# Patient Record
Sex: MALE | Race: WHITE | NOT HISPANIC OR LATINO | Employment: OTHER | URBAN - NONMETROPOLITAN AREA
[De-identification: names, ages, dates, MRNs, and addresses within clinical notes are randomized per-mention and may not be internally consistent; named-entity substitution may affect disease eponyms.]

---

## 2023-03-14 ENCOUNTER — TELEPHONE (OUTPATIENT)
Dept: PRIMARY CARE | Facility: CLINIC | Age: 73
End: 2023-03-14

## 2023-03-14 ENCOUNTER — OFFICE VISIT (OUTPATIENT)
Dept: PRIMARY CARE | Facility: CLINIC | Age: 73
End: 2023-03-14
Payer: MEDICARE

## 2023-03-14 VITALS
DIASTOLIC BLOOD PRESSURE: 72 MMHG | WEIGHT: 219 LBS | HEIGHT: 70 IN | RESPIRATION RATE: 16 BRPM | BODY MASS INDEX: 31.35 KG/M2 | HEART RATE: 78 BPM | OXYGEN SATURATION: 98 % | SYSTOLIC BLOOD PRESSURE: 152 MMHG | TEMPERATURE: 97.7 F

## 2023-03-14 DIAGNOSIS — R68.84 JAW PAIN: Primary | ICD-10-CM

## 2023-03-14 DIAGNOSIS — R03.0 ELEVATED BLOOD PRESSURE READING: ICD-10-CM

## 2023-03-14 DIAGNOSIS — C90.00 MULTIPLE MYELOMA, REMISSION STATUS UNSPECIFIED (MULTI): ICD-10-CM

## 2023-03-14 PROBLEM — M89.9 BONE LESION: Status: ACTIVE | Noted: 2023-03-14

## 2023-03-14 PROBLEM — M84.359A STRESS FRACTURE OF HIP: Status: ACTIVE | Noted: 2023-03-14

## 2023-03-14 PROBLEM — M16.10 HIP ARTHRITIS: Status: ACTIVE | Noted: 2023-03-14

## 2023-03-14 PROBLEM — M17.9 ARTHRITIS OF KNEE, DEGENERATIVE: Status: ACTIVE | Noted: 2023-03-14

## 2023-03-14 PROBLEM — R79.89 ELEVATED SERUM CREATININE: Status: ACTIVE | Noted: 2023-03-14

## 2023-03-14 PROBLEM — N52.9 ERECTILE DYSFUNCTION: Status: ACTIVE | Noted: 2023-03-14

## 2023-03-14 PROBLEM — M79.672 LEFT FOOT PAIN: Status: ACTIVE | Noted: 2023-03-14

## 2023-03-14 PROBLEM — R94.4 DECREASED GFR: Status: ACTIVE | Noted: 2023-03-14

## 2023-03-14 PROBLEM — S69.91XA FINGER INJURY, RIGHT, INITIAL ENCOUNTER: Status: ACTIVE | Noted: 2023-03-14

## 2023-03-14 PROBLEM — I51.9 LEFT VENTRICULAR DIASTOLIC DYSFUNCTION: Status: ACTIVE | Noted: 2023-03-14

## 2023-03-14 PROBLEM — M79.89 SWELLING OF EXTREMITY, LEFT: Status: ACTIVE | Noted: 2023-03-14

## 2023-03-14 PROBLEM — I95.1 AUTONOMIC ORTHOSTATIC HYPOTENSION: Status: ACTIVE | Noted: 2023-03-14

## 2023-03-14 PROBLEM — S42.002A FRACTURE OF CLAVICLE, LEFT, CLOSED: Status: ACTIVE | Noted: 2023-03-14

## 2023-03-14 PROBLEM — I82.402 ACUTE THROMBOEMBOLISM OF DEEP VEINS OF LEFT LOWER EXTREMITY (MULTI): Status: ACTIVE | Noted: 2023-03-14

## 2023-03-14 PROBLEM — I10 BENIGN ESSENTIAL HYPERTENSION: Status: ACTIVE | Noted: 2023-03-14

## 2023-03-14 PROBLEM — M48.02 CERVICAL STENOSIS OF SPINE: Status: ACTIVE | Noted: 2023-03-14

## 2023-03-14 PROBLEM — G47.00 INSOMNIA: Status: ACTIVE | Noted: 2023-03-14

## 2023-03-14 PROBLEM — M25.551 ACUTE PAIN OF RIGHT HIP: Status: ACTIVE | Noted: 2023-03-14

## 2023-03-14 PROBLEM — Z94.84 STEM CELLS TRANSPLANT STATUS (MULTI): Status: ACTIVE | Noted: 2023-03-14

## 2023-03-14 PROCEDURE — 3077F SYST BP >= 140 MM HG: CPT | Performed by: FAMILY MEDICINE

## 2023-03-14 PROCEDURE — 99213 OFFICE O/P EST LOW 20 MIN: CPT | Performed by: FAMILY MEDICINE

## 2023-03-14 PROCEDURE — 1159F MED LIST DOCD IN RCRD: CPT | Performed by: FAMILY MEDICINE

## 2023-03-14 PROCEDURE — 3078F DIAST BP <80 MM HG: CPT | Performed by: FAMILY MEDICINE

## 2023-03-14 PROCEDURE — 1160F RVW MEDS BY RX/DR IN RCRD: CPT | Performed by: FAMILY MEDICINE

## 2023-03-14 RX ORDER — CYCLOBENZAPRINE HCL 5 MG
5 TABLET ORAL 3 TIMES DAILY PRN
Qty: 30 TABLET | Refills: 0 | Status: SHIPPED | OUTPATIENT
Start: 2023-03-14 | End: 2023-05-13

## 2023-03-14 RX ORDER — APIXABAN 5 MG/1
5 TABLET, FILM COATED ORAL 2 TIMES DAILY
COMMUNITY
Start: 2023-02-07 | End: 2024-01-25 | Stop reason: ALTCHOICE

## 2023-03-14 RX ORDER — ACYCLOVIR 400 MG/1
400 TABLET ORAL 2 TIMES DAILY
COMMUNITY
End: 2024-01-02 | Stop reason: SDUPTHER

## 2023-03-14 RX ORDER — TRAZODONE HYDROCHLORIDE 100 MG/1
100 TABLET ORAL 3 TIMES DAILY
COMMUNITY
Start: 2015-04-17 | End: 2023-04-05 | Stop reason: SDUPTHER

## 2023-03-14 ASSESSMENT — ENCOUNTER SYMPTOMS
SHORTNESS OF BREATH: 0
PALPITATIONS: 0
SORE THROAT: 0
COUGH: 0
FEVER: 0
FATIGUE: 0
CHILLS: 0

## 2023-03-14 NOTE — TELEPHONE ENCOUNTER
Please call and let him know I spoke to Dr. Almodovar as well following his visit today- he suggest seeing Dentist first and getting xrays updated then.  May need CT scan of the jaw, either Dentist or we can order.

## 2023-03-14 NOTE — PROGRESS NOTES
"Subjective   Patient ID: Maldonado Mccloud is a 72 y.o. male who presents for Jaw Pain (Pain when he opens is mouth, pain will go up to his right eye /Swallowing will hurt right side only ).    HPI     Here today for right sided jaw pain, worse when opening mouth, pain radiates up towards right eye  He isn't sure what he did that caused it   Pain in throat when swallowing, right side only  Bothering him for 10 days or so   No recent illness  No injuries   No redness  No rashes  Pain described as dull   Headache right side and ear pain as well, feels like coming from jaw    No vision changes   Compliant with dental checks every 6 months   Scheduled to see Dentist on Monday   No teeth pain     Elevated BP- not on meds     Multiple myeloma- diagnosed about 8 years ago- was taken off his meds about 3 months for a hip replacement, will be going back on chemo soon     Can't take NSAIDs because he is on eliquis       Review of Systems   Constitutional:  Negative for chills, fatigue and fever.   HENT:  Negative for dental problem and sore throat.    Respiratory:  Negative for cough and shortness of breath.    Cardiovascular:  Negative for chest pain and palpitations.       Objective   /72 (BP Location: Left arm, Patient Position: Sitting, BP Cuff Size: Adult)   Pulse 78   Temp 36.5 °C (97.7 °F) (Temporal)   Resp 16   Ht 1.778 m (5' 10\")   Wt 99.3 kg (219 lb)   SpO2 98%   BMI 31.42 kg/m²     Physical Exam    Constitutional: Well developed, well nourished, alert and in no acute distress.  Head and Face: NC/AT  Eyes: Normal external exam.   ENT: External inspection of ears normal, tympanic membranes visualized and normal. Oral mucosa moist, oropharynx clear without tonsillar exudate or erythema.  Right side of jaw is tender, no dislocation or subluxation present.    Neck: Supple. No cervical lymphadenopathy.   Skin: Warm, well perfused, normal skin turgor and color.   Neurologic: Cranial nerves II-XII grossly " intact.      Assessment/Plan   Problem List Items Addressed This Visit          Other    Multiple myeloma (CMS/HCC)     Other Visit Diagnoses       Jaw pain    -  Primary    Right sided  Seeing Dentist Monday, will defer imaging to them, may need CT scan   Referral placed to ENT as well with history of cancer    Relevant Medications    cyclobenzaprine (Flexeril) 5 mg tablet    Other Relevant Orders    Referral to ENT    Elevated blood pressure reading        Follow up back in office next week for nurse visit check          Danica Dawson,   3/14/2023

## 2023-04-05 DIAGNOSIS — G47.09 OTHER INSOMNIA: ICD-10-CM

## 2023-04-05 RX ORDER — TRAZODONE HYDROCHLORIDE 100 MG/1
TABLET ORAL
Qty: 270 TABLET | Refills: 1 | Status: SHIPPED | OUTPATIENT
Start: 2023-04-05 | End: 2023-08-23 | Stop reason: SDUPTHER

## 2023-04-05 NOTE — TELEPHONE ENCOUNTER
Pt called for a med refill. Pt needs a refill on his Trazodone. He would like it sent to his mail away co. [facility]. Pt has an ov 8/23/23 with [facility].

## 2023-05-26 ENCOUNTER — HOSPITAL ENCOUNTER (OUTPATIENT)
Dept: DATA CONVERSION | Facility: HOSPITAL | Age: 73
End: 2023-05-26
Attending: INTERNAL MEDICINE | Admitting: INTERNAL MEDICINE
Payer: MEDICARE

## 2023-05-26 DIAGNOSIS — F17.290 NICOTINE DEPENDENCE, OTHER TOBACCO PRODUCT, UNCOMPLICATED: ICD-10-CM

## 2023-05-26 DIAGNOSIS — Z88.0 ALLERGY STATUS TO PENICILLIN: ICD-10-CM

## 2023-05-26 DIAGNOSIS — Z76.82 AWAITING ORGAN TRANSPLANT STATUS: ICD-10-CM

## 2023-05-26 DIAGNOSIS — C90.00 MULTIPLE MYELOMA NOT HAVING ACHIEVED REMISSION (MULTI): ICD-10-CM

## 2023-06-26 LAB
BASOPHILS (10*3/UL) IN BLOOD BY AUTOMATED COUNT: 0.01 X10E9/L (ref 0–0.1)
BASOPHILS/100 LEUKOCYTES IN BLOOD BY AUTOMATED COUNT: 0.4 % (ref 0–2)
EOSINOPHILS (10*3/UL) IN BLOOD BY AUTOMATED COUNT: 0.03 X10E9/L (ref 0–0.4)
EOSINOPHILS/100 LEUKOCYTES IN BLOOD BY AUTOMATED COUNT: 1.3 % (ref 0–6)
ERYTHROCYTE DISTRIBUTION WIDTH (RATIO) BY AUTOMATED COUNT: 13 % (ref 11.5–14.5)
ERYTHROCYTE MEAN CORPUSCULAR HEMOGLOBIN CONCENTRATION (G/DL) BY AUTOMATED: 33.8 G/DL (ref 32–36)
ERYTHROCYTE MEAN CORPUSCULAR VOLUME (FL) BY AUTOMATED COUNT: 106 FL (ref 80–100)
ERYTHROCYTES (10*6/UL) IN BLOOD BY AUTOMATED COUNT: 2.99 X10E12/L (ref 4.5–5.9)
HEMATOCRIT (%) IN BLOOD BY AUTOMATED COUNT: 31.7 % (ref 41–52)
HEMOGLOBIN (G/DL) IN BLOOD: 10.7 G/DL (ref 13.5–17.5)
IMMATURE GRANULOCYTES/100 LEUKOCYTES IN BLOOD BY AUTOMATED COUNT: 0.4 % (ref 0–0.9)
LEUKOCYTES (10*3/UL) IN BLOOD BY AUTOMATED COUNT: 2.3 X10E9/L (ref 4.4–11.3)
LYMPHOCYTES (10*3/UL) IN BLOOD BY AUTOMATED COUNT: 0.59 X10E9/L (ref 0.8–3)
LYMPHOCYTES/100 LEUKOCYTES IN BLOOD BY AUTOMATED COUNT: 25.5 % (ref 13–44)
MONOCYTES (10*3/UL) IN BLOOD BY AUTOMATED COUNT: 0.35 X10E9/L (ref 0.05–0.8)
MONOCYTES/100 LEUKOCYTES IN BLOOD BY AUTOMATED COUNT: 15.2 % (ref 2–10)
NEUTROPHILS (10*3/UL) IN BLOOD BY AUTOMATED COUNT: 1.32 X10E9/L (ref 1.6–5.5)
NEUTROPHILS/100 LEUKOCYTES IN BLOOD BY AUTOMATED COUNT: 57.2 % (ref 40–80)
PLATELETS (10*3/UL) IN BLOOD AUTOMATED COUNT: 128 X10E9/L (ref 150–450)

## 2023-06-27 LAB
ALANINE AMINOTRANSFERASE (SGPT) (U/L) IN SER/PLAS: 26 U/L (ref 10–52)
ALBUMIN (G/DL) IN SER/PLAS: 3.7 G/DL (ref 3.4–5)
ALKALINE PHOSPHATASE (U/L) IN SER/PLAS: 70 U/L (ref 33–136)
ANION GAP IN SER/PLAS: 12 MMOL/L (ref 10–20)
ASPARTATE AMINOTRANSFERASE (SGOT) (U/L) IN SER/PLAS: 28 U/L (ref 9–39)
BILIRUBIN TOTAL (MG/DL) IN SER/PLAS: 0.6 MG/DL (ref 0–1.2)
CALCIUM (MG/DL) IN SER/PLAS: 9 MG/DL (ref 8.6–10.6)
CARBON DIOXIDE, TOTAL (MMOL/L) IN SER/PLAS: 25 MMOL/L (ref 21–32)
CHLORIDE (MMOL/L) IN SER/PLAS: 112 MMOL/L (ref 98–107)
CREATININE (MG/DL) IN SER/PLAS: 1.22 MG/DL (ref 0.5–1.3)
GFR MALE: 62 ML/MIN/1.73M2
GLUCOSE (MG/DL) IN SER/PLAS: 89 MG/DL (ref 74–99)
IGA (MG/DL) IN SER/PLAS: <7 MG/DL (ref 70–400)
IGG (MG/DL) IN SER/PLAS: 1060 MG/DL (ref 700–1600)
IGM (MG/DL) IN SER/PLAS: 11 MG/DL (ref 40–230)
IMMUNOGLOBULIN LIGHT CHAINS KAPPA/LAMBDA (MASS RATIO) IN SERUM: 16.14 (ref 0.26–1.65)
IMMUNOGLOBULIN LIGHT CHAINS.KAPPA (MG/DL) IN SERUM: 4.68 MG/DL (ref 0.33–1.94)
IMMUNOGLOBULIN LIGHT CHAINS.LAMBDA (MG/DL) IN SERUM: 0.29 MG/DL (ref 0.57–2.63)
LACTATE DEHYDROGENASE (U/L) IN SER/PLAS BY LAC->PYR RXN: 141 U/L (ref 84–246)
POTASSIUM (MMOL/L) IN SER/PLAS: 4.8 MMOL/L (ref 3.5–5.3)
PROTEIN TOTAL: 6.2 G/DL (ref 6.4–8.2)
SODIUM (MMOL/L) IN SER/PLAS: 144 MMOL/L (ref 136–145)
UREA NITROGEN (MG/DL) IN SER/PLAS: 27 MG/DL (ref 6–23)

## 2023-07-01 LAB
ALBUMIN ELP: 3.6 G/DL (ref 3.4–5)
ALPHA 1: 0.4 G/DL (ref 0.2–0.6)
ALPHA 2: 0.8 G/DL (ref 0.4–1.1)
BETA: 0.6 G/DL (ref 0.5–1.2)
GAMMA GLOBULIN: 0.9 G/DL (ref 0.5–1.4)
PATH REVIEW - SERUM IMMUNOFIXATION: NORMAL
PATH REVIEW-SERUM PROTEIN ELECTROPHORESIS: NORMAL
PROTEIN ELECTROPHORESIS INTERPRETATION: NORMAL
PROTEIN TOTAL: 6.2 G/DL (ref 6.4–8.2)
SERUM IMMUNOFIXATION INTERPRETATION: NORMAL

## 2023-08-22 PROBLEM — R05.1 ACUTE COUGH: Status: ACTIVE | Noted: 2023-08-22

## 2023-08-22 PROBLEM — I48.91 ATRIAL FIBRILLATION (MULTI): Status: ACTIVE | Noted: 2023-08-22

## 2023-08-22 PROBLEM — M75.52 BURSITIS OF LEFT SHOULDER: Status: ACTIVE | Noted: 2023-08-22

## 2023-08-23 ENCOUNTER — OFFICE VISIT (OUTPATIENT)
Dept: PRIMARY CARE | Facility: CLINIC | Age: 73
End: 2023-08-23
Payer: MEDICARE

## 2023-08-23 VITALS
DIASTOLIC BLOOD PRESSURE: 73 MMHG | HEART RATE: 76 BPM | SYSTOLIC BLOOD PRESSURE: 123 MMHG | WEIGHT: 213.1 LBS | TEMPERATURE: 97.9 F | BODY MASS INDEX: 30.58 KG/M2 | OXYGEN SATURATION: 96 % | RESPIRATION RATE: 16 BRPM

## 2023-08-23 DIAGNOSIS — Z00.00 MEDICARE ANNUAL WELLNESS VISIT, SUBSEQUENT: ICD-10-CM

## 2023-08-23 DIAGNOSIS — C90.00 MULTIPLE MYELOMA NOT HAVING ACHIEVED REMISSION (MULTI): ICD-10-CM

## 2023-08-23 DIAGNOSIS — G47.09 OTHER INSOMNIA: Primary | ICD-10-CM

## 2023-08-23 PROBLEM — I10 HTN (HYPERTENSION): Status: ACTIVE | Noted: 2023-08-23

## 2023-08-23 PROCEDURE — 1170F FXNL STATUS ASSESSED: CPT | Performed by: FAMILY MEDICINE

## 2023-08-23 PROCEDURE — 1160F RVW MEDS BY RX/DR IN RCRD: CPT | Performed by: FAMILY MEDICINE

## 2023-08-23 PROCEDURE — 1125F AMNT PAIN NOTED PAIN PRSNT: CPT | Performed by: FAMILY MEDICINE

## 2023-08-23 PROCEDURE — 3074F SYST BP LT 130 MM HG: CPT | Performed by: FAMILY MEDICINE

## 2023-08-23 PROCEDURE — 3078F DIAST BP <80 MM HG: CPT | Performed by: FAMILY MEDICINE

## 2023-08-23 PROCEDURE — 1159F MED LIST DOCD IN RCRD: CPT | Performed by: FAMILY MEDICINE

## 2023-08-23 PROCEDURE — G0439 PPPS, SUBSEQ VISIT: HCPCS | Performed by: FAMILY MEDICINE

## 2023-08-23 RX ORDER — OXYCODONE HYDROCHLORIDE 5 MG/1
5 CAPSULE ORAL EVERY 6 HOURS PRN
COMMUNITY
End: 2023-09-12 | Stop reason: WASHOUT

## 2023-08-23 RX ORDER — TRAZODONE HYDROCHLORIDE 100 MG/1
TABLET ORAL
Qty: 270 TABLET | Refills: 1 | Status: SHIPPED | OUTPATIENT
Start: 2023-08-23 | End: 2023-11-30 | Stop reason: SDUPTHER

## 2023-08-23 ASSESSMENT — ACTIVITIES OF DAILY LIVING (ADL)
DRESSING: INDEPENDENT
DOING_HOUSEWORK: INDEPENDENT
TAKING_MEDICATION: INDEPENDENT
GROCERY_SHOPPING: INDEPENDENT
MANAGING_FINANCES: INDEPENDENT
BATHING: INDEPENDENT

## 2023-08-23 ASSESSMENT — PATIENT HEALTH QUESTIONNAIRE - PHQ9
SUM OF ALL RESPONSES TO PHQ9 QUESTIONS 1 AND 2: 0
2. FEELING DOWN, DEPRESSED OR HOPELESS: NOT AT ALL
1. LITTLE INTEREST OR PLEASURE IN DOING THINGS: NOT AT ALL

## 2023-08-23 ASSESSMENT — PAIN SCALES - GENERAL: PAINLEVEL: 8

## 2023-08-23 NOTE — PROGRESS NOTES
Subjective   Patient ID: Maldonado Mccloud is a 72 y.o. male who presents for Medicare Annual Wellness Visit Subsequent.    HPI   Les was seen today for a routine follow-up of his insomnia, for which he takes trazodone nightly.  He has been on this regimen for many years, tolerates it well, has no a.m. side effects.  His multiple myeloma treatment is ongoing, has had multiple sites radiated in recent months.  He will soon see an orthopedic oncologist for an opinion.  Labs reviewed, white blood cell count is typically a little low, hemoglobin in the 11 range.  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  He is not quite to the point where he would consider resuming it, or trying pregabalin.  Review of Systems  The full, 10+ multi-organ review of systems, is within normal limits with the exception of what is noted above in HPI.  Objective   /73 (BP Location: Right arm, Patient Position: Sitting, BP Cuff Size: Adult)   Pulse 76   Temp 36.6 °C (97.9 °F) (Temporal)   Resp 16   Wt 96.7 kg (213 lb 1.6 oz)   SpO2 96%   BMI 30.58 kg/m²     Physical Exam  Constitutional/General appearance: alert, oriented, well-appearing, in no distress  Head and face exam is normal  No scleral icterus or conjunctival erythema present  Hearing is grossly normal  Respiratory effort is normal, no dyspnea noted  Cortical function is normal  Mood, affect, are pleasant, appropriate, and interactive.  Insight is normal  Cardiac exam reveals a regular rate and rhythm  Lungs are clear bilaterally.    No lower extremity edema present.    Assessment/Plan     Insomnia, continue trazodone as prescribed, refill sent.    Multiple myeloma, care ongoing.  He does have oxycodone on hand for severe pain, as needed.  This is prescribed by oncology.    He continues Eliquis long-term for history of DVT.    Medicare gaps reviewed, PSA, colon  cancer screening not needed at present.  Pneumovax series up-to-date, flu shot soon.  Discussed Shingrix vaccine.    Follow-up in 6 months    **Portions of this medical record have been created using voice recognition software and may have minor errors which are inherent in voice recognition systems. It has not been fully edited for typographical or grammatical errors**

## 2023-08-23 NOTE — PROGRESS NOTES
No concerns to addres    sSubjective   Reason for Visit: Maldonado Mccloud is an 72 y.o. male here for a Medicare Wellness visit.          Reviewed all medications by prescribing practitioner or clinical pharmacist (such as prescriptions, OTCs, herbal therapies and supplements) and documented in the medical record.    HPI    Patient Care Team:  Timothy Almodovar MD as PCP - General  Timothy Almodovar MD as PCP - [employer] PCP     Review of Systems    Objective   Vitals:  There were no vitals taken for this visit.      Physical Exam    Assessment/Plan   Problem List Items Addressed This Visit    None

## 2023-09-06 DIAGNOSIS — G47.09 OTHER INSOMNIA: ICD-10-CM

## 2023-09-06 RX ORDER — TRAZODONE HYDROCHLORIDE 100 MG/1
TABLET ORAL
Qty: 270 TABLET | Refills: 1 | OUTPATIENT
Start: 2023-09-06

## 2023-09-11 NOTE — PROGRESS NOTES
Subjective      HPI:    Maldonado Gant Is 72 y.o.. male. Here for acute visit-     PCP is - Dr Almodovar pt         Chief Complaint   Patient presents with    Cough     X 5 days with wheezing.  Productive in the day with unknown colored sputum.    Immunizations     No flu vaccine due to illness         What concern/ problem/pain/symptom  brings you here today?  Cough and wheezing x 5 days      how long has pt had sxs?  5 days.  He states he gets this every year and it turns into bronchitis.      describe symptoms-  cough, wheezing, sob during coughing     fever- no  nausea- no  vomiting- no  SOB-  during coughing episodes  CP- no      how often do symptoms occur?  On and off all day      has pt tried anything for current symptoms, including medications (OTC or prescription)  ?  Dayquil and Nyquil with some rellief      what makes symptoms worse?  Nothing. Smokes 2 cigars daily and denies that it worsens his symptoms.      has pt been seen recently for this problem ( within past 2-3 weeks) ?  No      Social History     Tobacco Use   Smoking Status Every Day    Types: Cigars   Smokeless Tobacco Never        reports that he does not currently use alcohol.       Objective            Vitals:    09/12/23 0933   BP: 148/83   BP Location: Left arm   Patient Position: Sitting   BP Cuff Size: Adult   Pulse: 79   Temp: 36.9 °C (98.5 °F)   TempSrc: Temporal   SpO2: 95%   Weight: 95.8 kg (211 lb 4.8 oz)           PHYSICAL:      CONSTITUTIONAL- NAD, Pt is A and O x3, Vital signs are within normal limits, well- hydrated, non-toxic   General Appearance- normal , good hygiene, talks easily  EYES- conjunctiva and lids- normal  EARS/NOSE-TM's normal, head normocephalic and atraumatic, nasopharynx-no nasal discharge, no trismus, no hot potato voice, oropharynx- normal  NECK- supple, FROM  LYMPH- no cervical lymph nodes palpated   CV- RRR without murmur  PULM- mild rhonchi, scattered  exp wheezes bilaterally       Respiratory effort-  normal respiratory effort , no retractions or nasal flaring   ABDOMEN- normoactive BS's   EXTREMITIES- no edema or varicosities           SKIN- no abnormal skin lesions on inspection or palpation   NEURO- no focal deficits  PSYCH- pleasant, normal judgement and insight            BP Readings from Last 3 Encounters:   09/12/23 148/83   08/23/23 123/73   05/02/23 145/85         Wt Readings from Last 3 Encounters:   09/12/23 95.8 kg (211 lb 4.8 oz)   08/23/23 96.7 kg (213 lb 1.6 oz)   05/02/23 93.4 kg (206 lb)       BMI Readings from Last 3 Encounters:   09/12/23 30.91 kg/m²   08/23/23 31.17 kg/m²   05/02/23 30.13 kg/m²           The number and complexity of problems addressed is considered moderate.  The amount and/or complexity of data reviewed and analyzed is considered moderate. The risk of complications and/or morbidity/mortality of patient is considered moderate. Overall, this patient encounter is considered a moderate risk visit.      What are antibiotics?  Antibiotics are medicines that help people fight infections caused by bacteria. They work by killing bacteria that are in the body. These medicines come in many different forms, including pills, ointments, and liquids that are given by injection.    Antibiotics can do a lot of good. For people with serious bacterial infections, antibiotics can save lives. But people use them far too often, even when they're not needed. This is causing a very serious problem called antibiotic resistance. Antibiotic resistance happens when bacteria that have been exposed to an antibiotic change so that the antibiotic can no longer kill them. In those bacteria, the antibiotic has no effect. Because of this problem, doctors are having a harder and harder time treating infections. Experts worry that there will soon be infections that don't respond to any antibiotics.    When are antibiotics helpful?  Antibiotics can help people fight off infections caused by bacteria. They do not  work on infections caused by viruses.    Some common bacterial infections that are treated with antibiotics include:    Strep throat    Pneumonia (an infection of the lungs)    Bladder infections    Infections you catch through sex, such as gonorrhea and chlamydia    When are antibiotics NOT helpful?    Antibiotics do not work on infections caused by viruses.    Antibiotics are not helpful for the common cold, because the common cold is caused by a virus.    Antibiotics are not helpful for the flu, because the flu is caused by a virus. (People with the flu can be treated with medicines called antiviral medicines, which are different from antibiotics.)      Even though antibiotics don't work on infections caused by viruses, people sometimes believe that they do. That's because they took antibiotics for a viral infection before and then got better. The problem is that those people would have gotten better with or without an antibiotic. When they get better with the antibiotic, they think that's what cured them, when in reality the antibiotic had nothing to do with it.    What's the harm in taking antibiotics even if they might not help?  There are many reasons you should not take antibiotics unless you absolutely need them:    Antibiotics cause side effects, such as nausea, vomiting, and diarrhea. They can even make it more likely that you will get a different kind of infection, such as yeast infection (if you are a woman).    Allergies to antibiotics are common. You can develop an allergy to an antibiotic, even if you have not had a problem with it before. Some allergies are just unpleasant, causing rashes and itching. But some can be very serious and even life-threatening. It is better to avoid any risk of an allergy, if the antibiotic wouldn't help you anyway.    Overuse of antibiotics leads to antibiotic resistance. Using antibiotics when they are not needed gives bacteria a chance to change, so that the  "antibiotics cannot hurt them later on. People who have infections caused by antibiotic-resistant bacteria often have to be treated in the hospital with many different antibiotics. People can even die from these infections, because there is no antibiotic that will cure them.    When should I take antibiotics?  You should take antibiotics only when a doctor or nurse prescribes them to you. You should never take antibiotics prescribed to someone else, and you should not take antibiotics that were prescribed to you for a previous illness. When prescribing an antibiotic, doctors and nurses have to carefully pick the right antibiotic for a particular infection. Not all antibiotics work on all bacteria.    If you do have an infection caused by a bacteria, your doctor or nurse might want to find out what that bacteria is, and which antibiotics can kill it. They do this by taking a \"culture\" of the bacteria and growing it in the lab. But it's not possible to do a culture on someone who has already started an antibiotic. So if you start an antibiotic without input from a doctor or nurse it will be impossible to know if you have taken the right one.    What can I do to reduce antibiotic resistance?  Here are some things that can help:    Do not pressure your doctor or nurse for antibiotics when they do not think you need them.    If you are prescribed antibiotics, finish all of the medicine and take it exactly as directed. Never skip doses or stop taking the medicine without talking to your doctor or nurse.    Do not give antibiotics that were prescribed to you to anyone else.    What if my doctor prescribes an antibiotic that did not work for me before?  If an antibiotic did not work for you before, that does not mean it will never work for you. If you have used an antibiotic before and it did not work, tell your doctor. But keep in mind that the infection you had before might not have been caused by the same bacteria that " "you have now. The \"best\" antibiotic is the right one for the bacteria causing the infection, not for the person with the infection.    What if I am allergic to an antibiotic?  If you had a bad reaction to an antibiotic, tell your doctor or nurse about it. But do not assume you are allergic unless your doctor or nurse tells you that you are.    Many people who think they are allergic to an antibiotic are wrong. If you get nauseous after taking an antibiotic, that does not mean you are allergic to it. Nausea is a common side effect of many antibiotics. If you are a woman and you get a yeast infection after taking an antibiotic, that does not mean you are allergic to it. Yeast infections are a common side effect of antibiotics.    Symptoms of antibiotic allergy can be mild and include a flat rash and itching. They can also be more serious and include:    Hives - Hives are raised, red patches of skin that are usually very itchy (picture 1).    Lip or tongue swelling    Trouble swallowing or breathing    Serious allergy symptoms can start right after you start taking an antibiotic if you are very sensitive. Less serious symptoms, on the other hand, often start a day or more later.    Almost all antibiotics may cause possible side effects of allergic reaction, nausea, vomiting, diarrhea- most worrisome caused by C. diff, and secondary yeast infection.        Assessment/Plan        1. Cough  methylPREDNISolone (Medrol Dospak) 4 mg tablets    azithromycin (Zithromax Z-Donald) 250 mg tablet      2. Wheezing                Start albuterol inhaler with spacer- patient declines at this time       Start medrol dose pack      Start zpak                 Go to ED if symptoms worsen     "

## 2023-09-12 ENCOUNTER — OFFICE VISIT (OUTPATIENT)
Dept: PRIMARY CARE | Facility: CLINIC | Age: 73
End: 2023-09-12
Payer: MEDICARE

## 2023-09-12 VITALS
WEIGHT: 211.3 LBS | OXYGEN SATURATION: 95 % | HEART RATE: 79 BPM | DIASTOLIC BLOOD PRESSURE: 83 MMHG | SYSTOLIC BLOOD PRESSURE: 148 MMHG | TEMPERATURE: 98.5 F | BODY MASS INDEX: 30.91 KG/M2

## 2023-09-12 DIAGNOSIS — R05.9 COUGH, UNSPECIFIED TYPE: Primary | ICD-10-CM

## 2023-09-12 DIAGNOSIS — R06.2 WHEEZING: ICD-10-CM

## 2023-09-12 PROCEDURE — 3079F DIAST BP 80-89 MM HG: CPT | Performed by: FAMILY MEDICINE

## 2023-09-12 PROCEDURE — 1159F MED LIST DOCD IN RCRD: CPT | Performed by: FAMILY MEDICINE

## 2023-09-12 PROCEDURE — 1160F RVW MEDS BY RX/DR IN RCRD: CPT | Performed by: FAMILY MEDICINE

## 2023-09-12 PROCEDURE — 1125F AMNT PAIN NOTED PAIN PRSNT: CPT | Performed by: FAMILY MEDICINE

## 2023-09-12 PROCEDURE — 3077F SYST BP >= 140 MM HG: CPT | Performed by: FAMILY MEDICINE

## 2023-09-12 PROCEDURE — 99214 OFFICE O/P EST MOD 30 MIN: CPT | Performed by: FAMILY MEDICINE

## 2023-09-12 RX ORDER — AZITHROMYCIN 250 MG/1
TABLET, FILM COATED ORAL
Qty: 6 TABLET | Refills: 0 | Status: SHIPPED | OUTPATIENT
Start: 2023-09-12 | End: 2023-10-02 | Stop reason: ALTCHOICE

## 2023-09-12 RX ORDER — METHYLPREDNISOLONE 4 MG/1
TABLET ORAL
Qty: 21 TABLET | Refills: 0 | Status: SHIPPED | OUTPATIENT
Start: 2023-09-12 | End: 2023-10-02 | Stop reason: ALTCHOICE

## 2023-09-20 VITALS — HEIGHT: 69 IN | WEIGHT: 209.66 LBS | BODY MASS INDEX: 31.05 KG/M2

## 2023-09-20 DIAGNOSIS — C90.00 IGG MULTIPLE MYELOMA (MULTI): Primary | ICD-10-CM

## 2023-09-20 PROBLEM — D80.1 HYPOGAMMAGLOBULINEMIA DUE TO MULTIPLE MYELOMA (MULTI): Status: ACTIVE | Noted: 2023-09-20

## 2023-09-20 RX ORDER — ALBUTEROL SULFATE 0.83 MG/ML
3 SOLUTION RESPIRATORY (INHALATION) AS NEEDED
Status: CANCELLED | OUTPATIENT
Start: 2023-10-02

## 2023-09-20 RX ORDER — DIPHENHYDRAMINE HCL 25 MG
25 CAPSULE ORAL ONCE
Status: CANCELLED | OUTPATIENT
Start: 2023-10-02

## 2023-09-20 RX ORDER — ACETAMINOPHEN 325 MG/1
650 TABLET ORAL ONCE
Status: CANCELLED | OUTPATIENT
Start: 2023-10-02

## 2023-09-20 RX ORDER — EPINEPHRINE 0.3 MG/.3ML
0.3 INJECTION SUBCUTANEOUS EVERY 5 MIN PRN
Status: CANCELLED | OUTPATIENT
Start: 2023-10-02

## 2023-09-20 RX ORDER — FAMOTIDINE 10 MG/ML
20 INJECTION INTRAVENOUS ONCE AS NEEDED
Status: CANCELLED | OUTPATIENT
Start: 2023-10-02

## 2023-09-20 RX ORDER — DIPHENHYDRAMINE HYDROCHLORIDE 50 MG/ML
50 INJECTION INTRAMUSCULAR; INTRAVENOUS AS NEEDED
Status: CANCELLED | OUTPATIENT
Start: 2023-10-02

## 2023-09-22 ENCOUNTER — TELEPHONE (OUTPATIENT)
Dept: CARDIOLOGY | Facility: CLINIC | Age: 73
End: 2023-09-22

## 2023-09-27 DIAGNOSIS — C90.00 IGG MULTIPLE MYELOMA (MULTI): Primary | ICD-10-CM

## 2023-10-02 ENCOUNTER — APPOINTMENT (OUTPATIENT)
Dept: LAB | Facility: LAB | Age: 73
End: 2023-10-02
Payer: COMMERCIAL

## 2023-10-02 ENCOUNTER — APPOINTMENT (OUTPATIENT)
Dept: HEMATOLOGY/ONCOLOGY | Facility: CLINIC | Age: 73
End: 2023-10-02
Payer: MEDICARE

## 2023-10-02 ENCOUNTER — INFUSION (OUTPATIENT)
Dept: HEMATOLOGY/ONCOLOGY | Facility: CLINIC | Age: 73
End: 2023-10-02
Payer: MEDICARE

## 2023-10-02 ENCOUNTER — PROCEDURE VISIT (OUTPATIENT)
Dept: HEMATOLOGY/ONCOLOGY | Facility: CLINIC | Age: 73
End: 2023-10-02
Payer: MEDICARE

## 2023-10-02 VITALS
HEART RATE: 68 BPM | WEIGHT: 208.67 LBS | OXYGEN SATURATION: 99 % | TEMPERATURE: 98.2 F | DIASTOLIC BLOOD PRESSURE: 78 MMHG | SYSTOLIC BLOOD PRESSURE: 130 MMHG | HEIGHT: 69 IN | BODY MASS INDEX: 30.91 KG/M2

## 2023-10-02 DIAGNOSIS — Z76.82 STEM CELL TRANSPLANT CANDIDATE: ICD-10-CM

## 2023-10-02 DIAGNOSIS — C90.00 IGG MULTIPLE MYELOMA (MULTI): Primary | ICD-10-CM

## 2023-10-02 DIAGNOSIS — C90.00 MULTIPLE MYELOMA, REMISSION STATUS UNSPECIFIED (MULTI): ICD-10-CM

## 2023-10-02 DIAGNOSIS — C90.00 MULTIPLE MYELOMA, REMISSION STATUS UNSPECIFIED (MULTI): Primary | ICD-10-CM

## 2023-10-02 PROBLEM — G62.2 POLYNEUROPATHY DUE TO OTHER TOXIC AGENTS (MULTI): Status: ACTIVE | Noted: 2023-10-02

## 2023-10-02 PROBLEM — E55.9 VITAMIN D DEFICIENCY: Status: ACTIVE | Noted: 2023-10-02

## 2023-10-02 PROBLEM — C90.30 PLASMACYTOMA (MULTI): Status: ACTIVE | Noted: 2023-10-02

## 2023-10-02 PROBLEM — R50.9 FEVER: Status: ACTIVE | Noted: 2023-10-02

## 2023-10-02 PROBLEM — M16.9 OSTEOARTHRITIS OF HIP: Status: ACTIVE | Noted: 2023-10-02

## 2023-10-02 LAB
BASOPHILS # BLD AUTO: 0.01 X10*3/UL (ref 0–0.1)
BASOPHILS NFR BLD AUTO: 0.3 %
CMV IGG AVIDITY SERPL IA-RTO: REACTIVE %
EOSINOPHIL # BLD AUTO: 0.09 X10*3/UL (ref 0–0.4)
EOSINOPHIL NFR BLD AUTO: 2.9 %
ERYTHROCYTE [DISTWIDTH] IN BLOOD BY AUTOMATED COUNT: 13.1 % (ref 11.5–14.5)
HBV CORE AB SER QL: NONREACTIVE
HBV CORE IGM SER QL: NONREACTIVE
HBV SURFACE AB SER-ACNC: 213.1 MIU/ML
HBV SURFACE AG SERPL QL IA: NONREACTIVE
HCT VFR BLD AUTO: 35 % (ref 41–52)
HCV AB SER QL: NONREACTIVE
HGB BLD-MCNC: 12.2 G/DL (ref 13.5–17.5)
HIV 1+2 AB+HIV1 P24 AG SERPL QL IA: NONREACTIVE
IGA SERPL-MCNC: <7 MG/DL (ref 70–400)
IGG SERPL-MCNC: 670 MG/DL (ref 700–1600)
IGM SERPL-MCNC: 7 MG/DL (ref 40–230)
IMM GRANULOCYTES # BLD AUTO: 0.01 X10*3/UL (ref 0–0.5)
IMM GRANULOCYTES NFR BLD AUTO: 0.3 % (ref 0–0.9)
LYMPHOCYTES # BLD AUTO: 0.7 X10*3/UL (ref 0.8–3)
LYMPHOCYTES NFR BLD AUTO: 22.4 %
MCH RBC QN AUTO: 36.4 PG (ref 26–34)
MCHC RBC AUTO-ENTMCNC: 34.9 G/DL (ref 32–36)
MCV RBC AUTO: 105 FL (ref 80–100)
MONOCYTES # BLD AUTO: 0.4 X10*3/UL (ref 0.05–0.8)
MONOCYTES NFR BLD AUTO: 12.8 %
NEUTROPHILS # BLD AUTO: 1.91 X10*3/UL (ref 1.6–5.5)
NEUTROPHILS NFR BLD AUTO: 61.3 %
NRBC BLD-RTO: ABNORMAL /100{WBCS}
PLATELET # BLD AUTO: 97 X10*3/UL (ref 150–450)
PMV BLD AUTO: 8.7 FL (ref 7.5–11.5)
RBC # BLD AUTO: 3.35 X10*6/UL (ref 4.5–5.9)
T GONDII IGG SER-ACNC: NONREACTIVE
T PALLIDUM AB SER QL: NONREACTIVE
WBC # BLD AUTO: 3.1 X10*3/UL (ref 4.4–11.3)

## 2023-10-02 PROCEDURE — 86705 HEP B CORE ANTIBODY IGM: CPT

## 2023-10-02 PROCEDURE — 36415 COLL VENOUS BLD VENIPUNCTURE: CPT

## 2023-10-02 PROCEDURE — 86665 EPSTEIN-BARR CAPSID VCA: CPT

## 2023-10-02 PROCEDURE — 38222 DX BONE MARROW BX & ASPIR: CPT

## 2023-10-02 PROCEDURE — 86644 CMV ANTIBODY: CPT

## 2023-10-02 PROCEDURE — 88305 TISSUE EXAM BY PATHOLOGIST: CPT | Performed by: PATHOLOGY

## 2023-10-02 PROCEDURE — 88342 IMHCHEM/IMCYTCHM 1ST ANTB: CPT | Performed by: PATHOLOGY

## 2023-10-02 PROCEDURE — 88313 SPECIAL STAINS GROUP 2: CPT | Performed by: PATHOLOGY

## 2023-10-02 PROCEDURE — 88189 FLOWCYTOMETRY/READ 16 & >: CPT

## 2023-10-02 PROCEDURE — 88184 FLOWCYTOMETRY/ TC 1 MARKER: CPT | Mod: TC

## 2023-10-02 PROCEDURE — 87340 HEPATITIS B SURFACE AG IA: CPT

## 2023-10-02 PROCEDURE — 82784 ASSAY IGA/IGD/IGG/IGM EACH: CPT

## 2023-10-02 PROCEDURE — 99215 OFFICE O/P EST HI 40 MIN: CPT

## 2023-10-02 PROCEDURE — 87389 HIV-1 AG W/HIV-1&-2 AB AG IA: CPT

## 2023-10-02 PROCEDURE — 87799 DETECT AGENT NOS DNA QUANT: CPT

## 2023-10-02 PROCEDURE — 86780 TREPONEMA PALLIDUM: CPT

## 2023-10-02 PROCEDURE — 88341 IMHCHEM/IMCYTCHM EA ADD ANTB: CPT | Performed by: PATHOLOGY

## 2023-10-02 PROCEDURE — 84155 ASSAY OF PROTEIN SERUM: CPT | Mod: 59

## 2023-10-02 PROCEDURE — 86320 SERUM IMMUNOELECTROPHORESIS: CPT | Performed by: PATHOLOGY

## 2023-10-02 PROCEDURE — 88185 FLOWCYTOMETRY/TC ADD-ON: CPT | Mod: TC,MUE

## 2023-10-02 PROCEDURE — 86706 HEP B SURFACE ANTIBODY: CPT

## 2023-10-02 PROCEDURE — 85097 BONE MARROW INTERPRETATION: CPT | Mod: TC

## 2023-10-02 PROCEDURE — 86704 HEP B CORE ANTIBODY TOTAL: CPT

## 2023-10-02 PROCEDURE — 84165 PROTEIN E-PHORESIS SERUM: CPT | Performed by: PATHOLOGY

## 2023-10-02 PROCEDURE — 86334 IMMUNOFIX E-PHORESIS SERUM: CPT

## 2023-10-02 PROCEDURE — 80053 COMPREHEN METABOLIC PANEL: CPT

## 2023-10-02 PROCEDURE — 86695 HERPES SIMPLEX TYPE 1 TEST: CPT

## 2023-10-02 PROCEDURE — 86803 HEPATITIS C AB TEST: CPT

## 2023-10-02 PROCEDURE — 85025 COMPLETE CBC W/AUTO DIFF WBC: CPT

## 2023-10-02 PROCEDURE — 84550 ASSAY OF BLOOD/URIC ACID: CPT

## 2023-10-02 PROCEDURE — 83521 IG LIGHT CHAINS FREE EACH: CPT

## 2023-10-02 PROCEDURE — 86696 HERPES SIMPLEX TYPE 2 TEST: CPT

## 2023-10-02 PROCEDURE — 86645 CMV ANTIBODY IGM: CPT

## 2023-10-02 PROCEDURE — 88311 DECALCIFY TISSUE: CPT | Performed by: PATHOLOGY

## 2023-10-02 PROCEDURE — 86790 VIRUS ANTIBODY NOS: CPT

## 2023-10-02 PROCEDURE — 86787 VARICELLA-ZOSTER ANTIBODY: CPT

## 2023-10-02 PROCEDURE — 86777 TOXOPLASMA ANTIBODY: CPT

## 2023-10-02 PROCEDURE — 88305 TISSUE EXAM BY PATHOLOGIST: CPT | Mod: TC,59,SUR

## 2023-10-02 PROCEDURE — 86663 EPSTEIN-BARR ANTIBODY: CPT

## 2023-10-02 PROCEDURE — 99417 PROLNG OP E/M EACH 15 MIN: CPT

## 2023-10-02 PROCEDURE — 89240 UNLISTED MISC PATH TEST: CPT

## 2023-10-02 PROCEDURE — 88365 INSITU HYBRIDIZATION (FISH): CPT | Performed by: PATHOLOGY

## 2023-10-02 PROCEDURE — 84165 PROTEIN E-PHORESIS SERUM: CPT

## 2023-10-02 RX ORDER — HEPARIN SODIUM,PORCINE/PF 10 UNIT/ML
50 SYRINGE (ML) INTRAVENOUS AS NEEDED
Status: CANCELLED | OUTPATIENT
Start: 2023-10-02

## 2023-10-02 RX ORDER — HEPARIN 100 UNIT/ML
500 SYRINGE INTRAVENOUS AS NEEDED
Status: CANCELLED | OUTPATIENT
Start: 2023-10-02

## 2023-10-02 ASSESSMENT — PROMIS GLOBAL HEALTH SCALE
IN GENERAL, WOULD YOU SAY YOUR QUALITY OF LIFE IS...[ON A SCALE OF 1 (POOR) TO 5 (EXCELLENT)]: EXCELLENT
IN THE PAST 7 DAYS, HOW OFTEN HAVE YOU BEEN BOTHERED BY EMOTIONAL PROBLEMS, SUCH AS FEELING ANXIOUS, DEPRESSED, OR IRRITABLE [ON A SCALE FROM 1 (NEVER) TO 5 (ALWAYS)]?: NEVER
TO WHAT EXTENT ARE YOU ABLE TO CARRY OUT YOUR EVERYDAY PHYSICAL ACTIVITIES SUCH AS WALKING, CLIMBING STAIRS, CARRYING GROCERIES, OR MOVING A CHAIR [ON A SCALE OF 1 (NOT AT ALL) TO 5 (COMPLETELY)]?: COMPLETELY
IN THE PAST 7 DAYS, HOW WOULD YOU RATE YOUR FATIGUE ON AVERAGE [ON A SCALE FROM 1 (NONE) TO 5 (VERY SEVERE)]?: MILD
IN THE PAST 7 DAYS, HOW WOULD YOU RATE YOUR PAIN ON AVERAGE [ON A SCALE FROM 0 (NO PAIN) TO 10 (WORST IMAGINABLE PAIN)]?: 5
IN GENERAL, HOW WOULD YOU RATE YOUR PHYSICAL HEALTH [ON A SCALE OF 1 (POOR) TO 5 (EXCELLENT)]?: GOOD
IN GENERAL, HOW WOULD YOU RATE YOUR SATISFACTION WITH YOUR SOCIAL ACTIVITIES AND RELATIONSHIPS [ON A SCALE OF 1 (POOR) TO 5 (EXCELLENT)]?: EXCELLENT
IN GENERAL, HOW WOULD YOU RATE YOUR MENTAL HEALTH, INCLUDING YOUR MOOD AND YOUR ABILITY TO THINK [ON A SCALE OF 1 (POOR) TO 5 (EXCELLENT)]?: EXCELLENT

## 2023-10-02 ASSESSMENT — PAIN SCALES - GENERAL: PAINLEVEL: 4

## 2023-10-02 NOTE — PROGRESS NOTES
Bone Marrow Biopsy and Aspiration Procedure Note     Informed consent was obtained and potential risks including bleeding, infection and pain were reviewed with the patient.     The right posterior iliac crest was prepped with chlorhexidine.     10 ml of lidocaine 2% local anesthesia infiltrated into the subcutaneous tissue.    Right bone marrow biopsy and right bone marrow aspirate was obtained.     The procedure was tolerated well and there were no complications.    Specimens sent for: routine histopathologic stains and sectioning, flow cytometry, cytogenetics, and Clonoseq MRD    Procedure completed by: GEOFFREY John-CNP

## 2023-10-02 NOTE — PROGRESS NOTES
"Patient ID: Maldonado Mccloud is a 72 y.o. male.    Subjective    He presents to clinic 10/2/23 for a follow up visit. Overall he is doing well. Reporting a new lesion that popped up in his LFA, tender to touch. Has ongoing pain also in his R arm.     Denies fever/chills, headaches, chest pain, dyspnea, cough, sputum production, palpitations, syncope/near-syncope, peripheral edema, nausea/vomiting/diarrhea, taste changes, mouth sores, night sweats, constipation, abdominal pain, melena, dysuria, hematuria, noticeable lymphadenopathy, rash, dizziness, and balance/gait disturbance.         Objective    BSA: 2.14 meters squared  /78   Pulse 68   Temp 36.8 °C (98.2 °F) (Temporal)   Ht 1.744 m (5' 8.66\")   Wt 94.7 kg (208 lb 10.7 oz)   SpO2 99%   BMI 31.12 kg/m²      Physical Exam  Vitals reviewed.   Constitutional:       Appearance: Normal appearance. He is normal weight.   HENT:      Head: Normocephalic.      Nose: Nose normal.   Eyes:      Pupils: Pupils are equal, round, and reactive to light.   Cardiovascular:      Rate and Rhythm: Normal rate and regular rhythm.   Pulmonary:      Effort: Pulmonary effort is normal.   Abdominal:      General: Bowel sounds are normal.   Musculoskeletal:         General: Normal range of motion.      Cervical back: Normal range of motion and neck supple.   Skin:     General: Skin is warm.   Neurological:      General: No focal deficit present.      Mental Status: He is alert.   Psychiatric:         Mood and Affect: Mood normal.         Behavior: Behavior normal.         Thought Content: Thought content normal.         Judgment: Judgment normal.       Performance Status:  Symptomatic; fully ambulatory      Assessment/Plan   Had BMBx performed today. 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 Planning to admit for CAR T cell therapy on 10/19/23 w/ ABECMA w/ flu, cy prep.     Multiple Myeloma:   - ISS Stage 2 IgG Kappa Multiple Myeloma  - S/p VRD, " Autologous SCT, Vacto/Pom, Kyprolis/Sarclisa, Radiation, most recently Kyprolis/Cytoxan  - PET/CT showed mostly decreased metabolic activity, however some new areas  - Completed radiation to R arm with Dr. Patel  - BMBx performed 10/2  - Tracking to CAR T cell therapy, collected 5/26, plan to admit 10/19    ID:  - Acyclovir 400mg BID  - IVIG every 28 days, LD 8/7/23     DVT:  - Eliquis 5mg BID     Cardiac:  - Echo (5/2/23) showed EF of 50-55%     RTC  10/17 Dr. Resendez     Cancer Staging   No matching staging information was found for the patient.    Oncology History   IgG multiple myeloma (CMS/HCC)   11/4/2022 - 11/4/2022 Chemotherapy    Carfilzomib (Weekly) / Cyclophosphamide / Thalidomide / Dexamethasone, 28 Day Cycles     3/14/2023 Initial Diagnosis    IgG multiple myeloma (CMS/HCC)          Diagnoses and all orders for this visit:  Multiple myeloma, remission status unspecified (CMS/HCC)  -     CBC and Auto Differential; Future  -     Comprehensive Metabolic Panel; Future  -     Immunoglobulins (IgG, IgA, IgM); Future  -     Saxon/Lambda Free Light Chain, Serum; Future  -     Serum Protein Electrophoresis + Immunofixation; Future  -     Bone Marrow Evaluation  -     Extra Tubes; Future  Stem cell transplant candidate  -     Extra Tubes; Future  Other orders  -     heparin flush 10 unit/mL syringe 50 Units  -     heparin flush 100 unit/mL injection 500 Units  -     alteplase (Cathflo Activase) injection 2 mg           Wiley Arechiga, APRN-CNP

## 2023-10-03 LAB
ADENOVIRUS QPCR,PLASMA, VIRC: NOT DETECTED COPIES/ML
ALBUMIN SERPL BCP-MCNC: 4.2 G/DL (ref 3.4–5)
ALP SERPL-CCNC: 51 U/L (ref 33–136)
ALT SERPL W P-5'-P-CCNC: 21 U/L (ref 10–52)
ANION GAP SERPL CALC-SCNC: 14 MMOL/L (ref 10–20)
AST SERPL W P-5'-P-CCNC: 27 U/L (ref 9–39)
BILIRUB SERPL-MCNC: 1 MG/DL (ref 0–1.2)
BUN SERPL-MCNC: 23 MG/DL (ref 6–23)
CALCIUM SERPL-MCNC: 9.4 MG/DL (ref 8.6–10.6)
CHLORIDE SERPL-SCNC: 108 MMOL/L (ref 98–107)
CMV DNA SERPL NAA+PROBE-LOG IU: NORMAL {LOG_IU}/ML
CO2 SERPL-SCNC: 24 MMOL/L (ref 21–32)
CREAT SERPL-MCNC: 1.14 MG/DL (ref 0.5–1.3)
GFR SERPL CREATININE-BSD FRML MDRD: 68 ML/MIN/1.73M*2
GLUCOSE SERPL-MCNC: 111 MG/DL (ref 74–99)
HERPES SIMPLEX VIRUS 1 IGG: 1.7 INDEX
HERPES SIMPLEX VIRUS 2 IGG: 1.2 INDEX
KAPPA LC SERPL-MCNC: 6.79 MG/DL (ref 0.33–1.94)
KAPPA LC/LAMBDA SER: 21.9 {RATIO} (ref 0.26–1.65)
LABORATORY COMMENT REPORT: NOT DETECTED
LAMBDA LC SERPL-MCNC: 0.31 MG/DL (ref 0.57–2.63)
POTASSIUM SERPL-SCNC: 4.1 MMOL/L (ref 3.5–5.3)
PROT SERPL-MCNC: 6 G/DL (ref 6.4–8.2)
PROT SERPL-MCNC: 6 G/DL (ref 6.4–8.2)
SODIUM SERPL-SCNC: 142 MMOL/L (ref 136–145)
URATE SERPL-MCNC: 5.6 MG/DL (ref 4–7.5)
VARICELLA ZOSTER IGG INDEX: 2.1 IA
VZV IGG SER QL IA: POSITIVE

## 2023-10-04 LAB
ALBUMIN: 3.9 G/DL (ref 3.4–5)
ALPHA 1 GLOBULIN: 0.3 G/DL (ref 0.2–0.6)
ALPHA 2 GLOBULIN: 0.7 G/DL (ref 0.4–1.1)
BETA GLOBULIN: 0.6 G/DL (ref 0.5–1.2)
EBV EA IGG SER QL: NEGATIVE
EBV NA AB SER QL: POSITIVE
EBV VCA IGG SER IA-ACNC: POSITIVE
EBV VCA IGM SER IA-ACNC: ABNORMAL
GAMMA GLOBULIN: 0.6 G/DL (ref 0.5–1.4)
IMMUNOFIXATION COMMENT: NORMAL
PATH REVIEW - SERUM IMMUNOFIXATION: NORMAL
PATH REVIEW-SERUM PROTEIN ELECTROPHORESIS: NORMAL
PROTEIN ELECTROPHORESIS COMMENT: NORMAL

## 2023-10-05 LAB
CMV IGM SERPL-ACNC: <8 AU/ML
HTLV I+II AB SER QL IA: NEGATIVE
PATH REPORT.COMMENTS IMP SPEC: NORMAL
PATH REPORT.FINAL DX SPEC: NORMAL
PATH REPORT.GROSS SPEC: NORMAL
PATH REPORT.MICROSCOPIC SPEC OTHER STN: NORMAL
PATH REPORT.RELEVANT HX SPEC: NORMAL
PATH REPORT.TOTAL CANCER: NORMAL

## 2023-10-05 PROCEDURE — 88311 DECALCIFY TISSUE: CPT | Mod: TC,SUR

## 2023-10-09 LAB
CELL COUNT (BLOOD): 2.81 X10*3/UL
CELL POPULATIONS: NORMAL
DIAGNOSIS: NORMAL
FLOW DIFFERENTIAL: NORMAL
FLOW TEST ORDERED: NORMAL
LAB TEST METHOD: NORMAL
NUMBER OF CELLS COLLECTED: NORMAL PER TUBE
PATH REPORT.TOTAL CANCER: NORMAL
SIGNATURE COMMENT: NORMAL
SPECIMEN VIABILITY: NORMAL

## 2023-10-11 DIAGNOSIS — C90.00 IGG MULTIPLE MYELOMA (MULTI): Primary | ICD-10-CM

## 2023-10-17 ENCOUNTER — DOCUMENTATION (OUTPATIENT)
Dept: OTHER | Facility: HOSPITAL | Age: 73
End: 2023-10-17
Payer: MEDICARE

## 2023-10-17 ENCOUNTER — OFFICE VISIT (OUTPATIENT)
Dept: HEMATOLOGY/ONCOLOGY | Facility: HOSPITAL | Age: 73
DRG: 018 | End: 2023-10-17
Payer: COMMERCIAL

## 2023-10-17 VITALS
HEART RATE: 88 BPM | SYSTOLIC BLOOD PRESSURE: 147 MMHG | OXYGEN SATURATION: 100 % | RESPIRATION RATE: 16 BRPM | DIASTOLIC BLOOD PRESSURE: 88 MMHG | BODY MASS INDEX: 30.56 KG/M2 | HEIGHT: 70 IN | WEIGHT: 213.5 LBS | TEMPERATURE: 96.8 F

## 2023-10-17 DIAGNOSIS — Z76.82 STEM CELL TRANSPLANT CANDIDATE: Primary | ICD-10-CM

## 2023-10-17 DIAGNOSIS — C90.00 IGG MULTIPLE MYELOMA (MULTI): ICD-10-CM

## 2023-10-17 PROCEDURE — 87635 SARS-COV-2 COVID-19 AMP PRB: CPT | Performed by: INTERNAL MEDICINE

## 2023-10-17 PROCEDURE — 99215 OFFICE O/P EST HI 40 MIN: CPT | Performed by: INTERNAL MEDICINE

## 2023-10-17 PROCEDURE — 1160F RVW MEDS BY RX/DR IN RCRD: CPT | Performed by: INTERNAL MEDICINE

## 2023-10-17 PROCEDURE — 1159F MED LIST DOCD IN RCRD: CPT | Performed by: INTERNAL MEDICINE

## 2023-10-17 PROCEDURE — 1125F AMNT PAIN NOTED PAIN PRSNT: CPT | Performed by: INTERNAL MEDICINE

## 2023-10-17 PROCEDURE — 3079F DIAST BP 80-89 MM HG: CPT | Performed by: INTERNAL MEDICINE

## 2023-10-17 PROCEDURE — 3077F SYST BP >= 140 MM HG: CPT | Performed by: INTERNAL MEDICINE

## 2023-10-17 ASSESSMENT — ENCOUNTER SYMPTOMS
OCCASIONAL FEELINGS OF UNSTEADINESS: 1
LOSS OF SENSATION IN FEET: 1
DEPRESSION: 0

## 2023-10-17 ASSESSMENT — PAIN SCALES - GENERAL: PAINLEVEL: 3

## 2023-10-17 NOTE — PROGRESS NOTES
Met pt and wife at pre admit appointment with Dr. Resendez. Reviewed and provided calendar for CART admit. Questions answered. Pt signed CART consent and was provided a copy. My contact information was provided.

## 2023-10-17 NOTE — PROGRESS NOTES
"Patient ID: Maldonado Mccloud is a 73 y.o. male.    Subjective    Patient coming for last pre CART visit.  Continues to have pain in arm and in extremities.      Denies fever/chills, headaches, chest pain, dyspnea, cough, sputum production, palpitations, syncope/near-syncope, peripheral edema, nausea/vomiting/diarrhea, taste changes, mouth sores, night sweats, constipation, abdominal pain, melena, dysuria, hematuria, noticeable lymphadenopathy, rash, dizziness, and balance/gait disturbance.         Objective    BSA: 2.19 meters squared  /88 (BP Location: Left arm, Patient Position: Sitting, BP Cuff Size: Large adult)   Pulse 88   Temp 36 °C (96.8 °F)   Resp 16   Ht (S) 1.778 m (5' 10\")   Wt 96.8 kg (213 lb 8 oz)   SpO2 100%   BMI 30.63 kg/m²      Physical Exam  Vitals reviewed.   Constitutional:       Appearance: Normal appearance. He is normal weight.   HENT:      Head: Normocephalic.      Nose: Nose normal.   Eyes:      Pupils: Pupils are equal, round, and reactive to light.   Cardiovascular:      Rate and Rhythm: Normal rate and regular rhythm.   Pulmonary:      Effort: Pulmonary effort is normal.   Abdominal:      General: Bowel sounds are normal.   Musculoskeletal:         General: Normal range of motion.      Cervical back: Normal range of motion and neck supple.   Skin:     General: Skin is warm.   Neurological:      General: No focal deficit present.      Mental Status: He is alert.   Psychiatric:         Mood and Affect: Mood normal.         Behavior: Behavior normal.         Thought Content: Thought content normal.         Judgment: Judgment normal.         Performance Status:  Symptomatic; fully ambulatory      Assessment/Plan   Had BMBx performed today. 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 Planning to admit for CAR T cell therapy on 10/19/23 w/ ABECMA w/ flu, cy prep.     Multiple Myeloma:   - ISS Stage 2 IgG Kappa Multiple Myeloma  - S/p VRD, Autologous SCT, " Vacto/Pom, Kyprolis/Sarclisa, Radiation, most recently Kyprolis/Cytoxan  - PET/CT showed mostly decreased metabolic activity, however some new areas  - Completed radiation to R arm with Dr. Patel  - BMBx performed 10/2  shows BULMARO with  clonoseq detected but below LOD  Discrepancy between clonoseq and PET scan +symptoms.     - Tto be admitted for CART Abecma    Risk and benetifs explained.  Consent signed.   Patient to be admitted     ID:  - Acyclovir 400mg BID  - IVIG every 28 days, LD 8/7/23     DVT:  - Eliquis 5mg BID     Cardiac:  - Echo (5/2/23) showed EF of 50-55%     RTC  10/17 Dr. Sun      Cancer Staging   IgG multiple myeloma (CMS/HCC)  Staging form: Plasma Cell Myeloma and Plasma Cell Disorders, AJCC 8th Edition  - Clinical: No stage assigned - Unsigned    Oncology History   IgG multiple myeloma (CMS/HCC)   11/4/2022 - 11/4/2022 Chemotherapy    Carfilzomib (Weekly) / Cyclophosphamide / Thalidomide / Dexamethasone, 28 Day Cycles     3/14/2023 Initial Diagnosis    IgG multiple myeloma (CMS/HCC)     10/20/2023 -  Bone Marrow Transplant            Diagnoses and all orders for this visit:  Stem cell transplant candidate  IgG multiple myeloma (CMS/HCC)  -     Sars-CoV-2 PCR, Screen Asymptomatic           Wil Sun MD PhD

## 2023-10-18 LAB — SARS-COV-2 RNA RESP QL NAA+PROBE: NOT DETECTED

## 2023-10-19 ENCOUNTER — HOSPITAL ENCOUNTER (OUTPATIENT)
Dept: RADIOLOGY | Facility: HOSPITAL | Age: 73
Discharge: HOME | DRG: 018 | End: 2023-10-19
Payer: COMMERCIAL

## 2023-10-19 ENCOUNTER — HOSPITAL ENCOUNTER (INPATIENT)
Facility: HOSPITAL | Age: 73
LOS: 20 days | Discharge: HOME | DRG: 018 | End: 2023-11-08
Attending: INTERNAL MEDICINE
Payer: COMMERCIAL

## 2023-10-19 DIAGNOSIS — I10 HYPERTENSION, UNSPECIFIED TYPE: ICD-10-CM

## 2023-10-19 DIAGNOSIS — Z76.82 AWAITING ORGAN TRANSPLANT STATUS: ICD-10-CM

## 2023-10-19 DIAGNOSIS — C90.00 HYPOGAMMAGLOBULINEMIA DUE TO MULTIPLE MYELOMA (MULTI): ICD-10-CM

## 2023-10-19 DIAGNOSIS — C90.00 MULTIPLE MYELOMA WITHOUT REMISSION (MULTI): Primary | ICD-10-CM

## 2023-10-19 DIAGNOSIS — C90.00 IGG MULTIPLE MYELOMA (MULTI): ICD-10-CM

## 2023-10-19 DIAGNOSIS — C90.00 MULTIPLE MYELOMA NOT HAVING ACHIEVED REMISSION (MULTI): ICD-10-CM

## 2023-10-19 DIAGNOSIS — D80.1 HYPOGAMMAGLOBULINEMIA DUE TO MULTIPLE MYELOMA (MULTI): ICD-10-CM

## 2023-10-19 LAB
ALBUMIN SERPL BCP-MCNC: 3.9 G/DL (ref 3.4–5)
ALP SERPL-CCNC: 55 U/L (ref 33–136)
ALT SERPL W P-5'-P-CCNC: 22 U/L (ref 10–52)
ANION GAP SERPL CALC-SCNC: 14 MMOL/L (ref 10–20)
APTT PPP: 30 SECONDS (ref 27–38)
AST SERPL W P-5'-P-CCNC: 22 U/L (ref 9–39)
BILIRUB DIRECT SERPL-MCNC: 0.1 MG/DL (ref 0–0.3)
BILIRUB SERPL-MCNC: 0.6 MG/DL (ref 0–1.2)
BUN SERPL-MCNC: 25 MG/DL (ref 6–23)
CALCIUM SERPL-MCNC: 9 MG/DL (ref 8.6–10.6)
CHLORIDE SERPL-SCNC: 108 MMOL/L (ref 98–107)
CO2 SERPL-SCNC: 26 MMOL/L (ref 21–32)
CREAT SERPL-MCNC: 1.14 MG/DL (ref 0.5–1.3)
FIBRINOGEN PPP-MCNC: 269 MG/DL (ref 200–400)
GFR SERPL CREATININE-BSD FRML MDRD: 67 ML/MIN/1.73M*2
GLUCOSE SERPL-MCNC: 110 MG/DL (ref 74–99)
INR PPP: 1 (ref 0.9–1.1)
MAGNESIUM SERPL-MCNC: 1.87 MG/DL (ref 1.6–2.4)
PHOSPHATE SERPL-MCNC: 3 MG/DL (ref 2.5–4.9)
POTASSIUM SERPL-SCNC: 4.2 MMOL/L (ref 3.5–5.3)
PROT SERPL-MCNC: 5.6 G/DL (ref 6.4–8.2)
PROTHROMBIN TIME: 11.3 SECONDS (ref 9.8–12.8)
SODIUM SERPL-SCNC: 144 MMOL/L (ref 136–145)

## 2023-10-19 PROCEDURE — 85610 PROTHROMBIN TIME: CPT

## 2023-10-19 PROCEDURE — 99223 1ST HOSP IP/OBS HIGH 75: CPT

## 2023-10-19 PROCEDURE — 84100 ASSAY OF PHOSPHORUS: CPT

## 2023-10-19 PROCEDURE — 85384 FIBRINOGEN ACTIVITY: CPT

## 2023-10-19 PROCEDURE — 36569 INSJ PICC 5 YR+ W/O IMAGING: CPT

## 2023-10-19 PROCEDURE — 82784 ASSAY IGA/IGD/IGG/IGM EACH: CPT

## 2023-10-19 PROCEDURE — 80053 COMPREHEN METABOLIC PANEL: CPT

## 2023-10-19 PROCEDURE — 1170000001 HC PRIVATE ONCOLOGY ROOM DAILY

## 2023-10-19 PROCEDURE — 83735 ASSAY OF MAGNESIUM: CPT

## 2023-10-19 PROCEDURE — 82248 BILIRUBIN DIRECT: CPT

## 2023-10-19 PROCEDURE — 2500000001 HC RX 250 WO HCPCS SELF ADMINISTERED DRUGS (ALT 637 FOR MEDICARE OP)

## 2023-10-19 RX ORDER — TRAZODONE HYDROCHLORIDE 100 MG/1
300 TABLET ORAL NIGHTLY
Status: DISCONTINUED | OUTPATIENT
Start: 2023-10-19 | End: 2023-11-08 | Stop reason: HOSPADM

## 2023-10-19 RX ORDER — ACYCLOVIR 400 MG/1
400 TABLET ORAL 2 TIMES DAILY
Status: DISCONTINUED | OUTPATIENT
Start: 2023-10-19 | End: 2023-10-20

## 2023-10-19 RX ORDER — OXYCODONE HYDROCHLORIDE 5 MG/1
5 TABLET ORAL EVERY 6 HOURS PRN
Status: DISCONTINUED | OUTPATIENT
Start: 2023-10-19 | End: 2023-11-08 | Stop reason: HOSPADM

## 2023-10-19 RX ADMIN — APIXABAN 5 MG: 5 TABLET, FILM COATED ORAL at 21:59

## 2023-10-19 RX ADMIN — ACYCLOVIR 400 MG: 400 TABLET ORAL at 21:59

## 2023-10-19 RX ADMIN — TRAZODONE HYDROCHLORIDE 300 MG: 100 TABLET ORAL at 22:32

## 2023-10-19 SDOH — SOCIAL STABILITY: SOCIAL INSECURITY: ARE THERE ANY APPARENT SIGNS OF INJURIES/BEHAVIORS THAT COULD BE RELATED TO ABUSE/NEGLECT?: NO

## 2023-10-19 SDOH — SOCIAL STABILITY: SOCIAL INSECURITY: ABUSE: ADULT

## 2023-10-19 SDOH — SOCIAL STABILITY: SOCIAL INSECURITY: WERE YOU ABLE TO COMPLETE ALL THE BEHAVIORAL HEALTH SCREENINGS?: YES

## 2023-10-19 SDOH — SOCIAL STABILITY: SOCIAL INSECURITY: ARE YOU OR HAVE YOU BEEN THREATENED OR ABUSED PHYSICALLY, EMOTIONALLY, OR SEXUALLY BY ANYONE?: NO

## 2023-10-19 SDOH — SOCIAL STABILITY: SOCIAL INSECURITY: HAVE YOU HAD THOUGHTS OF HARMING ANYONE ELSE?: NO

## 2023-10-19 SDOH — SOCIAL STABILITY: SOCIAL INSECURITY: DO YOU FEEL UNSAFE GOING BACK TO THE PLACE WHERE YOU ARE LIVING?: NO

## 2023-10-19 SDOH — SOCIAL STABILITY: SOCIAL INSECURITY: DO YOU FEEL ANYONE HAS EXPLOITED OR TAKEN ADVANTAGE OF YOU FINANCIALLY OR OF YOUR PERSONAL PROPERTY?: NO

## 2023-10-19 SDOH — SOCIAL STABILITY: SOCIAL INSECURITY: DOES ANYONE TRY TO KEEP YOU FROM HAVING/CONTACTING OTHER FRIENDS OR DOING THINGS OUTSIDE YOUR HOME?: NO

## 2023-10-19 SDOH — SOCIAL STABILITY: SOCIAL INSECURITY: HAS ANYONE EVER THREATENED TO HURT YOUR FAMILY OR YOUR PETS?: NO

## 2023-10-19 ASSESSMENT — PAIN SCALES - GENERAL
PAINLEVEL_OUTOF10: 0 - NO PAIN
PAINLEVEL_OUTOF10: 0 - NO PAIN

## 2023-10-19 ASSESSMENT — LIFESTYLE VARIABLES
AUDIT-C TOTAL SCORE: 0
SUBSTANCE_ABUSE_PAST_12_MONTHS: NO
AUDIT-C TOTAL SCORE: 0
HOW OFTEN DO YOU HAVE 6 OR MORE DRINKS ON ONE OCCASION: NEVER
HOW MANY STANDARD DRINKS CONTAINING ALCOHOL DO YOU HAVE ON A TYPICAL DAY: PATIENT DOES NOT DRINK
PRESCIPTION_ABUSE_PAST_12_MONTHS: NO
HOW OFTEN DO YOU HAVE A DRINK CONTAINING ALCOHOL: NEVER
SKIP TO QUESTIONS 9-10: 1

## 2023-10-19 ASSESSMENT — ENCOUNTER SYMPTOMS
CARDIOVASCULAR NEGATIVE: 1
ENDOCRINE NEGATIVE: 1
CONSTITUTIONAL NEGATIVE: 1
NEUROLOGICAL NEGATIVE: 1
PSYCHIATRIC NEGATIVE: 1
GASTROINTESTINAL NEGATIVE: 1
HEMATOLOGIC/LYMPHATIC NEGATIVE: 1
SHORTNESS OF BREATH: 1

## 2023-10-19 ASSESSMENT — COGNITIVE AND FUNCTIONAL STATUS - GENERAL
DAILY ACTIVITIY SCORE: 24
PATIENT BASELINE BEDBOUND: NO
MOBILITY SCORE: 24

## 2023-10-19 ASSESSMENT — ACTIVITIES OF DAILY LIVING (ADL)
BATHING: INDEPENDENT
HEARING - RIGHT EAR: FUNCTIONAL
PATIENT'S MEMORY ADEQUATE TO SAFELY COMPLETE DAILY ACTIVITIES?: YES
WALKS IN HOME: INDEPENDENT
JUDGMENT_ADEQUATE_SAFELY_COMPLETE_DAILY_ACTIVITIES: YES
DRESSING YOURSELF: INDEPENDENT
ADEQUATE_TO_COMPLETE_ADL: YES
GROOMING: INDEPENDENT
TOILETING: INDEPENDENT
LACK_OF_TRANSPORTATION: NO
HEARING - LEFT EAR: FUNCTIONAL
FEEDING YOURSELF: INDEPENDENT

## 2023-10-19 ASSESSMENT — PATIENT HEALTH QUESTIONNAIRE - PHQ9
2. FEELING DOWN, DEPRESSED OR HOPELESS: NOT AT ALL
1. LITTLE INTEREST OR PLEASURE IN DOING THINGS: NOT AT ALL
SUM OF ALL RESPONSES TO PHQ9 QUESTIONS 1 & 2: 0

## 2023-10-19 ASSESSMENT — PAIN - FUNCTIONAL ASSESSMENT: PAIN_FUNCTIONAL_ASSESSMENT: 0-10

## 2023-10-19 NOTE — NURSING NOTE
Pt arrived to floor, ambulating independently with steady gait. Pt accompanied by wife. Pt and wife oriented to room 3025. Admission forms completed. Pt denies needs at this time.

## 2023-10-19 NOTE — CONSULTS
Reason For Consult  PICC line placement     Pre-Procedure Checklist:  Emergent Line Insertion: No  Type of Line to be Placed: PICC  Consent Obtained: Yes  Emergency Medication Necessary: No  Patient Identified with 2 Independent Identifiers: Yes  Review of Allergies, Anticoagulation, Relevant Labs, ECG/Telemetry: Yes  Risks/Benefits/Alternatives Discussed with Patient/POA/Legal Representative: Yes  Stop Sign on Door: Yes  Time Out Performed: Yes  Catheter Exchange: No    Positioning Checklist:  All People, Including Patient, in the Room with Cap and Mask: Yes  Fluoroscopy Used to Identify Vessel and Guide Insertion: No   Sterile Cover Used: Yes  Full Barrier Precautions Followed (Mask, Cap, Gown, Gloves): Yes  Hands Washed: Yes  Monitors Attached with Sound Alarms On: No  Full Body Sterile Drape (Head-to-Toe) Used to Cover Patient: Yes  Trendelenburg Position (For IJ and Subclavian): No  CHG Skin Prep Used and Allowed to Air Dry to Skin Procedure: Yes    Procedure Checklist:  Blood Aspirated From All Lumens, All Ports Subsequently Flushed: Yes  Catheter Caps Placed on All Lumens; Lumens Clamped: Yes  Maintain Guidewire Control Throughout, Ensuring Guidewire Removal: Yes  Maintain Sterile Field Throughout Insertion: Yes  Catheter Secured: Yes  Confirmatory Test of Venous Placement: Non-Pulsatile Blood    Post Procedure Checklist:  Date and Time Written on Dressing: Yes  Sharp and Wire Count and Safe Disposal of all Sharps/Wires: Yes  Sterile Dressing Applied Per Protocol: Yes  X-ray Ordered or ECG Image: Yes    PICC Insertion Details:  Size (Fr): 4  Lumen Type: DL PICC   Catheter to Vein Ratio Less Than 50%: Yes  Total Length (cm): 46 cm   External Length (cm): 0cm   Orientation: Right basilic vein     Site Prep: Chlorohexidine; Usual sterile procedure followed  Local Anesthetic: Injectable/Subcutaneous  Indication: medication administration   Insertion Team Members in the Room: Sandra Soliz Nurse, LPN  Initial  Extremity Circumference (cm):  31 cm   Insertion Attempts: 1  Patient Tolerance: Tolerated Well, Age Appropriate  Comfort Measures: Subcutaneous anesthetic; Verbal  Procedure Location: Bedside  Safety Measures: Patient specific safety measures addressed with RN  Estimated Blood Loss (mL): 1  Vessel Fully Compressible Proximally and Distally to Insertion Site: Yes  Brisk Blood Return Obtained and Line Draws Easily: Yes  Tip Location: PICC tip confirmed using 3cg at SVC  Line Confirmation: ECG  Lot #: [id number]  : Bard  PICC Line Exp Date: 07/31/2024  Securement: Stat Lock  Post Procedure Checklist: Handoff with RN; Obtain all new IV tubing prior to use; Bed at lowest level and wheels locked; Line discharge information at bedside.  Additional Details: Line was inserted using Modified Seldinger's Technique.   Placed by:   Kaitlyn Zuñiga RN

## 2023-10-20 LAB
ABO GROUP (TYPE) IN BLOOD: NORMAL
ALBUMIN SERPL BCP-MCNC: 3.7 G/DL (ref 3.4–5)
ANION GAP SERPL CALC-SCNC: 12 MMOL/L (ref 10–20)
ANTIBODY SCREEN: NORMAL
BASOPHILS # BLD AUTO: 0.01 X10*3/UL (ref 0–0.1)
BASOPHILS NFR BLD AUTO: 0.3 %
BUN SERPL-MCNC: 24 MG/DL (ref 6–23)
CALCIUM SERPL-MCNC: 8.9 MG/DL (ref 8.6–10.6)
CHLORIDE SERPL-SCNC: 109 MMOL/L (ref 98–107)
CO2 SERPL-SCNC: 26 MMOL/L (ref 21–32)
CREAT SERPL-MCNC: 1.22 MG/DL (ref 0.5–1.3)
EOSINOPHIL # BLD AUTO: 0.08 X10*3/UL (ref 0–0.4)
EOSINOPHIL NFR BLD AUTO: 2.4 %
ERYTHROCYTE [DISTWIDTH] IN BLOOD BY AUTOMATED COUNT: 12.5 % (ref 11.5–14.5)
GFR SERPL CREATININE-BSD FRML MDRD: 62 ML/MIN/1.73M*2
GLUCOSE SERPL-MCNC: 125 MG/DL (ref 74–99)
HCT VFR BLD AUTO: 30.3 % (ref 41–52)
HGB BLD-MCNC: 10.4 G/DL (ref 13.5–17.5)
IGG SERPL-MCNC: 690 MG/DL (ref 700–1600)
IMM GRANULOCYTES # BLD AUTO: 0.01 X10*3/UL (ref 0–0.5)
IMM GRANULOCYTES NFR BLD AUTO: 0.3 % (ref 0–0.9)
LYMPHOCYTES # BLD AUTO: 0.81 X10*3/UL (ref 0.8–3)
LYMPHOCYTES NFR BLD AUTO: 24.6 %
MAGNESIUM SERPL-MCNC: 1.9 MG/DL (ref 1.6–2.4)
MCH RBC QN AUTO: 35.6 PG (ref 26–34)
MCHC RBC AUTO-ENTMCNC: 34.3 G/DL (ref 32–36)
MCV RBC AUTO: 104 FL (ref 80–100)
MONOCYTES # BLD AUTO: 0.47 X10*3/UL (ref 0.05–0.8)
MONOCYTES NFR BLD AUTO: 14.3 %
NEUTROPHILS # BLD AUTO: 1.91 X10*3/UL (ref 1.6–5.5)
NEUTROPHILS NFR BLD AUTO: 58.1 %
NRBC BLD-RTO: 0 /100 WBCS (ref 0–0)
PHOSPHATE SERPL-MCNC: 3.1 MG/DL (ref 2.5–4.9)
PLATELET # BLD AUTO: 121 X10*3/UL (ref 150–450)
PMV BLD AUTO: 8.8 FL (ref 7.5–11.5)
POTASSIUM SERPL-SCNC: 4.1 MMOL/L (ref 3.5–5.3)
RBC # BLD AUTO: 2.92 X10*6/UL (ref 4.5–5.9)
RH FACTOR (ANTIGEN D): NORMAL
SCAN RESULT: NORMAL
SODIUM SERPL-SCNC: 143 MMOL/L (ref 136–145)
WBC # BLD AUTO: 3.3 X10*3/UL (ref 4.4–11.3)

## 2023-10-20 PROCEDURE — 1170000001 HC PRIVATE ONCOLOGY ROOM DAILY

## 2023-10-20 PROCEDURE — 2500000001 HC RX 250 WO HCPCS SELF ADMINISTERED DRUGS (ALT 637 FOR MEDICARE OP)

## 2023-10-20 PROCEDURE — 2500000004 HC RX 250 GENERAL PHARMACY W/ HCPCS (ALT 636 FOR OP/ED): Performed by: INTERNAL MEDICINE

## 2023-10-20 PROCEDURE — 85025 COMPLETE CBC W/AUTO DIFF WBC: CPT

## 2023-10-20 PROCEDURE — 86901 BLOOD TYPING SEROLOGIC RH(D): CPT

## 2023-10-20 PROCEDURE — 2500000001 HC RX 250 WO HCPCS SELF ADMINISTERED DRUGS (ALT 637 FOR MEDICARE OP): Performed by: INTERNAL MEDICINE

## 2023-10-20 PROCEDURE — 80069 RENAL FUNCTION PANEL: CPT

## 2023-10-20 PROCEDURE — 99233 SBSQ HOSP IP/OBS HIGH 50: CPT | Performed by: INTERNAL MEDICINE

## 2023-10-20 PROCEDURE — 83735 ASSAY OF MAGNESIUM: CPT

## 2023-10-20 RX ORDER — FLUCONAZOLE 200 MG/1
400 TABLET ORAL DAILY
Status: DISCONTINUED | OUTPATIENT
Start: 2023-10-26 | End: 2023-11-08 | Stop reason: HOSPADM

## 2023-10-20 RX ORDER — SODIUM CHLORIDE 9 MG/ML
60 INJECTION, SOLUTION INTRAVENOUS CONTINUOUS
Status: DISCONTINUED | OUTPATIENT
Start: 2023-10-20 | End: 2023-10-28

## 2023-10-20 RX ORDER — FAMOTIDINE 10 MG/ML
20 INJECTION INTRAVENOUS AS NEEDED
Status: DISCONTINUED | OUTPATIENT
Start: 2023-10-20 | End: 2023-11-01 | Stop reason: ALTCHOICE

## 2023-10-20 RX ORDER — URSODIOL 300 MG/1
300 CAPSULE ORAL 3 TIMES DAILY
Status: DISCONTINUED | OUTPATIENT
Start: 2023-10-20 | End: 2023-11-08 | Stop reason: HOSPADM

## 2023-10-20 RX ORDER — EPINEPHRINE 0.1 MG/ML
0.3 INJECTION INTRACARDIAC; INTRAVENOUS EVERY 5 MIN PRN
Status: DISCONTINUED | OUTPATIENT
Start: 2023-10-20 | End: 2023-11-01 | Stop reason: ALTCHOICE

## 2023-10-20 RX ORDER — ACETAMINOPHEN 325 MG/1
325 TABLET ORAL EVERY 6 HOURS PRN
COMMUNITY
End: 2023-11-08 | Stop reason: HOSPADM

## 2023-10-20 RX ORDER — ALLOPURINOL 300 MG/1
300 TABLET ORAL DAILY
Status: DISCONTINUED | OUTPATIENT
Start: 2023-10-20 | End: 2023-10-24

## 2023-10-20 RX ORDER — DIPHENHYDRAMINE HYDROCHLORIDE 50 MG/ML
50 INJECTION INTRAMUSCULAR; INTRAVENOUS AS NEEDED
Status: DISCONTINUED | OUTPATIENT
Start: 2023-10-20 | End: 2023-11-01 | Stop reason: ALTCHOICE

## 2023-10-20 RX ORDER — ALBUTEROL SULFATE 0.83 MG/ML
3 SOLUTION RESPIRATORY (INHALATION) AS NEEDED
Status: DISCONTINUED | OUTPATIENT
Start: 2023-10-20 | End: 2023-11-01 | Stop reason: ALTCHOICE

## 2023-10-20 RX ORDER — LEVETIRACETAM 500 MG/1
500 TABLET ORAL 2 TIMES DAILY
Status: DISCONTINUED | OUTPATIENT
Start: 2023-10-24 | End: 2023-11-08 | Stop reason: HOSPADM

## 2023-10-20 RX ORDER — ACYCLOVIR 400 MG/1
400 TABLET ORAL EVERY 12 HOURS
Status: DISCONTINUED | OUTPATIENT
Start: 2023-10-20 | End: 2023-11-08 | Stop reason: HOSPADM

## 2023-10-20 RX ADMIN — ONDANSETRON 16 MG: 2 INJECTION INTRAMUSCULAR; INTRAVENOUS at 11:41

## 2023-10-20 RX ADMIN — SODIUM CHLORIDE 100 ML/HR: 9 INJECTION, SOLUTION INTRAVENOUS at 22:23

## 2023-10-20 RX ADMIN — URSODIOL 300 MG: 300 CAPSULE ORAL at 14:35

## 2023-10-20 RX ADMIN — ACYCLOVIR 400 MG: 400 TABLET ORAL at 08:34

## 2023-10-20 RX ADMIN — APIXABAN 5 MG: 5 TABLET, FILM COATED ORAL at 21:04

## 2023-10-20 RX ADMIN — CYCLOPHOSPHAMIDE 657 MG: 1 INJECTION, POWDER, FOR SOLUTION INTRAVENOUS; ORAL at 13:51

## 2023-10-20 RX ADMIN — SODIUM CHLORIDE 100 ML/HR: 9 INJECTION, SOLUTION INTRAVENOUS at 11:41

## 2023-10-20 RX ADMIN — URSODIOL 300 MG: 300 CAPSULE ORAL at 21:04

## 2023-10-20 RX ADMIN — ACYCLOVIR 400 MG: 400 TABLET ORAL at 21:04

## 2023-10-20 RX ADMIN — FLUDARABINE PHOSPHATE 65 MG: 25 INJECTION, SOLUTION INTRAVENOUS at 13:14

## 2023-10-20 RX ADMIN — TRAZODONE HYDROCHLORIDE 300 MG: 100 TABLET ORAL at 21:04

## 2023-10-20 RX ADMIN — APIXABAN 5 MG: 5 TABLET, FILM COATED ORAL at 08:34

## 2023-10-20 RX ADMIN — ALLOPURINOL 300 MG: 300 TABLET ORAL at 11:41

## 2023-10-20 ASSESSMENT — PAIN SCALES - GENERAL
PAINLEVEL_OUTOF10: 0 - NO PAIN

## 2023-10-20 ASSESSMENT — COGNITIVE AND FUNCTIONAL STATUS - GENERAL
MOBILITY SCORE: 24
DAILY ACTIVITIY SCORE: 24
DAILY ACTIVITIY SCORE: 24
MOBILITY SCORE: 24

## 2023-10-20 ASSESSMENT — PAIN - FUNCTIONAL ASSESSMENT
PAIN_FUNCTIONAL_ASSESSMENT: 0-10

## 2023-10-20 ASSESSMENT — ACTIVITIES OF DAILY LIVING (ADL): LACK_OF_TRANSPORTATION: NO

## 2023-10-20 NOTE — ED NOTES
Pharmacy Medication History Review    Maldonado Mccloud is a 73 y.o. male admitted for Multiple myeloma without remission (CMS/Formerly [facility]). Pharmacy reviewed the patient's [id number] medications and allergies for accuracy. Interviewed patient and wife at bedside. Patient was able to list off the medications with help of his wife including strength and direction.     The list below reflects the updated PTA list. Please review each medication in order reconciliation for additional clarification and justification.  Medications Prior to Admission   Medication Sig Dispense Refill Last Dose    acetaminophen (Tylenol) 325 mg tablet Take 1 tablet (325 mg) by mouth every 6 hours if needed for mild pain (1 - 3).       acyclovir (Zovirax) 400 mg tablet Take 1 tablet (400 mg) by mouth 2 times a day.   10/19/2023    Eliquis 5 mg tablet Take 1 tablet (5 mg) by mouth 2 times a day.   10/19/2023    traZODone (Desyrel) 100 mg tablet 3 tablets nightly at bedtime 270 tablet 1 10/18/2023        The list below reflects the updated allergy list. Please review each documented allergy for additional clarification and justification.  Allergies  Reviewed by Mariely Peoples, FranklinD on 10/20/2023        Severity Reactions Comments    Penicillins Low Rash     Piperacillin-tazobactam Low Rash             Below are additional concerns with the patient's PTA list.  -- Patient's wife also states giving an OTC vitamin B6 daily with an occasional vitamin B12    Sources: 10/17 heme onc note, 10/2 procedure visit, 9/12 PCP visit, surescricorby, patient/wife interview.    Mariely Peoples, PharmD  Transitions of Care Pharmacist  Medication reconciliation complete  Please reach out via [employer] chat for questions, or if no response call [employer] or [employer]  [facility]

## 2023-10-20 NOTE — H&P
History Of Present Illness  Mr. Maldonado Mccloud is a 73 year old male with a PMH of multiple myeloma s/p Auto SCT in 2015 and multiple lines of treatment most recently on Cytoxan and s/p radiation treatments, and a DVT in the LLE earlier this year (on Eliquis) who is a direct admit for CAR T cell therapy for his ISS stage 2 Kappa multiple myeloma with ABECMA.    Upon admission, patient is in good spirits. He states that he does get short of breath with exertion at times and has pain from his myeloma that he only takes Tylenol for. Patient denies recent sick contacts, fever, chills, HA, dizziness, visual disturbances, nasal congestion, sore throat, dysphagia, CP, palpitations, wheezing, hemoptysis, N/V/C/D, abdominal pain, melena, hematochezia, urinary s/s, hematuria, weakness, bleeding, bruising, rash, pruritus. All other ROS otherwise negative.    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 Bone marrow biopsy suggested multiple myeloma IgG kappa, ISS Stage II, bone survey revealed lytic lesions; Urine light chains present kappa restricted with 2gm proteinuria.  No adverse prognostic factors identified.     Treatment:     VRD  Initiated Aug 2015; currently s/p 3 cycles.     BMBx 10/2015 complete response.     Stem Cell Transplant  Underwent stem cell mobilization with Neupogen/Plerixafor and 2 day collection December 16-17, 2015, for 8.62 CD34 x 10^6/kg. During procedure for right IJ Trialysis placement  developed A. Fib with RVR which spontaneously resolved on December 14, 2015. He was admitted and placed on monitor for stem cell collection.      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  Hospitalization complicated by orthostatic hypotension, electrolyte replacement, drug induced rash, culture negative fever.       He completed  approximately 5 weeks of maintenance Revlimid 10 mg daily which was held for worsening neuropathy August 2016.     3/ 2018: IgG M Braxton rising to 0.2gm/DL and light chain rising; restarted on Revlimid maintenance at 5mg EOD.      Revlimid stopped in 3/2021 due to stable disease.     Vacto/Pom Trial  7/2021 his M spike was on the rise. He was consented for Vacto/Pom trial which he began on 8/5/2021. He continued Pom  post finishing the trial.      Sarclisa/Kyprolis  Myeloma markers increasing in 6/2022, consented for Sarclisa /Kyprolis, which began 6/30/22. He continued this through September of 2022.         Cytoxan/Kyprolis  His therapy was subsequently changed in 9/2022 due to continued disease progression in the face of Sarclisa. His therapy was changed to CYYCLON regimen (Cytoxan/Kyprolis).     PET CT scan 9/14/2022 shows FDG avid lytic lesion within the left midshaft clavicle with pathologic fracture, FDG avid lytic lesions involving bilateral scapulas and FDG avidity within the T9 vertebral body, suggestive of active multiple myeloma.  There  are non FDG avid lytic lesions in the right iliac bone and right clavicle, likely representing nonactive multiple myeloma.  There is no PET evidence of extramedullary involvement.  There is an FDG avid right thyroid nodule.      MRI in 10/2022 negative for myelomatous involvement in thoracic vertebrae.      Therapy placed on hold in 11/2022 due to need for hip replacement surgery. His myeloma markers were on the rise in 3/2023 with a new jaw lesion. Plan is to begin radiation therapy 4/11 and resume 2x/week Kyprolis 4/10.      Tracking to CAR T cell therapy. Collected CAR T cells on 5/26/23.      X-ray of L elbow (6/20/23):  IMPRESSION:  1. Osteolytic lesions in the proximal radius and distal humerus  consistent with patient's reported history of multiple myeloma.  2. No acute fracture or malalignment.     Began radiation x8 fractions on 7/3/23.     Resumed Cytoxan as of  8/7/23.      PMH: as above  PSH: Right hip replacement, left knee replacement  Family Hx: Father-pancreatic cancer  Social Hx: Denies ETOH and drug use. Smokes 1-2 cigars a day         Oncology History   IgG multiple myeloma (CMS/HCC)   11/4/2022 - 11/4/2022 Chemotherapy    Carfilzomib (Weekly) / Cyclophosphamide / Thalidomide / Dexamethasone, 28 Day Cycles     3/14/2023 Initial Diagnosis    IgG multiple myeloma (CMS/HCC)     10/20/2023 -  Bone Marrow Transplant            Allergies  Penicillins and Piperacillin-tazobactam    Review of Systems   Constitutional: Negative.    HENT: Negative.     Respiratory:  Positive for shortness of breath.         With exertion intermittently   Cardiovascular: Negative.    Gastrointestinal: Negative.    Endocrine: Negative.    Genitourinary: Negative.    Musculoskeletal:         Pain from myeloma lesions   Neurological: Negative.    Hematological: Negative.    Psychiatric/Behavioral: Negative.          Physical Exam  Constitutional:       Appearance: Normal appearance.   HENT:      Head: Normocephalic and atraumatic.      Nose: Nose normal.      Mouth/Throat:      Mouth: Mucous membranes are dry.   Eyes:      Extraocular Movements: Extraocular movements intact.      Pupils: Pupils are equal, round, and reactive to light.   Cardiovascular:      Rate and Rhythm: Normal rate and regular rhythm.   Pulmonary:      Effort: Pulmonary effort is normal.      Breath sounds: Normal breath sounds.   Abdominal:      General: Bowel sounds are normal.      Palpations: Abdomen is soft.   Musculoskeletal:         General: Normal range of motion.   Skin:     General: Skin is warm and dry.   Neurological:      General: No focal deficit present.      Mental Status: He is alert and oriented to person, place, and time.   Psychiatric:         Mood and Affect: Mood normal.         Behavior: Behavior normal.          Last Recorded Vitals  Blood pressure 134/77, pulse 91, temperature 36.5 °C (97.7 °F),  "temperature source Temporal, resp. rate 18, height 1.775 m (5' 9.88\"), weight 97.3 kg (214 lb 8.1 oz), SpO2 99 %.    Relevant Results          Scheduled medications  acyclovir, 400 mg, oral, BID  apixaban, 5 mg, oral, BID  traZODone, 300 mg, oral, Nightly      Continuous medications     PRN medications  PRN medications: alteplase, oxyCODONE       Assessment/Plan   Principal Problem:    Multiple myeloma without remission (CMS/HCC)      ONC:  ISS stage 2 IgG Kappa Multiple Myeloma  -Presented with right chest wall mass in July 2015 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   -Collected T cells 5/26/23  -Tracking to CAR-T cell therapy-ABECMA prep with Flu/Cy  -Myeloma marks 10/2/23-KFLC 6.79, LFLC 0.31, IgG 670, IgM 7, IgA <7  -Plan to start Allopurinol, Ursodiol 10/20  -Plan to start Keppra 500mg BID on T-1 (10/24)  -Plan to start Fluconazole T+1 (10/26)      HEME:  Admit H&H-10.4/30.3  Anemia, thrombocytopenia  -No evidence of active bleeding  -Monitor and transfuse to maintain Hgb >7, plt >10    ID:  -Afebrile on admit  -No evidence of infection on admit  -Continue Acyclovir 400mg BID    CARDIAC:  -Echo (9/22/23) EF 50%     FEN/GI:  Admit wt: 97.3kg  Admit sCr-1.14  -Monitor electrolytes and replete as needed    PSYCH:  Hx of Insomnia  -Continue home Trazadone 300mg HS    DISPO  -Full code-confirmed on admit  -Access: double PICC  -primary oncologist Dr. Gaitan, APRKRYSTYNA-CNP    "

## 2023-10-20 NOTE — SIGNIFICANT EVENT
10/20/23 0942   Prechemo Checklist   Has the patient been in the hospital, ED, or urgent care since last date of service No   Chemo/Immuno Consent Signed Yes  (CAR T Transplant Consent)   Protocol/Indications Verified Yes  (Multiple Myeloma)   Confirmed to previous date/time of medication N/A  (First Dose/Cycle)   Compared to previous dose N/A  (First Dose/Cycle)   All medications are dated accurately Yes   Pregnancy Test Negative Not applicable   Parameters Met Yes   BSA/Weight-Height Verified Yes  (Current BSA = 2.19, Ordered BSA = 2.19. Within 10%.)   Dose Calculations Verified Yes  (Ordered BSA is the same as Pt's current BSA - doses are correct.)

## 2023-10-20 NOTE — PROGRESS NOTES
"Mr. Maldonado Mccloud is a 73 year old male with a PMH of multiple myeloma s/p Auto SCT on 12/13/15 and then multiple lines of treatment (Revlimid, Vacto/Pom trial, Sarclista/Kyprolis), most recently (8/2023) on Cytoxan/Kyprolis and s/p radiation treatments x 8 fractions on 7/3/23.,  LLE DVT 2022 (on Eliquis).  Admitted for CAR T cell therapy for his ISS stage 2 Kappa multiple myeloma with ABECMA.    T-5 today    Patient feels well today.  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    ROS:  Patient denies recent sick contacts, fever, chills, HA, dizziness, visual disturbances, nasal congestion, sore throat, dysphagia, CP, palpitations, wheezing, hemoptysis, N/V/C/D, abdominal pain, melena, hematochezia, urinary s/s, hematuria, weakness, bleeding, bruising, rash, pruritus. All other ROS otherwise negative.        PMH: as above  PSH: Right hip replacement, left knee replacement  Family Hx: Father-pancreatic cancer  Social Hx: Denies ETOH and drug use. Smokes 1-2 cigars a day      Allergies  Penicillins and Piperacillin-tazobactam    Meds:     acyclovir, 400 mg, oral, q12h  allopurinol, 300 mg, oral, Daily  apixaban, 5 mg, oral, BID  cyclophosphamide, 300 mg/m2 (Treatment Plan Recorded), intravenous, Once  [START ON 10/26/2023] fluconazole, 400 mg, oral, Daily  [START ON 10/24/2023] levETIRAcetam, 500 mg, oral, BID  traZODone, 300 mg, oral, Nightly  ursodiol, 300 mg, oral, TID       Visit Vitals  /87   Pulse 66   Temp 36.4 °C (97.5 °F) (Temporal)   Resp 18   Ht 1.775 m (5' 9.88\")   Wt 97.3 kg (214 lb 8.1 oz)   SpO2 96%   BMI 30.88 kg/m²   Smoking Status Every Day   BSA 2.19 m²          Physical Exam  Constitutional:       Appearance: Normal appearance.   HENT:      Head: Normocephalic and atraumatic.      Nose: Nose normal.      Mouth/Throat:      Mouth: Mucous membranes are moist.   Eyes:      Extraocular Movements: Extraocular movements intact.      Pupils: Pupils are equal, round, and reactive to " light.   Cardiovascular:      Rate and Rhythm: Normal rate and regular rhythm.   Pulmonary:      Effort: Pulmonary effort is normal.      Breath sounds: Normal breath sounds.   Abdominal:      General: Bowel sounds are normal.      Palpations: Abdomen is soft.   Musculoskeletal:         General: Normal range of motion.   Skin:     General: Skin is warm and dry.   Neurological:      General: No focal deficit present.      Mental Status: He is alert and oriented to person, place, and time.   Psychiatric:         Mood and Affect: Mood normal.         Behavior: Behavior normal.         Results for orders placed or performed during the hospital encounter of 10/19/23 (from the past 24 hour(s))   Magnesium   Result Value Ref Range    Magnesium 1.87 1.60 - 2.40 mg/dL   Hepatic function panel   Result Value Ref Range    Albumin 3.9 3.4 - 5.0 g/dL    Bilirubin, Total 0.6 0.0 - 1.2 mg/dL    Bilirubin, Direct 0.1 0.0 - 0.3 mg/dL    Alkaline Phosphatase 55 33 - 136 U/L    ALT 22 10 - 52 U/L    AST 22 9 - 39 U/L    Total Protein 5.6 (L) 6.4 - 8.2 g/dL   Coagulation Screen   Result Value Ref Range    Protime 11.3 9.8 - 12.8 seconds    INR 1.0 0.9 - 1.1    aPTT 30 27 - 38 seconds   Fibrinogen   Result Value Ref Range    Fibrinogen 269 200 - 400 mg/dL   Phosphorus   Result Value Ref Range    Phosphorus 3.0 2.5 - 4.9 mg/dL   Basic Metabolic Panel   Result Value Ref Range    Glucose 110 (H) 74 - 99 mg/dL    Sodium 144 136 - 145 mmol/L    Potassium 4.2 3.5 - 5.3 mmol/L    Chloride 108 (H) 98 - 107 mmol/L    Bicarbonate 26 21 - 32 mmol/L    Anion Gap 14 10 - 20 mmol/L    Urea Nitrogen 25 (H) 6 - 23 mg/dL    Creatinine 1.14 0.50 - 1.30 mg/dL    eGFR 67 >60 mL/min/1.73m*2    Calcium 9.0 8.6 - 10.6 mg/dL   CBC and Auto Differential   Result Value Ref Range    WBC 3.3 (L) 4.4 - 11.3 x10*3/uL    nRBC 0.0 0.0 - 0.0 /100 WBCs    RBC 2.92 (L) 4.50 - 5.90 x10*6/uL    Hemoglobin 10.4 (L) 13.5 - 17.5 g/dL    Hematocrit 30.3 (L) 41.0 - 52.0 %      (H) 80 - 100 fL    MCH 35.6 (H) 26.0 - 34.0 pg    MCHC 34.3 32.0 - 36.0 g/dL    RDW 12.5 11.5 - 14.5 %    Platelets 121 (L) 150 - 450 x10*3/uL    MPV 8.8 7.5 - 11.5 fL    Neutrophils % 58.1 40.0 - 80.0 %    Immature Granulocytes %, Automated 0.3 0.0 - 0.9 %    Lymphocytes % 24.6 13.0 - 44.0 %    Monocytes % 14.3 2.0 - 10.0 %    Eosinophils % 2.4 0.0 - 6.0 %    Basophils % 0.3 0.0 - 2.0 %    Neutrophils Absolute 1.91 1.60 - 5.50 x10*3/uL    Immature Granulocytes Absolute, Automated 0.01 0.00 - 0.50 x10*3/uL    Lymphocytes Absolute 0.81 0.80 - 3.00 x10*3/uL    Monocytes Absolute 0.47 0.05 - 0.80 x10*3/uL    Eosinophils Absolute 0.08 0.00 - 0.40 x10*3/uL    Basophils Absolute 0.01 0.00 - 0.10 x10*3/uL   Renal function panel   Result Value Ref Range    Glucose 125 (H) 74 - 99 mg/dL    Sodium 143 136 - 145 mmol/L    Potassium 4.1 3.5 - 5.3 mmol/L    Chloride 109 (H) 98 - 107 mmol/L    Bicarbonate 26 21 - 32 mmol/L    Anion Gap 12 10 - 20 mmol/L    Urea Nitrogen 24 (H) 6 - 23 mg/dL    Creatinine 1.22 0.50 - 1.30 mg/dL    eGFR 62 >60 mL/min/1.73m*2    Calcium 8.9 8.6 - 10.6 mg/dL    Phosphorus 3.1 2.5 - 4.9 mg/dL    Albumin 3.7 3.4 - 5.0 g/dL   Magnesium   Result Value Ref Range    Magnesium 1.90 1.60 - 2.40 mg/dL   Type and screen   Result Value Ref Range    ABO TYPE B     Rh TYPE POS     ANTIBODY SCREEN NEG          ONC:  ISS stage 2 IgG Kappa Multiple Myeloma  -Presented with right chest wall mass in July 2015 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   -Myeloma marks 10/2/23-KFLC 6.79, LFLC 0.31, IgG 670, IgM 7, IgA  <7  -Collected T cells 5/26/23    Admitted for CAR-T cell therapy-ABECMA (Idecabtagene vicleucel) prep with Flu/Cy  -Fludarabine 65 mg IV on days T-5 to T-3 (10/20 - 10/22/23)  -Cyclophosphamide 657 mg IV on days T-5 to T-3 (10/20 - 10/22/23)  -CAR T-cell ABECMA (Idecabtagene vicleucel) T0 = 10/25/23  -Plan to start Allopurinol, Ursodiol (10/20)  -Plan to start Keppra 500mg BID on T-1 (10/24)    Daily Evaluation:    Ferritin:   CRP:   Fibrinogen:   Coags:   ICE Score :     #CRS Assessment:   In last 24 hours: Fever: NO; Any O2 supplement? NO    #Neurotoxicity Assessment:   ICANS Score: 10/10  Handwriting/Signature impaired:  No  Motor/Sensory deficit: No  Other abnormal Neuro symptom: No  Imaging/Fundoscopy finding: N/A     #CRS Grade: N/A  Organ/System affected by CRS:   Date of CRS onset:   Date of highest CRS Grade:   Date of CRS resolution to Grade 1 or lower:      #ICANS Grade: N/A  Date of Neurotoxicity onset:   Date of highest Neurotoxicity Grade:   Date of Neurotoxicity resolution to Grade 1 or lower:   Neurotox management: not indicated     #Management:   1. Tocilizumab 8mg/kg: N/A  2. Anakinra 100mcg/day: N/A  3. Siltuximab 5mg/kg: date & time administered (not started)   4. Steroids: N/A  5. Other agents/management: agent or other free text (N/A)      HEME:  Pancytopenia  Tx for hgb < 7, plt < 10  Hx/o LLE DVT 2022, cont Eliquis, hold for plt count < 50k     ID:  -Allergies to PCN & Pip-Tazo  -Afebrile on admit  -No evidence of infection on admit  -Continue Acyclovir 400mg BID  -Plan to start Fluconazole T+1 (10/26)  -Check CMV PCR 2x/week     CARDIAC:  -Echo (9/22/23) EF 50%  -Hx/o A. 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     PSYCH:  Hx of Insomnia  -Continue home Trazadone 300mg HS     DISPO  -Full code-confirmed on admit  -Access: double  non solo PICC  -primary oncologist Dr. Resendez    Patient seen & examined with Dr. Alexis James PA-C

## 2023-10-20 NOTE — PROGRESS NOTES
10/20/23 1625   Discharge Planning   Living Arrangements Spouse/significant other   Support Systems Spouse/significant other   Assistance Needed none   Type of Residence Private residence   Home or Post Acute Services None   Patient expects to be discharged to: local hotel   Does the patient need discharge transport arranged? No   Financial Resource Strain   How hard is it for you to pay for the very basics like food, housing, medical care, and heating? Not hard   Housing Stability   In the last 12 months, was there a time when you were not able to pay the mortgage or rent on time? N   In the last 12 months, how many places have you lived? 1   In the last 12 months, was there a time when you did not have a steady place to sleep or slept in a shelter (including now)? N   Transportation Needs   In the past 12 months, has lack of transportation kept you from medical appointments or from getting medications? no   In the past 12 months, has lack of transportation kept you from meetings, work, or from getting things needed for daily living? No     Pt with Multiple Myeloma was admitted for CAR-T Cells (Abecma), T-5 today.  Pt is independent and has no home care or DME needs.  His caregiver will be his wife and they will be staying at a local hotel after discharge.  He has a DL SOLO PICC which will be cared for in the [facility] infusion center.  His MD is Dr. Wil Resendez.  EDOD is 11/3/23.

## 2023-10-20 NOTE — CARE PLAN
The clinical goals for the shift include Patient will remain hemodynamically stable throughout shift    VSS, afebrile, no complaints N/V/D this shift. Pt remained safe and free of falls/injury. No pain reported this shift.

## 2023-10-21 LAB
ALBUMIN SERPL BCP-MCNC: 3.4 G/DL (ref 3.4–5)
ANION GAP SERPL CALC-SCNC: 10 MMOL/L (ref 10–20)
BASOPHILS # BLD AUTO: 0.01 X10*3/UL (ref 0–0.1)
BASOPHILS NFR BLD AUTO: 0.4 %
BUN SERPL-MCNC: 20 MG/DL (ref 6–23)
CALCIUM SERPL-MCNC: 8.4 MG/DL (ref 8.6–10.6)
CHLORIDE SERPL-SCNC: 110 MMOL/L (ref 98–107)
CO2 SERPL-SCNC: 27 MMOL/L (ref 21–32)
CREAT SERPL-MCNC: 1.32 MG/DL (ref 0.5–1.3)
CRP SERPL-MCNC: 0.39 MG/DL
EOSINOPHIL # BLD AUTO: 0.09 X10*3/UL (ref 0–0.4)
EOSINOPHIL NFR BLD AUTO: 3.3 %
ERYTHROCYTE [DISTWIDTH] IN BLOOD BY AUTOMATED COUNT: 12.4 % (ref 11.5–14.5)
FERRITIN SERPL-MCNC: 115 NG/ML (ref 20–300)
FIBRINOGEN PPP-MCNC: 260 MG/DL (ref 200–400)
GFR SERPL CREATININE-BSD FRML MDRD: 57 ML/MIN/1.73M*2
GLUCOSE SERPL-MCNC: 122 MG/DL (ref 74–99)
HCT VFR BLD AUTO: 29.8 % (ref 41–52)
HGB BLD-MCNC: 10.1 G/DL (ref 13.5–17.5)
IMM GRANULOCYTES # BLD AUTO: 0.01 X10*3/UL (ref 0–0.5)
IMM GRANULOCYTES NFR BLD AUTO: 0.4 % (ref 0–0.9)
LYMPHOCYTES # BLD AUTO: 0.43 X10*3/UL (ref 0.8–3)
LYMPHOCYTES NFR BLD AUTO: 15.6 %
MAGNESIUM SERPL-MCNC: 1.7 MG/DL (ref 1.6–2.4)
MCH RBC QN AUTO: 35.4 PG (ref 26–34)
MCHC RBC AUTO-ENTMCNC: 33.9 G/DL (ref 32–36)
MCV RBC AUTO: 105 FL (ref 80–100)
MONOCYTES # BLD AUTO: 0.33 X10*3/UL (ref 0.05–0.8)
MONOCYTES NFR BLD AUTO: 12 %
NEUTROPHILS # BLD AUTO: 1.89 X10*3/UL (ref 1.6–5.5)
NEUTROPHILS NFR BLD AUTO: 68.3 %
NRBC BLD-RTO: 0 /100 WBCS (ref 0–0)
PHOSPHATE SERPL-MCNC: 3.2 MG/DL (ref 2.5–4.9)
PLATELET # BLD AUTO: 120 X10*3/UL (ref 150–450)
PMV BLD AUTO: 9.1 FL (ref 7.5–11.5)
POTASSIUM SERPL-SCNC: 4 MMOL/L (ref 3.5–5.3)
RBC # BLD AUTO: 2.85 X10*6/UL (ref 4.5–5.9)
SODIUM SERPL-SCNC: 143 MMOL/L (ref 136–145)
WBC # BLD AUTO: 2.8 X10*3/UL (ref 4.4–11.3)

## 2023-10-21 PROCEDURE — 2500000004 HC RX 250 GENERAL PHARMACY W/ HCPCS (ALT 636 FOR OP/ED): Performed by: INTERNAL MEDICINE

## 2023-10-21 PROCEDURE — 83735 ASSAY OF MAGNESIUM: CPT | Performed by: PHYSICIAN ASSISTANT

## 2023-10-21 PROCEDURE — 2500000001 HC RX 250 WO HCPCS SELF ADMINISTERED DRUGS (ALT 637 FOR MEDICARE OP): Performed by: INTERNAL MEDICINE

## 2023-10-21 PROCEDURE — 1170000001 HC PRIVATE ONCOLOGY ROOM DAILY

## 2023-10-21 PROCEDURE — 85025 COMPLETE CBC W/AUTO DIFF WBC: CPT | Performed by: PHYSICIAN ASSISTANT

## 2023-10-21 PROCEDURE — 82728 ASSAY OF FERRITIN: CPT | Performed by: PHYSICIAN ASSISTANT

## 2023-10-21 PROCEDURE — 85384 FIBRINOGEN ACTIVITY: CPT | Performed by: PHYSICIAN ASSISTANT

## 2023-10-21 PROCEDURE — 86140 C-REACTIVE PROTEIN: CPT | Performed by: PHYSICIAN ASSISTANT

## 2023-10-21 PROCEDURE — 99233 SBSQ HOSP IP/OBS HIGH 50: CPT | Performed by: INTERNAL MEDICINE

## 2023-10-21 PROCEDURE — 2500000001 HC RX 250 WO HCPCS SELF ADMINISTERED DRUGS (ALT 637 FOR MEDICARE OP)

## 2023-10-21 PROCEDURE — 80069 RENAL FUNCTION PANEL: CPT | Performed by: PHYSICIAN ASSISTANT

## 2023-10-21 RX ADMIN — FLUDARABINE PHOSPHATE 65 MG: 25 INJECTION, SOLUTION INTRAVENOUS at 11:39

## 2023-10-21 RX ADMIN — URSODIOL 300 MG: 300 CAPSULE ORAL at 08:38

## 2023-10-21 RX ADMIN — ACYCLOVIR 400 MG: 400 TABLET ORAL at 20:42

## 2023-10-21 RX ADMIN — APIXABAN 5 MG: 5 TABLET, FILM COATED ORAL at 08:38

## 2023-10-21 RX ADMIN — ACYCLOVIR 400 MG: 400 TABLET ORAL at 08:38

## 2023-10-21 RX ADMIN — ONDANSETRON 16 MG: 2 INJECTION INTRAMUSCULAR; INTRAVENOUS at 11:05

## 2023-10-21 RX ADMIN — APIXABAN 5 MG: 5 TABLET, FILM COATED ORAL at 20:42

## 2023-10-21 RX ADMIN — TRAZODONE HYDROCHLORIDE 300 MG: 100 TABLET ORAL at 20:42

## 2023-10-21 RX ADMIN — ALLOPURINOL 300 MG: 300 TABLET ORAL at 08:38

## 2023-10-21 RX ADMIN — CYCLOPHOSPHAMIDE 657 MG: 1 INJECTION, POWDER, FOR SOLUTION INTRAVENOUS; ORAL at 12:44

## 2023-10-21 RX ADMIN — URSODIOL 300 MG: 300 CAPSULE ORAL at 14:10

## 2023-10-21 RX ADMIN — SODIUM CHLORIDE 100 ML/HR: 9 INJECTION, SOLUTION INTRAVENOUS at 11:04

## 2023-10-21 RX ADMIN — URSODIOL 300 MG: 300 CAPSULE ORAL at 20:42

## 2023-10-21 ASSESSMENT — COGNITIVE AND FUNCTIONAL STATUS - GENERAL
DAILY ACTIVITIY SCORE: 24
MOBILITY SCORE: 24

## 2023-10-21 ASSESSMENT — PAIN SCALES - GENERAL
PAINLEVEL_OUTOF10: 0 - NO PAIN
PAINLEVEL_OUTOF10: 0 - NO PAIN

## 2023-10-21 ASSESSMENT — PAIN - FUNCTIONAL ASSESSMENT: PAIN_FUNCTIONAL_ASSESSMENT: 0-10

## 2023-10-21 NOTE — CARE PLAN
Problem: Fall/Injury  Goal: Not fall by end of shift  Outcome: Progressing  Goal: Be free from injury by end of the shift  Outcome: Progressing  Goal: Verbalize understanding of personal risk factors for fall in the hospital  Outcome: Progressing  Goal: Verbalize understanding of risk factor reduction measures to prevent injury from fall in the home  Outcome: Progressing  Goal: Use assistive devices by end of the shift  Outcome: Progressing  Goal: Pace activities to prevent fatigue by end of the shift  Outcome: Progressing     VSS, patient remained afebrile throughout shift. Patient remained free from injuries and falls throughout shift. No complaints of pain or n/v/d.  Patient safe.

## 2023-10-21 NOTE — CARE PLAN
The patient's goals for the shift include      The clinical goals for the shift include Patient will tolerate chemotherapy by end of shift.    VSS, afebrile. No c/o pain, nausea, or diarrhea. Fludarabine/Cytarabine infused at 1130. Tolerated well. Chemo precautions reviewed with patient. Salt and Soda rinse provided. No other complaints at this time. Wife at bedside. In good spirits.

## 2023-10-21 NOTE — PROGRESS NOTES
Mr. Maldonado Mccloud is a 73 year old male with a PMH of multiple myeloma s/p Auto SCT on 12/13/15 and then multiple lines of treatment (Revlimid, Vacto/Pom trial, Sarclista/Kyprolis), most recently (8/2023) on Cytoxan/Kyprolis and s/p radiation treatments x 8 fractions on 7/3/23.,  LLE DVT 2022 (on Eliquis).  Admitted for CAR T cell therapy for his ISS stage 2 Kappa multiple myeloma with ABECMA.    T-4 today    Patient is clinically doing well today. Denies any chest pain, cough, SOB, abdominal pain. Admits regular BM. Other ROS are unremarkable.         Allergies  Penicillins and Piperacillin-tazobactam    Meds:     acyclovir, 400 mg, oral, q12h  allopurinol, 300 mg, oral, Daily  apixaban, 5 mg, oral, BID  cyclophosphamide, 300 mg/m2 (Treatment Plan Recorded), intravenous, Once  [START ON 10/26/2023] fluconazole, 400 mg, oral, Daily  [START ON 10/24/2023] levETIRAcetam, 500 mg, oral, BID  traZODone, 300 mg, oral, Nightly  ursodiol, 300 mg, oral, TID       Visit Vitals  Vitals:    10/21/23 1429   BP: 139/75   Pulse: 78   Resp: 18   Temp: 37 °C (98.6 °F)   SpO2: 99%           Physical Exam  Constitutional:       Appearance: Normal appearance.   HENT:      Head: Normocephalic and atraumatic.      Nose: Nose normal.      Mouth/Throat:      Mouth: Mucous membranes are moist.   Eyes:      Extraocular Movements: Extraocular movements intact.      Pupils: Pupils are equal, round, and reactive to light.   Cardiovascular:      Rate and Rhythm: Normal rate and regular rhythm.   Pulmonary:      Effort: Pulmonary effort is normal.      Breath sounds: Normal breath sounds.   Abdominal:      General: Bowel sounds are normal.      Palpations: Abdomen is soft.   Musculoskeletal:         General: Normal range of motion.   Skin:     General: Skin is warm and dry.   Neurological:      General: No focal deficit present.      Mental Status: He is alert and oriented to person, place, and time.   Psychiatric:         Mood and Affect: Mood  normal.         Behavior: Behavior normal.      Results for orders placed or performed during the hospital encounter of 10/19/23 (from the past 24 hour(s))   CBC and Auto Differential   Result Value Ref Range    WBC 2.8 (L) 4.4 - 11.3 x10*3/uL    nRBC 0.0 0.0 - 0.0 /100 WBCs    RBC 2.85 (L) 4.50 - 5.90 x10*6/uL    Hemoglobin 10.1 (L) 13.5 - 17.5 g/dL    Hematocrit 29.8 (L) 41.0 - 52.0 %     (H) 80 - 100 fL    MCH 35.4 (H) 26.0 - 34.0 pg    MCHC 33.9 32.0 - 36.0 g/dL    RDW 12.4 11.5 - 14.5 %    Platelets 120 (L) 150 - 450 x10*3/uL    MPV 9.1 7.5 - 11.5 fL    Neutrophils % 68.3 40.0 - 80.0 %    Immature Granulocytes %, Automated 0.4 0.0 - 0.9 %    Lymphocytes % 15.6 13.0 - 44.0 %    Monocytes % 12.0 2.0 - 10.0 %    Eosinophils % 3.3 0.0 - 6.0 %    Basophils % 0.4 0.0 - 2.0 %    Neutrophils Absolute 1.89 1.60 - 5.50 x10*3/uL    Immature Granulocytes Absolute, Automated 0.01 0.00 - 0.50 x10*3/uL    Lymphocytes Absolute 0.43 (L) 0.80 - 3.00 x10*3/uL    Monocytes Absolute 0.33 0.05 - 0.80 x10*3/uL    Eosinophils Absolute 0.09 0.00 - 0.40 x10*3/uL    Basophils Absolute 0.01 0.00 - 0.10 x10*3/uL   Renal Function Panel   Result Value Ref Range    Glucose 122 (H) 74 - 99 mg/dL    Sodium 143 136 - 145 mmol/L    Potassium 4.0 3.5 - 5.3 mmol/L    Chloride 110 (H) 98 - 107 mmol/L    Bicarbonate 27 21 - 32 mmol/L    Anion Gap 10 10 - 20 mmol/L    Urea Nitrogen 20 6 - 23 mg/dL    Creatinine 1.32 (H) 0.50 - 1.30 mg/dL    eGFR 57 (L) >60 mL/min/1.73m*2    Calcium 8.4 (L) 8.6 - 10.6 mg/dL    Phosphorus 3.2 2.5 - 4.9 mg/dL    Albumin 3.4 3.4 - 5.0 g/dL   Magnesium   Result Value Ref Range    Magnesium 1.70 1.60 - 2.40 mg/dL   Fibrinogen   Result Value Ref Range    Fibrinogen 260 200 - 400 mg/dL   C-reactive protein   Result Value Ref Range    C-Reactive Protein 0.39 <1.00 mg/dL   Ferritin   Result Value Ref Range    Ferritin 115 20 - 300 ng/mL          ONC:  ISS stage 2 IgG Kappa Multiple Myeloma  -Presented with right chest  wall mass in July 2015 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   -Myeloma marks 10/2/23-KFLC 6.79, LFLC 0.31, IgG 670, IgM 7, IgA <7  -Collected T cells 5/26/23    Admitted for CAR-T cell therapy-ABECMA (Idecabtagene vicleucel) prep with Flu/Cy  -Fludarabine 65 mg IV on days T-5 to T-3 (10/20 - 10/22/23)  -Cyclophosphamide 657 mg IV on days T-5 to T-3 (10/20 - 10/22/23)  -CAR T-cell ABECMA (Idecabtagene vicleucel) T0 = 10/25/23  -Plan to start Allopurinol, Ursodiol (10/20)  -Plan to start Keppra 500mg BID on T-1 (10/24)    Daily Evaluation:    Ferritin: 115  CRP: 0.39  Fibrinogen: 260  Coags:   ICE Score :     #CRS Assessment:   In last 24 hours: Fever: NO; Any O2 supplement? NO    #Neurotoxicity Assessment:   ICANS Score: 10/10  Handwriting/Signature impaired:  No  Motor/Sensory deficit: No  Other abnormal Neuro symptom: No  Imaging/Fundoscopy finding: N/A     #CRS Grade: N/A  Organ/System affected by CRS:   Date of CRS onset:   Date of highest CRS Grade:   Date of CRS resolution to Grade 1 or lower:      #ICANS Grade: N/A  Date of Neurotoxicity onset:   Date of highest Neurotoxicity Grade:   Date of Neurotoxicity resolution to Grade 1 or lower:   Neurotox management: not indicated     #Management:   1. Tocilizumab 8mg/kg: N/A  2. Anakinra 100mcg/day: N/A  3. Siltuximab 5mg/kg: date & time administered (not started)   4. Steroids: N/A  5. Other agents/management: agent or other free text (N/A)      HEME:  Pancytopenia  Tx for hgb < 7, plt < 10  Hx/o LLE DVT 2022, cont Eliquis, hold for plt count < 50k     ID:  -Allergies to PCN  & Pip-Tazo  -Afebrile on admit  -No evidence of infection on admit  -Continue Acyclovir 400mg BID  -Plan to start Fluconazole T+1 (10/26)  -Check CMV PCR 2x/week     CARDIAC:  -Echo (9/22/23) EF 50%  -Hx/o A. 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     PSYCH:  Hx of Insomnia  -Continue home Trazadone 300mg HS     DISPO  -Full code-confirmed on admit  -Access: double non solo PICC  -primary oncologist Dr. Resendez    Patient seen & examined with Dr. Alexis Carvajal ([email or web address])

## 2023-10-21 NOTE — SIGNIFICANT EVENT
10/21/23 0353   Prechemo Checklist   Has the patient been in the hospital, ED, or urgent care since last date of service No   Chemo/Immuno Consent Signed Yes   Protocol/Indications Verified Yes   Confirmed to previous date/time of medication Yes   Compared to previous dose Yes   All medications are dated accurately Yes   Pregnancy Test Negative Not applicable   Parameters Met Yes   BSA/Weight-Height Verified Yes   Dose Calculations Verified Yes

## 2023-10-22 LAB
ALBUMIN SERPL BCP-MCNC: 3.5 G/DL (ref 3.4–5)
ANION GAP SERPL CALC-SCNC: 12 MMOL/L (ref 10–20)
BASOPHILS # BLD AUTO: 0 X10*3/UL (ref 0–0.1)
BASOPHILS NFR BLD AUTO: 0 %
BUN SERPL-MCNC: 19 MG/DL (ref 6–23)
CALCIUM SERPL-MCNC: 8.5 MG/DL (ref 8.6–10.6)
CHLORIDE SERPL-SCNC: 111 MMOL/L (ref 98–107)
CMV DNA SERPL NAA+PROBE-LOG IU: NORMAL {LOG_IU}/ML
CO2 SERPL-SCNC: 22 MMOL/L (ref 21–32)
CREAT SERPL-MCNC: 1.14 MG/DL (ref 0.5–1.3)
CRP SERPL-MCNC: 0.47 MG/DL
EOSINOPHIL # BLD AUTO: 0.07 X10*3/UL (ref 0–0.4)
EOSINOPHIL NFR BLD AUTO: 2.7 %
ERYTHROCYTE [DISTWIDTH] IN BLOOD BY AUTOMATED COUNT: 12.3 % (ref 11.5–14.5)
FERRITIN SERPL-MCNC: 118 NG/ML (ref 20–300)
FIBRINOGEN PPP-MCNC: 230 MG/DL (ref 200–400)
GFR SERPL CREATININE-BSD FRML MDRD: 67 ML/MIN/1.73M*2
GLUCOSE SERPL-MCNC: 107 MG/DL (ref 74–99)
HCT VFR BLD AUTO: 29.3 % (ref 41–52)
HGB BLD-MCNC: 9.9 G/DL (ref 13.5–17.5)
IMM GRANULOCYTES # BLD AUTO: 0.01 X10*3/UL (ref 0–0.5)
IMM GRANULOCYTES NFR BLD AUTO: 0.4 % (ref 0–0.9)
LABORATORY COMMENT REPORT: NOT DETECTED
LYMPHOCYTES # BLD AUTO: 0.07 X10*3/UL (ref 0.8–3)
LYMPHOCYTES NFR BLD AUTO: 2.7 %
MAGNESIUM SERPL-MCNC: 1.89 MG/DL (ref 1.6–2.4)
MCH RBC QN AUTO: 36.1 PG (ref 26–34)
MCHC RBC AUTO-ENTMCNC: 33.8 G/DL (ref 32–36)
MCV RBC AUTO: 107 FL (ref 80–100)
MONOCYTES # BLD AUTO: 0.16 X10*3/UL (ref 0.05–0.8)
MONOCYTES NFR BLD AUTO: 6.1 %
NEUTROPHILS # BLD AUTO: 2.32 X10*3/UL (ref 1.6–5.5)
NEUTROPHILS NFR BLD AUTO: 88.1 %
NRBC BLD-RTO: 0 /100 WBCS (ref 0–0)
PHOSPHATE SERPL-MCNC: 3.4 MG/DL (ref 2.5–4.9)
PLATELET # BLD AUTO: 98 X10*3/UL (ref 150–450)
PMV BLD AUTO: 8.6 FL (ref 7.5–11.5)
POTASSIUM SERPL-SCNC: 4.3 MMOL/L (ref 3.5–5.3)
RBC # BLD AUTO: 2.74 X10*6/UL (ref 4.5–5.9)
SODIUM SERPL-SCNC: 141 MMOL/L (ref 136–145)
WBC # BLD AUTO: 2.6 X10*3/UL (ref 4.4–11.3)

## 2023-10-22 PROCEDURE — 2500000004 HC RX 250 GENERAL PHARMACY W/ HCPCS (ALT 636 FOR OP/ED): Mod: JZ | Performed by: INTERNAL MEDICINE

## 2023-10-22 PROCEDURE — 85384 FIBRINOGEN ACTIVITY: CPT | Performed by: PHYSICIAN ASSISTANT

## 2023-10-22 PROCEDURE — 83735 ASSAY OF MAGNESIUM: CPT | Performed by: PHYSICIAN ASSISTANT

## 2023-10-22 PROCEDURE — 2500000001 HC RX 250 WO HCPCS SELF ADMINISTERED DRUGS (ALT 637 FOR MEDICARE OP): Performed by: INTERNAL MEDICINE

## 2023-10-22 PROCEDURE — 2500000001 HC RX 250 WO HCPCS SELF ADMINISTERED DRUGS (ALT 637 FOR MEDICARE OP)

## 2023-10-22 PROCEDURE — 80069 RENAL FUNCTION PANEL: CPT | Performed by: PHYSICIAN ASSISTANT

## 2023-10-22 PROCEDURE — 86140 C-REACTIVE PROTEIN: CPT | Performed by: PHYSICIAN ASSISTANT

## 2023-10-22 PROCEDURE — 85025 COMPLETE CBC W/AUTO DIFF WBC: CPT | Performed by: PHYSICIAN ASSISTANT

## 2023-10-22 PROCEDURE — 1170000001 HC PRIVATE ONCOLOGY ROOM DAILY

## 2023-10-22 PROCEDURE — 99233 SBSQ HOSP IP/OBS HIGH 50: CPT | Performed by: INTERNAL MEDICINE

## 2023-10-22 PROCEDURE — 82728 ASSAY OF FERRITIN: CPT | Performed by: PHYSICIAN ASSISTANT

## 2023-10-22 RX ADMIN — URSODIOL 300 MG: 300 CAPSULE ORAL at 20:38

## 2023-10-22 RX ADMIN — FLUDARABINE PHOSPHATE 65 MG: 25 INJECTION, SOLUTION INTRAVENOUS at 11:25

## 2023-10-22 RX ADMIN — TRAZODONE HYDROCHLORIDE 300 MG: 100 TABLET ORAL at 20:38

## 2023-10-22 RX ADMIN — APIXABAN 5 MG: 5 TABLET, FILM COATED ORAL at 10:13

## 2023-10-22 RX ADMIN — ONDANSETRON 16 MG: 2 INJECTION INTRAMUSCULAR; INTRAVENOUS at 10:13

## 2023-10-22 RX ADMIN — CYCLOPHOSPHAMIDE 657 MG: 1 INJECTION, POWDER, FOR SOLUTION INTRAVENOUS; ORAL at 12:33

## 2023-10-22 RX ADMIN — APIXABAN 5 MG: 5 TABLET, FILM COATED ORAL at 20:38

## 2023-10-22 RX ADMIN — ALLOPURINOL 300 MG: 300 TABLET ORAL at 10:13

## 2023-10-22 RX ADMIN — ACYCLOVIR 400 MG: 400 TABLET ORAL at 10:13

## 2023-10-22 RX ADMIN — URSODIOL 300 MG: 300 CAPSULE ORAL at 14:13

## 2023-10-22 RX ADMIN — SODIUM CHLORIDE 100 ML/HR: 9 INJECTION, SOLUTION INTRAVENOUS at 14:13

## 2023-10-22 RX ADMIN — ACYCLOVIR 400 MG: 400 TABLET ORAL at 20:38

## 2023-10-22 RX ADMIN — URSODIOL 300 MG: 300 CAPSULE ORAL at 10:13

## 2023-10-22 ASSESSMENT — COGNITIVE AND FUNCTIONAL STATUS - GENERAL
DAILY ACTIVITIY SCORE: 24
MOBILITY SCORE: 24
MOBILITY SCORE: 24
DAILY ACTIVITIY SCORE: 24

## 2023-10-22 ASSESSMENT — PAIN - FUNCTIONAL ASSESSMENT: PAIN_FUNCTIONAL_ASSESSMENT: 0-10

## 2023-10-22 ASSESSMENT — PAIN SCALES - GENERAL: PAINLEVEL_OUTOF10: 0 - NO PAIN

## 2023-10-22 NOTE — PROGRESS NOTES
Mr. Maldonado Mccloud is a 73 year old male with a PMH of multiple myeloma s/p Auto SCT on 12/13/15 and then multiple lines of treatment (Revlimid, Vacto/Pom trial, Sarclista/Kyprolis), most recently (8/2023) on Cytoxan/Kyprolis and s/p radiation treatments x 8 fractions on 7/3/23.,  LLE DVT 2022 (on Eliquis).  Admitted for CAR T cell therapy for his ISS stage 2 Kappa multiple myeloma with ABECMA.    T-3 today    Patient siting up at bed side with wife.  He states he continues to tolerate  chemo well.    He offered no other complaints.          Allergies  Penicillins and Piperacillin-tazobactam    Meds:     acyclovir, 400 mg, oral, q12h  allopurinol, 300 mg, oral, Daily  apixaban, 5 mg, oral, BID  cyclophosphamide, 300 mg/m2 (Treatment Plan Recorded), intravenous, Once  [START ON 10/26/2023] fluconazole, 400 mg, oral, Daily  [START ON 10/24/2023] levETIRAcetam, 500 mg, oral, BID  traZODone, 300 mg, oral, Nightly  ursodiol, 300 mg, oral, TID       Visit Vitals  Vitals:    10/22/23 1354   BP: 112/58   Pulse: 71   Resp: 16   Temp: 36.7 °C (98.1 °F)   SpO2: 98%           Physical Exam  Constitutional:       Appearance: Normal appearance.   HENT:      Head: Normocephalic and atraumatic.      Nose: Nose normal.      Mouth/Throat:      Mouth: Mucous membranes are moist.   Eyes:      Extraocular Movements: Extraocular movements intact.      Pupils: Pupils are equal, round, and reactive to light.   Cardiovascular:      Rate and Rhythm: Normal rate and regular rhythm.   Pulmonary:      Effort: Pulmonary effort is normal.      Breath sounds: Normal breath sounds.   Abdominal:      General: Bowel sounds are normal.      Palpations: Abdomen is soft.   Musculoskeletal:         General: Normal range of motion.   Skin:     General: Skin is warm and dry.   Neurological:      General: No focal deficit present.      Mental Status: He is alert and oriented to person, place, and time.   Psychiatric:         Mood and Affect: Mood normal.          Behavior: Behavior normal.      Results for orders placed or performed during the hospital encounter of 10/19/23 (from the past 24 hour(s))   CBC and Auto Differential   Result Value Ref Range    WBC 2.6 (L) 4.4 - 11.3 x10*3/uL    nRBC 0.0 0.0 - 0.0 /100 WBCs    RBC 2.74 (L) 4.50 - 5.90 x10*6/uL    Hemoglobin 9.9 (L) 13.5 - 17.5 g/dL    Hematocrit 29.3 (L) 41.0 - 52.0 %     (H) 80 - 100 fL    MCH 36.1 (H) 26.0 - 34.0 pg    MCHC 33.8 32.0 - 36.0 g/dL    RDW 12.3 11.5 - 14.5 %    Platelets 98 (L) 150 - 450 x10*3/uL    MPV 8.6 7.5 - 11.5 fL    Neutrophils % 88.1 40.0 - 80.0 %    Immature Granulocytes %, Automated 0.4 0.0 - 0.9 %    Lymphocytes % 2.7 13.0 - 44.0 %    Monocytes % 6.1 2.0 - 10.0 %    Eosinophils % 2.7 0.0 - 6.0 %    Basophils % 0.0 0.0 - 2.0 %    Neutrophils Absolute 2.32 1.60 - 5.50 x10*3/uL    Immature Granulocytes Absolute, Automated 0.01 0.00 - 0.50 x10*3/uL    Lymphocytes Absolute 0.07 (L) 0.80 - 3.00 x10*3/uL    Monocytes Absolute 0.16 0.05 - 0.80 x10*3/uL    Eosinophils Absolute 0.07 0.00 - 0.40 x10*3/uL    Basophils Absolute 0.00 0.00 - 0.10 x10*3/uL   Renal Function Panel   Result Value Ref Range    Glucose 107 (H) 74 - 99 mg/dL    Sodium 141 136 - 145 mmol/L    Potassium 4.3 3.5 - 5.3 mmol/L    Chloride 111 (H) 98 - 107 mmol/L    Bicarbonate 22 21 - 32 mmol/L    Anion Gap 12 10 - 20 mmol/L    Urea Nitrogen 19 6 - 23 mg/dL    Creatinine 1.14 0.50 - 1.30 mg/dL    eGFR 67 >60 mL/min/1.73m*2    Calcium 8.5 (L) 8.6 - 10.6 mg/dL    Phosphorus 3.4 2.5 - 4.9 mg/dL    Albumin 3.5 3.4 - 5.0 g/dL   Magnesium   Result Value Ref Range    Magnesium 1.89 1.60 - 2.40 mg/dL   Fibrinogen   Result Value Ref Range    Fibrinogen 230 200 - 400 mg/dL   C-reactive protein   Result Value Ref Range    C-Reactive Protein 0.47 <1.00 mg/dL   Ferritin   Result Value Ref Range    Ferritin 118 20 - 300 ng/mL          ONC:  ISS stage 2 IgG Kappa Multiple Myeloma  -Presented with right chest wall mass in July 2015  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   -Myeloma marks 10/2/23-KFLC 6.79, LFLC 0.31, IgG 670, IgM 7, IgA <7  -Collected T cells 5/26/23    Admitted for CAR-T cell therapy-ABECMA (Idecabtagene vicleucel) prep with Flu/Cy  -Fludarabine 65 mg IV on days T-5 to T-3 (10/20 - 10/22/23)  -Cyclophosphamide 657 mg IV on days T-5 to T-3 (10/20 - 10/22/23)  -CAR T-cell ABECMA (Idecabtagene vicleucel) T0 = 10/25/23  -Plan to start Allopurinol, Ursodiol (10/20)  -Plan to start Keppra 500mg BID on T-1 (10/24)    Daily Evaluation:    Ferritin: 115  CRP: 0.39  Fibrinogen: 260  Coags:   ICE Score :     #CRS Assessment:   In last 24 hours: Fever: NO; Any O2 supplement? NO    #Neurotoxicity Assessment:   ICANS Score: 10/10  Handwriting/Signature impaired:  No  Motor/Sensory deficit: No  Other abnormal Neuro symptom: No  Imaging/Fundoscopy finding: N/A     #CRS Grade: N/A  Organ/System affected by CRS:   Date of CRS onset:   Date of highest CRS Grade:   Date of CRS resolution to Grade 1 or lower:      #ICANS Grade: N/A  Date of Neurotoxicity onset:   Date of highest Neurotoxicity Grade:   Date of Neurotoxicity resolution to Grade 1 or lower:   Neurotox management: not indicated     #Management:   1. Tocilizumab 8mg/kg: N/A  2. Anakinra 100mcg/day: N/A  3. Siltuximab 5mg/kg: date & time administered (not started)   4. Steroids: N/A  5. Other agents/management: agent or other free text (N/A)      HEME:  Pancytopenia  Tx for hgb < 7, plt < 10  Hx/o LLE DVT 2022, cont Eliquis, hold for plt count < 50k     ID:  -Allergies to PCN & Pip-Tazo  -Afebrile  on admit  -No evidence of infection on admit  -Continue Acyclovir 400mg BID  -Plan to start Fluconazole T+1 (10/26)  -Check CMV PCR 2x/week     CARDIAC:  -Echo (9/22/23) EF 50%  -Hx/o A. 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     PSYCH:  Hx of Insomnia  -Continue home Trazadone 300mg HS     DISPO  -Full code-confirmed on admit  -Access: double non solo PICC  -primary oncologist Dr. Resendez    Patient seen & examined with Dr. Espinoza

## 2023-10-22 NOTE — CARE PLAN
The patient's goals for the shift include      The clinical goals for the shift include patient will tolerate chemotherapy    D-3 CAR-T, Abecma. VSS, afebrile. No c/o pain or nausea. Patient received last doses of Fludarabine & Cytarabine today. Tolerated well. No other complaints at this time. Patient fatigued from getting up frequently to urinate last night.

## 2023-10-23 LAB
ALBUMIN SERPL BCP-MCNC: 3.3 G/DL (ref 3.4–5)
ALP SERPL-CCNC: 46 U/L (ref 33–136)
ALT SERPL W P-5'-P-CCNC: 17 U/L (ref 10–52)
ANION GAP SERPL CALC-SCNC: 10 MMOL/L (ref 10–20)
APTT PPP: 31 SECONDS (ref 27–38)
AST SERPL W P-5'-P-CCNC: 18 U/L (ref 9–39)
BASOPHILS # BLD AUTO: 0 X10*3/UL (ref 0–0.1)
BASOPHILS NFR BLD AUTO: 0 %
BILIRUB DIRECT SERPL-MCNC: 0.2 MG/DL (ref 0–0.3)
BILIRUB SERPL-MCNC: 0.9 MG/DL (ref 0–1.2)
BUN SERPL-MCNC: 17 MG/DL (ref 6–23)
CALCIUM SERPL-MCNC: 8.2 MG/DL (ref 8.6–10.6)
CHLORIDE SERPL-SCNC: 113 MMOL/L (ref 98–107)
CO2 SERPL-SCNC: 24 MMOL/L (ref 21–32)
CREAT SERPL-MCNC: 1.03 MG/DL (ref 0.5–1.3)
CRP SERPL-MCNC: 1.3 MG/DL
EOSINOPHIL # BLD AUTO: 0.08 X10*3/UL (ref 0–0.4)
EOSINOPHIL NFR BLD AUTO: 4 %
ERYTHROCYTE [DISTWIDTH] IN BLOOD BY AUTOMATED COUNT: 12.3 % (ref 11.5–14.5)
FERRITIN SERPL-MCNC: 158 NG/ML (ref 20–300)
FIBRINOGEN PPP-MCNC: 224 MG/DL (ref 200–400)
GFR SERPL CREATININE-BSD FRML MDRD: 76 ML/MIN/1.73M*2
GLUCOSE SERPL-MCNC: 102 MG/DL (ref 74–99)
HCT VFR BLD AUTO: 28.2 % (ref 41–52)
HGB BLD-MCNC: 9.3 G/DL (ref 13.5–17.5)
IMM GRANULOCYTES # BLD AUTO: 0.01 X10*3/UL (ref 0–0.5)
IMM GRANULOCYTES NFR BLD AUTO: 0.5 % (ref 0–0.9)
INR PPP: 1.3 (ref 0.9–1.1)
LYMPHOCYTES # BLD AUTO: 0.02 X10*3/UL (ref 0.8–3)
LYMPHOCYTES NFR BLD AUTO: 1 %
MAGNESIUM SERPL-MCNC: 1.78 MG/DL (ref 1.6–2.4)
MCH RBC QN AUTO: 34.6 PG (ref 26–34)
MCHC RBC AUTO-ENTMCNC: 33 G/DL (ref 32–36)
MCV RBC AUTO: 105 FL (ref 80–100)
MONOCYTES # BLD AUTO: 0.03 X10*3/UL (ref 0.05–0.8)
MONOCYTES NFR BLD AUTO: 1.5 %
NEUTROPHILS # BLD AUTO: 1.87 X10*3/UL (ref 1.6–5.5)
NEUTROPHILS NFR BLD AUTO: 93 %
NRBC BLD-RTO: 0 /100 WBCS (ref 0–0)
PHOSPHATE SERPL-MCNC: 3.1 MG/DL (ref 2.5–4.9)
PLATELET # BLD AUTO: 90 X10*3/UL (ref 150–450)
PMV BLD AUTO: 8.6 FL (ref 7.5–11.5)
POTASSIUM SERPL-SCNC: 4 MMOL/L (ref 3.5–5.3)
PROT SERPL-MCNC: 4.7 G/DL (ref 6.4–8.2)
PROTHROMBIN TIME: 14.2 SECONDS (ref 9.8–12.8)
RBC # BLD AUTO: 2.69 X10*6/UL (ref 4.5–5.9)
RBC MORPH BLD: NORMAL
SODIUM SERPL-SCNC: 143 MMOL/L (ref 136–145)
WBC # BLD AUTO: 2 X10*3/UL (ref 4.4–11.3)

## 2023-10-23 PROCEDURE — 2500000001 HC RX 250 WO HCPCS SELF ADMINISTERED DRUGS (ALT 637 FOR MEDICARE OP): Performed by: INTERNAL MEDICINE

## 2023-10-23 PROCEDURE — 82728 ASSAY OF FERRITIN: CPT | Performed by: PHYSICIAN ASSISTANT

## 2023-10-23 PROCEDURE — 2500000004 HC RX 250 GENERAL PHARMACY W/ HCPCS (ALT 636 FOR OP/ED): Performed by: INTERNAL MEDICINE

## 2023-10-23 PROCEDURE — 83735 ASSAY OF MAGNESIUM: CPT | Performed by: PHYSICIAN ASSISTANT

## 2023-10-23 PROCEDURE — 81003 URINALYSIS AUTO W/O SCOPE: CPT | Performed by: PHYSICIAN ASSISTANT

## 2023-10-23 PROCEDURE — 85384 FIBRINOGEN ACTIVITY: CPT | Performed by: PHYSICIAN ASSISTANT

## 2023-10-23 PROCEDURE — 82248 BILIRUBIN DIRECT: CPT | Performed by: PHYSICIAN ASSISTANT

## 2023-10-23 PROCEDURE — 85025 COMPLETE CBC W/AUTO DIFF WBC: CPT | Performed by: PHYSICIAN ASSISTANT

## 2023-10-23 PROCEDURE — 1170000001 HC PRIVATE ONCOLOGY ROOM DAILY

## 2023-10-23 PROCEDURE — 2500000001 HC RX 250 WO HCPCS SELF ADMINISTERED DRUGS (ALT 637 FOR MEDICARE OP)

## 2023-10-23 PROCEDURE — 85610 PROTHROMBIN TIME: CPT | Performed by: PHYSICIAN ASSISTANT

## 2023-10-23 PROCEDURE — 86140 C-REACTIVE PROTEIN: CPT | Performed by: PHYSICIAN ASSISTANT

## 2023-10-23 PROCEDURE — 84100 ASSAY OF PHOSPHORUS: CPT | Performed by: PHYSICIAN ASSISTANT

## 2023-10-23 RX ADMIN — URSODIOL 300 MG: 300 CAPSULE ORAL at 20:19

## 2023-10-23 RX ADMIN — URSODIOL 300 MG: 300 CAPSULE ORAL at 15:00

## 2023-10-23 RX ADMIN — TRAZODONE HYDROCHLORIDE 300 MG: 100 TABLET ORAL at 20:19

## 2023-10-23 RX ADMIN — ACYCLOVIR 400 MG: 400 TABLET ORAL at 08:13

## 2023-10-23 RX ADMIN — ACYCLOVIR 400 MG: 400 TABLET ORAL at 20:19

## 2023-10-23 RX ADMIN — ALLOPURINOL 300 MG: 300 TABLET ORAL at 08:13

## 2023-10-23 RX ADMIN — APIXABAN 5 MG: 5 TABLET, FILM COATED ORAL at 20:19

## 2023-10-23 RX ADMIN — APIXABAN 5 MG: 5 TABLET, FILM COATED ORAL at 08:13

## 2023-10-23 RX ADMIN — URSODIOL 300 MG: 300 CAPSULE ORAL at 08:13

## 2023-10-23 ASSESSMENT — PAIN - FUNCTIONAL ASSESSMENT
PAIN_FUNCTIONAL_ASSESSMENT: 0-10
PAIN_FUNCTIONAL_ASSESSMENT: 0-10

## 2023-10-23 ASSESSMENT — COGNITIVE AND FUNCTIONAL STATUS - GENERAL
MOBILITY SCORE: 24
DAILY ACTIVITIY SCORE: 24

## 2023-10-23 ASSESSMENT — PAIN SCALES - GENERAL
PAINLEVEL_OUTOF10: 0 - NO PAIN

## 2023-10-23 NOTE — PROGRESS NOTES
"Mr. Maldonado Mccloud is a 73 year old male with a PMH of multiple myeloma s/p Auto SCT on 12/13/15 and then multiple lines of treatment (Revlimid, Vacto/Pom trial, Sarclista/Kyprolis), most recently (8/2023) on Cytoxan/Kyprolis and s/p radiation treatments x 8 fractions on 7/3/23.,  LLE DVT 2022 (on Eliquis).  Admitted for CAR T cell therapy for his ISS stage 2 Kappa multiple myeloma with ABECMA.    T-2 today    Patient siting up at bed side with wife.  He states he continues to tolerate chemo well.    Mentions couple of episodes overnight of urinary urgency with sm amt of leakage before getting to the bathroom.  Denies dysuria.  He offered no other complaints.          Allergies  Penicillins and Piperacillin-tazobactam    Meds:     acyclovir, 400 mg, oral, q12h  allopurinol, 300 mg, oral, Daily  apixaban, 5 mg, oral, BID  cyclophosphamide, 300 mg/m2 (Treatment Plan Recorded), intravenous, Once  [START ON 10/26/2023] fluconazole, 400 mg, oral, Daily  [START ON 10/24/2023] levETIRAcetam, 500 mg, oral, BID  traZODone, 300 mg, oral, Nightly  ursodiol, 300 mg, oral, TID      Visit Vitals  /82   Pulse 73   Temp 36.8 °C (98.2 °F)   Resp 18   Ht 1.775 m (5' 9.88\")   Wt 97.4 kg (214 lb 11.7 oz)   SpO2 97%   BMI 30.91 kg/m²   Smoking Status Every Day   BSA 2.19 m²              Physical Exam  Constitutional:       Appearance: Normal appearance.   HENT:      Head: Normocephalic and atraumatic.      Nose: Nose normal.      Mouth/Throat:      Mouth: Mucous membranes are moist.   Eyes:      Extraocular Movements: Extraocular movements intact.      Pupils: Pupils are equal, round, and reactive to light.   Cardiovascular:      Rate and Rhythm: Normal rate and regular rhythm.   Pulmonary:      Effort: Pulmonary effort is normal.      Breath sounds: Normal breath sounds.   Abdominal:      General: Bowel sounds are normal.      Palpations: Abdomen is soft.   Musculoskeletal:         General: Normal range of motion.   Skin:     " General: Skin is warm and dry.   Neurological:      General: No focal deficit present.      Mental Status: He is alert and oriented to person, place, and time.   Psychiatric:         Mood and Affect: Mood normal.         Behavior: Behavior normal.      Results for orders placed or performed during the hospital encounter of 10/19/23 (from the past 24 hour(s))   CBC and Auto Differential   Result Value Ref Range    WBC 2.0 (L) 4.4 - 11.3 x10*3/uL    nRBC 0.0 0.0 - 0.0 /100 WBCs    RBC 2.69 (L) 4.50 - 5.90 x10*6/uL    Hemoglobin 9.3 (L) 13.5 - 17.5 g/dL    Hematocrit 28.2 (L) 41.0 - 52.0 %     (H) 80 - 100 fL    MCH 34.6 (H) 26.0 - 34.0 pg    MCHC 33.0 32.0 - 36.0 g/dL    RDW 12.3 11.5 - 14.5 %    Platelets 90 (L) 150 - 450 x10*3/uL    MPV 8.6 7.5 - 11.5 fL    Neutrophils % 93.0 40.0 - 80.0 %    Immature Granulocytes %, Automated 0.5 0.0 - 0.9 %    Lymphocytes % 1.0 13.0 - 44.0 %    Monocytes % 1.5 2.0 - 10.0 %    Eosinophils % 4.0 0.0 - 6.0 %    Basophils % 0.0 0.0 - 2.0 %    Neutrophils Absolute 1.87 1.60 - 5.50 x10*3/uL    Immature Granulocytes Absolute, Automated 0.01 0.00 - 0.50 x10*3/uL    Lymphocytes Absolute 0.02 (L) 0.80 - 3.00 x10*3/uL    Monocytes Absolute 0.03 (L) 0.05 - 0.80 x10*3/uL    Eosinophils Absolute 0.08 0.00 - 0.40 x10*3/uL    Basophils Absolute 0.00 0.00 - 0.10 x10*3/uL   Hepatic function panel   Result Value Ref Range    Albumin 3.3 (L) 3.4 - 5.0 g/dL    Bilirubin, Total 0.9 0.0 - 1.2 mg/dL    Bilirubin, Direct 0.2 0.0 - 0.3 mg/dL    Alkaline Phosphatase 46 33 - 136 U/L    ALT 17 10 - 52 U/L    AST 18 9 - 39 U/L    Total Protein 4.7 (L) 6.4 - 8.2 g/dL   Coagulation Screen   Result Value Ref Range    Protime 14.2 (H) 9.8 - 12.8 seconds    INR 1.3 (H) 0.9 - 1.1    aPTT 31 27 - 38 seconds   Magnesium   Result Value Ref Range    Magnesium 1.78 1.60 - 2.40 mg/dL   Fibrinogen   Result Value Ref Range    Fibrinogen 224 200 - 400 mg/dL   C-reactive protein   Result Value Ref Range    C-Reactive  Protein 1.30 (H) <1.00 mg/dL   Ferritin   Result Value Ref Range    Ferritin 158 20 - 300 ng/mL   Phosphorus   Result Value Ref Range    Phosphorus 3.1 2.5 - 4.9 mg/dL   Basic Metabolic Panel   Result Value Ref Range    Glucose 102 (H) 74 - 99 mg/dL    Sodium 143 136 - 145 mmol/L    Potassium 4.0 3.5 - 5.3 mmol/L    Chloride 113 (H) 98 - 107 mmol/L    Bicarbonate 24 21 - 32 mmol/L    Anion Gap 10 10 - 20 mmol/L    Urea Nitrogen 17 6 - 23 mg/dL    Creatinine 1.03 0.50 - 1.30 mg/dL    eGFR 76 >60 mL/min/1.73m*2    Calcium 8.2 (L) 8.6 - 10.6 mg/dL   Morphology   Result Value Ref Range    RBC Morphology No significant RBC morphology present        ONC:  ISS stage 2 IgG Kappa Multiple Myeloma  -Presented with right chest wall mass in July 2015 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   -Myeloma marks 10/2/23-KFLC 6.79, LFLC 0.31, IgG 670, IgM 7, IgA <7  -Collected T cells 5/26/23    Admitted for CAR-T cell therapy-ABECMA (Idecabtagene vicleucel) prep with Flu/Cy  -Fludarabine 65 mg IV on days T-5 to T-3 (10/20 - 10/22/23)  -Cyclophosphamide 657 mg IV on days T-5 to T-3 (10/20 - 10/22/23)  -CAR T-cell ABECMA (Idecabtagene vicleucel) T0 = 10/25/23    -Cont Allopurinol, Ursodiol (10/20)  -Plan to start Keppra 500mg BID on T-1 (10/24)    Daily Evaluation:    Ferritin: 158  CRP: 1.3  Fibrinogen: 224  Coags: 1.3  ICE Score :     #CRS Assessment:   In last 24 hours: Fever: NO; Any O2 supplement? NO    #Neurotoxicity Assessment:   ICANS Score: 10/10  Handwriting/Signature impaired:  No  Motor/Sensory deficit: No  Other abnormal Neuro symptom:  No  Imaging/Fundoscopy finding: N/A     #CRS Grade: N/A  Organ/System affected by CRS:   Date of CRS onset:   Date of highest CRS Grade:   Date of CRS resolution to Grade 1 or lower:      #ICANS Grade: N/A  Date of Neurotoxicity onset:   Date of highest Neurotoxicity Grade:   Date of Neurotoxicity resolution to Grade 1 or lower:   Neurotox management: not indicated     #Management:   1. Tocilizumab 8mg/kg: N/A  2. Anakinra 100mcg/day: N/A  3. Siltuximab 5mg/kg: date & time administered (not started)   4. Steroids: N/A  5. Other agents/management: agent or other free text (N/A)      HEME:  Pancytopenia  Tx for hgb < 7, plt < 10  Hx/o LLE DVT 2022, cont Eliquis, hold for plt count < 50k     ID:  -Allergies to PCN & Pip-Tazo  -Afebrile on admit  -No evidence of infection on admit  -Continue Acyclovir 400mg BID  -Plan to start Fluconazole T+1 (10/26)  -Check CMV PCR 2x/week     CARDIAC:  -Echo (9/22/23) EF 50%  -Hx/o A. 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     PSYCH:  Hx of Insomnia  -Continue home Trazadone 300mg HS     DISPO  -Full code-confirmed on admit  -Access: double non solo PICC  -primary oncologist Dr. Resendez    Patient seen & examined with Dr. Alexis James PA-C

## 2023-10-24 LAB
ALBUMIN SERPL BCP-MCNC: 3.3 G/DL (ref 3.4–5)
ANION GAP SERPL CALC-SCNC: 10 MMOL/L (ref 10–20)
APPEARANCE UR: CLEAR
BASOPHILS # BLD AUTO: 0.01 X10*3/UL (ref 0–0.1)
BASOPHILS NFR BLD AUTO: 0.5 %
BILIRUB UR STRIP.AUTO-MCNC: NEGATIVE MG/DL
BUN SERPL-MCNC: 18 MG/DL (ref 6–23)
CALCIUM SERPL-MCNC: 8.5 MG/DL (ref 8.6–10.6)
CHLORIDE SERPL-SCNC: 112 MMOL/L (ref 98–107)
CMV DNA SERPL NAA+PROBE-LOG IU: NORMAL {LOG_IU}/ML
CO2 SERPL-SCNC: 25 MMOL/L (ref 21–32)
COLOR UR: NORMAL
CREAT SERPL-MCNC: 0.95 MG/DL (ref 0.5–1.3)
CRP SERPL-MCNC: 1.64 MG/DL
EOSINOPHIL # BLD AUTO: 0.1 X10*3/UL (ref 0–0.4)
EOSINOPHIL NFR BLD AUTO: 4.5 %
ERYTHROCYTE [DISTWIDTH] IN BLOOD BY AUTOMATED COUNT: 12.4 % (ref 11.5–14.5)
FERRITIN SERPL-MCNC: 210 NG/ML (ref 20–300)
FIBRINOGEN PPP-MCNC: 244 MG/DL (ref 200–400)
GFR SERPL CREATININE-BSD FRML MDRD: 84 ML/MIN/1.73M*2
GLUCOSE SERPL-MCNC: 104 MG/DL (ref 74–99)
GLUCOSE UR STRIP.AUTO-MCNC: NEGATIVE MG/DL
HCT VFR BLD AUTO: 28.1 % (ref 41–52)
HGB BLD-MCNC: 9.5 G/DL (ref 13.5–17.5)
IMM GRANULOCYTES # BLD AUTO: 0.02 X10*3/UL (ref 0–0.5)
IMM GRANULOCYTES NFR BLD AUTO: 0.9 % (ref 0–0.9)
KETONES UR STRIP.AUTO-MCNC: NEGATIVE MG/DL
LABORATORY COMMENT REPORT: NOT DETECTED
LEUKOCYTE ESTERASE UR QL STRIP.AUTO: NEGATIVE
LYMPHOCYTES # BLD AUTO: 0.03 X10*3/UL (ref 0.8–3)
LYMPHOCYTES NFR BLD AUTO: 1.4 %
MCH RBC QN AUTO: 35.4 PG (ref 26–34)
MCHC RBC AUTO-ENTMCNC: 33.8 G/DL (ref 32–36)
MCV RBC AUTO: 105 FL (ref 80–100)
MONOCYTES # BLD AUTO: 0.06 X10*3/UL (ref 0.05–0.8)
MONOCYTES NFR BLD AUTO: 2.7 %
NEUTROPHILS # BLD AUTO: 1.98 X10*3/UL (ref 1.6–5.5)
NEUTROPHILS NFR BLD AUTO: 90 %
NITRITE UR QL STRIP.AUTO: NEGATIVE
NRBC BLD-RTO: 0 /100 WBCS (ref 0–0)
PH UR STRIP.AUTO: 6 [PH]
PHOSPHATE SERPL-MCNC: 3.1 MG/DL (ref 2.5–4.9)
PLATELET # BLD AUTO: 85 X10*3/UL (ref 150–450)
PMV BLD AUTO: 9.3 FL (ref 7.5–11.5)
POTASSIUM SERPL-SCNC: 4.1 MMOL/L (ref 3.5–5.3)
PROT UR STRIP.AUTO-MCNC: NEGATIVE MG/DL
RBC # BLD AUTO: 2.68 X10*6/UL (ref 4.5–5.9)
RBC # UR STRIP.AUTO: NEGATIVE /UL
RBC MORPH BLD: NORMAL
SODIUM SERPL-SCNC: 143 MMOL/L (ref 136–145)
SP GR UR STRIP.AUTO: 1.01
UROBILINOGEN UR STRIP.AUTO-MCNC: <2 MG/DL
WBC # BLD AUTO: 2.2 X10*3/UL (ref 4.4–11.3)

## 2023-10-24 PROCEDURE — 2500000001 HC RX 250 WO HCPCS SELF ADMINISTERED DRUGS (ALT 637 FOR MEDICARE OP)

## 2023-10-24 PROCEDURE — 1170000001 HC PRIVATE ONCOLOGY ROOM DAILY

## 2023-10-24 PROCEDURE — 99233 SBSQ HOSP IP/OBS HIGH 50: CPT | Performed by: INTERNAL MEDICINE

## 2023-10-24 PROCEDURE — 82728 ASSAY OF FERRITIN: CPT | Performed by: PHYSICIAN ASSISTANT

## 2023-10-24 PROCEDURE — 85025 COMPLETE CBC W/AUTO DIFF WBC: CPT | Performed by: PHYSICIAN ASSISTANT

## 2023-10-24 PROCEDURE — 86140 C-REACTIVE PROTEIN: CPT | Performed by: PHYSICIAN ASSISTANT

## 2023-10-24 PROCEDURE — 2500000001 HC RX 250 WO HCPCS SELF ADMINISTERED DRUGS (ALT 637 FOR MEDICARE OP): Performed by: INTERNAL MEDICINE

## 2023-10-24 PROCEDURE — 80069 RENAL FUNCTION PANEL: CPT | Performed by: PHYSICIAN ASSISTANT

## 2023-10-24 PROCEDURE — 85384 FIBRINOGEN ACTIVITY: CPT | Performed by: PHYSICIAN ASSISTANT

## 2023-10-24 PROCEDURE — 2500000004 HC RX 250 GENERAL PHARMACY W/ HCPCS (ALT 636 FOR OP/ED): Performed by: INTERNAL MEDICINE

## 2023-10-24 RX ORDER — PANTOPRAZOLE SODIUM 40 MG/1
40 TABLET, DELAYED RELEASE ORAL
Status: DISCONTINUED | OUTPATIENT
Start: 2023-10-25 | End: 2023-11-08 | Stop reason: HOSPADM

## 2023-10-24 RX ADMIN — APIXABAN 5 MG: 5 TABLET, FILM COATED ORAL at 08:58

## 2023-10-24 RX ADMIN — TRAZODONE HYDROCHLORIDE 300 MG: 100 TABLET ORAL at 20:25

## 2023-10-24 RX ADMIN — LEVETIRACETAM 500 MG: 500 TABLET, FILM COATED ORAL at 20:25

## 2023-10-24 RX ADMIN — SODIUM CHLORIDE 100 ML/HR: 9 INJECTION, SOLUTION INTRAVENOUS at 23:28

## 2023-10-24 RX ADMIN — URSODIOL 300 MG: 300 CAPSULE ORAL at 08:58

## 2023-10-24 RX ADMIN — ACYCLOVIR 400 MG: 400 TABLET ORAL at 08:58

## 2023-10-24 RX ADMIN — URSODIOL 300 MG: 300 CAPSULE ORAL at 20:25

## 2023-10-24 RX ADMIN — URSODIOL 300 MG: 300 CAPSULE ORAL at 15:50

## 2023-10-24 RX ADMIN — ACYCLOVIR 400 MG: 400 TABLET ORAL at 20:25

## 2023-10-24 RX ADMIN — ALLOPURINOL 300 MG: 300 TABLET ORAL at 08:58

## 2023-10-24 RX ADMIN — APIXABAN 5 MG: 5 TABLET, FILM COATED ORAL at 20:25

## 2023-10-24 ASSESSMENT — COGNITIVE AND FUNCTIONAL STATUS - GENERAL
MOBILITY SCORE: 24
MOBILITY SCORE: 24
DAILY ACTIVITIY SCORE: 24
MOBILITY SCORE: 24
DAILY ACTIVITIY SCORE: 24
DAILY ACTIVITIY SCORE: 24

## 2023-10-24 ASSESSMENT — PAIN SCALES - GENERAL
PAINLEVEL_OUTOF10: 0 - NO PAIN
PAINLEVEL_OUTOF10: 0 - NO PAIN

## 2023-10-24 ASSESSMENT — PAIN - FUNCTIONAL ASSESSMENT: PAIN_FUNCTIONAL_ASSESSMENT: 0-10

## 2023-10-24 NOTE — PROGRESS NOTES
"Mr. Maldonado Mccloud is a 73 year old male with a PMH of multiple myeloma s/p Auto SCT on 12/13/15 and then multiple lines of treatment (Revlimid, Vacto/Pom trial, Sarclista/Kyprolis), most recently (8/2023) on Cytoxan/Kyprolis and s/p radiation treatments x 8 fractions on 7/3/23.,  LLE DVT 2022 (on Eliquis).  Admitted for CAR T cell therapy for his ISS stage 2 Kappa multiple myeloma with ABECMA.    T-1 today    Patient siting up at bed side.  He states he continues to tolerate chemo well.    Mentions couple of episodes overnight of urinary urgency & using bedside urnal .  Denies dysuria. Mentions mild pruritic rash on mid/lower back.  He offered no other complaints.      Denies recent sx of Ha, fever, chills, sinus pain, anorexia, stomatitis, odynophagia, CP, cough. Sob, dyspnea, abd pain, GERD, diarrhea, constipation, dysuria, bleeding, unexplained bruising,  swelling, edema, paresthesia, & diff w balance, coordination, & gait.         Allergies  Penicillins (rash) and Piperacillin-tazobactam (rash)    Meds:   acyclovir, 400 mg, oral, q12h  apixaban, 5 mg, oral, BID  [START ON 10/26/2023] fluconazole, 400 mg, oral, Daily  levETIRAcetam, 500 mg, oral, BID  [START ON 10/25/2023] pantoprazole, 40 mg, oral, Daily before breakfast  traZODone, 300 mg, oral, Nightly  ursodiol, 300 mg, oral, TID     PRN medications: albuterol, alteplase, dextrose, diphenhydrAMINE, EPINEPHrine, famotidine, methylPREDNISolone sodium succinate (PF), oxyCODONE, sodium chloride       Visit Vitals  /73 (Patient Position: Sitting)   Pulse 72   Temp 36.5 °C (97.7 °F) (Temporal)   Resp 14   Ht 1.775 m (5' 9.88\")   Wt 97.4 kg (214 lb 11.7 oz)   SpO2 97%   BMI 30.91 kg/m²   Smoking Status Every Day   BSA 2.19 m²               Physical Exam  Constitutional:       Appearance: Normal appearance.   HENT:      Head: Normocephalic and atraumatic.      Nose: Nose normal.      Mouth/Throat:      Mouth: Mucous membranes are moist.   Eyes:      Extraocular " Movements: Extraocular movements intact.      Pupils: Pupils are equal, round, and reactive to light.   Cardiovascular:      Rate and Rhythm: Normal rate and regular rhythm.   Pulmonary:      Effort: Pulmonary effort is normal.      Breath sounds: Normal breath sounds.   Abdominal:      General: Bowel sounds are normal.      Palpations: Abdomen is soft.   Musculoskeletal:         General: Normal range of motion.   Skin:     General: Skin is warm and dry.  Faint papular erythema on mid to lower back  Neurological:      General: No focal deficit present.      Mental Status: He is alert and oriented to person, place, and time.   Psychiatric:         Mood and Affect: Mood normal.         Behavior: Behavior normal.       Results for orders placed or performed during the hospital encounter of 10/19/23 (from the past 24 hour(s))   Urinalysis with Reflex Microscopic   Result Value Ref Range    Color, Urine Straw Straw, Yellow    Appearance, Urine Clear Clear    Specific Gravity, Urine 1.008 1.005 - 1.035    pH, Urine 6.0 5.0, 5.5, 6.0, 6.5, 7.0, 7.5, 8.0    Protein, Urine NEGATIVE NEGATIVE mg/dL    Glucose, Urine NEGATIVE NEGATIVE mg/dL    Blood, Urine NEGATIVE NEGATIVE    Ketones, Urine NEGATIVE NEGATIVE mg/dL    Bilirubin, Urine NEGATIVE NEGATIVE    Urobilinogen, Urine <2.0 <2.0 mg/dL    Nitrite, Urine NEGATIVE NEGATIVE    Leukocyte Esterase, Urine NEGATIVE NEGATIVE   CBC and Auto Differential   Result Value Ref Range    WBC 2.2 (L) 4.4 - 11.3 x10*3/uL    nRBC 0.0 0.0 - 0.0 /100 WBCs    RBC 2.68 (L) 4.50 - 5.90 x10*6/uL    Hemoglobin 9.5 (L) 13.5 - 17.5 g/dL    Hematocrit 28.1 (L) 41.0 - 52.0 %     (H) 80 - 100 fL    MCH 35.4 (H) 26.0 - 34.0 pg    MCHC 33.8 32.0 - 36.0 g/dL    RDW 12.4 11.5 - 14.5 %    Platelets 85 (L) 150 - 450 x10*3/uL    MPV 9.3 7.5 - 11.5 fL    Neutrophils % 90.0 40.0 - 80.0 %    Immature Granulocytes %, Automated 0.9 0.0 - 0.9 %    Lymphocytes % 1.4 13.0 - 44.0 %    Monocytes % 2.7 2.0 - 10.0  %    Eosinophils % 4.5 0.0 - 6.0 %    Basophils % 0.5 0.0 - 2.0 %    Neutrophils Absolute 1.98 1.60 - 5.50 x10*3/uL    Immature Granulocytes Absolute, Automated 0.02 0.00 - 0.50 x10*3/uL    Lymphocytes Absolute 0.03 (L) 0.80 - 3.00 x10*3/uL    Monocytes Absolute 0.06 0.05 - 0.80 x10*3/uL    Eosinophils Absolute 0.10 0.00 - 0.40 x10*3/uL    Basophils Absolute 0.01 0.00 - 0.10 x10*3/uL   Renal Function Panel   Result Value Ref Range    Glucose 104 (H) 74 - 99 mg/dL    Sodium 143 136 - 145 mmol/L    Potassium 4.1 3.5 - 5.3 mmol/L    Chloride 112 (H) 98 - 107 mmol/L    Bicarbonate 25 21 - 32 mmol/L    Anion Gap 10 10 - 20 mmol/L    Urea Nitrogen 18 6 - 23 mg/dL    Creatinine 0.95 0.50 - 1.30 mg/dL    eGFR 84 >60 mL/min/1.73m*2    Calcium 8.5 (L) 8.6 - 10.6 mg/dL    Phosphorus 3.1 2.5 - 4.9 mg/dL    Albumin 3.3 (L) 3.4 - 5.0 g/dL   Fibrinogen   Result Value Ref Range    Fibrinogen 244 200 - 400 mg/dL   C-reactive protein   Result Value Ref Range    C-Reactive Protein 1.64 (H) <1.00 mg/dL   Ferritin   Result Value Ref Range    Ferritin 210 20 - 300 ng/mL   Morphology   Result Value Ref Range    RBC Morphology No significant RBC morphology present              ONC:  ISS stage 2 IgG Kappa Multiple Myeloma  -Presented with right chest wall mass in July 2015 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   -Myeloma marks 10/2/23-KFLC 6.79, LFLC 0.31, IgG 670, IgM 7, IgA <7  -Collected T cells 5/26/23    Admitted for CAR-T cell therapy-ABECMA (Idecabtagene vicleucel) prep with Flu/Cy  -Fludarabine 65 mg IV on days T-5 to T-3 (10/20 -  10/22/23)  -Cyclophosphamide 657 mg IV on days T-5 to T-3 (10/20 - 10/22/23)  -CAR T-cell ABECMA (Idecabtagene vicleucel) T0 = 10/25/23    Currently T-1 today    -Cont Allopurinol, Ursodiol (10/20), discontinued (10/24) for new mild pruritic rash on     mid/lower back.  -Plan to start Keppra 500mg BID on T-1 (10/24)    Daily Evaluation:    Ferritin: 210  CRP: 1.64  Fibrinogen: 244  Coags: 1.3  ICE Score :     #CRS Assessment:   In last 24 hours: Fever: NO; Any O2 supplement? NO    #Neurotoxicity Assessment:   ICANS Score: 10/10  Handwriting/Signature impaired:  No  Motor/Sensory deficit: No  Other abnormal Neuro symptom: No  Imaging/Fundoscopy finding: N/A     #CRS Grade: N/A  Organ/System affected by CRS:   Date of CRS onset:   Date of highest CRS Grade:   Date of CRS resolution to Grade 1 or lower:      #ICANS Grade: N/A  Date of Neurotoxicity onset:   Date of highest Neurotoxicity Grade:   Date of Neurotoxicity resolution to Grade 1 or lower:   Neurotox management: not indicated     #Management:   1. Tocilizumab 8mg/kg: N/A  2. Anakinra 100mcg/day: N/A  3. Siltuximab 5mg/kg: date & time administered (not started)   4. Steroids: N/A  5. Other agents/management: agent or other free text (N/A)      HEME:  Pancytopenia  Tx for hgb < 7, plt < 10  Hx/o LLE DVT 2022, cont Eliquis, hold for plt count < 50k     ID:  -Allergies to PCN (rash) & Pip-Tazo (rash)  -Afebrile  -Continue Acyclovir 400mg BID  -Plan to start Fluconazole T+1 (10/26)  -Check CMV PCR 2x/week  -Plan Cefepime or Meropenem w neutropenic fever     CARDIAC:  -Echo (9/22/23) EF 50%  -Hx/o A. fib 2015  -HTN - no home meds.  Cont to follow.  If persists may need to begin antihypertensives.     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     PSYCH:  Hx of  Insomnia  -Continue home Trazadone 300mg HS     DISPO  -Full code-confirmed on admit  -Access: double non solo PICC  -primary oncologist Dr. Resendez    Patient seen & examined with Dr. Patrick James PA-C

## 2023-10-25 LAB
ALBUMIN SERPL BCP-MCNC: 3.3 G/DL (ref 3.4–5)
ALP SERPL-CCNC: 42 U/L (ref 33–136)
ALT SERPL W P-5'-P-CCNC: 13 U/L (ref 10–52)
ANION GAP SERPL CALC-SCNC: 9 MMOL/L (ref 10–20)
APTT PPP: 30 SECONDS (ref 27–38)
AST SERPL W P-5'-P-CCNC: 20 U/L (ref 9–39)
BASOPHILS # BLD AUTO: 0.01 X10*3/UL (ref 0–0.1)
BASOPHILS NFR BLD AUTO: 0.7 %
BILIRUB DIRECT SERPL-MCNC: 0.2 MG/DL (ref 0–0.3)
BILIRUB SERPL-MCNC: 0.9 MG/DL (ref 0–1.2)
BUN SERPL-MCNC: 19 MG/DL (ref 6–23)
CALCIUM SERPL-MCNC: 8.6 MG/DL (ref 8.6–10.6)
CHLORIDE SERPL-SCNC: 115 MMOL/L (ref 98–107)
CO2 SERPL-SCNC: 25 MMOL/L (ref 21–32)
CREAT SERPL-MCNC: 0.87 MG/DL (ref 0.5–1.3)
CRP SERPL-MCNC: 1 MG/DL
EOSINOPHIL # BLD AUTO: 0.07 X10*3/UL (ref 0–0.4)
EOSINOPHIL NFR BLD AUTO: 5 %
ERYTHROCYTE [DISTWIDTH] IN BLOOD BY AUTOMATED COUNT: 12.1 % (ref 11.5–14.5)
FERRITIN SERPL-MCNC: 206 NG/ML (ref 20–300)
FIBRINOGEN PPP-MCNC: 260 MG/DL (ref 200–400)
GFR SERPL CREATININE-BSD FRML MDRD: >90 ML/MIN/1.73M*2
GLUCOSE SERPL-MCNC: 97 MG/DL (ref 74–99)
HCT VFR BLD AUTO: 26.6 % (ref 41–52)
HGB BLD-MCNC: 9.1 G/DL (ref 13.5–17.5)
IMM GRANULOCYTES # BLD AUTO: 0.01 X10*3/UL (ref 0–0.5)
IMM GRANULOCYTES NFR BLD AUTO: 0.7 % (ref 0–0.9)
INR PPP: 1.2 (ref 0.9–1.1)
LYMPHOCYTES # BLD AUTO: 0.03 X10*3/UL (ref 0.8–3)
LYMPHOCYTES NFR BLD AUTO: 2.2 %
MCH RBC QN AUTO: 35.4 PG (ref 26–34)
MCHC RBC AUTO-ENTMCNC: 34.2 G/DL (ref 32–36)
MCV RBC AUTO: 104 FL (ref 80–100)
MONOCYTES # BLD AUTO: 0.04 X10*3/UL (ref 0.05–0.8)
MONOCYTES NFR BLD AUTO: 2.9 %
NEUTROPHILS # BLD AUTO: 1.23 X10*3/UL (ref 1.6–5.5)
NEUTROPHILS NFR BLD AUTO: 88.5 %
NRBC BLD-RTO: 0 /100 WBCS (ref 0–0)
PHOSPHATE SERPL-MCNC: 3.3 MG/DL (ref 2.5–4.9)
PLATELET # BLD AUTO: 83 X10*3/UL (ref 150–450)
PMV BLD AUTO: 8.9 FL (ref 7.5–11.5)
POTASSIUM SERPL-SCNC: 4 MMOL/L (ref 3.5–5.3)
PROT SERPL-MCNC: 5.1 G/DL (ref 6.4–8.2)
PROTHROMBIN TIME: 13.2 SECONDS (ref 9.8–12.8)
RBC # BLD AUTO: 2.57 X10*6/UL (ref 4.5–5.9)
RBC MORPH BLD: NORMAL
SODIUM SERPL-SCNC: 145 MMOL/L (ref 136–145)
WBC # BLD AUTO: 1.4 X10*3/UL (ref 4.4–11.3)

## 2023-10-25 PROCEDURE — 2500000001 HC RX 250 WO HCPCS SELF ADMINISTERED DRUGS (ALT 637 FOR MEDICARE OP): Performed by: HOME HEALTH AIDE

## 2023-10-25 PROCEDURE — 0540T HC CAR-T CELL THERAPY ADMINISTRATION: CPT

## 2023-10-25 PROCEDURE — 85610 PROTHROMBIN TIME: CPT | Performed by: PHYSICIAN ASSISTANT

## 2023-10-25 PROCEDURE — 82728 ASSAY OF FERRITIN: CPT | Performed by: PHYSICIAN ASSISTANT

## 2023-10-25 PROCEDURE — 0539T HC CAR-T THERAPY RECEIPT & PREP CAR-T CELLS F/ADMN: CPT

## 2023-10-25 PROCEDURE — 85025 COMPLETE CBC W/AUTO DIFF WBC: CPT | Performed by: PHYSICIAN ASSISTANT

## 2023-10-25 PROCEDURE — 99233 SBSQ HOSP IP/OBS HIGH 50: CPT | Performed by: INTERNAL MEDICINE

## 2023-10-25 PROCEDURE — 2500000001 HC RX 250 WO HCPCS SELF ADMINISTERED DRUGS (ALT 637 FOR MEDICARE OP): Performed by: INTERNAL MEDICINE

## 2023-10-25 PROCEDURE — 82248 BILIRUBIN DIRECT: CPT | Performed by: PHYSICIAN ASSISTANT

## 2023-10-25 PROCEDURE — 84100 ASSAY OF PHOSPHORUS: CPT | Performed by: PHYSICIAN ASSISTANT

## 2023-10-25 PROCEDURE — 85384 FIBRINOGEN ACTIVITY: CPT | Performed by: PHYSICIAN ASSISTANT

## 2023-10-25 PROCEDURE — 2500000004 HC RX 250 GENERAL PHARMACY W/ HCPCS (ALT 636 FOR OP/ED): Performed by: PHYSICIAN ASSISTANT

## 2023-10-25 PROCEDURE — XW033K7 INTRODUCTION OF IDECABTAGENE VICLEUCEL IMMUNOTHERAPY INTO PERIPHERAL VEIN, PERCUTANEOUS APPROACH, NEW TECHNOLOGY GROUP 7: ICD-10-PCS | Performed by: INTERNAL MEDICINE

## 2023-10-25 PROCEDURE — 86140 C-REACTIVE PROTEIN: CPT | Performed by: PHYSICIAN ASSISTANT

## 2023-10-25 PROCEDURE — 1170000001 HC PRIVATE ONCOLOGY ROOM DAILY

## 2023-10-25 PROCEDURE — 80053 COMPREHEN METABOLIC PANEL: CPT | Performed by: PHYSICIAN ASSISTANT

## 2023-10-25 PROCEDURE — 2500000004 HC RX 250 GENERAL PHARMACY W/ HCPCS (ALT 636 FOR OP/ED): Performed by: INTERNAL MEDICINE

## 2023-10-25 PROCEDURE — 2500000001 HC RX 250 WO HCPCS SELF ADMINISTERED DRUGS (ALT 637 FOR MEDICARE OP)

## 2023-10-25 RX ORDER — ACETAMINOPHEN 325 MG/1
650 TABLET ORAL ONCE
Status: COMPLETED | OUTPATIENT
Start: 2023-10-25 | End: 2023-10-25

## 2023-10-25 RX ORDER — DIPHENHYDRAMINE HCL 25 MG
25 CAPSULE ORAL ONCE
Status: COMPLETED | OUTPATIENT
Start: 2023-10-25 | End: 2023-10-25

## 2023-10-25 RX ADMIN — Medication 1 APPLICATION: at 10:56

## 2023-10-25 RX ADMIN — APIXABAN 5 MG: 5 TABLET, FILM COATED ORAL at 20:42

## 2023-10-25 RX ADMIN — PANTOPRAZOLE SODIUM 40 MG: 40 TABLET, DELAYED RELEASE ORAL at 08:26

## 2023-10-25 RX ADMIN — URSODIOL 300 MG: 300 CAPSULE ORAL at 20:42

## 2023-10-25 RX ADMIN — DIPHENHYDRAMINE HYDROCHLORIDE 25 MG: 25 CAPSULE ORAL at 10:29

## 2023-10-25 RX ADMIN — ACYCLOVIR 400 MG: 400 TABLET ORAL at 08:26

## 2023-10-25 RX ADMIN — ACETAMINOPHEN 650 MG: 325 TABLET ORAL at 10:29

## 2023-10-25 RX ADMIN — URSODIOL 300 MG: 300 CAPSULE ORAL at 14:35

## 2023-10-25 RX ADMIN — LEVETIRACETAM 500 MG: 500 TABLET, FILM COATED ORAL at 08:26

## 2023-10-25 RX ADMIN — URSODIOL 300 MG: 300 CAPSULE ORAL at 08:26

## 2023-10-25 RX ADMIN — TRAZODONE HYDROCHLORIDE 300 MG: 100 TABLET ORAL at 20:43

## 2023-10-25 RX ADMIN — Medication 1 APPLICATION: at 20:42

## 2023-10-25 RX ADMIN — IDECABTAGENE VICLEUCEL 1 EACH: 300000000 SUSPENSION INTRAVENOUS at 11:15

## 2023-10-25 RX ADMIN — LEVETIRACETAM 500 MG: 500 TABLET, FILM COATED ORAL at 20:42

## 2023-10-25 RX ADMIN — SODIUM CHLORIDE 100 ML/HR: 9 INJECTION, SOLUTION INTRAVENOUS at 21:16

## 2023-10-25 RX ADMIN — ACYCLOVIR 400 MG: 400 TABLET ORAL at 20:42

## 2023-10-25 RX ADMIN — APIXABAN 5 MG: 5 TABLET, FILM COATED ORAL at 08:26

## 2023-10-25 ASSESSMENT — COGNITIVE AND FUNCTIONAL STATUS - GENERAL
DAILY ACTIVITIY SCORE: 24
MOBILITY SCORE: 24
MOBILITY SCORE: 24
DAILY ACTIVITIY SCORE: 24

## 2023-10-25 ASSESSMENT — PAIN SCALES - GENERAL
PAINLEVEL_OUTOF10: 0 - NO PAIN
PAINLEVEL_OUTOF10: 0 - NO PAIN

## 2023-10-25 ASSESSMENT — PAIN - FUNCTIONAL ASSESSMENT: PAIN_FUNCTIONAL_ASSESSMENT: 0-10

## 2023-10-25 NOTE — CARE PLAN
The patient's goals for the shift include remain afebrile    Problem: Fall/Injury  Goal: Not fall by end of shift  Outcome: Progressing  Goal: Be free from injury by end of the shift  Outcome: Progressing  Goal: Verbalize understanding of personal risk factors for fall in the hospital  Outcome: Progressing  Goal: Verbalize understanding of risk factor reduction measures to prevent injury from fall in the home  Outcome: Progressing       The clinical goals for the shift include pt will remain safe throughout shift

## 2023-10-25 NOTE — NURSING NOTE
Abecma product [id number]  delivered by [facility] personnel and verified at the bedside with Juan Valdovinos RN. Patient pre-medicated per orders. Patient identification verified against product with a second RN, Christa Dumont, using name, MRN, and . Verified with pharmacy that 2 doses of tocilizumab are present on site prior to initiating infusion. Dr. Nguyen present at start of infusion. Product infused via gravity within 30 minutes of product thaw through Distal catheter, +BBR obtained prior to and following infusion. Infusion started at 1115 and completed at 1147. Patient received a total of 61.65 ml and a total dose of 439.56 x 10^6 CAR T. Vitals remained stable throughout infusion. No complications noted. Report given to bedside RN. Bed alarm initiated due to fall risk from premedication. Patient instructed not to ambulate without supervision until cleared by medical team. Patient verbalized understanding of this safety measure. Patient to be monitored for at least 7 days following infusion for signs and symptoms of CRS and neurologic toxicities.      Radha Mosqueda RN

## 2023-10-25 NOTE — NURSING NOTE
Patient received CAR-T today, tolerated infusion well. Continues on fluids, started on Benadryl cream for itching back with relief. Currently resting in bed with no needs noted at this time, wife at the bedside.    Radha Mosqueda RN

## 2023-10-25 NOTE — PROGRESS NOTES
Mr. Maldonado Mccloud is a 73 year old male with a PMH of multiple myeloma s/p Auto SCT on 12/13/15 and then multiple lines of treatment (Revlimid, Vacto/Pom trial, Sarclista/Kyprolis), most recently (8/2023) on Cytoxan/Kyprolis and s/p radiation treatments x 8 fractions on 7/3/23.,  LLE DVT 2022 (on Eliquis).  Admitted for CAR T cell therapy for his ISS stage 2 Kappa multiple myeloma with ABECMA.    T0 today    Patient states he overall feels well today, only noting diffuse erythematous rash on chest and back that it is pruritic (itching palliated with topical benadryl). LBM yesterday, soft/solid. Denies HA, dizziness, CP, SOB, N/V/D/C. All other ROS otherwise negative.         Allergies  Penicillins (rash) and Piperacillin-tazobactam (rash)     Physical Exam  Constitutional:       Appearance: Normal appearance, NAD, A&Ox3, laying comfortably in bed   HENT:      Head: Normocephalic and atraumatic.      Nose: Nose normal.      Mouth/Throat:      Mouth: Mucous membranes are moist.   Eyes:      Extraocular Movements: Extraocular movements intact.      Pupils: Pupils are equal, round, and reactive to light.   Cardiovascular:      Rate and Rhythm: Normal rate and regular rhythm. No MRG  Pulmonary:      Effort: Pulmonary effort is normal.      Breath sounds: Normal breath sounds. No wheezing, rhonchi, rales  Abdominal:      General: Bowel sounds are normal.      Palpations: Abdomen is soft. No tenderness, masses, organomegaly  Musculoskeletal:         General: Normal range of motion. No edema, erythema, deformities.   Skin:     General: Skin is warm and dry.  Diffuse macular erythematous rash on back and chest- improving   Neurological:      General: No focal deficit present. CN2-12 grossly intact     Mental Status: He is alert and oriented to person, place, and time.   Psychiatric:         Mood and Affect: Mood normal.         Behavior: Behavior normal. Fluent speech, cooperative     Scheduled medications  acyclovir, 400  mg, oral, q12h  apixaban, 5 mg, oral, BID  [START ON 10/26/2023] fluconazole, 400 mg, oral, Daily  levETIRAcetam, 500 mg, oral, BID  pantoprazole, 40 mg, oral, Daily before breakfast  traZODone, 300 mg, oral, Nightly  ursodiol, 300 mg, oral, TID      Continuous medications  sodium chloride 0.9%, 100 mL/hr, Last Rate: 100 mL/hr (10/24/23 8324)      PRN medications  PRN medications: albuterol, alteplase, dextrose, diphenhydrAMINE, diphenhydramine-zinc acetate, EPINEPHrine, famotidine, methylPREDNISolone sodium succinate (PF), oxyCODONE, sodium chloride    Results for orders placed or performed during the hospital encounter of 10/19/23 (from the past 24 hour(s))   CBC and Auto Differential   Result Value Ref Range    WBC 1.4 (L) 4.4 - 11.3 x10*3/uL    nRBC 0.0 0.0 - 0.0 /100 WBCs    RBC 2.57 (L) 4.50 - 5.90 x10*6/uL    Hemoglobin 9.1 (L) 13.5 - 17.5 g/dL    Hematocrit 26.6 (L) 41.0 - 52.0 %     (H) 80 - 100 fL    MCH 35.4 (H) 26.0 - 34.0 pg    MCHC 34.2 32.0 - 36.0 g/dL    RDW 12.1 11.5 - 14.5 %    Platelets 83 (L) 150 - 450 x10*3/uL    MPV 8.9 7.5 - 11.5 fL    Neutrophils % 88.5 40.0 - 80.0 %    Immature Granulocytes %, Automated 0.7 0.0 - 0.9 %    Lymphocytes % 2.2 13.0 - 44.0 %    Monocytes % 2.9 2.0 - 10.0 %    Eosinophils % 5.0 0.0 - 6.0 %    Basophils % 0.7 0.0 - 2.0 %    Neutrophils Absolute 1.23 (L) 1.60 - 5.50 x10*3/uL    Immature Granulocytes Absolute, Automated 0.01 0.00 - 0.50 x10*3/uL    Lymphocytes Absolute 0.03 (L) 0.80 - 3.00 x10*3/uL    Monocytes Absolute 0.04 (L) 0.05 - 0.80 x10*3/uL    Eosinophils Absolute 0.07 0.00 - 0.40 x10*3/uL    Basophils Absolute 0.01 0.00 - 0.10 x10*3/uL   Hepatic function panel   Result Value Ref Range    Albumin 3.3 (L) 3.4 - 5.0 g/dL    Bilirubin, Total 0.9 0.0 - 1.2 mg/dL    Bilirubin, Direct 0.2 0.0 - 0.3 mg/dL    Alkaline Phosphatase 42 33 - 136 U/L    ALT 13 10 - 52 U/L    AST 20 9 - 39 U/L    Total Protein 5.1 (L) 6.4 - 8.2 g/dL   Coagulation Screen   Result  Value Ref Range    Protime 13.2 (H) 9.8 - 12.8 seconds    INR 1.2 (H) 0.9 - 1.1    aPTT 30 27 - 38 seconds   Fibrinogen   Result Value Ref Range    Fibrinogen 260 200 - 400 mg/dL   C-reactive protein   Result Value Ref Range    C-Reactive Protein 1.00 (H) <1.00 mg/dL   Ferritin   Result Value Ref Range    Ferritin 206 20 - 300 ng/mL   Phosphorus   Result Value Ref Range    Phosphorus 3.3 2.5 - 4.9 mg/dL   Basic Metabolic Panel   Result Value Ref Range    Glucose 97 74 - 99 mg/dL    Sodium 145 136 - 145 mmol/L    Potassium 4.0 3.5 - 5.3 mmol/L    Chloride 115 (H) 98 - 107 mmol/L    Bicarbonate 25 21 - 32 mmol/L    Anion Gap 9 (L) 10 - 20 mmol/L    Urea Nitrogen 19 6 - 23 mg/dL    Creatinine 0.87 0.50 - 1.30 mg/dL    eGFR >90 >60 mL/min/1.73m*2    Calcium 8.6 8.6 - 10.6 mg/dL   Morphology   Result Value Ref Range    RBC Morphology No significant RBC morphology present      No results found.           Mr. Maldonado Mccloud is a 73 year old male with a PMH of multiple myeloma s/p Auto SCT on 12/13/15 and then multiple lines of treatment (Revlimid, Vacto/Pom trial, Sarclista/Kyprolis), most recently (8/2023) on Cytoxan/Kyprolis and s/p radiation treatments x 8 fractions on 7/3/23.,  LLE DVT 2022 (on Eliquis).  Admitted for CAR T cell therapy for his ISS stage 2 Kappa multiple myeloma with ABECMA.    T0 today    ONC:  ISS stage 2 IgG Kappa Multiple Myeloma  -Presented with right chest wall mass in July 2015 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   -Myeloma marks 10/2/23-KFLC 6.79, LFLC 0.31, IgG 670, IgM 7, IgA <7  -Collected T cells  5/26/23    Admitted for CAR-T cell therapy-ABECMA (Idecabtagene vicleucel) prep with Flu/Cy  -Fludarabine 65 mg IV on days T-5 to T-3 (10/20 - 10/22/23)  -Cyclophosphamide 657 mg IV on days T-5 to T-3 (10/20 - 10/22/23)  -CAR T-cell ABECMA (Idecabtagene vicleucel) T0 = 10/25/23    Currently T0 today    -Cont Allopurinol, Ursodiol (10/20), allopurinol discontinued (10/24) for new mild pruritic rash on     mid/lower back.  -Plan to start Keppra 500mg BID on T-1 (10/24)    Daily Evaluation:  10/25   Ferritin: 210 (10/24), 206 (10/25)   CRP: 1.64 (10/24), 1.00 (10/25)   Fibrinogen: 244 (10/24), 260 (10/25)   Coags: 1.3 (10/24), 1.2 (10/25)   ICE Score : 10/10      #CRS Assessment:   In last 24 hours: Fever: NO; Any O2 supplement? NO    #Neurotoxicity Assessment:   ICANS Score: 10/10  Handwriting/Signature impaired:  No  Motor/Sensory deficit: No  Other abnormal Neuro symptom: No  Imaging/Fundoscopy finding: N/A     #CRS Grade: N/A  Organ/System affected by CRS:   Date of CRS onset:   Date of highest CRS Grade:   Date of CRS resolution to Grade 1 or lower:      #ICANS Grade: N/A  Date of Neurotoxicity onset:   Date of highest Neurotoxicity Grade:   Date of Neurotoxicity resolution to Grade 1 or lower:   Neurotox management: not indicated     #Management:   1. Tocilizumab 8mg/kg: N/A  2. Anakinra 100mcg/day: N/A  3. Siltuximab 5mg/kg: date & time administered (not started)   4. Steroids: N/A  5. Other agents/management: agent or other free text (N/A)      HEME:  Pancytopenia  Tx for hgb < 7, plt < 10  Hx/o LLE DVT 2022, cont Eliquis, hold for plt count < 50k     ID:  -Allergies to PCN (rash) & Pip-Tazo (rash)  -Afebrile  -Continue Acyclovir 400mg BID  -Plan to start Fluconazole T+1 (10/26)  -Check CMV PCR 2x/week  -Plan Cefepime or Meropenem w neutropenic fever     CARDIAC:  -Echo (9/22/23) EF 50%  -Hx/o A. fib 2015  -HTN - no home meds.  Cont to follow.  If persists may need to begin antihypertensives.      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     PSYCH:  Hx of Insomnia  -Continue home Trazadone 300mg HS    DERM  #Diffuse macular rash on trunk, c/f drug reaction   -Allopurinol discontinued with improvement   -If no improvement, will plan dermatology consult   -PRN benadryl cream available      DISPO  -Full code-confirmed on admit  -Access: double non solo PICC  -primary oncologist Dr. Resendez    Patient seen & examined with Dr. Patrick Gutierrez, PA-C

## 2023-10-26 ENCOUNTER — APPOINTMENT (OUTPATIENT)
Dept: RADIOLOGY | Facility: HOSPITAL | Age: 73
DRG: 018 | End: 2023-10-26
Payer: COMMERCIAL

## 2023-10-26 LAB
ALBUMIN SERPL BCP-MCNC: 3.3 G/DL (ref 3.4–5)
ANION GAP SERPL CALC-SCNC: 11 MMOL/L (ref 10–20)
APPEARANCE UR: CLEAR
BASOPHILS # BLD AUTO: 0 X10*3/UL (ref 0–0.1)
BASOPHILS NFR BLD AUTO: 0 %
BILIRUB UR STRIP.AUTO-MCNC: NEGATIVE MG/DL
BUN SERPL-MCNC: 20 MG/DL (ref 6–23)
CALCIUM SERPL-MCNC: 8.4 MG/DL (ref 8.6–10.6)
CHLORIDE SERPL-SCNC: 116 MMOL/L (ref 98–107)
CMV DNA SERPL NAA+PROBE-LOG IU: NORMAL {LOG_IU}/ML
CO2 SERPL-SCNC: 25 MMOL/L (ref 21–32)
COLOR UR: YELLOW
CREAT SERPL-MCNC: 0.9 MG/DL (ref 0.5–1.3)
CRP SERPL-MCNC: 0.65 MG/DL
EOSINOPHIL # BLD AUTO: 0.06 X10*3/UL (ref 0–0.4)
EOSINOPHIL NFR BLD AUTO: 5.4 %
ERYTHROCYTE [DISTWIDTH] IN BLOOD BY AUTOMATED COUNT: 12 % (ref 11.5–14.5)
FERRITIN SERPL-MCNC: 203 NG/ML (ref 20–300)
FIBRINOGEN PPP-MCNC: 245 MG/DL (ref 200–400)
GFR SERPL CREATININE-BSD FRML MDRD: 90 ML/MIN/1.73M*2
GLUCOSE SERPL-MCNC: 100 MG/DL (ref 74–99)
GLUCOSE UR STRIP.AUTO-MCNC: NEGATIVE MG/DL
HCT VFR BLD AUTO: 27.4 % (ref 41–52)
HGB BLD-MCNC: 9.4 G/DL (ref 13.5–17.5)
IMM GRANULOCYTES # BLD AUTO: 0 X10*3/UL (ref 0–0.5)
IMM GRANULOCYTES NFR BLD AUTO: 0 % (ref 0–0.9)
KETONES UR STRIP.AUTO-MCNC: NEGATIVE MG/DL
LABORATORY COMMENT REPORT: NOT DETECTED
LEUKOCYTE ESTERASE UR QL STRIP.AUTO: NEGATIVE
LYMPHOCYTES # BLD AUTO: 0.02 X10*3/UL (ref 0.8–3)
LYMPHOCYTES NFR BLD AUTO: 1.8 %
MAGNESIUM SERPL-MCNC: 1.64 MG/DL (ref 1.6–2.4)
MCH RBC QN AUTO: 35.5 PG (ref 26–34)
MCHC RBC AUTO-ENTMCNC: 34.3 G/DL (ref 32–36)
MCV RBC AUTO: 103 FL (ref 80–100)
MONOCYTES # BLD AUTO: 0.03 X10*3/UL (ref 0.05–0.8)
MONOCYTES NFR BLD AUTO: 2.7 %
NEUTROPHILS # BLD AUTO: 1 X10*3/UL (ref 1.6–5.5)
NEUTROPHILS NFR BLD AUTO: 90.1 %
NITRITE UR QL STRIP.AUTO: NEGATIVE
NRBC BLD-RTO: 0 /100 WBCS (ref 0–0)
OVALOCYTES BLD QL SMEAR: NORMAL
PH UR STRIP.AUTO: 6 [PH]
PHOSPHATE SERPL-MCNC: 3 MG/DL (ref 2.5–4.9)
PLATELET # BLD AUTO: 80 X10*3/UL (ref 150–450)
PMV BLD AUTO: 8.9 FL (ref 7.5–11.5)
POTASSIUM SERPL-SCNC: 4 MMOL/L (ref 3.5–5.3)
PROT UR STRIP.AUTO-MCNC: ABNORMAL MG/DL
RBC # BLD AUTO: 2.65 X10*6/UL (ref 4.5–5.9)
RBC # UR STRIP.AUTO: NEGATIVE /UL
RBC #/AREA URNS AUTO: NORMAL /HPF
RBC MORPH BLD: NORMAL
SODIUM SERPL-SCNC: 148 MMOL/L (ref 136–145)
SP GR UR STRIP.AUTO: 1.02
UROBILINOGEN UR STRIP.AUTO-MCNC: <2 MG/DL
WBC # BLD AUTO: 1.1 X10*3/UL (ref 4.4–11.3)
WBC #/AREA URNS AUTO: NORMAL /HPF

## 2023-10-26 PROCEDURE — 99233 SBSQ HOSP IP/OBS HIGH 50: CPT | Performed by: INTERNAL MEDICINE

## 2023-10-26 PROCEDURE — 2500000004 HC RX 250 GENERAL PHARMACY W/ HCPCS (ALT 636 FOR OP/ED): Performed by: NURSE PRACTITIONER

## 2023-10-26 PROCEDURE — 2500000001 HC RX 250 WO HCPCS SELF ADMINISTERED DRUGS (ALT 637 FOR MEDICARE OP)

## 2023-10-26 PROCEDURE — 2500000004 HC RX 250 GENERAL PHARMACY W/ HCPCS (ALT 636 FOR OP/ED): Performed by: PHYSICIAN ASSISTANT

## 2023-10-26 PROCEDURE — 82728 ASSAY OF FERRITIN: CPT | Performed by: PHYSICIAN ASSISTANT

## 2023-10-26 PROCEDURE — 85384 FIBRINOGEN ACTIVITY: CPT | Performed by: PHYSICIAN ASSISTANT

## 2023-10-26 PROCEDURE — 36415 COLL VENOUS BLD VENIPUNCTURE: CPT | Performed by: NURSE PRACTITIONER

## 2023-10-26 PROCEDURE — 71046 X-RAY EXAM CHEST 2 VIEWS: CPT | Performed by: RADIOLOGY

## 2023-10-26 PROCEDURE — 71046 X-RAY EXAM CHEST 2 VIEWS: CPT | Mod: FY

## 2023-10-26 PROCEDURE — 83735 ASSAY OF MAGNESIUM: CPT | Performed by: NURSE PRACTITIONER

## 2023-10-26 PROCEDURE — 87040 BLOOD CULTURE FOR BACTERIA: CPT | Performed by: NURSE PRACTITIONER

## 2023-10-26 PROCEDURE — 81001 URINALYSIS AUTO W/SCOPE: CPT | Performed by: NURSE PRACTITIONER

## 2023-10-26 PROCEDURE — 80069 RENAL FUNCTION PANEL: CPT | Performed by: PHYSICIAN ASSISTANT

## 2023-10-26 PROCEDURE — 86140 C-REACTIVE PROTEIN: CPT | Performed by: PHYSICIAN ASSISTANT

## 2023-10-26 PROCEDURE — 2500000001 HC RX 250 WO HCPCS SELF ADMINISTERED DRUGS (ALT 637 FOR MEDICARE OP): Performed by: NURSE PRACTITIONER

## 2023-10-26 PROCEDURE — 85025 COMPLETE CBC W/AUTO DIFF WBC: CPT | Performed by: PHYSICIAN ASSISTANT

## 2023-10-26 PROCEDURE — 2500000001 HC RX 250 WO HCPCS SELF ADMINISTERED DRUGS (ALT 637 FOR MEDICARE OP): Performed by: INTERNAL MEDICINE

## 2023-10-26 PROCEDURE — 1170000001 HC PRIVATE ONCOLOGY ROOM DAILY

## 2023-10-26 PROCEDURE — 2500000004 HC RX 250 GENERAL PHARMACY W/ HCPCS (ALT 636 FOR OP/ED): Performed by: INTERNAL MEDICINE

## 2023-10-26 RX ORDER — DEXAMETHASONE SODIUM PHOSPHATE 100 MG/10ML
10 INJECTION INTRAMUSCULAR; INTRAVENOUS ONCE
Status: COMPLETED | OUTPATIENT
Start: 2023-10-26 | End: 2023-10-26

## 2023-10-26 RX ORDER — MEROPENEM 1 G/1
1 INJECTION, POWDER, FOR SOLUTION INTRAVENOUS EVERY 8 HOURS
Status: DISCONTINUED | OUTPATIENT
Start: 2023-10-26 | End: 2023-11-05

## 2023-10-26 RX ORDER — LEVOFLOXACIN 500 MG/1
500 TABLET, FILM COATED ORAL
Status: DISCONTINUED | OUTPATIENT
Start: 2023-10-26 | End: 2023-10-27

## 2023-10-26 RX ORDER — ACETAMINOPHEN 325 MG/1
650 TABLET ORAL EVERY 4 HOURS PRN
Status: DISCONTINUED | OUTPATIENT
Start: 2023-10-26 | End: 2023-11-08 | Stop reason: HOSPADM

## 2023-10-26 RX ADMIN — URSODIOL 300 MG: 300 CAPSULE ORAL at 14:10

## 2023-10-26 RX ADMIN — LEVETIRACETAM 500 MG: 500 TABLET, FILM COATED ORAL at 08:35

## 2023-10-26 RX ADMIN — APIXABAN 5 MG: 5 TABLET, FILM COATED ORAL at 20:36

## 2023-10-26 RX ADMIN — APIXABAN 5 MG: 5 TABLET, FILM COATED ORAL at 08:35

## 2023-10-26 RX ADMIN — DEXAMETHASONE SODIUM PHOSPHATE 10 MG: 10 INJECTION INTRAMUSCULAR; INTRAVENOUS at 18:18

## 2023-10-26 RX ADMIN — ACETAMINOPHEN 650 MG: 325 TABLET ORAL at 17:58

## 2023-10-26 RX ADMIN — ACYCLOVIR 400 MG: 400 TABLET ORAL at 20:36

## 2023-10-26 RX ADMIN — URSODIOL 300 MG: 300 CAPSULE ORAL at 20:36

## 2023-10-26 RX ADMIN — FLUCONAZOLE 400 MG: 200 TABLET ORAL at 08:35

## 2023-10-26 RX ADMIN — LEVOFLOXACIN 500 MG: 500 TABLET, FILM COATED ORAL at 14:10

## 2023-10-26 RX ADMIN — URSODIOL 300 MG: 300 CAPSULE ORAL at 08:35

## 2023-10-26 RX ADMIN — PANTOPRAZOLE SODIUM 40 MG: 40 TABLET, DELAYED RELEASE ORAL at 08:35

## 2023-10-26 RX ADMIN — Medication 1 G: at 18:30

## 2023-10-26 RX ADMIN — SODIUM CHLORIDE 100 ML/HR: 9 INJECTION, SOLUTION INTRAVENOUS at 18:18

## 2023-10-26 RX ADMIN — LEVETIRACETAM 500 MG: 500 TABLET, FILM COATED ORAL at 20:36

## 2023-10-26 RX ADMIN — ACYCLOVIR 400 MG: 400 TABLET ORAL at 08:35

## 2023-10-26 ASSESSMENT — COGNITIVE AND FUNCTIONAL STATUS - GENERAL
DAILY ACTIVITIY SCORE: 24
DAILY ACTIVITIY SCORE: 24
MOBILITY SCORE: 24
MOBILITY SCORE: 24

## 2023-10-26 ASSESSMENT — PAIN SCALES - GENERAL
PAINLEVEL_OUTOF10: 0 - NO PAIN
PAINLEVEL_OUTOF10: 0 - NO PAIN

## 2023-10-26 ASSESSMENT — PAIN - FUNCTIONAL ASSESSMENT: PAIN_FUNCTIONAL_ASSESSMENT: 0-10

## 2023-10-26 NOTE — PROGRESS NOTES
Mr. Maldonado Mccloud is a 73 year old male with a PMH of multiple myeloma s/p Auto SCT on 12/13/15 and then multiple lines of treatment (Revlimid, Vacto/Pom trial, Sarclista/Kyprolis), most recently (8/2023) on Cytoxan/Kyprolis and s/p radiation treatments x 8 fractions on 7/3/23.,  LLE DVT 2022 (on Eliquis).  Admitted for CAR T cell therapy for his ISS stage 2 Kappa multiple myeloma with ABECMA.    T1 today    Patient states he overall feels well today. Wife, Farida is here with him. The diffuse erythematous rash on back that is pruritic is much improved with the topical benadryl. LBM this am soft/solid. Denies HA, dizziness, CP, SOB, N/V/D/C. All other ROS otherwise negative.         Allergies  Penicillins (rash) and Piperacillin-tazobactam (rash)     Physical Exam  Constitutional:       Appearance: Normal appearance, NAD, A&Ox3, laying comfortably in bed   HENT:      Head: Normocephalic and atraumatic.      Nose: Nose normal.      Mouth/Throat:      Mouth: Mucous membranes are moist.   Eyes:      Extraocular Movements: Extraocular movements intact.      Pupils: Pupils are equal, round, and reactive to light.   Cardiovascular:      Rate and Rhythm: Normal rate and regular rhythm. No MRG  Pulmonary:      Effort: Pulmonary effort is normal.      Breath sounds: Normal breath sounds. No wheezing, rhonchi, rales  Abdominal:      General: Bowel sounds are normal.      Palpations: Abdomen is soft. No tenderness, masses, organomegaly  Musculoskeletal:         General: Normal range of motion. No edema, erythema, deformities.   Skin:     General: Skin is warm and dry.  Diffuse macular erythematous rash on back and chest- improving   Neurological:      General: No focal deficit present. CN2-12 grossly intact     Mental Status: He is alert and oriented to person, place, and time.   Psychiatric:         Mood and Affect: Mood normal.         Behavior: Behavior normal. Fluent speech, cooperative     Scheduled medications  acyclovir,  400 mg, oral, q12h  apixaban, 5 mg, oral, BID  fluconazole, 400 mg, oral, Daily  levETIRAcetam, 500 mg, oral, BID  levoFLOXacin, 500 mg, oral, q24h VARINDER  pantoprazole, 40 mg, oral, Daily before breakfast  traZODone, 300 mg, oral, Nightly  ursodiol, 300 mg, oral, TID      Continuous medications  sodium chloride 0.9%, 100 mL/hr, Last Rate: 100 mL/hr (10/25/23 2116)      PRN medications  PRN medications: albuterol, alteplase, dextrose, diphenhydrAMINE, diphenhydramine-zinc acetate, EPINEPHrine, famotidine, methylPREDNISolone sodium succinate (PF), oxyCODONE, sodium chloride    Results for orders placed or performed during the hospital encounter of 10/19/23 (from the past 24 hour(s))   CBC and Auto Differential   Result Value Ref Range    WBC 1.1 (L) 4.4 - 11.3 x10*3/uL    nRBC 0.0 0.0 - 0.0 /100 WBCs    RBC 2.65 (L) 4.50 - 5.90 x10*6/uL    Hemoglobin 9.4 (L) 13.5 - 17.5 g/dL    Hematocrit 27.4 (L) 41.0 - 52.0 %     (H) 80 - 100 fL    MCH 35.5 (H) 26.0 - 34.0 pg    MCHC 34.3 32.0 - 36.0 g/dL    RDW 12.0 11.5 - 14.5 %    Platelets 80 (L) 150 - 450 x10*3/uL    MPV 8.9 7.5 - 11.5 fL    Neutrophils % 90.1 40.0 - 80.0 %    Immature Granulocytes %, Automated 0.0 0.0 - 0.9 %    Lymphocytes % 1.8 13.0 - 44.0 %    Monocytes % 2.7 2.0 - 10.0 %    Eosinophils % 5.4 0.0 - 6.0 %    Basophils % 0.0 0.0 - 2.0 %    Neutrophils Absolute 1.00 (L) 1.60 - 5.50 x10*3/uL    Immature Granulocytes Absolute, Automated 0.00 0.00 - 0.50 x10*3/uL    Lymphocytes Absolute 0.02 (L) 0.80 - 3.00 x10*3/uL    Monocytes Absolute 0.03 (L) 0.05 - 0.80 x10*3/uL    Eosinophils Absolute 0.06 0.00 - 0.40 x10*3/uL    Basophils Absolute 0.00 0.00 - 0.10 x10*3/uL   Renal Function Panel   Result Value Ref Range    Glucose 100 (H) 74 - 99 mg/dL    Sodium 148 (H) 136 - 145 mmol/L    Potassium 4.0 3.5 - 5.3 mmol/L    Chloride 116 (H) 98 - 107 mmol/L    Bicarbonate 25 21 - 32 mmol/L    Anion Gap 11 10 - 20 mmol/L    Urea Nitrogen 20 6 - 23 mg/dL    Creatinine  0.90 0.50 - 1.30 mg/dL    eGFR 90 >60 mL/min/1.73m*2    Calcium 8.4 (L) 8.6 - 10.6 mg/dL    Phosphorus 3.0 2.5 - 4.9 mg/dL    Albumin 3.3 (L) 3.4 - 5.0 g/dL   Fibrinogen   Result Value Ref Range    Fibrinogen 245 200 - 400 mg/dL   C-reactive protein   Result Value Ref Range    C-Reactive Protein 0.65 <1.00 mg/dL   Ferritin   Result Value Ref Range    Ferritin 203 20 - 300 ng/mL   Morphology   Result Value Ref Range    RBC Morphology See Below     Ovalocytes Few      No results found.           Mr. Maldonado Mccloud is a 73 year old male with a PMH of multiple myeloma s/p Auto SCT on 12/13/15 and then multiple lines of treatment (Revlimid, Vacto/Pom trial, Sarclista/Kyprolis), most recently (8/2023) on Cytoxan/Kyprolis and s/p radiation treatments x 8 fractions on 7/3/23.,  LLE DVT 2022 (on Eliquis).  Admitted for CAR T cell therapy for his ISS stage 2 Kappa multiple myeloma with ABECMA.    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   -Myeloma marks 10/2/23-KFLC 6.79, LFLC 0.31, IgG 670, IgM 7, IgA <7  -Collected T cells 5/26/23    Admitted for CAR-T cell therapy-ABECMA (Idecabtagene vicleucel) prep with Flu/Cy  -Fludarabine 65 mg IV on days T-5 to T-3 (10/20 - 10/22/23)  -Cyclophosphamide 657 mg IV on days T-5 to T-3 (10/20 - 10/22/23)  -CAR T-cell ABECMA (Idecabtagene vicleucel) T0 = 10/25/23    Currently T1 today    -Cont Allopurinol, Ursodiol (10/20), allopurinol discontinued (10/24) for new mild pruritic rash on      mid/lower back.  -Plan to start Keppra 500mg BID on T-1 (10/24)    Daily Evaluation:  10/25   Ferritin: 210 (10/24), 206 (10/25)   CRP: 1.64 (10/24), 1.00 (10/25)   Fibrinogen: 244 (10/24), 260 (10/25)   Coags: 1.3 (10/24), 1.2 (10/25)   ICE Score : 10/10      #CRS Assessment:   In last 24 hours: Fever: YES; Any O2 supplement? NO    #Neurotoxicity Assessment:   ICANS Score: 9/10  Handwriting/Signature impaired:  yes  Motor/Sensory deficit: No  Other abnormal Neuro symptom: No  Imaging/Fundoscopy finding: N/A     #CRS ndGndrndanddndend:nd nd2nd Organ/System affected by CRS: neuro  Date of CRS onset: 10/26  Date of highest CRS Grade: 10/26  Date of CRS resolution to Grade 1 or lower:      #ICANS ndGndrndanddndend:nd nd2nd Date of Neurotoxicity onset: 10/26  Date of highest Neurotoxicity ndGndrndanddndend:nd nd2nd Date of Neurotoxicity resolution to Grade 1 or lower:   Neurotox management: dexamethasone (10/26)      #Management:   1. Tocilizumab 8mg/kg: N/A  2. Anakinra 100mcg/day: N/A  3. Siltuximab 5mg/kg: date & time administered (not started)   4. Steroids: dexamethasone 10mg 10/26  5. Other agents/management:  Tylenol, Meropenem (10/26)  If febrile again s/p dexamethasone dose @1818 10/26 or progressive CRS/ICANS symptoms then give tocilizumab and then continue the dexamethasone for 24 hrs.       HEME:  Pancytopenia  Tx for hgb < 7, plt < 10  Hx/o LLE DVT 2022, cont Eliquis, hold for plt count < 50k     ID:  -Allergies to PCN (rash) & Pip-Tazo (rash)  -Afebrile  -Continue Acyclovir 400mg BID  -Plan to start Fluconazole T+1 (10/26)  -Check CMV PCR 2x/week Negative 10/26  #PPX: Levaquin initiated 10/26 at 1410  #FEVER (10/26)  -T38.1 @ 1745 10/26  -Blood cultures, UA & Culture, CXR, & Tylenol ordered 10/26 pm  -Meropenem 1gm initiated 10/26 pm Q8 hrs    PPX:  -Levaquin initiated 10/26 at 1410     CARDIAC:  -Echo (9/22/23) EF 50%  -Hx/o A. fib 2015  -HTN - no home meds.  Cont to follow.  If persists may need to begin antihypertensives.     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     PSYCH:  Hx of Insomnia  -Continue home Trazadone 300mg HS    DERM  #Diffuse macular rash on trunk, c/f drug reaction   -Allopurinol discontinued with improvement   -If no improvement, will plan dermatology consult   -PRN benadryl cream available      DISPO  -Full code-confirmed on admit  -Access: double non solo PICC  -primary oncologist Dr. Resendez    Patient seen & examined with Dr. Nguyen         Staging Info: By selecting yes to the question above you will include information on AJCC 8 tumor staging in your Mohs note. Information on tumor staging will be automatically added for SCCs on the head and neck. AJCC 8 includes tumor size, tumor depth, perineural involvement and bone invasion.

## 2023-10-26 NOTE — NURSING NOTE
Patient had uneventful shift, dressing changed per protocol. Currently resting in bed with no needs noted at this time.    Radha Mosqueda RN

## 2023-10-26 NOTE — CARE PLAN
The patient's goals for the shift include      The clinical goals for the shift include remaining free from falls.    Problem: Fall/Injury  Goal: Not fall by end of shift  Outcome: Progressing  Goal: Be free from injury by end of the shift  Outcome: Progressing  Goal: Verbalize understanding of personal risk factors for fall in the hospital  Outcome: Progressing

## 2023-10-26 NOTE — NURSING NOTE
10/26/23 1800: Pt. Febrile 38.1 with all other VSS. CARTOX score 9/10 with point missed for shaky handwriting. Team notified, peripheral blood cultures x2, urine culture and CXR ordered, Meropenem and dexamethasone ordered. Will recheck temp in one hour and continue to monitor.

## 2023-10-26 NOTE — CARE PLAN
Problem: Fall/Injury  Goal: Not fall by end of shift  Outcome: Progressing  Goal: Be free from injury by end of the shift  Outcome: Progressing  Goal: Verbalize understanding of personal risk factors for fall in the hospital  Outcome: Progressing  Goal: Verbalize understanding of risk factor reduction measures to prevent injury from fall in the home  Outcome: Progressing  Goal: Use assistive devices by end of the shift  Outcome: Progressing  Goal: Pace activities to prevent fatigue by end of the shift  Outcome: Progressing       VSS, pt remained afebrile during shift. Pt remained safe from injuries and falls during shift. No complaints of pain or n/v/d. CAR TOX 10/10. Pt safe.

## 2023-10-27 LAB
ALBUMIN SERPL BCP-MCNC: 3.5 G/DL (ref 3.4–5)
ALP SERPL-CCNC: 46 U/L (ref 33–136)
ALT SERPL W P-5'-P-CCNC: 12 U/L (ref 10–52)
ANION GAP SERPL CALC-SCNC: 12 MMOL/L (ref 10–20)
APTT PPP: 30 SECONDS (ref 27–38)
AST SERPL W P-5'-P-CCNC: 19 U/L (ref 9–39)
BASOPHILS # BLD MANUAL: 0 X10*3/UL (ref 0–0.1)
BASOPHILS NFR BLD MANUAL: 0 %
BILIRUB DIRECT SERPL-MCNC: 0.3 MG/DL (ref 0–0.3)
BILIRUB SERPL-MCNC: 1.1 MG/DL (ref 0–1.2)
BUN SERPL-MCNC: 18 MG/DL (ref 6–23)
CA-I BLD-SCNC: 1.17 MMOL/L (ref 1.1–1.33)
CALCIUM SERPL-MCNC: 8.4 MG/DL (ref 8.6–10.6)
CHLORIDE SERPL-SCNC: 110 MMOL/L (ref 98–107)
CO2 SERPL-SCNC: 23 MMOL/L (ref 21–32)
CREAT SERPL-MCNC: 0.91 MG/DL (ref 0.5–1.3)
CRP SERPL-MCNC: 2.5 MG/DL
EOSINOPHIL # BLD MANUAL: 0.06 X10*3/UL (ref 0–0.4)
EOSINOPHIL NFR BLD MANUAL: 4 %
ERYTHROCYTE [DISTWIDTH] IN BLOOD BY AUTOMATED COUNT: 11.5 % (ref 11.5–14.5)
FERRITIN SERPL-MCNC: 339 NG/ML (ref 20–300)
FIBRINOGEN PPP-MCNC: 337 MG/DL (ref 200–400)
GFR SERPL CREATININE-BSD FRML MDRD: 88 ML/MIN/1.73M*2
GLUCOSE SERPL-MCNC: 146 MG/DL (ref 74–99)
HCT VFR BLD AUTO: 27.7 % (ref 41–52)
HGB BLD-MCNC: 9.5 G/DL (ref 13.5–17.5)
IMM GRANULOCYTES # BLD AUTO: 0.13 X10*3/UL (ref 0–0.5)
IMM GRANULOCYTES NFR BLD AUTO: 8.3 % (ref 0–0.9)
INR PPP: 1.4 (ref 0.9–1.1)
LYMPHOCYTES # BLD MANUAL: 0.03 X10*3/UL (ref 0.8–3)
LYMPHOCYTES NFR BLD MANUAL: 2 %
MAGNESIUM SERPL-MCNC: 1.55 MG/DL (ref 1.6–2.4)
MCH RBC QN AUTO: 35.3 PG (ref 26–34)
MCHC RBC AUTO-ENTMCNC: 34.3 G/DL (ref 32–36)
MCV RBC AUTO: 103 FL (ref 80–100)
MONOCYTES # BLD MANUAL: 0 X10*3/UL (ref 0.05–0.8)
MONOCYTES NFR BLD MANUAL: 0 %
MYELOCYTES # BLD MANUAL: 0.02 X10*3/UL
MYELOCYTES NFR BLD MANUAL: 1 %
NEUTROPHILS # BLD MANUAL: 1.49 X10*3/UL (ref 1.6–5.5)
NEUTS BAND # BLD MANUAL: 0.05 X10*3/UL (ref 0–0.5)
NEUTS BAND NFR BLD MANUAL: 3 %
NEUTS SEG # BLD MANUAL: 1.44 X10*3/UL (ref 1.6–5)
NEUTS SEG NFR BLD MANUAL: 90 %
NRBC BLD-RTO: 0 /100 WBCS (ref 0–0)
PHOSPHATE SERPL-MCNC: 2.7 MG/DL (ref 2.5–4.9)
PLATELET # BLD AUTO: 63 X10*3/UL (ref 150–450)
PMV BLD AUTO: 9 FL (ref 7.5–11.5)
POTASSIUM SERPL-SCNC: 4 MMOL/L (ref 3.5–5.3)
PROT SERPL-MCNC: 5.2 G/DL (ref 6.4–8.2)
PROTHROMBIN TIME: 15.5 SECONDS (ref 9.8–12.8)
RBC # BLD AUTO: 2.69 X10*6/UL (ref 4.5–5.9)
RBC MORPH BLD: ABNORMAL
SODIUM SERPL-SCNC: 141 MMOL/L (ref 136–145)
TOTAL CELLS COUNTED BLD: 100
WBC # BLD AUTO: 1.6 X10*3/UL (ref 4.4–11.3)

## 2023-10-27 PROCEDURE — 2500000001 HC RX 250 WO HCPCS SELF ADMINISTERED DRUGS (ALT 637 FOR MEDICARE OP)

## 2023-10-27 PROCEDURE — 86140 C-REACTIVE PROTEIN: CPT | Performed by: PHYSICIAN ASSISTANT

## 2023-10-27 PROCEDURE — 2500000004 HC RX 250 GENERAL PHARMACY W/ HCPCS (ALT 636 FOR OP/ED): Mod: JZ

## 2023-10-27 PROCEDURE — 82248 BILIRUBIN DIRECT: CPT | Performed by: PHYSICIAN ASSISTANT

## 2023-10-27 PROCEDURE — 2500000004 HC RX 250 GENERAL PHARMACY W/ HCPCS (ALT 636 FOR OP/ED)

## 2023-10-27 PROCEDURE — 80053 COMPREHEN METABOLIC PANEL: CPT | Performed by: PHYSICIAN ASSISTANT

## 2023-10-27 PROCEDURE — 99233 SBSQ HOSP IP/OBS HIGH 50: CPT | Performed by: INTERNAL MEDICINE

## 2023-10-27 PROCEDURE — 85027 COMPLETE CBC AUTOMATED: CPT | Performed by: PHYSICIAN ASSISTANT

## 2023-10-27 PROCEDURE — 83735 ASSAY OF MAGNESIUM: CPT | Performed by: NURSE PRACTITIONER

## 2023-10-27 PROCEDURE — 85007 BL SMEAR W/DIFF WBC COUNT: CPT | Performed by: PHYSICIAN ASSISTANT

## 2023-10-27 PROCEDURE — 2500000004 HC RX 250 GENERAL PHARMACY W/ HCPCS (ALT 636 FOR OP/ED): Performed by: INTERNAL MEDICINE

## 2023-10-27 PROCEDURE — 2500000004 HC RX 250 GENERAL PHARMACY W/ HCPCS (ALT 636 FOR OP/ED): Performed by: NURSE PRACTITIONER

## 2023-10-27 PROCEDURE — 85384 FIBRINOGEN ACTIVITY: CPT | Performed by: PHYSICIAN ASSISTANT

## 2023-10-27 PROCEDURE — 82330 ASSAY OF CALCIUM: CPT | Performed by: NURSE PRACTITIONER

## 2023-10-27 PROCEDURE — 82728 ASSAY OF FERRITIN: CPT | Performed by: PHYSICIAN ASSISTANT

## 2023-10-27 PROCEDURE — 1170000001 HC PRIVATE ONCOLOGY ROOM DAILY

## 2023-10-27 PROCEDURE — 85610 PROTHROMBIN TIME: CPT | Performed by: PHYSICIAN ASSISTANT

## 2023-10-27 PROCEDURE — XW033H5 INTRODUCTION OF TOCILIZUMAB INTO PERIPHERAL VEIN, PERCUTANEOUS APPROACH, NEW TECHNOLOGY GROUP 5: ICD-10-PCS | Performed by: INTERNAL MEDICINE

## 2023-10-27 PROCEDURE — 84100 ASSAY OF PHOSPHORUS: CPT | Performed by: PHYSICIAN ASSISTANT

## 2023-10-27 PROCEDURE — 2500000004 HC RX 250 GENERAL PHARMACY W/ HCPCS (ALT 636 FOR OP/ED): Performed by: PHYSICIAN ASSISTANT

## 2023-10-27 PROCEDURE — 2500000001 HC RX 250 WO HCPCS SELF ADMINISTERED DRUGS (ALT 637 FOR MEDICARE OP): Performed by: INTERNAL MEDICINE

## 2023-10-27 RX ORDER — MAGNESIUM SULFATE HEPTAHYDRATE 40 MG/ML
2 INJECTION, SOLUTION INTRAVENOUS ONCE
Status: COMPLETED | OUTPATIENT
Start: 2023-10-27 | End: 2023-10-27

## 2023-10-27 RX ORDER — DEXAMETHASONE SODIUM PHOSPHATE 100 MG/10ML
10 INJECTION INTRAMUSCULAR; INTRAVENOUS ONCE
Status: COMPLETED | OUTPATIENT
Start: 2023-10-27 | End: 2023-10-27

## 2023-10-27 RX ADMIN — SODIUM CHLORIDE 100 ML/HR: 9 INJECTION, SOLUTION INTRAVENOUS at 05:39

## 2023-10-27 RX ADMIN — FLUCONAZOLE 400 MG: 200 TABLET ORAL at 10:13

## 2023-10-27 RX ADMIN — PANTOPRAZOLE SODIUM 40 MG: 40 TABLET, DELAYED RELEASE ORAL at 10:13

## 2023-10-27 RX ADMIN — Medication 1 G: at 10:15

## 2023-10-27 RX ADMIN — DEXAMETHASONE SODIUM PHOSPHATE 10 MG: 10 INJECTION INTRAMUSCULAR; INTRAVENOUS at 10:49

## 2023-10-27 RX ADMIN — URSODIOL 300 MG: 300 CAPSULE ORAL at 10:13

## 2023-10-27 RX ADMIN — LEVETIRACETAM 500 MG: 500 TABLET, FILM COATED ORAL at 20:18

## 2023-10-27 RX ADMIN — Medication 1 G: at 02:23

## 2023-10-27 RX ADMIN — ACYCLOVIR 400 MG: 400 TABLET ORAL at 20:18

## 2023-10-27 RX ADMIN — ACYCLOVIR 400 MG: 400 TABLET ORAL at 10:12

## 2023-10-27 RX ADMIN — APIXABAN 5 MG: 5 TABLET, FILM COATED ORAL at 10:12

## 2023-10-27 RX ADMIN — APIXABAN 2.5 MG: 2.5 TABLET, FILM COATED ORAL at 20:18

## 2023-10-27 RX ADMIN — SODIUM CHLORIDE 100 ML/HR: 9 INJECTION, SOLUTION INTRAVENOUS at 18:22

## 2023-10-27 RX ADMIN — ACETAMINOPHEN 650 MG: 325 TABLET ORAL at 10:16

## 2023-10-27 RX ADMIN — URSODIOL 300 MG: 300 CAPSULE ORAL at 20:18

## 2023-10-27 RX ADMIN — Medication 1 G: at 18:15

## 2023-10-27 RX ADMIN — LEVETIRACETAM 500 MG: 500 TABLET, FILM COATED ORAL at 10:12

## 2023-10-27 RX ADMIN — URSODIOL 300 MG: 300 CAPSULE ORAL at 15:36

## 2023-10-27 RX ADMIN — TRAZODONE HYDROCHLORIDE 300 MG: 100 TABLET ORAL at 20:18

## 2023-10-27 RX ADMIN — TOCILIZUMAB 784 MG: 20 INJECTION, SOLUTION, CONCENTRATE INTRAVENOUS at 11:41

## 2023-10-27 RX ADMIN — MAGNESIUM SULFATE HEPTAHYDRATE 2 G: 40 INJECTION, SOLUTION INTRAVENOUS at 05:38

## 2023-10-27 ASSESSMENT — COGNITIVE AND FUNCTIONAL STATUS - GENERAL
MOBILITY SCORE: 24
DAILY ACTIVITIY SCORE: 24

## 2023-10-27 ASSESSMENT — PAIN SCALES - GENERAL
PAINLEVEL_OUTOF10: 0 - NO PAIN
PAINLEVEL_OUTOF10: 2
PAINLEVEL_OUTOF10: 0 - NO PAIN
PAINLEVEL_OUTOF10: 0 - NO PAIN

## 2023-10-27 ASSESSMENT — PAIN - FUNCTIONAL ASSESSMENT
PAIN_FUNCTIONAL_ASSESSMENT: 0-10

## 2023-10-27 ASSESSMENT — PAIN DESCRIPTION - DESCRIPTORS: DESCRIPTORS: ACHING

## 2023-10-27 NOTE — PROGRESS NOTES
"Maldonado Mccloud is a 73 y.o. male on day 8 of admission presenting with Multiple myeloma without remission (CMS/HCC).    Subjective   Wasn't feeling too well this AM when he spiked his fever and had ICE 9/10 (counting impaired). Feeling better s/p toci and dex. No SOB. ICE 10/10. No CP, abd pain, N/V/D/C.     Objective   Physical Exam  Constitutional:       General: He is not in acute distress.  HENT:      Head: Normocephalic and atraumatic.      Nose: Nose normal.      Mouth/Throat:      Mouth: Mucous membranes are moist.   Eyes:      Extraocular Movements: Extraocular movements intact.      Pupils: Pupils are equal, round, and reactive to light.   Cardiovascular:      Rate and Rhythm: Normal rate and regular rhythm.   Pulmonary:      Effort: Pulmonary effort is normal. No respiratory distress.      Breath sounds: Normal breath sounds.   Abdominal:      General: Bowel sounds are normal. There is no distension.      Palpations: Abdomen is soft.      Tenderness: There is no abdominal tenderness.   Musculoskeletal:         General: Normal range of motion.      Cervical back: Normal range of motion and neck supple.   Skin:     General: Skin is warm and dry.      Comments: Diffuse macular erythematous rash on back and chest- improving   Neurological:      General: No focal deficit present.      Mental Status: He is alert and oriented to person, place, and time.   Psychiatric:         Mood and Affect: Mood normal.         Behavior: Behavior normal.     Last Recorded Vitals  Blood pressure 106/67, pulse 61, temperature 36.6 °C (97.9 °F), temperature source Temporal, resp. rate 16, height 1.775 m (5' 9.88\"), weight 98 kg (216 lb 0.8 oz), SpO2 98 %.  Intake/Output last 3 Shifts:  I/O last 3 completed shifts:  In: 2113 (21.6 mL/kg) [P.O.:240; I.V.:1823 (18.6 mL/kg); IV Piggyback:50]  Out: 300 (3.1 mL/kg) [Urine:300 (0.1 mL/kg/hr)]  Weight: 98 kg     Assessment/Plan   Principal Problem:    Multiple myeloma without remission " (CMS/[facility])    Mr. Maldonado Mccloud is a 73 year old male with a PMH of multiple myeloma s/p Auto SCT on 12/13/15 and then multiple lines of treatment (Revlimid, Vacto/Pom trial, Sarclista/Kyprolis), most recently (8/2023) on Cytoxan/Kyprolis and s/p radiation treatments x 8 fractions on 7/3/23.,  LLE DVT 2022 (on Eliquis).  Admitted for CAR T cell therapy for his ISS stage 2 Kappa multiple myeloma with ABECMA.     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   -Myeloma marks 10/2/23-KFLC 6.79, LFLC 0.31, IgG 670, IgM 7, IgA <7  -Collected T cells 5/26/23  - Admitted for CAR-T cell therapy-ABECMA (Idecabtagene vicleucel) prep with Flu/Cy    CHEMO:  -Fludarabine 65 mg IV on days T-5 to T-3 (10/20 - 10/22/23)  -Cyclophosphamide 657 mg IV on days T-5 to T-3 (10/20 - 10/22/23)  -CAR T-cell ABECMA (Idecabtagene vicleucel) T0 = 10/25/23  -Plan to start Keppra 500mg BID on T-1 (10/24)     Daily Evaluation:  10/25   Ferritin: 210 (10/24), 206 (10/25) 339 (10/27)  CRP: 1.64 (10/24), 1.00 (10/25) 2.5 (10/27)  Fibrinogen: 244 (10/24), 260 (10/25)   Coags: 1.3 (10/24), 1.2 (10/25)   ICE Score : 10/10      #CRS Assessment:   In last 24 hours: Fever: YES; Any O2 supplement? NO     #Neurotoxicity Assessment:   ICANS Score: 9/10  Handwriting/Signature impaired:  no  Motor/Sensory deficit: No  Other abnormal Neuro symptom: counting impaired  Imaging/Fundoscopy finding: N/A     #CRS ndGndrndanddndend:nd nd2nd Organ/System affected by CRS: neuro  Date of CRS onset:  10/26, 10/27  Date of highest CRS Grade: 10/26, 10/27  Date of CRS resolution to Grade 1 or lower: 10/27     #ICANS ndGndrndanddndend:nd nd2nd Date of Neurotoxicity onset: 10/26  Date of highest Neurotoxicity ndGndrndanddndend:nd nd2nd Date of Neurotoxicity resolution to Grade 1 or lower: Neurotox management: dexamethasone (10/26)      #Management:   1. Tocilizumab 8mg/kg: 10/27  2. Anakinra 100mcg/day: N/A  3. Siltuximab 5mg/kg: date & time administered (not started)   4. Steroids: dexamethasone 10mg 10/26, 10/27  5. Other agents/management:  Tylenol, Meropenem (10/26)  If febrile again s/p dexamethasone dose @1818 10/26 or progressive CRS/ICANS symptoms then give tocilizumab and then continue the dexamethasone for 24 hrs.       HEME:  Pancytopenia  Tx for hgb < 7, plt < 10  Hx/o LLE DVT 2022, cont Eliquis, hold for plt count < 50k  - apix decreased to 2.5mg BID, stop 10/28 if plt < 50     ID:  Allergies to PCN (rash) & Pip-Tazo (rash)  #PPX: acyclovir  -Afebrile  -Continue Acyclovir 400mg BID  -Plan to start Fluconazole T+1 (10/26)  -Check CMV PCR 2x/week Negative 10/26  #FEVER (10/26)  -T38.1 @ 1745 10/26  -Blood cultures, UA & Culture, CXR, & Tylenol ordered 10/26 pm  - UA 10/26 neg  - CXR 10/26 neg  -Meropenem 1gm initiated 10/26 pm Q8 hrs      CARDIAC:  -Echo (9/22/23) EF 50%  -Hx/o A. fib 2015  -HTN - no home meds.  Cont to follow.  If persists may need to begin antihypertensives.     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     PSYCH:  Hx of Insomnia  -Continue home Trazadone 300mg HS     DERM  #Diffuse  macular rash on trunk, c/f drug reaction, improving  -Allopurinol discontinued with improvement   -If no improvement, will plan dermatology consult   -PRN benadryl cream available      DISPO  -Full code-confirmed on admit  -Access: double non solo PICC  -primary oncologist Dr. Resendez     Patient seen & examined with Dr. Patrick Swartz, PAFahadC

## 2023-10-27 NOTE — NURSING NOTE
Maldonado had a good night and appeared to have slept well. His T max was 37.7  earlier this am and on recheck he went down to 37.6. He denied any pain overnight and his cartox score was a 10.

## 2023-10-28 LAB
ALBUMIN SERPL BCP-MCNC: 3.5 G/DL (ref 3.4–5)
ANION GAP SERPL CALC-SCNC: 15 MMOL/L (ref 10–20)
BASOPHILS # BLD MANUAL: 0 X10*3/UL (ref 0–0.1)
BASOPHILS NFR BLD MANUAL: 0 %
BUN SERPL-MCNC: 25 MG/DL (ref 6–23)
BURR CELLS BLD QL SMEAR: ABNORMAL
CALCIUM SERPL-MCNC: 7.8 MG/DL (ref 8.6–10.6)
CHLORIDE SERPL-SCNC: 111 MMOL/L (ref 98–107)
CO2 SERPL-SCNC: 21 MMOL/L (ref 21–32)
CREAT SERPL-MCNC: 0.98 MG/DL (ref 0.5–1.3)
CRP SERPL-MCNC: 6.24 MG/DL
EOSINOPHIL # BLD MANUAL: 0.05 X10*3/UL (ref 0–0.4)
EOSINOPHIL NFR BLD MANUAL: 5 %
ERYTHROCYTE [DISTWIDTH] IN BLOOD BY AUTOMATED COUNT: 11.9 % (ref 11.5–14.5)
FERRITIN SERPL-MCNC: 381 NG/ML (ref 20–300)
FIBRINOGEN PPP-MCNC: 348 MG/DL (ref 200–400)
GFR SERPL CREATININE-BSD FRML MDRD: 81 ML/MIN/1.73M*2
GLUCOSE SERPL-MCNC: 146 MG/DL (ref 74–99)
HCT VFR BLD AUTO: 26.2 % (ref 41–52)
HGB BLD-MCNC: 8.8 G/DL (ref 13.5–17.5)
IMM GRANULOCYTES # BLD AUTO: 0.24 X10*3/UL (ref 0–0.5)
IMM GRANULOCYTES NFR BLD AUTO: 23.8 % (ref 0–0.9)
LYMPHOCYTES # BLD MANUAL: 0.04 X10*3/UL (ref 0.8–3)
LYMPHOCYTES NFR BLD MANUAL: 4.1 %
MAGNESIUM SERPL-MCNC: 1.96 MG/DL (ref 1.6–2.4)
MCH RBC QN AUTO: 35.1 PG (ref 26–34)
MCHC RBC AUTO-ENTMCNC: 33.6 G/DL (ref 32–36)
MCV RBC AUTO: 104 FL (ref 80–100)
MONOCYTES # BLD MANUAL: 0.03 X10*3/UL (ref 0.05–0.8)
MONOCYTES NFR BLD MANUAL: 2.5 %
NEUTS SEG # BLD MANUAL: 0.88 X10*3/UL (ref 1.6–5)
NEUTS SEG NFR BLD MANUAL: 88.4 %
NRBC BLD-RTO: 0 /100 WBCS (ref 0–0)
OVALOCYTES BLD QL SMEAR: ABNORMAL
PHOSPHATE SERPL-MCNC: 2.5 MG/DL (ref 2.5–4.9)
PLATELET # BLD AUTO: 54 X10*3/UL (ref 150–450)
PMV BLD AUTO: 9.3 FL (ref 7.5–11.5)
POTASSIUM SERPL-SCNC: 4 MMOL/L (ref 3.5–5.3)
RBC # BLD AUTO: 2.51 X10*6/UL (ref 4.5–5.9)
RBC MORPH BLD: ABNORMAL
SODIUM SERPL-SCNC: 143 MMOL/L (ref 136–145)
TOTAL CELLS COUNTED BLD: 121
WBC # BLD AUTO: 1 X10*3/UL (ref 4.4–11.3)

## 2023-10-28 PROCEDURE — 2500000001 HC RX 250 WO HCPCS SELF ADMINISTERED DRUGS (ALT 637 FOR MEDICARE OP)

## 2023-10-28 PROCEDURE — 82728 ASSAY OF FERRITIN: CPT | Performed by: PHYSICIAN ASSISTANT

## 2023-10-28 PROCEDURE — 83735 ASSAY OF MAGNESIUM: CPT | Performed by: NURSE PRACTITIONER

## 2023-10-28 PROCEDURE — 2500000004 HC RX 250 GENERAL PHARMACY W/ HCPCS (ALT 636 FOR OP/ED): Performed by: NURSE PRACTITIONER

## 2023-10-28 PROCEDURE — 2500000001 HC RX 250 WO HCPCS SELF ADMINISTERED DRUGS (ALT 637 FOR MEDICARE OP): Performed by: INTERNAL MEDICINE

## 2023-10-28 PROCEDURE — 85007 BL SMEAR W/DIFF WBC COUNT: CPT | Performed by: PHYSICIAN ASSISTANT

## 2023-10-28 PROCEDURE — 2500000004 HC RX 250 GENERAL PHARMACY W/ HCPCS (ALT 636 FOR OP/ED): Performed by: INTERNAL MEDICINE

## 2023-10-28 PROCEDURE — 2500000004 HC RX 250 GENERAL PHARMACY W/ HCPCS (ALT 636 FOR OP/ED): Performed by: PHYSICIAN ASSISTANT

## 2023-10-28 PROCEDURE — 85384 FIBRINOGEN ACTIVITY: CPT | Performed by: PHYSICIAN ASSISTANT

## 2023-10-28 PROCEDURE — 2500000004 HC RX 250 GENERAL PHARMACY W/ HCPCS (ALT 636 FOR OP/ED)

## 2023-10-28 PROCEDURE — 80069 RENAL FUNCTION PANEL: CPT | Performed by: PHYSICIAN ASSISTANT

## 2023-10-28 PROCEDURE — 85027 COMPLETE CBC AUTOMATED: CPT | Performed by: PHYSICIAN ASSISTANT

## 2023-10-28 PROCEDURE — 99233 SBSQ HOSP IP/OBS HIGH 50: CPT | Performed by: INTERNAL MEDICINE

## 2023-10-28 PROCEDURE — 1170000001 HC PRIVATE ONCOLOGY ROOM DAILY

## 2023-10-28 PROCEDURE — 86140 C-REACTIVE PROTEIN: CPT | Performed by: PHYSICIAN ASSISTANT

## 2023-10-28 RX ORDER — DEXAMETHASONE 4 MG/1
10 TABLET ORAL ONCE
Status: COMPLETED | OUTPATIENT
Start: 2023-10-28 | End: 2023-10-28

## 2023-10-28 RX ADMIN — URSODIOL 300 MG: 300 CAPSULE ORAL at 14:56

## 2023-10-28 RX ADMIN — PANTOPRAZOLE SODIUM 40 MG: 40 TABLET, DELAYED RELEASE ORAL at 08:58

## 2023-10-28 RX ADMIN — ACYCLOVIR 400 MG: 400 TABLET ORAL at 21:02

## 2023-10-28 RX ADMIN — Medication 1 G: at 10:21

## 2023-10-28 RX ADMIN — Medication 1 G: at 18:23

## 2023-10-28 RX ADMIN — DEXAMETHASONE 10 MG: 4 TABLET ORAL at 14:56

## 2023-10-28 RX ADMIN — URSODIOL 300 MG: 300 CAPSULE ORAL at 21:02

## 2023-10-28 RX ADMIN — FLUCONAZOLE 400 MG: 200 TABLET ORAL at 08:58

## 2023-10-28 RX ADMIN — Medication 1 G: at 02:30

## 2023-10-28 RX ADMIN — TRAZODONE HYDROCHLORIDE 300 MG: 100 TABLET ORAL at 21:02

## 2023-10-28 RX ADMIN — APIXABAN 2.5 MG: 2.5 TABLET, FILM COATED ORAL at 10:20

## 2023-10-28 RX ADMIN — ACYCLOVIR 400 MG: 400 TABLET ORAL at 08:58

## 2023-10-28 RX ADMIN — APIXABAN 2.5 MG: 2.5 TABLET, FILM COATED ORAL at 21:02

## 2023-10-28 RX ADMIN — LEVETIRACETAM 500 MG: 500 TABLET, FILM COATED ORAL at 21:02

## 2023-10-28 RX ADMIN — LEVETIRACETAM 500 MG: 500 TABLET, FILM COATED ORAL at 08:58

## 2023-10-28 RX ADMIN — URSODIOL 300 MG: 300 CAPSULE ORAL at 08:58

## 2023-10-28 ASSESSMENT — COGNITIVE AND FUNCTIONAL STATUS - GENERAL
MOBILITY SCORE: 24
MOBILITY SCORE: 24
DAILY ACTIVITIY SCORE: 24
DAILY ACTIVITIY SCORE: 24

## 2023-10-28 ASSESSMENT — PAIN SCALES - GENERAL
PAINLEVEL_OUTOF10: 0 - NO PAIN
PAINLEVEL_OUTOF10: 0 - NO PAIN

## 2023-10-28 NOTE — HOSPITAL COURSE
Mr. Maldonado Mccloud is a 73 year old male with a PMH of multiple myeloma s/p Auto SCT on 12/13/15 and then multiple lines of treatment (Revlimid, Vacto/Pom trial, Sarclista/Kyprolis), most recently (8/2023) on Cytoxan/Kyprolis and s/p radiation treatments x 8 fractions on 7/3/23.,  LLE DVT 2022 (on Eliquis).  Admitted for CAR T cell therapy for his ISS stage 2 Kappa multiple myeloma with ABECMA.    Hospital course complicated by Grade 1 CRS and Grade 1 ICANS, managed with dexamethasone 10mg x 3 days and one dose of tocilizumab.     Prophy: acyclovir, fluconazole (T+90), keppra (T+30), ursidiol (T+30)  Access: NON solo PICC  Follow up:   -Post-transplant clinic w/ infusion chair 11/10, 11/14, 11/6  -primary physician follow up w/ Dr. Resendez 11/21 (okay per Dr. Resendez).

## 2023-10-28 NOTE — PROGRESS NOTES
"Maldonado Mccloud is a 73 y.o. male on day 9 of admission presenting with Multiple myeloma without remission (CMS/HCC).    Subjective   Feeling good today. No complaints. Appetite good. LBM this AM, normal No SOB. ICE 10/10. No CP, abd pain, N/V/D/C.     Objective   Physical Exam  Constitutional:       General: He is not in acute distress.  HENT:      Head: Normocephalic and atraumatic.      Nose: Nose normal.      Mouth/Throat:      Mouth: Mucous membranes are moist.   Eyes:      Extraocular Movements: Extraocular movements intact.      Pupils: Pupils are equal, round, and reactive to light.   Cardiovascular:      Rate and Rhythm: Normal rate and regular rhythm.   Pulmonary:      Effort: Pulmonary effort is normal. No respiratory distress.      Breath sounds: Normal breath sounds.   Abdominal:      General: Bowel sounds are normal. There is no distension.      Palpations: Abdomen is soft.      Tenderness: There is no abdominal tenderness.   Musculoskeletal:         General: Normal range of motion.      Cervical back: Normal range of motion and neck supple.   Skin:     General: Skin is warm and dry.      Comments: Diffuse macular erythematous rash on back and chest- improving   Neurological:      General: No focal deficit present.      Mental Status: He is alert and oriented to person, place, and time.   Psychiatric:         Mood and Affect: Mood normal.         Behavior: Behavior normal.     Last Recorded Vitals  Blood pressure 137/77, pulse 67, temperature 36.7 °C (98.1 °F), temperature source Temporal, resp. rate 18, height 1.775 m (5' 9.88\"), weight 98 kg (216 lb 0.8 oz), SpO2 98 %.  Intake/Output last 3 Shifts:  I/O last 3 completed shifts:  In: 200 (2 mL/kg) [IV Piggyback:200]  Out: - (0 mL/kg)   Weight: 98 kg     Assessment/Plan   Principal Problem:    Multiple myeloma without remission (CMS/HCC)    Mr. Maldonado Mccloud is a 73 year old male with a PMH of multiple myeloma s/p Auto SCT on 12/13/15 and then multiple " lines of treatment (Revlimid, Vacto/Pom trial, Sarclista/Kyprolis), most recently (8/2023) on Cytoxan/Kyprolis and s/p radiation treatments x 8 fractions on 7/3/23.,  LLE DVT 2022 (on Eliquis).  Admitted for CAR T cell therapy for his ISS stage 2 Kappa multiple myeloma with ABECMA.     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   -Myeloma marks 10/2/23-KFLC 6.79, LFLC 0.31, IgG 670, IgM 7, IgA <7  -Collected T cells 5/26/23  - Admitted for CAR-T cell therapy-ABECMA (Idecabtagene vicleucel) prep with Flu/Cy    CHEMO:  -Fludarabine 65 mg IV on days T-5 to T-3 (10/20 - 10/22/23)  -Cyclophosphamide 657 mg IV on days T-5 to T-3 (10/20 - 10/22/23)  -CAR T-cell ABECMA (Idecabtagene vicleucel) T0 = 10/25/23  -Plan to start Keppra 500mg BID on T-1 (10/24)     Daily Evaluation:  10/25   Ferritin: 210 (10/24), 206 (10/25) 339 (10/27)  CRP: 1.64 (10/24), 1.00 (10/25) 2.5 (10/27)  Fibrinogen: 244 (10/24), 260 (10/25)   Coags: 1.3 (10/24), 1.2 (10/25)   ICE Score : 10/10      #CRS Assessment:   In last 24 hours: Fever: NO; Any O2 supplement? NO     #Neurotoxicity Assessment:   ICANS Score: 10/10  Handwriting/Signature impaired:  no  Motor/Sensory deficit: No  Other abnormal Neuro symptom: N/A  Imaging/Fundoscopy finding: N/A     #CRS ndGndrndanddndend:nd nd2nd Organ/System affected by CRS: neuro  Date of CRS onset: 10/26, 10/27  Date of highest CRS Grade: 10/26, 10/27  Date of CRS resolution to Grade 1 or lower: 10/27     #ICANS ndGndrndanddndend:nd nd2nd Date of  Neurotoxicity onset: 10/26  Date of highest Neurotoxicity ndGndrndanddndend:nd nd2nd Date of Neurotoxicity resolution to Grade 1 or lower: Neurotox management: dexamethasone (10/26)      #Management:   1. Tocilizumab 8mg/kg: 10/27  2. Anakinra 100mcg/day: N/A  3. Siltuximab 5mg/kg: date & time administered (not started)   4. Steroids: dexamethasone 10mg 10/26, 10/27, 10/28  5. Other agents/management:  Tylenol, Meropenem (10/26)  - If febrile again s/p dexamethasone dose @1818 10/26 or progressive CRS/ICANS symptoms then give tocilizumab and then continue the dexamethasone for 24 hrs  - gave toci and dex 10/27  - gave dex 10mg PO 10/28 to complete 3 doses per Dr. Nguyen     HEME:  Pancytopenia  Tx for hgb < 7, plt < 10  Hx/o LLE DVT 2022, cont Eliquis, hold for plt count < 50k  - apix decreased to 2.5mg BID, last dose 10/28 PM in anticipation of thrombocytopenia     ID:  Allergies to PCN (rash) & Pip-Tazo (rash)  #PPX: acyclovir  -Afebrile  -Continue Acyclovir 400mg BID  -Plan to start Fluconazole T+1 (10/26)  -Check CMV PCR 2x/week Negative 10/26  #FEVER (10/26)  -T38.1 @ 1745 10/26  -Blood cultures, UA & Culture, CXR, & Tylenol ordered 10/26 pm  - UA 10/26 neg  - bl cx 10/26 NGTD  - CXR 10/26 neg  - CXR 10/27 neg  -Meropenem 1gm initiated 10/26 pm Q8 hrs      CARDIAC:  -Echo (9/22/23) EF 50%  -Hx/o A. fib 2015  -HTN - no home meds.  Cont to follow.  If persists may need to begin antihypertensives.     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      PSYCH:  Hx of Insomnia  -Continue home Trazadone 300mg HS     DERM  #Diffuse macular rash on trunk, c/f drug reaction, improving  -Allopurinol discontinued with improvement   -If no improvement, will plan dermatology consult   -PRN benadryl cream available      DISPO  -Full code-confirmed on admit  -Access: double non solo PICC  -primary oncologist Dr. Resendez     Patient seen & examined with Dr. Patrick Swartz PA-C

## 2023-10-29 LAB
ALBUMIN SERPL BCP-MCNC: 3.4 G/DL (ref 3.4–5)
ANION GAP SERPL CALC-SCNC: 11 MMOL/L (ref 10–20)
BASOPHILS # BLD MANUAL: 0 X10*3/UL (ref 0–0.1)
BASOPHILS NFR BLD MANUAL: 0 %
BUN SERPL-MCNC: 26 MG/DL (ref 6–23)
CALCIUM SERPL-MCNC: 7.7 MG/DL (ref 8.6–10.6)
CHLORIDE SERPL-SCNC: 113 MMOL/L (ref 98–107)
CO2 SERPL-SCNC: 23 MMOL/L (ref 21–32)
CREAT SERPL-MCNC: 0.87 MG/DL (ref 0.5–1.3)
CRP SERPL-MCNC: 2.41 MG/DL
EOSINOPHIL # BLD MANUAL: 0.02 X10*3/UL (ref 0–0.4)
EOSINOPHIL NFR BLD MANUAL: 2.5 %
ERYTHROCYTE [DISTWIDTH] IN BLOOD BY AUTOMATED COUNT: 11.9 % (ref 11.5–14.5)
FERRITIN SERPL-MCNC: 390 NG/ML (ref 20–300)
FIBRINOGEN PPP-MCNC: 260 MG/DL (ref 200–400)
GFR SERPL CREATININE-BSD FRML MDRD: >90 ML/MIN/1.73M*2
GLUCOSE SERPL-MCNC: 138 MG/DL (ref 74–99)
HCT VFR BLD AUTO: 26.1 % (ref 41–52)
HGB BLD-MCNC: 9 G/DL (ref 13.5–17.5)
IMM GRANULOCYTES # BLD AUTO: 0.07 X10*3/UL (ref 0–0.5)
IMM GRANULOCYTES NFR BLD AUTO: 8 % (ref 0–0.9)
LYMPHOCYTES # BLD MANUAL: 0.06 X10*3/UL (ref 0.8–3)
LYMPHOCYTES NFR BLD MANUAL: 6.7 %
MAGNESIUM SERPL-MCNC: 2.02 MG/DL (ref 1.6–2.4)
MCH RBC QN AUTO: 35.2 PG (ref 26–34)
MCHC RBC AUTO-ENTMCNC: 34.5 G/DL (ref 32–36)
MCV RBC AUTO: 102 FL (ref 80–100)
MONOCYTES # BLD MANUAL: 0.05 X10*3/UL (ref 0.05–0.8)
MONOCYTES NFR BLD MANUAL: 5.8 %
NEUTS SEG # BLD MANUAL: 0.77 X10*3/UL (ref 1.6–5)
NEUTS SEG NFR BLD MANUAL: 85 %
NRBC BLD-RTO: 0 /100 WBCS (ref 0–0)
PHOSPHATE SERPL-MCNC: 2.8 MG/DL (ref 2.5–4.9)
PLATELET # BLD AUTO: 64 X10*3/UL (ref 150–450)
PMV BLD AUTO: 10 FL (ref 7.5–11.5)
POTASSIUM SERPL-SCNC: 4.4 MMOL/L (ref 3.5–5.3)
RBC # BLD AUTO: 2.56 X10*6/UL (ref 4.5–5.9)
RBC MORPH BLD: ABNORMAL
SODIUM SERPL-SCNC: 143 MMOL/L (ref 136–145)
TOTAL CELLS COUNTED BLD: 120
WBC # BLD AUTO: 0.9 X10*3/UL (ref 4.4–11.3)

## 2023-10-29 PROCEDURE — 99233 SBSQ HOSP IP/OBS HIGH 50: CPT | Performed by: INTERNAL MEDICINE

## 2023-10-29 PROCEDURE — 2500000004 HC RX 250 GENERAL PHARMACY W/ HCPCS (ALT 636 FOR OP/ED): Performed by: PHYSICIAN ASSISTANT

## 2023-10-29 PROCEDURE — 2500000001 HC RX 250 WO HCPCS SELF ADMINISTERED DRUGS (ALT 637 FOR MEDICARE OP)

## 2023-10-29 PROCEDURE — 86140 C-REACTIVE PROTEIN: CPT | Performed by: PHYSICIAN ASSISTANT

## 2023-10-29 PROCEDURE — 82728 ASSAY OF FERRITIN: CPT | Performed by: PHYSICIAN ASSISTANT

## 2023-10-29 PROCEDURE — 1170000001 HC PRIVATE ONCOLOGY ROOM DAILY

## 2023-10-29 PROCEDURE — 85027 COMPLETE CBC AUTOMATED: CPT | Performed by: PHYSICIAN ASSISTANT

## 2023-10-29 PROCEDURE — 2500000004 HC RX 250 GENERAL PHARMACY W/ HCPCS (ALT 636 FOR OP/ED): Performed by: INTERNAL MEDICINE

## 2023-10-29 PROCEDURE — 85007 BL SMEAR W/DIFF WBC COUNT: CPT | Performed by: PHYSICIAN ASSISTANT

## 2023-10-29 PROCEDURE — 2500000001 HC RX 250 WO HCPCS SELF ADMINISTERED DRUGS (ALT 637 FOR MEDICARE OP): Performed by: INTERNAL MEDICINE

## 2023-10-29 PROCEDURE — 80069 RENAL FUNCTION PANEL: CPT | Performed by: PHYSICIAN ASSISTANT

## 2023-10-29 PROCEDURE — 83735 ASSAY OF MAGNESIUM: CPT | Performed by: NURSE PRACTITIONER

## 2023-10-29 PROCEDURE — 2500000004 HC RX 250 GENERAL PHARMACY W/ HCPCS (ALT 636 FOR OP/ED): Performed by: NURSE PRACTITIONER

## 2023-10-29 PROCEDURE — 85384 FIBRINOGEN ACTIVITY: CPT | Performed by: PHYSICIAN ASSISTANT

## 2023-10-29 RX ORDER — SENNOSIDES 8.6 MG/1
1 TABLET ORAL 2 TIMES DAILY
Status: DISCONTINUED | OUTPATIENT
Start: 2023-10-29 | End: 2023-11-08 | Stop reason: HOSPADM

## 2023-10-29 RX ADMIN — TRAZODONE HYDROCHLORIDE 300 MG: 100 TABLET ORAL at 20:23

## 2023-10-29 RX ADMIN — SENNOSIDES 8.6 MG: 8.6 TABLET, FILM COATED ORAL at 20:23

## 2023-10-29 RX ADMIN — LEVETIRACETAM 500 MG: 500 TABLET, FILM COATED ORAL at 20:23

## 2023-10-29 RX ADMIN — Medication 1 G: at 09:40

## 2023-10-29 RX ADMIN — APIXABAN 2.5 MG: 2.5 TABLET, FILM COATED ORAL at 20:23

## 2023-10-29 RX ADMIN — FLUCONAZOLE 400 MG: 200 TABLET ORAL at 09:40

## 2023-10-29 RX ADMIN — SENNOSIDES 8.6 MG: 8.6 TABLET, FILM COATED ORAL at 14:25

## 2023-10-29 RX ADMIN — ACYCLOVIR 400 MG: 400 TABLET ORAL at 20:23

## 2023-10-29 RX ADMIN — PANTOPRAZOLE SODIUM 40 MG: 40 TABLET, DELAYED RELEASE ORAL at 08:00

## 2023-10-29 RX ADMIN — URSODIOL 300 MG: 300 CAPSULE ORAL at 14:25

## 2023-10-29 RX ADMIN — URSODIOL 300 MG: 300 CAPSULE ORAL at 20:23

## 2023-10-29 RX ADMIN — URSODIOL 300 MG: 300 CAPSULE ORAL at 09:39

## 2023-10-29 RX ADMIN — Medication 1 G: at 03:05

## 2023-10-29 RX ADMIN — LEVETIRACETAM 500 MG: 500 TABLET, FILM COATED ORAL at 09:39

## 2023-10-29 RX ADMIN — Medication 1 G: at 18:40

## 2023-10-29 RX ADMIN — ACYCLOVIR 400 MG: 400 TABLET ORAL at 09:40

## 2023-10-29 ASSESSMENT — COGNITIVE AND FUNCTIONAL STATUS - GENERAL
MOBILITY SCORE: 24
DAILY ACTIVITIY SCORE: 24
DAILY ACTIVITIY SCORE: 24
MOBILITY SCORE: 24

## 2023-10-29 ASSESSMENT — PAIN - FUNCTIONAL ASSESSMENT
PAIN_FUNCTIONAL_ASSESSMENT: 0-10

## 2023-10-29 ASSESSMENT — PAIN SCALES - GENERAL
PAINLEVEL_OUTOF10: 0 - NO PAIN

## 2023-10-29 NOTE — PROGRESS NOTES
"Maldonado Mccloud is a 73 y.o. male on day 10 of admission presenting with Multiple myeloma without remission (CMS/HCC).    Subjective   Feeling good today. No complaints. Appetite good. Good PO intake, plenty of water.  LBM yesterday, firm. Felt like he needed to go today but could not, requested laxative. No SOB. ICE 10/10. No CP, abd pain, N/V/D/C.     Objective   Physical Exam  Constitutional:       General: He is not in acute distress.  HENT:      Head: Normocephalic and atraumatic.      Nose: Nose normal.      Mouth/Throat:      Mouth: Mucous membranes are moist.   Eyes:      Extraocular Movements: Extraocular movements intact.      Pupils: Pupils are equal, round, and reactive to light.   Cardiovascular:      Rate and Rhythm: Normal rate and regular rhythm.   Pulmonary:      Effort: Pulmonary effort is normal. No respiratory distress.      Breath sounds: Normal breath sounds.   Abdominal:      General: Bowel sounds are normal. There is no distension.      Palpations: Abdomen is soft.      Tenderness: There is no abdominal tenderness.   Musculoskeletal:         General: Normal range of motion.      Cervical back: Normal range of motion and neck supple.   Skin:     General: Skin is warm and dry.   Neurological:      General: No focal deficit present.      Mental Status: He is alert and oriented to person, place, and time.   Psychiatric:         Mood and Affect: Mood normal.         Behavior: Behavior normal.     Last Recorded Vitals  Blood pressure 150/85, pulse 50, temperature 36.5 °C (97.7 °F), resp. rate 18, height 1.775 m (5' 9.88\"), weight 98 kg (216 lb 0.8 oz), SpO2 97 %.  Intake/Output last 3 Shifts:  I/O last 3 completed shifts:  In: 200 (2 mL/kg) [IV Piggyback:200]  Out: - (0 mL/kg)   Weight: 98 kg     Assessment/Plan   Principal Problem:    Multiple myeloma without remission (CMS/HCC)    Mr. Maldonado Mccloud is a 73 year old male with a PMH of multiple myeloma s/p Auto SCT on 12/13/15 and then multiple " lines of treatment (Revlimid, Vacto/Pom trial, Sarclista/Kyprolis), most recently (8/2023) on Cytoxan/Kyprolis and s/p radiation treatments x 8 fractions on 7/3/23.,  LLE DVT 2022 (on Eliquis).  Admitted for CAR T cell therapy for his ISS stage 2 Kappa multiple myeloma with ABECMA (T0 = 10/25/23).     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   -Myeloma marks 10/2/23-KFLC 6.79, LFLC 0.31, IgG 670, IgM 7, IgA <7  -Collected T cells 5/26/23  - Admitted for CAR-T cell therapy-ABECMA (Idecabtagene vicleucel) prep with Flu/Cy (T0 = 10/25/23)    CHEMO:  -Fludarabine 65 mg IV on days T-5 to T-3 (10/20 - 10/22/23)  -Cyclophosphamide 657 mg IV on days T-5 to T-3 (10/20 - 10/22/23)  -CAR T-cell ABECMA (Idecabtagene vicleucel) T0 = 10/25/23  -Plan to start Keppra 500mg BID on T-1 (10/24)     Daily Evaluation:  Ferritin: 210 (10/24), 206 (10/25) 339 (10/27) 381 (10/28) 390 (10/29)  CRP: 1.64 (10/24), 1.00 (10/25) 2.5 (10/27) 6.24 (10/28) 2.4 (10/29)  Fibrinogen: 244 (10/24), 260 (10/25) 337 (10/27) 348 (10/28) 260 (10/29)  Coags: 1.3 (10/24), 1.2 (10/25)   ICE Score : 10/10      #CRS Assessment:   In last 24 hours: Fever: NO; Any O2 supplement? NO     #Neurotoxicity Assessment:   ICANS Score: 10/10  Handwriting/Signature impaired:  no  Motor/Sensory deficit: No  Other abnormal Neuro symptom: N/A  Imaging/Fundoscopy finding: N/A     #CRS ndGndrndanddndend:nd nd2nd Organ/System affected by CRS: neuro  Date of CRS onset: 10/26, 10/27  Date of highest  CRS Grade: 10/26, 10/27  Date of CRS resolution to Grade 1 or lower: 10/27     #ICANS ndGndrndanddndend:nd nd2nd Date of Neurotoxicity onset: 10/26  Date of highest Neurotoxicity ndGndrndanddndend:nd nd2nd Date of Neurotoxicity resolution to Grade 1 or lower: 10/27  Neurotox management: dexamethasone (10/26), dexamethasone and tocilizumab (10/27)     #Management:   1. Tocilizumab 8mg/kg: 10/27  2. Anakinra 100mcg/day: N/A  3. Siltuximab 5mg/kg: date & time administered (not started)   4. Steroids: dexamethasone 10mg 10/26, 10/27, 10/28  5. Other agents/management:  Tylenol, Meropenem (10/26), (10/27)  - If febrile again s/p dexamethasone dose @1818 10/26 or progressive CRS/ICANS symptoms then give tocilizumab and then continue the dexamethasone for 24 hrs  - gave toci and dex 10/27  - gave dex 10mg PO 10/28 to complete 3 doses per Dr. Nguyen     HEME:  # Pancytopenia  - Tx for hgb < 7, plt < 10  # Hx/o LLE DVT 2022, cont Eliquis, hold for plt count < 50k  - apixaban decreased to 2.5mg BID, last dose 10/28 PM in anticipation of thrombocytopenia  - plt 64 (10/29), apixaban 2.5mg BID restarted, increase back to 5mg BID (10/30) if plt continue to improve      ID:  Allergies to PCN (rash) & Pip-Tazo (rash)  #PPX: acyclovir  -Afebrile  -Continue Acyclovir 400mg BID  -Plan to start Fluconazole T+1 (10/26)  -Check CMV PCR 2x/week Negative 10/26  #FEVER (10/26), (10/27)  -T38.1 @ 1745 10/26  -Blood cultures, UA & Culture, CXR, & Tylenol ordered 10/26 pm  - UA 10/26 neg  - bl cx 10/26 NGTD  - CXR 10/26 neg  - CXR 10/27 neg  -Meropenem 1gm initiated 10/26 pm Q8 hrs --> plan cass for 5 days, de-escalate to levaquin on 10/31 if remains afebrile      CARDIAC:  -Echo (9/22/23) EF 50%  # Hx/o A. fib 2015  # HTN - no home meds.  Cont to follow.  If persists may need to begin antihypertensives.     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     PSYCH:  #Hx of Insomnia  -Continue home Trazadone 300mg HS     DERM  #Diffuse macular rash on trunk, c/f drug reaction, resolved  -Allopurinol discontinued with improvement   -If no improvement, will plan dermatology consult   -PRN benadryl cream available      DISPO  -Full code-confirmed on admit  -Access: double non solo PICC  -primary oncologist Dr. Resendez     Patient seen & examined with Dr. Patrick Swartz, PAFahadC

## 2023-10-29 NOTE — CARE PLAN
VSS, afebrile, pt tolerating medications well. CARTOX 10/10. No complaints of pain, n/v/d, or any injuries or falls. Pt resting comfortably in bed.

## 2023-10-29 NOTE — CARE PLAN
Assumed care of pt at 2300. Vitals stable, pt afebrile throughout shift. Denies pain, N/V/D when questioned. Resting comfortably in bed at this time, denies further needs.     Pt here for Keytruda infusion and OV. PIV placed with blood return noted. Labs sent. Pt has symptoms of itching to back and under breast area. I assessed areas and no redness or rash noted. Dr Lemuel Ferrer made aware and instructed pt to start hydrocortisone cream at home. Patient's status assessed and documented appropriately. All labs and required results were also reviewed today. Treatment parameters have been reviewed. Today's treatment has been approved by the provider. Treatment orders and medication sequencing (when applicable) was verified by 2 registered nurses. The treatment plan was confirmed with the patient prior to administration, and the patient understands the need to report any treatment-related symptoms. Prior to administration, when applicable, the following 8 elements of medication administration were reviewed with 2nd Registered Nurse prior to dosing: drug name, drug dose, infusion volume when prepared in a syringe, rate of administration, expiration dates and/or times, appearance and integrity of drug(s), and rate of pump for infusion. The 5 rights of medication administration have been verified. Pt tolerated tx without incident left ambulatory instructed to stop at check out desk for next OV and discharge paperwork.

## 2023-10-30 LAB
ALBUMIN SERPL BCP-MCNC: 3.3 G/DL (ref 3.4–5)
ALP SERPL-CCNC: 41 U/L (ref 33–136)
ALT SERPL W P-5'-P-CCNC: 22 U/L (ref 10–52)
ANION GAP SERPL CALC-SCNC: 10 MMOL/L (ref 10–20)
APTT PPP: 26 SECONDS (ref 27–38)
AST SERPL W P-5'-P-CCNC: 14 U/L (ref 9–39)
BACTERIA BLD CULT: NORMAL
BACTERIA BLD CULT: NORMAL
BASOPHILS # BLD MANUAL: 0.01 X10*3/UL (ref 0–0.1)
BASOPHILS NFR BLD MANUAL: 1.1 %
BILIRUB DIRECT SERPL-MCNC: 0.2 MG/DL (ref 0–0.3)
BILIRUB SERPL-MCNC: 0.8 MG/DL (ref 0–1.2)
BUN SERPL-MCNC: 26 MG/DL (ref 6–23)
BURR CELLS BLD QL SMEAR: ABNORMAL
CALCIUM SERPL-MCNC: 7.9 MG/DL (ref 8.6–10.6)
CHLORIDE SERPL-SCNC: 113 MMOL/L (ref 98–107)
CO2 SERPL-SCNC: 24 MMOL/L (ref 21–32)
CREAT SERPL-MCNC: 0.87 MG/DL (ref 0.5–1.3)
CRP SERPL-MCNC: 1.33 MG/DL
EOSINOPHIL # BLD MANUAL: 0.08 X10*3/UL (ref 0–0.4)
EOSINOPHIL NFR BLD MANUAL: 8.6 %
ERYTHROCYTE [DISTWIDTH] IN BLOOD BY AUTOMATED COUNT: 11.9 % (ref 11.5–14.5)
FERRITIN SERPL-MCNC: 368 NG/ML (ref 20–300)
FIBRINOGEN PPP-MCNC: 206 MG/DL (ref 200–400)
GFR SERPL CREATININE-BSD FRML MDRD: >90 ML/MIN/1.73M*2
GLUCOSE SERPL-MCNC: 129 MG/DL (ref 74–99)
HCT VFR BLD AUTO: 24.3 % (ref 41–52)
HGB BLD-MCNC: 8.3 G/DL (ref 13.5–17.5)
IMM GRANULOCYTES # BLD AUTO: 0 X10*3/UL (ref 0–0.5)
IMM GRANULOCYTES NFR BLD AUTO: 0 % (ref 0–0.9)
INR PPP: 1.1 (ref 0.9–1.1)
LYMPHOCYTES # BLD MANUAL: 0.1 X10*3/UL (ref 0.8–3)
LYMPHOCYTES NFR BLD MANUAL: 10.8 %
MAGNESIUM SERPL-MCNC: 2.01 MG/DL (ref 1.6–2.4)
MCH RBC QN AUTO: 35.5 PG (ref 26–34)
MCHC RBC AUTO-ENTMCNC: 34.2 G/DL (ref 32–36)
MCV RBC AUTO: 104 FL (ref 80–100)
MONOCYTES # BLD MANUAL: 0.07 X10*3/UL (ref 0.05–0.8)
MONOCYTES NFR BLD MANUAL: 7.5 %
NEUTS SEG # BLD MANUAL: 0.65 X10*3/UL (ref 1.6–5)
NEUTS SEG NFR BLD MANUAL: 72 %
NRBC BLD-RTO: 0 /100 WBCS (ref 0–0)
OVALOCYTES BLD QL SMEAR: ABNORMAL
PHOSPHATE SERPL-MCNC: 2.2 MG/DL (ref 2.5–4.9)
PLATELET # BLD AUTO: 60 X10*3/UL (ref 150–450)
PMV BLD AUTO: 10 FL (ref 7.5–11.5)
POTASSIUM SERPL-SCNC: 3.9 MMOL/L (ref 3.5–5.3)
PROT SERPL-MCNC: 4.6 G/DL (ref 6.4–8.2)
PROTHROMBIN TIME: 12.2 SECONDS (ref 9.8–12.8)
RBC # BLD AUTO: 2.34 X10*6/UL (ref 4.5–5.9)
RBC MORPH BLD: ABNORMAL
SODIUM SERPL-SCNC: 143 MMOL/L (ref 136–145)
TOTAL CELLS COUNTED BLD: 93
WBC # BLD AUTO: 0.9 X10*3/UL (ref 4.4–11.3)

## 2023-10-30 PROCEDURE — 82728 ASSAY OF FERRITIN: CPT | Performed by: PHYSICIAN ASSISTANT

## 2023-10-30 PROCEDURE — 85027 COMPLETE CBC AUTOMATED: CPT | Performed by: PHYSICIAN ASSISTANT

## 2023-10-30 PROCEDURE — 2500000004 HC RX 250 GENERAL PHARMACY W/ HCPCS (ALT 636 FOR OP/ED): Performed by: HOME HEALTH AIDE

## 2023-10-30 PROCEDURE — 1170000001 HC PRIVATE ONCOLOGY ROOM DAILY

## 2023-10-30 PROCEDURE — 84100 ASSAY OF PHOSPHORUS: CPT | Performed by: PHYSICIAN ASSISTANT

## 2023-10-30 PROCEDURE — 2500000004 HC RX 250 GENERAL PHARMACY W/ HCPCS (ALT 636 FOR OP/ED): Performed by: INTERNAL MEDICINE

## 2023-10-30 PROCEDURE — 2500000001 HC RX 250 WO HCPCS SELF ADMINISTERED DRUGS (ALT 637 FOR MEDICARE OP)

## 2023-10-30 PROCEDURE — 85610 PROTHROMBIN TIME: CPT | Performed by: PHYSICIAN ASSISTANT

## 2023-10-30 PROCEDURE — 86140 C-REACTIVE PROTEIN: CPT | Performed by: PHYSICIAN ASSISTANT

## 2023-10-30 PROCEDURE — 85384 FIBRINOGEN ACTIVITY: CPT | Performed by: PHYSICIAN ASSISTANT

## 2023-10-30 PROCEDURE — 85007 BL SMEAR W/DIFF WBC COUNT: CPT | Performed by: PHYSICIAN ASSISTANT

## 2023-10-30 PROCEDURE — 2500000004 HC RX 250 GENERAL PHARMACY W/ HCPCS (ALT 636 FOR OP/ED): Performed by: NURSE PRACTITIONER

## 2023-10-30 PROCEDURE — 2500000005 HC RX 250 GENERAL PHARMACY W/O HCPCS: Performed by: HOME HEALTH AIDE

## 2023-10-30 PROCEDURE — 82248 BILIRUBIN DIRECT: CPT | Performed by: PHYSICIAN ASSISTANT

## 2023-10-30 PROCEDURE — 2500000001 HC RX 250 WO HCPCS SELF ADMINISTERED DRUGS (ALT 637 FOR MEDICARE OP): Performed by: INTERNAL MEDICINE

## 2023-10-30 PROCEDURE — 80053 COMPREHEN METABOLIC PANEL: CPT | Performed by: PHYSICIAN ASSISTANT

## 2023-10-30 PROCEDURE — 85730 THROMBOPLASTIN TIME PARTIAL: CPT | Performed by: PHYSICIAN ASSISTANT

## 2023-10-30 PROCEDURE — 99233 SBSQ HOSP IP/OBS HIGH 50: CPT | Performed by: INTERNAL MEDICINE

## 2023-10-30 PROCEDURE — 2500000004 HC RX 250 GENERAL PHARMACY W/ HCPCS (ALT 636 FOR OP/ED): Performed by: PHYSICIAN ASSISTANT

## 2023-10-30 PROCEDURE — 83735 ASSAY OF MAGNESIUM: CPT | Performed by: NURSE PRACTITIONER

## 2023-10-30 RX ORDER — POLYETHYLENE GLYCOL 3350 17 G/17G
17 POWDER, FOR SOLUTION ORAL 2 TIMES DAILY PRN
Status: DISCONTINUED | OUTPATIENT
Start: 2023-10-30 | End: 2023-11-08 | Stop reason: HOSPADM

## 2023-10-30 RX ADMIN — POTASSIUM PHOSPHATE, MONOBASIC POTASSIUM PHOSPHATE, DIBASIC 15 MMOL: 224; 236 INJECTION, SOLUTION, CONCENTRATE INTRAVENOUS at 10:02

## 2023-10-30 RX ADMIN — SENNOSIDES 8.6 MG: 8.6 TABLET, FILM COATED ORAL at 20:39

## 2023-10-30 RX ADMIN — ACYCLOVIR 400 MG: 400 TABLET ORAL at 08:16

## 2023-10-30 RX ADMIN — URSODIOL 300 MG: 300 CAPSULE ORAL at 08:16

## 2023-10-30 RX ADMIN — TRAZODONE HYDROCHLORIDE 300 MG: 100 TABLET ORAL at 20:40

## 2023-10-30 RX ADMIN — URSODIOL 300 MG: 300 CAPSULE ORAL at 20:39

## 2023-10-30 RX ADMIN — SENNOSIDES 8.6 MG: 8.6 TABLET, FILM COATED ORAL at 08:16

## 2023-10-30 RX ADMIN — FLUCONAZOLE 400 MG: 200 TABLET ORAL at 08:16

## 2023-10-30 RX ADMIN — LEVETIRACETAM 500 MG: 500 TABLET, FILM COATED ORAL at 08:16

## 2023-10-30 RX ADMIN — ACYCLOVIR 400 MG: 400 TABLET ORAL at 20:40

## 2023-10-30 RX ADMIN — URSODIOL 300 MG: 300 CAPSULE ORAL at 14:55

## 2023-10-30 RX ADMIN — Medication 1 G: at 10:04

## 2023-10-30 RX ADMIN — Medication 1 G: at 01:54

## 2023-10-30 RX ADMIN — PANTOPRAZOLE SODIUM 40 MG: 40 TABLET, DELAYED RELEASE ORAL at 08:16

## 2023-10-30 RX ADMIN — Medication 1 G: at 18:15

## 2023-10-30 RX ADMIN — APIXABAN 2.5 MG: 2.5 TABLET, FILM COATED ORAL at 08:16

## 2023-10-30 RX ADMIN — POLYETHYLENE GLYCOL 3350 17 G: 17 POWDER, FOR SOLUTION ORAL at 20:41

## 2023-10-30 RX ADMIN — LEVETIRACETAM 500 MG: 500 TABLET, FILM COATED ORAL at 20:39

## 2023-10-30 ASSESSMENT — COGNITIVE AND FUNCTIONAL STATUS - GENERAL
MOBILITY SCORE: 24
MOBILITY SCORE: 24
DAILY ACTIVITIY SCORE: 24
DAILY ACTIVITIY SCORE: 24

## 2023-10-30 ASSESSMENT — PAIN - FUNCTIONAL ASSESSMENT
PAIN_FUNCTIONAL_ASSESSMENT: 0-10

## 2023-10-30 ASSESSMENT — PAIN SCALES - GENERAL
PAINLEVEL_OUTOF10: 0 - NO PAIN

## 2023-10-30 NOTE — CARE PLAN
The clinical goals for the shift include Patient will remain afebrile and hemodynamically stable throughout shift.    VSS, afebrile, no complaints N/V/D this shift. Pt remained safe and free of falls/injury. No pain reported this shift. Patient remained on IV meropenem overnight. No issues.

## 2023-10-30 NOTE — PROGRESS NOTES
Maldonado Mccloud is a 73 y.o. male on day 11 of admission presenting with Multiple myeloma without remission (CMS/HCC).    Subjective   Patient feels well today, only noting constipation. Denies HA, dizziness, CP, SOB, N/V/D. All other ROS otherwise negative.      Objective   Physical Exam  Constitutional:       General: He is not in acute distress.     Appearance: Normal appearance. He is not ill-appearing or toxic-appearing.   HENT:      Head: Normocephalic and atraumatic.      Nose: Nose normal.      Mouth/Throat:      Mouth: Mucous membranes are moist.   Eyes:      General: No scleral icterus.     Extraocular Movements: Extraocular movements intact.      Pupils: Pupils are equal, round, and reactive to light.   Cardiovascular:      Rate and Rhythm: Normal rate and regular rhythm.      Pulses: Normal pulses.      Heart sounds: Normal heart sounds. No murmur heard.  Pulmonary:      Effort: Pulmonary effort is normal. No respiratory distress.      Breath sounds: Normal breath sounds. No wheezing, rhonchi or rales.   Abdominal:      General: Bowel sounds are normal. There is distension (mild distension).      Palpations: Abdomen is soft. There is no mass.      Tenderness: There is no abdominal tenderness. There is no guarding.   Musculoskeletal:         General: No swelling. Normal range of motion.      Cervical back: Normal range of motion and neck supple.   Skin:     General: Skin is warm and dry.      Coloration: Skin is not jaundiced.      Findings: No bruising or rash.   Neurological:      General: No focal deficit present.      Mental Status: He is alert and oriented to person, place, and time.      Cranial Nerves: No cranial nerve deficit.      Sensory: No sensory deficit.      Motor: No weakness.      Gait: Gait normal.   Psychiatric:         Mood and Affect: Mood normal.         Behavior: Behavior normal.      Comments: Fluent speech, cooperative      Last Recorded Vitals  Blood pressure 149/88, pulse 62,  "temperature 36.1 °C (97 °F), resp. rate 20, height 1.775 m (5' 9.88\"), weight 100 kg (220 lb 14.4 oz), SpO2 98 %.  Intake/Output last 3 Shifts:  I/O last 3 completed shifts:  In: 429.2 (4.3 mL/kg) [I.V.:29.2 (0.3 mL/kg); IV Piggyback:400]  Out: - (0 mL/kg)   Weight: 100.2 kg     Assessment/Plan   Principal Problem:    Multiple myeloma without remission (CMS/HCC)    Mr. Maldonado Mccloud is a 73 year old male with a PMH of multiple myeloma s/p Auto SCT on 12/13/15 and then multiple lines of treatment (Revlimid, Vacto/Pom trial, Sarclista/Kyprolis), most recently (8/2023) on Cytoxan/Kyprolis and s/p radiation treatments x 8 fractions on 7/3/23.,  LLE DVT 2022 (on Eliquis).  Admitted for CAR T cell therapy for his ISS stage 2 Kappa multiple myeloma with ABECMA (T0 = 10/25/23).     T+5 today     ONC:  ISS stage 2 IgG Kappa Multiple Myeloma  -Presented with right chest wall mass in July 2015 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   -Myeloma marks 10/2/23-KFLC 6.79, LFLC 0.31, IgG 670, IgM 7, IgA <7  -Collected T cells 5/26/23  - Admitted for CAR-T cell therapy-ABECMA (Idecabtagene vicleucel) prep with Flu/Cy (T0 = 10/25/23)    CHEMO:  -Fludarabine 65 mg IV on days T-5 to T-3 (10/20 - 10/22/23)  -Cyclophosphamide 657 mg IV on days T-5 to T-3 (10/20 - 10/22/23)  -CAR T-cell ABECMA (Idecabtagene vicleucel) T0 = 10/25/23  -Plan to start Keppra 500mg BID on T-1 (10/24)     Daily Evaluation: 10/30  Ferritin: 210 (10/24), 206 (10/25) 339 (10/27) 381 (10/28) 390 (10/29), 368 (10/30)   CRP: 1.64 (10/24), 1.00 (10/25) 2.5 (10/27) 6.24 (10/28) 2.4 " (10/29), 1.33 (10/30)   Fibrinogen: 244 (10/24), 260 (10/25) 337 (10/27) 348 (10/28) 260 (10/29), 206 (10/30)   Coags: 1.3 (10/24), 1.2 (10/25)   ICE Score : 10/10      #CRS Assessment:   In last 24 hours: Fever: NO; Any O2 supplement? NO     #Neurotoxicity Assessment:   ICANS Score: 10/10  Handwriting/Signature impaired:  no  Motor/Sensory deficit: No  Other abnormal Neuro symptom: N/A  Imaging/Fundoscopy finding: N/A     #CRS ndGndrndanddndend:nd nd2nd Organ/System affected by CRS: fever  Date of CRS onset: 10/26, 10/27  Date of highest CRS Grade: 10/26, 10/27  Date of CRS resolution to Grade 1 or lower: 10/27     #ICANS ndGndrndanddndend:nd nd2nd Date of Neurotoxicity onset: 10/26, 10/27  Date of highest Neurotoxicity ndGndrndanddndend:nd nd2nd Date of Neurotoxicity resolution to Grade 1 or lower: 10/27  Neurotox management:   -Dexamethasone 10mg IVP (10/26, 10/27, 10/28)  -tocilizumab (10/27)     #Management:   1. Tocilizumab 8mg/kg: 10/27  2. Anakinra 100mcg/day: N/A  3. Siltuximab 5mg/kg: date & time administered (not started)   4. Steroids: dexamethasone 10mg 10/26, 10/27, 10/28  5. Other agents/management:  Tylenol, Meropenem (10/26), (10/27)     HEME:  # Pancytopenia  - Tx for hgb < 7, plt < 10  # Hx/o LLE DVT 2022, cont Eliquis, hold for plt count < 50k  - apixaban decreased to 2.5mg BID for thrombocytopenia; plan to increase to 5mg BID when plts increase      ID:  Allergies to PCN (rash) & Pip-Tazo (rash)  #PPX: acyclovir, fluconazole   -Check CMV PCR 2x/week Negative 10/26  #Non-neutropenic FEVER (10/26), (10/27)  Ddx: CRS vs infection (less likely given negative infectious workup)   - UA 10/26 neg  - bl cx 10/26 NGTD  - CXR 10/26, 10/27 neg for infx  -C/w meropenem (10/26-present)    CARDIAC:  -Echo (9/22/23) EF 50%  # Hx/o A. fib 2015  # HTN - no home meds.  Cont to follow.  If persists may need to begin antihypertensives.     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     PSYCH:  #Hx of Insomnia  -Continue home Trazadone 300mg HS     DERM  #Diffuse macular rash on trunk, c/f drug reaction, resolved  -Allopurinol discontinued with improvement   -If no improvement, will plan dermatology consult   -PRN benadryl cream available      DISPO  -Full code-confirmed on admit  -Access: double non solo PICC  -primary oncologist Dr. Resendez     Patient seen & examined with Dr. Patrick Gutierrez, PA-C

## 2023-10-31 ENCOUNTER — APPOINTMENT (OUTPATIENT)
Dept: HEMATOLOGY/ONCOLOGY | Facility: HOSPITAL | Age: 73
End: 2023-10-31
Payer: MEDICARE

## 2023-10-31 LAB
ALBUMIN SERPL BCP-MCNC: 3.2 G/DL (ref 3.4–5)
ANION GAP SERPL CALC-SCNC: 12 MMOL/L (ref 10–20)
BASOPHILS # BLD MANUAL: 0 X10*3/UL (ref 0–0.1)
BASOPHILS NFR BLD MANUAL: 0 %
BUN SERPL-MCNC: 17 MG/DL (ref 6–23)
CALCIUM SERPL-MCNC: 7.7 MG/DL (ref 8.6–10.6)
CHLORIDE SERPL-SCNC: 113 MMOL/L (ref 98–107)
CMV DNA SERPL NAA+PROBE-LOG IU: NORMAL {LOG_IU}/ML
CO2 SERPL-SCNC: 25 MMOL/L (ref 21–32)
CREAT SERPL-MCNC: 0.99 MG/DL (ref 0.5–1.3)
CRP SERPL-MCNC: 0.89 MG/DL
EOSINOPHIL # BLD MANUAL: 0.12 X10*3/UL (ref 0–0.4)
EOSINOPHIL NFR BLD MANUAL: 12.9 %
ERYTHROCYTE [DISTWIDTH] IN BLOOD BY AUTOMATED COUNT: 12.1 % (ref 11.5–14.5)
FERRITIN SERPL-MCNC: 323 NG/ML (ref 20–300)
FIBRINOGEN PPP-MCNC: 206 MG/DL (ref 200–400)
GFR SERPL CREATININE-BSD FRML MDRD: 80 ML/MIN/1.73M*2
GLUCOSE SERPL-MCNC: 126 MG/DL (ref 74–99)
HCT VFR BLD AUTO: 26.9 % (ref 41–52)
HGB BLD-MCNC: 9.2 G/DL (ref 13.5–17.5)
IMM GRANULOCYTES # BLD AUTO: 0 X10*3/UL (ref 0–0.5)
IMM GRANULOCYTES NFR BLD AUTO: 0 % (ref 0–0.9)
LABORATORY COMMENT REPORT: NOT DETECTED
LYMPHOCYTES # BLD MANUAL: 0.14 X10*3/UL (ref 0.8–3)
LYMPHOCYTES NFR BLD MANUAL: 15.5 %
MAGNESIUM SERPL-MCNC: 1.91 MG/DL (ref 1.6–2.4)
MCH RBC QN AUTO: 35.5 PG (ref 26–34)
MCHC RBC AUTO-ENTMCNC: 34.2 G/DL (ref 32–36)
MCV RBC AUTO: 104 FL (ref 80–100)
MONOCYTES # BLD MANUAL: 0.19 X10*3/UL (ref 0.05–0.8)
MONOCYTES NFR BLD MANUAL: 20.7 %
NEUTS SEG # BLD MANUAL: 0.4 X10*3/UL (ref 1.6–5)
NEUTS SEG NFR BLD MANUAL: 44.8 %
NRBC BLD-RTO: 0 /100 WBCS (ref 0–0)
PHOSPHATE SERPL-MCNC: 3.5 MG/DL (ref 2.5–4.9)
PLATELET # BLD AUTO: 78 X10*3/UL (ref 150–450)
PMV BLD AUTO: 10.1 FL (ref 7.5–11.5)
POTASSIUM SERPL-SCNC: 4.3 MMOL/L (ref 3.5–5.3)
RBC # BLD AUTO: 2.59 X10*6/UL (ref 4.5–5.9)
RBC MORPH BLD: ABNORMAL
SODIUM SERPL-SCNC: 146 MMOL/L (ref 136–145)
TOTAL CELLS COUNTED BLD: 116
VARIANT LYMPHS # BLD MANUAL: 0.05 X10*3/UL (ref 0–0.3)
VARIANT LYMPHS NFR BLD: 6.1 %
WBC # BLD AUTO: 0.9 X10*3/UL (ref 4.4–11.3)

## 2023-10-31 PROCEDURE — 85007 BL SMEAR W/DIFF WBC COUNT: CPT | Performed by: PHYSICIAN ASSISTANT

## 2023-10-31 PROCEDURE — 82728 ASSAY OF FERRITIN: CPT | Performed by: PHYSICIAN ASSISTANT

## 2023-10-31 PROCEDURE — 2500000004 HC RX 250 GENERAL PHARMACY W/ HCPCS (ALT 636 FOR OP/ED): Performed by: HOME HEALTH AIDE

## 2023-10-31 PROCEDURE — 85027 COMPLETE CBC AUTOMATED: CPT | Performed by: PHYSICIAN ASSISTANT

## 2023-10-31 PROCEDURE — 1170000001 HC PRIVATE ONCOLOGY ROOM DAILY

## 2023-10-31 PROCEDURE — 2500000004 HC RX 250 GENERAL PHARMACY W/ HCPCS (ALT 636 FOR OP/ED): Performed by: PHYSICIAN ASSISTANT

## 2023-10-31 PROCEDURE — 2500000001 HC RX 250 WO HCPCS SELF ADMINISTERED DRUGS (ALT 637 FOR MEDICARE OP): Performed by: INTERNAL MEDICINE

## 2023-10-31 PROCEDURE — 2500000004 HC RX 250 GENERAL PHARMACY W/ HCPCS (ALT 636 FOR OP/ED): Performed by: INTERNAL MEDICINE

## 2023-10-31 PROCEDURE — 85384 FIBRINOGEN ACTIVITY: CPT | Performed by: PHYSICIAN ASSISTANT

## 2023-10-31 PROCEDURE — 2500000004 HC RX 250 GENERAL PHARMACY W/ HCPCS (ALT 636 FOR OP/ED): Performed by: NURSE PRACTITIONER

## 2023-10-31 PROCEDURE — 86140 C-REACTIVE PROTEIN: CPT | Performed by: PHYSICIAN ASSISTANT

## 2023-10-31 PROCEDURE — 2500000001 HC RX 250 WO HCPCS SELF ADMINISTERED DRUGS (ALT 637 FOR MEDICARE OP)

## 2023-10-31 PROCEDURE — 80069 RENAL FUNCTION PANEL: CPT | Performed by: PHYSICIAN ASSISTANT

## 2023-10-31 PROCEDURE — 99233 SBSQ HOSP IP/OBS HIGH 50: CPT | Performed by: INTERNAL MEDICINE

## 2023-10-31 PROCEDURE — 83735 ASSAY OF MAGNESIUM: CPT | Performed by: NURSE PRACTITIONER

## 2023-10-31 RX ADMIN — SENNOSIDES 8.6 MG: 8.6 TABLET, FILM COATED ORAL at 20:31

## 2023-10-31 RX ADMIN — Medication 1 G: at 02:23

## 2023-10-31 RX ADMIN — PANTOPRAZOLE SODIUM 40 MG: 40 TABLET, DELAYED RELEASE ORAL at 08:54

## 2023-10-31 RX ADMIN — URSODIOL 300 MG: 300 CAPSULE ORAL at 15:37

## 2023-10-31 RX ADMIN — ACYCLOVIR 400 MG: 400 TABLET ORAL at 20:31

## 2023-10-31 RX ADMIN — TRAZODONE HYDROCHLORIDE 300 MG: 100 TABLET ORAL at 20:31

## 2023-10-31 RX ADMIN — Medication 1 G: at 11:27

## 2023-10-31 RX ADMIN — URSODIOL 300 MG: 300 CAPSULE ORAL at 20:31

## 2023-10-31 RX ADMIN — URSODIOL 300 MG: 300 CAPSULE ORAL at 08:54

## 2023-10-31 RX ADMIN — Medication 1 G: at 19:36

## 2023-10-31 RX ADMIN — POLYETHYLENE GLYCOL 3350 17 G: 17 POWDER, FOR SOLUTION ORAL at 20:34

## 2023-10-31 RX ADMIN — SENNOSIDES 8.6 MG: 8.6 TABLET, FILM COATED ORAL at 08:53

## 2023-10-31 RX ADMIN — ACYCLOVIR 400 MG: 400 TABLET ORAL at 08:54

## 2023-10-31 RX ADMIN — FLUCONAZOLE 400 MG: 200 TABLET ORAL at 08:54

## 2023-10-31 RX ADMIN — LEVETIRACETAM 500 MG: 500 TABLET, FILM COATED ORAL at 08:54

## 2023-10-31 RX ADMIN — LEVETIRACETAM 500 MG: 500 TABLET, FILM COATED ORAL at 20:31

## 2023-10-31 ASSESSMENT — COGNITIVE AND FUNCTIONAL STATUS - GENERAL
DAILY ACTIVITIY SCORE: 24
MOBILITY SCORE: 24

## 2023-10-31 ASSESSMENT — PAIN - FUNCTIONAL ASSESSMENT
PAIN_FUNCTIONAL_ASSESSMENT: 0-10

## 2023-10-31 ASSESSMENT — PAIN SCALES - GENERAL
PAINLEVEL_OUTOF10: 0 - NO PAIN

## 2023-10-31 NOTE — PROGRESS NOTES
Maldonado Mccloud is a 73 y.o. male on day 12 of admission presenting with Multiple myeloma without remission (CMS/HCC).    Subjective   Patient feels well today, reports improvement in bloating. LBM yesterday, soft. Requests to continue on stool softener. Denies HA, dizziness, CP, SOB, N/V/D/C. All other ROS otherwise negative.      Objective   Physical Exam  Constitutional:       General: He is not in acute distress.     Appearance: Normal appearance. He is not ill-appearing or toxic-appearing.   HENT:      Head: Normocephalic and atraumatic.      Nose: Nose normal.      Mouth/Throat:      Mouth: Mucous membranes are moist.   Eyes:      General: No scleral icterus.     Extraocular Movements: Extraocular movements intact.      Pupils: Pupils are equal, round, and reactive to light.   Cardiovascular:      Rate and Rhythm: Normal rate and regular rhythm.      Pulses: Normal pulses.      Heart sounds: Normal heart sounds. No murmur heard.  Pulmonary:      Effort: Pulmonary effort is normal. No respiratory distress.      Breath sounds: Normal breath sounds. No wheezing, rhonchi or rales.   Abdominal:      General: Bowel sounds are normal. There is distension (mild distension, improving).      Palpations: Abdomen is soft. There is no mass.      Tenderness: There is no abdominal tenderness. There is no guarding.   Musculoskeletal:         General: No swelling. Normal range of motion.      Cervical back: Normal range of motion and neck supple.   Skin:     General: Skin is warm and dry.      Coloration: Skin is not jaundiced.      Findings: No bruising or rash.   Neurological:      General: No focal deficit present.      Mental Status: He is alert and oriented to person, place, and time.      Cranial Nerves: No cranial nerve deficit.      Sensory: No sensory deficit.      Motor: No weakness.      Gait: Gait normal.   Psychiatric:         Mood and Affect: Mood normal.         Behavior: Behavior normal.      Comments: Fluent  "speech, cooperative      Last Recorded Vitals  Blood pressure 120/75, pulse 63, temperature 36.5 °C (97.7 °F), temperature source Temporal, resp. rate 18, height 1.775 m (5' 9.88\"), weight 100 kg (220 lb 7.4 oz), SpO2 98 %.  Intake/Output last 3 Shifts:  I/O last 3 completed shifts:  In: 1599.2 (16 mL/kg) [P.O.:720; I.V.:29.2 (0.3 mL/kg); IV Piggyback:850]  Out: 3 (0 mL/kg) [Urine:3 (0 mL/kg/hr)]  Weight: 100 kg     Scheduled medications  acyclovir, 400 mg, oral, q12h  fluconazole, 400 mg, oral, Daily  levETIRAcetam, 500 mg, oral, BID  meropenem, 1 g, intravenous, q8h  pantoprazole, 40 mg, oral, Daily before breakfast  sennosides, 1 tablet, oral, BID  traZODone, 300 mg, oral, Nightly  ursodiol, 300 mg, oral, TID      Continuous medications     PRN medications  PRN medications: acetaminophen, albuterol, alteplase, dextrose, diphenhydrAMINE, diphenhydramine-zinc acetate, EPINEPHrine, famotidine, methylPREDNISolone sodium succinate (PF), oxyCODONE, polyethylene glycol, sodium chloride    Results for orders placed or performed during the hospital encounter of 10/19/23 (from the past 24 hour(s))   CBC and Auto Differential   Result Value Ref Range    WBC 0.9 (LL) 4.4 - 11.3 x10*3/uL    nRBC 0.0 0.0 - 0.0 /100 WBCs    RBC 2.59 (L) 4.50 - 5.90 x10*6/uL    Hemoglobin 9.2 (L) 13.5 - 17.5 g/dL    Hematocrit 26.9 (L) 41.0 - 52.0 %     (H) 80 - 100 fL    MCH 35.5 (H) 26.0 - 34.0 pg    MCHC 34.2 32.0 - 36.0 g/dL    RDW 12.1 11.5 - 14.5 %    Platelets 78 (L) 150 - 450 x10*3/uL    MPV 10.1 7.5 - 11.5 fL    Immature Granulocytes %, Automated 0.0 0.0 - 0.9 %    Immature Granulocytes Absolute, Automated 0.00 0.00 - 0.50 x10*3/uL   Renal Function Panel   Result Value Ref Range    Glucose 126 (H) 74 - 99 mg/dL    Sodium 146 (H) 136 - 145 mmol/L    Potassium 4.3 3.5 - 5.3 mmol/L    Chloride 113 (H) 98 - 107 mmol/L    Bicarbonate 25 21 - 32 mmol/L    Anion Gap 12 10 - 20 mmol/L    Urea Nitrogen 17 6 - 23 mg/dL    Creatinine 0.99 " 0.50 - 1.30 mg/dL    eGFR 80 >60 mL/min/1.73m*2    Calcium 7.7 (L) 8.6 - 10.6 mg/dL    Phosphorus 3.5 2.5 - 4.9 mg/dL    Albumin 3.2 (L) 3.4 - 5.0 g/dL   Fibrinogen   Result Value Ref Range    Fibrinogen 206 200 - 400 mg/dL   C-reactive protein   Result Value Ref Range    C-Reactive Protein 0.89 <1.00 mg/dL   Ferritin   Result Value Ref Range    Ferritin 323 (H) 20 - 300 ng/mL   Magnesium   Result Value Ref Range    Magnesium 1.91 1.60 - 2.40 mg/dL   Manual Differential   Result Value Ref Range    Neutrophils %, Manual 44.8 40.0 - 80.0 %    Lymphocytes %, Manual 15.5 13.0 - 44.0 %    Monocytes %, Manual 20.7 2.0 - 10.0 %    Eosinophils %, Manual 12.9 0.0 - 6.0 %    Basophils %, Manual 0.0 0.0 - 2.0 %    Atypical Lymphocytes %, Manual 6.1 0.0 - 2.0 %    Seg Neutrophils Absolute, Manual 0.40 (L) 1.60 - 5.00 x10*3/uL    Lymphocytes Absolute, Manual 0.14 (L) 0.80 - 3.00 x10*3/uL    Monocytes Absolute, Manual 0.19 0.05 - 0.80 x10*3/uL    Eosinophils Absolute, Manual 0.12 0.00 - 0.40 x10*3/uL    Basophils Absolute, Manual 0.00 0.00 - 0.10 x10*3/uL    Atypical Lymphs Absolute, Manual 0.05 0.00 - 0.30 x10*3/uL    Total Cells Counted 116     RBC Morphology See Below          Assessment/Plan   Principal Problem:    Multiple myeloma without remission (CMS/HCC)    Mr. Maldonado Mccloud is a 73 year old male with a PMH of multiple myeloma s/p Auto SCT on 12/13/15 and then multiple lines of treatment (Revlimid, Vacto/Pom trial, Sarclista/Kyprolis), most recently (8/2023) on Cytoxan/Kyprolis and s/p radiation treatments x 8 fractions on 7/3/23.,  LLE DVT 2022 (on Eliquis).  Admitted for CAR T cell therapy for his ISS stage 2 Kappa multiple myeloma with ABECMA (T0 = 10/25/23).     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   -Myeloma marks 10/2/23-KFLC 6.79, LFLC 0.31, IgG 670, IgM 7, IgA <7  -Collected T cells 5/26/23  - Admitted for CAR-T cell therapy-ABECMA (Idecabtagene vicleucel) prep with Flu/Cy (T0 = 10/25/23)    CHEMO:  -Fludarabine 65 mg IV on days T-5 to T-3 (10/20 - 10/22/23)  -Cyclophosphamide 657 mg IV on days T-5 to T-3 (10/20 - 10/22/23)  -CAR T-cell ABECMA (Idecabtagene vicleucel) T0 = 10/25/23  -Plan to start Keppra 500mg BID on T-1 (10/24)     Daily Evaluation: 10/30  Ferritin: 210 (10/24), 206 (10/25) 339 (10/27) 381 (10/28) 390 (10/29), 368 (10/30), 323 (10/31)   CRP: 1.64 (10/24), 1.00 (10/25) 2.5 (10/27) 6.24 (10/28) 2.4 (10/29), 1.33 (10/30), 0.89 (10/31)    Fibrinogen: 244 (10/24), 260 (10/25) 337 (10/27) 348 (10/28) 260 (10/29), 206 (10/30), 206 (10/31)    Coags: 1.3 (10/24), 1.2 (10/25)   ICE Score : 10/10      #CRS Assessment:   In last 24 hours: Fever: NO; Any O2 supplement? NO     #Neurotoxicity Assessment:   ICANS Score: 10/10  Handwriting/Signature impaired:  no  Motor/Sensory deficit: No  Other abnormal Neuro symptom: N/A  Imaging/Fundoscopy finding: N/A     #CRS ndGndrndanddndend:nd nd2nd Organ/System affected by CRS: fever  Date of CRS onset: 10/26, 10/27  Date of highest CRS Grade: 10/26, 10/27  Date of CRS resolution to Grade 1 or lower: 10/27     #ICANS ndGndrndanddndend:nd nd2nd Date of Neurotoxicity onset: 10/26, 10/27  Date of highest Neurotoxicity ndGndrndanddndend:nd nd2nd Date of Neurotoxicity resolution to Grade 1 or lower: 10/27  Neurotox management:   -Dexamethasone 10mg IVP (10/26, 10/27, 10/28)  -tocilizumab (10/27)     #Management:   1. Tocilizumab 8mg/kg: 10/27  2. Anakinra 100mcg/day: N/A  3. Siltuximab 5mg/kg: date & time administered (not started)    4. Steroids: dexamethasone 10mg 10/26, 10/27, 10/28  5. Other agents/management:  Tylenol, Meropenem (10/26), (10/27)     HEME:  # Pancytopenia  - Tx for hgb < 7, plt < 10  # Hx/o LLE DVT 2022, cont Eliquis, hold for plt count < 50k  - apixaban decreased to 2.5mg BID for thrombocytopenia; plan to increase to 5mg BID when plts increase      ID:  Allergies to PCN (rash) & Pip-Tazo (rash)  #PPX: acyclovir, fluconazole   -Check CMV PCR 2x/week Negative 10/26  #Non-neutropenic FEVER (10/26), (10/27)  Ddx: CRS vs infection (less likely given negative infectious workup)   - UA 10/26 neg  - bl cx 10/26 no growth  - CXR 10/26, 10/27 neg for infx  -C/w meropenem (10/26-present); continued for neutropenia     CARDIAC:  -Echo (9/22/23) EF 50%  # Hx/o A. fib 2015  # HTN - no home meds.  Cont to follow.  If persists may need to begin antihypertensives.     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     PSYCH:  #Hx of Insomnia  -Continue home Trazadone 300mg HS     DERM  #Diffuse macular rash on trunk, c/f drug reaction, resolved  -Allopurinol discontinued with improvement   -If no improvement, will plan dermatology consult   -PRN benadryl cream available      DISPO  -Full code-confirmed on admit  -Access: double non solo PICC  -primary oncologist Dr. Resendez     Patient seen, discussed & examined with Dr. Patrick Gutierrez PA-C

## 2023-10-31 NOTE — CARE PLAN
Problem: Fall/Injury  Goal: Not fall by end of shift  Outcome: Progressing  Goal: Be free from injury by end of the shift  Outcome: Progressing  Goal: Verbalize understanding of personal risk factors for fall in the hospital  Outcome: Progressing  Goal: Verbalize understanding of risk factor reduction measures to prevent injury from fall in the home  Outcome: Progressing  Goal: Use assistive devices by end of the shift  Outcome: Progressing  Goal: Pace activities to prevent fatigue by end of the shift  Outcome: Progressing     Problem: Skin  Goal: Decreased wound size/increased tissue granulation at next dressing change  Outcome: Progressing  Goal: Participates in plan/prevention/treatment measures  Outcome: Progressing  Goal: Prevent/manage excess moisture  Outcome: Progressing  Goal: Prevent/minimize sheer/friction injuries  Outcome: Progressing  Goal: Promote/optimize nutrition  Outcome: Progressing  Goal: Promote skin healing  Outcome: Progressing     Problem: Safety  Goal: Patient will be injury free during hospitalization  Outcome: Progressing  Goal: I will remain free of falls  Outcome: Progressing     Problem: PICC line  Goal: I will remain free from symptoms of infection  Outcome: Progressing     Problem: Pain - Adult  Goal: Verbalizes/displays adequate comfort level or baseline comfort level  Outcome: Progressing   The patient's goals for the shift include      The clinical goals for the shift include Pt will remain afebrile and hemodynamically stable throughout shift.    Over the shift, the patient did not make progress toward the following goals.

## 2023-10-31 NOTE — CARE PLAN
The clinical goals for the shift include Pt will remain afebrile and hemodynamically stable throughout shift.    VSS, afebrile, no complaints N/V/D this shift. Pt remained safe and free of falls/injury. No pain reported this shift. Patient continued to receive scheduled IV meropenem overnight, and Pt reports bowel movements overnight after receiving miralax.

## 2023-11-01 LAB
ALBUMIN SERPL BCP-MCNC: 3.1 G/DL (ref 3.4–5)
ALP SERPL-CCNC: 45 U/L (ref 33–136)
ALT SERPL W P-5'-P-CCNC: 16 U/L (ref 10–52)
ANION GAP SERPL CALC-SCNC: 10 MMOL/L (ref 10–20)
APTT PPP: 22 SECONDS (ref 27–38)
AST SERPL W P-5'-P-CCNC: 13 U/L (ref 9–39)
BASOPHILS # BLD AUTO: 0 X10*3/UL (ref 0–0.1)
BASOPHILS NFR BLD AUTO: 0 %
BILIRUB DIRECT SERPL-MCNC: 0.1 MG/DL (ref 0–0.3)
BILIRUB SERPL-MCNC: 0.7 MG/DL (ref 0–1.2)
BUN SERPL-MCNC: 20 MG/DL (ref 6–23)
BURR CELLS BLD QL SMEAR: NORMAL
CALCIUM SERPL-MCNC: 8.2 MG/DL (ref 8.6–10.6)
CHLORIDE SERPL-SCNC: 111 MMOL/L (ref 98–107)
CO2 SERPL-SCNC: 27 MMOL/L (ref 21–32)
CREAT SERPL-MCNC: 0.94 MG/DL (ref 0.5–1.3)
CRP SERPL-MCNC: 0.64 MG/DL
EOSINOPHIL # BLD AUTO: 0.06 X10*3/UL (ref 0–0.4)
EOSINOPHIL NFR BLD AUTO: 8.6 %
ERYTHROCYTE [DISTWIDTH] IN BLOOD BY AUTOMATED COUNT: 11.9 % (ref 11.5–14.5)
FERRITIN SERPL-MCNC: 287 NG/ML (ref 20–300)
FIBRINOGEN PPP-MCNC: 169 MG/DL (ref 200–400)
GFR SERPL CREATININE-BSD FRML MDRD: 85 ML/MIN/1.73M*2
GLUCOSE SERPL-MCNC: 120 MG/DL (ref 74–99)
HCT VFR BLD AUTO: 26.7 % (ref 41–52)
HGB BLD-MCNC: 9.2 G/DL (ref 13.5–17.5)
IMM GRANULOCYTES # BLD AUTO: 0.01 X10*3/UL (ref 0–0.5)
IMM GRANULOCYTES NFR BLD AUTO: 1.4 % (ref 0–0.9)
INR PPP: 1 (ref 0.9–1.1)
LYMPHOCYTES # BLD AUTO: 0.3 X10*3/UL (ref 0.8–3)
LYMPHOCYTES NFR BLD AUTO: 42.9 %
MAGNESIUM SERPL-MCNC: 1.83 MG/DL (ref 1.6–2.4)
MCH RBC QN AUTO: 35.2 PG (ref 26–34)
MCHC RBC AUTO-ENTMCNC: 34.5 G/DL (ref 32–36)
MCV RBC AUTO: 102 FL (ref 80–100)
MONOCYTES # BLD AUTO: 0.22 X10*3/UL (ref 0.05–0.8)
MONOCYTES NFR BLD AUTO: 31.4 %
NEUTROPHILS # BLD AUTO: 0.11 X10*3/UL (ref 1.6–5.5)
NEUTROPHILS NFR BLD AUTO: 15.7 %
NRBC BLD-RTO: 0 /100 WBCS (ref 0–0)
PHOSPHATE SERPL-MCNC: 3.3 MG/DL (ref 2.5–4.9)
PLATELET # BLD AUTO: 75 X10*3/UL (ref 150–450)
PMV BLD AUTO: 9.7 FL (ref 7.5–11.5)
POTASSIUM SERPL-SCNC: 3.9 MMOL/L (ref 3.5–5.3)
PROT SERPL-MCNC: 4.4 G/DL (ref 6.4–8.2)
PROTHROMBIN TIME: 11.2 SECONDS (ref 9.8–12.8)
RBC # BLD AUTO: 2.61 X10*6/UL (ref 4.5–5.9)
RBC MORPH BLD: NORMAL
SODIUM SERPL-SCNC: 144 MMOL/L (ref 136–145)
WBC # BLD AUTO: 0.7 X10*3/UL (ref 4.4–11.3)

## 2023-11-01 PROCEDURE — 82728 ASSAY OF FERRITIN: CPT | Performed by: PHYSICIAN ASSISTANT

## 2023-11-01 PROCEDURE — 2500000004 HC RX 250 GENERAL PHARMACY W/ HCPCS (ALT 636 FOR OP/ED): Performed by: HOME HEALTH AIDE

## 2023-11-01 PROCEDURE — 2500000001 HC RX 250 WO HCPCS SELF ADMINISTERED DRUGS (ALT 637 FOR MEDICARE OP)

## 2023-11-01 PROCEDURE — 84100 ASSAY OF PHOSPHORUS: CPT | Performed by: PHYSICIAN ASSISTANT

## 2023-11-01 PROCEDURE — 2500000004 HC RX 250 GENERAL PHARMACY W/ HCPCS (ALT 636 FOR OP/ED): Performed by: NURSE PRACTITIONER

## 2023-11-01 PROCEDURE — 86140 C-REACTIVE PROTEIN: CPT | Performed by: PHYSICIAN ASSISTANT

## 2023-11-01 PROCEDURE — 2500000001 HC RX 250 WO HCPCS SELF ADMINISTERED DRUGS (ALT 637 FOR MEDICARE OP): Performed by: INTERNAL MEDICINE

## 2023-11-01 PROCEDURE — 83735 ASSAY OF MAGNESIUM: CPT | Performed by: NURSE PRACTITIONER

## 2023-11-01 PROCEDURE — 99233 SBSQ HOSP IP/OBS HIGH 50: CPT | Performed by: INTERNAL MEDICINE

## 2023-11-01 PROCEDURE — 2500000004 HC RX 250 GENERAL PHARMACY W/ HCPCS (ALT 636 FOR OP/ED): Performed by: PHYSICIAN ASSISTANT

## 2023-11-01 PROCEDURE — 85025 COMPLETE CBC W/AUTO DIFF WBC: CPT | Performed by: PHYSICIAN ASSISTANT

## 2023-11-01 PROCEDURE — 2500000004 HC RX 250 GENERAL PHARMACY W/ HCPCS (ALT 636 FOR OP/ED): Performed by: INTERNAL MEDICINE

## 2023-11-01 PROCEDURE — 80053 COMPREHEN METABOLIC PANEL: CPT | Performed by: PHYSICIAN ASSISTANT

## 2023-11-01 PROCEDURE — 85610 PROTHROMBIN TIME: CPT | Performed by: PHYSICIAN ASSISTANT

## 2023-11-01 PROCEDURE — 85384 FIBRINOGEN ACTIVITY: CPT | Performed by: PHYSICIAN ASSISTANT

## 2023-11-01 PROCEDURE — 82248 BILIRUBIN DIRECT: CPT | Performed by: PHYSICIAN ASSISTANT

## 2023-11-01 PROCEDURE — 1170000001 HC PRIVATE ONCOLOGY ROOM DAILY

## 2023-11-01 RX ADMIN — POLYETHYLENE GLYCOL 3350 17 G: 17 POWDER, FOR SOLUTION ORAL at 10:04

## 2023-11-01 RX ADMIN — ACYCLOVIR 400 MG: 400 TABLET ORAL at 09:59

## 2023-11-01 RX ADMIN — URSODIOL 300 MG: 300 CAPSULE ORAL at 09:59

## 2023-11-01 RX ADMIN — LEVETIRACETAM 500 MG: 500 TABLET, FILM COATED ORAL at 09:59

## 2023-11-01 RX ADMIN — URSODIOL 300 MG: 300 CAPSULE ORAL at 20:30

## 2023-11-01 RX ADMIN — SENNOSIDES 8.6 MG: 8.6 TABLET, FILM COATED ORAL at 09:59

## 2023-11-01 RX ADMIN — Medication 1 G: at 18:40

## 2023-11-01 RX ADMIN — PANTOPRAZOLE SODIUM 40 MG: 40 TABLET, DELAYED RELEASE ORAL at 09:00

## 2023-11-01 RX ADMIN — Medication 1 G: at 12:35

## 2023-11-01 RX ADMIN — LEVETIRACETAM 500 MG: 500 TABLET, FILM COATED ORAL at 20:30

## 2023-11-01 RX ADMIN — FLUCONAZOLE 400 MG: 200 TABLET ORAL at 09:59

## 2023-11-01 RX ADMIN — TRAZODONE HYDROCHLORIDE 300 MG: 100 TABLET ORAL at 20:30

## 2023-11-01 RX ADMIN — ACYCLOVIR 400 MG: 400 TABLET ORAL at 20:30

## 2023-11-01 RX ADMIN — Medication 1 G: at 02:09

## 2023-11-01 RX ADMIN — URSODIOL 300 MG: 300 CAPSULE ORAL at 14:20

## 2023-11-01 ASSESSMENT — COGNITIVE AND FUNCTIONAL STATUS - GENERAL
DAILY ACTIVITIY SCORE: 24
MOBILITY SCORE: 24
DAILY ACTIVITIY SCORE: 24
MOBILITY SCORE: 24

## 2023-11-01 ASSESSMENT — PAIN SCALES - GENERAL
PAINLEVEL_OUTOF10: 0 - NO PAIN

## 2023-11-01 ASSESSMENT — PAIN - FUNCTIONAL ASSESSMENT
PAIN_FUNCTIONAL_ASSESSMENT: 0-10

## 2023-11-01 NOTE — CARE PLAN
The patient's goals for the shift include  completing ADLs and visiting with wife.    The clinical goals for the shift include Patient will remain without CRS symptoms and hemodynamically stable throughout the shift.        Problem: Fall/Injury  Goal: Not fall by end of shift  Outcome: Progressing  Goal: Be free from injury by end of the shift  Outcome: Progressing  Goal: Verbalize understanding of personal risk factors for fall in the hospital  Outcome: Progressing  Goal: Verbalize understanding of risk factor reduction measures to prevent injury from fall in the home  Outcome: Progressing  Goal: Use assistive devices by end of the shift  Outcome: Progressing  Goal: Pace activities to prevent fatigue by end of the shift  Outcome: Progressing     Problem: Skin  Goal: Decreased wound size/increased tissue granulation at next dressing change  Outcome: Progressing  Goal: Participates in plan/prevention/treatment measures  Outcome: Progressing  Goal: Prevent/manage excess moisture  Outcome: Progressing  Goal: Prevent/minimize sheer/friction injuries  Outcome: Progressing  Goal: Promote/optimize nutrition  Outcome: Progressing  Goal: Promote skin healing  Outcome: Progressing     Problem: Safety  Goal: Patient will be injury free during hospitalization  Outcome: Progressing  Goal: I will remain free of falls  Outcome: Progressing     Problem: PICC line  Goal: I will remain free from symptoms of infection  Outcome: Progressing     Problem: Pain - Adult  Goal: Verbalizes/displays adequate comfort level or baseline comfort level  Outcome: Progressing

## 2023-11-01 NOTE — CARE PLAN
The clinical goals for the shift include Patient will remain without CRS symptoms and hemodynamically stable throughout the shift.    VSS, afebrile, no complaints N/V/D this shift. Pt remained safe and free of falls/injury. No pain reported this shift.     Patient had an uneventful night. CARTOX score remain a 10/10, not CRS signs or symptoms throughout the shift. Patient continue to receive scheduled meropenem throughout the shift.

## 2023-11-01 NOTE — PROGRESS NOTES
Maldonado Mccloud is a 73 y.o. male on day 13 of admission presenting with Multiple myeloma without remission (CMS/HCC).    Subjective   Patient feels well. Denies HA, dizziness, CP, SOB, N/V/D/C. All other ROS otherwise negative.      Objective   Physical Exam  Constitutional:       General: He is not in acute distress.     Appearance: Normal appearance. He is not ill-appearing or toxic-appearing.   HENT:      Head: Normocephalic and atraumatic.      Nose: Nose normal.      Mouth/Throat:      Mouth: Mucous membranes are moist.   Eyes:      General: No scleral icterus.     Extraocular Movements: Extraocular movements intact.      Pupils: Pupils are equal, round, and reactive to light.   Cardiovascular:      Rate and Rhythm: Normal rate and regular rhythm.      Pulses: Normal pulses.      Heart sounds: Normal heart sounds. No murmur heard.  Pulmonary:      Effort: Pulmonary effort is normal. No respiratory distress.      Breath sounds: Normal breath sounds. No wheezing, rhonchi or rales.   Abdominal:      General: Bowel sounds are normal. There is no distension.      Palpations: Abdomen is soft. There is no mass.      Tenderness: There is no abdominal tenderness. There is no guarding.   Musculoskeletal:         General: No swelling. Normal range of motion.      Cervical back: Normal range of motion and neck supple.   Skin:     General: Skin is warm and dry.      Coloration: Skin is not jaundiced.      Findings: No bruising or rash.   Neurological:      General: No focal deficit present.      Mental Status: He is alert and oriented to person, place, and time.      Cranial Nerves: No cranial nerve deficit.      Sensory: No sensory deficit.      Motor: No weakness.      Gait: Gait normal.   Psychiatric:         Mood and Affect: Mood normal.         Behavior: Behavior normal.      Comments: Fluent speech, cooperative      Last Recorded Vitals  Blood pressure (!) 162/93, pulse 61, temperature 36.5 °C (97.7 °F), temperature  "source Temporal, resp. rate 16, height 1.775 m (5' 9.88\"), weight 100 kg (220 lb 7.4 oz), SpO2 96 %.  Intake/Output last 3 Shifts:  I/O last 3 completed shifts:  In: 500 (5 mL/kg) [IV Piggyback:500]  Out: - (0 mL/kg)   Weight: 100 kg     Scheduled medications  acyclovir, 400 mg, oral, q12h  fluconazole, 400 mg, oral, Daily  levETIRAcetam, 500 mg, oral, BID  meropenem, 1 g, intravenous, q8h  pantoprazole, 40 mg, oral, Daily before breakfast  sennosides, 1 tablet, oral, BID  traZODone, 300 mg, oral, Nightly  ursodiol, 300 mg, oral, TID      Continuous medications     PRN medications  PRN medications: acetaminophen, alteplase, diphenhydramine-zinc acetate, oxyCODONE, polyethylene glycol    Results for orders placed or performed during the hospital encounter of 10/19/23 (from the past 24 hour(s))   CBC and Auto Differential   Result Value Ref Range    WBC 0.7 (LL) 4.4 - 11.3 x10*3/uL    nRBC 0.0 0.0 - 0.0 /100 WBCs    RBC 2.61 (L) 4.50 - 5.90 x10*6/uL    Hemoglobin 9.2 (L) 13.5 - 17.5 g/dL    Hematocrit 26.7 (L) 41.0 - 52.0 %     (H) 80 - 100 fL    MCH 35.2 (H) 26.0 - 34.0 pg    MCHC 34.5 32.0 - 36.0 g/dL    RDW 11.9 11.5 - 14.5 %    Platelets 75 (L) 150 - 450 x10*3/uL    MPV 9.7 7.5 - 11.5 fL    Neutrophils % 15.7 40.0 - 80.0 %    Immature Granulocytes %, Automated 1.4 (H) 0.0 - 0.9 %    Lymphocytes % 42.9 13.0 - 44.0 %    Monocytes % 31.4 2.0 - 10.0 %    Eosinophils % 8.6 0.0 - 6.0 %    Basophils % 0.0 0.0 - 2.0 %    Neutrophils Absolute 0.11 (L) 1.60 - 5.50 x10*3/uL    Immature Granulocytes Absolute, Automated 0.01 0.00 - 0.50 x10*3/uL    Lymphocytes Absolute 0.30 (L) 0.80 - 3.00 x10*3/uL    Monocytes Absolute 0.22 0.05 - 0.80 x10*3/uL    Eosinophils Absolute 0.06 0.00 - 0.40 x10*3/uL    Basophils Absolute 0.00 0.00 - 0.10 x10*3/uL   Hepatic function panel   Result Value Ref Range    Albumin 3.1 (L) 3.4 - 5.0 g/dL    Bilirubin, Total 0.7 0.0 - 1.2 mg/dL    Bilirubin, Direct 0.1 0.0 - 0.3 mg/dL    Alkaline " Phosphatase 45 33 - 136 U/L    ALT 16 10 - 52 U/L    AST 13 9 - 39 U/L    Total Protein 4.4 (L) 6.4 - 8.2 g/dL   Coagulation Screen   Result Value Ref Range    Protime 11.2 9.8 - 12.8 seconds    INR 1.0 0.9 - 1.1    aPTT 22 (L) 27 - 38 seconds   Fibrinogen   Result Value Ref Range    Fibrinogen 169 (L) 200 - 400 mg/dL   C-reactive protein   Result Value Ref Range    C-Reactive Protein 0.64 <1.00 mg/dL   Ferritin   Result Value Ref Range    Ferritin 287 20 - 300 ng/mL   Magnesium   Result Value Ref Range    Magnesium 1.83 1.60 - 2.40 mg/dL   Phosphorus   Result Value Ref Range    Phosphorus 3.3 2.5 - 4.9 mg/dL   Basic Metabolic Panel   Result Value Ref Range    Glucose 120 (H) 74 - 99 mg/dL    Sodium 144 136 - 145 mmol/L    Potassium 3.9 3.5 - 5.3 mmol/L    Chloride 111 (H) 98 - 107 mmol/L    Bicarbonate 27 21 - 32 mmol/L    Anion Gap 10 10 - 20 mmol/L    Urea Nitrogen 20 6 - 23 mg/dL    Creatinine 0.94 0.50 - 1.30 mg/dL    eGFR 85 >60 mL/min/1.73m*2    Calcium 8.2 (L) 8.6 - 10.6 mg/dL   Morphology   Result Value Ref Range    RBC Morphology See Below     Fancy Gap Cells Few          Assessment/Plan   Principal Problem:    Multiple myeloma without remission (CMS/HCC)    Mr. Maldonado Mccloud is a 73 year old male with a PMH of multiple myeloma s/p Auto SCT on 12/13/15 and then multiple lines of treatment (Revlimid, Vacto/Pom trial, Sarclista/Kyprolis), most recently (8/2023) on Cytoxan/Kyprolis and s/p radiation treatments x 8 fractions on 7/3/23.,  LLE DVT 2022 (on Eliquis).  Admitted for CAR T cell therapy for his ISS stage 2 Kappa multiple myeloma with ABECMA (T0 = 10/25/23).     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   -Myeloma marks 10/2/23-KFLC 6.79, LFLC 0.31, IgG 670, IgM 7, IgA <7  -Collected T cells 5/26/23  - Admitted for CAR-T cell therapy-ABECMA (Idecabtagene vicleucel) prep with Flu/Cy (T0 = 10/25/23)    CHEMO:  -Fludarabine 65 mg IV on days T-5 to T-3 (10/20 - 10/22/23)  -Cyclophosphamide 657 mg IV on days T-5 to T-3 (10/20 - 10/22/23)  -CAR T-cell ABECMA (Idecabtagene vicleucel) T0 = 10/25/23  -Plan to start Keppra 500mg BID on T-1 (10/24)     Daily Evaluation: 10/30  Ferritin: 210 (10/24), 206 (10/25) 339 (10/27) 381 (10/28) 390 (10/29), 368 (10/30), 323 (10/31), 323 (11/1)    CRP: 1.64 (10/24), 1.00 (10/25) 2.5 (10/27) 6.24 (10/28) 2.4 (10/29), 1.33 (10/30), 0.89 (10/31), 0.64 (11/1)     Fibrinogen: 244 (10/24), 260 (10/25) 337 (10/27) 348 (10/28) 260 (10/29), 206 (10/30), 206 (10/31), 169 (11/1)     Coags: 1.3 (10/24), 1.2 (10/25)   ICE Score : 10/10      #CRS Assessment:   In last 24 hours: Fever: NO; Any O2 supplement? NO     #Neurotoxicity Assessment:   ICANS Score: 10/10  Handwriting/Signature impaired:  no  Motor/Sensory deficit: No  Other abnormal Neuro symptom: N/A  Imaging/Fundoscopy finding: N/A     #CRS ndGndrndanddndend:nd nd2nd Organ/System affected by CRS: fever  Date of CRS onset: 10/26, 10/27  Date of highest CRS Grade: 10/26, 10/27  Date of CRS resolution to Grade 1 or lower: 10/27     #ICANS ndGndrndanddndend:nd nd2nd Date of Neurotoxicity onset: 10/26, 10/27  Date of highest Neurotoxicity ndGndrndanddndend:nd nd2nd Date of Neurotoxicity resolution to Grade 1 or lower: 10/27  Neurotox management:   -Dexamethasone 10mg IVP (10/26, 10/27, 10/28)  -tocilizumab (10/27)     #Management:   1. Tocilizumab 8mg/kg: 10/27  2. Anakinra 100mcg/day: N/A  3. Siltuximab 5mg/kg: date & time administered (not started)   4. Steroids: dexamethasone 10mg 10/26, 10/27,  10/28  5. Other agents/management:  Tylenol, Meropenem (10/26), (10/27)     HEME:  # Pancytopenia  - Tx for hgb < 7, plt < 10  # Hx/o LLE DVT 2022, cont Eliquis, hold for plt count < 50k  - apixaban decreased to 2.5mg BID for thrombocytopenia; plan to increase to 5mg BID when plts increase      ID:  Allergies to PCN (rash) & Pip-Tazo (rash)  #PPX: acyclovir, fluconazole   -Check CMV PCR 2x/week Negative 10/26, 10/30  #Non-neutropenic FEVER (10/26), (10/27)  Ddx: CRS vs infection (less likely given negative infectious workup)   - UA 10/26 neg  - bl cx 10/26 no growth  - CXR 10/26, 10/27 neg for infx  -C/w meropenem (10/26-present); continued for neutropenia     CARDIAC:  -Echo (9/22/23) EF 50%  # Hx/o A. fib 2015  # HTN - no home meds.  Cont to follow.  If persists may need to begin antihypertensives.     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     PSYCH:  #Hx of Insomnia  -Continue home Trazadone 300mg HS     DERM  #Diffuse macular rash on trunk, c/f drug reaction, resolved  -Allopurinol discontinued with improvement   -If no improvement, will plan dermatology consult   -PRN benadryl cream available      DISPO  -Full code-confirmed on admit  -Access: double non solo PICC  -primary oncologist Dr. Resendez     Patient seen, discussed & examined with Dr. Patrick Gutierrez PA-C

## 2023-11-02 LAB
ABO GROUP (TYPE) IN BLOOD: NORMAL
ALBUMIN SERPL BCP-MCNC: 3.1 G/DL (ref 3.4–5)
ANION GAP SERPL CALC-SCNC: 9 MMOL/L (ref 10–20)
ANTIBODY SCREEN: NORMAL
BASOPHILS # BLD AUTO: 0 X10*3/UL (ref 0–0.1)
BASOPHILS NFR BLD AUTO: 0 %
BLOOD EXPIRATION DATE: NORMAL
BUN SERPL-MCNC: 16 MG/DL (ref 6–23)
CALCIUM SERPL-MCNC: 8.2 MG/DL (ref 8.6–10.6)
CHLORIDE SERPL-SCNC: 111 MMOL/L (ref 98–107)
CMV DNA SERPL NAA+PROBE-LOG IU: ABNORMAL {LOG_IU}/ML
CO2 SERPL-SCNC: 29 MMOL/L (ref 21–32)
CREAT SERPL-MCNC: 1.11 MG/DL (ref 0.5–1.3)
CRP SERPL-MCNC: 0.48 MG/DL
DISPENSE STATUS: NORMAL
EOSINOPHIL # BLD AUTO: 0.03 X10*3/UL (ref 0–0.4)
EOSINOPHIL NFR BLD AUTO: 3.9 %
ERYTHROCYTE [DISTWIDTH] IN BLOOD BY AUTOMATED COUNT: 12.4 % (ref 11.5–14.5)
FERRITIN SERPL-MCNC: 270 NG/ML (ref 20–300)
FIBRINOGEN PPP-MCNC: 148 MG/DL (ref 200–400)
GFR SERPL CREATININE-BSD FRML MDRD: 70 ML/MIN/1.73M*2
GLUCOSE SERPL-MCNC: 106 MG/DL (ref 74–99)
HCT VFR BLD AUTO: 27 % (ref 41–52)
HGB BLD-MCNC: 8.9 G/DL (ref 13.5–17.5)
IMM GRANULOCYTES # BLD AUTO: 0 X10*3/UL (ref 0–0.5)
IMM GRANULOCYTES NFR BLD AUTO: 0 % (ref 0–0.9)
LABORATORY COMMENT REPORT: ABNORMAL
LYMPHOCYTES # BLD AUTO: 0.4 X10*3/UL (ref 0.8–3)
LYMPHOCYTES NFR BLD AUTO: 52.6 %
MAGNESIUM SERPL-MCNC: 1.8 MG/DL (ref 1.6–2.4)
MCH RBC QN AUTO: 35 PG (ref 26–34)
MCHC RBC AUTO-ENTMCNC: 33 G/DL (ref 32–36)
MCV RBC AUTO: 106 FL (ref 80–100)
MONOCYTES # BLD AUTO: 0.28 X10*3/UL (ref 0.05–0.8)
MONOCYTES NFR BLD AUTO: 36.8 %
NEUTROPHILS # BLD AUTO: 0.05 X10*3/UL (ref 1.6–5.5)
NEUTROPHILS NFR BLD AUTO: 6.7 %
NRBC BLD-RTO: 0 /100 WBCS (ref 0–0)
PHOSPHATE SERPL-MCNC: 3.6 MG/DL (ref 2.5–4.9)
PLATELET # BLD AUTO: 76 X10*3/UL (ref 150–450)
POTASSIUM SERPL-SCNC: 4.1 MMOL/L (ref 3.5–5.3)
PRODUCT BLOOD TYPE: 7300
PRODUCT CODE: NORMAL
RBC # BLD AUTO: 2.54 X10*6/UL (ref 4.5–5.9)
RBC MORPH BLD: NORMAL
RH FACTOR (ANTIGEN D): NORMAL
SODIUM SERPL-SCNC: 145 MMOL/L (ref 136–145)
UNIT ABO: NORMAL
UNIT NUMBER: NORMAL
UNIT RH: NORMAL
UNIT VOLUME: 63
WBC # BLD AUTO: 0.8 X10*3/UL (ref 4.4–11.3)

## 2023-11-02 PROCEDURE — 2500000001 HC RX 250 WO HCPCS SELF ADMINISTERED DRUGS (ALT 637 FOR MEDICARE OP): Performed by: INTERNAL MEDICINE

## 2023-11-02 PROCEDURE — 2500000004 HC RX 250 GENERAL PHARMACY W/ HCPCS (ALT 636 FOR OP/ED): Performed by: PHYSICIAN ASSISTANT

## 2023-11-02 PROCEDURE — 80069 RENAL FUNCTION PANEL: CPT | Performed by: PHYSICIAN ASSISTANT

## 2023-11-02 PROCEDURE — 86901 BLOOD TYPING SEROLOGIC RH(D): CPT | Performed by: HOME HEALTH AIDE

## 2023-11-02 PROCEDURE — 2500000001 HC RX 250 WO HCPCS SELF ADMINISTERED DRUGS (ALT 637 FOR MEDICARE OP)

## 2023-11-02 PROCEDURE — 85384 FIBRINOGEN ACTIVITY: CPT | Performed by: PHYSICIAN ASSISTANT

## 2023-11-02 PROCEDURE — 86140 C-REACTIVE PROTEIN: CPT | Performed by: PHYSICIAN ASSISTANT

## 2023-11-02 PROCEDURE — 1170000001 HC PRIVATE ONCOLOGY ROOM DAILY

## 2023-11-02 PROCEDURE — 36430 TRANSFUSION BLD/BLD COMPNT: CPT

## 2023-11-02 PROCEDURE — 83735 ASSAY OF MAGNESIUM: CPT | Performed by: NURSE PRACTITIONER

## 2023-11-02 PROCEDURE — 85025 COMPLETE CBC W/AUTO DIFF WBC: CPT | Performed by: PHYSICIAN ASSISTANT

## 2023-11-02 PROCEDURE — P9012 CRYOPRECIPITATE EACH UNIT: HCPCS

## 2023-11-02 PROCEDURE — 82728 ASSAY OF FERRITIN: CPT | Performed by: PHYSICIAN ASSISTANT

## 2023-11-02 PROCEDURE — 2500000004 HC RX 250 GENERAL PHARMACY W/ HCPCS (ALT 636 FOR OP/ED): Performed by: INTERNAL MEDICINE

## 2023-11-02 PROCEDURE — 2500000004 HC RX 250 GENERAL PHARMACY W/ HCPCS (ALT 636 FOR OP/ED): Performed by: NURSE PRACTITIONER

## 2023-11-02 RX ADMIN — Medication 1 G: at 02:50

## 2023-11-02 RX ADMIN — SENNOSIDES 8.6 MG: 8.6 TABLET, FILM COATED ORAL at 09:40

## 2023-11-02 RX ADMIN — LEVETIRACETAM 500 MG: 500 TABLET, FILM COATED ORAL at 09:40

## 2023-11-02 RX ADMIN — Medication 1 G: at 09:40

## 2023-11-02 RX ADMIN — ACYCLOVIR 400 MG: 400 TABLET ORAL at 20:25

## 2023-11-02 RX ADMIN — TRAZODONE HYDROCHLORIDE 300 MG: 100 TABLET ORAL at 20:25

## 2023-11-02 RX ADMIN — PANTOPRAZOLE SODIUM 40 MG: 40 TABLET, DELAYED RELEASE ORAL at 09:00

## 2023-11-02 RX ADMIN — URSODIOL 300 MG: 300 CAPSULE ORAL at 20:25

## 2023-11-02 RX ADMIN — LEVETIRACETAM 500 MG: 500 TABLET, FILM COATED ORAL at 20:25

## 2023-11-02 RX ADMIN — Medication 1 G: at 20:15

## 2023-11-02 RX ADMIN — URSODIOL 300 MG: 300 CAPSULE ORAL at 09:40

## 2023-11-02 RX ADMIN — ACYCLOVIR 400 MG: 400 TABLET ORAL at 09:40

## 2023-11-02 RX ADMIN — URSODIOL 300 MG: 300 CAPSULE ORAL at 15:48

## 2023-11-02 RX ADMIN — FLUCONAZOLE 400 MG: 200 TABLET ORAL at 09:40

## 2023-11-02 ASSESSMENT — COGNITIVE AND FUNCTIONAL STATUS - GENERAL
DAILY ACTIVITIY SCORE: 24
MOBILITY SCORE: 24
DAILY ACTIVITIY SCORE: 24
MOBILITY SCORE: 24

## 2023-11-02 ASSESSMENT — PAIN SCALES - GENERAL
PAINLEVEL_OUTOF10: 0 - NO PAIN

## 2023-11-02 ASSESSMENT — PAIN - FUNCTIONAL ASSESSMENT
PAIN_FUNCTIONAL_ASSESSMENT: 0-10
PAIN_FUNCTIONAL_ASSESSMENT: 0-10

## 2023-11-02 NOTE — CARE PLAN
The patient's goals for the shift include  ambulating in hallway with wife.    The clinical goals for the shift include Remain HDS.      Problem: Fall/Injury  Goal: Not fall by end of shift  Outcome: Progressing  Goal: Be free from injury by end of the shift  Outcome: Progressing  Goal: Verbalize understanding of personal risk factors for fall in the hospital  Outcome: Progressing  Goal: Verbalize understanding of risk factor reduction measures to prevent injury from fall in the home  Outcome: Progressing  Goal: Use assistive devices by end of the shift  Outcome: Progressing  Goal: Pace activities to prevent fatigue by end of the shift  Outcome: Progressing     Problem: Skin  Goal: Decreased wound size/increased tissue granulation at next dressing change  Outcome: Progressing  Goal: Participates in plan/prevention/treatment measures  Outcome: Progressing  Goal: Prevent/manage excess moisture  Outcome: Progressing  Goal: Prevent/minimize sheer/friction injuries  Outcome: Progressing  Goal: Promote/optimize nutrition  Outcome: Progressing  Goal: Promote skin healing  Outcome: Progressing     Problem: Safety  Goal: Patient will be injury free during hospitalization  Outcome: Progressing  Goal: I will remain free of falls  Outcome: Progressing     Problem: PICC line  Goal: I will remain free from symptoms of infection  Outcome: Progressing     Problem: Pain - Adult  Goal: Verbalizes/displays adequate comfort level or baseline comfort level  Outcome: Progressing

## 2023-11-02 NOTE — PROGRESS NOTES
Maldonado Mccloud is a 73 y.o. male on day 14 of admission presenting with Multiple myeloma without remission (CMS/HCC).    Subjective   Patient states he overall feels well today. States the bowel regimen is palliating his constipation and bloating. Denies HA, dizziness, CP, SOB, N/V/D. All other ROS otherwise negative.       Objective   Physical Exam  Constitutional:       General: He is not in acute distress.     Appearance: Normal appearance. He is not ill-appearing or toxic-appearing.   HENT:      Head: Normocephalic and atraumatic.      Nose: Nose normal.      Mouth/Throat:      Mouth: Mucous membranes are moist.   Eyes:      General: No scleral icterus.     Extraocular Movements: Extraocular movements intact.      Pupils: Pupils are equal, round, and reactive to light.   Cardiovascular:      Rate and Rhythm: Normal rate and regular rhythm.      Pulses: Normal pulses.      Heart sounds: Normal heart sounds. No murmur heard.  Pulmonary:      Effort: Pulmonary effort is normal. No respiratory distress.      Breath sounds: Normal breath sounds. No wheezing, rhonchi or rales.   Abdominal:      General: Bowel sounds are normal. There is no distension.      Palpations: Abdomen is soft. There is no mass.      Tenderness: There is no abdominal tenderness. There is no guarding.   Musculoskeletal:         General: No swelling. Normal range of motion.      Cervical back: Normal range of motion and neck supple.   Skin:     General: Skin is warm and dry.      Coloration: Skin is not jaundiced.      Findings: No bruising or rash.   Neurological:      General: No focal deficit present.      Mental Status: He is alert and oriented to person, place, and time.      Cranial Nerves: No cranial nerve deficit.      Sensory: No sensory deficit.      Motor: No weakness.      Gait: Gait normal.   Psychiatric:         Mood and Affect: Mood normal.         Behavior: Behavior normal.      Comments: Fluent speech, cooperative      Last  "Recorded Vitals  Blood pressure 160/87, pulse 58, temperature 36.8 °C (98.2 °F), temperature source Temporal, resp. rate 18, height 1.775 m (5' 9.88\"), weight 97.7 kg (215 lb 6.2 oz), SpO2 96 %.  Intake/Output last 3 Shifts:  I/O last 3 completed shifts:  In: 300 (3 mL/kg) [IV Piggyback:300]  Out: - (0 mL/kg)   Weight: 100 kg     Scheduled medications  acyclovir, 400 mg, oral, q12h  fluconazole, 400 mg, oral, Daily  levETIRAcetam, 500 mg, oral, BID  meropenem, 1 g, intravenous, q8h  pantoprazole, 40 mg, oral, Daily before breakfast  sennosides, 1 tablet, oral, BID  traZODone, 300 mg, oral, Nightly  ursodiol, 300 mg, oral, TID      Continuous medications     PRN medications  PRN medications: acetaminophen, alteplase, diphenhydramine-zinc acetate, oxyCODONE, polyethylene glycol    Results for orders placed or performed during the hospital encounter of 10/19/23 (from the past 24 hour(s))   CBC and Auto Differential   Result Value Ref Range    WBC 0.8 (LL) 4.4 - 11.3 x10*3/uL    nRBC 0.0 0.0 - 0.0 /100 WBCs    RBC 2.54 (L) 4.50 - 5.90 x10*6/uL    Hemoglobin 8.9 (L) 13.5 - 17.5 g/dL    Hematocrit 27.0 (L) 41.0 - 52.0 %     (H) 80 - 100 fL    MCH 35.0 (H) 26.0 - 34.0 pg    MCHC 33.0 32.0 - 36.0 g/dL    RDW 12.4 11.5 - 14.5 %    Platelets 76 (L) 150 - 450 x10*3/uL    Neutrophils % 6.7 40.0 - 80.0 %    Immature Granulocytes %, Automated 0.0 0.0 - 0.9 %    Lymphocytes % 52.6 13.0 - 44.0 %    Monocytes % 36.8 2.0 - 10.0 %    Eosinophils % 3.9 0.0 - 6.0 %    Basophils % 0.0 0.0 - 2.0 %    Neutrophils Absolute 0.05 (L) 1.60 - 5.50 x10*3/uL    Immature Granulocytes Absolute, Automated 0.00 0.00 - 0.50 x10*3/uL    Lymphocytes Absolute 0.40 (L) 0.80 - 3.00 x10*3/uL    Monocytes Absolute 0.28 0.05 - 0.80 x10*3/uL    Eosinophils Absolute 0.03 0.00 - 0.40 x10*3/uL    Basophils Absolute 0.00 0.00 - 0.10 x10*3/uL   Renal Function Panel   Result Value Ref Range    Glucose 106 (H) 74 - 99 mg/dL    Sodium 145 136 - 145 mmol/L    " Potassium 4.1 3.5 - 5.3 mmol/L    Chloride 111 (H) 98 - 107 mmol/L    Bicarbonate 29 21 - 32 mmol/L    Anion Gap 9 (L) 10 - 20 mmol/L    Urea Nitrogen 16 6 - 23 mg/dL    Creatinine 1.11 0.50 - 1.30 mg/dL    eGFR 70 >60 mL/min/1.73m*2    Calcium 8.2 (L) 8.6 - 10.6 mg/dL    Phosphorus 3.6 2.5 - 4.9 mg/dL    Albumin 3.1 (L) 3.4 - 5.0 g/dL   Fibrinogen   Result Value Ref Range    Fibrinogen 148 (L) 200 - 400 mg/dL   C-reactive protein   Result Value Ref Range    C-Reactive Protein 0.48 <1.00 mg/dL   Ferritin   Result Value Ref Range    Ferritin 270 20 - 300 ng/mL   Magnesium   Result Value Ref Range    Magnesium 1.80 1.60 - 2.40 mg/dL   Morphology   Result Value Ref Range    RBC Morphology No significant RBC morphology present    Prepare Cryoprecipitated AHF (Pooled Units): 1 Pools   Result Value Ref Range    PRODUCT CODE B9917C97     Unit Number [id number]     Unit ABO B     Unit RH POS     Dispense Status IS     Blood Expiration Date November 02, 2023 14:50 EDT     PRODUCT BLOOD TYPE 7300     UNIT VOLUME 63          Assessment/Plan   Principal Problem:    Multiple myeloma without remission (CMS/HCC)    Mr. Maldonado Mccloud is a 73 year old male with a PMH of multiple myeloma s/p Auto SCT on 12/13/15 and then multiple lines of treatment (Revlimid, Vacto/Pom trial, Sarclista/Kyprolis), most recently (8/2023) on Cytoxan/Kyprolis and s/p radiation treatments x 8 fractions on 7/3/23.,  LLE DVT 2022 (on Eliquis).  Admitted for CAR T cell therapy for his ISS stage 2 Kappa multiple myeloma with ABECMA (T0 = 10/25/23).     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   -Myeloma marks 10/2/23-KFLC 6.79, LFLC 0.31, IgG 670, IgM 7, IgA <7  -Collected T cells 5/26/23  - Admitted for CAR-T cell therapy-ABECMA (Idecabtagene vicleucel) prep with Flu/Cy (T0 = 10/25/23)    CHEMO:  -Fludarabine 65 mg IV on days T-5 to T-3 (10/20 - 10/22/23)  -Cyclophosphamide 657 mg IV on days T-5 to T-3 (10/20 - 10/22/23)  -CAR T-cell ABECMA (Idecabtagene vicleucel) T0 = 10/25/23  -Plan to start Keppra 500mg BID on T-1 (10/24)     Daily Evaluation: 11/2  Ferritin: 210 (10/24), 206 (10/25) 339 (10/27) 381 (10/28) 390 (10/29), 368 (10/30), 323 (10/31), 323 (11/1), 270 (11/2)     CRP: 1.64 (10/24), 1.00 (10/25) 2.5 (10/27) 6.24 (10/28) 2.4 (10/29), 1.33 (10/30), 0.89 (10/31), 0.64 (11/1), 0.48 (11/2)     Fibrinogen: 244 (10/24), 260 (10/25) 337 (10/27) 348 (10/28) 260 (10/29), 206 (10/30), 206 (10/31), 169 (11/1), 148 (11/2)      Coags: 1.3 (10/24), 1.2 (10/25)   ICE Score : 10/10      #CRS Assessment:   In last 24 hours: Fever: NO; Any O2 supplement? NO     #Neurotoxicity Assessment:   ICANS Score: 10/10  Handwriting/Signature impaired:  no  Motor/Sensory deficit: No  Other abnormal Neuro symptom: N/A  Imaging/Fundoscopy finding: N/A     #CRS ndGndrndanddndend:nd nd2nd Organ/System affected by CRS: fever  Date of CRS onset: 10/26, 10/27  Date of highest CRS Grade: 10/26, 10/27  Date of CRS resolution to Grade 1 or lower: 10/27     #ICANS ndGndrndanddndend:nd nd2nd Date of Neurotoxicity onset: 10/26, 10/27  Date of highest Neurotoxicity ndGndrndanddndend:nd nd2nd Date of Neurotoxicity resolution to Grade 1 or lower: 10/27  Neurotox management:   -Dexamethasone 10mg IVP (10/26, 10/27, 10/28)  -tocilizumab (10/27)     #Management:   1. Tocilizumab 8mg/kg: 10/27  2. Anakinra 100mcg/day: N/A  3. Siltuximab 5mg/kg: date & time administered (not started)   4. Steroids: dexamethasone 10mg 10/26, 10/27, 10/28  5. Other  agents/management:  Tylenol, Meropenem (10/26), (10/27)     HEME:  # Pancytopenia  - Tx for hgb < 7, plt < 10  # Hx/o LLE DVT 2022, cont Eliquis, hold for plt count < 50k  - apixaban decreased to 2.5mg BID for thrombocytopenia; plan to increase to 5mg BID when plts increase      ID:  Allergies to PCN (rash) & Pip-Tazo (rash)  #PPX: acyclovir, fluconazole   -Check CMV PCR 2x/week Negative 10/26, 10/30; detected (<35) on 11/2  -Will continue to monitor   #Non-neutropenic FEVER (10/26), (10/27)  Ddx: CRS vs infection (less likely given negative infectious workup)   - UA 10/26 neg  - bl cx 10/26 no growth  - CXR 10/26, 10/27 neg for infx  -C/w meropenem (10/26-present); continued for neutropenia     CARDIAC:  -Echo (9/22/23) EF 50%  # Hx/o A. fib 2015  # HTN - no home meds.  Cont to follow.  If persists may need to begin antihypertensives.     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     PSYCH:  #Hx of Insomnia  -Continue home Trazadone 300mg HS     DERM  #Diffuse macular rash on trunk, c/f drug reaction, resolved  -Allopurinol discontinued with improvement   -If no improvement, will plan dermatology consult   -PRN benadryl cream available      DISPO  -Full code-confirmed on admit  -Access: double non solo PICC  -primary oncologist Dr. Resendez     Patient seen, discussed & examined with Dr. Patrick Gutierrez PAANN

## 2023-11-02 NOTE — CARE PLAN
Problem: Fall/Injury  Goal: Not fall by end of shift  Outcome: Progressing  Goal: Be free from injury by end of the shift  Outcome: Progressing  Goal: Verbalize understanding of personal risk factors for fall in the hospital  Outcome: Progressing  Goal: Verbalize understanding of risk factor reduction measures to prevent injury from fall in the home  Outcome: Progressing  Goal: Use assistive devices by end of the shift  Outcome: Progressing  Goal: Pace activities to prevent fatigue by end of the shift  Outcome: Progressing     Problem: Skin  Goal: Decreased wound size/increased tissue granulation at next dressing change  Outcome: Progressing  Goal: Participates in plan/prevention/treatment measures  Outcome: Progressing  Goal: Prevent/manage excess moisture  Outcome: Progressing  Goal: Prevent/minimize sheer/friction injuries  Outcome: Progressing  Goal: Promote/optimize nutrition  Outcome: Progressing  Goal: Promote skin healing  Outcome: Progressing     Problem: Safety  Goal: Patient will be injury free during hospitalization  Outcome: Progressing  Goal: I will remain free of falls  Outcome: Progressing     Problem: PICC line  Goal: I will remain free from symptoms of infection  Outcome: Progressing     Problem: Pain - Adult  Goal: Verbalizes/displays adequate comfort level or baseline comfort level  Outcome: Progressing        The clinical goals for the shift include remain HDS

## 2023-11-03 ENCOUNTER — APPOINTMENT (OUTPATIENT)
Dept: ORTHOPEDIC SURGERY | Facility: HOSPITAL | Age: 73
End: 2023-11-03
Payer: MEDICARE

## 2023-11-03 LAB
ALBUMIN SERPL BCP-MCNC: 3.4 G/DL (ref 3.4–5)
ALP SERPL-CCNC: 52 U/L (ref 33–136)
ALT SERPL W P-5'-P-CCNC: 14 U/L (ref 10–52)
ANION GAP SERPL CALC-SCNC: 12 MMOL/L (ref 10–20)
APTT PPP: 27 SECONDS (ref 27–38)
AST SERPL W P-5'-P-CCNC: 15 U/L (ref 9–39)
BASOPHILS # BLD AUTO: 0 X10*3/UL (ref 0–0.1)
BASOPHILS NFR BLD AUTO: 0 %
BILIRUB DIRECT SERPL-MCNC: 0.2 MG/DL (ref 0–0.3)
BILIRUB SERPL-MCNC: 0.7 MG/DL (ref 0–1.2)
BUN SERPL-MCNC: 18 MG/DL (ref 6–23)
CALCIUM SERPL-MCNC: 8.5 MG/DL (ref 8.6–10.6)
CHLORIDE SERPL-SCNC: 109 MMOL/L (ref 98–107)
CO2 SERPL-SCNC: 27 MMOL/L (ref 21–32)
CREAT SERPL-MCNC: 1.04 MG/DL (ref 0.5–1.3)
CRP SERPL-MCNC: 0.41 MG/DL
EOSINOPHIL # BLD AUTO: 0.03 X10*3/UL (ref 0–0.4)
EOSINOPHIL NFR BLD AUTO: 3.3 %
ERYTHROCYTE [DISTWIDTH] IN BLOOD BY AUTOMATED COUNT: 12.8 % (ref 11.5–14.5)
FERRITIN SERPL-MCNC: 278 NG/ML (ref 20–300)
FIBRINOGEN PPP-MCNC: 157 MG/DL (ref 200–400)
GFR SERPL CREATININE-BSD FRML MDRD: 75 ML/MIN/1.73M*2
GLUCOSE SERPL-MCNC: 105 MG/DL (ref 74–99)
HCT VFR BLD AUTO: 27.1 % (ref 41–52)
HGB BLD-MCNC: 9.5 G/DL (ref 13.5–17.5)
IMM GRANULOCYTES # BLD AUTO: 0.01 X10*3/UL (ref 0–0.5)
IMM GRANULOCYTES NFR BLD AUTO: 1.1 % (ref 0–0.9)
INR PPP: 1 (ref 0.9–1.1)
LYMPHOCYTES # BLD AUTO: 0.53 X10*3/UL (ref 0.8–3)
LYMPHOCYTES NFR BLD AUTO: 58.2 %
MAGNESIUM SERPL-MCNC: 1.89 MG/DL (ref 1.6–2.4)
MCH RBC QN AUTO: 36.5 PG (ref 26–34)
MCHC RBC AUTO-ENTMCNC: 35.1 G/DL (ref 32–36)
MCV RBC AUTO: 104 FL (ref 80–100)
MONOCYTES # BLD AUTO: 0.29 X10*3/UL (ref 0.05–0.8)
MONOCYTES NFR BLD AUTO: 31.9 %
NEUTROPHILS # BLD AUTO: 0.05 X10*3/UL (ref 1.6–5.5)
NEUTROPHILS NFR BLD AUTO: 5.5 %
NRBC BLD-RTO: 0 /100 WBCS (ref 0–0)
PHOSPHATE SERPL-MCNC: 3.5 MG/DL (ref 2.5–4.9)
PLATELET # BLD AUTO: 82 X10*3/UL (ref 150–450)
POTASSIUM SERPL-SCNC: 4.2 MMOL/L (ref 3.5–5.3)
PROT SERPL-MCNC: 4.9 G/DL (ref 6.4–8.2)
PROTHROMBIN TIME: 10.9 SECONDS (ref 9.8–12.8)
RBC # BLD AUTO: 2.6 X10*6/UL (ref 4.5–5.9)
RBC MORPH BLD: NORMAL
SODIUM SERPL-SCNC: 144 MMOL/L (ref 136–145)
WBC # BLD AUTO: 0.9 X10*3/UL (ref 4.4–11.3)

## 2023-11-03 PROCEDURE — 83735 ASSAY OF MAGNESIUM: CPT | Performed by: NURSE PRACTITIONER

## 2023-11-03 PROCEDURE — 85610 PROTHROMBIN TIME: CPT | Performed by: PHYSICIAN ASSISTANT

## 2023-11-03 PROCEDURE — 2500000001 HC RX 250 WO HCPCS SELF ADMINISTERED DRUGS (ALT 637 FOR MEDICARE OP)

## 2023-11-03 PROCEDURE — 2500000001 HC RX 250 WO HCPCS SELF ADMINISTERED DRUGS (ALT 637 FOR MEDICARE OP): Performed by: INTERNAL MEDICINE

## 2023-11-03 PROCEDURE — 2500000004 HC RX 250 GENERAL PHARMACY W/ HCPCS (ALT 636 FOR OP/ED): Performed by: PHYSICIAN ASSISTANT

## 2023-11-03 PROCEDURE — 80048 BASIC METABOLIC PNL TOTAL CA: CPT | Performed by: PHYSICIAN ASSISTANT

## 2023-11-03 PROCEDURE — 84100 ASSAY OF PHOSPHORUS: CPT | Performed by: PHYSICIAN ASSISTANT

## 2023-11-03 PROCEDURE — 86140 C-REACTIVE PROTEIN: CPT | Performed by: PHYSICIAN ASSISTANT

## 2023-11-03 PROCEDURE — 82728 ASSAY OF FERRITIN: CPT | Performed by: PHYSICIAN ASSISTANT

## 2023-11-03 PROCEDURE — 85384 FIBRINOGEN ACTIVITY: CPT | Performed by: PHYSICIAN ASSISTANT

## 2023-11-03 PROCEDURE — 2500000001 HC RX 250 WO HCPCS SELF ADMINISTERED DRUGS (ALT 637 FOR MEDICARE OP): Performed by: NURSE PRACTITIONER

## 2023-11-03 PROCEDURE — 1170000001 HC PRIVATE ONCOLOGY ROOM DAILY

## 2023-11-03 PROCEDURE — 2500000004 HC RX 250 GENERAL PHARMACY W/ HCPCS (ALT 636 FOR OP/ED): Performed by: NURSE PRACTITIONER

## 2023-11-03 PROCEDURE — 80053 COMPREHEN METABOLIC PANEL: CPT | Performed by: PHYSICIAN ASSISTANT

## 2023-11-03 PROCEDURE — 85025 COMPLETE CBC W/AUTO DIFF WBC: CPT | Performed by: PHYSICIAN ASSISTANT

## 2023-11-03 PROCEDURE — 2500000004 HC RX 250 GENERAL PHARMACY W/ HCPCS (ALT 636 FOR OP/ED): Performed by: INTERNAL MEDICINE

## 2023-11-03 PROCEDURE — 82248 BILIRUBIN DIRECT: CPT | Performed by: PHYSICIAN ASSISTANT

## 2023-11-03 RX ADMIN — Medication 1 G: at 02:39

## 2023-11-03 RX ADMIN — PANTOPRAZOLE SODIUM 40 MG: 40 TABLET, DELAYED RELEASE ORAL at 08:40

## 2023-11-03 RX ADMIN — FLUCONAZOLE 400 MG: 200 TABLET ORAL at 08:40

## 2023-11-03 RX ADMIN — URSODIOL 300 MG: 300 CAPSULE ORAL at 15:42

## 2023-11-03 RX ADMIN — LEVETIRACETAM 500 MG: 500 TABLET, FILM COATED ORAL at 20:30

## 2023-11-03 RX ADMIN — URSODIOL 300 MG: 300 CAPSULE ORAL at 21:00

## 2023-11-03 RX ADMIN — Medication 1 G: at 19:05

## 2023-11-03 RX ADMIN — ACYCLOVIR 400 MG: 400 TABLET ORAL at 08:40

## 2023-11-03 RX ADMIN — LEVETIRACETAM 500 MG: 500 TABLET, FILM COATED ORAL at 08:40

## 2023-11-03 RX ADMIN — APIXABAN 5 MG: 5 TABLET, FILM COATED ORAL at 20:30

## 2023-11-03 RX ADMIN — TRAZODONE HYDROCHLORIDE 300 MG: 100 TABLET ORAL at 20:29

## 2023-11-03 RX ADMIN — Medication 1 G: at 10:27

## 2023-11-03 RX ADMIN — URSODIOL 300 MG: 300 CAPSULE ORAL at 08:40

## 2023-11-03 RX ADMIN — ACYCLOVIR 400 MG: 400 TABLET ORAL at 20:30

## 2023-11-03 ASSESSMENT — COGNITIVE AND FUNCTIONAL STATUS - GENERAL
MOBILITY SCORE: 24
DAILY ACTIVITIY SCORE: 24

## 2023-11-03 ASSESSMENT — PAIN - FUNCTIONAL ASSESSMENT: PAIN_FUNCTIONAL_ASSESSMENT: 0-10

## 2023-11-03 ASSESSMENT — PAIN SCALES - GENERAL: PAINLEVEL_OUTOF10: 0 - NO PAIN

## 2023-11-03 NOTE — PROGRESS NOTES
Maldonado Mccloud is a 73 y.o. male on day 15 of admission presenting with Multiple myeloma without remission (CMS/HCC).    Subjective   Patient is bed resting well.    He continues to feel well .  He has no new complaints.      Objective   Physical Exam  Constitutional:       General: He is not in acute distress.     Appearance: Normal appearance. He is not ill-appearing or toxic-appearing.   HENT:      Head: Normocephalic and atraumatic.      Nose: Nose normal.      Mouth/Throat:      Mouth: Mucous membranes are moist.   Eyes:      General: No scleral icterus.     Extraocular Movements: Extraocular movements intact.      Pupils: Pupils are equal, round, and reactive to light.   Cardiovascular:      Rate and Rhythm: Normal rate and regular rhythm.      Pulses: Normal pulses.      Heart sounds: Normal heart sounds. No murmur heard.  Pulmonary:      Effort: Pulmonary effort is normal. No respiratory distress.      Breath sounds: Normal breath sounds. No wheezing, rhonchi or rales.   Abdominal:      General: Bowel sounds are normal. There is no distension.      Palpations: Abdomen is soft. There is no mass.      Tenderness: There is no abdominal tenderness. There is no guarding.   Musculoskeletal:         General: No swelling. Normal range of motion.      Cervical back: Normal range of motion and neck supple.   Skin:     General: Skin is warm and dry.      Coloration: Skin is not jaundiced.      Findings: No bruising or rash.   Neurological:      General: No focal deficit present.      Mental Status: He is alert and oriented to person, place, and time.      Cranial Nerves: No cranial nerve deficit.      Sensory: No sensory deficit.      Motor: No weakness.      Gait: Gait normal.   Psychiatric:         Mood and Affect: Mood normal.         Behavior: Behavior normal.      Comments: Fluent speech, cooperative      Last Recorded Vitals  Blood pressure 156/85, pulse 63, temperature 36.7 °C (98.1 °F), temperature source  "Temporal, resp. rate 18, height 1.775 m (5' 9.88\"), weight 97.6 kg (215 lb 2.7 oz), SpO2 96 %.  Intake/Output last 3 Shifts:  I/O last 3 completed shifts:  In: 116.6 (1.2 mL/kg) [Blood:116.6]  Out: - (0 mL/kg)   Weight: 97.7 kg     Scheduled medications  acyclovir, 400 mg, oral, q12h  apixaban, 5 mg, oral, BID  fluconazole, 400 mg, oral, Daily  levETIRAcetam, 500 mg, oral, BID  meropenem, 1 g, intravenous, q8h  pantoprazole, 40 mg, oral, Daily before breakfast  sennosides, 1 tablet, oral, BID  traZODone, 300 mg, oral, Nightly  ursodiol, 300 mg, oral, TID      Continuous medications     PRN medications  PRN medications: acetaminophen, alteplase, diphenhydramine-zinc acetate, oxyCODONE, polyethylene glycol    Results for orders placed or performed during the hospital encounter of 10/19/23 (from the past 24 hour(s))   CBC and Auto Differential   Result Value Ref Range    WBC 0.9 (LL) 4.4 - 11.3 x10*3/uL    nRBC 0.0 0.0 - 0.0 /100 WBCs    RBC 2.60 (L) 4.50 - 5.90 x10*6/uL    Hemoglobin 9.5 (L) 13.5 - 17.5 g/dL    Hematocrit 27.1 (L) 41.0 - 52.0 %     (H) 80 - 100 fL    MCH 36.5 (H) 26.0 - 34.0 pg    MCHC 35.1 32.0 - 36.0 g/dL    RDW 12.8 11.5 - 14.5 %    Platelets 82 (L) 150 - 450 x10*3/uL    Neutrophils % 5.5 40.0 - 80.0 %    Immature Granulocytes %, Automated 1.1 (H) 0.0 - 0.9 %    Lymphocytes % 58.2 13.0 - 44.0 %    Monocytes % 31.9 2.0 - 10.0 %    Eosinophils % 3.3 0.0 - 6.0 %    Basophils % 0.0 0.0 - 2.0 %    Neutrophils Absolute 0.05 (L) 1.60 - 5.50 x10*3/uL    Immature Granulocytes Absolute, Automated 0.01 0.00 - 0.50 x10*3/uL    Lymphocytes Absolute 0.53 (L) 0.80 - 3.00 x10*3/uL    Monocytes Absolute 0.29 0.05 - 0.80 x10*3/uL    Eosinophils Absolute 0.03 0.00 - 0.40 x10*3/uL    Basophils Absolute 0.00 0.00 - 0.10 x10*3/uL   Hepatic function panel   Result Value Ref Range    Albumin 3.4 3.4 - 5.0 g/dL    Bilirubin, Total 0.7 0.0 - 1.2 mg/dL    Bilirubin, Direct 0.2 0.0 - 0.3 mg/dL    Alkaline Phosphatase " 52 33 - 136 U/L    ALT 14 10 - 52 U/L    AST 15 9 - 39 U/L    Total Protein 4.9 (L) 6.4 - 8.2 g/dL   Coagulation Screen   Result Value Ref Range    Protime 10.9 9.8 - 12.8 seconds    INR 1.0 0.9 - 1.1    aPTT 27 27 - 38 seconds   Fibrinogen   Result Value Ref Range    Fibrinogen 157 (L) 200 - 400 mg/dL   C-reactive protein   Result Value Ref Range    C-Reactive Protein 0.41 <1.00 mg/dL   Ferritin   Result Value Ref Range    Ferritin 278 20 - 300 ng/mL   Magnesium   Result Value Ref Range    Magnesium 1.89 1.60 - 2.40 mg/dL   Phosphorus   Result Value Ref Range    Phosphorus 3.5 2.5 - 4.9 mg/dL   Basic Metabolic Panel   Result Value Ref Range    Glucose 105 (H) 74 - 99 mg/dL    Sodium 144 136 - 145 mmol/L    Potassium 4.2 3.5 - 5.3 mmol/L    Chloride 109 (H) 98 - 107 mmol/L    Bicarbonate 27 21 - 32 mmol/L    Anion Gap 12 10 - 20 mmol/L    Urea Nitrogen 18 6 - 23 mg/dL    Creatinine 1.04 0.50 - 1.30 mg/dL    eGFR 75 >60 mL/min/1.73m*2    Calcium 8.5 (L) 8.6 - 10.6 mg/dL   Morphology   Result Value Ref Range    RBC Morphology No significant RBC morphology present          Assessment/Plan   Principal Problem:    Multiple myeloma without remission (CMS/HCC)    Mr. Maldonado Mccloud is a 73 year old male with a PMH of multiple myeloma s/p Auto SCT on 12/13/15 and then multiple lines of treatment (Revlimid, Vacto/Pom trial, Sarclista/Kyprolis), most recently (8/2023) on Cytoxan/Kyprolis and s/p radiation treatments x 8 fractions on 7/3/23.,  LLE DVT 2022 (on Eliquis).  Admitted for CAR T cell therapy for his ISS stage 2 Kappa multiple myeloma with ABECMA (T0 = 10/25/23).     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   -Myeloma marks 10/2/23-KFLC 6.79, LFLC 0.31, IgG 670, IgM 7, IgA <7  -Collected T cells 5/26/23  - Admitted for CAR-T cell therapy-ABECMA (Idecabtagene vicleucel) prep with Flu/Cy (T0 = 10/25/23)    CHEMO:  -Fludarabine 65 mg IV on days T-5 to T-3 (10/20 - 10/22/23)  -Cyclophosphamide 657 mg IV on days T-5 to T-3 (10/20 - 10/22/23)  -CAR T-cell ABECMA (Idecabtagene vicleucel) T0 = 10/25/23  -Plan to start Keppra 500mg BID on T-1 (10/24)     Daily Evaluation: 11/2  Ferritin: 210 (10/24), 206 (10/25) 339 (10/27) 381 (10/28) 390 (10/29), 368 (10/30), 323 (10/31), 323 (11/1), 270 (11/2)     CRP: 1.64 (10/24), 1.00 (10/25) 2.5 (10/27) 6.24 (10/28) 2.4 (10/29), 1.33 (10/30), 0.89 (10/31), 0.64 (11/1), 0.48 (11/2)     Fibrinogen: 244 (10/24), 260 (10/25) 337 (10/27) 348 (10/28) 260 (10/29), 206 (10/30), 206 (10/31), 169 (11/1), 148 (11/2)      Coags: 1.3 (10/24), 1.2 (10/25)   ICE Score : 10/10      #CRS Assessment:   In last 24 hours: Fever: NO; Any O2 supplement? NO     #Neurotoxicity Assessment:   ICANS Score: 10/10  Handwriting/Signature impaired:  no  Motor/Sensory deficit: No  Other abnormal Neuro symptom: N/A  Imaging/Fundoscopy finding: N/A     #CRS ndGndrndanddndend:nd nd2nd Organ/System affected by CRS: fever  Date of CRS onset: 10/26, 10/27  Date of highest CRS Grade: 10/26, 10/27  Date of CRS resolution to Grade 1 or lower: 10/27     #ICANS ndGndrndanddndend:nd nd2nd Date of Neurotoxicity onset: 10/26, 10/27  Date of highest Neurotoxicity ndGndrndanddndend:nd nd2nd Date of Neurotoxicity resolution to Grade 1 or lower: 10/27  Neurotox management:   -Dexamethasone 10mg IVP (10/26, 10/27, 10/28)  -tocilizumab (10/27)     #Management:   1. Tocilizumab 8mg/kg: 10/27  2. Anakinra 100mcg/day: N/A  3. Siltuximab 5mg/kg: date & time administered (not started)   4. Steroids:  dexamethasone 10mg 10/26, 10/27, 10/28  5. Other agents/management:  Tylenol, Meropenem (10/26), (10/27)     HEME:  # Pancytopenia  - Tx for hgb < 7, plt < 10  # Hx/o LLE DVT 2022, cont Eliquis, hold for plt count < 50k  - apixaban decreased to 2.5mg BID for thrombocytopenia; plan to increase to 5mg BID when plts increase      ID:  Allergies to PCN (rash) & Pip-Tazo (rash)  #PPX: acyclovir, fluconazole   -Check CMV PCR 2x/week Negative 10/26, 10/30; detected (<35) on 11/2  -Will continue to monitor   #Non-neutropenic FEVER (10/26), (10/27)  Ddx: CRS vs infection (less likely given negative infectious workup)   - UA 10/26 neg  - bl cx 10/26 no growth  - CXR 10/26, 10/27 neg for infx  -C/w meropenem (10/26-present); continued for neutropenia     CARDIAC:  -Echo (9/22/23) EF 50%  # Hx/o A. fib 2015  # HTN - no home meds.  Cont to follow.  If persists may need to begin antihypertensives.     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     PSYCH:  #Hx of Insomnia  -Continue home Trazadone 300mg HS     DERM  #Diffuse macular rash on trunk, c/f drug reaction, resolved  -Allopurinol discontinued with improvement   -If no improvement, will plan dermatology consult   -PRN benadryl cream available      DISPO  -Full code-confirmed on admit  -Access: double non solo PICC  -primary oncologist Dr. Resendez     Patient seen, discussed & examined with Dr. Patrick Stewart, APRN-CNP

## 2023-11-04 LAB
ALBUMIN SERPL BCP-MCNC: 3.4 G/DL (ref 3.4–5)
ANION GAP SERPL CALC-SCNC: 12 MMOL/L (ref 10–20)
BASOPHILS # BLD MANUAL: 0.03 X10*3/UL (ref 0–0.1)
BASOPHILS NFR BLD MANUAL: 3.4 %
BUN SERPL-MCNC: 16 MG/DL (ref 6–23)
CALCIUM SERPL-MCNC: 8.3 MG/DL (ref 8.6–10.6)
CHLORIDE SERPL-SCNC: 109 MMOL/L (ref 98–107)
CO2 SERPL-SCNC: 29 MMOL/L (ref 21–32)
CREAT SERPL-MCNC: 1.05 MG/DL (ref 0.5–1.3)
CRP SERPL-MCNC: 0.3 MG/DL
EOSINOPHIL # BLD MANUAL: 0.04 X10*3/UL (ref 0–0.4)
EOSINOPHIL NFR BLD MANUAL: 4.3 %
ERYTHROCYTE [DISTWIDTH] IN BLOOD BY AUTOMATED COUNT: 12.9 % (ref 11.5–14.5)
FERRITIN SERPL-MCNC: 250 NG/ML (ref 20–300)
FIBRINOGEN PPP-MCNC: 159 MG/DL (ref 200–400)
GFR SERPL CREATININE-BSD FRML MDRD: 74 ML/MIN/1.73M*2
GLUCOSE SERPL-MCNC: 109 MG/DL (ref 74–99)
HCT VFR BLD AUTO: 27.7 % (ref 41–52)
HGB BLD-MCNC: 9.4 G/DL (ref 13.5–17.5)
IMM GRANULOCYTES # BLD AUTO: 0.01 X10*3/UL (ref 0–0.5)
IMM GRANULOCYTES NFR BLD AUTO: 1 % (ref 0–0.9)
LYMPHOCYTES # BLD MANUAL: 0.48 X10*3/UL (ref 0.8–3)
LYMPHOCYTES NFR BLD MANUAL: 48.3 %
MAGNESIUM SERPL-MCNC: 1.86 MG/DL (ref 1.6–2.4)
MCH RBC QN AUTO: 35.7 PG (ref 26–34)
MCHC RBC AUTO-ENTMCNC: 33.9 G/DL (ref 32–36)
MCV RBC AUTO: 105 FL (ref 80–100)
MONOCYTES # BLD MANUAL: 0.35 X10*3/UL (ref 0.05–0.8)
MONOCYTES NFR BLD MANUAL: 34.5 %
MYELOCYTES # BLD MANUAL: 0.01 X10*3/UL
MYELOCYTES NFR BLD MANUAL: 0.9 %
NEUTS SEG # BLD MANUAL: 0.09 X10*3/UL (ref 1.6–5)
NEUTS SEG NFR BLD MANUAL: 8.6 %
NRBC BLD-RTO: 0 /100 WBCS (ref 0–0)
PHOSPHATE SERPL-MCNC: 3.1 MG/DL (ref 2.5–4.9)
PLATELET # BLD AUTO: 97 X10*3/UL (ref 150–450)
POTASSIUM SERPL-SCNC: 4 MMOL/L (ref 3.5–5.3)
RBC # BLD AUTO: 2.63 X10*6/UL (ref 4.5–5.9)
RBC MORPH BLD: ABNORMAL
SODIUM SERPL-SCNC: 146 MMOL/L (ref 136–145)
TOTAL CELLS COUNTED BLD: 116
WBC # BLD AUTO: 1 X10*3/UL (ref 4.4–11.3)

## 2023-11-04 PROCEDURE — 82565 ASSAY OF CREATININE: CPT | Performed by: PHYSICIAN ASSISTANT

## 2023-11-04 PROCEDURE — 85007 BL SMEAR W/DIFF WBC COUNT: CPT | Performed by: PHYSICIAN ASSISTANT

## 2023-11-04 PROCEDURE — 82728 ASSAY OF FERRITIN: CPT | Performed by: PHYSICIAN ASSISTANT

## 2023-11-04 PROCEDURE — 1170000001 HC PRIVATE ONCOLOGY ROOM DAILY

## 2023-11-04 PROCEDURE — 2500000001 HC RX 250 WO HCPCS SELF ADMINISTERED DRUGS (ALT 637 FOR MEDICARE OP): Performed by: INTERNAL MEDICINE

## 2023-11-04 PROCEDURE — 85384 FIBRINOGEN ACTIVITY: CPT | Performed by: PHYSICIAN ASSISTANT

## 2023-11-04 PROCEDURE — 2500000004 HC RX 250 GENERAL PHARMACY W/ HCPCS (ALT 636 FOR OP/ED): Performed by: INTERNAL MEDICINE

## 2023-11-04 PROCEDURE — 2500000001 HC RX 250 WO HCPCS SELF ADMINISTERED DRUGS (ALT 637 FOR MEDICARE OP): Performed by: NURSE PRACTITIONER

## 2023-11-04 PROCEDURE — 2500000001 HC RX 250 WO HCPCS SELF ADMINISTERED DRUGS (ALT 637 FOR MEDICARE OP)

## 2023-11-04 PROCEDURE — 2500000004 HC RX 250 GENERAL PHARMACY W/ HCPCS (ALT 636 FOR OP/ED): Performed by: NURSE PRACTITIONER

## 2023-11-04 PROCEDURE — 83735 ASSAY OF MAGNESIUM: CPT | Performed by: NURSE PRACTITIONER

## 2023-11-04 PROCEDURE — 86140 C-REACTIVE PROTEIN: CPT | Performed by: PHYSICIAN ASSISTANT

## 2023-11-04 PROCEDURE — 85027 COMPLETE CBC AUTOMATED: CPT | Performed by: PHYSICIAN ASSISTANT

## 2023-11-04 PROCEDURE — 2500000004 HC RX 250 GENERAL PHARMACY W/ HCPCS (ALT 636 FOR OP/ED): Performed by: PHYSICIAN ASSISTANT

## 2023-11-04 RX ADMIN — Medication 1 G: at 02:18

## 2023-11-04 RX ADMIN — FLUCONAZOLE 400 MG: 200 TABLET ORAL at 08:48

## 2023-11-04 RX ADMIN — ACYCLOVIR 400 MG: 400 TABLET ORAL at 08:48

## 2023-11-04 RX ADMIN — Medication 1 G: at 18:42

## 2023-11-04 RX ADMIN — LEVETIRACETAM 500 MG: 500 TABLET, FILM COATED ORAL at 08:48

## 2023-11-04 RX ADMIN — APIXABAN 5 MG: 5 TABLET, FILM COATED ORAL at 20:19

## 2023-11-04 RX ADMIN — URSODIOL 300 MG: 300 CAPSULE ORAL at 14:40

## 2023-11-04 RX ADMIN — PANTOPRAZOLE SODIUM 40 MG: 40 TABLET, DELAYED RELEASE ORAL at 08:48

## 2023-11-04 RX ADMIN — LEVETIRACETAM 500 MG: 500 TABLET, FILM COATED ORAL at 20:19

## 2023-11-04 RX ADMIN — TRAZODONE HYDROCHLORIDE 300 MG: 100 TABLET ORAL at 20:19

## 2023-11-04 RX ADMIN — URSODIOL 300 MG: 300 CAPSULE ORAL at 08:48

## 2023-11-04 RX ADMIN — ACYCLOVIR 400 MG: 400 TABLET ORAL at 20:19

## 2023-11-04 RX ADMIN — URSODIOL 300 MG: 300 CAPSULE ORAL at 20:19

## 2023-11-04 RX ADMIN — APIXABAN 5 MG: 5 TABLET, FILM COATED ORAL at 08:48

## 2023-11-04 ASSESSMENT — COGNITIVE AND FUNCTIONAL STATUS - GENERAL
MOBILITY SCORE: 24
MOBILITY SCORE: 23
DAILY ACTIVITIY SCORE: 24
CLIMB 3 TO 5 STEPS WITH RAILING: A LITTLE
MOBILITY SCORE: 24

## 2023-11-04 ASSESSMENT — PAIN SCALES - GENERAL
PAINLEVEL_OUTOF10: 0 - NO PAIN

## 2023-11-04 ASSESSMENT — PAIN - FUNCTIONAL ASSESSMENT: PAIN_FUNCTIONAL_ASSESSMENT: 0-10

## 2023-11-04 NOTE — CARE PLAN
The patient's goals for the shift include  get sleep    The clinical goals for the shift include patient will remain HDS for the shift    Patient's vitals stable, no complaints overnight

## 2023-11-05 LAB
ALBUMIN SERPL BCP-MCNC: 3.4 G/DL (ref 3.4–5)
ANION GAP SERPL CALC-SCNC: 12 MMOL/L (ref 10–20)
BASOPHILS # BLD MANUAL: 0 X10*3/UL (ref 0–0.1)
BASOPHILS NFR BLD MANUAL: 0 %
BUN SERPL-MCNC: 19 MG/DL (ref 6–23)
CALCIUM SERPL-MCNC: 8.4 MG/DL (ref 8.6–10.6)
CHLORIDE SERPL-SCNC: 112 MMOL/L (ref 98–107)
CO2 SERPL-SCNC: 27 MMOL/L (ref 21–32)
CREAT SERPL-MCNC: 1.04 MG/DL (ref 0.5–1.3)
CRP SERPL-MCNC: 0.24 MG/DL
EOSINOPHIL # BLD MANUAL: 0.05 X10*3/UL (ref 0–0.4)
EOSINOPHIL NFR BLD MANUAL: 5.9 %
ERYTHROCYTE [DISTWIDTH] IN BLOOD BY AUTOMATED COUNT: 13.1 % (ref 11.5–14.5)
FERRITIN SERPL-MCNC: 216 NG/ML (ref 20–300)
FIBRINOGEN PPP-MCNC: 160 MG/DL (ref 200–400)
GFR SERPL CREATININE-BSD FRML MDRD: 75 ML/MIN/1.73M*2
GLUCOSE SERPL-MCNC: 119 MG/DL (ref 74–99)
HCT VFR BLD AUTO: 27.8 % (ref 41–52)
HGB BLD-MCNC: 9.3 G/DL (ref 13.5–17.5)
IMM GRANULOCYTES # BLD AUTO: 0.01 X10*3/UL (ref 0–0.5)
IMM GRANULOCYTES NFR BLD AUTO: 1.2 % (ref 0–0.9)
LYMPHOCYTES # BLD MANUAL: 0.38 X10*3/UL (ref 0.8–3)
LYMPHOCYTES NFR BLD MANUAL: 47.1 %
MAGNESIUM SERPL-MCNC: 1.83 MG/DL (ref 1.6–2.4)
MCH RBC QN AUTO: 36.2 PG (ref 26–34)
MCHC RBC AUTO-ENTMCNC: 33.5 G/DL (ref 32–36)
MCV RBC AUTO: 108 FL (ref 80–100)
MONOCYTES # BLD MANUAL: 0.14 X10*3/UL (ref 0.05–0.8)
MONOCYTES NFR BLD MANUAL: 17.6 %
NEUTS SEG # BLD MANUAL: 0.24 X10*3/UL (ref 1.6–5)
NEUTS SEG NFR BLD MANUAL: 29.4 %
NRBC BLD-RTO: 0 /100 WBCS (ref 0–0)
PHOSPHATE SERPL-MCNC: 3.7 MG/DL (ref 2.5–4.9)
PLATELET # BLD AUTO: 88 X10*3/UL (ref 150–450)
POTASSIUM SERPL-SCNC: 4.2 MMOL/L (ref 3.5–5.3)
RBC # BLD AUTO: 2.57 X10*6/UL (ref 4.5–5.9)
RBC MORPH BLD: ABNORMAL
SODIUM SERPL-SCNC: 147 MMOL/L (ref 136–145)
TOTAL CELLS COUNTED BLD: 34
WBC # BLD AUTO: 0.8 X10*3/UL (ref 4.4–11.3)

## 2023-11-05 PROCEDURE — 80069 RENAL FUNCTION PANEL: CPT | Performed by: PHYSICIAN ASSISTANT

## 2023-11-05 PROCEDURE — 2500000001 HC RX 250 WO HCPCS SELF ADMINISTERED DRUGS (ALT 637 FOR MEDICARE OP): Performed by: NURSE PRACTITIONER

## 2023-11-05 PROCEDURE — 2500000004 HC RX 250 GENERAL PHARMACY W/ HCPCS (ALT 636 FOR OP/ED): Performed by: PHYSICIAN ASSISTANT

## 2023-11-05 PROCEDURE — 83735 ASSAY OF MAGNESIUM: CPT | Performed by: NURSE PRACTITIONER

## 2023-11-05 PROCEDURE — 86140 C-REACTIVE PROTEIN: CPT | Performed by: PHYSICIAN ASSISTANT

## 2023-11-05 PROCEDURE — 82728 ASSAY OF FERRITIN: CPT | Performed by: PHYSICIAN ASSISTANT

## 2023-11-05 PROCEDURE — 2500000004 HC RX 250 GENERAL PHARMACY W/ HCPCS (ALT 636 FOR OP/ED): Performed by: INTERNAL MEDICINE

## 2023-11-05 PROCEDURE — 2500000001 HC RX 250 WO HCPCS SELF ADMINISTERED DRUGS (ALT 637 FOR MEDICARE OP)

## 2023-11-05 PROCEDURE — 85384 FIBRINOGEN ACTIVITY: CPT | Performed by: PHYSICIAN ASSISTANT

## 2023-11-05 PROCEDURE — 85027 COMPLETE CBC AUTOMATED: CPT | Performed by: PHYSICIAN ASSISTANT

## 2023-11-05 PROCEDURE — 1170000001 HC PRIVATE ONCOLOGY ROOM DAILY

## 2023-11-05 PROCEDURE — 2500000004 HC RX 250 GENERAL PHARMACY W/ HCPCS (ALT 636 FOR OP/ED): Performed by: NURSE PRACTITIONER

## 2023-11-05 PROCEDURE — 94760 N-INVAS EAR/PLS OXIMETRY 1: CPT

## 2023-11-05 PROCEDURE — 2500000001 HC RX 250 WO HCPCS SELF ADMINISTERED DRUGS (ALT 637 FOR MEDICARE OP): Performed by: INTERNAL MEDICINE

## 2023-11-05 PROCEDURE — 85007 BL SMEAR W/DIFF WBC COUNT: CPT | Performed by: PHYSICIAN ASSISTANT

## 2023-11-05 RX ORDER — LEVOFLOXACIN 500 MG/1
500 TABLET, FILM COATED ORAL
Status: DISCONTINUED | OUTPATIENT
Start: 2023-11-05 | End: 2023-11-08 | Stop reason: HOSPADM

## 2023-11-05 RX ORDER — LISINOPRIL 5 MG/1
5 TABLET ORAL DAILY
Status: DISCONTINUED | OUTPATIENT
Start: 2023-11-05 | End: 2023-11-08 | Stop reason: HOSPADM

## 2023-11-05 RX ADMIN — URSODIOL 300 MG: 300 CAPSULE ORAL at 20:51

## 2023-11-05 RX ADMIN — Medication 1 G: at 02:06

## 2023-11-05 RX ADMIN — LEVETIRACETAM 500 MG: 500 TABLET, FILM COATED ORAL at 20:51

## 2023-11-05 RX ADMIN — URSODIOL 300 MG: 300 CAPSULE ORAL at 08:56

## 2023-11-05 RX ADMIN — FLUCONAZOLE 400 MG: 200 TABLET ORAL at 08:56

## 2023-11-05 RX ADMIN — URSODIOL 300 MG: 300 CAPSULE ORAL at 14:56

## 2023-11-05 RX ADMIN — ACYCLOVIR 400 MG: 400 TABLET ORAL at 08:56

## 2023-11-05 RX ADMIN — SENNOSIDES 8.6 MG: 8.6 TABLET, FILM COATED ORAL at 08:56

## 2023-11-05 RX ADMIN — LISINOPRIL 5 MG: 5 TABLET ORAL at 12:57

## 2023-11-05 RX ADMIN — Medication 1 G: at 09:08

## 2023-11-05 RX ADMIN — TRAZODONE HYDROCHLORIDE 300 MG: 100 TABLET ORAL at 20:51

## 2023-11-05 RX ADMIN — LEVETIRACETAM 500 MG: 500 TABLET, FILM COATED ORAL at 08:56

## 2023-11-05 RX ADMIN — APIXABAN 5 MG: 5 TABLET, FILM COATED ORAL at 08:56

## 2023-11-05 RX ADMIN — LEVOFLOXACIN 500 MG: 500 TABLET, FILM COATED ORAL at 18:27

## 2023-11-05 RX ADMIN — APIXABAN 5 MG: 5 TABLET, FILM COATED ORAL at 20:51

## 2023-11-05 RX ADMIN — ACYCLOVIR 400 MG: 400 TABLET ORAL at 20:51

## 2023-11-05 RX ADMIN — PANTOPRAZOLE SODIUM 40 MG: 40 TABLET, DELAYED RELEASE ORAL at 08:56

## 2023-11-05 ASSESSMENT — COGNITIVE AND FUNCTIONAL STATUS - GENERAL
MOBILITY SCORE: 24
DAILY ACTIVITIY SCORE: 24
DAILY ACTIVITIY SCORE: 24
MOBILITY SCORE: 24

## 2023-11-05 ASSESSMENT — PAIN SCALES - GENERAL
PAINLEVEL_OUTOF10: 0 - NO PAIN

## 2023-11-05 NOTE — PROGRESS NOTES
Maldonado Mccloud is a 73 y.o. male on day 16 of admission presenting with Multiple myeloma without remission (CMS/HCC).    Subjective   Patient up at bed side.  He states he is feeling well today.  He offers no new complaints today,  wife is in at bed side.      Objective   Physical Exam  Constitutional:       General: He is not in acute distress.     Appearance: Normal appearance. He is not ill-appearing or toxic-appearing.   HENT:      Head: Normocephalic and atraumatic.      Nose: Nose normal.      Mouth/Throat:      Mouth: Mucous membranes are moist.   Eyes:      General: No scleral icterus.     Extraocular Movements: Extraocular movements intact.      Pupils: Pupils are equal, round, and reactive to light.   Cardiovascular:      Rate and Rhythm: Normal rate and regular rhythm.      Pulses: Normal pulses.      Heart sounds: Normal heart sounds. No murmur heard.  Pulmonary:      Effort: Pulmonary effort is normal. No respiratory distress.      Breath sounds: Normal breath sounds. No wheezing, rhonchi or rales.   Abdominal:      General: Bowel sounds are normal. There is no distension.      Palpations: Abdomen is soft. There is no mass.      Tenderness: There is no abdominal tenderness. There is no guarding.   Musculoskeletal:         General: No swelling. Normal range of motion.      Cervical back: Normal range of motion and neck supple.   Skin:     General: Skin is warm and dry.      Coloration: Skin is not jaundiced.      Findings: No bruising or rash.   Neurological:      General: No focal deficit present.      Mental Status: He is alert and oriented to person, place, and time.      Cranial Nerves: No cranial nerve deficit.      Sensory: No sensory deficit.      Motor: No weakness.      Gait: Gait normal.   Psychiatric:         Mood and Affect: Mood normal.         Behavior: Behavior normal.      Comments: Fluent speech, cooperative      Last Recorded Vitals  Blood pressure 150/82, pulse 91, temperature 36.9 °C  "(98.4 °F), temperature source Temporal, resp. rate 18, height 1.775 m (5' 9.88\"), weight 95.4 kg (210 lb 5.1 oz), SpO2 98 %.  Intake/Output last 3 Shifts:  No intake/output data recorded.    Scheduled medications  acyclovir, 400 mg, oral, q12h  apixaban, 5 mg, oral, BID  fluconazole, 400 mg, oral, Daily  levETIRAcetam, 500 mg, oral, BID  meropenem, 1 g, intravenous, q8h  pantoprazole, 40 mg, oral, Daily before breakfast  sennosides, 1 tablet, oral, BID  traZODone, 300 mg, oral, Nightly  ursodiol, 300 mg, oral, TID      Continuous medications     PRN medications  PRN medications: acetaminophen, alteplase, diphenhydramine-zinc acetate, oxyCODONE, polyethylene glycol    Results for orders placed or performed during the hospital encounter of 10/19/23 (from the past 24 hour(s))   CBC and Auto Differential   Result Value Ref Range    WBC 1.0 (LL) 4.4 - 11.3 x10*3/uL    nRBC 0.0 0.0 - 0.0 /100 WBCs    RBC 2.63 (L) 4.50 - 5.90 x10*6/uL    Hemoglobin 9.4 (L) 13.5 - 17.5 g/dL    Hematocrit 27.7 (L) 41.0 - 52.0 %     (H) 80 - 100 fL    MCH 35.7 (H) 26.0 - 34.0 pg    MCHC 33.9 32.0 - 36.0 g/dL    RDW 12.9 11.5 - 14.5 %    Platelets 97 (L) 150 - 450 x10*3/uL    Immature Granulocytes %, Automated 1.0 (H) 0.0 - 0.9 %    Immature Granulocytes Absolute, Automated 0.01 0.00 - 0.50 x10*3/uL   Renal Function Panel   Result Value Ref Range    Glucose 109 (H) 74 - 99 mg/dL    Sodium 146 (H) 136 - 145 mmol/L    Potassium 4.0 3.5 - 5.3 mmol/L    Chloride 109 (H) 98 - 107 mmol/L    Bicarbonate 29 21 - 32 mmol/L    Anion Gap 12 10 - 20 mmol/L    Urea Nitrogen 16 6 - 23 mg/dL    Creatinine 1.05 0.50 - 1.30 mg/dL    eGFR 74 >60 mL/min/1.73m*2    Calcium 8.3 (L) 8.6 - 10.6 mg/dL    Phosphorus 3.1 2.5 - 4.9 mg/dL    Albumin 3.4 3.4 - 5.0 g/dL   Fibrinogen   Result Value Ref Range    Fibrinogen 159 (L) 200 - 400 mg/dL   C-reactive protein   Result Value Ref Range    C-Reactive Protein 0.30 <1.00 mg/dL   Ferritin   Result Value Ref Range    " Ferritin 250 20 - 300 ng/mL   Magnesium   Result Value Ref Range    Magnesium 1.86 1.60 - 2.40 mg/dL   Manual Differential   Result Value Ref Range    Neutrophils %, Manual 8.6 40.0 - 80.0 %    Lymphocytes %, Manual 48.3 13.0 - 44.0 %    Monocytes %, Manual 34.5 2.0 - 10.0 %    Eosinophils %, Manual 4.3 0.0 - 6.0 %    Basophils %, Manual 3.4 0.0 - 2.0 %    Myelocytes %, Manual 0.9 0.0 - 0.0 %    Seg Neutrophils Absolute, Manual 0.09 (L) 1.60 - 5.00 x10*3/uL    Lymphocytes Absolute, Manual 0.48 (L) 0.80 - 3.00 x10*3/uL    Monocytes Absolute, Manual 0.35 0.05 - 0.80 x10*3/uL    Eosinophils Absolute, Manual 0.04 0.00 - 0.40 x10*3/uL    Basophils Absolute, Manual 0.03 0.00 - 0.10 x10*3/uL    Myelocytes Absolute, Manual 0.01 0.00 - 0.00 x10*3/uL    Total Cells Counted 116     RBC Morphology No significant RBC morphology present          Assessment/Plan   Principal Problem:    Multiple myeloma without remission (CMS/HCC)    Mr. Maldonado Mccloud is a 73 year old male with a PMH of multiple myeloma s/p Auto SCT on 12/13/15 and then multiple lines of treatment (Revlimid, Vacto/Pom trial, Sarclista/Kyprolis), most recently (8/2023) on Cytoxan/Kyprolis and s/p radiation treatments x 8 fractions on 7/3/23.,  LLE DVT 2022 (on Eliquis).  Admitted for CAR T cell therapy for his ISS stage 2 Kappa multiple myeloma with ABECMA (T0 = 10/25/23).     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   -Myeloma marks 10/2/23-KFLC 6.79, LFLC 0.31, IgG  670, IgM 7, IgA <7  -Collected T cells 5/26/23  - Admitted for CAR-T cell therapy-ABECMA (Idecabtagene vicleucel) prep with Flu/Cy (T0 = 10/25/23)    CHEMO:  -Fludarabine 65 mg IV on days T-5 to T-3 (10/20 - 10/22/23)  -Cyclophosphamide 657 mg IV on days T-5 to T-3 (10/20 - 10/22/23)  -CAR T-cell ABECMA (Idecabtagene vicleucel) T0 = 10/25/23  -Plan to start Keppra 500mg BID on T-1 (10/24)     Daily Evaluation: 11/2  Ferritin: 210 (10/24), 206 (10/25) 339 (10/27) 381 (10/28) 390 (10/29), 368 (10/30), 323 (10/31), 323 (11/1), 270 (11/2)     CRP: 1.64 (10/24), 1.00 (10/25) 2.5 (10/27) 6.24 (10/28) 2.4 (10/29), 1.33 (10/30), 0.89 (10/31), 0.64 (11/1), 0.48 (11/2)     Fibrinogen: 244 (10/24), 260 (10/25) 337 (10/27) 348 (10/28) 260 (10/29), 206 (10/30), 206 (10/31), 169 (11/1), 148 (11/2)      Coags: 1.3 (10/24), 1.2 (10/25)   ICE Score : 10/10      #CRS Assessment:   In last 24 hours: Fever: NO; Any O2 supplement? NO     #Neurotoxicity Assessment:   ICANS Score: 10/10  Handwriting/Signature impaired:  no  Motor/Sensory deficit: No  Other abnormal Neuro symptom: N/A  Imaging/Fundoscopy finding: N/A     #CRS ndGndrndanddndend:nd nd2nd Organ/System affected by CRS: fever  Date of CRS onset: 10/26, 10/27  Date of highest CRS Grade: 10/26, 10/27  Date of CRS resolution to Grade 1 or lower: 10/27     #ICANS ndGndrndanddndend:nd nd2nd Date of Neurotoxicity onset: 10/26, 10/27  Date of highest Neurotoxicity ndGndrndanddndend:nd nd2nd Date of Neurotoxicity resolution to Grade 1 or lower: 10/27  Neurotox management:   -Dexamethasone 10mg IVP (10/26, 10/27, 10/28)  -tocilizumab (10/27)     #Management:   1. Tocilizumab 8mg/kg: 10/27  2. Anakinra 100mcg/day: N/A  3. Siltuximab 5mg/kg: date & time administered (not started)   4. Steroids: dexamethasone 10mg 10/26, 10/27, 10/28  5. Other agents/management:  Tylenol, Meropenem (10/26), (10/27)     HEME:  # Pancytopenia  - Tx for hgb < 7, plt < 10  # Hx/o LLE DVT 2022, cont Eliquis, hold for plt count < 50k  - apixaban decreased to  2.5mg BID for thrombocytopenia; plan to increase to 5mg BID when plts increase      ID:  Allergies to PCN (rash) & Pip-Tazo (rash)  #PPX: acyclovir, fluconazole   -Check CMV PCR 2x/week Negative 10/26, 10/30; detected (<35) on 11/2  -Will continue to monitor   #Non-neutropenic FEVER (10/26), (10/27)  Ddx: CRS vs infection (less likely given negative infectious workup)   - UA 10/26 neg  - bl cx 10/26 no growth  - CXR 10/26, 10/27 neg for infx  -C/w meropenem (10/26-present); continued for neutropenia     CARDIAC:  -Echo (9/22/23) EF 50%  # Hx/o A. fib 2015  # HTN - no home meds.  Cont to follow.  If persists may need to begin antihypertensives.     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     PSYCH:  #Hx of Insomnia  -Continue home Trazadone 300mg HS     DERM  #Diffuse macular rash on trunk, c/f drug reaction, resolved  -Allopurinol discontinued with improvement   -If no improvement, will plan dermatology consult   -PRN benadryl cream available      DISPO  -Full code-confirmed on admit  -Access: double non solo PICC  -primary oncologist Dr. Resendez     Patient seen, discussed & examined with Dr. Patrick Stewart, APRN-CNP

## 2023-11-05 NOTE — CARE PLAN
The patient's goals for the shift include  remain free of injury throughout shift.     The clinical goals for the shift include remain afebrile and HDS throughout shift.     Patient hypertensive this AM with BP of 170/94. Patient asymptomatic, denying h/a. Provider notified and will recheck BP in an hour after initial value was taken. Other VSS.  Patient afebrile this shift. Patient remains on IV meropenum per order. Up in room independently; remained safe and free of injury.

## 2023-11-05 NOTE — PROGRESS NOTES
Maldonado Mccloud is a 73 y.o. male on day 17 of admission presenting with Multiple myeloma without remission (CMS/HCC).    Subjective    Patient is bed resting well.   He continues to feel well.    BP has been elevated and he states it has been several years since he has had to take BP medications.  Otherwise he remains stable .       Objective   Physical Exam  Constitutional:       General: He is not in acute distress.     Appearance: Normal appearance. He is not ill-appearing or toxic-appearing.   HENT:      Head: Normocephalic and atraumatic.      Nose: Nose normal.      Mouth/Throat:      Mouth: Mucous membranes are moist.   Eyes:      General: No scleral icterus.     Extraocular Movements: Extraocular movements intact.      Pupils: Pupils are equal, round, and reactive to light.   Cardiovascular:      Rate and Rhythm: Normal rate and regular rhythm.      Pulses: Normal pulses.      Heart sounds: Normal heart sounds. No murmur heard.  Pulmonary:      Effort: Pulmonary effort is normal. No respiratory distress.      Breath sounds: Normal breath sounds. No wheezing, rhonchi or rales.   Abdominal:      General: Bowel sounds are normal. There is no distension.      Palpations: Abdomen is soft. There is no mass.      Tenderness: There is no abdominal tenderness. There is no guarding.   Musculoskeletal:         General: No swelling. Normal range of motion.      Cervical back: Normal range of motion and neck supple.   Skin:     General: Skin is warm and dry.      Coloration: Skin is not jaundiced.      Findings: No bruising or rash.   Neurological:      General: No focal deficit present.      Mental Status: He is alert and oriented to person, place, and time.      Cranial Nerves: No cranial nerve deficit.      Sensory: No sensory deficit.      Motor: No weakness.      Gait: Gait normal.   Psychiatric:         Mood and Affect: Mood normal.         Behavior: Behavior normal.      Comments: Fluent speech, cooperative   "    Last Recorded Vitals  Blood pressure 143/83, pulse 65, temperature 36.2 °C (97.2 °F), temperature source Temporal, resp. rate 18, height 1.775 m (5' 9.88\"), weight 96.2 kg (212 lb 1.3 oz), SpO2 97 %.  Intake/Output last 3 Shifts:  No intake/output data recorded.    Scheduled medications  acyclovir, 400 mg, oral, q12h  apixaban, 5 mg, oral, BID  fluconazole, 400 mg, oral, Daily  levETIRAcetam, 500 mg, oral, BID  lisinopril, 5 mg, oral, Daily  meropenem, 1 g, intravenous, q8h  pantoprazole, 40 mg, oral, Daily before breakfast  sennosides, 1 tablet, oral, BID  traZODone, 300 mg, oral, Nightly  ursodiol, 300 mg, oral, TID      Continuous medications     PRN medications  PRN medications: acetaminophen, alteplase, diphenhydramine-zinc acetate, oxyCODONE, polyethylene glycol    Results for orders placed or performed during the hospital encounter of 10/19/23 (from the past 24 hour(s))   CBC and Auto Differential   Result Value Ref Range    WBC 0.8 (LL) 4.4 - 11.3 x10*3/uL    nRBC 0.0 0.0 - 0.0 /100 WBCs    RBC 2.57 (L) 4.50 - 5.90 x10*6/uL    Hemoglobin 9.3 (L) 13.5 - 17.5 g/dL    Hematocrit 27.8 (L) 41.0 - 52.0 %     (H) 80 - 100 fL    MCH 36.2 (H) 26.0 - 34.0 pg    MCHC 33.5 32.0 - 36.0 g/dL    RDW 13.1 11.5 - 14.5 %    Platelets 88 (L) 150 - 450 x10*3/uL    Immature Granulocytes %, Automated 1.2 (H) 0.0 - 0.9 %    Immature Granulocytes Absolute, Automated 0.01 0.00 - 0.50 x10*3/uL   Manual Differential   Result Value Ref Range    Neutrophils %, Manual 29.4 40.0 - 80.0 %    Lymphocytes %, Manual 47.1 13.0 - 44.0 %    Monocytes %, Manual 17.6 2.0 - 10.0 %    Eosinophils %, Manual 5.9 0.0 - 6.0 %    Basophils %, Manual 0.0 0.0 - 2.0 %    Seg Neutrophils Absolute, Manual 0.24 (L) 1.60 - 5.00 x10*3/uL    Lymphocytes Absolute, Manual 0.38 (L) 0.80 - 3.00 x10*3/uL    Monocytes Absolute, Manual 0.14 0.05 - 0.80 x10*3/uL    Eosinophils Absolute, Manual 0.05 0.00 - 0.40 x10*3/uL    Basophils Absolute, Manual 0.00 0.00 - " 0.10 x10*3/uL    Total Cells Counted 34     RBC Morphology No significant RBC morphology present    Renal Function Panel   Result Value Ref Range    Glucose 119 (H) 74 - 99 mg/dL    Sodium 147 (H) 136 - 145 mmol/L    Potassium 4.2 3.5 - 5.3 mmol/L    Chloride 112 (H) 98 - 107 mmol/L    Bicarbonate 27 21 - 32 mmol/L    Anion Gap 12 10 - 20 mmol/L    Urea Nitrogen 19 6 - 23 mg/dL    Creatinine 1.04 0.50 - 1.30 mg/dL    eGFR 75 >60 mL/min/1.73m*2    Calcium 8.4 (L) 8.6 - 10.6 mg/dL    Phosphorus 3.7 2.5 - 4.9 mg/dL    Albumin 3.4 3.4 - 5.0 g/dL   Fibrinogen   Result Value Ref Range    Fibrinogen 160 (L) 200 - 400 mg/dL   C-reactive protein   Result Value Ref Range    C-Reactive Protein 0.24 <1.00 mg/dL   Ferritin   Result Value Ref Range    Ferritin 216 20 - 300 ng/mL   Magnesium   Result Value Ref Range    Magnesium 1.83 1.60 - 2.40 mg/dL         Assessment/Plan   Principal Problem:    Multiple myeloma without remission (CMS/HCC)    Mr. Maldonado Mccloud is a 73 year old male with a PMH of multiple myeloma s/p Auto SCT on 12/13/15 and then multiple lines of treatment (Revlimid, Vacto/Pom trial, Sarclista/Kyprolis), most recently (8/2023) on Cytoxan/Kyprolis and s/p radiation treatments x 8 fractions on 7/3/23.,  LLE DVT 2022 (on Eliquis).  Admitted for CAR T cell therapy for his ISS stage 2 Kappa multiple myeloma with ABECMA (T0 = 10/25/23).     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   -Myeloma marks 10/2/23-KFLC 6.79, LFLC  0.31, IgG 670, IgM 7, IgA <7  -Collected T cells 5/26/23  - Admitted for CAR-T cell therapy-ABECMA (Idecabtagene vicleucel) prep with Flu/Cy (T0 = 10/25/23)    CHEMO:  -Fludarabine 65 mg IV on days T-5 to T-3 (10/20 - 10/22/23)  -Cyclophosphamide 657 mg IV on days T-5 to T-3 (10/20 - 10/22/23)  -CAR T-cell ABECMA (Idecabtagene vicleucel) T0 = 10/25/23  -Plan to start Keppra 500mg BID on T-1 (10/24)     Daily Evaluation: 11/2  Ferritin: 210 (10/24), 206 (10/25) 339 (10/27) 381 (10/28) 390 (10/29), 368 (10/30), 323 (10/31), 323 (11/1), 270 (11/2)     CRP: 1.64 (10/24), 1.00 (10/25) 2.5 (10/27) 6.24 (10/28) 2.4 (10/29), 1.33 (10/30), 0.89 (10/31), 0.64 (11/1), 0.48 (11/2)     Fibrinogen: 244 (10/24), 260 (10/25) 337 (10/27) 348 (10/28) 260 (10/29), 206 (10/30), 206 (10/31), 169 (11/1), 148 (11/2)      Coags: 1.3 (10/24), 1.2 (10/25)   ICE Score : 10/10      #CRS Assessment:   In last 24 hours: Fever: NO; Any O2 supplement? NO     #Neurotoxicity Assessment:   ICANS Score: 10/10  Handwriting/Signature impaired:  no  Motor/Sensory deficit: No  Other abnormal Neuro symptom: N/A  Imaging/Fundoscopy finding: N/A     #CRS ndGndrndanddndend:nd nd2nd Organ/System affected by CRS: fever  Date of CRS onset: 10/26, 10/27  Date of highest CRS Grade: 10/26, 10/27  Date of CRS resolution to Grade 1 or lower: 10/27     #ICANS ndGndrndanddndend:nd nd2nd Date of Neurotoxicity onset: 10/26, 10/27  Date of highest Neurotoxicity ndGndrndanddndend:nd nd2nd Date of Neurotoxicity resolution to Grade 1 or lower: 10/27  Neurotox management:   -Dexamethasone 10mg IVP (10/26, 10/27, 10/28)  -tocilizumab (10/27)     #Management:   1. Tocilizumab 8mg/kg: 10/27  2. Anakinra 100mcg/day: N/A  3. Siltuximab 5mg/kg: date & time administered (not started)   4. Steroids: dexamethasone 10mg 10/26, 10/27, 10/28  5. Other agents/management:  Tylenol, Meropenem (10/26), (10/27)     HEME:  # Pancytopenia  - Tx for hgb < 7, plt < 10  # Hx/o LLE DVT 2022, cont Eliquis, hold for plt count < 50k  - apixaban  decreased to 2.5mg BID for thrombocytopenia; plan to increase to 5mg BID when plts increase      ID:  Allergies to PCN (rash) & Pip-Tazo (rash)  #PPX: acyclovir, fluconazole   -Check CMV PCR 2x/week Negative 10/26, 10/30; detected (<35) on 11/2  -Will continue to monitor   #Non-neutropenic FEVER (10/26), (10/27)  Ddx: CRS vs infection (less likely given negative infectious workup)   - UA 10/26 neg  - bl cx 10/26 no growth  - CXR 10/26, 10/27 neg for infx  -C/w meropenem (10/26-11/5 ); continued for neutropenia   - Transitioned back to Levaquin on 11/5 since still neutropenic     CARDIAC:  -Echo (9/22/23) EF 50%  # Hx/o A. fib 2015  # HTN - no home meds.  Cont to follow.  If persists may need to begin antihypertensives.   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     PSYCH:  #Hx of Insomnia  -Continue home Trazadone 300mg HS     DERM  #Diffuse macular rash on trunk, c/f drug reaction, resolved  -Allopurinol discontinued with improvement   -If no improvement, will plan dermatology consult   -PRN benadryl cream available      DISPO  -Full code-confirmed on admit  -Access: double non solo PICC  -primary oncologist Dr. Resendez     Patient seen, discussed & examined with Dr. Patrick Stewart, APRN-CNP

## 2023-11-05 NOTE — CARE PLAN
The patient's goals for the shift include remain free of injury this shift.      The clinical goals for the shift include pt will remain afebrile by end of shift    Patient hypertensive this AM with BP of 170/94. Patient asymptomatic, denying h/a. Provider notified and will recheck BP in an hour after initial value was taken. Other VSS.  Patient afebrile this shift. Patient remains on IV meropenum per order. Up in room independently; remained safe and free of injury.

## 2023-11-06 LAB
ALBUMIN SERPL BCP-MCNC: 3.5 G/DL (ref 3.4–5)
ALP SERPL-CCNC: 58 U/L (ref 33–136)
ALT SERPL W P-5'-P-CCNC: 13 U/L (ref 10–52)
ANION GAP SERPL CALC-SCNC: 11 MMOL/L (ref 10–20)
APTT PPP: 30 SECONDS (ref 27–38)
AST SERPL W P-5'-P-CCNC: 15 U/L (ref 9–39)
BASOPHILS # BLD AUTO: 0.01 X10*3/UL (ref 0–0.1)
BASOPHILS NFR BLD AUTO: 0.9 %
BILIRUB DIRECT SERPL-MCNC: 0.1 MG/DL (ref 0–0.3)
BILIRUB SERPL-MCNC: 0.6 MG/DL (ref 0–1.2)
BUN SERPL-MCNC: 22 MG/DL (ref 6–23)
CALCIUM SERPL-MCNC: 8.9 MG/DL (ref 8.6–10.6)
CHLORIDE SERPL-SCNC: 110 MMOL/L (ref 98–107)
CO2 SERPL-SCNC: 28 MMOL/L (ref 21–32)
CREAT SERPL-MCNC: 0.98 MG/DL (ref 0.5–1.3)
CRP SERPL-MCNC: 0.21 MG/DL
EOSINOPHIL # BLD AUTO: 0.05 X10*3/UL (ref 0–0.4)
EOSINOPHIL NFR BLD AUTO: 4.6 %
ERYTHROCYTE [DISTWIDTH] IN BLOOD BY AUTOMATED COUNT: 13.3 % (ref 11.5–14.5)
FERRITIN SERPL-MCNC: 219 NG/ML (ref 20–300)
FIBRINOGEN PPP-MCNC: 151 MG/DL (ref 200–400)
GFR SERPL CREATININE-BSD FRML MDRD: 81 ML/MIN/1.73M*2
GLUCOSE SERPL-MCNC: 95 MG/DL (ref 74–99)
HCT VFR BLD AUTO: 28.2 % (ref 41–52)
HGB BLD-MCNC: 9.4 G/DL (ref 13.5–17.5)
IMM GRANULOCYTES # BLD AUTO: 0.01 X10*3/UL (ref 0–0.5)
IMM GRANULOCYTES NFR BLD AUTO: 0.9 % (ref 0–0.9)
INR PPP: 1 (ref 0.9–1.1)
LYMPHOCYTES # BLD AUTO: 0.41 X10*3/UL (ref 0.8–3)
LYMPHOCYTES NFR BLD AUTO: 37.6 %
MAGNESIUM SERPL-MCNC: 1.76 MG/DL (ref 1.6–2.4)
MCH RBC QN AUTO: 35.2 PG (ref 26–34)
MCHC RBC AUTO-ENTMCNC: 33.3 G/DL (ref 32–36)
MCV RBC AUTO: 106 FL (ref 80–100)
MONOCYTES # BLD AUTO: 0.34 X10*3/UL (ref 0.05–0.8)
MONOCYTES NFR BLD AUTO: 31.2 %
NEUTROPHILS # BLD AUTO: 0.27 X10*3/UL (ref 1.6–5.5)
NEUTROPHILS NFR BLD AUTO: 24.8 %
NRBC BLD-RTO: 0 /100 WBCS (ref 0–0)
PHOSPHATE SERPL-MCNC: 3.5 MG/DL (ref 2.5–4.9)
PLATELET # BLD AUTO: 92 X10*3/UL (ref 150–450)
POTASSIUM SERPL-SCNC: 4.2 MMOL/L (ref 3.5–5.3)
PROT SERPL-MCNC: 4.9 G/DL (ref 6.4–8.2)
PROTHROMBIN TIME: 11.5 SECONDS (ref 9.8–12.8)
RBC # BLD AUTO: 2.67 X10*6/UL (ref 4.5–5.9)
SODIUM SERPL-SCNC: 145 MMOL/L (ref 136–145)
WBC # BLD AUTO: 1.1 X10*3/UL (ref 4.4–11.3)

## 2023-11-06 PROCEDURE — 94760 N-INVAS EAR/PLS OXIMETRY 1: CPT

## 2023-11-06 PROCEDURE — 80053 COMPREHEN METABOLIC PANEL: CPT | Performed by: PHYSICIAN ASSISTANT

## 2023-11-06 PROCEDURE — 85610 PROTHROMBIN TIME: CPT | Performed by: PHYSICIAN ASSISTANT

## 2023-11-06 PROCEDURE — 83735 ASSAY OF MAGNESIUM: CPT | Performed by: NURSE PRACTITIONER

## 2023-11-06 PROCEDURE — 86140 C-REACTIVE PROTEIN: CPT | Performed by: PHYSICIAN ASSISTANT

## 2023-11-06 PROCEDURE — 2500000004 HC RX 250 GENERAL PHARMACY W/ HCPCS (ALT 636 FOR OP/ED): Performed by: PHYSICIAN ASSISTANT

## 2023-11-06 PROCEDURE — 84100 ASSAY OF PHOSPHORUS: CPT | Performed by: PHYSICIAN ASSISTANT

## 2023-11-06 PROCEDURE — 2500000001 HC RX 250 WO HCPCS SELF ADMINISTERED DRUGS (ALT 637 FOR MEDICARE OP)

## 2023-11-06 PROCEDURE — 85025 COMPLETE CBC W/AUTO DIFF WBC: CPT | Performed by: PHYSICIAN ASSISTANT

## 2023-11-06 PROCEDURE — 82248 BILIRUBIN DIRECT: CPT | Performed by: PHYSICIAN ASSISTANT

## 2023-11-06 PROCEDURE — 1170000001 HC PRIVATE ONCOLOGY ROOM DAILY

## 2023-11-06 PROCEDURE — 2500000001 HC RX 250 WO HCPCS SELF ADMINISTERED DRUGS (ALT 637 FOR MEDICARE OP): Performed by: NURSE PRACTITIONER

## 2023-11-06 PROCEDURE — 85384 FIBRINOGEN ACTIVITY: CPT | Performed by: PHYSICIAN ASSISTANT

## 2023-11-06 PROCEDURE — 82728 ASSAY OF FERRITIN: CPT | Performed by: PHYSICIAN ASSISTANT

## 2023-11-06 PROCEDURE — 2500000004 HC RX 250 GENERAL PHARMACY W/ HCPCS (ALT 636 FOR OP/ED): Performed by: INTERNAL MEDICINE

## 2023-11-06 PROCEDURE — 99233 SBSQ HOSP IP/OBS HIGH 50: CPT | Performed by: INTERNAL MEDICINE

## 2023-11-06 PROCEDURE — 2500000001 HC RX 250 WO HCPCS SELF ADMINISTERED DRUGS (ALT 637 FOR MEDICARE OP): Performed by: INTERNAL MEDICINE

## 2023-11-06 RX ADMIN — LEVETIRACETAM 500 MG: 500 TABLET, FILM COATED ORAL at 09:10

## 2023-11-06 RX ADMIN — APIXABAN 5 MG: 5 TABLET, FILM COATED ORAL at 21:19

## 2023-11-06 RX ADMIN — LISINOPRIL 5 MG: 5 TABLET ORAL at 09:10

## 2023-11-06 RX ADMIN — FLUCONAZOLE 400 MG: 200 TABLET ORAL at 09:10

## 2023-11-06 RX ADMIN — LEVETIRACETAM 500 MG: 500 TABLET, FILM COATED ORAL at 21:19

## 2023-11-06 RX ADMIN — TRAZODONE HYDROCHLORIDE 300 MG: 100 TABLET ORAL at 21:19

## 2023-11-06 RX ADMIN — URSODIOL 300 MG: 300 CAPSULE ORAL at 21:19

## 2023-11-06 RX ADMIN — LEVOFLOXACIN 500 MG: 500 TABLET, FILM COATED ORAL at 09:10

## 2023-11-06 RX ADMIN — ACYCLOVIR 400 MG: 400 TABLET ORAL at 21:19

## 2023-11-06 RX ADMIN — URSODIOL 300 MG: 300 CAPSULE ORAL at 09:10

## 2023-11-06 RX ADMIN — URSODIOL 300 MG: 300 CAPSULE ORAL at 14:50

## 2023-11-06 RX ADMIN — PANTOPRAZOLE SODIUM 40 MG: 40 TABLET, DELAYED RELEASE ORAL at 09:10

## 2023-11-06 RX ADMIN — APIXABAN 5 MG: 5 TABLET, FILM COATED ORAL at 09:10

## 2023-11-06 RX ADMIN — ACYCLOVIR 400 MG: 400 TABLET ORAL at 09:10

## 2023-11-06 ASSESSMENT — COGNITIVE AND FUNCTIONAL STATUS - GENERAL
DAILY ACTIVITIY SCORE: 24
MOBILITY SCORE: 24
DAILY ACTIVITIY SCORE: 24
MOBILITY SCORE: 24

## 2023-11-06 ASSESSMENT — PAIN - FUNCTIONAL ASSESSMENT
PAIN_FUNCTIONAL_ASSESSMENT: 0-10
PAIN_FUNCTIONAL_ASSESSMENT: 0-10

## 2023-11-06 ASSESSMENT — PAIN SCALES - GENERAL
PAINLEVEL_OUTOF10: 0 - NO PAIN
PAINLEVEL_OUTOF10: 0 - NO PAIN

## 2023-11-06 NOTE — CARE PLAN
The clinical goals for the shift include remain HDS      Problem: Fall/Injury  Goal: Not fall by end of shift  Outcome: Progressing  Goal: Be free from injury by end of the shift  Outcome: Progressing  Goal: Verbalize understanding of personal risk factors for fall in the hospital  Outcome: Progressing  Goal: Verbalize understanding of risk factor reduction measures to prevent injury from fall in the home  Outcome: Progressing  Goal: Use assistive devices by end of the shift  Outcome: Progressing  Goal: Pace activities to prevent fatigue by end of the shift  Outcome: Progressing     Problem: Skin  Goal: Decreased wound size/increased tissue granulation at next dressing change  Outcome: Progressing  Goal: Participates in plan/prevention/treatment measures  Outcome: Progressing  Goal: Prevent/manage excess moisture  Outcome: Progressing  Goal: Prevent/minimize sheer/friction injuries  Outcome: Progressing  Goal: Promote/optimize nutrition  Outcome: Progressing  Goal: Promote skin healing  Outcome: Progressing     Problem: Safety  Goal: Patient will be injury free during hospitalization  Outcome: Progressing  Goal: I will remain free of falls  Outcome: Progressing     Problem: PICC line  Goal: I will remain free from symptoms of infection  Outcome: Progressing     Problem: Pain - Adult  Goal: Verbalizes/displays adequate comfort level or baseline comfort level  Outcome: Progressing

## 2023-11-06 NOTE — CARE PLAN
The patient's goals for the shift include  remain free of falls.     The clinical goals for the shift include pt. will remain safe throughout shift    VSS; afebrile this shift.  Patient had no c/o pain this shift; no c/o n/v/d.  Up in room independently; remained safe and free of injury.

## 2023-11-06 NOTE — PROGRESS NOTES
"Maldonado Mccloud is a 73 y.o. male on day 18 of admission presenting with Multiple myeloma without remission (CMS/HCC).    Subjective   Has no complaints. Sitting on couch with wife. Feeling good, says he has \"been ready to go home,\" just waiting to be able to leave. Eating well. Denies CP, SOB, N/V/D/C, abd pain. Wife states he has been \"wobbly\" while walking, patient says this is due to his neuropathy. Patient ambulating well in room.      Objective   Physical Exam  Constitutional:       General: He is not in acute distress.     Appearance: Normal appearance. He is not ill-appearing.   HENT:      Head: Normocephalic and atraumatic.      Nose: Nose normal.      Mouth/Throat:      Mouth: Mucous membranes are moist.      Pharynx: Oropharynx is clear.   Eyes:      Extraocular Movements: Extraocular movements intact.      Conjunctiva/sclera: Conjunctivae normal.      Pupils: Pupils are equal, round, and reactive to light.   Cardiovascular:      Rate and Rhythm: Normal rate and regular rhythm.   Pulmonary:      Effort: Pulmonary effort is normal.      Breath sounds: Normal breath sounds.   Abdominal:      General: Abdomen is flat. Bowel sounds are normal.      Palpations: Abdomen is soft.   Musculoskeletal:         General: Normal range of motion.      Cervical back: Normal range of motion and neck supple.   Skin:     General: Skin is warm and dry.   Neurological:      General: No focal deficit present.      Mental Status: He is alert and oriented to person, place, and time.   Psychiatric:         Mood and Affect: Mood normal.         Behavior: Behavior normal.     Last Recorded Vitals  Blood pressure 153/87, pulse 69, temperature 37.6 °C (99.7 °F), resp. rate 18, height 1.775 m (5' 9.88\"), weight 96.2 kg (212 lb 1.3 oz), SpO2 96 %.  Intake/Output last 3 Shifts:  I/O last 3 completed shifts:  In: 530 (5.5 mL/kg) [P.O.:480; IV Piggyback:50]  Out: - (0 mL/kg)   Weight: 96.2 kg     Assessment/Plan   Principal Problem:    " Multiple myeloma without remission (CMS/HCC)    Mr. Maldonado Mccloud is a 73 year old male with a PMH of multiple myeloma s/p Auto SCT on 12/13/15 and then multiple lines of treatment (Revlimid, Vacto/Pom trial, Sarclista/Kyprolis), most recently (8/2023) on Cytoxan/Kyprolis and s/p radiation treatments x 8 fractions on 7/3/23, LLE DVT 2022 (on Eliquis).  Admitted for CAR T cell therapy for his ISS stage 2 Kappa multiple myeloma with ABECMA (T0 = 10/25/23).     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   -Myeloma markers (10/2/23) - KFLC 6.79, LFLC 0.31, IgG 670, IgM 7, IgA <7  -Collected T cells 5/26/23  - Admitted for CAR-T cell therapy-ABECMA (Idecabtagene vicleucel) prep with Flu/Cy (T0 = 10/25/23)     CHEMO:  -Fludarabine 65 mg IV on days T-5 to T-3 (10/20 - 10/22/23)  -Cyclophosphamide 657 mg IV on days T-5 to T-3 (10/20 - 10/22/23)  -CAR T-cell ABECMA (Idecabtagene vicleucel) T0 = 10/25/23  -Plan to start Keppra 500mg BID on T-1 (10/24)     Daily Evaluation:   Ferritin: 210 (10/24), 206 (10/25) 339 (10/27) 381 (10/28) 390 (10/29), 368 (10/30), 323 (10/31), 323 (11/1), 270 (11/2)     CRP: 1.64 (10/24), 1.00 (10/25) 2.5 (10/27) 6.24 (10/28) 2.4 (10/29), 1.33 (10/30), 0.89 (10/31), 0.64 (11/1), 0.48 (11/2)     Fibrinogen: 244 (10/24), 260 (10/25) 337 (10/27) 348 (10/28) 260 (10/29), 206 (10/30), 206 (10/31), 169 (11/1), 148 (11/2)      Coags: 1.3 (10/24), 1.2 (10/25)   ICE Score : 10/10      #CRS Assessment:   In last  24 hours: Fever: NO; Any O2 supplement? NO     #Neurotoxicity Assessment:   ICANS Score: 10/10  Handwriting/Signature impaired:  no  Motor/Sensory deficit: No  Other abnormal Neuro symptom: N/A  Imaging/Fundoscopy finding: N/A     #CRS ndGndrndanddndend:nd nd2nd Organ/System affected by CRS: fever  Date of CRS onset: 10/26, 10/27  Date of highest CRS Grade: 10/26, 10/27  Date of CRS resolution to Grade 1 or lower: 10/27     #ICANS ndGndrndanddndend:nd nd2nd Date of Neurotoxicity onset: 10/26, 10/27  Date of highest Neurotoxicity ndGndrndanddndend:nd nd2nd Date of Neurotoxicity resolution to Grade 1 or lower: 10/27  Neurotox management:   -Dexamethasone 10mg IVP (10/26, 10/27, 10/28)  -tocilizumab (10/27)     #Management:   1. Tocilizumab 8mg/kg: 10/27  2. Anakinra 100mcg/day: N/A  3. Siltuximab 5mg/kg: date & time administered (not started)   4. Steroids: dexamethasone 10mg 10/26, 10/27, 10/28  5. Other agents/management:  Tylenol, Meropenem (10/26), (10/27)     HEME:  # Pancytopenia  - Tx for hgb < 7, plt < 10  # Hx/o LLE DVT 2022, cont Eliquis, hold for plt count < 50k  - apixaban decreased to 2.5mg BID for thrombocytopenia --> increased back to 5mg BID (11/3)     ID:  Allergies to PCN (rash) & Pip-Tazo (rash)  # PPX: acyclovir, fluconazole   # Check CMV PCR 2x/week: 10/26 Negative, 10/30 Negative, 11/2 detected (<35), 11/6 pending  # Non-neutropenic FEVER (10/26), (10/27)  Ddx: CRS vs infection (less likely given negative infectious workup)   - UA 10/26 neg  - bl cx 10/26 no growth  - CXR 10/26, 10/27 neg for infx  - C/w meropenem (10/26-11/5); continued for neutropenia --> Transitioned back to Levaquin on 11/5 since still neutropenic      CARDIAC:  -Echo (9/22/23) EF 50%  # Hx/o A. fib 2015  # HTN - no home meds.  Cont to follow.  If persists may need to begin antihypertensives.   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     PSYCH:  #Hx of Insomnia  -Continue home Trazadone 300mg HS     DERM  #Diffuse macular rash on trunk, c/f drug reaction, resolved  -Allopurinol discontinued with improvement   -PRN benadryl cream available      DISPO  -Full code-confirmed on admit  -Access: double non solo PICC  -primary oncologist: Dr. Resendez     Patient seen, discussed & examined with KATHY EspinosaC

## 2023-11-07 ENCOUNTER — PHARMACY VISIT (OUTPATIENT)
Dept: PHARMACY | Facility: CLINIC | Age: 73
End: 2023-11-07
Payer: MEDICARE

## 2023-11-07 LAB
ALBUMIN SERPL BCP-MCNC: 3.6 G/DL (ref 3.4–5)
ANION GAP SERPL CALC-SCNC: 13 MMOL/L (ref 10–20)
BASOPHILS # BLD AUTO: 0.01 X10*3/UL (ref 0–0.1)
BASOPHILS NFR BLD AUTO: 0.8 %
BUN SERPL-MCNC: 18 MG/DL (ref 6–23)
CALCIUM SERPL-MCNC: 8.9 MG/DL (ref 8.6–10.6)
CHLORIDE SERPL-SCNC: 111 MMOL/L (ref 98–107)
CMV DNA SERPL NAA+PROBE-LOG IU: NORMAL {LOG_IU}/ML
CO2 SERPL-SCNC: 25 MMOL/L (ref 21–32)
CREAT SERPL-MCNC: 1.07 MG/DL (ref 0.5–1.3)
CRP SERPL-MCNC: 0.15 MG/DL
EOSINOPHIL # BLD AUTO: 0.05 X10*3/UL (ref 0–0.4)
EOSINOPHIL NFR BLD AUTO: 4.2 %
ERYTHROCYTE [DISTWIDTH] IN BLOOD BY AUTOMATED COUNT: 13.6 % (ref 11.5–14.5)
FERRITIN SERPL-MCNC: 205 NG/ML (ref 20–300)
FIBRINOGEN PPP-MCNC: 155 MG/DL (ref 200–400)
GFR SERPL CREATININE-BSD FRML MDRD: 73 ML/MIN/1.73M*2
GLUCOSE SERPL-MCNC: 93 MG/DL (ref 74–99)
HCT VFR BLD AUTO: 29.7 % (ref 41–52)
HGB BLD-MCNC: 10 G/DL (ref 13.5–17.5)
IMM GRANULOCYTES # BLD AUTO: 0.01 X10*3/UL (ref 0–0.5)
IMM GRANULOCYTES NFR BLD AUTO: 0.8 % (ref 0–0.9)
LABORATORY COMMENT REPORT: NOT DETECTED
LYMPHOCYTES # BLD AUTO: 0.34 X10*3/UL (ref 0.8–3)
LYMPHOCYTES NFR BLD AUTO: 28.8 %
MAGNESIUM SERPL-MCNC: 1.69 MG/DL (ref 1.6–2.4)
MCH RBC QN AUTO: 35.5 PG (ref 26–34)
MCHC RBC AUTO-ENTMCNC: 33.7 G/DL (ref 32–36)
MCV RBC AUTO: 105 FL (ref 80–100)
MONOCYTES # BLD AUTO: 0.34 X10*3/UL (ref 0.05–0.8)
MONOCYTES NFR BLD AUTO: 28.8 %
NEUTROPHILS # BLD AUTO: 0.43 X10*3/UL (ref 1.6–5.5)
NEUTROPHILS NFR BLD AUTO: 36.6 %
NRBC BLD-RTO: 0 /100 WBCS (ref 0–0)
PHOSPHATE SERPL-MCNC: 3.7 MG/DL (ref 2.5–4.9)
PLATELET # BLD AUTO: 96 X10*3/UL (ref 150–450)
POTASSIUM SERPL-SCNC: 4.1 MMOL/L (ref 3.5–5.3)
RBC # BLD AUTO: 2.82 X10*6/UL (ref 4.5–5.9)
RBC MORPH BLD: NORMAL
SODIUM SERPL-SCNC: 145 MMOL/L (ref 136–145)
WBC # BLD AUTO: 1.2 X10*3/UL (ref 4.4–11.3)

## 2023-11-07 PROCEDURE — 94760 N-INVAS EAR/PLS OXIMETRY 1: CPT

## 2023-11-07 PROCEDURE — 2500000004 HC RX 250 GENERAL PHARMACY W/ HCPCS (ALT 636 FOR OP/ED): Performed by: INTERNAL MEDICINE

## 2023-11-07 PROCEDURE — 1170000001 HC PRIVATE ONCOLOGY ROOM DAILY

## 2023-11-07 PROCEDURE — 2500000001 HC RX 250 WO HCPCS SELF ADMINISTERED DRUGS (ALT 637 FOR MEDICARE OP): Performed by: INTERNAL MEDICINE

## 2023-11-07 PROCEDURE — 85384 FIBRINOGEN ACTIVITY: CPT | Performed by: PHYSICIAN ASSISTANT

## 2023-11-07 PROCEDURE — 80069 RENAL FUNCTION PANEL: CPT | Performed by: PHYSICIAN ASSISTANT

## 2023-11-07 PROCEDURE — 99232 SBSQ HOSP IP/OBS MODERATE 35: CPT | Performed by: INTERNAL MEDICINE

## 2023-11-07 PROCEDURE — 2500000001 HC RX 250 WO HCPCS SELF ADMINISTERED DRUGS (ALT 637 FOR MEDICARE OP): Performed by: NURSE PRACTITIONER

## 2023-11-07 PROCEDURE — 82728 ASSAY OF FERRITIN: CPT | Performed by: PHYSICIAN ASSISTANT

## 2023-11-07 PROCEDURE — 85025 COMPLETE CBC W/AUTO DIFF WBC: CPT | Performed by: PHYSICIAN ASSISTANT

## 2023-11-07 PROCEDURE — 2500000001 HC RX 250 WO HCPCS SELF ADMINISTERED DRUGS (ALT 637 FOR MEDICARE OP)

## 2023-11-07 PROCEDURE — RXMED WILLOW AMBULATORY MEDICATION CHARGE

## 2023-11-07 PROCEDURE — 83735 ASSAY OF MAGNESIUM: CPT | Performed by: NURSE PRACTITIONER

## 2023-11-07 PROCEDURE — 86140 C-REACTIVE PROTEIN: CPT | Performed by: PHYSICIAN ASSISTANT

## 2023-11-07 PROCEDURE — 2500000004 HC RX 250 GENERAL PHARMACY W/ HCPCS (ALT 636 FOR OP/ED): Performed by: PHYSICIAN ASSISTANT

## 2023-11-07 RX ORDER — LEVETIRACETAM 500 MG/1
500 TABLET ORAL 2 TIMES DAILY
Qty: 60 TABLET | Refills: 11 | Status: SHIPPED | OUTPATIENT
Start: 2023-11-07 | End: 2023-11-07 | Stop reason: SDUPTHER

## 2023-11-07 RX ORDER — SULFAMETHOXAZOLE AND TRIMETHOPRIM 800; 160 MG/1; MG/1
1 TABLET ORAL
Qty: 12 TABLET | Refills: 1 | Status: SHIPPED | OUTPATIENT
Start: 2023-11-27 | End: 2023-11-07 | Stop reason: SDUPTHER

## 2023-11-07 RX ORDER — LISINOPRIL 5 MG/1
5 TABLET ORAL DAILY
Qty: 30 TABLET | Refills: 11 | Status: SHIPPED | OUTPATIENT
Start: 2023-11-08 | End: 2023-11-07 | Stop reason: SDUPTHER

## 2023-11-07 RX ORDER — URSODIOL 300 MG/1
300 CAPSULE ORAL 3 TIMES DAILY
Qty: 90 CAPSULE | Refills: 0 | Status: SHIPPED | OUTPATIENT
Start: 2023-11-07 | End: 2023-11-07 | Stop reason: SDUPTHER

## 2023-11-07 RX ORDER — URSODIOL 300 MG/1
300 CAPSULE ORAL 3 TIMES DAILY
Qty: 90 CAPSULE | Refills: 0 | Status: SHIPPED | OUTPATIENT
Start: 2023-11-07 | End: 2023-11-28 | Stop reason: ALTCHOICE

## 2023-11-07 RX ORDER — LEVETIRACETAM 500 MG/1
500 TABLET ORAL 2 TIMES DAILY
Qty: 32 TABLET | Refills: 0 | Status: CANCELLED | OUTPATIENT
Start: 2023-11-07 | End: 2023-11-23

## 2023-11-07 RX ORDER — LEVETIRACETAM 500 MG/1
500 TABLET ORAL 2 TIMES DAILY
Qty: 60 TABLET | Refills: 11 | Status: SHIPPED | OUTPATIENT
Start: 2023-11-07 | End: 2023-11-28 | Stop reason: ALTCHOICE

## 2023-11-07 RX ORDER — FLUCONAZOLE 200 MG/1
400 TABLET ORAL DAILY
Qty: 60 TABLET | Refills: 2 | Status: SHIPPED | OUTPATIENT
Start: 2023-11-08 | End: 2023-11-07 | Stop reason: SDUPTHER

## 2023-11-07 RX ORDER — ONDANSETRON HYDROCHLORIDE 8 MG/1
8 TABLET, FILM COATED ORAL EVERY 8 HOURS PRN
Qty: 30 TABLET | Refills: 1 | Status: SHIPPED | OUTPATIENT
Start: 2023-11-07 | End: 2023-11-29

## 2023-11-07 RX ORDER — ONDANSETRON HYDROCHLORIDE 8 MG/1
8 TABLET, FILM COATED ORAL EVERY 8 HOURS PRN
Qty: 30 TABLET | Refills: 1 | Status: SHIPPED | OUTPATIENT
Start: 2023-11-07 | End: 2023-11-07 | Stop reason: SDUPTHER

## 2023-11-07 RX ORDER — LEVOFLOXACIN 500 MG/1
500 TABLET, FILM COATED ORAL
Qty: 30 TABLET | Refills: 0 | Status: SHIPPED | OUTPATIENT
Start: 2023-11-08 | End: 2023-11-07 | Stop reason: SDUPTHER

## 2023-11-07 RX ORDER — PANTOPRAZOLE SODIUM 40 MG/1
40 TABLET, DELAYED RELEASE ORAL
Qty: 30 TABLET | Refills: 11 | Status: CANCELLED | OUTPATIENT
Start: 2023-11-07 | End: 2024-11-06

## 2023-11-07 RX ORDER — LEVOFLOXACIN 500 MG/1
500 TABLET, FILM COATED ORAL
Qty: 30 TABLET | Refills: 0 | Status: SHIPPED | OUTPATIENT
Start: 2023-11-08 | End: 2023-11-28 | Stop reason: ALTCHOICE

## 2023-11-07 RX ORDER — LISINOPRIL 5 MG/1
5 TABLET ORAL DAILY
Qty: 30 TABLET | Refills: 11 | Status: SHIPPED | OUTPATIENT
Start: 2023-11-08 | End: 2023-12-18 | Stop reason: WASHOUT

## 2023-11-07 RX ORDER — FLUCONAZOLE 200 MG/1
400 TABLET ORAL DAILY
Qty: 60 TABLET | Refills: 2 | Status: SHIPPED | OUTPATIENT
Start: 2023-11-08 | End: 2023-12-18 | Stop reason: SDUPTHER

## 2023-11-07 RX ORDER — SULFAMETHOXAZOLE AND TRIMETHOPRIM 800; 160 MG/1; MG/1
1 TABLET ORAL
Qty: 12 TABLET | Refills: 1 | Status: SHIPPED | OUTPATIENT
Start: 2023-11-27 | End: 2023-12-18 | Stop reason: SDUPTHER

## 2023-11-07 RX ADMIN — URSODIOL 300 MG: 300 CAPSULE ORAL at 09:12

## 2023-11-07 RX ADMIN — URSODIOL 300 MG: 300 CAPSULE ORAL at 15:21

## 2023-11-07 RX ADMIN — ACYCLOVIR 400 MG: 400 TABLET ORAL at 09:20

## 2023-11-07 RX ADMIN — LISINOPRIL 5 MG: 5 TABLET ORAL at 09:12

## 2023-11-07 RX ADMIN — ACYCLOVIR 400 MG: 400 TABLET ORAL at 20:33

## 2023-11-07 RX ADMIN — TRAZODONE HYDROCHLORIDE 300 MG: 100 TABLET ORAL at 20:33

## 2023-11-07 RX ADMIN — LEVETIRACETAM 500 MG: 500 TABLET, FILM COATED ORAL at 09:12

## 2023-11-07 RX ADMIN — APIXABAN 5 MG: 5 TABLET, FILM COATED ORAL at 20:33

## 2023-11-07 RX ADMIN — FLUCONAZOLE 400 MG: 200 TABLET ORAL at 09:12

## 2023-11-07 RX ADMIN — APIXABAN 5 MG: 5 TABLET, FILM COATED ORAL at 09:12

## 2023-11-07 RX ADMIN — LEVETIRACETAM 500 MG: 500 TABLET, FILM COATED ORAL at 20:32

## 2023-11-07 RX ADMIN — URSODIOL 300 MG: 300 CAPSULE ORAL at 20:33

## 2023-11-07 RX ADMIN — LEVOFLOXACIN 500 MG: 500 TABLET, FILM COATED ORAL at 09:12

## 2023-11-07 RX ADMIN — PANTOPRAZOLE SODIUM 40 MG: 40 TABLET, DELAYED RELEASE ORAL at 08:00

## 2023-11-07 ASSESSMENT — COGNITIVE AND FUNCTIONAL STATUS - GENERAL
DAILY ACTIVITIY SCORE: 24
MOBILITY SCORE: 24

## 2023-11-07 ASSESSMENT — PAIN SCALES - GENERAL
PAINLEVEL_OUTOF10: 0 - NO PAIN
PAINLEVEL_OUTOF10: 0 - NO PAIN

## 2023-11-07 ASSESSMENT — PAIN - FUNCTIONAL ASSESSMENT
PAIN_FUNCTIONAL_ASSESSMENT: 0-10
PAIN_FUNCTIONAL_ASSESSMENT: 0-10

## 2023-11-07 ASSESSMENT — ACTIVITIES OF DAILY LIVING (ADL): LACK_OF_TRANSPORTATION: NO

## 2023-11-07 NOTE — PROGRESS NOTES
11/07/23 1500   Discharge Planning   Living Arrangements Spouse/significant other   Support Systems Spouse/significant other   Assistance Needed none   Type of Residence Private residence   Home or Post Acute Services None   Patient expects to be discharged to: local hotel   Does the patient need discharge transport arranged? No   Financial Resource Strain   How hard is it for you to pay for the very basics like food, housing, medical care, and heating? Not hard   Housing Stability   In the last 12 months, was there a time when you were not able to pay the mortgage or rent on time? N   In the last 12 months, how many places have you lived? 1   In the last 12 months, was there a time when you did not have a steady place to sleep or slept in a shelter (including now)? N   Transportation Needs   In the past 12 months, has lack of transportation kept you from medical appointments or from getting medications? no   In the past 12 months, has lack of transportation kept you from meetings, work, or from getting things needed for daily living? No     Pt with Multiple Myeloma was admitted for CAR-T Cells (Abecma), T+14 today.  Pt is independent and has no home care or DME needs.  His caregiver is his wife and they will be staying at a local hotel after discharge.  He has a DL SOLO PICC which will be cared for in the [facility] infusion center.  He has follow up scheduled with Dr. Wil Resendez and will be scheduled for twice weekly count checks.  He was provided the CAR-T discharge packet and ED card.  Reviewed the discharge plan with the pt.

## 2023-11-07 NOTE — PROGRESS NOTES
"Maldonado Mccloud is a 73 y.o. male on day 19 of admission presenting with Multiple myeloma without remission (CMS/HCC).    Subjective   He reports he is feeling well today, denies any issues or pain. Excited to go home shortly. Otherwise denies fever, chills, headache, vision changes, CP, SOB, cough, abdominal pain, n/v/c/d, bleeding, bruising, rashes.       Objective   Physical Exam  Constitutional:       General: He is not in acute distress.     Appearance: Normal appearance. He is not ill-appearing.   HENT:      Head: Normocephalic and atraumatic.      Nose: Nose normal.      Mouth/Throat:      Mouth: Mucous membranes are moist.      Pharynx: Oropharynx is clear.   Eyes:      Extraocular Movements: Extraocular movements intact.      Conjunctiva/sclera: Conjunctivae normal.      Pupils: Pupils are equal, round, and reactive to light.   Cardiovascular:      Rate and Rhythm: Normal rate and regular rhythm.   Pulmonary:      Effort: Pulmonary effort is normal.      Breath sounds: Normal breath sounds.   Abdominal:      General: Abdomen is flat. Bowel sounds are normal.      Palpations: Abdomen is soft.   Musculoskeletal:         General: Normal range of motion.      Cervical back: Normal range of motion and neck supple.   Skin:     General: Skin is warm and dry.   Neurological:      General: No focal deficit present.      Mental Status: He is alert and oriented to person, place, and time.   Psychiatric:         Mood and Affect: Mood normal.         Behavior: Behavior normal.     Last Recorded Vitals  Blood pressure (!) 163/95, pulse 65, temperature 36 °C (96.8 °F), temperature source Temporal, resp. rate 18, height 1.775 m (5' 9.88\"), weight 96.2 kg (212 lb 1.3 oz), SpO2 94 %.  Intake/Output last 3 Shifts:  No intake/output data recorded.    Assessment/Plan   Principal Problem:    Multiple myeloma without remission (CMS/HCC)    Mr. Maldonado Mccloud is a 73 year old male with a PMH of multiple myeloma s/p Auto SCT on " 12/13/15 and then multiple lines of treatment (Revlimid, Vacto/Pom trial, Sarclista/Kyprolis), most recently (8/2023) on Cytoxan/Kyprolis and s/p radiation treatments x 8 fractions on 7/3/23, LLE DVT 2022 (on Eliquis).  Admitted for CAR T cell therapy for his ISS stage 2 Kappa multiple myeloma with ABECMA (T0 = 10/25/23).     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   -Myeloma markers (10/2/23) - KFLC 6.79, LFLC 0.31, IgG 670, IgM 7, IgA <7  -Collected T cells 5/26/23  - Admitted for CAR-T cell therapy-ABECMA (Idecabtagene vicleucel) prep with Flu/Cy (T0 = 10/25/23)     CHEMO:  -Fludarabine 65 mg IV on days T-5 to T-3 (10/20 - 10/22/23)  -Cyclophosphamide 657 mg IV on days T-5 to T-3 (10/20 - 10/22/23)  -CAR T-cell ABECMA (Idecabtagene vicleucel) T0 = 10/25/23  - Keppra 500mg BID on T-1 (10/24)     Daily Evaluation:   Ferritin: Baseline 210 (10/24), Max: 390, Current: 205 (11/7)  CRP: Baseline: 1.64 (10/24), Max: 6.24 (10/28) Current: 0.15 (11/7)  Fibrinogen: Baseline 244 (10/24), Max: 348 (10/28), Current 155 (11/7)    Coags: 1.3 (10/24), 1.2 (10/25)   ICE Score : 10/10      #CRS Assessment:   In last 24 hours: Fever: NO; Any O2 supplement? NO     #Neurotoxicity Assessment:   ICANS Score: 10/10  Handwriting/Signature impaired:  no  Motor/Sensory deficit: No  Other abnormal Neuro symptom: N/A  Imaging/Fundoscopy finding: N/A     #CRS ndGndrndanddndend:nd nd2nd Organ/System affected by CRS: fever  Date of CRS onset: 10/26, 10/27  Date  of highest CRS Grade: 10/26, 10/27  Date of CRS resolution to Grade 1 or lower: 10/27     #ICANS ndGndrndanddndend:nd nd2nd Date of Neurotoxicity onset: 10/26, 10/27  Date of highest Neurotoxicity ndGndrndanddndend:nd nd2nd Date of Neurotoxicity resolution to Grade 1 or lower: 10/27  Neurotox management:   -Dexamethasone 10mg IVP (10/26, 10/27, 10/28)  -tocilizumab (10/27)     #Management:   1. Tocilizumab 8mg/kg: 10/27  2. Anakinra 100mcg/day: N/A  3. Siltuximab 5mg/kg: date & time administered (not started)   4. Steroids: dexamethasone 10mg 10/26, 10/27, 10/28  5. Other agents/management:  Tylenol, Meropenem (10/26), (10/27)     HEME:  # Pancytopenia  - Tx for hgb < 7, plt < 10  # Hx/o LLE DVT 2022, cont Eliquis, hold for plt count < 50k  - apixaban decreased to 2.5mg BID for thrombocytopenia --> increased back to 5mg BID (11/3)     ID:  Allergies to PCN (rash) & Pip-Tazo (rash)  # PPX: acyclovir, fluconazole   # Check CMV PCR 2x/week: 10/26 Negative, 10/30 Negative, 11/2 detected (<35), 11/6 pending  # Non-neutropenic FEVER (10/26), (10/27)  Ddx: CRS vs infection (less likely given negative infectious workup)   - UA 10/26 neg  - bl cx 10/26 no growth  - CXR 10/26, 10/27 neg for infx  - C/w meropenem (10/26-11/5); continued for neutropenia --> Transitioned back to Levaquin on 11/5 since still neutropenic      CARDIAC:  -Echo (9/22/23) EF 50%  # Hx/o A. fib 2015  # HTN - no home meds.  Cont to follow.  If persists may need to begin antihypertensives.   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     PSYCH:  #Hx of Insomnia  -Continue home Trazadone 300mg HS     DERM  #Diffuse macular rash on trunk, c/f drug reaction, resolved  -Allopurinol discontinued with improvement   -PRN benadryl cream  available      DISPO  -Full code-confirmed on admit  -Access: double non solo PICC  -primary oncologist: Dr. Resendez     Patient seen, discussed & examined with Dr. Aristeo Holt PA-C

## 2023-11-07 NOTE — CARE PLAN
The patient's goals for the shift include remain free of injury.      The clinical goals for the shift include remain HDS this shift    VSS; afebrile this shift.  Patient had no c/o pain overnight; no c/o n/v/d.  Up in room independently; remained safe and free of injury.

## 2023-11-08 ENCOUNTER — PHARMACY VISIT (OUTPATIENT)
Dept: PHARMACY | Facility: CLINIC | Age: 73
End: 2023-11-08
Payer: MEDICARE

## 2023-11-08 VITALS
BODY MASS INDEX: 29.83 KG/M2 | HEART RATE: 71 BPM | WEIGHT: 208.34 LBS | OXYGEN SATURATION: 98 % | SYSTOLIC BLOOD PRESSURE: 133 MMHG | TEMPERATURE: 99.3 F | HEIGHT: 70 IN | DIASTOLIC BLOOD PRESSURE: 89 MMHG | RESPIRATION RATE: 18 BRPM

## 2023-11-08 LAB
ALBUMIN SERPL BCP-MCNC: 3.6 G/DL (ref 3.4–5)
ALP SERPL-CCNC: 53 U/L (ref 33–136)
ALT SERPL W P-5'-P-CCNC: 12 U/L (ref 10–52)
ANION GAP SERPL CALC-SCNC: 12 MMOL/L (ref 10–20)
APTT PPP: 28 SECONDS (ref 27–38)
AST SERPL W P-5'-P-CCNC: 15 U/L (ref 9–39)
BASOPHILS # BLD MANUAL: 0.03 X10*3/UL (ref 0–0.1)
BASOPHILS NFR BLD MANUAL: 2.2 %
BILIRUB DIRECT SERPL-MCNC: 0.1 MG/DL (ref 0–0.3)
BILIRUB SERPL-MCNC: 0.6 MG/DL (ref 0–1.2)
BUN SERPL-MCNC: 20 MG/DL (ref 6–23)
CALCIUM SERPL-MCNC: 8.7 MG/DL (ref 8.6–10.6)
CHLORIDE SERPL-SCNC: 110 MMOL/L (ref 98–107)
CO2 SERPL-SCNC: 27 MMOL/L (ref 21–32)
CREAT SERPL-MCNC: 1.27 MG/DL (ref 0.5–1.3)
CRP SERPL-MCNC: 0.13 MG/DL
EOSINOPHIL # BLD MANUAL: 0.07 X10*3/UL (ref 0–0.4)
EOSINOPHIL NFR BLD MANUAL: 5.4 %
ERYTHROCYTE [DISTWIDTH] IN BLOOD BY AUTOMATED COUNT: 13.5 % (ref 11.5–14.5)
FERRITIN SERPL-MCNC: 194 NG/ML (ref 20–300)
FIBRINOGEN PPP-MCNC: 157 MG/DL (ref 200–400)
GFR SERPL CREATININE-BSD FRML MDRD: 59 ML/MIN/1.73M*2
GLUCOSE SERPL-MCNC: 116 MG/DL (ref 74–99)
HCT VFR BLD AUTO: 29.3 % (ref 41–52)
HGB BLD-MCNC: 9.9 G/DL (ref 13.5–17.5)
IMM GRANULOCYTES # BLD AUTO: 0.01 X10*3/UL (ref 0–0.5)
IMM GRANULOCYTES NFR BLD AUTO: 0.8 % (ref 0–0.9)
INR PPP: 1 (ref 0.9–1.1)
LYMPHOCYTES # BLD MANUAL: 0.34 X10*3/UL (ref 0.8–3)
LYMPHOCYTES NFR BLD MANUAL: 25.8 %
MAGNESIUM SERPL-MCNC: 1.71 MG/DL (ref 1.6–2.4)
MCH RBC QN AUTO: 35.4 PG (ref 26–34)
MCHC RBC AUTO-ENTMCNC: 33.8 G/DL (ref 32–36)
MCV RBC AUTO: 105 FL (ref 80–100)
MONOCYTES # BLD MANUAL: 0.1 X10*3/UL (ref 0.05–0.8)
MONOCYTES NFR BLD MANUAL: 7.5 %
NEUTS SEG # BLD MANUAL: 0.77 X10*3/UL (ref 1.6–5)
NEUTS SEG NFR BLD MANUAL: 59.1 %
NRBC BLD-RTO: 0 /100 WBCS (ref 0–0)
OVALOCYTES BLD QL SMEAR: ABNORMAL
PHOSPHATE SERPL-MCNC: 3.9 MG/DL (ref 2.5–4.9)
PLATELET # BLD AUTO: 95 X10*3/UL (ref 150–450)
POTASSIUM SERPL-SCNC: 4 MMOL/L (ref 3.5–5.3)
PROT SERPL-MCNC: 4.8 G/DL (ref 6.4–8.2)
PROTHROMBIN TIME: 11.8 SECONDS (ref 9.8–12.8)
RBC # BLD AUTO: 2.8 X10*6/UL (ref 4.5–5.9)
RBC MORPH BLD: ABNORMAL
SODIUM SERPL-SCNC: 145 MMOL/L (ref 136–145)
TOTAL CELLS COUNTED BLD: 93
WBC # BLD AUTO: 1.3 X10*3/UL (ref 4.4–11.3)

## 2023-11-08 PROCEDURE — 99238 HOSP IP/OBS DSCHRG MGMT 30/<: CPT | Performed by: INTERNAL MEDICINE

## 2023-11-08 PROCEDURE — 2500000001 HC RX 250 WO HCPCS SELF ADMINISTERED DRUGS (ALT 637 FOR MEDICARE OP): Performed by: NURSE PRACTITIONER

## 2023-11-08 PROCEDURE — 86140 C-REACTIVE PROTEIN: CPT | Performed by: PHYSICIAN ASSISTANT

## 2023-11-08 PROCEDURE — 83735 ASSAY OF MAGNESIUM: CPT | Performed by: NURSE PRACTITIONER

## 2023-11-08 PROCEDURE — 85007 BL SMEAR W/DIFF WBC COUNT: CPT | Performed by: PHYSICIAN ASSISTANT

## 2023-11-08 PROCEDURE — 82728 ASSAY OF FERRITIN: CPT | Performed by: PHYSICIAN ASSISTANT

## 2023-11-08 PROCEDURE — 82248 BILIRUBIN DIRECT: CPT | Performed by: PHYSICIAN ASSISTANT

## 2023-11-08 PROCEDURE — 85610 PROTHROMBIN TIME: CPT | Performed by: PHYSICIAN ASSISTANT

## 2023-11-08 PROCEDURE — 84100 ASSAY OF PHOSPHORUS: CPT | Performed by: PHYSICIAN ASSISTANT

## 2023-11-08 PROCEDURE — 2500000004 HC RX 250 GENERAL PHARMACY W/ HCPCS (ALT 636 FOR OP/ED): Performed by: INTERNAL MEDICINE

## 2023-11-08 PROCEDURE — 2500000004 HC RX 250 GENERAL PHARMACY W/ HCPCS (ALT 636 FOR OP/ED): Performed by: PHYSICIAN ASSISTANT

## 2023-11-08 PROCEDURE — RXMED WILLOW AMBULATORY MEDICATION CHARGE

## 2023-11-08 PROCEDURE — 2500000001 HC RX 250 WO HCPCS SELF ADMINISTERED DRUGS (ALT 637 FOR MEDICARE OP): Performed by: INTERNAL MEDICINE

## 2023-11-08 PROCEDURE — 85384 FIBRINOGEN ACTIVITY: CPT | Performed by: PHYSICIAN ASSISTANT

## 2023-11-08 PROCEDURE — 85730 THROMBOPLASTIN TIME PARTIAL: CPT | Performed by: PHYSICIAN ASSISTANT

## 2023-11-08 PROCEDURE — 85027 COMPLETE CBC AUTOMATED: CPT | Performed by: PHYSICIAN ASSISTANT

## 2023-11-08 RX ADMIN — URSODIOL 300 MG: 300 CAPSULE ORAL at 09:16

## 2023-11-08 RX ADMIN — LISINOPRIL 5 MG: 5 TABLET ORAL at 09:15

## 2023-11-08 RX ADMIN — ACYCLOVIR 400 MG: 400 TABLET ORAL at 09:16

## 2023-11-08 RX ADMIN — LEVETIRACETAM 500 MG: 500 TABLET, FILM COATED ORAL at 09:15

## 2023-11-08 RX ADMIN — LEVOFLOXACIN 500 MG: 500 TABLET, FILM COATED ORAL at 09:14

## 2023-11-08 RX ADMIN — APIXABAN 5 MG: 5 TABLET, FILM COATED ORAL at 09:16

## 2023-11-08 RX ADMIN — PANTOPRAZOLE SODIUM 40 MG: 40 TABLET, DELAYED RELEASE ORAL at 09:16

## 2023-11-08 RX ADMIN — FLUCONAZOLE 400 MG: 200 TABLET ORAL at 09:14

## 2023-11-08 ASSESSMENT — COGNITIVE AND FUNCTIONAL STATUS - GENERAL
DAILY ACTIVITIY SCORE: 24
MOBILITY SCORE: 24

## 2023-11-08 ASSESSMENT — PAIN SCALES - GENERAL: PAINLEVEL_OUTOF10: 0 - NO PAIN

## 2023-11-08 NOTE — DISCHARGE SUMMARY
Discharge Diagnosis  Multiple myeloma without remission (CMS/HCC)    Issues Requiring Follow-Up  -Post-CAR-T cell therapy, ongoing monitoring for neurotox and CRS (grade 1 CRS and Grade 1 ICANs managed w/ dex x 3 days and 1 dose toci)  -Pancytopenia, on Eliquis for Hx of DVT, plats 95 at discharge. Please continue to monitor and hold if plts <50,000.  -Neutropenia, currently on levofloxacin prophy.  -Hx of HTN, not previously on meds, started on 5mg lisinopril while admitted 11/5.     Test Results Pending At Discharge  Pending Labs       No current pending labs.            Hospital Course  Mr. Maldonado Mccloud is a 73 year old male with a PMH of multiple myeloma s/p Auto SCT on 12/13/15 and then multiple lines of treatment (Revlimid, Vacto/Pom trial, Sarclista/Kyprolis), most recently (8/2023) on Cytoxan/Kyprolis and s/p radiation treatments x 8 fractions on 7/3/23.,  LLE DVT 2022 (on Eliquis).  Admitted for CAR T cell therapy for his ISS stage 2 Kappa multiple myeloma with ABECMA.    Hospital course complicated by Grade 1 CRS and Grade 1 ICANS, managed with dexamethasone 10mg x 3 days and one dose of tocilizumab.     Prophy: acyclovir, fluconazole (T+90), keppra (T+30), ursidiol (T+30)  Access: NON solo PICC  Follow up:   -Post-transplant clinic w/ infusion chair 11/10, 11/14, 11/6  -primary physician follow up w/ Dr. Resendez 11/21 (okay per Dr. Resendez).    Pertinent Physical Exam At Time of Discharge  Physical Exam  Constitutional:       Appearance: Normal appearance.   HENT:      Head: Normocephalic and atraumatic.      Nose: Nose normal.      Mouth/Throat:      Mouth: Mucous membranes are moist.      Pharynx: Oropharynx is clear.   Eyes:      Extraocular Movements: Extraocular movements intact.      Conjunctiva/sclera: Conjunctivae normal.      Pupils: Pupils are equal, round, and reactive to light.   Cardiovascular:      Rate and Rhythm: Normal rate and regular rhythm.      Pulses: Normal pulses.      Heart  sounds: Normal heart sounds.   Pulmonary:      Effort: Pulmonary effort is normal.      Breath sounds: Normal breath sounds.   Abdominal:      General: Abdomen is flat. Bowel sounds are normal.      Palpations: Abdomen is soft.   Musculoskeletal:         General: Normal range of motion.   Skin:     General: Skin is warm and dry.   Neurological:      General: No focal deficit present.      Mental Status: He is alert and oriented to person, place, and time. Mental status is at baseline.   Psychiatric:         Mood and Affect: Mood normal.         Behavior: Behavior normal.         Thought Content: Thought content normal.         Judgment: Judgment normal.         Home Medications     Medication List      START taking these medications     fluconazole 200 mg tablet; Commonly known as: Diflucan; Take 2 tablets   (400 mg) by mouth once daily. Do not start before November 8, 2023.   levETIRAcetam 500 mg tablet; Commonly known as: Keppra; Take 1 tablet   (500 mg) by mouth 2 times a day.   levoFLOXacin 500 mg tablet; Commonly known as: Levaquin; Take 1 tablet   (500 mg) by mouth once every 24 hours. Do not start before November 8, 2023.   lisinopril 5 mg tablet; Take 1 tablet (5 mg) by mouth once daily. Do not   start before November 8, 2023.   ondansetron 8 mg tablet; Commonly known as: Zofran; Take 1 tablet (8 mg)   by mouth every 8 hours if needed for nausea for up to 21 days.   sulfamethoxazole-trimethoprim 800-160 mg tablet; Commonly known as:   Bactrim DS; Take 1 tablet by mouth once a day on Monday, Wednesday, and   Friday. Do not start before November 27, 2023.; Start taking on: November 27, 2023   ursodiol 300 mg capsule; Commonly known as: Actigall; Take 1 capsule   (300 mg) by mouth 3 times a day.     CONTINUE taking these medications     acyclovir 400 mg tablet; Commonly known as: Zovirax   Eliquis 5 mg tablet; Generic drug: apixaban   traZODone 100 mg tablet; Commonly known as: Desyrel; 3 tablets nightly    at bedtime     STOP taking these medications     acetaminophen 325 mg tablet; Commonly known as: Tylenol       Outpatient Follow-Up  Future Appointments   Date Time Provider Department [place]   11/10/2023  8:00 AM [facility] 2F BMT POST TRANSPLANT [address]   11/10/2023  8:00 AM [facility] SCT TREATMENT ROOM 8 [address]   11/14/2023 11:00 AM [facility] 2F BMT POST TRANSPLANT [address]   11/14/2023 11:00 AM [facility] SCT TREATMENT ROOM 6 [address]   11/16/2023  1:00 PM [facility] 2F BMT POST TRANSPLANT [address]   11/16/2023  1:00 PM [facility] SCT TREATMENT ROOM 6 [address]   11/21/2023  4:00 PM Wil Resendez MD PhD [id number] [facility]   2/28/2024  8:30 AM Timothy Almodovar MD [id number] [facility]       Shilpa Holt PA-C

## 2023-11-08 NOTE — PROGRESS NOTES
"Maldonado Mccloud is a 73 y.o. male on day 19 of admission presenting with Multiple myeloma without remission (CMS/HCC).    Subjective   Has no complaints. Sitting on couch with wife. Feeling good, says he has \"been ready to go home,\" just waiting to be able to leave. Eating well. Denies CP, SOB, N/V/D/C, abd pain. Wife states he has been \"wobbly\" while walking, patient says this is due to his neuropathy. Patient ambulating well in room.      Objective   Physical Exam  Constitutional:       General: He is not in acute distress.     Appearance: Normal appearance. He is not ill-appearing.   HENT:      Head: Normocephalic and atraumatic.      Nose: Nose normal.      Mouth/Throat:      Mouth: Mucous membranes are moist.      Pharynx: Oropharynx is clear.   Eyes:      Extraocular Movements: Extraocular movements intact.      Conjunctiva/sclera: Conjunctivae normal.      Pupils: Pupils are equal, round, and reactive to light.   Cardiovascular:      Rate and Rhythm: Normal rate and regular rhythm.   Pulmonary:      Effort: Pulmonary effort is normal.      Breath sounds: Normal breath sounds.   Abdominal:      General: Abdomen is flat. Bowel sounds are normal.      Palpations: Abdomen is soft.   Musculoskeletal:         General: Normal range of motion.      Cervical back: Normal range of motion and neck supple.   Skin:     General: Skin is warm and dry.   Neurological:      General: No focal deficit present.      Mental Status: He is alert and oriented to person, place, and time.   Psychiatric:         Mood and Affect: Mood normal.         Behavior: Behavior normal.     Last Recorded Vitals  Blood pressure 105/71, pulse 81, temperature 36.9 °C (98.4 °F), temperature source Temporal, resp. rate 18, height 1.775 m (5' 9.88\"), weight 95.8 kg (211 lb 3.2 oz), SpO2 97 %.  Intake/Output last 3 Shifts:  I/O last 3 completed shifts:  In: 300 (3.1 mL/kg) [P.O.:300]  Out: - (0 mL/kg)   Weight: 95.8 kg     Assessment/Plan   Principal " Problem:    Multiple myeloma without remission (CMS/HCC)    Mr. Maldonado Mccloud is a 73 year old male with a PMH of multiple myeloma s/p Auto SCT on 12/13/15 and then multiple lines of treatment (Revlimid, Vacto/Pom trial, Sarclista/Kyprolis), most recently (8/2023) on Cytoxan/Kyprolis and s/p radiation treatments x 8 fractions on 7/3/23, LLE DVT 2022 (on Eliquis).  Admitted for CAR T cell therapy for his ISS stage 2 Kappa multiple myeloma with ABECMA (T0 = 10/25/23).     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   -Myeloma markers (10/2/23) - KFLC 6.79, LFLC 0.31, IgG 670, IgM 7, IgA <7  -Collected T cells 5/26/23  - Admitted for CAR-T cell therapy-ABECMA (Idecabtagene vicleucel) prep with Flu/Cy (T0 = 10/25/23)     CHEMO:  -Fludarabine 65 mg IV on days T-5 to T-3 (10/20 - 10/22/23)  -Cyclophosphamide 657 mg IV on days T-5 to T-3 (10/20 - 10/22/23)  -CAR T-cell ABECMA (Idecabtagene vicleucel) T0 = 10/25/23  -Plan to start Keppra 500mg BID on T-1 (10/24)     Daily Evaluation:   Ferritin: 210 (10/24), 206 (10/25) 339 (10/27) 381 (10/28) 390 (10/29), 368 (10/30), 323 (10/31), 323 (11/1), 270 (11/2)     CRP: 1.64 (10/24), 1.00 (10/25) 2.5 (10/27) 6.24 (10/28) 2.4 (10/29), 1.33 (10/30), 0.89 (10/31), 0.64 (11/1), 0.48 (11/2)     Fibrinogen: 244 (10/24), 260 (10/25) 337 (10/27) 348 (10/28) 260 (10/29), 206 (10/30), 206 (10/31), 169 (11/1), 148 (11/2)      Coags: 1.3 (10/24), 1.2 (10/25)   ICE Score : 10/10      #CRS  Assessment:   In last 24 hours: Fever: NO; Any O2 supplement? NO     #Neurotoxicity Assessment:   ICANS Score: 10/10  Handwriting/Signature impaired:  no  Motor/Sensory deficit: No  Other abnormal Neuro symptom: N/A  Imaging/Fundoscopy finding: N/A     #CRS ndGndrndanddndend:nd nd2nd Organ/System affected by CRS: fever  Date of CRS onset: 10/26, 10/27  Date of highest CRS Grade: 10/26, 10/27  Date of CRS resolution to Grade 1 or lower: 10/27     #ICANS ndGndrndanddndend:nd nd2nd Date of Neurotoxicity onset: 10/26, 10/27  Date of highest Neurotoxicity ndGndrndanddndend:nd nd2nd Date of Neurotoxicity resolution to Grade 1 or lower: 10/27  Neurotox management:   -Dexamethasone 10mg IVP (10/26, 10/27, 10/28)  -tocilizumab (10/27)     #Management:   1. Tocilizumab 8mg/kg: 10/27  2. Anakinra 100mcg/day: N/A  3. Siltuximab 5mg/kg: date & time administered (not started)   4. Steroids: dexamethasone 10mg 10/26, 10/27, 10/28  5. Other agents/management:  Tylenol, Meropenem (10/26), (10/27)     HEME:  # Pancytopenia  - Tx for hgb < 7, plt < 10  # Hx/o LLE DVT 2022, cont Eliquis, hold for plt count < 50k  - apixaban decreased to 2.5mg BID for thrombocytopenia --> increased back to 5mg BID (11/3)     ID:  Allergies to PCN (rash) & Pip-Tazo (rash)  # PPX: acyclovir, fluconazole   # Check CMV PCR 2x/week: 10/26 Negative, 10/30 Negative, 11/2 detected (<35), 11/6 pending  # Non-neutropenic FEVER (10/26), (10/27)  Ddx: CRS vs infection (less likely given negative infectious workup)   - UA 10/26 neg  - bl cx 10/26 no growth  - CXR 10/26, 10/27 neg for infx  - C/w meropenem (10/26-11/5); continued for neutropenia --> Transitioned back to Levaquin on 11/5 since still neutropenic      CARDIAC:  -Echo (9/22/23) EF 50%  # Hx/o A. fib 2015  # HTN - no home meds.  Cont to follow.  If persists may need to begin antihypertensives.   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     PSYCH:  #Hx of Insomnia  -Continue home Trazadone 300mg HS     DERM  #Diffuse macular rash on trunk, c/f drug reaction, resolved  -Allopurinol discontinued with improvement   -PRN benadryl cream available      DISPO  -Full code-confirmed on admit  -Access: double non solo PICC  -primary oncologist: Dr. Adam MD PhD

## 2023-11-10 ENCOUNTER — TREATMENT (OUTPATIENT)
Dept: OTHER | Facility: HOSPITAL | Age: 73
End: 2023-11-10
Payer: COMMERCIAL

## 2023-11-10 ENCOUNTER — OFFICE VISIT (OUTPATIENT)
Dept: OTHER | Facility: HOSPITAL | Age: 73
End: 2023-11-10
Payer: COMMERCIAL

## 2023-11-10 ENCOUNTER — OFFICE VISIT (OUTPATIENT)
Dept: HEMATOLOGY/ONCOLOGY | Facility: HOSPITAL | Age: 73
End: 2023-11-10
Payer: COMMERCIAL

## 2023-11-10 ENCOUNTER — PHARMACY VISIT (OUTPATIENT)
Dept: PHARMACY | Facility: CLINIC | Age: 73
End: 2023-11-10
Payer: MEDICARE

## 2023-11-10 VITALS
RESPIRATION RATE: 18 BRPM | SYSTOLIC BLOOD PRESSURE: 122 MMHG | WEIGHT: 216.27 LBS | BODY MASS INDEX: 31.14 KG/M2 | OXYGEN SATURATION: 99 % | DIASTOLIC BLOOD PRESSURE: 85 MMHG | TEMPERATURE: 98.2 F | HEART RATE: 92 BPM

## 2023-11-10 DIAGNOSIS — C90.00 IGG MULTIPLE MYELOMA (MULTI): ICD-10-CM

## 2023-11-10 DIAGNOSIS — G89.3 NEOPLASM RELATED PAIN: ICD-10-CM

## 2023-11-10 DIAGNOSIS — I82.402 ACUTE THROMBOEMBOLISM OF DEEP VEINS OF LEFT LOWER EXTREMITY (MULTI): ICD-10-CM

## 2023-11-10 DIAGNOSIS — I48.0 PAROXYSMAL ATRIAL FIBRILLATION (MULTI): ICD-10-CM

## 2023-11-10 DIAGNOSIS — C90.00 IGG MULTIPLE MYELOMA (MULTI): Primary | ICD-10-CM

## 2023-11-10 DIAGNOSIS — G62.2 POLYNEUROPATHY DUE TO OTHER TOXIC AGENTS (MULTI): ICD-10-CM

## 2023-11-10 DIAGNOSIS — Z92.850 HISTORY OF CHIMERIC ANTIGEN RECEPTOR T-CELL THERAPY: ICD-10-CM

## 2023-11-10 PROBLEM — R05.1 ACUTE COUGH: Status: RESOLVED | Noted: 2023-08-22 | Resolved: 2023-11-10

## 2023-11-10 PROBLEM — S69.91XA FINGER INJURY, RIGHT, INITIAL ENCOUNTER: Status: RESOLVED | Noted: 2023-03-14 | Resolved: 2023-11-10

## 2023-11-10 PROBLEM — I10 HTN (HYPERTENSION): Status: ACTIVE | Noted: 2023-03-14

## 2023-11-10 PROBLEM — S42.002A FRACTURE OF CLAVICLE, LEFT, CLOSED: Status: RESOLVED | Noted: 2023-03-14 | Resolved: 2023-11-10

## 2023-11-10 PROBLEM — M25.551 ACUTE PAIN OF RIGHT HIP: Status: RESOLVED | Noted: 2023-03-14 | Resolved: 2023-11-10

## 2023-11-10 PROBLEM — R50.9 FEVER: Status: RESOLVED | Noted: 2023-10-02 | Resolved: 2023-11-10

## 2023-11-10 PROBLEM — M48.02 CERVICAL STENOSIS OF SPINE: Status: RESOLVED | Noted: 2023-03-14 | Resolved: 2023-11-10

## 2023-11-10 PROBLEM — N52.9 ERECTILE DYSFUNCTION: Status: RESOLVED | Noted: 2023-03-14 | Resolved: 2023-11-10

## 2023-11-10 PROBLEM — M89.9 BONE LESION: Status: RESOLVED | Noted: 2023-03-14 | Resolved: 2023-11-10

## 2023-11-10 PROBLEM — M79.89 SWELLING OF EXTREMITY, LEFT: Status: RESOLVED | Noted: 2023-03-14 | Resolved: 2023-11-10

## 2023-11-10 PROBLEM — R94.4 DECREASED GFR: Status: RESOLVED | Noted: 2023-03-14 | Resolved: 2023-11-10

## 2023-11-10 PROBLEM — M75.52 BURSITIS OF LEFT SHOULDER: Status: RESOLVED | Noted: 2023-08-22 | Resolved: 2023-11-10

## 2023-11-10 PROBLEM — I95.1 AUTONOMIC ORTHOSTATIC HYPOTENSION: Status: RESOLVED | Noted: 2023-03-14 | Resolved: 2023-11-10

## 2023-11-10 LAB
ALBUMIN SERPL BCP-MCNC: 3.9 G/DL (ref 3.4–5)
ALP SERPL-CCNC: 63 U/L (ref 33–136)
ALT SERPL W P-5'-P-CCNC: 13 U/L (ref 10–52)
ANION GAP SERPL CALC-SCNC: 10 MMOL/L (ref 10–20)
AST SERPL W P-5'-P-CCNC: 17 U/L (ref 9–39)
BASOPHILS # BLD AUTO: 0.02 X10*3/UL (ref 0–0.1)
BASOPHILS NFR BLD AUTO: 1.7 %
BILIRUB SERPL-MCNC: 0.6 MG/DL (ref 0–1.2)
BUN SERPL-MCNC: 28 MG/DL (ref 6–23)
CALCIUM SERPL-MCNC: 8.9 MG/DL (ref 8.6–10.3)
CHLORIDE SERPL-SCNC: 111 MMOL/L (ref 98–107)
CO2 SERPL-SCNC: 27 MMOL/L (ref 21–32)
CREAT SERPL-MCNC: 1.29 MG/DL (ref 0.5–1.3)
CRP SERPL-MCNC: 0.11 MG/DL
DACRYOCYTES BLD QL SMEAR: NORMAL
EOSINOPHIL # BLD AUTO: 0.06 X10*3/UL (ref 0–0.4)
EOSINOPHIL NFR BLD AUTO: 5 %
ERYTHROCYTE [DISTWIDTH] IN BLOOD BY AUTOMATED COUNT: 14 % (ref 11.5–14.5)
FERRITIN SERPL-MCNC: 179 NG/ML (ref 20–300)
FIBRINOGEN PPP-MCNC: 157 MG/DL (ref 200–400)
GFR SERPL CREATININE-BSD FRML MDRD: 58 ML/MIN/1.73M*2
GLUCOSE SERPL-MCNC: 114 MG/DL (ref 74–99)
HCT VFR BLD AUTO: 31.7 % (ref 41–52)
HGB BLD-MCNC: 10.9 G/DL (ref 13.5–17.5)
HOLD SPECIMEN: NORMAL
IMM GRANULOCYTES # BLD AUTO: 0.02 X10*3/UL (ref 0–0.5)
IMM GRANULOCYTES NFR BLD AUTO: 1.7 % (ref 0–0.9)
LDH SERPL L TO P-CCNC: 162 U/L (ref 84–246)
LYMPHOCYTES # BLD AUTO: 0.26 X10*3/UL (ref 0.8–3)
LYMPHOCYTES NFR BLD AUTO: 21.5 %
MAGNESIUM SERPL-MCNC: 1.76 MG/DL (ref 1.6–2.4)
MCH RBC QN AUTO: 35.7 PG (ref 26–34)
MCHC RBC AUTO-ENTMCNC: 34.4 G/DL (ref 32–36)
MCV RBC AUTO: 104 FL (ref 80–100)
MONOCYTES # BLD AUTO: 0.3 X10*3/UL (ref 0.05–0.8)
MONOCYTES NFR BLD AUTO: 24.8 %
NEUTROPHILS # BLD AUTO: 0.55 X10*3/UL (ref 1.6–5.5)
NEUTROPHILS NFR BLD AUTO: 45.3 %
NRBC BLD-RTO: 0 /100 WBCS (ref 0–0)
OVALOCYTES BLD QL SMEAR: NORMAL
PLATELET # BLD AUTO: 86 X10*3/UL (ref 150–450)
POLYCHROMASIA BLD QL SMEAR: NORMAL
POTASSIUM SERPL-SCNC: 4.3 MMOL/L (ref 3.5–5.3)
PROT SERPL-MCNC: 5.7 G/DL (ref 6.4–8.2)
RBC # BLD AUTO: 3.05 X10*6/UL (ref 4.5–5.9)
RBC MORPH BLD: NORMAL
SODIUM SERPL-SCNC: 144 MMOL/L (ref 136–145)
URATE SERPL-MCNC: 6 MG/DL (ref 4–7.5)
WBC # BLD AUTO: 1.2 X10*3/UL (ref 4.4–11.3)

## 2023-11-10 PROCEDURE — 99215 OFFICE O/P EST HI 40 MIN: CPT

## 2023-11-10 PROCEDURE — 3079F DIAST BP 80-89 MM HG: CPT

## 2023-11-10 PROCEDURE — 86140 C-REACTIVE PROTEIN: CPT

## 2023-11-10 PROCEDURE — 1160F RVW MEDS BY RX/DR IN RCRD: CPT

## 2023-11-10 PROCEDURE — 83615 LACTATE (LD) (LDH) ENZYME: CPT

## 2023-11-10 PROCEDURE — 82728 ASSAY OF FERRITIN: CPT

## 2023-11-10 PROCEDURE — 83735 ASSAY OF MAGNESIUM: CPT

## 2023-11-10 PROCEDURE — 85025 COMPLETE CBC W/AUTO DIFF WBC: CPT

## 2023-11-10 PROCEDURE — 1159F MED LIST DOCD IN RCRD: CPT

## 2023-11-10 PROCEDURE — 84550 ASSAY OF BLOOD/URIC ACID: CPT

## 2023-11-10 PROCEDURE — 3074F SYST BP LT 130 MM HG: CPT

## 2023-11-10 PROCEDURE — 36415 COLL VENOUS BLD VENIPUNCTURE: CPT

## 2023-11-10 PROCEDURE — 85384 FIBRINOGEN ACTIVITY: CPT

## 2023-11-10 PROCEDURE — 1111F DSCHRG MED/CURRENT MED MERGE: CPT

## 2023-11-10 PROCEDURE — 80053 COMPREHEN METABOLIC PANEL: CPT

## 2023-11-10 PROCEDURE — 1125F AMNT PAIN NOTED PAIN PRSNT: CPT

## 2023-11-10 RX ORDER — TRAMADOL HYDROCHLORIDE 50 MG/1
50 TABLET ORAL EVERY 6 HOURS PRN
Qty: 20 TABLET | Refills: 0 | Status: SHIPPED | OUTPATIENT
Start: 2023-11-10 | End: 2023-11-14 | Stop reason: ALTCHOICE

## 2023-11-10 ASSESSMENT — ENCOUNTER SYMPTOMS
MYALGIAS: 1
ARTHRALGIAS: 1
FATIGUE: 1
NUMBNESS: 1

## 2023-11-10 ASSESSMENT — PAIN DESCRIPTION - DESCRIPTORS: DESCRIPTORS: ACHING

## 2023-11-10 ASSESSMENT — PAIN SCALES - GENERAL: PAINLEVEL_OUTOF10: 3

## 2023-11-10 ASSESSMENT — PAIN - FUNCTIONAL ASSESSMENT: PAIN_FUNCTIONAL_ASSESSMENT: 0-10

## 2023-11-10 NOTE — ASSESSMENT & PLAN NOTE
Chronic b/l lower extremity chemo induced neuropathy below knees - stable.     Now complaining of discomfort in bilateral arms and right leg at sites of previous radiation for myeloma lesions x1 week; likely related to inflammatory process 2/2 CAR T therapy as occurred a few days after stopping dexamethasone.  - pain not significant enough for pain medications, was taking APAP. Asked patient to refrain from acetaminophen while neutropenic. Tramadol sent to pharmacy.

## 2023-11-10 NOTE — PROGRESS NOTES
Pt ambulated to SCT unit without assistance. Pt denies complaints today. Pt reports tolerable pain in both arms and right leg, provider Manoj Xiong made aware. Vitals obtained, blood drawn peripherally and sent to lab. PARIS Barragan came to see patient. Pt ambulated off unit without complaints or assistance.

## 2023-11-10 NOTE — PROGRESS NOTES
Oncology Pharmacy Medication Education Note   Maldonado Mccloud is a 73 y.o. male patient currently day + 16 following Abecma CAR T-cell therapy for a diagnosis of multiple myeloma. Pharmacist was consulted to provide home medication education.     Medication Education: Education was provided regarding all home medication changes. The schedule was discussed in detail with the patient, including drug name, use and dose, and the appropriate timing of self-administration.   - Medication changes: none    Pain: The patient has been experiencing mild pain in his arms and legs. This has been controlled at home with acetaminophen. Since his ANC is 550 today, I recommended he stop acetaminophen therapy in order to avoid masking a fever. I suggested tramadol at home to control the pain, but he believes he can manage the pain without medications. A prescription for tramadol was sent to [facility] pharmacy in case his pain worsens over the weekend.    PJP Prophylaxis: The patient is not currently on PJP prophylaxis. Continue to monitor counts to determine appropriate timing to start therapy closer to day + 30. I confirmed that the patient has Bactrim DS tablets available at home.    The patient demonstrated excellent understanding of their current medication list.    All questions were answered. Patient/family verbalized understanding of the plan of care and information provided. Will follow as necessary. Time spent with patient/family and/or coordinating care: 45 minutes.    Jacquie Sanchez, PharmD, [facility]  Ambulatory Bone and Marrow Transplant Pharmacist    Medication Indication 7am 9am 3pm 9pm   Prophylactic Medications   Levofloxacin (Levaquin®)  500 mg tablet Prevents Bacterial Infection   1 tab      Acyclovir (Zovirax®)  400 mg tablet Prevents Viral Infections  1 tab  1 tab   Fluconazole (Diflucan®)  200 mg tablet Prevents Fungal Infections  2 tabs     Sulfamethoxazole-Trimethoprim  (Bactrim DS)  800-160 mg tablet Prevents  PCP Pneumonia HOLD   Levetiracetam (Keppra®)    500 mg tablet Prevents Seizures  1 tab  1 tab   Ursodiol (Actigall®)  300 mg capsule  Prevents liver occlusion   1 cap  1 cap 1 cap   Maintenance Medications   Apixaban (Eliquis®)  5 mg tablet  Prevents Blood Clots  1 tab  1 tab   Lisinopril (Zestril®)  5 mg tablet Prevents High Blood Pressure   1 tab      Trazodone  100 mg tablet Insomnia    3 tabs   As Needed Medications   Ondansetron (Zofran®)  8 mg tablet Nausea 1 tab every 8 hours as needed

## 2023-11-10 NOTE — ASSESSMENT & PLAN NOTE
Now T+16 s/p CAR T with Abecma. No current s/sx CRS or neurotox, see overview for hospital course.     Continue infectious prophylaxis with acyclovir and fluconazole (until day T+90). Plan to start Bactrim DS M/W/F closer to day 30.    Will continue Levaquin 500 mg daily until ANC recovery.      Patient lives a little less than 1 hour away, cleared patient to return home if they did not want to continue staying at hotel locally. Aware of reportable symptoms.   Continue Keppra and Actigall until T+30.

## 2023-11-10 NOTE — PROGRESS NOTES
Patient ID: Maldonado Mccloud is a 73 y.o. male.    Subjective    Patient states that he feels pretty good today, although he continues to be bothered by fatigue. Denies nausea, vomiting, or diarrhea; eating and drinking adequately. Also, he is without any s/sx CRS or neurotox; denies headaches, dizziness, speech changes, or falls. Now also complaining of discomfort and feeling achy at sites of previous radiation sites where plasmacytomas had developed.     Chronic neuropathy, stable.     Staying at [facility], locally.          Presents today for follow up visit, accompanied by wife.      Review of Systems   Constitutional:  Positive for fatigue.   Musculoskeletal:  Positive for arthralgias and myalgias.   Neurological:  Positive for numbness.   All other systems reviewed and are negative.      Objective    BSA: 2.2 meters squared  /85 (BP Location: Left arm, Patient Position: Sitting)   Pulse 92   Temp 36.8 °C (98.2 °F) (Skin)   Resp 18   Wt 98.1 kg (216 lb 4.3 oz)   SpO2 99%   BMI 31.14 kg/m²   Vital signs reviewed today.      Physical Exam  Vitals reviewed.   Constitutional:       Appearance: Normal appearance.   HENT:      Head: Normocephalic.      Mouth/Throat:      Mouth: Mucous membranes are moist.   Eyes:      Extraocular Movements: Extraocular movements intact.      Pupils: Pupils are equal, round, and reactive to light.   Cardiovascular:      Rate and Rhythm: Normal rate and regular rhythm.   Pulmonary:      Effort: Pulmonary effort is normal.      Breath sounds: Normal breath sounds.   Abdominal:      General: Abdomen is flat. Bowel sounds are normal.      Palpations: Abdomen is soft.   Musculoskeletal:         General: Normal range of motion.   Skin:     General: Skin is warm and dry.   Neurological:      General: No focal deficit present.      Mental Status: He is alert and oriented to person, place, and time.   Psychiatric:         Mood and Affect: Mood normal.         Behavior: Behavior normal.          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     Now complaining of discomfort in bilateral arms and right leg at sites of previous radiation for myeloma lesions x1 week; likely related to inflammatory process 2/2 CAR T therapy as occurred a few days after stopping dexamethasone.  - pain not significant enough for pain medications, was taking APAP. Asked patient to refrain from acetaminophen while neutropenic. Tramadol sent to pharmacy.      Acute thromboembolism of deep veins of left lower extremity (CMS/HCC)  Continue Eliquis, plt count stable >50k    History of chimeric antigen receptor T-cell therapy  Now T+16 s/p CAR T with Abecma. No current s/sx CRS or neurotox, see overview for hospital course.     Continue infectious prophylaxis with acyclovir and fluconazole (until day T+90). Plan to start Bactrim DS M/W/F closer to day 30.    Will continue Levaquin 500 mg daily until ANC recovery.      Patient lives a little less than 1 hour away, cleared patient to return home if they did not want to continue staying at hotel locally. Aware of reportable symptoms.   Continue Keppra and Actigall until T+30.     Reviewed future appointments.        Manoj Xiong, APRN-CNP

## 2023-11-12 ENCOUNTER — HOSPITAL ENCOUNTER (OUTPATIENT)
Facility: HOSPITAL | Age: 73
Setting detail: OBSERVATION
Discharge: HOME | DRG: 543 | End: 2023-11-13
Attending: STUDENT IN AN ORGANIZED HEALTH CARE EDUCATION/TRAINING PROGRAM | Admitting: INTERNAL MEDICINE
Payer: COMMERCIAL

## 2023-11-12 ENCOUNTER — APPOINTMENT (OUTPATIENT)
Dept: RADIOLOGY | Facility: HOSPITAL | Age: 73
DRG: 543 | End: 2023-11-12
Payer: COMMERCIAL

## 2023-11-12 DIAGNOSIS — C90.00 MULTIPLE MYELOMA, REMISSION STATUS UNSPECIFIED (MULTI): ICD-10-CM

## 2023-11-12 DIAGNOSIS — M84.534A: Primary | ICD-10-CM

## 2023-11-12 DIAGNOSIS — C90.00 MULTIPLE MYELOMA WITHOUT REMISSION (MULTI): ICD-10-CM

## 2023-11-12 LAB
ABO GROUP (TYPE) IN BLOOD: NORMAL
ALBUMIN SERPL BCP-MCNC: 4.1 G/DL (ref 3.4–5)
ALP SERPL-CCNC: 53 U/L (ref 33–136)
ALT SERPL W P-5'-P-CCNC: 15 U/L (ref 10–52)
ANION GAP SERPL CALC-SCNC: 13 MMOL/L (ref 10–20)
ANTIBODY SCREEN: NORMAL
AST SERPL W P-5'-P-CCNC: 21 U/L (ref 9–39)
BASOPHILS # BLD MANUAL: 0.06 X10*3/UL (ref 0–0.1)
BASOPHILS NFR BLD MANUAL: 3.3 %
BILIRUB SERPL-MCNC: 0.8 MG/DL (ref 0–1.2)
BUN SERPL-MCNC: 27 MG/DL (ref 6–23)
CA-I BLD-SCNC: 1.14 MMOL/L (ref 1.1–1.33)
CALCIUM SERPL-MCNC: 9.1 MG/DL (ref 8.6–10.6)
CHLORIDE SERPL-SCNC: 109 MMOL/L (ref 98–107)
CMV DNA SERPL NAA+PROBE-LOG IU: NORMAL {LOG_IU}/ML
CO2 SERPL-SCNC: 24 MMOL/L (ref 21–32)
CREAT SERPL-MCNC: 1.2 MG/DL (ref 0.5–1.3)
DACRYOCYTES BLD QL SMEAR: ABNORMAL
EOSINOPHIL # BLD MANUAL: 0 X10*3/UL (ref 0–0.4)
EOSINOPHIL NFR BLD MANUAL: 0 %
ERYTHROCYTE [DISTWIDTH] IN BLOOD BY AUTOMATED COUNT: 13.5 % (ref 11.5–14.5)
GFR SERPL CREATININE-BSD FRML MDRD: 63 ML/MIN/1.73M*2
GLUCOSE SERPL-MCNC: 94 MG/DL (ref 74–99)
HCT VFR BLD AUTO: 33.5 % (ref 41–52)
HGB BLD-MCNC: 11.4 G/DL (ref 13.5–17.5)
HOLD SPECIMEN: NORMAL
HOLD SPECIMEN: NORMAL
IMM GRANULOCYTES # BLD AUTO: 0.01 X10*3/UL (ref 0–0.5)
IMM GRANULOCYTES NFR BLD AUTO: 0.5 % (ref 0–0.9)
INR PPP: 1.1 (ref 0.9–1.1)
LABORATORY COMMENT REPORT: NOT DETECTED
LYMPHOCYTES # BLD MANUAL: 0.31 X10*3/UL (ref 0.8–3)
LYMPHOCYTES NFR BLD MANUAL: 16.5 %
MCH RBC QN AUTO: 35.6 PG (ref 26–34)
MCHC RBC AUTO-ENTMCNC: 34 G/DL (ref 32–36)
MCV RBC AUTO: 105 FL (ref 80–100)
MONOCYTES # BLD MANUAL: 0.29 X10*3/UL (ref 0.05–0.8)
MONOCYTES NFR BLD MANUAL: 15.4 %
NEUTS SEG # BLD MANUAL: 1.23 X10*3/UL (ref 1.6–5)
NEUTS SEG NFR BLD MANUAL: 64.8 %
NRBC BLD-RTO: 0 /100 WBCS (ref 0–0)
OVALOCYTES BLD QL SMEAR: ABNORMAL
PLATELET # BLD AUTO: 68 X10*3/UL (ref 150–450)
POLYCHROMASIA BLD QL SMEAR: ABNORMAL
POTASSIUM SERPL-SCNC: 4.5 MMOL/L (ref 3.5–5.3)
PROT SERPL-MCNC: 5.7 G/DL (ref 6.4–8.2)
PROTHROMBIN TIME: 12.8 SECONDS (ref 9.8–12.8)
RBC # BLD AUTO: 3.2 X10*6/UL (ref 4.5–5.9)
RBC MORPH BLD: ABNORMAL
RH FACTOR (ANTIGEN D): NORMAL
SODIUM SERPL-SCNC: 141 MMOL/L (ref 136–145)
TOTAL CELLS COUNTED BLD: 91
WBC # BLD AUTO: 1.9 X10*3/UL (ref 4.4–11.3)

## 2023-11-12 PROCEDURE — G0378 HOSPITAL OBSERVATION PER HR: HCPCS

## 2023-11-12 PROCEDURE — 73110 X-RAY EXAM OF WRIST: CPT | Mod: RT

## 2023-11-12 PROCEDURE — 99222 1ST HOSP IP/OBS MODERATE 55: CPT

## 2023-11-12 PROCEDURE — 99285 EMERGENCY DEPT VISIT HI MDM: CPT | Mod: 25 | Performed by: STUDENT IN AN ORGANIZED HEALTH CARE EDUCATION/TRAINING PROGRAM

## 2023-11-12 PROCEDURE — 73090 X-RAY EXAM OF FOREARM: CPT | Mod: RIGHT SIDE | Performed by: RADIOLOGY

## 2023-11-12 PROCEDURE — 2500000001 HC RX 250 WO HCPCS SELF ADMINISTERED DRUGS (ALT 637 FOR MEDICARE OP)

## 2023-11-12 PROCEDURE — 71045 X-RAY EXAM CHEST 1 VIEW: CPT | Performed by: RADIOLOGY

## 2023-11-12 PROCEDURE — 85007 BL SMEAR W/DIFF WBC COUNT: CPT | Performed by: STUDENT IN AN ORGANIZED HEALTH CARE EDUCATION/TRAINING PROGRAM

## 2023-11-12 PROCEDURE — 2500000004 HC RX 250 GENERAL PHARMACY W/ HCPCS (ALT 636 FOR OP/ED): Performed by: STUDENT IN AN ORGANIZED HEALTH CARE EDUCATION/TRAINING PROGRAM

## 2023-11-12 PROCEDURE — 1210000001 HC SEMI-PRIVATE ROOM DAILY

## 2023-11-12 PROCEDURE — 86901 BLOOD TYPING SEROLOGIC RH(D): CPT | Performed by: STUDENT IN AN ORGANIZED HEALTH CARE EDUCATION/TRAINING PROGRAM

## 2023-11-12 PROCEDURE — 73080 X-RAY EXAM OF ELBOW: CPT | Mod: RIGHT SIDE | Performed by: RADIOLOGY

## 2023-11-12 PROCEDURE — 2500000004 HC RX 250 GENERAL PHARMACY W/ HCPCS (ALT 636 FOR OP/ED)

## 2023-11-12 PROCEDURE — 71045 X-RAY EXAM CHEST 1 VIEW: CPT | Mod: FY

## 2023-11-12 PROCEDURE — 93010 ELECTROCARDIOGRAM REPORT: CPT | Performed by: STUDENT IN AN ORGANIZED HEALTH CARE EDUCATION/TRAINING PROGRAM

## 2023-11-12 PROCEDURE — 73090 X-RAY EXAM OF FOREARM: CPT | Mod: RT

## 2023-11-12 PROCEDURE — 99285 EMERGENCY DEPT VISIT HI MDM: CPT | Performed by: STUDENT IN AN ORGANIZED HEALTH CARE EDUCATION/TRAINING PROGRAM

## 2023-11-12 PROCEDURE — 82330 ASSAY OF CALCIUM: CPT | Performed by: STUDENT IN AN ORGANIZED HEALTH CARE EDUCATION/TRAINING PROGRAM

## 2023-11-12 PROCEDURE — 85610 PROTHROMBIN TIME: CPT | Performed by: STUDENT IN AN ORGANIZED HEALTH CARE EDUCATION/TRAINING PROGRAM

## 2023-11-12 PROCEDURE — 80053 COMPREHEN METABOLIC PANEL: CPT | Performed by: STUDENT IN AN ORGANIZED HEALTH CARE EDUCATION/TRAINING PROGRAM

## 2023-11-12 PROCEDURE — 73200 CT UPPER EXTREMITY W/O DYE: CPT | Mod: RT

## 2023-11-12 PROCEDURE — 85027 COMPLETE CBC AUTOMATED: CPT | Performed by: STUDENT IN AN ORGANIZED HEALTH CARE EDUCATION/TRAINING PROGRAM

## 2023-11-12 PROCEDURE — 73110 X-RAY EXAM OF WRIST: CPT | Mod: RIGHT SIDE | Performed by: RADIOLOGY

## 2023-11-12 PROCEDURE — 73080 X-RAY EXAM OF ELBOW: CPT | Mod: RT

## 2023-11-12 PROCEDURE — 73200 CT UPPER EXTREMITY W/O DYE: CPT | Mod: RIGHT SIDE | Performed by: RADIOLOGY

## 2023-11-12 RX ORDER — LEVETIRACETAM 500 MG/1
500 TABLET ORAL 2 TIMES DAILY
Status: DISCONTINUED | OUTPATIENT
Start: 2023-11-12 | End: 2023-11-13 | Stop reason: HOSPADM

## 2023-11-12 RX ORDER — URSODIOL 300 MG/1
300 CAPSULE ORAL 3 TIMES DAILY
Status: DISCONTINUED | OUTPATIENT
Start: 2023-11-12 | End: 2023-11-13 | Stop reason: HOSPADM

## 2023-11-12 RX ORDER — TRAMADOL HYDROCHLORIDE 50 MG/1
50 TABLET ORAL EVERY 6 HOURS PRN
Status: DISCONTINUED | OUTPATIENT
Start: 2023-11-12 | End: 2023-11-13 | Stop reason: HOSPADM

## 2023-11-12 RX ORDER — MORPHINE SULFATE 4 MG/ML
4 INJECTION INTRAVENOUS ONCE
Status: COMPLETED | OUTPATIENT
Start: 2023-11-12 | End: 2023-11-12

## 2023-11-12 RX ORDER — POLYETHYLENE GLYCOL 3350 17 G/17G
17 POWDER, FOR SOLUTION ORAL DAILY PRN
Status: DISCONTINUED | OUTPATIENT
Start: 2023-11-12 | End: 2023-11-13 | Stop reason: HOSPADM

## 2023-11-12 RX ORDER — TRAZODONE HYDROCHLORIDE 50 MG/1
300 TABLET ORAL NIGHTLY
Status: DISCONTINUED | OUTPATIENT
Start: 2023-11-12 | End: 2023-11-13 | Stop reason: HOSPADM

## 2023-11-12 RX ORDER — PANTOPRAZOLE SODIUM 40 MG/1
40 TABLET, DELAYED RELEASE ORAL
Status: DISCONTINUED | OUTPATIENT
Start: 2023-11-13 | End: 2023-11-13 | Stop reason: HOSPADM

## 2023-11-12 RX ORDER — FLUCONAZOLE 200 MG/1
400 TABLET ORAL DAILY
Status: DISCONTINUED | OUTPATIENT
Start: 2023-11-13 | End: 2023-11-13 | Stop reason: HOSPADM

## 2023-11-12 RX ORDER — MORPHINE SULFATE 4 MG/ML
INJECTION INTRAVENOUS
Status: DISPENSED
Start: 2023-11-12 | End: 2023-11-13

## 2023-11-12 RX ORDER — ACYCLOVIR 400 MG/1
400 TABLET ORAL 2 TIMES DAILY
Status: DISCONTINUED | OUTPATIENT
Start: 2023-11-12 | End: 2023-11-13 | Stop reason: HOSPADM

## 2023-11-12 RX ORDER — LEVOFLOXACIN 500 MG/1
500 TABLET, FILM COATED ORAL
Status: DISCONTINUED | OUTPATIENT
Start: 2023-11-13 | End: 2023-11-13 | Stop reason: HOSPADM

## 2023-11-12 RX ORDER — LISINOPRIL 5 MG/1
5 TABLET ORAL DAILY
Status: DISCONTINUED | OUTPATIENT
Start: 2023-11-13 | End: 2023-11-13 | Stop reason: HOSPADM

## 2023-11-12 RX ORDER — ONDANSETRON 4 MG/1
8 TABLET, FILM COATED ORAL EVERY 8 HOURS PRN
Status: DISCONTINUED | OUTPATIENT
Start: 2023-11-12 | End: 2023-11-13 | Stop reason: HOSPADM

## 2023-11-12 RX ADMIN — TRAZODONE HYDROCHLORIDE 300 MG: 50 TABLET ORAL at 22:14

## 2023-11-12 RX ADMIN — LEVETIRACETAM 500 MG: 500 TABLET, FILM COATED ORAL at 21:32

## 2023-11-12 RX ADMIN — MORPHINE SULFATE 4 MG: 4 INJECTION INTRAVENOUS at 21:30

## 2023-11-12 RX ADMIN — URSODIOL 300 MG: 300 CAPSULE ORAL at 21:05

## 2023-11-12 RX ADMIN — MORPHINE SULFATE 4 MG: 4 INJECTION INTRAVENOUS at 18:32

## 2023-11-12 RX ADMIN — ACYCLOVIR 400 MG: 400 TABLET ORAL at 21:32

## 2023-11-12 ASSESSMENT — LIFESTYLE VARIABLES
EVER HAD A DRINK FIRST THING IN THE MORNING TO STEADY YOUR NERVES TO GET RID OF A HANGOVER: NO
REASON UNABLE TO ASSESS: NO
HAVE YOU EVER FELT YOU SHOULD CUT DOWN ON YOUR DRINKING: NO
HAVE PEOPLE ANNOYED YOU BY CRITICIZING YOUR DRINKING: NO
EVER FELT BAD OR GUILTY ABOUT YOUR DRINKING: NO

## 2023-11-12 ASSESSMENT — PAIN SCALES - GENERAL: PAINLEVEL_OUTOF10: 5 - MODERATE PAIN

## 2023-11-12 ASSESSMENT — COLUMBIA-SUICIDE SEVERITY RATING SCALE - C-SSRS
6. HAVE YOU EVER DONE ANYTHING, STARTED TO DO ANYTHING, OR PREPARED TO DO ANYTHING TO END YOUR LIFE?: NO
1. IN THE PAST MONTH, HAVE YOU WISHED YOU WERE DEAD OR WISHED YOU COULD GO TO SLEEP AND NOT WAKE UP?: NO
2. HAVE YOU ACTUALLY HAD ANY THOUGHTS OF KILLING YOURSELF?: NO

## 2023-11-12 ASSESSMENT — PAIN - FUNCTIONAL ASSESSMENT: PAIN_FUNCTIONAL_ASSESSMENT: 0-10

## 2023-11-13 ENCOUNTER — CLINICAL SUPPORT (OUTPATIENT)
Dept: EMERGENCY MEDICINE | Facility: HOSPITAL | Age: 73
DRG: 543 | End: 2023-11-13
Payer: COMMERCIAL

## 2023-11-13 VITALS
DIASTOLIC BLOOD PRESSURE: 88 MMHG | BODY MASS INDEX: 30.06 KG/M2 | HEART RATE: 82 BPM | TEMPERATURE: 97.9 F | RESPIRATION RATE: 16 BRPM | SYSTOLIC BLOOD PRESSURE: 131 MMHG | WEIGHT: 210 LBS | HEIGHT: 70 IN | OXYGEN SATURATION: 98 %

## 2023-11-13 LAB
ALBUMIN SERPL BCP-MCNC: 4.2 G/DL (ref 3.4–5)
ALP SERPL-CCNC: 51 U/L (ref 33–136)
ALT SERPL W P-5'-P-CCNC: 13 U/L (ref 10–52)
ANION GAP SERPL CALC-SCNC: 12 MMOL/L (ref 10–20)
AST SERPL W P-5'-P-CCNC: 18 U/L (ref 9–39)
ATRIAL RATE: 76 BPM
BASOPHILS # BLD AUTO: 0.02 X10*3/UL (ref 0–0.1)
BASOPHILS NFR BLD AUTO: 1.9 %
BILIRUB DIRECT SERPL-MCNC: 0.1 MG/DL (ref 0–0.3)
BILIRUB SERPL-MCNC: 0.8 MG/DL (ref 0–1.2)
BUN SERPL-MCNC: 21 MG/DL (ref 6–23)
CALCIUM SERPL-MCNC: 9.1 MG/DL (ref 8.6–10.6)
CHLORIDE SERPL-SCNC: 108 MMOL/L (ref 98–107)
CO2 SERPL-SCNC: 27 MMOL/L (ref 21–32)
CREAT SERPL-MCNC: 1.12 MG/DL (ref 0.5–1.3)
CRP SERPL-MCNC: <0.1 MG/DL
EOSINOPHIL # BLD AUTO: 0.04 X10*3/UL (ref 0–0.4)
EOSINOPHIL NFR BLD AUTO: 3.7 %
ERYTHROCYTE [DISTWIDTH] IN BLOOD BY AUTOMATED COUNT: 13.8 % (ref 11.5–14.5)
FERRITIN SERPL-MCNC: 171 NG/ML (ref 20–300)
FIBRINOGEN PPP-MCNC: 165 MG/DL (ref 200–400)
GFR SERPL CREATININE-BSD FRML MDRD: 69 ML/MIN/1.73M*2
GLUCOSE SERPL-MCNC: 116 MG/DL (ref 74–99)
HCT VFR BLD AUTO: 32.8 % (ref 41–52)
HGB BLD-MCNC: 11.7 G/DL (ref 13.5–17.5)
HOLD SPECIMEN: NORMAL
IMM GRANULOCYTES # BLD AUTO: 0.01 X10*3/UL (ref 0–0.5)
IMM GRANULOCYTES NFR BLD AUTO: 0.9 % (ref 0–0.9)
LDH SERPL L TO P-CCNC: 156 U/L (ref 84–246)
LYMPHOCYTES # BLD AUTO: 0.22 X10*3/UL (ref 0.8–3)
LYMPHOCYTES NFR BLD AUTO: 20.6 %
MAGNESIUM SERPL-MCNC: 1.73 MG/DL (ref 1.6–2.4)
MCH RBC QN AUTO: 36.4 PG (ref 26–34)
MCHC RBC AUTO-ENTMCNC: 35.7 G/DL (ref 32–36)
MCV RBC AUTO: 102 FL (ref 80–100)
MONOCYTES # BLD AUTO: 0.27 X10*3/UL (ref 0.05–0.8)
MONOCYTES NFR BLD AUTO: 25.2 %
NEUTROPHILS # BLD AUTO: 0.51 X10*3/UL (ref 1.6–5.5)
NEUTROPHILS NFR BLD AUTO: 47.7 %
NRBC BLD-RTO: 0 /100 WBCS (ref 0–0)
P AXIS: 3 DEGREES
P OFFSET: 199 MS
P ONSET: 144 MS
PHOSPHATE SERPL-MCNC: 3.9 MG/DL (ref 2.5–4.9)
PLATELET # BLD AUTO: 62 X10*3/UL (ref 150–450)
POTASSIUM SERPL-SCNC: 3.9 MMOL/L (ref 3.5–5.3)
PR INTERVAL: 162 MS
PROT SERPL-MCNC: 6 G/DL (ref 6.4–8.2)
Q ONSET: 225 MS
QRS COUNT: 12 BEATS
QRS DURATION: 86 MS
QT INTERVAL: 370 MS
QTC CALCULATION(BAZETT): 416 MS
QTC FREDERICIA: 400 MS
R AXIS: -29 DEGREES
RBC # BLD AUTO: 3.21 X10*6/UL (ref 4.5–5.9)
RBC MORPH BLD: NORMAL
SARS-COV-2 RNA RESP QL NAA+PROBE: NOT DETECTED
SODIUM SERPL-SCNC: 143 MMOL/L (ref 136–145)
T AXIS: -7 DEGREES
T OFFSET: 410 MS
URATE SERPL-MCNC: 5.9 MG/DL (ref 4–7.5)
VENTRICULAR RATE: 76 BPM
WBC # BLD AUTO: 1.1 X10*3/UL (ref 4.4–11.3)

## 2023-11-13 PROCEDURE — 84100 ASSAY OF PHOSPHORUS: CPT

## 2023-11-13 PROCEDURE — 84550 ASSAY OF BLOOD/URIC ACID: CPT

## 2023-11-13 PROCEDURE — 24650 CLTX RDL HEAD/NCK FX WO MNPJ: CPT

## 2023-11-13 PROCEDURE — 2500000004 HC RX 250 GENERAL PHARMACY W/ HCPCS (ALT 636 FOR OP/ED)

## 2023-11-13 PROCEDURE — 87635 SARS-COV-2 COVID-19 AMP PRB: CPT

## 2023-11-13 PROCEDURE — 85384 FIBRINOGEN ACTIVITY: CPT

## 2023-11-13 PROCEDURE — 86140 C-REACTIVE PROTEIN: CPT

## 2023-11-13 PROCEDURE — G0378 HOSPITAL OBSERVATION PER HR: HCPCS

## 2023-11-13 PROCEDURE — 83615 LACTATE (LD) (LDH) ENZYME: CPT

## 2023-11-13 PROCEDURE — 82248 BILIRUBIN DIRECT: CPT

## 2023-11-13 PROCEDURE — 2500000001 HC RX 250 WO HCPCS SELF ADMINISTERED DRUGS (ALT 637 FOR MEDICARE OP)

## 2023-11-13 PROCEDURE — 85025 COMPLETE CBC W/AUTO DIFF WBC: CPT

## 2023-11-13 PROCEDURE — 93005 ELECTROCARDIOGRAM TRACING: CPT

## 2023-11-13 PROCEDURE — 83735 ASSAY OF MAGNESIUM: CPT

## 2023-11-13 PROCEDURE — 99222 1ST HOSP IP/OBS MODERATE 55: CPT

## 2023-11-13 PROCEDURE — 80053 COMPREHEN METABOLIC PANEL: CPT

## 2023-11-13 PROCEDURE — 82728 ASSAY OF FERRITIN: CPT

## 2023-11-13 PROCEDURE — 99222 1ST HOSP IP/OBS MODERATE 55: CPT | Performed by: INTERNAL MEDICINE

## 2023-11-13 RX ORDER — POLYETHYLENE GLYCOL 3350 17 G/17G
17 POWDER, FOR SOLUTION ORAL DAILY PRN
Qty: 3 PACKET | Refills: 0 | Status: SHIPPED | OUTPATIENT
Start: 2023-11-13 | End: 2023-11-14 | Stop reason: WASHOUT

## 2023-11-13 RX ORDER — PANTOPRAZOLE SODIUM 40 MG/1
40 TABLET, DELAYED RELEASE ORAL
Qty: 30 TABLET | Refills: 11 | Status: SHIPPED | OUTPATIENT
Start: 2023-11-14 | End: 2023-11-14 | Stop reason: WASHOUT

## 2023-11-13 RX ADMIN — ACYCLOVIR 400 MG: 400 TABLET ORAL at 08:35

## 2023-11-13 RX ADMIN — LEVETIRACETAM 500 MG: 500 TABLET, FILM COATED ORAL at 08:35

## 2023-11-13 RX ADMIN — FLUCONAZOLE 400 MG: 200 TABLET ORAL at 09:32

## 2023-11-13 RX ADMIN — LISINOPRIL 5 MG: 5 TABLET ORAL at 08:36

## 2023-11-13 RX ADMIN — TRAMADOL HYDROCHLORIDE 50 MG: 50 TABLET, COATED ORAL at 10:43

## 2023-11-13 RX ADMIN — LEVOFLOXACIN 500 MG: 500 TABLET, FILM COATED ORAL at 09:32

## 2023-11-13 RX ADMIN — URSODIOL 300 MG: 300 CAPSULE ORAL at 09:32

## 2023-11-13 RX ADMIN — URSODIOL 300 MG: 300 CAPSULE ORAL at 16:20

## 2023-11-13 RX ADMIN — PANTOPRAZOLE SODIUM 40 MG: 40 TABLET, DELAYED RELEASE ORAL at 08:36

## 2023-11-13 ASSESSMENT — ENCOUNTER SYMPTOMS
FEVER: 0
ACTIVITY CHANGE: 0
COUGH: 0
WEAKNESS: 0
TROUBLE SWALLOWING: 0
PALPITATIONS: 0
DIARRHEA: 0
ABDOMINAL PAIN: 0
CONSTIPATION: 0
WHEEZING: 0
CHOKING: 0
DIZZINESS: 0
CHILLS: 0
CONFUSION: 0
ABDOMINAL DISTENTION: 0
MYALGIAS: 0
VOMITING: 0
APPETITE CHANGE: 0
FATIGUE: 0
NAUSEA: 0
CHEST TIGHTNESS: 0
HEMATURIA: 0
HEADACHES: 0
SHORTNESS OF BREATH: 0
AGITATION: 0
ARTHRALGIAS: 0

## 2023-11-13 NOTE — CONSULTS
Inpatient consult to Medicine  Consult performed by: Allison Sharif MD  Consult ordered by: Milad Lorenzana PA-C        Reason For Consult  Pre-operative clearance    History Of Present Illness  Maldonado Mccloud is a 73 y.o. male with history of multiple myeloma s/p Auto SCT on 12/13/15 and then multiple lines of treatment (Revlimid, Vacto/Pom trial, Sarclista/Kyprolis), s/p Cytoxan/Kyprolis (8/2023) and s/p radiation treatments x 8 fractions on 7/3/23 and most recently s/p CART, and LLE DVT 2022 (on Eliquis) presenting with pathologic R proximal radius fracture. The patient reports the injury occurred after picking up a blanket. He has had pain in the arm prior to the injury and has had radiation to the arm. He reports chronic fatigue. He denies any cardiac history as well as shortness of breath, chest pain, and palpitations. He is able to climb 13 stairs without difficulty. He denies any GI or urinary symptoms. His last dose of Eliquis was yesterday (11/12) morning.      Past Medical History  - Multiple myeloma  - DVT on Eliquis  - Hypertension   - Atrial fibrillation    Surgical History  He has a past surgical history that includes Total knee arthroplasty (04/17/2015); Other surgical history (10/23/2015); IR CVC tunneled (12/14/2015); and IR CVC tunneled (5/26/2023).     Social History  He reports that he has been smoking cigars. He has never used smokeless tobacco. He reports that he does not currently use alcohol. He reports that he does not use drugs.    Family History  Family History   Problem Relation Name Age of Onset    Osteoporosis Mother      Diabetes Father      Heart disease Father      Pancreatic cancer Father      Squamous cell carcinoma Father          Allergies  Penicillins and Piperacillin-tazobactam    Review of Systems  As per HPI.      Physical Exam  General: Comfortable, NAD, well appearing  HEENT: Normocepahlic, atraumatic, EOMI   Cardiac: RRR, normal heart sounds, no m/r/g  Pulm: CTAB,  normal effort   Abd: Non-tender, soft and non-distended  MSK: No peripheral edema, able to move all extremities equally. Right arm in cast with palpable radial pulse.   Neuro: AAOx3  Psych: Calm and cooperative     Last Recorded Vitals  /84 (BP Location: Left arm, Patient Position: Lying)   Pulse 69   Temp 36.6 °C (97.9 °F) (Tympanic)   Resp 16   Wt 95.3 kg (210 lb)   SpO2 99%     Relevant Results  Results for orders placed or performed during the hospital encounter of 11/12/23 (from the past 24 hour(s))   CBC and Auto Differential   Result Value Ref Range    WBC 1.9 (L) 4.4 - 11.3 x10*3/uL    nRBC 0.0 0.0 - 0.0 /100 WBCs    RBC 3.20 (L) 4.50 - 5.90 x10*6/uL    Hemoglobin 11.4 (L) 13.5 - 17.5 g/dL    Hematocrit 33.5 (L) 41.0 - 52.0 %     (H) 80 - 100 fL    MCH 35.6 (H) 26.0 - 34.0 pg    MCHC 34.0 32.0 - 36.0 g/dL    RDW 13.5 11.5 - 14.5 %    Platelets 68 (L) 150 - 450 x10*3/uL    Immature Granulocytes %, Automated 0.5 0.0 - 0.9 %    Immature Granulocytes Absolute, Automated 0.01 0.00 - 0.50 x10*3/uL   Comprehensive metabolic panel   Result Value Ref Range    Glucose 94 74 - 99 mg/dL    Sodium 141 136 - 145 mmol/L    Potassium 4.5 3.5 - 5.3 mmol/L    Chloride 109 (H) 98 - 107 mmol/L    Bicarbonate 24 21 - 32 mmol/L    Anion Gap 13 10 - 20 mmol/L    Urea Nitrogen 27 (H) 6 - 23 mg/dL    Creatinine 1.20 0.50 - 1.30 mg/dL    eGFR 63 >60 mL/min/1.73m*2    Calcium 9.1 8.6 - 10.6 mg/dL    Albumin 4.1 3.4 - 5.0 g/dL    Alkaline Phosphatase 53 33 - 136 U/L    Total Protein 5.7 (L) 6.4 - 8.2 g/dL    AST 21 9 - 39 U/L    Bilirubin, Total 0.8 0.0 - 1.2 mg/dL    ALT 15 10 - 52 U/L   Manual Differential   Result Value Ref Range    Neutrophils %, Manual 64.8 40.0 - 80.0 %    Lymphocytes %, Manual 16.5 13.0 - 44.0 %    Monocytes %, Manual 15.4 2.0 - 10.0 %    Eosinophils %, Manual 0.0 0.0 - 6.0 %    Basophils %, Manual 3.3 0.0 - 2.0 %    Seg Neutrophils Absolute, Manual 1.23 (L) 1.60 - 5.00 x10*3/uL    Lymphocytes  Absolute, Manual 0.31 (L) 0.80 - 3.00 x10*3/uL    Monocytes Absolute, Manual 0.29 0.05 - 0.80 x10*3/uL    Eosinophils Absolute, Manual 0.00 0.00 - 0.40 x10*3/uL    Basophils Absolute, Manual 0.06 0.00 - 0.10 x10*3/uL    Total Cells Counted 91     RBC Morphology See Below     Polychromasia Mild     Ovalocytes Few     Teardrop Cells Few    Protime-INR   Result Value Ref Range    Protime 12.8 9.8 - 12.8 seconds    INR 1.1 0.9 - 1.1   Type And Screen   Result Value Ref Range    ABO TYPE B     Rh TYPE POS     ANTIBODY SCREEN NEG    Calcium, Ionized   Result Value Ref Range    POCT Calcium, Ionized 1.14 1.1 - 1.33 mmol/L   Red Top   Result Value Ref Range    Extra Tube Hold for add-ons.    Lavender Top   Result Value Ref Range    Extra Tube Hold for add-ons.    Sars-CoV-2 PCR, Screen Asymptomatic   Result Value Ref Range    Coronavirus 2019, PCR Not Detected Not Detected   ECG 12 lead   Result Value Ref Range    Ventricular Rate 76 BPM    Atrial Rate 76 BPM    DC Interval 162 ms    QRS Duration 86 ms    QT Interval 370 ms    QTC Calculation(Bazett) 416 ms    P Axis 3 degrees    R Axis -29 degrees    T Axis -7 degrees    QRS Count 12 beats    Q Onset 225 ms    P Onset 144 ms    P Offset 199 ms    T Offset 410 ms    QTC Fredericia 400 ms   CBC and Auto Differential   Result Value Ref Range    WBC 1.1 (L) 4.4 - 11.3 x10*3/uL    nRBC 0.0 0.0 - 0.0 /100 WBCs    RBC 3.21 (L) 4.50 - 5.90 x10*6/uL    Hemoglobin 11.7 (L) 13.5 - 17.5 g/dL    Hematocrit 32.8 (L) 41.0 - 52.0 %     (H) 80 - 100 fL    MCH 36.4 (H) 26.0 - 34.0 pg    MCHC 35.7 32.0 - 36.0 g/dL    RDW 13.8 11.5 - 14.5 %    Platelets 62 (L) 150 - 450 x10*3/uL    Neutrophils % 47.7 40.0 - 80.0 %    Immature Granulocytes %, Automated 0.9 0.0 - 0.9 %    Lymphocytes % 20.6 13.0 - 44.0 %    Monocytes % 25.2 2.0 - 10.0 %    Eosinophils % 3.7 0.0 - 6.0 %    Basophils % 1.9 0.0 - 2.0 %    Neutrophils Absolute 0.51 (L) 1.60 - 5.50 x10*3/uL    Immature Granulocytes Absolute,  Automated 0.01 0.00 - 0.50 x10*3/uL    Lymphocytes Absolute 0.22 (L) 0.80 - 3.00 x10*3/uL    Monocytes Absolute 0.27 0.05 - 0.80 x10*3/uL    Eosinophils Absolute 0.04 0.00 - 0.40 x10*3/uL    Basophils Absolute 0.02 0.00 - 0.10 x10*3/uL   Magnesium   Result Value Ref Range    Magnesium 1.73 1.60 - 2.40 mg/dL   Hepatic Function Panel   Result Value Ref Range    Albumin 4.2 3.4 - 5.0 g/dL    Bilirubin, Total 0.8 0.0 - 1.2 mg/dL    Bilirubin, Direct 0.1 0.0 - 0.3 mg/dL    Alkaline Phosphatase 51 33 - 136 U/L    ALT 13 10 - 52 U/L    AST 18 9 - 39 U/L    Total Protein 6.0 (L) 6.4 - 8.2 g/dL   Uric Acid   Result Value Ref Range    Uric Acid 5.9 4.0 - 7.5 mg/dL   Lactate Dehydrogenase   Result Value Ref Range     84 - 246 U/L   Fibrinogen   Result Value Ref Range    Fibrinogen 165 (L) 200 - 400 mg/dL   Phosphorus   Result Value Ref Range    Phosphorus 3.9 2.5 - 4.9 mg/dL   Basic Metabolic Panel   Result Value Ref Range    Glucose 116 (H) 74 - 99 mg/dL    Sodium 143 136 - 145 mmol/L    Potassium 3.9 3.5 - 5.3 mmol/L    Chloride 108 (H) 98 - 107 mmol/L    Bicarbonate 27 21 - 32 mmol/L    Anion Gap 12 10 - 20 mmol/L    Urea Nitrogen 21 6 - 23 mg/dL    Creatinine 1.12 0.50 - 1.30 mg/dL    eGFR 69 >60 mL/min/1.73m*2    Calcium 9.1 8.6 - 10.6 mg/dL   PST Top   Result Value Ref Range    Extra Tube Hold for add-ons.    Morphology   Result Value Ref Range    RBC Morphology No significant RBC morphology present    C-reactive protein   Result Value Ref Range    C-Reactive Protein <0.10 <1.00 mg/dL   Ferritin   Result Value Ref Range    Ferritin 171 20 - 300 ng/mL       ECG 12 lead    Result Date: 11/13/2023  Please see ED Provider Note for formal interpretation Confirmed by Mary Ellen Hale ([id number]) on 11/13/2023 12:21:28 AM    CT radius ulna right wo IV contrast    Result Date: 11/12/2023  1. Comminuted pathologic fracture of the proximal radius. Moderate associated soft tissue swelling. 2. Redemonstration of known osseous  involvement of multiple myeloma with scattered lytic lesions. 3. Suspected nondisplaced intra-articular fracture of the proximal ulna. 4. There are lytic areas within the right scapula with associated nondisplaced age-indeterminate fracture. Correlate with point tenderness. 5. Severe degenerative changes of the 1st carpometacarpal joint.   I personally reviewed the images/study and I agree with the findings as stated by Vik Melo MD. This study was interpreted at [facility], [place], [place]   MACRO: None.     Signed by: Evan Finkelstein 11/12/2023 10:22 PM Dictation workstation:   [id number]    CT humerus right wo IV contrast    Result Date: 11/12/2023  1. Comminuted pathologic fracture of the proximal radius. Moderate associated soft tissue swelling. 2. Redemonstration of known osseous involvement of multiple myeloma with scattered lytic lesions. 3. Suspected nondisplaced intra-articular fracture of the proximal ulna. 4. There are lytic areas within the right scapula with associated nondisplaced age-indeterminate fracture. Correlate with point tenderness. 5. Severe degenerative changes of the 1st carpometacarpal joint.   I personally reviewed the images/study and I agree with the findings as stated by Vik Melo MD. This study was interpreted at [facility], [place], [place]   MACRO: None.     Signed by: Evan Finkelstein 11/12/2023 10:22 PM Dictation workstation:   [id number]    XR forearm right 2 views    Result Date: 11/12/2023  Pathologic, comminuted fracture of the right radial neck and proximal aspect of the radial diaphysis as above. Underlying lytic lesion and several additional subcentimeter lytic lesions are compatible with patient's known multiple myeloma. Severe 1st carpometacarpal joint osteoarthrosis.   I personally reviewed the images/study and I agree with the findings as stated. This study was interpreted at [facility]  [place], [place].   Signed by: Evan Finkelstein 11/12/2023 7:44 PM Dictation workstation:   [id number]    XR wrist right 3+ views    Result Date: 11/12/2023  Pathologic, comminuted fracture of the right radial neck and proximal aspect of the radial diaphysis as above. Underlying lytic lesion and several additional subcentimeter lytic lesions are compatible with patient's known multiple myeloma. Severe 1st carpometacarpal joint osteoarthrosis.   I personally reviewed the images/study and I agree with the findings as stated. This study was interpreted at [place], [place].   Signed by: Evan Finkelstein 11/12/2023 7:44 PM Dictation workstation:   [id number]    XR elbow right T 3+ views    Result Date: 11/12/2023  Pathologic, comminuted fracture of the right radial neck and proximal aspect of the radial diaphysis as above. Underlying lytic lesion and several additional subcentimeter lytic lesions are compatible with patient's known multiple myeloma. Severe 1st carpometacarpal joint osteoarthrosis.   I personally reviewed the images/study and I agree with the findings as stated. This study was interpreted at [place], [place].   Signed by: Evan Finkelstein 11/12/2023 7:44 PM Dictation workstation:   [id number]    XR chest 1 view    Result Date: 11/12/2023  1.  No evidence of acute cardiopulmonary process.   I personally reviewed the images/study and I agree with the findings as stated. This study was interpreted at [place], [place].   MACRO: None   Signed by: Evan Finkelstein 11/12/2023 7:36 PM Dictation workstation:   [id number]       Assessment/Plan   Maldonado Mccloud is a 73 year old male with history of multiple myeloma not in remission, LLE DVT (2022) on Eliquis, and hypertension who was admitted for pathologic comminuted fracture of the  right radius. Most recent dose of Eliquis was 11/12 AM. He is pancytopenic with platelets 62k and . Medicine was consulted for pre-operative clearance and optimization.     RCRI Class I Risk: 3.9% 30-day risk of death, MI, or cardiac arrest.     DASI: 5.62 METs    Recommendations:   - Clarify platelet threshold with orthopaedic surgery team, transfuse as needed and per primary team  - Recommend holding Eliquis for 48 hours prior to surgery (4 doses), last dose 11/12 AM      Allison Sharif MD

## 2023-11-13 NOTE — SIGNIFICANT EVENT
Imaging reviewed with Dr. Collins, at this time will plan for non-operative management in splint to allow for bony and soft tissue healing. May consider operative intervention in future if necessary. Okay for diet. Maintain NWB RUE in splint. Okay for DVT Ppx per primary. Plan for follow up in 2-3 weeks with Dr. Collins. Orthopedics to follow peripherally while in house.     Orthopedic Tumor Team:     Reji Fofana, PGY-4 ([id number]), Epic Chat Preferred    Please call on call resident ([phone]) nights after 6pm and weekends

## 2023-11-13 NOTE — CONSULTS
ORTHOPAEDIC CONSULTATION     History Of Present Illness  Maldonado Mccloud is a 73 y.o. male presenting with Pathologic R proximal radius fx .    Orthopaedic Problems/Injuries:  HPI: 73M (MM s/p radiation 7/3 to R elbow, LLE DVT on eliquis, pancytopenia, HTN, tobacco use) p/a lifting blanket. Recently admitted for T cell therapy for MM. Previously seen by Dr. Collins for L elbow lesion.     Location: Painful at R elbow  Duration: Pain has been persistent since lifting object  Severity: 8 /10  Worsened by movement/Palpation, improved with rest and pain medication  Open/Closed: Closed, NVI: NVI  Associated symptoms: no associated numbness/tingling/weakness    Other Injuries: known other sides of MM  NPO: for possible OR, pending availability    Past medical history: per HPI; no history of blood clots  Past surgical history: per HPI, rest reviewed in EMR  Allergies: per EMR  Medications: on eliquis - rest per EMR  Social History: endorses smoking, denies drinking, denies IVDU  Family History:  Non-contributory to this patient's acute surgical issue.    Review of Systems: 12 point ROS negative unless stated in HPI    Past Medical History  He has a past medical history of Autonomic orthostatic hypotension (03/14/2023), Bursitis of left shoulder (08/22/2023), Cervical stenosis of spine (03/14/2023), Decreased GFR (03/14/2023), Erectile dysfunction (03/14/2023), Fever (10/02/2023), Finger injury, right, initial encounter (03/14/2023), Fracture of clavicle, left, closed (03/14/2023), Other conditions influencing health status (05/24/2017), Personal history of malignant melanoma of skin (11/20/2015), Personal history of other diseases of the respiratory system (05/12/2018), Personal history of other diseases of the respiratory system (04/21/2017), Personal history of other diseases of the respiratory system (04/21/2017), Strain of unspecified muscle and tendon at ankle and foot level, left foot, initial encounter (04/05/2021),  "and Swelling of extremity, left (03/14/2023).    Surgical History  He has a past surgical history that includes Total knee arthroplasty (04/17/2015); Other surgical history (10/23/2015); IR CVC tunneled (12/14/2015); and IR CVC tunneled (5/26/2023).     Social History  He reports that he has been smoking cigars. He has never used smokeless tobacco. He reports that he does not currently use alcohol. He reports that he does not use drugs.    Family History  Family History   Problem Relation Name Age of Onset    Osteoporosis Mother      Diabetes Father      Heart disease Father      Pancreatic cancer Father      Squamous cell carcinoma Father          Allergies  Penicillins and Piperacillin-tazobactam    Review of Systems  12 point ROS negative unless stated in HPI     Physical Exam  Constitutional: Comfortable, NAD  HEENT: hearing and vision grossly intact, MMM  Resp: breathing comfortably   CV: extremities warm, well perfused  GI: abd soft  Neuro: AAOx3, sensory and motor grossly intact  Psych: Appropriate mood and affect ,     RUE:   -Skin intact, moderate ecchymosis over upper ar m  -Tender at site of injury with painful ROM.  -Motor intact in axillary/AIN/PIN/ulnar distributions  -SILT axillary/radial/median/ulnar distributions  -Hand warm, well perfused  -Palpable  radial pulse, cap refill brisk  -Compartments soft and compressible     Last Recorded Vitals  Blood pressure (!) 168/114, pulse 90, temperature 36.7 °C (98 °F), resp. rate 18, height 1.778 m (5' 10\"), weight 95.3 kg (210 lb), SpO2 99 %.    Relevant Results  Results for orders placed or performed during the hospital encounter of 11/12/23 (from the past 24 hour(s))   CBC and Auto Differential   Result Value Ref Range    WBC 1.9 (L) 4.4 - 11.3 x10*3/uL    nRBC 0.0 0.0 - 0.0 /100 WBCs    RBC 3.20 (L) 4.50 - 5.90 x10*6/uL    Hemoglobin 11.4 (L) 13.5 - 17.5 g/dL    Hematocrit 33.5 (L) 41.0 - 52.0 %     (H) 80 - 100 fL    MCH 35.6 (H) 26.0 - 34.0 pg    " MCHC 34.0 32.0 - 36.0 g/dL    RDW 13.5 11.5 - 14.5 %    Platelets 68 (L) 150 - 450 x10*3/uL    Immature Granulocytes %, Automated 0.5 0.0 - 0.9 %    Immature Granulocytes Absolute, Automated 0.01 0.00 - 0.50 x10*3/uL   Comprehensive metabolic panel   Result Value Ref Range    Glucose 94 74 - 99 mg/dL    Sodium 141 136 - 145 mmol/L    Potassium 4.5 3.5 - 5.3 mmol/L    Chloride 109 (H) 98 - 107 mmol/L    Bicarbonate 24 21 - 32 mmol/L    Anion Gap 13 10 - 20 mmol/L    Urea Nitrogen 27 (H) 6 - 23 mg/dL    Creatinine 1.20 0.50 - 1.30 mg/dL    eGFR 63 >60 mL/min/1.73m*2    Calcium 9.1 8.6 - 10.6 mg/dL    Albumin 4.1 3.4 - 5.0 g/dL    Alkaline Phosphatase 53 33 - 136 U/L    Total Protein 5.7 (L) 6.4 - 8.2 g/dL    AST 21 9 - 39 U/L    Bilirubin, Total 0.8 0.0 - 1.2 mg/dL    ALT 15 10 - 52 U/L   Manual Differential   Result Value Ref Range    Neutrophils %, Manual 64.8 40.0 - 80.0 %    Lymphocytes %, Manual 16.5 13.0 - 44.0 %    Monocytes %, Manual 15.4 2.0 - 10.0 %    Eosinophils %, Manual 0.0 0.0 - 6.0 %    Basophils %, Manual 3.3 0.0 - 2.0 %    Seg Neutrophils Absolute, Manual 1.23 (L) 1.60 - 5.00 x10*3/uL    Lymphocytes Absolute, Manual 0.31 (L) 0.80 - 3.00 x10*3/uL    Monocytes Absolute, Manual 0.29 0.05 - 0.80 x10*3/uL    Eosinophils Absolute, Manual 0.00 0.00 - 0.40 x10*3/uL    Basophils Absolute, Manual 0.06 0.00 - 0.10 x10*3/uL    Total Cells Counted 91     RBC Morphology See Below     Polychromasia Mild     Ovalocytes Few     Teardrop Cells Few    Protime-INR   Result Value Ref Range    Protime 12.8 9.8 - 12.8 seconds    INR 1.1 0.9 - 1.1   Type And Screen   Result Value Ref Range    ABO TYPE B     Rh TYPE POS     ANTIBODY SCREEN NEG    Calcium, Ionized   Result Value Ref Range    POCT Calcium, Ionized 1.14 1.1 - 1.33 mmol/L   Red Top   Result Value Ref Range    Extra Tube Hold for add-ons.    Lavender Top   Result Value Ref Range    Extra Tube Hold for add-ons.    ECG 12 lead   Result Value Ref Range     Ventricular Rate 76 BPM    Atrial Rate 76 BPM    ND Interval 162 ms    QRS Duration 86 ms    QT Interval 370 ms    QTC Calculation(Bazett) 416 ms    P Axis 3 degrees    R Axis -29 degrees    T Axis -7 degrees    QRS Count 12 beats    Q Onset 225 ms    P Onset 144 ms    P Offset 199 ms    T Offset 410 ms    QTC Fredericia 400 ms       ECG 12 lead    Result Date: 11/13/2023  Please see ED Provider Note for formal interpretation Confirmed by Mary Ellen Hale ([id number]) on 11/13/2023 12:21:28 AM    CT radius ulna right wo IV contrast    Result Date: 11/12/2023  Interpreted By:  Finkelstein, Evan, and Barbat Antonio STUDY: CT HUMERUS RIGHT WO IV CONTRAST; CT RADIUS ULNA RIGHT WO IV CONTRAST; 11/12/2023 8:50 pm   INDICATION: Signs/Symptoms:MM w fracture.   COMPARISON: Forearm radiographs dated 11/12/2023.   ACCESSION NUMBER(S): [id number]; [id number]   ORDERING CLINICIAN: COSTA GUTIÉRREZ   TECHNIQUE: CT imaging of the right humerus and right radius/ulna was obtained. Coronal and sagittal reformatted images were performed.   FINDINGS: OSSEOUS STRUCTURES:   Redemonstration of known comminuted pathologic fracture of the proximal radius. There is redemonstration of posterior displacement of the distal fracture fragment, approximately about 1 cm on this exam.  There is redemonstration of a lytic lesion at the fracture site, measuring about 3.0 x 2.1 cm on this exam (series 305, image 57). There are additional subcentimeter osseous lytic lesions in the radius and humerus, consistent with patient's known diagnosis of multiple myeloma.   Suspected nondisplaced intra-articular fracture of the proximal ulna.   Redemonstration of severe degenerative changes of the 1st carpometacarpal joint.   There is no evidence of acute fracture or malalignment of the wrist.   Incompletely visualized right hip arthroplasty hardware.   There are lytic areas within the right scapula with associated nondisplaced fracture.   SOFT TISSUES:   Moderate  component of soft tissue swelling/edema adjacent to the fracture site. No evidence of fluid collection to suggest hematoma.       1. Comminuted pathologic fracture of the proximal radius. Moderate associated soft tissue swelling. 2. Redemonstration of known osseous involvement of multiple myeloma with scattered lytic lesions. 3. Suspected nondisplaced intra-articular fracture of the proximal ulna. 4. There are lytic areas within the right scapula with associated nondisplaced age-indeterminate fracture. Correlate with point tenderness. 5. Severe degenerative changes of the 1st carpometacarpal joint.   I personally reviewed the images/study and I agree with the findings as stated by Vik Melo MD. This study was interpreted at [facility], [place], [place]   MACRO: None.     Signed by: Evan Finkelstein 11/12/2023 10:22 PM Dictation workstation:   [id number]    CT humerus right wo IV contrast    Result Date: 11/12/2023  Interpreted By:  Finkelstein, Evan, and Barbat Antonio STUDY: CT HUMERUS RIGHT WO IV CONTRAST; CT RADIUS ULNA RIGHT WO IV CONTRAST; 11/12/2023 8:50 pm   INDICATION: Signs/Symptoms:MM w fracture.   COMPARISON: Forearm radiographs dated 11/12/2023.   ACCESSION NUMBER(S): [id number]; [id number]   ORDERING CLINICIAN: COSTA GUTIÉRREZ   TECHNIQUE: CT imaging of the right humerus and right radius/ulna was obtained. Coronal and sagittal reformatted images were performed.   FINDINGS: OSSEOUS STRUCTURES:   Redemonstration of known comminuted pathologic fracture of the proximal radius. There is redemonstration of posterior displacement of the distal fracture fragment, approximately about 1 cm on this exam.  There is redemonstration of a lytic lesion at the fracture site, measuring about 3.0 x 2.1 cm on this exam (series 305, image 57). There are additional subcentimeter osseous lytic lesions in the radius and humerus, consistent with patient's known diagnosis of multiple  myeloma.   Suspected nondisplaced intra-articular fracture of the proximal ulna.   Redemonstration of severe degenerative changes of the 1st carpometacarpal joint.   There is no evidence of acute fracture or malalignment of the wrist.   Incompletely visualized right hip arthroplasty hardware.   There are lytic areas within the right scapula with associated nondisplaced fracture.   SOFT TISSUES:   Moderate component of soft tissue swelling/edema adjacent to the fracture site. No evidence of fluid collection to suggest hematoma.       1. Comminuted pathologic fracture of the proximal radius. Moderate associated soft tissue swelling. 2. Redemonstration of known osseous involvement of multiple myeloma with scattered lytic lesions. 3. Suspected nondisplaced intra-articular fracture of the proximal ulna. 4. There are lytic areas within the right scapula with associated nondisplaced age-indeterminate fracture. Correlate with point tenderness. 5. Severe degenerative changes of the 1st carpometacarpal joint.   I personally reviewed the images/study and I agree with the findings as stated by Vik Melo MD. This study was interpreted at [facility], [place], [place]   MACRO: None.     Signed by: Evan Finkelstein 11/12/2023 10:22 PM Dictation workstation:   [id number]    XR forearm right 2 views    Result Date: 11/12/2023  Interpreted By:  Finkelstein, Evan, and Hanreck James STUDY: Right forearm  2 views. Right elbow, four views. Right wrist, 3 views.   INDICATION: Signs/Symptoms:xray; Signs/Symptoms:frx; Signs/Symptoms:pain, hx MM.   COMPARISON: None.   ACCESSION NUMBER(S): [id number]; [id number]; [id number]   ORDERING CLINICIAN: TWYLA GONZALES   FINDINGS: There is a comminuted fracture of the right radial neck and proximal right radial diaphysis with a distal segment displaced approximately 1.2 cm posteriorly and slightly apex volar angulated. There is an underlying 2.7 x 1.7 cm  lytic lesion and several additional subcentimeter osseous lytic lesions in the radius and humerus compatible with patient's known multiple myeloma. A supracondylar spur is noted. No acute displaced fracture or dislocation of the wrist. Severe degenerative changes of the 1st carpometacarpal joint are noted.       Pathologic, comminuted fracture of the right radial neck and proximal aspect of the radial diaphysis as above. Underlying lytic lesion and several additional subcentimeter lytic lesions are compatible with patient's known multiple myeloma. Severe 1st carpometacarpal joint osteoarthrosis.   I personally reviewed the images/study and I agree with the findings as stated. This study was interpreted at [place], [place].   Signed by: Evan Finkelstein 11/12/2023 7:44 PM Dictation workstation:   [id number]    XR wrist right 3+ views    Result Date: 11/12/2023  Interpreted By:  Finkelstein, Evan, and Hanreck James STUDY: Right forearm  2 views. Right elbow, four views. Right wrist, 3 views.   INDICATION: Signs/Symptoms:xray; Signs/Symptoms:frx; Signs/Symptoms:pain, hx MM.   COMPARISON: None.   ACCESSION NUMBER(S): [id number]; [id number]; [id number]   ORDERING CLINICIAN: TWYLA GONZALES   FINDINGS: There is a comminuted fracture of the right radial neck and proximal right radial diaphysis with a distal segment displaced approximately 1.2 cm posteriorly and slightly apex volar angulated. There is an underlying 2.7 x 1.7 cm lytic lesion and several additional subcentimeter osseous lytic lesions in the radius and humerus compatible with patient's known multiple myeloma. A supracondylar spur is noted. No acute displaced fracture or dislocation of the wrist. Severe degenerative changes of the 1st carpometacarpal joint are noted.       Pathologic, comminuted fracture of the right radial neck and proximal aspect of the radial diaphysis as above. Underlying lytic lesion and  several additional subcentimeter lytic lesions are compatible with patient's known multiple myeloma. Severe 1st carpometacarpal joint osteoarthrosis.   I personally reviewed the images/study and I agree with the findings as stated. This study was interpreted at [place], [place].   Signed by: Evan Finkelstein 11/12/2023 7:44 PM Dictation workstation:   [id number]    XR elbow right T 3+ views    Result Date: 11/12/2023  Interpreted By:  Finkelstein, Evan, and Hanreck James STUDY: Right forearm  2 views. Right elbow, four views. Right wrist, 3 views.   INDICATION: Signs/Symptoms:xray; Signs/Symptoms:frx; Signs/Symptoms:pain, hx MM.   COMPARISON: None.   ACCESSION NUMBER(S): [id number]; [id number]; [id number]   ORDERING CLINICIAN: TWYLA GONZALES   FINDINGS: There is a comminuted fracture of the right radial neck and proximal right radial diaphysis with a distal segment displaced approximately 1.2 cm posteriorly and slightly apex volar angulated. There is an underlying 2.7 x 1.7 cm lytic lesion and several additional subcentimeter osseous lytic lesions in the radius and humerus compatible with patient's known multiple myeloma. A supracondylar spur is noted. No acute displaced fracture or dislocation of the wrist. Severe degenerative changes of the 1st carpometacarpal joint are noted.       Pathologic, comminuted fracture of the right radial neck and proximal aspect of the radial diaphysis as above. Underlying lytic lesion and several additional subcentimeter lytic lesions are compatible with patient's known multiple myeloma. Severe 1st carpometacarpal joint osteoarthrosis.   I personally reviewed the images/study and I agree with the findings as stated. This study was interpreted at [place], [place].   Signed by: Evan Finkelstein 11/12/2023 7:44 PM Dictation workstation:   [id number]    XR chest 1 view    Result Date:  11/12/2023  Interpreted By:  Finkelstein, Evan, and Hanreck James STUDY: XR CHEST 1 VIEW;  11/12/2023 6:30 pm   INDICATION: Signs/Symptoms:Pre-Op.   COMPARISON: 10/26/2023 chest radiograph   ACCESSION NUMBER(S): [id number]   ORDERING CLINICIAN: JUAN WRIGHT   FINDINGS: AP radiograph of the chest was provided.   Interval removal of right upper extremity PICC.   CARDIOMEDIASTINAL SILHOUETTE: Cardiomediastinal silhouette is normal in size and configuration.   LUNGS: Lungs are clear without focal consolidation, pleural effusion or pneumothorax.   ABDOMEN: No remarkable upper abdominal findings.   BONES: No acute osseous changes.       1.  No evidence of acute cardiopulmonary process.   I personally reviewed the images/study and I agree with the findings as stated. This study was interpreted at [place], [place].   MACRO: None   Signed by: Evan Finkelstein 11/12/2023 7:36 PM Dictation workstation:   [id number]       Assessment/Plan   Injury: Pathologic R proximal radius fx   HPI: 73M (MM s/p radiation 7/3 to R elbow, LLE DVT on eliquis, pancytopenia, HTN, tobacco use) p/a lifting blanket. Recently admitted for T cell therapy for MM. Previously seen by Dr. Collins for L elbow lesion. Closed, NVI. XR/CTs segmental proximal radius fx through previously known MM lesion. Placed in LAS.    Plan for radial head arthroplasty vs excision vs ORIF w bone cement adjuvant w Dr. Collins, timing/availability pending AM discussion    Plan:   - NPO at midnight for upcoming surgery with orthopedics.  - Admit to Medicine, clearance pending for OR tomorrow; Appreciate documentation of clearance by primary team  - Please obtain pre-operative labs/studies: T&S, PT/INR, CBC, BMP, CXR, EKG   - NWB RUE extremity.   - Pre-operative ABx: None indicated   - No indication for transfusion pre-operatively  - DVT PPx: SCDs, okay for chemoppx per primary    Consult seen and staffed within 30 minutes of  notification.    This consult was staffed with attending physician, Dr. Collins.  Ros Elizondo, PGY-2  Orthopaedic Surgery   Pager: [phone]  [facility] Preferred     Patient will be followed by the Orthopaedic Oncology Team:  Reji Fofana  Available via [facility]

## 2023-11-13 NOTE — DISCHARGE SUMMARY
Discharge Diagnosis  Pathological fracture of left radius due to neoplastic disease, initial encounter    Issues Requiring Follow-Up  Left radius fracture  Multiple myeloma s/p CAR-T 11/8/23    Test Results Pending At Discharge  Pending Labs       Order Current Status    Light Blue Top Collected (11/12/23 1832)    Extra Tubes In process            Hospital Course   73 y.o. male with a PMH of multiple myeloma s/p Auto SCT on 12/13/15 and then multiple lines of treatment (Revlimid, Vacto/Pom trial, Sarclista/Kyprolis), s/p Cytoxan/Kyprolis (8/2023) and s/p radiation treatments x 8 fractions on 7/3/23 and most recently s/p CART and with LLE DVT 2022 (on Eliquis) who was admitted for pain in right arm, found to have a pathologic fracture. Ortho was consulted who decided for non-operative management in splint to allow for bone and soft tissue healing and decided to follow up outpatient in 2 weeks. Pt was discharged home in a stable condition.     Of note, patient was recently admitted for CARt with Abecma ( -11/8/23). Hospital course complicated by Grade 1 CRS and Grade 1 ICANS, managed with dexamethasone 10mg x 3 days and one dose of tocilizumab.     Follow up with Dr. Reji Fofana (ortho) in 1-2 weeks. Appointment requested.   Follow up with mal heme clinic and post CAR-T follow up (already scheduled)    Pertinent Physical Exam At Time of Discharge  Physical Exam  Constitutional:       Appearance: Normal appearance.   Cardiovascular:      Rate and Rhythm: Normal rate and regular rhythm.   Pulmonary:      Breath sounds: Normal breath sounds.   Abdominal:      General: Bowel sounds are normal.      Palpations: Abdomen is soft.   Musculoskeletal:         General: Tenderness present.      Cervical back: Neck supple.   Neurological:      Mental Status: He is oriented to person, place, and time.         Home Medications     Medication List      START taking these medications     pantoprazole 40 mg EC tablet; Commonly known as:  ProtoNix; Take 1 tablet   (40 mg) by mouth once daily in the morning. Take before meals. Do not   crush, chew, or split. Do not start before November 14, 2023.; Start   taking on: November 14, 2023   polyethylene glycol 17 gram packet; Commonly known as: Glycolax,   Miralax; Take 17 g by mouth once daily as needed (constipation) for up to   3 days.     CONTINUE taking these medications     acyclovir 400 mg tablet; Commonly known as: Zovirax   Eliquis 5 mg tablet; Generic drug: apixaban   fluconazole 200 mg tablet; Commonly known as: Diflucan; Take 2 tablets   (400 mg) by mouth once daily. Do not start before November 8, 2023.   levETIRAcetam 500 mg tablet; Commonly known as: Keppra; Take 1 tablet   (500 mg) by mouth 2 times a day.   levoFLOXacin 500 mg tablet; Commonly known as: Levaquin; Take 1 tablet   (500 mg) by mouth once every 24 hours. Do not start before November 8, 2023.   lisinopril 5 mg tablet; Take 1 tablet (5 mg) by mouth once daily. Do not   start before November 8, 2023.   ondansetron 8 mg tablet; Commonly known as: Zofran; Take 1 tablet (8 mg)   by mouth every 8 hours if needed for nausea for up to 21 days.   sulfamethoxazole-trimethoprim 800-160 mg tablet; Commonly known as:   Bactrim DS; Take 1 tablet by mouth once a day on Monday, Wednesday, and   Friday. Do not start before November 27, 2023.; Start taking on: November 27, 2023   traMADol 50 mg tablet; Commonly known as: Ultram; Take 1 tablet (50 mg)   by mouth every 6 hours if needed for moderate pain (4 - 6) for up to 5   days.   traZODone 100 mg tablet; Commonly known as: Desyrel; 3 tablets nightly   at bedtime   ursodiol 300 mg capsule; Commonly known as: Actigall; Take 1 capsule   (300 mg) by mouth 3 times a day.       Outpatient Follow-Up  Future Appointments   Date Time Provider Department Center   11/14/2023 11:00 AM [facility] 2F BMT POST TRANSPLANT [address]   11/14/2023 11:00 AM [facility] SCT TREATMENT ROOM 6 [address]    11/16/2023  1:00 PM [facility] 2F BMT POST TRANSPLANT [id number] [facility]   11/16/2023  1:00 PM [facility] SCT TREATMENT ROOM 6 [id number] [facility]   11/21/2023  4:00 PM Wil Resendez MD PhD [id number] [facility]   2/28/2024  8:30 AM Timothy Almodovar MD [id number] [facility]       Jewel Hernández MD

## 2023-11-13 NOTE — H&P
History Of Present Illness  Maldonado Mccloud is a 73 y.o. male with a PMH of multiple myeloma s/p Auto SCT on 12/13/15 and then multiple lines of treatment (Revlimid, Vacto/Pom trial, Sarclista/Kyprolis), s/p Cytoxan/Kyprolis (8/2023) and s/p radiation treatments x 8 fractions on 7/3/23 and most recently s/p CART and with LLE DVT 2022 (on Eliquis) who is admitted for pain in right arm, found to have a pathologic fracture. Patient presented to the ED on 11/12 with right arm pain after lifting an object and presented to the ED for further evaluation. XR of RUE obtained indicating a pathologic comminuted fracture of right radius. Patient is now admitted for further workup and management.    Of note, patient was recently admitted for CARt with Abecma ( -11/8/23). Hospital course complicated by Grade 1 CRS and Grade 1 ICANS, managed with dexamethasone 10mg x 3 days and one dose of tocilizumab.     ED Course:  VS: T 36.7C, HR 91, RR 18, /89  Labs: CBC: 1.9/11.4/33.5/68, RFP: 141/4.5/109/24/13/27/1.20  Imaging: XR R arm, R forearm, R elbow obtained indicating: Pathologic comminuted fracture of the right radial neck a segment displaced approximately 1.2 cm posteriorly and slightly apex volar angulated. Underlying lytic lesion and several additional subcentimeter lytic lesions are compatible with patient's known multiple myeloma.    Interventions: made NPO, Morphine 4mg IV, CT R humerus and CT R radius, ulna    On admission, patient reports pain in his right arm after lifting blanket at home. He thinks that the blanket got stuck with caused the pain in his arm. Patient denies any falls, rashes or bleeding associated with the arm pain. He also denies rashes, fever, chills, headaches, vision changes, congestion, epistaxis, rhinorrhea, cough, hemoptysis, Cp, dyspnea, N/V/D/C, hematochezia, melena, LE edema, or new weakness. Reports chronic intermittent LE neuropathy. All other ROS negative.    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 Bone marrow biopsy suggested multiple myeloma IgG kappa, ISS Stage II, bone survey revealed lytic lesions; Urine light chains present kappa restricted with 2gm proteinuria.  No adverse prognostic factors identified.     Treatment:    VRD  Initiated Aug 2015; currently s/p 3 cycles.  BMBx 10/2015 complete response.     Stem Cell Transplant  Underwent stem cell mobilization with Neupogen/Plerixafor and 2 day collection December 16-17, 2015, for 8.62 CD34 x 10^6/kg. During procedure for right IJ Trialysis placement  developed A. Fib with RVR which spontaneously resolved on December 14, 2015. He was admitted and placed on monitor for stem cell collection.   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  Hospitalization complicated by orthostatic hypotension, electrolyte replacement, drug induced rash, culture negative fever.    He completed approximately 5 weeks of maintenance Revlimid 10 mg daily which was held for worsening neuropathy August 2016.  3/ 2018: IgG M Braxton rising to 0.2gm/DL and light chain rising; restarted on Revlimid maintenance at 5mg EOD.   Revlimid stopped in 3/2021 due to stable disease.     Vacto/Pom Trial  7/2021 his M spike was on the rise. He was consented for Vacto/Pom trial which he began on 8/5/2021. He continued Pom  post finishing the trial.      Sarclisa/Kyprolis  Myeloma markers increasing in 6/2022, consented for Sarclisa /Kyprolis, which began 6/30/22. He continued this through September of 2022.      Cytoxan/Kyprolis  His therapy was subsequently changed in 9/2022 due to continued disease progression in the face of Sarclisa. His therapy was changed to CYYCLON regimen (Cytoxan/Kyprolis).     PET CT scan 9/14/2022 shows FDG avid lytic lesion within the left midshaft clavicle with pathologic fracture, FDG  avid lytic lesions involving bilateral scapulas and FDG avidity within the T9 vertebral body, suggestive of active multiple myeloma.  There  are non FDG avid lytic lesions in the right iliac bone and right clavicle, likely representing nonactive multiple myeloma.  There is no PET evidence of extramedullary involvement.  There is an FDG avid right thyroid nodule.      MRI in 10/2022 negative for myelomatous involvement in thoracic vertebrae.     Therapy placed on hold in 11/2022 due to need for hip replacement surgery. His myeloma markers were on the rise in 3/2023 with a new jaw lesion. Plan is to begin radiation therapy 4/11 and resume 2x/week Kyprolis 4/10.   Tracking to CAR T cell therapy. Collected CAR T cells on 5/26/23.   Began radiation x8 fractions on 7/3/23.  Resumed Cytoxan as of 8/7/23.   -s/p CAR-T cell therapy-ABECMA (Idecabtagene vicleucel) prep with Flu/Cy (T0 = 10/25/23)      Past Medical History  Past Medical History:   Diagnosis Date    Autonomic orthostatic hypotension 03/14/2023    Bursitis of left shoulder 08/22/2023    Cervical stenosis of spine 03/14/2023    Decreased GFR 03/14/2023    Erectile dysfunction 03/14/2023    Fever 10/02/2023    Finger injury, right, initial encounter 03/14/2023    Fracture of clavicle, left, closed 03/14/2023    Other conditions influencing health status 05/24/2017    History of cough    Personal history of malignant melanoma of skin 11/20/2015    History of malignant melanoma of skin    Personal history of other diseases of the respiratory system 05/12/2018    History of acute bronchitis    Personal history of other diseases of the respiratory system 04/21/2017    History of acute sinusitis    Personal history of other diseases of the respiratory system 04/21/2017    History of acute bronchitis    Strain of unspecified muscle and tendon at ankle and foot level, left foot, initial encounter 04/05/2021    Strain of left foot, initial encounter    Swelling of  extremity, left 03/14/2023       Surgical History  Past Surgical History:   Procedure Laterality Date    IR CVC TUNNELED  12/14/2015    IR CVC TUNNELED 12/14/2015 [facility]    IR CVC TUNNELED  5/26/2023    IR CVC TUNNELED 5/26/2023 [facility]    OTHER SURGICAL HISTORY  10/23/2015    Thorax Partial Excision Of A Rib    TOTAL KNEE ARTHROPLASTY  04/17/2015    Knee Replacement        Social History  He reports that he has been smoking cigars. He has never used smokeless tobacco. He reports that he does not currently use alcohol. He reports that he does not use drugs.    Family History  Family History   Problem Relation Name Age of Onset    Osteoporosis Mother      Diabetes Father      Heart disease Father      Pancreatic cancer Father      Squamous cell carcinoma Father          Allergies  Penicillins and Piperacillin-tazobactam    Review of Systems   Constitutional:  Negative for activity change, appetite change, chills, fatigue and fever.   HENT:  Negative for congestion, nosebleeds and trouble swallowing.    Eyes:  Negative for visual disturbance.   Respiratory:  Negative for cough, choking, chest tightness, shortness of breath and wheezing.    Cardiovascular:  Negative for chest pain, palpitations and leg swelling.   Gastrointestinal:  Negative for abdominal distention, abdominal pain, constipation, diarrhea, nausea and vomiting.   Genitourinary:  Negative for hematuria.   Musculoskeletal:  Negative for arthralgias and myalgias.        R arm pain   Skin:  Negative for rash.   Neurological:  Negative for dizziness, weakness and headaches.   Psychiatric/Behavioral:  Negative for agitation and confusion.         Physical Exam  Constitutional:       General: He is not in acute distress.     Appearance: Normal appearance. He is not ill-appearing.   HENT:      Head: Normocephalic and atraumatic.      Nose: Nose normal. No congestion.      Mouth/Throat:      Mouth: Mucous membranes are moist.      Pharynx: No  "oropharyngeal exudate or posterior oropharyngeal erythema.   Eyes:      Extraocular Movements: Extraocular movements intact.      Pupils: Pupils are equal, round, and reactive to light.   Cardiovascular:      Rate and Rhythm: Normal rate and regular rhythm.      Pulses: Normal pulses.      Heart sounds: Normal heart sounds. No murmur heard.  Pulmonary:      Effort: Pulmonary effort is normal. No respiratory distress.      Breath sounds: Normal breath sounds. No stridor. No wheezing.   Abdominal:      General: Abdomen is flat. There is no distension.      Palpations: Abdomen is soft.      Tenderness: There is no abdominal tenderness. There is no guarding.   Musculoskeletal:         General: Tenderness present. No swelling.      Right lower leg: No edema.      Left lower leg: No edema.      Comments: Tenderness over right arm, limited range of motion due to pain, neurovascularly intact    Skin:     General: Skin is warm.   Neurological:      General: No focal deficit present.      Mental Status: He is alert and oriented to person, place, and time.      Cranial Nerves: No cranial nerve deficit.   Psychiatric:         Mood and Affect: Mood normal.         Behavior: Behavior normal.        Last Recorded Vitals  Blood pressure (!) 168/114, pulse 90, temperature 36.7 °C (98 °F), resp. rate 18, height 1.778 m (5' 10\"), weight 95.3 kg (210 lb), SpO2 99 %.    Relevant Results  Scheduled medications  acyclovir, 400 mg, oral, BID  [Held by provider] apixaban, 5 mg, oral, BID  fluconazole, 400 mg, oral, Daily  levETIRAcetam, 500 mg, oral, BID  levoFLOXacin, 500 mg, oral, q24h VARINDER  lisinopril, 5 mg, oral, Daily  pantoprazole, 40 mg, oral, Daily before breakfast  traZODone, 300 mg, oral, Nightly  ursodiol, 300 mg, oral, TID      Continuous medications     PRN medications  PRN medications: ondansetron, polyethylene glycol, traMADol    Results for orders placed or performed during the hospital encounter of 11/12/23 (from the past 24 " hour(s))   CBC and Auto Differential   Result Value Ref Range    WBC 1.9 (L) 4.4 - 11.3 x10*3/uL    nRBC 0.0 0.0 - 0.0 /100 WBCs    RBC 3.20 (L) 4.50 - 5.90 x10*6/uL    Hemoglobin 11.4 (L) 13.5 - 17.5 g/dL    Hematocrit 33.5 (L) 41.0 - 52.0 %     (H) 80 - 100 fL    MCH 35.6 (H) 26.0 - 34.0 pg    MCHC 34.0 32.0 - 36.0 g/dL    RDW 13.5 11.5 - 14.5 %    Platelets 68 (L) 150 - 450 x10*3/uL    Immature Granulocytes %, Automated 0.5 0.0 - 0.9 %    Immature Granulocytes Absolute, Automated 0.01 0.00 - 0.50 x10*3/uL   Comprehensive metabolic panel   Result Value Ref Range    Glucose 94 74 - 99 mg/dL    Sodium 141 136 - 145 mmol/L    Potassium 4.5 3.5 - 5.3 mmol/L    Chloride 109 (H) 98 - 107 mmol/L    Bicarbonate 24 21 - 32 mmol/L    Anion Gap 13 10 - 20 mmol/L    Urea Nitrogen 27 (H) 6 - 23 mg/dL    Creatinine 1.20 0.50 - 1.30 mg/dL    eGFR 63 >60 mL/min/1.73m*2    Calcium 9.1 8.6 - 10.6 mg/dL    Albumin 4.1 3.4 - 5.0 g/dL    Alkaline Phosphatase 53 33 - 136 U/L    Total Protein 5.7 (L) 6.4 - 8.2 g/dL    AST 21 9 - 39 U/L    Bilirubin, Total 0.8 0.0 - 1.2 mg/dL    ALT 15 10 - 52 U/L   Manual Differential   Result Value Ref Range    Neutrophils %, Manual 64.8 40.0 - 80.0 %    Lymphocytes %, Manual 16.5 13.0 - 44.0 %    Monocytes %, Manual 15.4 2.0 - 10.0 %    Eosinophils %, Manual 0.0 0.0 - 6.0 %    Basophils %, Manual 3.3 0.0 - 2.0 %    Seg Neutrophils Absolute, Manual 1.23 (L) 1.60 - 5.00 x10*3/uL    Lymphocytes Absolute, Manual 0.31 (L) 0.80 - 3.00 x10*3/uL    Monocytes Absolute, Manual 0.29 0.05 - 0.80 x10*3/uL    Eosinophils Absolute, Manual 0.00 0.00 - 0.40 x10*3/uL    Basophils Absolute, Manual 0.06 0.00 - 0.10 x10*3/uL    Total Cells Counted 91     RBC Morphology See Below     Polychromasia Mild     Ovalocytes Few     Teardrop Cells Few    Protime-INR   Result Value Ref Range    Protime 12.8 9.8 - 12.8 seconds    INR 1.1 0.9 - 1.1   Type And Screen   Result Value Ref Range    ABO TYPE B     Rh TYPE POS      ANTIBODY SCREEN NEG    Calcium, Ionized   Result Value Ref Range    POCT Calcium, Ionized 1.14 1.1 - 1.33 mmol/L   Red Top   Result Value Ref Range    Extra Tube Hold for add-ons.    Lavender Top   Result Value Ref Range    Extra Tube Hold for add-ons.    Sars-CoV-2 PCR, Screen Asymptomatic   Result Value Ref Range    Coronavirus 2019, PCR Not Detected Not Detected   ECG 12 lead   Result Value Ref Range    Ventricular Rate 76 BPM    Atrial Rate 76 BPM    NC Interval 162 ms    QRS Duration 86 ms    QT Interval 370 ms    QTC Calculation(Bazett) 416 ms    P Axis 3 degrees    R Axis -29 degrees    T Axis -7 degrees    QRS Count 12 beats    Q Onset 225 ms    P Onset 144 ms    P Offset 199 ms    T Offset 410 ms    QTC Fredericia 400 ms     ECG 12 lead    Result Date: 11/13/2023  Please see ED Provider Note for formal interpretation Confirmed by Mary Ellen Hale ([id number]) on 11/13/2023 12:21:28 AM    CT radius ulna right wo IV contrast    Result Date: 11/12/2023  Interpreted By:  Finkelstein, Evan,  and Kameron Chery STUDY: CT HUMERUS RIGHT WO IV CONTRAST; CT RADIUS ULNA RIGHT WO IV CONTRAST; 11/12/2023 8:50 pm   INDICATION: Signs/Symptoms:MM w fracture.   COMPARISON: Forearm radiographs dated 11/12/2023.   ACCESSION NUMBER(S): [id number]; [id number]   ORDERING CLINICIAN: COSTA GUTIÉRREZ   TECHNIQUE: CT imaging of the right humerus and right radius/ulna was obtained. Coronal and sagittal reformatted images were performed.   FINDINGS: OSSEOUS STRUCTURES:   Redemonstration of known comminuted pathologic fracture of the proximal radius. There is redemonstration of posterior displacement of the distal fracture fragment, approximately about 1 cm on this exam.  There is redemonstration of a lytic lesion at the fracture site, measuring about 3.0 x 2.1 cm on this exam (series 305, image 57). There are additional subcentimeter osseous lytic lesions in the radius and humerus, consistent with patient's known diagnosis of multiple  myeloma.   Suspected nondisplaced intra-articular fracture of the proximal ulna.   Redemonstration of severe degenerative changes of the 1st carpometacarpal joint.   There is no evidence of acute fracture or malalignment of the wrist.   Incompletely visualized right hip arthroplasty hardware.   There are lytic areas within the right scapula with associated nondisplaced fracture.   SOFT TISSUES:   Moderate component of soft tissue swelling/edema adjacent to the fracture site. No evidence of fluid collection to suggest hematoma.       1. Comminuted pathologic fracture of the proximal radius. Moderate associated soft tissue swelling. 2. Redemonstration of known osseous involvement of multiple myeloma with scattered lytic lesions. 3. Suspected nondisplaced intra-articular fracture of the proximal ulna. 4. There are lytic areas within the right scapula with associated nondisplaced age-indeterminate fracture. Correlate with point tenderness. 5. Severe degenerative changes of the 1st carpometacarpal joint.   I personally reviewed the images/study and I agree with the findings as stated by Vik Melo MD. This study was interpreted at [facility], [place], [place]   MACRO: None.     Signed by: Evan Finkelstein 11/12/2023 10:22 PM Dictation workstation:   [id number]    CT humerus right wo IV contrast    Result Date: 11/12/2023  Interpreted By:  Finkelstein, Evan, and Barbat Antonio STUDY: CT HUMERUS RIGHT WO IV CONTRAST; CT RADIUS ULNA RIGHT WO IV CONTRAST; 11/12/2023 8:50 pm   INDICATION: Signs/Symptoms:MM w fracture.   COMPARISON: Forearm radiographs dated 11/12/2023.   ACCESSION NUMBER(S): [id number]; [id number]   ORDERING CLINICIAN: COSTA GUTIÉRREZ   TECHNIQUE: CT imaging of the right humerus and right radius/ulna was obtained. Coronal and sagittal reformatted images were performed.   FINDINGS: OSSEOUS STRUCTURES:   Redemonstration of known comminuted pathologic fracture of the  proximal radius. There is redemonstration of posterior displacement of the distal fracture fragment, approximately about 1 cm on this exam.  There is redemonstration of a lytic lesion at the fracture site, measuring about 3.0 x 2.1 cm on this exam (series 305, image 57). There are additional subcentimeter osseous lytic lesions in the radius and humerus, consistent with patient's known diagnosis of multiple myeloma.   Suspected nondisplaced intra-articular fracture of the proximal ulna.   Redemonstration of severe degenerative changes of the 1st carpometacarpal joint.   There is no evidence of acute fracture or malalignment of the wrist.   Incompletely visualized right hip arthroplasty hardware.   There are lytic areas within the right scapula with associated nondisplaced fracture.   SOFT TISSUES:   Moderate component of soft tissue swelling/edema adjacent to the fracture site. No evidence of fluid collection to suggest hematoma.       1. Comminuted pathologic fracture of the proximal radius. Moderate associated soft tissue swelling. 2. Redemonstration of known osseous involvement of multiple myeloma with scattered lytic lesions. 3. Suspected nondisplaced intra-articular fracture of the proximal ulna. 4. There are lytic areas within the right scapula with associated nondisplaced age-indeterminate fracture. Correlate with point tenderness. 5. Severe degenerative changes of the 1st carpometacarpal joint.   I personally reviewed the images/study and I agree with the findings as stated by Vik Melo MD. This study was interpreted at [facility], [place], [place]   MACRO: None.     Signed by: Evan Finkelstein 11/12/2023 10:22 PM Dictation workstation:   [id number]    XR forearm right 2 views    Result Date: 11/12/2023  Interpreted By:  Finkelstein, Evan, and Hanreck James STUDY: Right forearm  2 views. Right elbow, four views. Right wrist, 3 views.   INDICATION: Signs/Symptoms:xray;  Signs/Symptoms:frx; Signs/Symptoms:pain, hx MM.   COMPARISON: None.   ACCESSION NUMBER(S): [id number]; [id number]; [id number]   ORDERING CLINICIAN: TWYLA GONZALES   FINDINGS: There is a comminuted fracture of the right radial neck and proximal right radial diaphysis with a distal segment displaced approximately 1.2 cm posteriorly and slightly apex volar angulated. There is an underlying 2.7 x 1.7 cm lytic lesion and several additional subcentimeter osseous lytic lesions in the radius and humerus compatible with patient's known multiple myeloma. A supracondylar spur is noted. No acute displaced fracture or dislocation of the wrist. Severe degenerative changes of the 1st carpometacarpal joint are noted.       Pathologic, comminuted fracture of the right radial neck and proximal aspect of the radial diaphysis as above. Underlying lytic lesion and several additional subcentimeter lytic lesions are compatible with patient's known multiple myeloma. Severe 1st carpometacarpal joint osteoarthrosis.   I personally reviewed the images/study and I agree with the findings as stated. This study was interpreted at [place], [place].   Signed by: Evan Finkelstein 11/12/2023 7:44 PM Dictation workstation:   [id number]    XR wrist right 3+ views    Result Date: 11/12/2023  Interpreted By:  Finkelstein, Evan, and Hanreck James STUDY: Right forearm  2 views. Right elbow, four views. Right wrist, 3 views.   INDICATION: Signs/Symptoms:xray; Signs/Symptoms:frx; Signs/Symptoms:pain, hx MM.   COMPARISON: None.   ACCESSION NUMBER(S): [id number]; [id number]; [id number]   ORDERING CLINICIAN: TWYLA GONZALES   FINDINGS: There is a comminuted fracture of the right radial neck and proximal right radial diaphysis with a distal segment displaced approximately 1.2 cm posteriorly and slightly apex volar angulated. There is an underlying 2.7 x 1.7 cm lytic lesion and several additional  subcentimeter osseous lytic lesions in the radius and humerus compatible with patient's known multiple myeloma. A supracondylar spur is noted. No acute displaced fracture or dislocation of the wrist. Severe degenerative changes of the 1st carpometacarpal joint are noted.       Pathologic, comminuted fracture of the right radial neck and proximal aspect of the radial diaphysis as above. Underlying lytic lesion and several additional subcentimeter lytic lesions are compatible with patient's known multiple myeloma. Severe 1st carpometacarpal joint osteoarthrosis.   I personally reviewed the images/study and I agree with the findings as stated. This study was interpreted at [place], [place].   Signed by: Evan Finkelstein 11/12/2023 7:44 PM Dictation workstation:   [id number]    XR elbow right T 3+ views    Result Date: 11/12/2023  Interpreted By:  Finkelstein, Evan, and Hanreck James STUDY: Right forearm  2 views. Right elbow, four views. Right wrist, 3 views.   INDICATION: Signs/Symptoms:xray; Signs/Symptoms:frx; Signs/Symptoms:pain, hx MM.   COMPARISON: None.   ACCESSION NUMBER(S): [id number]; [id number]; [id number]   ORDERING CLINICIAN: TWYLA GONZALES   FINDINGS: There is a comminuted fracture of the right radial neck and proximal right radial diaphysis with a distal segment displaced approximately 1.2 cm posteriorly and slightly apex volar angulated. There is an underlying 2.7 x 1.7 cm lytic lesion and several additional subcentimeter osseous lytic lesions in the radius and humerus compatible with patient's known multiple myeloma. A supracondylar spur is noted. No acute displaced fracture or dislocation of the wrist. Severe degenerative changes of the 1st carpometacarpal joint are noted.       Pathologic, comminuted fracture of the right radial neck and proximal aspect of the radial diaphysis as above. Underlying lytic lesion and several additional subcentimeter  lytic lesions are compatible with patient's known multiple myeloma. Severe 1st carpometacarpal joint osteoarthrosis.   I personally reviewed the images/study and I agree with the findings as stated. This study was interpreted at [place], [place].   Signed by: Evan Finkelstein 11/12/2023 7:44 PM Dictation workstation:   [id number]    XR chest 1 view    Result Date: 11/12/2023  Interpreted By:  Finkelstein, Evan, and Hanreck James STUDY: XR CHEST 1 VIEW;  11/12/2023 6:30 pm   INDICATION: Signs/Symptoms:Pre-Op.   COMPARISON: 10/26/2023 chest radiograph   ACCESSION NUMBER(S): [id number]   ORDERING CLINICIAN: JUAN WRIGHT   FINDINGS: AP radiograph of the chest was provided.   Interval removal of right upper extremity PICC.   CARDIOMEDIASTINAL SILHOUETTE: Cardiomediastinal silhouette is normal in size and configuration.   LUNGS: Lungs are clear without focal consolidation, pleural effusion or pneumothorax.   ABDOMEN: No remarkable upper abdominal findings.   BONES: No acute osseous changes.       1.  No evidence of acute cardiopulmonary process.   I personally reviewed the images/study and I agree with the findings as stated. This study was interpreted at [place], [place].   MACRO: None   Signed by: Evan Finkelstein 11/12/2023 7:36 PM Dictation workstation:   [id number]        Assessment/Plan   Principal Problem:    Pathological fracture of left radius due to neoplastic disease, initial encounter  Mr. Maldonado Mccloud is a 73 year old male with a PMH of multiple myeloma s/p Auto SCT on 12/13/15 and then multiple lines of treatment (Revlimid, Vacto/Pom trial, Sarclista/Kyprolis), s/p Cytoxan/Kyprolis (8/2023) and s/p radiation treatments x 8 fractions on 7/3/23 and most recently s/p CART and with LLE DVT 2022 (on Eliquis) who is admitted for pain in right arm, found to have a pathologic fracture. Patient presented to the ED on  11/12 with right arm pain after lifting an object and presented to the ED for further evaluation. XR of RUE obtained indicating a pathologic comminuted fracture of right radius. Patient is now admitted for further workup and management.    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   -Myeloma markers (10/2/23) - KFLC 6.79, LFLC 0.31, IgG 670, IgM 7, IgA <7  -Collected T cells 5/26/23  -s/p CAR-T cell therapy-ABECMA (Idecabtagene vicleucel) prep with Flu/Cy (T0 = 10/25/23), complicated by grade 1 CRS and Grade 1 ICANS managed with dex x3 days and 1 dose Toci during prior admission  -Continue Keppra and Actigall for ppx until T+30, per outpatient note  -CRP and Ferritin added on admission, pending    #CRS Assessment:   In last 24 hours: Fever: NO; Any O2 supplement? NO     #Neurotoxicity Assessment: pending patient's transfer to [facility]  ICANS Score:  Handwriting/Signature impaired:    Motor/Sensory deficit:  Other abnormal Neuro symptom:  Imaging/Fundoscopy finding:     HEME  #Pancytopenia  -Secondary to disease vs chemo  -No sings of bleeding, DIC, or hypercoag on admission  -Transfuse to keep hgb>7, plt>10  #h/o LLE DVT 2022   -Home Eliquis held on admission pending plan for surgery    ORTHO  #R radius fracture  -Presented to ED on 11/12 with right arm pain after lifting a blanket  -XR R elbow, R forearm and R Wrist obtained in the ED indicating comminuted fracture of the right radial neck and proximal right radial diaphysis with a  distal segment displaced approximately 1.2 cm posteriorly and slightly apex volar angulated.  -CT R humerus and R ulna obtained indicating comminuted pathologic fracture of the proximal radius. Moderate associated soft tissue swelling. Suspected nondisplaced intra-articular fracture of the proximal ulna.  -Orthopedics consulted by ED staff, recs NPO after midnight for possible surgical intervention in AM  -Patient was made NPO pending surgical plan, Eliquis placed on hold  -Tramadol PRN for pain    ID  Afebrile on admission, no signs of infection  Allergies: Penicillin (rash), Zosyn (rash)  Ppx: continue home fluconazole, acyclovir  -Continue Levaquin until count recovery, per outpatient note  -Plan to start Bactrim DS M/W/F closer to day 30, per outpatient note  -Covid Neg on admission    CARDIAC:  -Echo (9/22/23) EF 50%  # Hx/o A. 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     PSYCH:  #Hx of Insomnia  -Continue home Trazadone 300mg HS    DISPO:  Full code, confirmed on admission  Oncologist: Dr. Resendez  Access: PIV       I spent 60 minutes in the professional and overall care of this patient.      Milad Lorenzana PA-C

## 2023-11-13 NOTE — ED PROVIDER NOTES
History of Present Illness   CC: Arm Pain     History provided by: Patient  Limitations to History: None    HPI:  Maldonado Mccloud is a 73 y.o. male with history of multiple myeloma presenting to the emergency department with pain in the left arm.  Reports he left the blanket yesterday, he got caught on something and his arm got yanked and he had sudden pain near the elbow.  Pain persisted today so he presents to the emergency department.  Denies any other trauma.  Denies any weakness or numbness in the left upper extremity.      External Records Reviewed: None    Physical Exam   Triage vitals:  T 36.7 °C (98 °F)  HR 91  /89  RR 18  O2 99 %      Vital signs reviewed in nursing triage note, EMR flow sheets, and at patient's bedside.   General: Awake, alert, in no acute distress  Eyes: Gaze conjugate.  No scleral icterus or injection  HENT: Normo-cephalic, atraumatic. No stridor. No rhinorrhea or epistaxis.  CV: Regular rate, Regular rhythm. Radial pulses 2+ bilaterally  Resp: Breathing non-labored, speaking in full sentences.  Clear to auscultation bilaterally  GI: Soft, non-distended, non-tender. No rebound or guarding.  MSK/Extremities: Bruising and swelling to the left proximal forearm.  Tender to palpation.  Intact radial, median, ulnar nerve motor and sensory function.  Skin: Warm. Appropriate color  Neuro: Alert. Oriented. Face symmetric. Speech is fluent.  Gross strength and sensation intact in b/l UE and LEs  Psych: Appropriate mood and affect    ED Course & Medical Decision Making   ED Course:  ED Course as of 11/13/23 1826   Sun Nov 12, 2023 1820 CBC and Auto Differential(!)  CBC with neutropenia, mild anemia, thrombocytopenia [SH]   1820 Comprehensive metabolic panel(!)  Metabolic panel normal [SH]   1820 XR forearm right 2 views  X-ray of the forearm with pathologic proximal radius fracture [SH]   1902 Calcium, Ionized  iCal normal [SH]   1926 Protime-INR  Coags normal [SH]   1958 ECG 12  lead  EKG obtained at 1900 demonstrate normal sinus rhythm with rate of 76, normal axis, normal intervals.  T wave inversion noted in lead III, otherwise within normal limits, no signs of ischemia. [SH]      ED Course User Index  [SH] Jelani Tucker MD         Diagnoses as of 11/13/23 1826   Pathological fracture of left radius due to neoplastic disease, initial encounter   Multiple myeloma, remission status unspecified (CMS/HCC)       Differential diagnoses considered include but are not limited to: Fracture, muscle sprain, dislocation    Social Determinants Limiting Care: None identified    MDM:  73 y.o. male with history of multiple myeloma presenting to the emergency department with pain in his left upper extremity.  Exam concern for possible pathologic fracture.  X-rays obtained confirming pathologic fracture of the proximal radius.  Consult with orthopedic surgery who will plan to repair surgically.  Discussed patient with medicine for admission, discussed patient with malignant heme attending for admission.  Patient accepted for admission to malignant hematology.  Labs reviewed as above.  Patient admitted in stable condition.  Patient was provided with IV opiates for analgesia in the emergency department    Disposition   As a result of their workup, the patient will require admission to the hospital.  The patient was informed of their diagnosis.  Patient was given the opportunity to ask questions and answered them.  Patient agreed to be admitted to the hospital.    Procedures   Procedures    Patient seen and discussed with ED attending physician.    Dave Tucker MD  PGY 3 Emergency Medicine    ATTENDING NOTE for Say Miller MD:    ATTENDING ATTESTATION:  The patient was seen by the resident/fellow.  I have personally performed a substantive portion of the encounter.  I have seen and examined the patient; agree with the workup, evaluation, MDM, management and diagnosis.  The care plan has been discussed with  the resident/fellow; I have reviewed the resident/fellow´s note and agree with the documented findings with the exception/addition of the following:      Patient is a 73-year-old man with history of multiple myeloma who presents with right arm pain just distal to his elbow that started right after he was lifting a blanket yesterday and it got caught on something.  He reports he did not fall and injured in any other way reports significant pain.  Given his history of multiple myeloma, as well as his severe pain with next no trauma, I am concerned about a pathologic fracture so we sent him for an x-ray which confirmed a large lytic lesion that did likely cause the fracture as well as a second fracture just proximal to this right at the head of the radius.  Patient is otherwise neurovascular intact.  We consulted orthopedic surgery who agreed the patient should be admitted for surgical intervention but given his history of multiple myeloma requested the patient be admitted to a medical service so we will discuss the case with malignant heme and admitted for further evaluation and care.  We treat his pain with IV opioids while he is here.     Jelani Tucker MD  Resident  11/13/23 [id number]

## 2023-11-13 NOTE — HOSPITAL COURSE
73 y.o. male with a PMH of multiple myeloma s/p Auto SCT on 12/13/15 and then multiple lines of treatment (Revlimid, Vacto/Pom trial, Sarclista/Kyprolis), s/p Cytoxan/Kyprolis (8/2023) and s/p radiation treatments x 8 fractions on 7/3/23 and most recently s/p CART and with LLE DVT 2022 (on Eliquis) who was admitted for pain in right arm, found to have a pathologic fracture. Ortho was consulted who decided for non-operative management in splint to allow for bone and soft tissue healing and decided to follow up outpatient in 2 weeks.     Of note, patient was recently admitted for CARt with Abecma ( -11/8/23). Hospital course complicated by Grade 1 CRS and Grade 1 ICANS, managed with dexamethasone 10mg x 3 days and one dose of tocilizumab.     Follow up with Dr. Reji Fofana (ortho) in 1-2 weeks. Appointment requested.   Follow up with mal heme clinic and post CAR-T follow up (already scheduled)

## 2023-11-14 ENCOUNTER — PHARMACY VISIT (OUTPATIENT)
Dept: PHARMACY | Facility: CLINIC | Age: 73
End: 2023-11-14
Payer: MEDICARE

## 2023-11-14 ENCOUNTER — TREATMENT (OUTPATIENT)
Dept: OTHER | Facility: HOSPITAL | Age: 73
End: 2023-11-14
Payer: COMMERCIAL

## 2023-11-14 ENCOUNTER — APPOINTMENT (OUTPATIENT)
Dept: HEMATOLOGY/ONCOLOGY | Facility: HOSPITAL | Age: 73
End: 2023-11-14
Payer: COMMERCIAL

## 2023-11-14 ENCOUNTER — OFFICE VISIT (OUTPATIENT)
Dept: OTHER | Facility: HOSPITAL | Age: 73
End: 2023-11-14
Payer: COMMERCIAL

## 2023-11-14 VITALS
BODY MASS INDEX: 30.97 KG/M2 | DIASTOLIC BLOOD PRESSURE: 81 MMHG | TEMPERATURE: 98.8 F | WEIGHT: 215.83 LBS | OXYGEN SATURATION: 100 % | RESPIRATION RATE: 18 BRPM | SYSTOLIC BLOOD PRESSURE: 111 MMHG | HEART RATE: 94 BPM

## 2023-11-14 DIAGNOSIS — C90.00 MULTIPLE MYELOMA WITHOUT REMISSION (MULTI): Primary | ICD-10-CM

## 2023-11-14 DIAGNOSIS — M84.534A: ICD-10-CM

## 2023-11-14 DIAGNOSIS — I82.402 ACUTE THROMBOEMBOLISM OF DEEP VEINS OF LEFT LOWER EXTREMITY (MULTI): ICD-10-CM

## 2023-11-14 DIAGNOSIS — Z92.850 HISTORY OF CHIMERIC ANTIGEN RECEPTOR T-CELL THERAPY: ICD-10-CM

## 2023-11-14 DIAGNOSIS — C90.00 IGG MULTIPLE MYELOMA (MULTI): ICD-10-CM

## 2023-11-14 DIAGNOSIS — G89.3 NEOPLASM RELATED PAIN: ICD-10-CM

## 2023-11-14 LAB
ALBUMIN SERPL BCP-MCNC: 4 G/DL (ref 3.4–5)
ALP SERPL-CCNC: 55 U/L (ref 33–136)
ALT SERPL W P-5'-P-CCNC: 14 U/L (ref 10–52)
ANION GAP SERPL CALC-SCNC: 12 MMOL/L (ref 10–20)
AST SERPL W P-5'-P-CCNC: 17 U/L (ref 9–39)
BASOPHILS # BLD AUTO: 0.01 X10*3/UL (ref 0–0.1)
BASOPHILS NFR BLD AUTO: 0.7 %
BILIRUB SERPL-MCNC: 0.7 MG/DL (ref 0–1.2)
BUN SERPL-MCNC: 27 MG/DL (ref 6–23)
CALCIUM SERPL-MCNC: 9 MG/DL (ref 8.6–10.3)
CHLORIDE SERPL-SCNC: 110 MMOL/L (ref 98–107)
CO2 SERPL-SCNC: 25 MMOL/L (ref 21–32)
CREAT SERPL-MCNC: 1.23 MG/DL (ref 0.5–1.3)
CRP SERPL-MCNC: <0.1 MG/DL
EOSINOPHIL # BLD AUTO: 0.03 X10*3/UL (ref 0–0.4)
EOSINOPHIL NFR BLD AUTO: 2 %
ERYTHROCYTE [DISTWIDTH] IN BLOOD BY AUTOMATED COUNT: 14 % (ref 11.5–14.5)
FERRITIN SERPL-MCNC: 169 NG/ML (ref 20–300)
FIBRINOGEN PPP-MCNC: 152 MG/DL (ref 200–400)
GFR SERPL CREATININE-BSD FRML MDRD: 62 ML/MIN/1.73M*2
GLUCOSE SERPL-MCNC: 115 MG/DL (ref 74–99)
HCT VFR BLD AUTO: 32.1 % (ref 41–52)
HGB BLD-MCNC: 11.3 G/DL (ref 13.5–17.5)
IMM GRANULOCYTES # BLD AUTO: 0 X10*3/UL (ref 0–0.5)
IMM GRANULOCYTES NFR BLD AUTO: 0 % (ref 0–0.9)
LDH SERPL L TO P-CCNC: 146 U/L (ref 84–246)
LYMPHOCYTES # BLD AUTO: 0.37 X10*3/UL (ref 0.8–3)
LYMPHOCYTES NFR BLD AUTO: 25.2 %
MAGNESIUM SERPL-MCNC: 1.58 MG/DL (ref 1.6–2.4)
MCH RBC QN AUTO: 36.7 PG (ref 26–34)
MCHC RBC AUTO-ENTMCNC: 35.2 G/DL (ref 32–36)
MCV RBC AUTO: 104 FL (ref 80–100)
MONOCYTES # BLD AUTO: 0.29 X10*3/UL (ref 0.05–0.8)
MONOCYTES NFR BLD AUTO: 19.7 %
NEUTROPHILS # BLD AUTO: 0.77 X10*3/UL (ref 1.6–5.5)
NEUTROPHILS NFR BLD AUTO: 52.4 %
NRBC BLD-RTO: 0 /100 WBCS (ref 0–0)
OVALOCYTES BLD QL SMEAR: NORMAL
PLATELET # BLD AUTO: 49 X10*3/UL (ref 150–450)
POTASSIUM SERPL-SCNC: 4.2 MMOL/L (ref 3.5–5.3)
PROT SERPL-MCNC: 5.7 G/DL (ref 6.4–8.2)
RBC # BLD AUTO: 3.08 X10*6/UL (ref 4.5–5.9)
RBC MORPH BLD: NORMAL
SODIUM SERPL-SCNC: 143 MMOL/L (ref 136–145)
URATE SERPL-MCNC: 6.1 MG/DL (ref 4–7.5)
WBC # BLD AUTO: 1.5 X10*3/UL (ref 4.4–11.3)

## 2023-11-14 PROCEDURE — 36415 COLL VENOUS BLD VENIPUNCTURE: CPT

## 2023-11-14 PROCEDURE — 99215 OFFICE O/P EST HI 40 MIN: CPT

## 2023-11-14 PROCEDURE — 85025 COMPLETE CBC W/AUTO DIFF WBC: CPT

## 2023-11-14 PROCEDURE — 1159F MED LIST DOCD IN RCRD: CPT

## 2023-11-14 PROCEDURE — RXMED WILLOW AMBULATORY MEDICATION CHARGE

## 2023-11-14 PROCEDURE — 82728 ASSAY OF FERRITIN: CPT

## 2023-11-14 PROCEDURE — 83735 ASSAY OF MAGNESIUM: CPT

## 2023-11-14 PROCEDURE — 1111F DSCHRG MED/CURRENT MED MERGE: CPT

## 2023-11-14 PROCEDURE — 84550 ASSAY OF BLOOD/URIC ACID: CPT

## 2023-11-14 PROCEDURE — 3074F SYST BP LT 130 MM HG: CPT

## 2023-11-14 PROCEDURE — 3079F DIAST BP 80-89 MM HG: CPT

## 2023-11-14 PROCEDURE — 86140 C-REACTIVE PROTEIN: CPT

## 2023-11-14 PROCEDURE — 1160F RVW MEDS BY RX/DR IN RCRD: CPT

## 2023-11-14 PROCEDURE — 80053 COMPREHEN METABOLIC PANEL: CPT

## 2023-11-14 PROCEDURE — 1125F AMNT PAIN NOTED PAIN PRSNT: CPT

## 2023-11-14 PROCEDURE — 83615 LACTATE (LD) (LDH) ENZYME: CPT

## 2023-11-14 PROCEDURE — 85384 FIBRINOGEN ACTIVITY: CPT

## 2023-11-14 RX ORDER — OXYCODONE HYDROCHLORIDE 5 MG/1
5 TABLET ORAL EVERY 6 HOURS PRN
Qty: 20 TABLET | Refills: 0 | Status: SHIPPED | OUTPATIENT
Start: 2023-11-14 | End: 2023-11-19

## 2023-11-14 NOTE — PROGRESS NOTES
Patient ID: Maldonado Mccloud is a 73 y.o. male.    Subjective    Maldonado was admitted 11/12-11/13 for work-up of sudden onset right arm pain, he was found to have a pathologic comminuted fracture of right radius. Never actually made it to [facility], was discharged from ED. Pain currently 2/10 at rest, worse with movement or if aggravated while lying in bed. Will need ortho follow-up, currently scheduled in December--attempting to move up sooner.     Remains afebrile. No current s/sx CRS or neuro-tox. Denies nausea or diarrhea.       Presents today for follow up visit, accompanied by wife, Farida.      Review of Systems   Constitutional:  Positive for fatigue.   Musculoskeletal:         Right arm pain.   All other systems reviewed and are negative.      Objective    BSA: 2.2 meters squared  /81 (BP Location: Left arm, Patient Position: Lying)   Pulse 94   Temp 37.1 °C (98.8 °F) (Skin)   Resp 18   Wt 97.9 kg (215 lb 13.3 oz)   SpO2 100%   BMI 30.97 kg/m²   Vital signs reviewed today.      Physical Exam  Vitals reviewed.   Constitutional:       Appearance: Normal appearance.   HENT:      Head: Normocephalic.      Mouth/Throat:      Mouth: Mucous membranes are moist.   Eyes:      Extraocular Movements: Extraocular movements intact.      Pupils: Pupils are equal, round, and reactive to light.   Cardiovascular:      Rate and Rhythm: Normal rate and regular rhythm.   Pulmonary:      Effort: Pulmonary effort is normal.      Breath sounds: Normal breath sounds.   Abdominal:      General: Abdomen is flat. Bowel sounds are normal.      Palpations: Abdomen is soft.   Musculoskeletal:         General: Signs of injury (right arm immobilized in cast) present.   Skin:     General: Skin is warm and dry.   Neurological:      General: No focal deficit present.      Mental Status: He is alert and oriented to person, place, and time.   Psychiatric:         Mood and Affect: Mood normal.         Behavior: Behavior normal.          Performance Status:  Karnofsky Score: 60 - Requires occasional assistance, but is able care for most of personal needs      Assessment/Plan        Oncology History   IgG multiple myeloma (CMS/HCC)   11/4/2022 - 11/4/2022 Chemotherapy    Carfilzomib (Weekly) / Cyclophosphamide / Thalidomide / Dexamethasone, 28 Day Cycles     3/14/2023 Initial Diagnosis    IgG multiple myeloma (CMS/HCC)     10/20/2023 - 10/25/2023 Bone Marrow Transplant            Pathological fracture of right radius  Presented to ED on 11/12 for right arm pain that occurred after lifting a blanket that got caught on something, his arm got yanked and he had sudden pain near the elbow. XR of RUE obtained indicating a pathologic comminuted fracture of right radius.     Per ortho note--Imaging reviewed with Dr. Collins, at this time will plan for non-operative management in splint to allow for bony and soft tissue healing.    Will require ortho follow-up in about 2 weeks, was previously seen by Dr. Collins for left arm lesion 2/2 myeloma.     Patient provided with oxycodone 5 mg tabs for acute pain relief.     Acute thromboembolism of deep veins of left lower extremity (CMS/[facility])  Platelet count 49k today, continue Eliquis. Will return on Thursday 11/16, if platelet count <30k HOLD A/C.    History of chimeric antigen receptor T-cell therapy  Today is T+20 s/p CAR T with Abecma. No current s/sx CRS or neurotox.     Continue infectious prophylaxis with acyclovir and fluconazole (until day T+90). Plan to start Bactrim DS M/W/F closer to day 30. Continue Levaquin 500 mg daily until sustained ANC recovery.       Continue Keppra and Actigall until T+30.          Manoj Xiong, APRN-CNP

## 2023-11-14 NOTE — PROGRESS NOTES
Pt ambulated to SCT unit without assistance, accompanied by spouse. Pt verbalized minimal pain in right arm due to recent fracture.  Vitals obtained, blood drawn via venipuncture and sent to lab. RICH Xiong came to see patient. A copy of lab results provided per request. Pt ambulated off unit without complaints or assistance.

## 2023-11-14 NOTE — ASSESSMENT & PLAN NOTE
Presented to ED on 11/12 for right arm pain that occurred after lifting a blanket that got caught on something, his arm got yanked and he had sudden pain near the elbow. XR of RUE obtained indicating a pathologic comminuted fracture of right radius.     Per ortho note--Imaging reviewed with Dr. Collins, at this time will plan for non-operative management in splint to allow for bony and soft tissue healing.    Will require ortho follow-up in about 2 weeks, was previously seen by Dr. Collins for left arm lesion 2/2 myeloma.     Patient provided with oxycodone 5 mg tabs for acute pain relief.

## 2023-11-15 LAB
CMV DNA SERPL NAA+PROBE-LOG IU: ABNORMAL {LOG_IU}/ML
LABORATORY COMMENT REPORT: ABNORMAL

## 2023-11-15 ASSESSMENT — ENCOUNTER SYMPTOMS: FATIGUE: 1

## 2023-11-15 NOTE — ASSESSMENT & PLAN NOTE
Today is T+20 s/p CAR T with Abecma. No current s/sx CRS or neurotox.     Continue infectious prophylaxis with acyclovir and fluconazole (until day T+90). Plan to start Bactrim DS M/W/F closer to day 30. Continue Levaquin 500 mg daily until sustained ANC recovery.       Continue Keppra and Actigall until T+30.

## 2023-11-16 ENCOUNTER — TREATMENT (OUTPATIENT)
Dept: OTHER | Facility: HOSPITAL | Age: 73
End: 2023-11-16
Payer: MEDICARE

## 2023-11-16 ENCOUNTER — OFFICE VISIT (OUTPATIENT)
Dept: OTHER | Facility: HOSPITAL | Age: 73
End: 2023-11-16
Payer: MEDICARE

## 2023-11-16 VITALS
OXYGEN SATURATION: 100 % | BODY MASS INDEX: 31.06 KG/M2 | SYSTOLIC BLOOD PRESSURE: 133 MMHG | TEMPERATURE: 97.5 F | DIASTOLIC BLOOD PRESSURE: 78 MMHG | HEART RATE: 89 BPM | WEIGHT: 216.49 LBS | RESPIRATION RATE: 18 BRPM

## 2023-11-16 DIAGNOSIS — C90.00 IGG MULTIPLE MYELOMA (MULTI): ICD-10-CM

## 2023-11-16 DIAGNOSIS — C90.00 MULTIPLE MYELOMA WITHOUT REMISSION (MULTI): Primary | ICD-10-CM

## 2023-11-16 DIAGNOSIS — Z92.850 HISTORY OF CHIMERIC ANTIGEN RECEPTOR T-CELL THERAPY: ICD-10-CM

## 2023-11-16 DIAGNOSIS — C90.00 MULTIPLE MYELOMA WITHOUT REMISSION (MULTI): ICD-10-CM

## 2023-11-16 LAB
ALBUMIN SERPL BCP-MCNC: 4.2 G/DL (ref 3.4–5)
ALP SERPL-CCNC: 49 U/L (ref 33–136)
ALT SERPL W P-5'-P-CCNC: 13 U/L (ref 10–52)
ANION GAP SERPL CALC-SCNC: 12 MMOL/L (ref 10–20)
AST SERPL W P-5'-P-CCNC: 17 U/L (ref 9–39)
BASOPHILS # BLD AUTO: 0.01 X10*3/UL (ref 0–0.1)
BASOPHILS NFR BLD AUTO: 0.8 %
BILIRUB SERPL-MCNC: 0.9 MG/DL (ref 0–1.2)
BUN SERPL-MCNC: 27 MG/DL (ref 6–23)
CALCIUM SERPL-MCNC: 9.2 MG/DL (ref 8.6–10.3)
CHLORIDE SERPL-SCNC: 107 MMOL/L (ref 98–107)
CO2 SERPL-SCNC: 27 MMOL/L (ref 21–32)
CREAT SERPL-MCNC: 1.12 MG/DL (ref 0.5–1.3)
CRP SERPL-MCNC: <0.1 MG/DL
EOSINOPHIL # BLD AUTO: 0.05 X10*3/UL (ref 0–0.4)
EOSINOPHIL NFR BLD AUTO: 4.2 %
ERYTHROCYTE [DISTWIDTH] IN BLOOD BY AUTOMATED COUNT: 14 % (ref 11.5–14.5)
FERRITIN SERPL-MCNC: 160 NG/ML (ref 20–300)
FIBRINOGEN PPP-MCNC: 152 MG/DL (ref 200–400)
GFR SERPL CREATININE-BSD FRML MDRD: 69 ML/MIN/1.73M*2
GLUCOSE SERPL-MCNC: 102 MG/DL (ref 74–99)
HCT VFR BLD AUTO: 31.8 % (ref 41–52)
HGB BLD-MCNC: 11.2 G/DL (ref 13.5–17.5)
HOLD SPECIMEN: NORMAL
IGG SERPL-MCNC: 493 MG/DL (ref 700–1600)
IMM GRANULOCYTES # BLD AUTO: 0.01 X10*3/UL (ref 0–0.5)
IMM GRANULOCYTES NFR BLD AUTO: 0.8 % (ref 0–0.9)
LDH SERPL L TO P-CCNC: 149 U/L (ref 84–246)
LYMPHOCYTES # BLD AUTO: 0.23 X10*3/UL (ref 0.8–3)
LYMPHOCYTES NFR BLD AUTO: 19.2 %
MAGNESIUM SERPL-MCNC: 1.66 MG/DL (ref 1.6–2.4)
MCH RBC QN AUTO: 36.4 PG (ref 26–34)
MCHC RBC AUTO-ENTMCNC: 35.2 G/DL (ref 32–36)
MCV RBC AUTO: 103 FL (ref 80–100)
MONOCYTES # BLD AUTO: 0.33 X10*3/UL (ref 0.05–0.8)
MONOCYTES NFR BLD AUTO: 27.5 %
NEUTROPHILS # BLD AUTO: 0.57 X10*3/UL (ref 1.6–5.5)
NEUTROPHILS NFR BLD AUTO: 47.5 %
NRBC BLD-RTO: 0 /100 WBCS (ref 0–0)
OVALOCYTES BLD QL SMEAR: NORMAL
PLATELET # BLD AUTO: 44 X10*3/UL (ref 150–450)
POTASSIUM SERPL-SCNC: 4.3 MMOL/L (ref 3.5–5.3)
PROT SERPL-MCNC: 5.6 G/DL (ref 6.4–8.2)
RBC # BLD AUTO: 3.08 X10*6/UL (ref 4.5–5.9)
RBC MORPH BLD: NORMAL
SODIUM SERPL-SCNC: 142 MMOL/L (ref 136–145)
URATE SERPL-MCNC: 5.8 MG/DL (ref 4–7.5)
WBC # BLD AUTO: 1.2 X10*3/UL (ref 4.4–11.3)

## 2023-11-16 PROCEDURE — 1160F RVW MEDS BY RX/DR IN RCRD: CPT | Performed by: NURSE PRACTITIONER

## 2023-11-16 PROCEDURE — 80053 COMPREHEN METABOLIC PANEL: CPT

## 2023-11-16 PROCEDURE — 86140 C-REACTIVE PROTEIN: CPT

## 2023-11-16 PROCEDURE — 82784 ASSAY IGA/IGD/IGG/IGM EACH: CPT

## 2023-11-16 PROCEDURE — 82728 ASSAY OF FERRITIN: CPT

## 2023-11-16 PROCEDURE — 1125F AMNT PAIN NOTED PAIN PRSNT: CPT | Performed by: NURSE PRACTITIONER

## 2023-11-16 PROCEDURE — 36415 COLL VENOUS BLD VENIPUNCTURE: CPT

## 2023-11-16 PROCEDURE — 83735 ASSAY OF MAGNESIUM: CPT

## 2023-11-16 PROCEDURE — 99214 OFFICE O/P EST MOD 30 MIN: CPT | Performed by: NURSE PRACTITIONER

## 2023-11-16 PROCEDURE — 85384 FIBRINOGEN ACTIVITY: CPT

## 2023-11-16 PROCEDURE — 83615 LACTATE (LD) (LDH) ENZYME: CPT

## 2023-11-16 PROCEDURE — 1159F MED LIST DOCD IN RCRD: CPT | Performed by: NURSE PRACTITIONER

## 2023-11-16 PROCEDURE — 84550 ASSAY OF BLOOD/URIC ACID: CPT

## 2023-11-16 PROCEDURE — 85025 COMPLETE CBC W/AUTO DIFF WBC: CPT

## 2023-11-16 PROCEDURE — 1111F DSCHRG MED/CURRENT MED MERGE: CPT | Performed by: NURSE PRACTITIONER

## 2023-11-16 RX ORDER — DOCUSATE SODIUM 100 MG/1
100 CAPSULE, LIQUID FILLED ORAL 2 TIMES DAILY
Qty: 60 CAPSULE | Refills: 0
Start: 2023-11-16 | End: 2023-12-22 | Stop reason: WASHOUT

## 2023-11-16 RX ORDER — DIPHENHYDRAMINE HYDROCHLORIDE 50 MG/ML
50 INJECTION INTRAMUSCULAR; INTRAVENOUS AS NEEDED
Status: CANCELLED | OUTPATIENT
Start: 2023-11-21

## 2023-11-16 RX ORDER — EPINEPHRINE 0.3 MG/.3ML
0.3 INJECTION SUBCUTANEOUS EVERY 5 MIN PRN
Status: CANCELLED | OUTPATIENT
Start: 2023-11-21

## 2023-11-16 RX ORDER — ONDANSETRON HYDROCHLORIDE 8 MG/1
8 TABLET, FILM COATED ORAL ONCE
Status: CANCELLED | OUTPATIENT
Start: 2023-11-21

## 2023-11-16 RX ORDER — FAMOTIDINE 10 MG/ML
20 INJECTION INTRAVENOUS ONCE AS NEEDED
Status: CANCELLED | OUTPATIENT
Start: 2023-11-21

## 2023-11-16 RX ORDER — ALBUTEROL SULFATE 0.83 MG/ML
3 SOLUTION RESPIRATORY (INHALATION) AS NEEDED
Status: CANCELLED | OUTPATIENT
Start: 2023-11-21

## 2023-11-16 NOTE — PROGRESS NOTES
Oncology Pharmacy Medication Education Note   Maldonado Mccloud is a 73 y.o. male patient currently day + 22 following Abecma CAR T-cell therapy for a diagnosis of multiple myeloma. Pharmacist was consulted to provide home medication education.     Medication Education: Education was provided regarding all home medication changes. The schedule was discussed in detail with the patient, including drug name, use and dose, and the appropriate timing of self-administration.   - Medication changes: started docusate, holding apixaban for plts of 44 today    PJP Prophylaxis: The patient is not currently on PJP prophylaxis. Continue to monitor counts to determine appropriate timing to start therapy closer to day + 30. I confirmed that the patient has Bactrim DS tablets available at home. I entered pentamidine orders to be administered on 11/21/2023 which is currently pending precert.    The patient demonstrated excellent understanding of their current medication list.    All questions were answered. Patient/family verbalized understanding of the plan of care and information provided. Will follow as necessary. Time spent with patient/family and/or coordinating care: 30 minutes.    Jacquie Sanchez, PharmD, [facility]  Ambulatory Bone and Marrow Transplant Pharmacist    Medication Indication 7am 9am 3pm 9pm   Prophylactic Medications   Levofloxacin (Levaquin®)  500 mg tablet Prevents Bacterial Infection   1 tab      Acyclovir (Zovirax®)  400 mg tablet Prevents Viral Infections  1 tab  1 tab   Fluconazole (Diflucan®)  200 mg tablet Prevents Fungal Infections  2 tabs     Sulfamethoxazole-Trimethoprim  (Bactrim DS)  800-160 mg tablet Prevents PCP Pneumonia HOLD   Levetiracetam (Keppra®)    500 mg tablet  *last day of therapy 11/24* Prevents Seizures  1 tab  1 tab   Ursodiol (Actigall®)  300 mg capsule  *last day of therapy 11/24*  Prevents liver occlusion   1 cap  1 cap 1 cap   Maintenance Medications   Apixaban (Eliquis®)  5 mg tablet   Prevents Blood Clots HOLD   Lisinopril (Zestril®)  5 mg tablet Prevents High Blood Pressure   1 tab      Trazodone  100 mg tablet Insomnia    3 tabs   Docusate (Colace®)  100 mg capsule Constipation  1 cap  1 cap   As Needed Medications   Ondansetron (Zofran®)  8 mg tablet Nausea 1 tab every 8 hours as needed   Polyethylene Glycol (Miralax®)  17 gram powder Constipation Mix 17 grams of powder with 4-8 oz of water once daily as needed   Oxycodone  5 mg tablet Pain 1 tab every 6 hours as needed

## 2023-11-16 NOTE — PROGRESS NOTES
Pt ambulated to SCT unit without assistance accompanied by wife. Pt states he has 3/10 pain today in his R arm. Also endorses constipation despite stool softeners, NP notified. Vitals obtained, blood drawn and sent to lab. Lavonne Cordero NP came to see patient. Jacquie SAAVEDRA PharmD also came to see patient. No further nursing needs required. Pt ambulated off unit without complaints or assistance.

## 2023-11-17 LAB
CMV DNA SERPL NAA+PROBE-LOG IU: ABNORMAL {LOG_IU}/ML
LABORATORY COMMENT REPORT: ABNORMAL

## 2023-11-20 DIAGNOSIS — M84.533A: ICD-10-CM

## 2023-11-20 PROBLEM — M84.433A: Status: ACTIVE | Noted: 2023-11-12

## 2023-11-21 ENCOUNTER — TREATMENT (OUTPATIENT)
Dept: OTHER | Facility: HOSPITAL | Age: 73
End: 2023-11-21
Payer: COMMERCIAL

## 2023-11-21 ENCOUNTER — OFFICE VISIT (OUTPATIENT)
Dept: HEMATOLOGY/ONCOLOGY | Facility: HOSPITAL | Age: 73
End: 2023-11-21
Payer: COMMERCIAL

## 2023-11-21 VITALS
DIASTOLIC BLOOD PRESSURE: 87 MMHG | OXYGEN SATURATION: 98 % | RESPIRATION RATE: 18 BRPM | WEIGHT: 214.73 LBS | HEIGHT: 70 IN | HEART RATE: 81 BPM | TEMPERATURE: 96.8 F | SYSTOLIC BLOOD PRESSURE: 129 MMHG | BODY MASS INDEX: 30.74 KG/M2

## 2023-11-21 DIAGNOSIS — C90.00 MULTIPLE MYELOMA WITHOUT REMISSION (MULTI): Primary | ICD-10-CM

## 2023-11-21 DIAGNOSIS — Z92.850 HISTORY OF CHIMERIC ANTIGEN RECEPTOR T-CELL THERAPY: ICD-10-CM

## 2023-11-21 DIAGNOSIS — C90.00 MULTIPLE MYELOMA WITHOUT REMISSION (MULTI): ICD-10-CM

## 2023-11-21 DIAGNOSIS — C90.00 IGG MULTIPLE MYELOMA (MULTI): ICD-10-CM

## 2023-11-21 DIAGNOSIS — C90.00 IGG MULTIPLE MYELOMA (MULTI): Primary | ICD-10-CM

## 2023-11-21 PROBLEM — T40.2X5A THERAPEUTIC OPIOID INDUCED CONSTIPATION: Status: ACTIVE | Noted: 2023-11-21

## 2023-11-21 PROBLEM — D84.9 IMMUNOCOMPROMISED (MULTI): Status: ACTIVE | Noted: 2023-11-21

## 2023-11-21 PROBLEM — K59.03 THERAPEUTIC OPIOID INDUCED CONSTIPATION: Status: ACTIVE | Noted: 2023-11-21

## 2023-11-21 LAB
ALBUMIN SERPL BCP-MCNC: 4.1 G/DL (ref 3.4–5)
ALP SERPL-CCNC: 48 U/L (ref 33–136)
ALT SERPL W P-5'-P-CCNC: 13 U/L (ref 10–52)
ANION GAP SERPL CALC-SCNC: 11 MMOL/L (ref 10–20)
AST SERPL W P-5'-P-CCNC: 17 U/L (ref 9–39)
BASOPHILS # BLD AUTO: 0.01 X10*3/UL (ref 0–0.1)
BASOPHILS NFR BLD AUTO: 0.5 %
BILIRUB SERPL-MCNC: 1 MG/DL (ref 0–1.2)
BUN SERPL-MCNC: 28 MG/DL (ref 6–23)
CALCIUM SERPL-MCNC: 9.3 MG/DL (ref 8.6–10.3)
CHLORIDE SERPL-SCNC: 106 MMOL/L (ref 98–107)
CO2 SERPL-SCNC: 27 MMOL/L (ref 21–32)
CREAT SERPL-MCNC: 1.23 MG/DL (ref 0.5–1.3)
CRP SERPL-MCNC: <0.1 MG/DL
EOSINOPHIL # BLD AUTO: 0.08 X10*3/UL (ref 0–0.4)
EOSINOPHIL NFR BLD AUTO: 4 %
ERYTHROCYTE [DISTWIDTH] IN BLOOD BY AUTOMATED COUNT: 13.8 % (ref 11.5–14.5)
FERRITIN SERPL-MCNC: 142 NG/ML (ref 20–300)
FIBRINOGEN PPP-MCNC: 154 MG/DL (ref 200–400)
GFR SERPL CREATININE-BSD FRML MDRD: 62 ML/MIN/1.73M*2
GLUCOSE SERPL-MCNC: 108 MG/DL (ref 74–99)
HCT VFR BLD AUTO: 31.9 % (ref 41–52)
HGB BLD-MCNC: 11.4 G/DL (ref 13.5–17.5)
IGG SERPL-MCNC: 455 MG/DL (ref 700–1600)
IMM GRANULOCYTES # BLD AUTO: 0.01 X10*3/UL (ref 0–0.5)
IMM GRANULOCYTES NFR BLD AUTO: 0.5 % (ref 0–0.9)
LDH SERPL L TO P-CCNC: 162 U/L (ref 84–246)
LYMPHOCYTES # BLD AUTO: 0.4 X10*3/UL (ref 0.8–3)
LYMPHOCYTES NFR BLD AUTO: 19.9 %
MAGNESIUM SERPL-MCNC: 1.73 MG/DL (ref 1.6–2.4)
MCH RBC QN AUTO: 36.5 PG (ref 26–34)
MCHC RBC AUTO-ENTMCNC: 35.7 G/DL (ref 32–36)
MCV RBC AUTO: 102 FL (ref 80–100)
MONOCYTES # BLD AUTO: 0.43 X10*3/UL (ref 0.05–0.8)
MONOCYTES NFR BLD AUTO: 21.4 %
NEUTROPHILS # BLD AUTO: 1.08 X10*3/UL (ref 1.6–5.5)
NEUTROPHILS NFR BLD AUTO: 53.7 %
NRBC BLD-RTO: 0 /100 WBCS (ref 0–0)
PLATELET # BLD AUTO: 46 X10*3/UL (ref 150–450)
POTASSIUM SERPL-SCNC: 4.4 MMOL/L (ref 3.5–5.3)
PROT SERPL-MCNC: 5.4 G/DL (ref 6.4–8.2)
PROT SERPL-MCNC: 5.6 G/DL (ref 6.4–8.2)
RBC # BLD AUTO: 3.12 X10*6/UL (ref 4.5–5.9)
SODIUM SERPL-SCNC: 140 MMOL/L (ref 136–145)
URATE SERPL-MCNC: 5.5 MG/DL (ref 4–7.5)
WBC # BLD AUTO: 2 X10*3/UL (ref 4.4–11.3)

## 2023-11-21 PROCEDURE — 85025 COMPLETE CBC W/AUTO DIFF WBC: CPT

## 2023-11-21 PROCEDURE — 1126F AMNT PAIN NOTED NONE PRSNT: CPT | Performed by: INTERNAL MEDICINE

## 2023-11-21 PROCEDURE — 84550 ASSAY OF BLOOD/URIC ACID: CPT

## 2023-11-21 PROCEDURE — 85384 FIBRINOGEN ACTIVITY: CPT

## 2023-11-21 PROCEDURE — 80053 COMPREHEN METABOLIC PANEL: CPT

## 2023-11-21 PROCEDURE — 99215 OFFICE O/P EST HI 40 MIN: CPT | Performed by: INTERNAL MEDICINE

## 2023-11-21 PROCEDURE — 2500000005 HC RX 250 GENERAL PHARMACY W/O HCPCS

## 2023-11-21 PROCEDURE — 82784 ASSAY IGA/IGD/IGG/IGM EACH: CPT

## 2023-11-21 PROCEDURE — 82728 ASSAY OF FERRITIN: CPT

## 2023-11-21 PROCEDURE — 2500000004 HC RX 250 GENERAL PHARMACY W/ HCPCS (ALT 636 FOR OP/ED): Performed by: NURSE PRACTITIONER

## 2023-11-21 PROCEDURE — 96374 THER/PROPH/DIAG INJ IV PUSH: CPT

## 2023-11-21 PROCEDURE — 1159F MED LIST DOCD IN RCRD: CPT | Performed by: INTERNAL MEDICINE

## 2023-11-21 PROCEDURE — 83521 IG LIGHT CHAINS FREE EACH: CPT

## 2023-11-21 PROCEDURE — 96375 TX/PRO/DX INJ NEW DRUG ADDON: CPT

## 2023-11-21 PROCEDURE — 83735 ASSAY OF MAGNESIUM: CPT

## 2023-11-21 PROCEDURE — 84165 PROTEIN E-PHORESIS SERUM: CPT

## 2023-11-21 PROCEDURE — 86140 C-REACTIVE PROTEIN: CPT

## 2023-11-21 PROCEDURE — 84165 PROTEIN E-PHORESIS SERUM: CPT | Performed by: PATHOLOGY

## 2023-11-21 PROCEDURE — 96365 THER/PROPH/DIAG IV INF INIT: CPT

## 2023-11-21 PROCEDURE — 83615 LACTATE (LD) (LDH) ENZYME: CPT

## 2023-11-21 PROCEDURE — 1160F RVW MEDS BY RX/DR IN RCRD: CPT | Performed by: INTERNAL MEDICINE

## 2023-11-21 PROCEDURE — 2500000004 HC RX 250 GENERAL PHARMACY W/ HCPCS (ALT 636 FOR OP/ED)

## 2023-11-21 PROCEDURE — 1111F DSCHRG MED/CURRENT MED MERGE: CPT | Performed by: INTERNAL MEDICINE

## 2023-11-21 RX ORDER — FAMOTIDINE 10 MG/ML
20 INJECTION INTRAVENOUS ONCE AS NEEDED
Status: CANCELLED | OUTPATIENT
Start: 2023-12-19

## 2023-11-21 RX ORDER — ONDANSETRON HYDROCHLORIDE 8 MG/1
8 TABLET, FILM COATED ORAL ONCE
Status: COMPLETED | OUTPATIENT
Start: 2023-11-21 | End: 2023-11-21

## 2023-11-21 RX ORDER — ONDANSETRON HYDROCHLORIDE 8 MG/1
8 TABLET, FILM COATED ORAL ONCE
Status: CANCELLED | OUTPATIENT
Start: 2023-12-19

## 2023-11-21 RX ORDER — DIPHENHYDRAMINE HYDROCHLORIDE 50 MG/ML
25 INJECTION INTRAMUSCULAR; INTRAVENOUS ONCE
Status: COMPLETED | OUTPATIENT
Start: 2023-11-21 | End: 2023-11-21

## 2023-11-21 RX ORDER — EPINEPHRINE 0.3 MG/.3ML
0.3 INJECTION SUBCUTANEOUS EVERY 5 MIN PRN
Status: CANCELLED | OUTPATIENT
Start: 2023-12-19

## 2023-11-21 RX ORDER — DIPHENHYDRAMINE HYDROCHLORIDE 50 MG/ML
50 INJECTION INTRAMUSCULAR; INTRAVENOUS AS NEEDED
Status: CANCELLED | OUTPATIENT
Start: 2023-12-19

## 2023-11-21 RX ORDER — ALBUTEROL SULFATE 0.83 MG/ML
3 SOLUTION RESPIRATORY (INHALATION) AS NEEDED
Status: CANCELLED | OUTPATIENT
Start: 2023-12-19

## 2023-11-21 RX ADMIN — DIPHENHYDRAMINE HYDROCHLORIDE 25 MG: 50 INJECTION INTRAMUSCULAR; INTRAVENOUS at 15:47

## 2023-11-21 RX ADMIN — ONDANSETRON HYDROCHLORIDE 8 MG: 8 TABLET, FILM COATED ORAL at 14:22

## 2023-11-21 RX ADMIN — PENTAMIDINE ISETHIONATE 300 MG: 300 INJECTION, POWDER, LYOPHILIZED, FOR SOLUTION INTRAMUSCULAR; INTRAVENOUS at 14:48

## 2023-11-21 ASSESSMENT — ENCOUNTER SYMPTOMS
HEMATOLOGIC/LYMPHATIC NEGATIVE: 1
EYES NEGATIVE: 1
PSYCHIATRIC NEGATIVE: 1
ENDOCRINE NEGATIVE: 1
FATIGUE: 1
SHORTNESS OF BREATH: 1
CONSTIPATION: 1
DIZZINESS: 1
FATIGUE: 1
CONSTIPATION: 1
MUSCULOSKELETAL NEGATIVE: 1
LIGHT-HEADEDNESS: 1
CARDIOVASCULAR NEGATIVE: 1

## 2023-11-21 ASSESSMENT — PAIN SCALES - GENERAL: PAINLEVEL: 0-NO PAIN

## 2023-11-21 NOTE — ASSESSMENT & PLAN NOTE
Continue acyclovir and fluconazole until T+90.   Continue Levaquin until sustained ANC recovery.    Given pancytopenia, will plan for IV pentamidine at next visit for PJP prophylaxis. Consider Bactrim once counts further recover.    CMV PCR  <35 on 11/16. Continue to monitor twice weekly.

## 2023-11-21 NOTE — ASSESSMENT & PLAN NOTE
Presented to ED on 11/12 for right arm pain that occurred after lifting a blanket that got caught on something, his arm got yanked and he had sudden pain near the elbow. XR of RUE obtained indicating a pathologic comminuted fracture of right radius.      Per ortho note--Imaging reviewed with Dr. Collins, at this time will plan for non-operative management in splint to allow for bony and soft tissue healing.     Contacted Dr. Collins to request a sooner appointment than 12/1 and he will see the patient on 11/28.     Continue prn oxycodone for pain relief.

## 2023-11-21 NOTE — ASSESSMENT & PLAN NOTE
Currently T+21 status post CAR T w/ Abecma on 10/25/23. No current s/sx of CRS or neurotox.    Continue Keppra and Actigall until T+30

## 2023-11-21 NOTE — PROGRESS NOTES
Patient ID: Maldonado Mccloud is a 73 y.o. male.    Subjective    Maldonado was recently admitted 11/12-11/13 for work-up of sudden onset right arm pain, he was found to have a pathologic comminuted fracture of right radius. He presents to clinic today accompanied by his wife. He reports moderate discomfort and is taking oxycodone 1-2 x per day. He has noticed constipation since starting the pain medication and is taking docusate BID. Last bowel movement 11/13. Rosalina nausea or abdominal pain.            Review of Systems   Constitutional:  Positive for fatigue.   Gastrointestinal:  Positive for constipation.   Musculoskeletal:         Right arm pain.   All other systems reviewed and are negative.      Objective    Vital signs reviewed today.      Physical Exam  Vitals reviewed.   Constitutional:       Appearance: Normal appearance.   HENT:      Head: Normocephalic.      Mouth/Throat:      Mouth: Mucous membranes are moist.   Eyes:      Extraocular Movements: Extraocular movements intact.      Pupils: Pupils are equal, round, and reactive to light.   Cardiovascular:      Rate and Rhythm: Normal rate and regular rhythm.   Pulmonary:      Effort: Pulmonary effort is normal.      Breath sounds: Normal breath sounds.   Abdominal:      General: Abdomen is flat. Bowel sounds are normal.      Palpations: Abdomen is soft.   Musculoskeletal:         General: Signs of injury (right arm immobilized in cast) present.   Skin:     General: Skin is warm and dry.   Neurological:      General: No focal deficit present.      Mental Status: He is alert and oriented to person, place, and time.   Psychiatric:         Mood and Affect: Mood normal.         Behavior: Behavior normal.         Performance Status:  70% Karnofsky    Assessment/Plan        Oncology History   IgG multiple myeloma (CMS/HCC)   7/1/2015 Initial Diagnosis    IgG multiple myeloma (CMS/HCC)     12/23/2015 -  Bone Marrow Transplant    Autologous Stem Cell Transplant       11/4/2022 - 11/4/2022 Chemotherapy    Carfilzomib (Weekly) / Cyclophosphamide / Thalidomide / Dexamethasone, 28 Day Cycles     10/25/2023 -  Cellular Therapy    Abecma          Acute thromboembolism of deep veins of left lower extremity (CMS/HCC)  Platelets 44,000 today. Will hold Eliquis.     History of chimeric antigen receptor T-cell therapy  Currently T+21 status post CAR T w/ Abecma on 10/25/23. No current s/sx of CRS or neurotox.    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 Consider IVIG after day 30 if drops below 400.     Immunocompromised (CMS/HCC)  Continue acyclovir and fluconazole until T+90.   Continue Levaquin until sustained ANC recovery.    Given pancytopenia, will plan for IV pentamidine at next visit for PJP prophylaxis. Consider Bactrim once counts further recover.    CMV PCR  <35 on 11/16. Continue to monitor twice weekly.    Pathological fracture of right radius  Presented to ED on 11/12 for right arm pain that occurred after lifting a blanket that got caught on something, his arm got yanked and he had sudden pain near the elbow. XR of RUE obtained indicating a pathologic comminuted fracture of right radius.      Per ortho note--Imaging reviewed with Dr. Collins, at this time will plan for non-operative management in splint to allow for bony and soft tissue healing.     Contacted Dr. Collins to request a sooner appointment than 12/1 and he will see the patient on 11/28.     Continue prn oxycodone for pain relief.    Therapeutic opioid induced constipation  Continue docusate BID.    Encourage patient to  Miralax and begin daily until bowel movement.     Return 11/21 for IV Pentamidine and follow-up with Dr. Resendez.    Lavonne Cordero, APRN-CNP

## 2023-11-21 NOTE — PROGRESS NOTES
"Patient ID: Maldonado Mccloud is a 73 y.o. male.    Acute thromboembolism of deep veins of left lower extremity (CMS/[facility])  Platelets 44,000 today. Will hold Eliquis.      History of chimeric antigen receptor T-cell therapy  Currently T+27 status post CAR T w/ Abecma on 10/25/23. No current s/sx of CRS or neurotox.     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 Consider IVIG after day 30 if drops below 400.      Immunocompromised (CMS/HCC)  Continue acyclovir and fluconazole until T+90.   Continue Levaquin until sustained ANC recovery.     Given pancytopenia, will plan for IV pentamidine at next visit for PJP prophylaxis. Consider Bactrim once counts further recover.     CMV PCR  <35 on 11/16. Continue to monitor twice weekly.     Pathological fracture of right radius  Presented to ED on 11/12 for right arm pain that occurred after lifting a blanket that got caught on something, his arm got yanked and he had sudden pain near the elbow. XR of RUE obtained indicating a pathologic comminuted fracture of right radius.      Per ortho note--Imaging reviewed with Dr. Collins, at this time will plan for non-operative management in splint to allow for bony and soft tissue healing.       Subjective    Sunday readjusting blanket. Arm snapped. Maldonado was recently admitted 11/12-11/13 for work-up of sudden onset right arm pain, he was found to have a pathologic comminuted fracture of right radius.     Pain \"1/10\" takes oxycodone at bedtime. Denies other pain.    He presents to clinic today accompanied by his wife. He reports moderate discomfort and is taking oxycodone 1-2 x per day. He has noticed constipation since starting the pain medication and is taking docusate BID. Last bowel movement 11/13. Rosalina nausea or abdominal pain.    [facility] nearby.    Appetite good.    Wobbly on feet. Energy level not great. Starting to walk more.    Teeth numb and " tingling with pentamdine.          Review of Systems   Constitutional:  Positive for fatigue.   HENT:  Negative.     Eyes: Negative.    Respiratory:  Positive for shortness of breath (Occas when sitting for awhile and then stands up.).    Cardiovascular: Negative.    Gastrointestinal:  Positive for constipation (Taking stool softener/marielos prn.).   Endocrine: Negative.    Genitourinary: Negative.     Musculoskeletal: Negative.         Right arm pain. R lower leg pain r/t tumor.   Skin: Negative.    Neurological:  Positive for dizziness and light-headedness.   Hematological: Negative.    Psychiatric/Behavioral: Negative.     All other systems reviewed and are negative.      Objective    Vital signs reviewed today.      Physical Exam  Vitals reviewed.   Constitutional:       Appearance: Normal appearance.   HENT:      Head: Normocephalic.      Mouth/Throat:      Mouth: Mucous membranes are moist.   Eyes:      Extraocular Movements: Extraocular movements intact.      Pupils: Pupils are equal, round, and reactive to light.   Cardiovascular:      Rate and Rhythm: Normal rate and regular rhythm.   Pulmonary:      Effort: Pulmonary effort is normal.      Breath sounds: Normal breath sounds.   Abdominal:      General: Abdomen is flat. Bowel sounds are normal.      Palpations: Abdomen is soft.   Musculoskeletal:         General: Signs of injury (right arm immobilized in cast) present.   Skin:     General: Skin is warm and dry.   Neurological:      General: No focal deficit present.      Mental Status: He is alert and oriented to person, place, and time.   Psychiatric:         Mood and Affect: Mood normal.         Behavior: Behavior normal.       Performance Status:  70% Karnofsky    Assessment/Plan        Oncology History   IgG multiple myeloma (CMS/HCC)   7/1/2015 Initial Diagnosis    IgG multiple myeloma (CMS/HCC)     12/23/2015 -  Bone Marrow Transplant    Autologous Stem Cell Transplant      11/4/2022 - 11/4/2022  Chemotherapy    Carfilzomib (Weekly) / Cyclophosphamide / Thalidomide / Dexamethasone, 28 Day Cycles     10/25/2023 -  Cellular Therapy    Abecma          No problem-specific Assessment & Plan notes found for this encounter.     Return 11/21 for IV Pentamidine and follow-up with Dr. Sun.    Norma Xavier, APRN-CNP      This is a shared visit. I have reviewed the Advanced Practice Provider's encounter note, approve the Advanced Practice Provider's documentation, and provide the following additional information from my personal encounter.   Appears to be responding to CART  Unfortunately recent fracture - combination of disease and osteoporosis    Wil Sun MD PhD

## 2023-11-21 NOTE — PROGRESS NOTES
Patient arrived to SCT unit accompanied by wife, vitals taken. 22G peripheral IV placed in the L AC, labs drawn and sent. Patient premedicated with PO Zofran. Patient then received Pentamidine infusion, after 45 minutes patient reported tingling around the mouth. Message sent to Claire VILLANUEVA regarding change in status, per Claire NP to stop infusion and administer 25mg of benadryl IV. Norma VILLANUEVA then up to see patient accompanied by Dr. Resendez. Hand off of care given to Kiley HAMPTON.

## 2023-11-22 ENCOUNTER — HOSPITAL ENCOUNTER (OUTPATIENT)
Dept: RADIOLOGY | Facility: HOSPITAL | Age: 73
Discharge: HOME | End: 2023-11-22
Payer: MEDICARE

## 2023-11-22 DIAGNOSIS — M84.533A: ICD-10-CM

## 2023-11-22 LAB
CMV DNA SERPL NAA+PROBE-LOG IU: ABNORMAL {LOG_IU}/ML
KAPPA LC SERPL-MCNC: 0.08 MG/DL (ref 0.33–1.94)
KAPPA LC/LAMBDA SER: ABNORMAL {RATIO}
LABORATORY COMMENT REPORT: ABNORMAL
LAMBDA LC SERPL-MCNC: <0.17 MG/DL (ref 0.57–2.63)

## 2023-11-23 LAB
ALBUMIN: 4 G/DL (ref 3.4–5)
ALPHA 1 GLOBULIN: 0.2 G/DL (ref 0.2–0.6)
ALPHA 2 GLOBULIN: 0.5 G/DL (ref 0.4–1.1)
BETA GLOBULIN: 0.5 G/DL (ref 0.5–1.2)
GAMMA GLOBULIN: 0.4 G/DL (ref 0.5–1.4)
PATH REVIEW-SERUM PROTEIN ELECTROPHORESIS: ABNORMAL
PROTEIN ELECTROPHORESIS COMMENT: ABNORMAL

## 2023-11-28 ENCOUNTER — TREATMENT (OUTPATIENT)
Dept: OTHER | Facility: HOSPITAL | Age: 73
End: 2023-11-28
Payer: COMMERCIAL

## 2023-11-28 ENCOUNTER — EVALUATION (OUTPATIENT)
Dept: OCCUPATIONAL THERAPY | Facility: HOSPITAL | Age: 73
End: 2023-11-28
Payer: COMMERCIAL

## 2023-11-28 ENCOUNTER — APPOINTMENT (OUTPATIENT)
Dept: OTHER | Facility: HOSPITAL | Age: 73
End: 2023-11-28
Payer: COMMERCIAL

## 2023-11-28 ENCOUNTER — OFFICE VISIT (OUTPATIENT)
Dept: ORTHOPEDIC SURGERY | Facility: HOSPITAL | Age: 73
End: 2023-11-28
Payer: COMMERCIAL

## 2023-11-28 ENCOUNTER — HOSPITAL ENCOUNTER (OUTPATIENT)
Dept: RADIOLOGY | Facility: HOSPITAL | Age: 73
Discharge: HOME | End: 2023-11-28
Payer: COMMERCIAL

## 2023-11-28 ENCOUNTER — OFFICE VISIT (OUTPATIENT)
Dept: HEMATOLOGY/ONCOLOGY | Facility: HOSPITAL | Age: 73
End: 2023-11-28
Payer: COMMERCIAL

## 2023-11-28 VITALS — HEIGHT: 70 IN | BODY MASS INDEX: 30.64 KG/M2 | WEIGHT: 214 LBS

## 2023-11-28 DIAGNOSIS — C90.00 HYPOGAMMAGLOBULINEMIA DUE TO MULTIPLE MYELOMA (MULTI): ICD-10-CM

## 2023-11-28 DIAGNOSIS — Z92.850 HISTORY OF CHIMERIC ANTIGEN RECEPTOR T-CELL THERAPY: ICD-10-CM

## 2023-11-28 DIAGNOSIS — C90.00 IGG MULTIPLE MYELOMA (MULTI): ICD-10-CM

## 2023-11-28 DIAGNOSIS — M84.433D: ICD-10-CM

## 2023-11-28 DIAGNOSIS — D80.1 HYPOGAMMAGLOBULINEMIA DUE TO MULTIPLE MYELOMA (MULTI): ICD-10-CM

## 2023-11-28 DIAGNOSIS — Z94.84 STEM CELLS TRANSPLANT STATUS (MULTI): ICD-10-CM

## 2023-11-28 DIAGNOSIS — M84.433K: Primary | ICD-10-CM

## 2023-11-28 DIAGNOSIS — C90.00 MULTIPLE MYELOMA WITHOUT REMISSION (MULTI): Primary | ICD-10-CM

## 2023-11-28 DIAGNOSIS — D84.9 IMMUNOCOMPROMISED (MULTI): ICD-10-CM

## 2023-11-28 DIAGNOSIS — M84.433K: ICD-10-CM

## 2023-11-28 LAB
ALBUMIN SERPL BCP-MCNC: 4.4 G/DL (ref 3.4–5)
ALP SERPL-CCNC: 51 U/L (ref 33–136)
ALT SERPL W P-5'-P-CCNC: 21 U/L (ref 10–52)
ANION GAP SERPL CALC-SCNC: 14 MMOL/L (ref 10–20)
AST SERPL W P-5'-P-CCNC: 23 U/L (ref 9–39)
BASOPHILS # BLD AUTO: 0.01 X10*3/UL (ref 0–0.1)
BASOPHILS NFR BLD AUTO: 0.6 %
BILIRUB SERPL-MCNC: 1.4 MG/DL (ref 0–1.2)
BUN SERPL-MCNC: 24 MG/DL (ref 6–23)
CALCIUM SERPL-MCNC: 9.2 MG/DL (ref 8.6–10.3)
CHLORIDE SERPL-SCNC: 107 MMOL/L (ref 98–107)
CO2 SERPL-SCNC: 25 MMOL/L (ref 21–32)
CREAT SERPL-MCNC: 1.35 MG/DL (ref 0.5–1.3)
CRP SERPL-MCNC: <0.1 MG/DL
DACRYOCYTES BLD QL SMEAR: NORMAL
EOSINOPHIL # BLD AUTO: 0.03 X10*3/UL (ref 0–0.4)
EOSINOPHIL NFR BLD AUTO: 1.7 %
ERYTHROCYTE [DISTWIDTH] IN BLOOD BY AUTOMATED COUNT: 14 % (ref 11.5–14.5)
FERRITIN SERPL-MCNC: 169 NG/ML (ref 20–300)
FIBRINOGEN PPP-MCNC: 146 MG/DL (ref 200–400)
GFR SERPL CREATININE-BSD FRML MDRD: 55 ML/MIN/1.73M*2
GLUCOSE SERPL-MCNC: 110 MG/DL (ref 74–99)
HCT VFR BLD AUTO: 33.6 % (ref 41–52)
HGB BLD-MCNC: 12 G/DL (ref 13.5–17.5)
IGG SERPL-MCNC: 455 MG/DL (ref 700–1600)
IMM GRANULOCYTES # BLD AUTO: 0.01 X10*3/UL (ref 0–0.5)
IMM GRANULOCYTES NFR BLD AUTO: 0.6 % (ref 0–0.9)
LDH SERPL L TO P-CCNC: 184 U/L (ref 84–246)
LYMPHOCYTES # BLD AUTO: 0.42 X10*3/UL (ref 0.8–3)
LYMPHOCYTES NFR BLD AUTO: 24.1 %
MAGNESIUM SERPL-MCNC: 1.76 MG/DL (ref 1.6–2.4)
MCH RBC QN AUTO: 36.7 PG (ref 26–34)
MCHC RBC AUTO-ENTMCNC: 35.7 G/DL (ref 32–36)
MCV RBC AUTO: 103 FL (ref 80–100)
MONOCYTES # BLD AUTO: 0.46 X10*3/UL (ref 0.05–0.8)
MONOCYTES NFR BLD AUTO: 26.4 %
NEUTROPHILS # BLD AUTO: 0.81 X10*3/UL (ref 1.6–5.5)
NEUTROPHILS NFR BLD AUTO: 46.6 %
NRBC BLD-RTO: 0 /100 WBCS (ref 0–0)
OVALOCYTES BLD QL SMEAR: NORMAL
PLATELET # BLD AUTO: 61 X10*3/UL (ref 150–450)
POLYCHROMASIA BLD QL SMEAR: NORMAL
POTASSIUM SERPL-SCNC: 4.4 MMOL/L (ref 3.5–5.3)
PROT SERPL-MCNC: 6.1 G/DL (ref 6.4–8.2)
RBC # BLD AUTO: 3.27 X10*6/UL (ref 4.5–5.9)
RBC MORPH BLD: NORMAL
SODIUM SERPL-SCNC: 142 MMOL/L (ref 136–145)
URATE SERPL-MCNC: 5.6 MG/DL (ref 4–7.5)
WBC # BLD AUTO: 1.7 X10*3/UL (ref 4.4–11.3)

## 2023-11-28 PROCEDURE — 36415 COLL VENOUS BLD VENIPUNCTURE: CPT

## 2023-11-28 PROCEDURE — 86140 C-REACTIVE PROTEIN: CPT

## 2023-11-28 PROCEDURE — 97165 OT EVAL LOW COMPLEX 30 MIN: CPT | Mod: GO

## 2023-11-28 PROCEDURE — 83735 ASSAY OF MAGNESIUM: CPT

## 2023-11-28 PROCEDURE — 99417 PROLNG OP E/M EACH 15 MIN: CPT

## 2023-11-28 PROCEDURE — 85384 FIBRINOGEN ACTIVITY: CPT

## 2023-11-28 PROCEDURE — 1160F RVW MEDS BY RX/DR IN RCRD: CPT | Performed by: STUDENT IN AN ORGANIZED HEALTH CARE EDUCATION/TRAINING PROGRAM

## 2023-11-28 PROCEDURE — 99215 OFFICE O/P EST HI 40 MIN: CPT

## 2023-11-28 PROCEDURE — L3702 EO W/O JOINTS CF: HCPCS

## 2023-11-28 PROCEDURE — 82728 ASSAY OF FERRITIN: CPT

## 2023-11-28 PROCEDURE — 80053 COMPREHEN METABOLIC PANEL: CPT

## 2023-11-28 PROCEDURE — 1159F MED LIST DOCD IN RCRD: CPT | Performed by: STUDENT IN AN ORGANIZED HEALTH CARE EDUCATION/TRAINING PROGRAM

## 2023-11-28 PROCEDURE — 73080 X-RAY EXAM OF ELBOW: CPT | Mod: RT,FY

## 2023-11-28 PROCEDURE — 1160F RVW MEDS BY RX/DR IN RCRD: CPT

## 2023-11-28 PROCEDURE — 73080 X-RAY EXAM OF ELBOW: CPT | Mod: RIGHT SIDE | Performed by: RADIOLOGY

## 2023-11-28 PROCEDURE — 82784 ASSAY IGA/IGD/IGG/IGM EACH: CPT

## 2023-11-28 PROCEDURE — 84550 ASSAY OF BLOOD/URIC ACID: CPT

## 2023-11-28 PROCEDURE — 1126F AMNT PAIN NOTED NONE PRSNT: CPT

## 2023-11-28 PROCEDURE — 99024 POSTOP FOLLOW-UP VISIT: CPT | Performed by: STUDENT IN AN ORGANIZED HEALTH CARE EDUCATION/TRAINING PROGRAM

## 2023-11-28 PROCEDURE — 1111F DSCHRG MED/CURRENT MED MERGE: CPT

## 2023-11-28 PROCEDURE — 85025 COMPLETE CBC W/AUTO DIFF WBC: CPT

## 2023-11-28 PROCEDURE — 1126F AMNT PAIN NOTED NONE PRSNT: CPT | Performed by: STUDENT IN AN ORGANIZED HEALTH CARE EDUCATION/TRAINING PROGRAM

## 2023-11-28 PROCEDURE — 83615 LACTATE (LD) (LDH) ENZYME: CPT

## 2023-11-28 PROCEDURE — G2212 PROLONG OUTPT/OFFICE VIS: HCPCS

## 2023-11-28 PROCEDURE — 1159F MED LIST DOCD IN RCRD: CPT

## 2023-11-28 PROCEDURE — 1111F DSCHRG MED/CURRENT MED MERGE: CPT | Performed by: STUDENT IN AN ORGANIZED HEALTH CARE EDUCATION/TRAINING PROGRAM

## 2023-11-28 RX ORDER — DIPHENHYDRAMINE HYDROCHLORIDE 50 MG/ML
50 INJECTION INTRAMUSCULAR; INTRAVENOUS AS NEEDED
Status: CANCELLED | OUTPATIENT
Start: 2023-12-18

## 2023-11-28 RX ORDER — EPINEPHRINE 0.3 MG/.3ML
0.3 INJECTION SUBCUTANEOUS ONCE AS NEEDED
Status: CANCELLED | OUTPATIENT
Start: 2024-01-04

## 2023-11-28 RX ORDER — FAMOTIDINE 10 MG/ML
20 INJECTION INTRAVENOUS ONCE AS NEEDED
Status: CANCELLED | OUTPATIENT
Start: 2023-12-18

## 2023-11-28 RX ORDER — HEPARIN 100 UNIT/ML
500 SYRINGE INTRAVENOUS AS NEEDED
Status: CANCELLED | OUTPATIENT
Start: 2023-11-28

## 2023-11-28 RX ORDER — ALBUTEROL SULFATE 0.83 MG/ML
3 SOLUTION RESPIRATORY (INHALATION) AS NEEDED
Status: CANCELLED | OUTPATIENT
Start: 2024-01-04

## 2023-11-28 RX ORDER — DIPHENHYDRAMINE HYDROCHLORIDE 50 MG/ML
50 INJECTION INTRAMUSCULAR; INTRAVENOUS AS NEEDED
Status: CANCELLED | OUTPATIENT
Start: 2024-01-04

## 2023-11-28 RX ORDER — ACETAMINOPHEN 325 MG/1
650 TABLET ORAL ONCE
Status: CANCELLED | OUTPATIENT
Start: 2023-12-18

## 2023-11-28 RX ORDER — DIPHENHYDRAMINE HCL 25 MG
25 CAPSULE ORAL ONCE
Status: CANCELLED | OUTPATIENT
Start: 2023-12-18

## 2023-11-28 RX ORDER — HEPARIN SODIUM,PORCINE/PF 10 UNIT/ML
50 SYRINGE (ML) INTRAVENOUS AS NEEDED
Status: CANCELLED | OUTPATIENT
Start: 2023-11-28

## 2023-11-28 RX ORDER — FAMOTIDINE 10 MG/ML
20 INJECTION INTRAVENOUS ONCE AS NEEDED
Status: CANCELLED | OUTPATIENT
Start: 2024-01-04

## 2023-11-28 RX ORDER — EPINEPHRINE 0.3 MG/.3ML
0.3 INJECTION SUBCUTANEOUS EVERY 5 MIN PRN
Status: CANCELLED | OUTPATIENT
Start: 2023-12-18

## 2023-11-28 RX ORDER — ALBUTEROL SULFATE 0.83 MG/ML
3 SOLUTION RESPIRATORY (INHALATION) AS NEEDED
Status: CANCELLED | OUTPATIENT
Start: 2023-12-18

## 2023-11-28 ASSESSMENT — ENCOUNTER SYMPTOMS
CARDIOVASCULAR NEGATIVE: 1
EYES NEGATIVE: 1
PSYCHIATRIC NEGATIVE: 1
ENDOCRINE NEGATIVE: 1
LOSS OF SENSATION IN FEET: 0
GASTROINTESTINAL NEGATIVE: 1
FATIGUE: 1
OCCASIONAL FEELINGS OF UNSTEADINESS: 0
MUSCULOSKELETAL NEGATIVE: 1
DIZZINESS: 1
RESPIRATORY NEGATIVE: 1
DEPRESSION: 0
HEMATOLOGIC/LYMPHATIC NEGATIVE: 1

## 2023-11-28 ASSESSMENT — PAIN SCALES - GENERAL: PAINLEVEL_OUTOF10: 2

## 2023-11-28 ASSESSMENT — PAIN - FUNCTIONAL ASSESSMENT: PAIN_FUNCTIONAL_ASSESSMENT: 0-10

## 2023-11-28 NOTE — PROGRESS NOTES
Subjective:  73M w/ MM w/ R proximal radial lesion s/p radiation July 2023 presented to ED after fracturing through this site. He presents today for follow up. Pain has been 2-4/10, only needing oxycodone occasionally at night, has been in a sling and long-arm splint. Denies new N/T in RUE.     Objective:  GEN - NAD, resting comfortably in hospital bed  HEENT - MMM, EOMI, NCAT   CV - RRR by peripheral palpation, limbs wwp  PULM - NWOB on RA  ABD - Non-distended  NEURO - SAINI spontaneously, CNs II - XII grossly intact  PSYCH - Appropriate mood and affect    Right upper extremity:   -Skin intact.   -Mildly tender at site of injury with no pain with flex/ext and only mild pain with more extreme pronation/supination  -Fires axillary/AIN/PIN/ulnar distributions  -SILT axillary/radial/median/ulnar distributions  -Hand warm, well perfused  -Palpable radial pulse, cap refill brisk  -Compartments soft and compressible     Imaging:  XR R elbow reviewed and interpreted by myself today showing a pathologic fracture through right proximal radial lesion. Evidence of prior radial neck fracture with nonunion.    A/P: 73M PMH MM with pathologic R prox radius fx (h/o radiation to this lesion). We discussed challenge of bony healing through this given the pathology and h/o radiation to this site. We discussed that painless nonunion/fibrous union with a functional arm would be a good outcome, similar to his prior radial neck fracture. If he continues to have pain we would further discuss surgical options at that time. Will plan on non-operative treatment in OT splint, coming out for hygiene and OT. Referral to OT placed for splint making and to begin elbow ROM to prevent stiffness. He will return to clinic in 6 weeks with XR R elbow xrays out of splint at that time.

## 2023-11-28 NOTE — PROGRESS NOTES
Pt ambulated to SCT unit without assistance accompanied by wife. Pt denies complaints or pain today. Vitals obtained, blood drawn and sent to lab. Jacob Arechiga NP came to see patient. No further nursing needs required. Pt ambulated off unit without complaints or assistance.

## 2023-11-28 NOTE — PROGRESS NOTES
Patient ID: Maldonado Mccloud is a 73 y.o. male.  Referring Physician: No referring provider defined for this encounter.  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 Bone marrow biopsy suggested multiple myeloma IgG kappa, ISS Stage II, bone survey revealed lytic lesions; Urine light chains present kappa restricted with 2gm proteinuria.  No adverse prognostic factors identified.     Treatment:     VRD  Initiated Aug 2015; currently s/p 3 cycles.     BMBx 10/2015 complete response.     Stem Cell Transplant  Underwent stem cell mobilization with Neupogen/Plerixafor and 2 day collection December 16-17, 2015, for 8.62 CD34 x 10^6/kg. During procedure for right IJ Trialysis placement  developed A. Fib with RVR which spontaneously resolved on December 14, 2015. He was admitted and placed on monitor for stem cell collection.      Status post autologous hematopoietic progenitor cell transplant on 12/23/15 utilizing amifostine and melphalan preparative  regimen.  Hospitalization complicated by orthostatic hypotension, electrolyte replacement, drug induced rash, culture negative fever.       He completed approximately 5 weeks of maintenance Revlimid 10 mg daily which was held for worsening neuropathy August 2016.     3/ 2018: IgG M Braxton rising to 0.2gm/DL and light chain rising; restarted on Revlimid maintenance at 5mg EOD.      Revlimid stopped in 3/2021 due to stable disease.     Vacto/Pom Trial  7/2021 his M spike was on the rise. He was consented for Vacto/Pom trial which he began on 8/5/2021. He continued Pom  post finishing the trial.      Sarclisa/Kyprolis  Myeloma markers increasing in 6/2022, consented for Sarclisa /Kyprolis, which began 6/30/22. He continued this through September of 2022.         Cytoxan/Kyprolis  His therapy was subsequently changed in 9/2022 due to continued disease  progression in the face of Sarclisa. His therapy was changed to CYYCLON regimen (Cytoxan/Kyprolis).     PET CT scan 9/14/2022 shows FDG avid lytic lesion within the left midshaft clavicle with pathologic fracture, FDG avid lytic lesions involving bilateral scapulas and FDG avidity within the T9 vertebral body, suggestive of active multiple myeloma.  There  are non FDG avid lytic lesions in the right iliac bone and right clavicle, likely representing nonactive multiple myeloma.  There is no PET evidence of extramedullary involvement.  There is an FDG avid right thyroid nodule.      MRI in 10/2022 negative for myelomatous involvement in thoracic vertebrae.      Therapy placed on hold in 11/2022 due to need for hip replacement surgery. His myeloma markers were on the rise in 3/2023 with a new jaw lesion. Plan is to begin radiation therapy 4/11 and resume 2x/week Kyprolis 4/10.      Tracking to CAR T cell therapy. Collected CAR T cells on 5/26/23.      X-ray of L elbow (6/20/23):  IMPRESSION:  1. Osteolytic lesions in the proximal radius and distal humerus  consistent with patient's reported history of multiple myeloma.  2. No acute fracture or malalignment.     Began radiation x8 fractions on 7/3/23.     Resumed Cytoxan as of 8/7/23.   '  CAR T Cell Therapy   Admitted for CAR T cell therapy for his ISS stage 2 Kappa multiple myeloma with ABECMA T=0, 10/25/23     Hospital course complicated by Grade 1 CRS and Grade 1 ICANS, managed with dexamethasone 10mg x 3 days and one dose of tocilizumab.     ASSESSMENT and PLAN:    Multiple Myeloma:   - ISS Stage 2 IgG Kappa Multiple Myeloma  - S/p VRD, Autologous SCT, Vacto/Pom, Kyprolis/Sarclisa, Radiation, most recently Kyprolis/Cytoxan  - PET/CT showed mostly decreased metabolic activity, however some new areas  - Completed radiation to R arm with Dr. Patel  - BMBx performed 10/2, MRD prior to CAR T of 0, however progressing lytic lesions  - S/p CAR T w/ ABECMA (T=0,  10/25/23), hospital course complicated by grade 1 ICANS managed w/ Dex 10mg x3 days and 1 dose Tociluzimab  - Plan for PET/CT, BMBx ~D90     ID:  - Acyclovir 400mg BID  - Bactrim DS M, W, F  - Will resume Levaquin if ANC <500  - Plan to start IVIG for IgG <400    Bone Health:  - Found to have fracture of the R radius  11/12/23  - Follows w/ Dr. Collins, no plans for surgery at this point  - Plan to start Zometa monthly     DVT:  - Eliquis 5mg BID, when platelets increase consistently >50     Cardiac:  - Echo (5/2/23) showed EF of 50-55%  - Reports some dizziness, encouraged increasing fluid intake, sCr 1.35 (11/28)    Oncology History   IgG multiple myeloma (CMS/HCC)   7/1/2015 Initial Diagnosis    IgG multiple myeloma (CMS/HCC)     12/23/2015 -  Bone Marrow Transplant    Autologous Stem Cell Transplant      11/4/2022 - 11/4/2022 Chemotherapy    Carfilzomib (Weekly) / Cyclophosphamide / Thalidomide / Dexamethasone, 28 Day Cycles     10/25/2023 -  Cellular Therapy    Abecma        SUBJECTIVE:  History of Present Illness:  Cuong presents to clinic 11/28/23 with his wife Farida for a follow up visit.    T+35 s/p CAR T.    Overall he is doing well.    Notes ongoing fatigue.     Wearing brace on R arm. Met with Dr. Collins, no plans for surgery at this time.     Has dizziness w/ position changes.       Review of Systems   Constitutional:  Positive for fatigue.   HENT: Negative.     Eyes: Negative.    Respiratory: Negative.     Cardiovascular: Negative.    Gastrointestinal: Negative.    Endocrine: Negative.    Genitourinary: Negative.    Musculoskeletal: Negative.    Skin: Negative.    Allergic/Immunologic: Positive for immunocompromised state.   Neurological:  Positive for dizziness.   Hematological: Negative.    Psychiatric/Behavioral: Negative.       OBJECTIVE:  KPS: Karnofsky Score: 70 - Cares for self; unable to carry on normal activity or do normal work    VS:  There were no vitals taken for this visit.  BSA: There is no  height or weight on file to calculate BSA.    Physical Exam  Constitutional:       Appearance: Normal appearance. He is normal weight.   HENT:      Head: Normocephalic and atraumatic.      Nose: Nose normal.      Mouth/Throat:      Mouth: Mucous membranes are moist.      Pharynx: Oropharynx is clear.   Eyes:      Extraocular Movements: Extraocular movements intact.      Conjunctiva/sclera: Conjunctivae normal.      Pupils: Pupils are equal, round, and reactive to light.   Cardiovascular:      Rate and Rhythm: Normal rate and regular rhythm.      Pulses: Normal pulses.      Heart sounds: Normal heart sounds.   Pulmonary:      Effort: Pulmonary effort is normal.      Breath sounds: Normal breath sounds.   Abdominal:      General: Abdomen is flat. Bowel sounds are normal.      Palpations: Abdomen is soft.   Musculoskeletal:         General: Tenderness and signs of injury present. Normal range of motion.      Cervical back: Normal range of motion and neck supple.   Skin:     General: Skin is warm and dry.   Neurological:      General: No focal deficit present.      Mental Status: He is alert and oriented to person, place, and time. Mental status is at baseline.   Psychiatric:         Mood and Affect: Mood normal.         Behavior: Behavior normal.         Thought Content: Thought content normal.         Judgment: Judgment normal.       Laboratory:  The pertinent laboratory results were reviewed and discussed with the patient.    Lab Results   Component Value Date    WBC 1.7 (L) 11/28/2023    HCT 33.6 (L) 11/28/2023    HGB 12.0 (L) 11/28/2023    PLT 61 (L) 11/28/2023    ANC 1.49 (L) 10/27/2023    K 4.4 11/28/2023    CALCIUM 9.2 11/28/2023     11/28/2023    MG 1.76 11/28/2023    ALT 21 11/28/2023    AST 23 11/28/2023    BUN 24 (H) 11/28/2023    CREATININE 1.35 (H) 11/28/2023    PHOS 3.9 11/13/2023    KAPPA 0.08 (L) 11/21/2023    LAMBDA <0.17 (L) 11/21/2023    KAPLS  11/21/2023      Comment:      One or more analytes  used in this calculation   is outside of the analytical measurement range.  Calculation cannot be performed.    SPEP Decrease in polyclonal gamma globulins. 11/21/2023    IEPIN  10/02/2023     No monoclonal proteins detected by immunofixation.        (L) 11/21/2023    IGM 7 (L) 10/02/2023    IGA <7 (L) 10/02/2023      Note: for a comprehensive list of the patient's lab results, access the Results Review activity.    RTC:  12/4 NICK Arechiga, APRN-CNP

## 2023-11-28 NOTE — PROGRESS NOTES
Occupational Therapy Orthopedic Evaluation    Patient Name: Maldonado Mccloud  MRN: [id number]  Today's Date: 11/28/2023  Time Calculation  Start Time: 1100  Stop Time: 1150  Time Calculation (min): 50 min    Insurance:  Visit number: 1 of [place]  Authorization info: not needed  Insurance Type: Medicare Advantage HMO through [employer]    Assessment: Maldonado Mccloud is a 72 yo man being treated for multiple myeloma who sustained a pathological fracture of his proximal R radius on 11/12/23, which is being treated conservatively. Dr. Collins sent him here for a custom protective orthosis and a home program of gentle AROM. I provided him with the same. He will participate in ongoing occupational therapy closer to home ([place], [place]) in order to address his functional impairments as he heals.      Plan: Call me with issues, follow up with OT closer to home.  Goals: in 1 visit Maldonado Mccloud will  Be independent with stating and demonstrating his home exercise program in order to maintain and improve upon his R elbow/forearm/wrist range of motion and work toward improving his ability to perform self-care, household, and leisure tasks.   GOAL MET    Be independent with custom orthosis donning and doffing.  GOAL MET    Plan of care was developed with input and agreement by the patient   Planned Interventions include: therapeutic exercise, orthosis fabrication  Frequency: NO FURTHER VISITS PLANNED AT THIS LOCATION    General:  Reason for visit: R proximal radius fracture treated conservatively  Referred by: Dr. Collins    Current Problem  1. Pathological fracture of right radius with nonunion, unspecified pathological cause, subsequent encounter  Referral to Occupational Therapy        Precautions: NWB, orthosis removal only for hygiene and home exercise program     Medical History Form: Reviewed (scanned into chart)    Subjective:   Chief Complaint: fractured elbow  Onset: 11/12/23  KISHAN: picking up heavy blanket  Just got cast off today,  hasn't moved it much    Hand Dominance: Right     PAIN  2/10 at rest,   Location: R elbow    Relevant Information (PMH & Previous Tests/Imaging): undergoing treatment for multiple myeloma, had tumor in the bone that fractured  Previous Interventions/Treatments: plaster cast applied in ED until this morning (2 weeks ago)    Prior Level of Function (PLOF)  Exercise/Physical Activity: limited due to cancer/treatment  Work/School: retired  Current ADL/IADL Status: limited     Patients Living Environment: Reviewed and no concern; presented with wife    Primary Language: English, no barrier to learning    Pt goals for therapy: protect healing elbow, preserve motion    Objective:    Elbow AROM (degrees)   R L   Extension -17    Flexion 126    Pronation full    Supination 40       Edema: mild about R elbow to wrist    Sensation: WNL    Outcome Measures:  DASH: did not complete    EDUCATION: home exercise program, plan of care, activity modifications, splint protocol     Treatments:     Therapeutic Exercise:   5 min  Instructed Maldonado in active elbow flexion and extension with forearm in neutral, wrist extension and flexion, radial/ulnar deviation with forearm in neutral, and gentle supination and pronation, each x 10 reps 2-3x/day     Orthosis:      15 min  Fabricated a custom thermoplastic dorsal static elbow orthosis with elbow in 80 degrees of flexion and the forearm and wrist in neutral; instructed him in its use and care.

## 2023-11-30 DIAGNOSIS — C90.00 MULTIPLE MYELOMA WITHOUT REMISSION (MULTI): Primary | ICD-10-CM

## 2023-11-30 DIAGNOSIS — G47.09 OTHER INSOMNIA: ICD-10-CM

## 2023-11-30 LAB
CMV DNA SERPL NAA+PROBE-LOG IU: ABNORMAL {LOG_IU}/ML
LABORATORY COMMENT REPORT: ABNORMAL

## 2023-11-30 RX ORDER — TRAZODONE HYDROCHLORIDE 100 MG/1
TABLET ORAL
Qty: 270 TABLET | Refills: 1 | Status: SHIPPED | OUTPATIENT
Start: 2023-11-30 | End: 2024-05-28

## 2023-12-01 ENCOUNTER — APPOINTMENT (OUTPATIENT)
Dept: ORTHOPEDIC SURGERY | Facility: HOSPITAL | Age: 73
End: 2023-12-01
Payer: COMMERCIAL

## 2023-12-04 ENCOUNTER — LAB (OUTPATIENT)
Dept: LAB | Facility: CLINIC | Age: 73
End: 2023-12-04
Payer: COMMERCIAL

## 2023-12-04 ENCOUNTER — OFFICE VISIT (OUTPATIENT)
Dept: HEMATOLOGY/ONCOLOGY | Facility: CLINIC | Age: 73
End: 2023-12-04
Payer: COMMERCIAL

## 2023-12-04 ENCOUNTER — DOCUMENTATION (OUTPATIENT)
Dept: OCCUPATIONAL THERAPY | Facility: CLINIC | Age: 73
End: 2023-12-04
Payer: MEDICARE

## 2023-12-04 VITALS
RESPIRATION RATE: 14 BRPM | DIASTOLIC BLOOD PRESSURE: 74 MMHG | HEIGHT: 69 IN | SYSTOLIC BLOOD PRESSURE: 104 MMHG | BODY MASS INDEX: 31.98 KG/M2 | OXYGEN SATURATION: 98 % | HEART RATE: 94 BPM | WEIGHT: 215.94 LBS | TEMPERATURE: 97.5 F

## 2023-12-04 DIAGNOSIS — C90.00 IGG MULTIPLE MYELOMA (MULTI): Primary | ICD-10-CM

## 2023-12-04 DIAGNOSIS — C90.00 IGG MULTIPLE MYELOMA (MULTI): ICD-10-CM

## 2023-12-04 DIAGNOSIS — C90.00 MULTIPLE MYELOMA WITHOUT REMISSION (MULTI): Primary | ICD-10-CM

## 2023-12-04 DIAGNOSIS — Z92.850 HISTORY OF CHIMERIC ANTIGEN RECEPTOR T-CELL THERAPY: ICD-10-CM

## 2023-12-04 DIAGNOSIS — M84.433D: ICD-10-CM

## 2023-12-04 DIAGNOSIS — Z94.84 STEM CELLS TRANSPLANT STATUS (MULTI): ICD-10-CM

## 2023-12-04 LAB
BASOPHILS # BLD AUTO: 0.01 X10*3/UL (ref 0–0.1)
BASOPHILS NFR BLD AUTO: 0.5 %
EOSINOPHIL # BLD AUTO: 0.05 X10*3/UL (ref 0–0.4)
EOSINOPHIL NFR BLD AUTO: 2.4 %
ERYTHROCYTE [DISTWIDTH] IN BLOOD BY AUTOMATED COUNT: 13.7 % (ref 11.5–14.5)
HCT VFR BLD AUTO: 35 % (ref 41–52)
HGB BLD-MCNC: 12.1 G/DL (ref 13.5–17.5)
IMM GRANULOCYTES # BLD AUTO: 0 X10*3/UL (ref 0–0.5)
IMM GRANULOCYTES NFR BLD AUTO: 0 % (ref 0–0.9)
LYMPHOCYTES # BLD AUTO: 0.48 X10*3/UL (ref 0.8–3)
LYMPHOCYTES NFR BLD AUTO: 23.1 %
MCH RBC QN AUTO: 36.1 PG (ref 26–34)
MCHC RBC AUTO-ENTMCNC: 34.6 G/DL (ref 32–36)
MCV RBC AUTO: 105 FL (ref 80–100)
MONOCYTES # BLD AUTO: 0.64 X10*3/UL (ref 0.05–0.8)
MONOCYTES NFR BLD AUTO: 30.8 %
NEUTROPHILS # BLD AUTO: 0.9 X10*3/UL (ref 1.6–5.5)
NEUTROPHILS NFR BLD AUTO: 43.2 %
NRBC BLD-RTO: ABNORMAL /100{WBCS}
PLATELET # BLD AUTO: 74 X10*3/UL (ref 150–450)
RBC # BLD AUTO: 3.35 X10*6/UL (ref 4.5–5.9)
WBC # BLD AUTO: 2.1 X10*3/UL (ref 4.4–11.3)

## 2023-12-04 PROCEDURE — 85384 FIBRINOGEN ACTIVITY: CPT

## 2023-12-04 PROCEDURE — 82784 ASSAY IGA/IGD/IGG/IGM EACH: CPT

## 2023-12-04 PROCEDURE — 83615 LACTATE (LD) (LDH) ENZYME: CPT

## 2023-12-04 PROCEDURE — 84550 ASSAY OF BLOOD/URIC ACID: CPT

## 2023-12-04 PROCEDURE — 86140 C-REACTIVE PROTEIN: CPT

## 2023-12-04 PROCEDURE — 83735 ASSAY OF MAGNESIUM: CPT

## 2023-12-04 PROCEDURE — 3074F SYST BP LT 130 MM HG: CPT

## 2023-12-04 PROCEDURE — 3078F DIAST BP <80 MM HG: CPT

## 2023-12-04 PROCEDURE — 82728 ASSAY OF FERRITIN: CPT

## 2023-12-04 PROCEDURE — 1160F RVW MEDS BY RX/DR IN RCRD: CPT

## 2023-12-04 PROCEDURE — 80053 COMPREHEN METABOLIC PANEL: CPT

## 2023-12-04 PROCEDURE — 1159F MED LIST DOCD IN RCRD: CPT

## 2023-12-04 PROCEDURE — 85025 COMPLETE CBC W/AUTO DIFF WBC: CPT

## 2023-12-04 PROCEDURE — 99215 OFFICE O/P EST HI 40 MIN: CPT

## 2023-12-04 PROCEDURE — 1126F AMNT PAIN NOTED NONE PRSNT: CPT

## 2023-12-04 PROCEDURE — 1111F DSCHRG MED/CURRENT MED MERGE: CPT

## 2023-12-04 ASSESSMENT — ENCOUNTER SYMPTOMS
DIZZINESS: 1
PSYCHIATRIC NEGATIVE: 1
MUSCULOSKELETAL NEGATIVE: 1
FATIGUE: 1
HEMATOLOGIC/LYMPHATIC NEGATIVE: 1
GASTROINTESTINAL NEGATIVE: 1
EYES NEGATIVE: 1
RESPIRATORY NEGATIVE: 1
CARDIOVASCULAR NEGATIVE: 1
ENDOCRINE NEGATIVE: 1

## 2023-12-04 ASSESSMENT — PAIN SCALES - GENERAL: PAINLEVEL: 0-NO PAIN

## 2023-12-04 NOTE — PROGRESS NOTES
Patient ID: Maldonado Mccloud is a 73 y.o. male.  Referring Physician: No referring provider defined for this encounter.  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 Bone marrow biopsy suggested multiple myeloma IgG kappa, ISS Stage II, bone survey revealed lytic lesions; Urine light chains present kappa restricted with 2gm proteinuria.  No adverse prognostic factors identified.     Treatment:     VRD  Initiated Aug 2015; currently s/p 3 cycles.     BMBx 10/2015 complete response.     Stem Cell Transplant  Underwent stem cell mobilization with Neupogen/Plerixafor and 2 day collection December 16-17, 2015, for 8.62 CD34 x 10^6/kg. During procedure for right IJ Trialysis placement  developed A. Fib with RVR which spontaneously resolved on December 14, 2015. He was admitted and placed on monitor for stem cell collection.      Status post autologous hematopoietic progenitor cell transplant on 12/23/15 utilizing amifostine and melphalan preparative  regimen.  Hospitalization complicated by orthostatic hypotension, electrolyte replacement, drug induced rash, culture negative fever.       He completed approximately 5 weeks of maintenance Revlimid 10 mg daily which was held for worsening neuropathy August 2016.     3/ 2018: IgG M Braxton rising to 0.2gm/DL and light chain rising; restarted on Revlimid maintenance at 5mg EOD.      Revlimid stopped in 3/2021 due to stable disease.     Vacto/Pom Trial  7/2021 his M spike was on the rise. He was consented for Vacto/Pom trial which he began on 8/5/2021. He continued Pom  post finishing the trial.      Sarclisa/Kyprolis  Myeloma markers increasing in 6/2022, consented for Sarclisa /Kyprolis, which began 6/30/22. He continued this through September of 2022.         Cytoxan/Kyprolis  His therapy was subsequently changed in 9/2022 due to continued disease  progression in the face of Sarclisa. His therapy was changed to CYYCLON regimen (Cytoxan/Kyprolis).     PET CT scan 9/14/2022 shows FDG avid lytic lesion within the left midshaft clavicle with pathologic fracture, FDG avid lytic lesions involving bilateral scapulas and FDG avidity within the T9 vertebral body, suggestive of active multiple myeloma.  There  are non FDG avid lytic lesions in the right iliac bone and right clavicle, likely representing nonactive multiple myeloma.  There is no PET evidence of extramedullary involvement.  There is an FDG avid right thyroid nodule.      MRI in 10/2022 negative for myelomatous involvement in thoracic vertebrae.      Therapy placed on hold in 11/2022 due to need for hip replacement surgery. His myeloma markers were on the rise in 3/2023 with a new jaw lesion. Plan is to begin radiation therapy 4/11 and resume 2x/week Kyprolis 4/10.      Tracking to CAR T cell therapy. Collected CAR T cells on 5/26/23.      X-ray of L elbow (6/20/23):  IMPRESSION:  1. Osteolytic lesions in the proximal radius and distal humerus  consistent with patient's reported history of multiple myeloma.  2. No acute fracture or malalignment.     Began radiation x8 fractions on 7/3/23.     Resumed Cytoxan as of 8/7/23.   '  CAR T Cell Therapy   Admitted for CAR T cell therapy for his ISS stage 2 Kappa multiple myeloma with ABECMA T=0, 10/25/23     Hospital course complicated by Grade 1 CRS and Grade 1 ICANS, managed with dexamethasone 10mg x 3 days and one dose of tocilizumab.     ASSESSMENT and PLAN:    Multiple Myeloma:   - ISS Stage 2 IgG Kappa Multiple Myeloma  - S/p VRD, Autologous SCT, Vacto/Pom, Kyprolis/Sarclisa, Radiation, most recently Kyprolis/Cytoxan  - PET/CT showed mostly decreased metabolic activity, however some new areas  - Completed radiation to R arm with Dr. Patel  - BMBx performed 10/2, MRD prior to CAR T of 0, however progressing lytic lesions  - S/p CAR T w/ ABECMA (T=0,  10/25/23), hospital course complicated by grade 1 ICANS managed w/ Dex 10mg x3 days and 1 dose Tociluzimab  - Plan for PET/CT, BMBx ~D90     ID:  - Acyclovir 400mg BID  - Bactrim DS M, W, F  - Fluconazole 400mg daily   - Will resume Levaquin if ANC <500  - Plan to start IVIG for IgG <400    Bone Health:  - Found to have fracture of the R radius  11/12/23  - Follows w/ Dr. Collins, no plans for surgery at this point  - Plan to start Zometa monthly     DVT:  - Eliquis 5mg BID, when platelets increase consistently >50, may resume 12/4     Cardiac:  - Echo (5/2/23) showed EF of 50-55%  - Reports some dizziness, encouraged increasing fluid intake, sCr 1.35 (11/28)  - Will plan to hold Lisinopril and assess improvement in dizziness and BP (will wean, 2.5mg BID x2 days, every other day, done)    Oncology History   IgG multiple myeloma (CMS/[facility])   7/1/2015 Initial Diagnosis    IgG multiple myeloma (CMS/[facility])     12/23/2015 -  Bone Marrow Transplant    Autologous Stem Cell Transplant      11/4/2022 - 11/4/2022 Chemotherapy    Carfilzomib (Weekly) / Cyclophosphamide / Thalidomide / Dexamethasone, 28 Day Cycles     10/25/2023 -  Cellular Therapy    Abecma        SUBJECTIVE:  History of Present Illness:  Cuong presents to clinic 12/4/23 with his wife Farida for a follow up visit.    T+41 s/p CAR T.    Overall he is doing well.    Notes ongoing fatigue.     Met with PT and adjusted his brace. Plans to get in with OT by his house.    Improved his oral fluid intake, ongoing dizziness persists. Does not check BP at home.       Review of Systems   Constitutional:  Positive for fatigue.   HENT: Negative.     Eyes: Negative.    Respiratory: Negative.     Cardiovascular: Negative.    Gastrointestinal: Negative.    Endocrine: Negative.    Genitourinary: Negative.    Musculoskeletal: Negative.    Skin: Negative.    Allergic/Immunologic: Positive for immunocompromised state.   Neurological:  Positive for dizziness.   Hematological: Negative.     Psychiatric/Behavioral: Negative.       OBJECTIVE:  KPS: Karnofsky Score: 70 - Cares for self; unable to carry on normal activity or do normal work    VS:  There were no vitals taken for this visit.  BSA: There is no height or weight on file to calculate BSA.    Physical Exam  Constitutional:       Appearance: Normal appearance. He is normal weight.   HENT:      Head: Normocephalic and atraumatic.      Nose: Nose normal.      Mouth/Throat:      Mouth: Mucous membranes are moist.      Pharynx: Oropharynx is clear.   Eyes:      Extraocular Movements: Extraocular movements intact.      Conjunctiva/sclera: Conjunctivae normal.      Pupils: Pupils are equal, round, and reactive to light.   Cardiovascular:      Rate and Rhythm: Normal rate and regular rhythm.      Pulses: Normal pulses.      Heart sounds: Normal heart sounds.   Pulmonary:      Effort: Pulmonary effort is normal.      Breath sounds: Normal breath sounds.   Abdominal:      General: Abdomen is flat. Bowel sounds are normal.      Palpations: Abdomen is soft.   Musculoskeletal:         General: Tenderness and signs of injury present. Normal range of motion.      Cervical back: Normal range of motion and neck supple.   Skin:     General: Skin is warm and dry.   Neurological:      General: No focal deficit present.      Mental Status: He is alert and oriented to person, place, and time. Mental status is at baseline.   Psychiatric:         Mood and Affect: Mood normal.         Behavior: Behavior normal.         Thought Content: Thought content normal.         Judgment: Judgment normal.     Laboratory:  The pertinent laboratory results were reviewed and discussed with the patient.    Lab Results   Component Value Date    WBC 1.7 (L) 11/28/2023    HCT 33.6 (L) 11/28/2023    HGB 12.0 (L) 11/28/2023    PLT 61 (L) 11/28/2023    ANC 1.49 (L) 10/27/2023    K 4.4 11/28/2023    CALCIUM 9.2 11/28/2023     11/28/2023    MG 1.76 11/28/2023    ALT 21 11/28/2023    AST  23 11/28/2023    BUN 24 (H) 11/28/2023    CREATININE 1.35 (H) 11/28/2023    PHOS 3.9 11/13/2023    KAPPA 0.08 (L) 11/21/2023    LAMBDA <0.17 (L) 11/21/2023    KAPLS  11/21/2023      Comment:      One or more analytes used in this calculation   is outside of the analytical measurement range.  Calculation cannot be performed.    SPEP Decrease in polyclonal gamma globulins. 11/21/2023    IEPIN  10/02/2023     No monoclonal proteins detected by immunofixation.        (L) 11/28/2023    IGM 7 (L) 10/02/2023    IGA <7 (L) 10/02/2023      Note: for a comprehensive list of the patient's lab results, access the Results Review activity.    RTC:  12/18 NICK Arechiga, APRN-CNP

## 2023-12-04 NOTE — PROGRESS NOTES
Occupational Therapy                 Therapy Communication Note    Patient Name: Maldonado Mccloud  MRN: [id number]  Today's Date: 12/4/2023     Discipline: Occupational Therapy      Comment: Pt was seen for additional velcro and straps on splint.

## 2023-12-05 ENCOUNTER — APPOINTMENT (OUTPATIENT)
Dept: OCCUPATIONAL THERAPY | Facility: HOSPITAL | Age: 73
End: 2023-12-05
Payer: COMMERCIAL

## 2023-12-05 ENCOUNTER — TREATMENT (OUTPATIENT)
Dept: OCCUPATIONAL THERAPY | Facility: CLINIC | Age: 73
End: 2023-12-05
Payer: COMMERCIAL

## 2023-12-05 DIAGNOSIS — M84.433K: Primary | ICD-10-CM

## 2023-12-05 LAB
ALBUMIN SERPL BCP-MCNC: 4.5 G/DL (ref 3.4–5)
ALP SERPL-CCNC: 52 U/L (ref 33–136)
ALT SERPL W P-5'-P-CCNC: 27 U/L (ref 10–52)
ANION GAP SERPL CALC-SCNC: 13 MMOL/L (ref 10–20)
AST SERPL W P-5'-P-CCNC: 23 U/L (ref 9–39)
BILIRUB SERPL-MCNC: 1.2 MG/DL (ref 0–1.2)
BUN SERPL-MCNC: 27 MG/DL (ref 6–23)
CALCIUM SERPL-MCNC: 9.6 MG/DL (ref 8.6–10.6)
CHLORIDE SERPL-SCNC: 105 MMOL/L (ref 98–107)
CMV DNA SERPL NAA+PROBE-LOG IU: NORMAL {LOG_IU}/ML
CO2 SERPL-SCNC: 26 MMOL/L (ref 21–32)
CREAT SERPL-MCNC: 1.38 MG/DL (ref 0.5–1.3)
CRP SERPL-MCNC: <0.1 MG/DL
FERRITIN SERPL-MCNC: 162 NG/ML (ref 20–300)
FIBRINOGEN PPP-MCNC: 158 MG/DL (ref 200–400)
GFR SERPL CREATININE-BSD FRML MDRD: 54 ML/MIN/1.73M*2
GLUCOSE SERPL-MCNC: 116 MG/DL (ref 74–99)
IGG SERPL-MCNC: 448 MG/DL (ref 700–1600)
LABORATORY COMMENT REPORT: NOT DETECTED
LDH SERPL L TO P-CCNC: 168 U/L (ref 84–246)
MAGNESIUM SERPL-MCNC: 1.94 MG/DL (ref 1.6–2.4)
POTASSIUM SERPL-SCNC: 5.1 MMOL/L (ref 3.5–5.3)
PROT SERPL-MCNC: 6 G/DL (ref 6.4–8.2)
SODIUM SERPL-SCNC: 139 MMOL/L (ref 136–145)
URATE SERPL-MCNC: 5.5 MG/DL (ref 4–7.5)

## 2023-12-05 PROCEDURE — 97530 THERAPEUTIC ACTIVITIES: CPT | Mod: GO

## 2023-12-05 ASSESSMENT — PAIN SCALES - GENERAL: PAINLEVEL_OUTOF10: 2

## 2023-12-05 ASSESSMENT — PAIN - FUNCTIONAL ASSESSMENT: PAIN_FUNCTIONAL_ASSESSMENT: 0-10

## 2023-12-05 NOTE — PROGRESS NOTES
Occupational Therapy Treatment    Patient Name: Maldonado Mccloud  MRN: [id number]  Today's Date: 12/5/2023  Time Calculation  Start Time: 1200  Stop Time: 1238  Time Calculation (min): 38 min    Subjective   Current Problem:  Pt arrives with velcro off splint.   Pain:  Pain Assessment  Pain Assessment: 0-10  Pain Score: 2    Objective      Splinting and Casting: Pt in a long arm posterior orthotic.        Treatment:  Therapeutic Activity  Therapeutic Activity Performed: Yes  Therapeutic Activity 1: Removed velcro glue and reapplied new velcro and strapping for entire long arm orthoses.  Therapeutic Activity 2: Provided stockinet to go over entire long arm orthoses to ensure pt doesn't loose the velcro as he has twice now.  Therapeutic Activity 3: Educated pt and spouse on healing process of how scar tissue forms and need to rest arm into splint.  Therapeutic Activity 4: Provided education on adaptive utensils to assist with pt using non dominant hand to eat.    Assessment/Plan Pt demonstrated understanding of needing to rest into splint and not fight being in it.  Pt's care will transfer to a private practice.     Plan  OT Plan: Pt will transfer care to a private practice facility near his home.    GOALS:  Active       OT Goals       Pt will be independent with HEP carryover including splint wear/care and ROM in order for healing to continue.       Start:  12/05/23    Expected End:  01/30/24

## 2023-12-13 LAB — HOLD SPECIMEN: NORMAL

## 2023-12-18 ENCOUNTER — OFFICE VISIT (OUTPATIENT)
Dept: HEMATOLOGY/ONCOLOGY | Facility: CLINIC | Age: 73
End: 2023-12-18
Payer: COMMERCIAL

## 2023-12-18 ENCOUNTER — INFUSION (OUTPATIENT)
Dept: HEMATOLOGY/ONCOLOGY | Facility: CLINIC | Age: 73
End: 2023-12-18
Payer: COMMERCIAL

## 2023-12-18 VITALS
OXYGEN SATURATION: 96 % | HEART RATE: 88 BPM | BODY MASS INDEX: 30.9 KG/M2 | WEIGHT: 212.19 LBS | SYSTOLIC BLOOD PRESSURE: 108 MMHG | TEMPERATURE: 97.2 F | RESPIRATION RATE: 18 BRPM | DIASTOLIC BLOOD PRESSURE: 81 MMHG

## 2023-12-18 DIAGNOSIS — D80.1 HYPOGAMMAGLOBULINEMIA DUE TO MULTIPLE MYELOMA (MULTI): ICD-10-CM

## 2023-12-18 DIAGNOSIS — R42 DIZZINESS: ICD-10-CM

## 2023-12-18 DIAGNOSIS — D84.9 IMMUNOCOMPROMISED (MULTI): ICD-10-CM

## 2023-12-18 DIAGNOSIS — C90.00 IGG MULTIPLE MYELOMA (MULTI): Primary | ICD-10-CM

## 2023-12-18 DIAGNOSIS — C90.00 MULTIPLE MYELOMA WITHOUT REMISSION (MULTI): ICD-10-CM

## 2023-12-18 DIAGNOSIS — Z94.84 STEM CELLS TRANSPLANT STATUS (MULTI): ICD-10-CM

## 2023-12-18 DIAGNOSIS — C90.00 IGG MULTIPLE MYELOMA (MULTI): ICD-10-CM

## 2023-12-18 DIAGNOSIS — C90.00 HYPOGAMMAGLOBULINEMIA DUE TO MULTIPLE MYELOMA (MULTI): ICD-10-CM

## 2023-12-18 DIAGNOSIS — Z92.850 HISTORY OF CHIMERIC ANTIGEN RECEPTOR T-CELL THERAPY: ICD-10-CM

## 2023-12-18 DIAGNOSIS — M84.433D: ICD-10-CM

## 2023-12-18 LAB
BASOPHILS # BLD AUTO: 0.02 X10*3/UL (ref 0–0.1)
BASOPHILS NFR BLD AUTO: 0.8 %
EOSINOPHIL # BLD AUTO: 0.03 X10*3/UL (ref 0–0.4)
EOSINOPHIL NFR BLD AUTO: 1.2 %
ERYTHROCYTE [DISTWIDTH] IN BLOOD BY AUTOMATED COUNT: 14.2 % (ref 11.5–14.5)
HCT VFR BLD AUTO: 32 % (ref 41–52)
HGB BLD-MCNC: 11.3 G/DL (ref 13.5–17.5)
IMM GRANULOCYTES # BLD AUTO: 0.01 X10*3/UL (ref 0–0.5)
IMM GRANULOCYTES NFR BLD AUTO: 0.4 % (ref 0–0.9)
LYMPHOCYTES # BLD AUTO: 0.56 X10*3/UL (ref 0.8–3)
LYMPHOCYTES NFR BLD AUTO: 21.7 %
MCH RBC QN AUTO: 37.3 PG (ref 26–34)
MCHC RBC AUTO-ENTMCNC: 35.3 G/DL (ref 32–36)
MCV RBC AUTO: 106 FL (ref 80–100)
MONOCYTES # BLD AUTO: 0.58 X10*3/UL (ref 0.05–0.8)
MONOCYTES NFR BLD AUTO: 22.5 %
NEUTROPHILS # BLD AUTO: 1.38 X10*3/UL (ref 1.6–5.5)
NEUTROPHILS NFR BLD AUTO: 53.4 %
NRBC BLD-RTO: ABNORMAL /100{WBCS}
PLATELET # BLD AUTO: 86 X10*3/UL (ref 150–450)
RBC # BLD AUTO: 3.03 X10*6/UL (ref 4.5–5.9)
WBC # BLD AUTO: 2.6 X10*3/UL (ref 4.4–11.3)

## 2023-12-18 PROCEDURE — 1159F MED LIST DOCD IN RCRD: CPT

## 2023-12-18 PROCEDURE — 99215 OFFICE O/P EST HI 40 MIN: CPT

## 2023-12-18 PROCEDURE — 84075 ASSAY ALKALINE PHOSPHATASE: CPT

## 2023-12-18 PROCEDURE — 96360 HYDRATION IV INFUSION INIT: CPT | Mod: INF

## 2023-12-18 PROCEDURE — 99417 PROLNG OP E/M EACH 15 MIN: CPT

## 2023-12-18 PROCEDURE — 36415 COLL VENOUS BLD VENIPUNCTURE: CPT

## 2023-12-18 PROCEDURE — 1160F RVW MEDS BY RX/DR IN RCRD: CPT

## 2023-12-18 PROCEDURE — 1126F AMNT PAIN NOTED NONE PRSNT: CPT

## 2023-12-18 PROCEDURE — 82784 ASSAY IGA/IGD/IGG/IGM EACH: CPT

## 2023-12-18 PROCEDURE — 3079F DIAST BP 80-89 MM HG: CPT

## 2023-12-18 PROCEDURE — 80053 COMPREHEN METABOLIC PANEL: CPT

## 2023-12-18 PROCEDURE — 3074F SYST BP LT 130 MM HG: CPT

## 2023-12-18 PROCEDURE — 2500000004 HC RX 250 GENERAL PHARMACY W/ HCPCS (ALT 636 FOR OP/ED)

## 2023-12-18 PROCEDURE — 85025 COMPLETE CBC W/AUTO DIFF WBC: CPT

## 2023-12-18 RX ORDER — SODIUM CHLORIDE 9 MG/ML
1000 INJECTION, SOLUTION INTRAVENOUS ONCE
Status: CANCELLED | OUTPATIENT
Start: 2023-12-18 | End: 2023-12-18

## 2023-12-18 RX ORDER — HEPARIN SODIUM,PORCINE/PF 10 UNIT/ML
50 SYRINGE (ML) INTRAVENOUS AS NEEDED
Status: CANCELLED | OUTPATIENT
Start: 2023-12-18

## 2023-12-18 RX ORDER — SODIUM CHLORIDE 9 MG/ML
1000 INJECTION, SOLUTION INTRAVENOUS ONCE
Status: COMPLETED | OUTPATIENT
Start: 2023-12-18 | End: 2023-12-18

## 2023-12-18 RX ORDER — FLUCONAZOLE 200 MG/1
400 TABLET ORAL DAILY
Qty: 60 TABLET | Refills: 2 | Status: SHIPPED | OUTPATIENT
Start: 2023-12-18 | End: 2023-12-20 | Stop reason: SDUPTHER

## 2023-12-18 RX ORDER — HEPARIN 100 UNIT/ML
500 SYRINGE INTRAVENOUS AS NEEDED
Status: CANCELLED | OUTPATIENT
Start: 2023-12-18

## 2023-12-18 RX ORDER — SULFAMETHOXAZOLE AND TRIMETHOPRIM 800; 160 MG/1; MG/1
1 TABLET ORAL
Qty: 36 TABLET | Refills: 0 | Status: SHIPPED | OUTPATIENT
Start: 2023-12-18 | End: 2024-02-28 | Stop reason: ALTCHOICE

## 2023-12-18 RX ADMIN — SODIUM CHLORIDE 1000 ML/HR: 9 INJECTION, SOLUTION INTRAVENOUS at 11:30

## 2023-12-18 ASSESSMENT — ENCOUNTER SYMPTOMS
HEMATOLOGIC/LYMPHATIC NEGATIVE: 1
RESPIRATORY NEGATIVE: 1
FATIGUE: 1
GASTROINTESTINAL NEGATIVE: 1
EYES NEGATIVE: 1
MUSCULOSKELETAL NEGATIVE: 1
ENDOCRINE NEGATIVE: 1
PSYCHIATRIC NEGATIVE: 1
CARDIOVASCULAR NEGATIVE: 1
DIZZINESS: 1

## 2023-12-18 ASSESSMENT — PAIN SCALES - GENERAL: PAINLEVEL: 0-NO PAIN

## 2023-12-18 NOTE — PROGRESS NOTES
Peripheral IV to left antecubital flushed and discontinued. No complications. IV fluids  completed. Patient tolerated well. Remained asymptomatic throughout. Discharged home ambulatory with spouse offering no complaints.

## 2023-12-18 NOTE — PROGRESS NOTES
Patient ID: Maldonado Mccloud is a 73 y.o. male.  Referring Physician: No referring provider defined for this encounter.  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 Bone marrow biopsy suggested multiple myeloma IgG kappa, ISS Stage II, bone survey revealed lytic lesions; Urine light chains present kappa restricted with 2gm proteinuria.  No adverse prognostic factors identified.     Treatment:     VRD  Initiated Aug 2015; currently s/p 3 cycles.     BMBx 10/2015 complete response.     Stem Cell Transplant  Underwent stem cell mobilization with Neupogen/Plerixafor and 2 day collection December 16-17, 2015, for 8.62 CD34 x 10^6/kg. During procedure for right IJ Trialysis placement  developed A. Fib with RVR which spontaneously resolved on December 14, 2015. He was admitted and placed on monitor for stem cell collection.      Status post autologous hematopoietic progenitor cell transplant on 12/23/15 utilizing amifostine and melphalan preparative  regimen.  Hospitalization complicated by orthostatic hypotension, electrolyte replacement, drug induced rash, culture negative fever.       He completed approximately 5 weeks of maintenance Revlimid 10 mg daily which was held for worsening neuropathy August 2016.     3/ 2018: IgG M Braxton rising to 0.2gm/DL and light chain rising; restarted on Revlimid maintenance at 5mg EOD.      Revlimid stopped in 3/2021 due to stable disease.     Vacto/Pom Trial  7/2021 his M spike was on the rise. He was consented for Vacto/Pom trial which he began on 8/5/2021. He continued Pom  post finishing the trial.      Sarclisa/Kyprolis  Myeloma markers increasing in 6/2022, consented for Sarclisa /Kyprolis, which began 6/30/22. He continued this through September of 2022.         Cytoxan/Kyprolis  His therapy was subsequently changed in 9/2022 due to continued disease  progression in the face of Sarclisa. His therapy was changed to CYYCLON regimen (Cytoxan/Kyprolis).     PET CT scan 9/14/2022 shows FDG avid lytic lesion within the left midshaft clavicle with pathologic fracture, FDG avid lytic lesions involving bilateral scapulas and FDG avidity within the T9 vertebral body, suggestive of active multiple myeloma.  There  are non FDG avid lytic lesions in the right iliac bone and right clavicle, likely representing nonactive multiple myeloma.  There is no PET evidence of extramedullary involvement.  There is an FDG avid right thyroid nodule.      MRI in 10/2022 negative for myelomatous involvement in thoracic vertebrae.      Therapy placed on hold in 11/2022 due to need for hip replacement surgery. His myeloma markers were on the rise in 3/2023 with a new jaw lesion. Plan is to begin radiation therapy 4/11 and resume 2x/week Kyprolis 4/10.      Tracking to CAR T cell therapy. Collected CAR T cells on 5/26/23.      X-ray of L elbow (6/20/23):  IMPRESSION:  1. Osteolytic lesions in the proximal radius and distal humerus  consistent with patient's reported history of multiple myeloma.  2. No acute fracture or malalignment.     Began radiation x8 fractions on 7/3/23.     Resumed Cytoxan as of 8/7/23.   '  CAR T Cell Therapy   Admitted for CAR T cell therapy for his ISS stage 2 Kappa multiple myeloma with ABECMA T=0, 10/25/23     Hospital course complicated by Grade 1 CRS and Grade 1 ICANS, managed with dexamethasone 10mg x 3 days and one dose of tocilizumab.     ASSESSMENT and PLAN:    Multiple Myeloma:   - ISS Stage 2 IgG Kappa Multiple Myeloma  - S/p VRD, Autologous SCT, Vacto/Pom, Kyprolis/Sarclisa, Radiation, most recently Kyprolis/Cytoxan  - PET/CT showed mostly decreased metabolic activity, however some new areas  - Completed radiation to R arm with Dr. Patel  - BMBx performed 10/2, MRD prior to CAR T of 0, however progressing lytic lesions  - S/p CAR T w/ ABECMA (T=0,  10/25/23), hospital course complicated by grade 1 ICANS managed w/ Dex 10mg x3 days and 1 dose Tociluzimab  - Plan for PET/CT (1/19/24), BMBx (2/12/24)     ID:  - Acyclovir 400mg BID (for 6 months post CAR T)  - Bactrim DS M, W, F (for 6 months post CAR T)  - Fluconazole 400mg daily (through D90)  - Will resume Levaquin if ANC <500  - Plan to start IVIG for IgG <400    Bone Health:  - Found to have fracture of the R radius  11/12/23  - Follows w/ Dr. Collins, no plans for surgery at this point  - Plan to start Zometa monthly, awaiting dental clearance (appointment 12/19/23)     DVT:  - Eliquis 5mg BID, when platelets increase consistently >50, may resume 12/4     Cardiac:  - Echo (5/2/23) showed EF of 50-55%  - Reports some dizziness, encouraged increasing fluid intake, sCr 1.35 (11/28)  - Will plan to hold Lisinopril and assess improvement in dizziness and BP (will wean, 2.5mg BID x2 days, every other day, done)  - Ongoing dizziness/orthostasis, given 1L NS 12/18    Derm:  - History of squamous cell carcinomas  - Has plans to have another removed on L forehead    Oncology History   IgG multiple myeloma (CMS/HCC)   7/1/2015 Initial Diagnosis    IgG multiple myeloma (CMS/HCC)     12/23/2015 -  Bone Marrow Transplant    Autologous Stem Cell Transplant      11/4/2022 - 11/4/2022 Chemotherapy    Carfilzomib (Weekly) / Cyclophosphamide / Thalidomide / Dexamethasone, 28 Day Cycles     10/25/2023 -  Cellular Therapy    Abecma        SUBJECTIVE:  History of Present Illness:  Cuong presents to clinic 12/4/23 with his wife Farida for a follow up visit.    T+41 s/p CAR T.    Overall he is doing well.    Notes ongoing fatigue.     Met with PT and adjusted his brace. Plans to get in with OT by his house.    Improved his oral fluid intake, ongoing dizziness persists. Does not check BP at home.     Review of Systems   Constitutional:  Positive for fatigue.   HENT: Negative.     Eyes: Negative.    Respiratory: Negative.      Cardiovascular: Negative.    Gastrointestinal: Negative.    Endocrine: Negative.    Genitourinary: Negative.    Musculoskeletal: Negative.    Skin: Negative.    Allergic/Immunologic: Positive for immunocompromised state.   Neurological:  Positive for dizziness.   Hematological: Negative.    Psychiatric/Behavioral: Negative.       OBJECTIVE:  KPS: Karnofsky Score: 70 - Cares for self; unable to carry on normal activity or do normal work    VS:  /81   Pulse 88   Temp 36.2 °C (97.2 °F)   Resp 18   Wt 96.2 kg (212 lb 3.1 oz)   SpO2 96%   BMI 30.90 kg/m²   BSA: 2.17 meters squared    Physical Exam  Constitutional:       Appearance: Normal appearance. He is normal weight.   HENT:      Head: Normocephalic and atraumatic.      Nose: Nose normal.      Mouth/Throat:      Mouth: Mucous membranes are moist.      Pharynx: Oropharynx is clear.   Eyes:      Extraocular Movements: Extraocular movements intact.      Conjunctiva/sclera: Conjunctivae normal.      Pupils: Pupils are equal, round, and reactive to light.   Cardiovascular:      Rate and Rhythm: Normal rate and regular rhythm.      Pulses: Normal pulses.      Heart sounds: Normal heart sounds.   Pulmonary:      Effort: Pulmonary effort is normal.      Breath sounds: Normal breath sounds.   Abdominal:      General: Abdomen is flat. Bowel sounds are normal.      Palpations: Abdomen is soft.   Musculoskeletal:         General: Tenderness and signs of injury present. Normal range of motion.      Cervical back: Normal range of motion and neck supple.   Skin:     General: Skin is warm and dry.   Neurological:      General: No focal deficit present.      Mental Status: He is alert and oriented to person, place, and time. Mental status is at baseline.   Psychiatric:         Mood and Affect: Mood normal.         Behavior: Behavior normal.         Thought Content: Thought content normal.         Judgment: Judgment normal.     Laboratory:  The pertinent laboratory  results were reviewed and discussed with the patient.    Lab Results   Component Value Date    WBC 2.1 (L) 12/04/2023    HCT 35.0 (L) 12/04/2023    HGB 12.1 (L) 12/04/2023    PLT 74 (L) 12/04/2023    ANC 1.49 (L) 10/27/2023    K 5.1 12/04/2023    CALCIUM 9.6 12/04/2023     12/04/2023    MG 1.94 12/04/2023    ALT 27 12/04/2023    AST 23 12/04/2023    BUN 27 (H) 12/04/2023    CREATININE 1.38 (H) 12/04/2023    PHOS 3.9 11/13/2023    KAPPA 0.08 (L) 11/21/2023    LAMBDA <0.17 (L) 11/21/2023    KAPLS  11/21/2023      Comment:      One or more analytes used in this calculation   is outside of the analytical measurement range.  Calculation cannot be performed.    SPEP Decrease in polyclonal gamma globulins. 11/21/2023    IEPIN  10/02/2023     No monoclonal proteins detected by immunofixation.        (L) 12/04/2023    IGM 7 (L) 10/02/2023    IGA <7 (L) 10/02/2023      Note: for a comprehensive list of the patient's lab results, access the Results Review activity.    RTC:  1/4 potential Zometa  1/9 KVB  1/19 PET/CT  1/25 KVB  2/12 BMBx w/ ND    Wiley Arechiga, APRN-CNP

## 2023-12-19 ENCOUNTER — APPOINTMENT (OUTPATIENT)
Dept: OTHER | Facility: HOSPITAL | Age: 73
End: 2023-12-19
Payer: MEDICARE

## 2023-12-19 LAB
ALBUMIN SERPL BCP-MCNC: 4.2 G/DL (ref 3.4–5)
ALP SERPL-CCNC: 50 U/L (ref 33–136)
ALT SERPL W P-5'-P-CCNC: 21 U/L (ref 10–52)
ANION GAP SERPL CALC-SCNC: 12 MMOL/L (ref 10–20)
AST SERPL W P-5'-P-CCNC: 19 U/L (ref 9–39)
BILIRUB SERPL-MCNC: 0.7 MG/DL (ref 0–1.2)
BUN SERPL-MCNC: 29 MG/DL (ref 6–23)
CALCIUM SERPL-MCNC: 9.1 MG/DL (ref 8.6–10.6)
CHLORIDE SERPL-SCNC: 108 MMOL/L (ref 98–107)
CO2 SERPL-SCNC: 24 MMOL/L (ref 21–32)
CREAT SERPL-MCNC: 1.22 MG/DL (ref 0.5–1.3)
GFR SERPL CREATININE-BSD FRML MDRD: 62 ML/MIN/1.73M*2
GLUCOSE SERPL-MCNC: 101 MG/DL (ref 74–99)
IGG SERPL-MCNC: 384 MG/DL (ref 700–1600)
POTASSIUM SERPL-SCNC: 4.3 MMOL/L (ref 3.5–5.3)
PROT SERPL-MCNC: 5.5 G/DL (ref 6.4–8.2)
SODIUM SERPL-SCNC: 140 MMOL/L (ref 136–145)

## 2023-12-20 DIAGNOSIS — C90.00 IGG MULTIPLE MYELOMA (MULTI): ICD-10-CM

## 2023-12-20 RX ORDER — FLUCONAZOLE 200 MG/1
400 TABLET ORAL DAILY
Qty: 60 TABLET | Refills: 2 | Status: SHIPPED | OUTPATIENT
Start: 2023-12-20 | End: 2023-12-22 | Stop reason: SDUPTHER

## 2023-12-22 DIAGNOSIS — C90.00 IGG MULTIPLE MYELOMA (MULTI): ICD-10-CM

## 2023-12-22 RX ORDER — FLUCONAZOLE 200 MG/1
400 TABLET ORAL DAILY
Qty: 60 TABLET | Refills: 2 | Status: SHIPPED | OUTPATIENT
Start: 2023-12-22 | End: 2024-01-24 | Stop reason: ALTCHOICE

## 2024-01-02 DIAGNOSIS — C90.00 IGG MULTIPLE MYELOMA (MULTI): Primary | ICD-10-CM

## 2024-01-02 RX ORDER — ACYCLOVIR 400 MG/1
400 TABLET ORAL 2 TIMES DAILY
Qty: 180 TABLET | Refills: 0 | Status: SHIPPED | OUTPATIENT
Start: 2024-01-02 | End: 2024-04-01

## 2024-01-04 ENCOUNTER — INFUSION (OUTPATIENT)
Dept: HEMATOLOGY/ONCOLOGY | Facility: CLINIC | Age: 74
End: 2024-01-04
Payer: COMMERCIAL

## 2024-01-04 VITALS
HEART RATE: 86 BPM | TEMPERATURE: 97.7 F | WEIGHT: 215.06 LBS | SYSTOLIC BLOOD PRESSURE: 137 MMHG | RESPIRATION RATE: 16 BRPM | DIASTOLIC BLOOD PRESSURE: 88 MMHG | OXYGEN SATURATION: 100 % | BODY MASS INDEX: 31.31 KG/M2

## 2024-01-04 DIAGNOSIS — C90.00 IGG MULTIPLE MYELOMA (MULTI): ICD-10-CM

## 2024-01-04 DIAGNOSIS — C90.00 MULTIPLE MYELOMA WITHOUT REMISSION (MULTI): ICD-10-CM

## 2024-01-04 LAB
BASOPHILS # BLD AUTO: 0.01 X10*3/UL (ref 0–0.1)
BASOPHILS NFR BLD AUTO: 0.3 %
EOSINOPHIL # BLD AUTO: 0.05 X10*3/UL (ref 0–0.4)
EOSINOPHIL NFR BLD AUTO: 1.3 %
ERYTHROCYTE [DISTWIDTH] IN BLOOD BY AUTOMATED COUNT: 13.9 % (ref 11.5–14.5)
HCT VFR BLD AUTO: 32.3 % (ref 41–52)
HGB BLD-MCNC: 11.2 G/DL (ref 13.5–17.5)
IMM GRANULOCYTES # BLD AUTO: 0.01 X10*3/UL (ref 0–0.5)
IMM GRANULOCYTES NFR BLD AUTO: 0.3 % (ref 0–0.9)
LYMPHOCYTES # BLD AUTO: 0.66 X10*3/UL (ref 0.8–3)
LYMPHOCYTES NFR BLD AUTO: 17.6 %
MCH RBC QN AUTO: 37.2 PG (ref 26–34)
MCHC RBC AUTO-ENTMCNC: 34.7 G/DL (ref 32–36)
MCV RBC AUTO: 107 FL (ref 80–100)
MONOCYTES # BLD AUTO: 0.52 X10*3/UL (ref 0.05–0.8)
MONOCYTES NFR BLD AUTO: 13.8 %
NEUTROPHILS # BLD AUTO: 2.51 X10*3/UL (ref 1.6–5.5)
NEUTROPHILS NFR BLD AUTO: 66.7 %
NRBC BLD-RTO: ABNORMAL /100{WBCS}
PLATELET # BLD AUTO: 89 X10*3/UL (ref 150–450)
RBC # BLD AUTO: 3.01 X10*6/UL (ref 4.5–5.9)
WBC # BLD AUTO: 3.8 X10*3/UL (ref 4.4–11.3)

## 2024-01-04 PROCEDURE — 2500000004 HC RX 250 GENERAL PHARMACY W/ HCPCS (ALT 636 FOR OP/ED)

## 2024-01-04 PROCEDURE — 84165 PROTEIN E-PHORESIS SERUM: CPT

## 2024-01-04 PROCEDURE — 36415 COLL VENOUS BLD VENIPUNCTURE: CPT

## 2024-01-04 PROCEDURE — 83521 IG LIGHT CHAINS FREE EACH: CPT

## 2024-01-04 PROCEDURE — 84075 ASSAY ALKALINE PHOSPHATASE: CPT

## 2024-01-04 PROCEDURE — 86320 SERUM IMMUNOELECTROPHORESIS: CPT | Performed by: PATHOLOGY

## 2024-01-04 PROCEDURE — 96365 THER/PROPH/DIAG IV INF INIT: CPT | Mod: INF

## 2024-01-04 PROCEDURE — 82784 ASSAY IGA/IGD/IGG/IGM EACH: CPT

## 2024-01-04 PROCEDURE — 84165 PROTEIN E-PHORESIS SERUM: CPT | Performed by: PATHOLOGY

## 2024-01-04 PROCEDURE — 84155 ASSAY OF PROTEIN SERUM: CPT

## 2024-01-04 PROCEDURE — 85025 COMPLETE CBC W/AUTO DIFF WBC: CPT

## 2024-01-04 RX ORDER — EPINEPHRINE 0.3 MG/.3ML
0.3 INJECTION SUBCUTANEOUS ONCE AS NEEDED
Status: CANCELLED | OUTPATIENT
Start: 2024-02-01

## 2024-01-04 RX ORDER — DIPHENHYDRAMINE HYDROCHLORIDE 50 MG/ML
50 INJECTION INTRAMUSCULAR; INTRAVENOUS AS NEEDED
Status: CANCELLED | OUTPATIENT
Start: 2024-02-01

## 2024-01-04 RX ORDER — FAMOTIDINE 10 MG/ML
20 INJECTION INTRAVENOUS ONCE AS NEEDED
Status: CANCELLED | OUTPATIENT
Start: 2024-02-01

## 2024-01-04 RX ORDER — SODIUM CHLORIDE 9 MG/ML
1000 INJECTION, SOLUTION INTRAVENOUS ONCE
Status: COMPLETED | OUTPATIENT
Start: 2024-01-04 | End: 2024-01-15

## 2024-01-04 RX ORDER — ALBUTEROL SULFATE 0.83 MG/ML
3 SOLUTION RESPIRATORY (INHALATION) AS NEEDED
Status: CANCELLED | OUTPATIENT
Start: 2024-02-01

## 2024-01-04 RX ADMIN — ZOLEDRONIC ACID 4 MG: 4 INJECTION, SOLUTION, CONCENTRATE INTRAVENOUS at 14:57

## 2024-01-05 LAB
ALBUMIN SERPL BCP-MCNC: 4.3 G/DL (ref 3.4–5)
ALP SERPL-CCNC: 55 U/L (ref 33–136)
ALT SERPL W P-5'-P-CCNC: 18 U/L (ref 10–52)
ANION GAP SERPL CALC-SCNC: 14 MMOL/L (ref 10–20)
AST SERPL W P-5'-P-CCNC: 22 U/L (ref 9–39)
BILIRUB SERPL-MCNC: 0.8 MG/DL (ref 0–1.2)
BUN SERPL-MCNC: 27 MG/DL (ref 6–23)
CALCIUM SERPL-MCNC: 9.6 MG/DL (ref 8.6–10.6)
CHLORIDE SERPL-SCNC: 109 MMOL/L (ref 98–107)
CO2 SERPL-SCNC: 24 MMOL/L (ref 21–32)
CREAT SERPL-MCNC: 1.24 MG/DL (ref 0.5–1.3)
GFR SERPL CREATININE-BSD FRML MDRD: 61 ML/MIN/1.73M*2
GLUCOSE SERPL-MCNC: 104 MG/DL (ref 74–99)
IGA SERPL-MCNC: <7 MG/DL (ref 70–400)
IGG SERPL-MCNC: 326 MG/DL (ref 700–1600)
IGM SERPL-MCNC: <5 MG/DL (ref 40–230)
KAPPA LC SERPL-MCNC: <0.08 MG/DL (ref 0.33–1.94)
KAPPA LC/LAMBDA SER: ABNORMAL {RATIO}
LAMBDA LC SERPL-MCNC: <0.17 MG/DL (ref 0.57–2.63)
POTASSIUM SERPL-SCNC: 4.7 MMOL/L (ref 3.5–5.3)
PROT SERPL-MCNC: 5.6 G/DL (ref 6.4–8.2)
PROT SERPL-MCNC: 5.6 G/DL (ref 6.4–8.2)
SODIUM SERPL-SCNC: 142 MMOL/L (ref 136–145)

## 2024-01-09 ENCOUNTER — HOSPITAL ENCOUNTER (OUTPATIENT)
Dept: RADIOLOGY | Facility: HOSPITAL | Age: 74
Discharge: HOME | End: 2024-01-09
Payer: COMMERCIAL

## 2024-01-09 ENCOUNTER — OFFICE VISIT (OUTPATIENT)
Dept: HEMATOLOGY/ONCOLOGY | Facility: HOSPITAL | Age: 74
End: 2024-01-09
Payer: COMMERCIAL

## 2024-01-09 ENCOUNTER — OFFICE VISIT (OUTPATIENT)
Dept: ORTHOPEDIC SURGERY | Facility: HOSPITAL | Age: 74
End: 2024-01-09
Payer: COMMERCIAL

## 2024-01-09 VITALS
SYSTOLIC BLOOD PRESSURE: 147 MMHG | OXYGEN SATURATION: 100 % | WEIGHT: 217.7 LBS | TEMPERATURE: 98.8 F | BODY MASS INDEX: 31.24 KG/M2 | DIASTOLIC BLOOD PRESSURE: 90 MMHG | HEART RATE: 90 BPM | RESPIRATION RATE: 17 BRPM

## 2024-01-09 VITALS — BODY MASS INDEX: 30.06 KG/M2 | HEIGHT: 70 IN | WEIGHT: 210 LBS

## 2024-01-09 DIAGNOSIS — M84.433K: ICD-10-CM

## 2024-01-09 DIAGNOSIS — C90.00 MULTIPLE MYELOMA WITHOUT REMISSION (MULTI): Primary | ICD-10-CM

## 2024-01-09 LAB
ALBUMIN: 3.9 G/DL (ref 3.4–5)
ALPHA 1 GLOBULIN: 0.2 G/DL (ref 0.2–0.6)
ALPHA 2 GLOBULIN: 0.6 G/DL (ref 0.4–1.1)
BETA GLOBULIN: 0.5 G/DL (ref 0.5–1.2)
GAMMA GLOBULIN: 0.3 G/DL (ref 0.5–1.4)
IMMUNOFIXATION COMMENT: ABNORMAL
PATH REVIEW - SERUM IMMUNOFIXATION: ABNORMAL
PATH REVIEW-SERUM PROTEIN ELECTROPHORESIS: ABNORMAL
PROTEIN ELECTROPHORESIS COMMENT: ABNORMAL

## 2024-01-09 PROCEDURE — 3080F DIAST BP >= 90 MM HG: CPT | Performed by: INTERNAL MEDICINE

## 2024-01-09 PROCEDURE — 1125F AMNT PAIN NOTED PAIN PRSNT: CPT | Performed by: INTERNAL MEDICINE

## 2024-01-09 PROCEDURE — 3077F SYST BP >= 140 MM HG: CPT | Performed by: INTERNAL MEDICINE

## 2024-01-09 PROCEDURE — 1159F MED LIST DOCD IN RCRD: CPT | Performed by: INTERNAL MEDICINE

## 2024-01-09 PROCEDURE — 99215 OFFICE O/P EST HI 40 MIN: CPT | Performed by: INTERNAL MEDICINE

## 2024-01-09 PROCEDURE — 1159F MED LIST DOCD IN RCRD: CPT | Performed by: STUDENT IN AN ORGANIZED HEALTH CARE EDUCATION/TRAINING PROGRAM

## 2024-01-09 PROCEDURE — 73070 X-RAY EXAM OF ELBOW: CPT | Mod: 50

## 2024-01-09 PROCEDURE — 1160F RVW MEDS BY RX/DR IN RCRD: CPT | Performed by: STUDENT IN AN ORGANIZED HEALTH CARE EDUCATION/TRAINING PROGRAM

## 2024-01-09 PROCEDURE — 1160F RVW MEDS BY RX/DR IN RCRD: CPT | Performed by: INTERNAL MEDICINE

## 2024-01-09 PROCEDURE — 1126F AMNT PAIN NOTED NONE PRSNT: CPT | Performed by: STUDENT IN AN ORGANIZED HEALTH CARE EDUCATION/TRAINING PROGRAM

## 2024-01-09 PROCEDURE — 99024 POSTOP FOLLOW-UP VISIT: CPT | Performed by: STUDENT IN AN ORGANIZED HEALTH CARE EDUCATION/TRAINING PROGRAM

## 2024-01-09 ASSESSMENT — PAIN SCALES - GENERAL
PAINLEVEL_OUTOF10: 2
PAINLEVEL: 2

## 2024-01-09 ASSESSMENT — ENCOUNTER SYMPTOMS
MUSCULOSKELETAL NEGATIVE: 1
HEMATOLOGIC/LYMPHATIC NEGATIVE: 1
GASTROINTESTINAL NEGATIVE: 1
RESPIRATORY NEGATIVE: 1
OCCASIONAL FEELINGS OF UNSTEADINESS: 0
CARDIOVASCULAR NEGATIVE: 1
DEPRESSION: 0
PSYCHIATRIC NEGATIVE: 1
ENDOCRINE NEGATIVE: 1
LOSS OF SENSATION IN FEET: 0
OCCASIONAL FEELINGS OF UNSTEADINESS: 0
DEPRESSION: 0
EYES NEGATIVE: 1
LOSS OF SENSATION IN FEET: 0

## 2024-01-09 ASSESSMENT — PAIN - FUNCTIONAL ASSESSMENT: PAIN_FUNCTIONAL_ASSESSMENT: 0-10

## 2024-01-09 NOTE — PROGRESS NOTES
Patient ID: Maldonado Mccloud is a 73 y.o. male.  Referring Physician: No referring provider defined for this encounter.  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 Bone marrow biopsy suggested multiple myeloma IgG kappa, ISS Stage II, bone survey revealed lytic lesions; Urine light chains present kappa restricted with 2gm proteinuria.  No adverse prognostic factors identified.     Treatment:     VRD  Initiated Aug 2015; currently s/p 3 cycles.     BMBx 10/2015 complete response.     Stem Cell Transplant  Underwent stem cell mobilization with Neupogen/Plerixafor and 2 day collection December 16-17, 2015, for 8.62 CD34 x 10^6/kg. During procedure for right IJ Trialysis placement  developed A. Fib with RVR which spontaneously resolved on December 14, 2015. He was admitted and placed on monitor for stem cell collection.      Status post autologous hematopoietic progenitor cell transplant on 12/23/15 utilizing amifostine and melphalan preparative  regimen.  Hospitalization complicated by orthostatic hypotension, electrolyte replacement, drug induced rash, culture negative fever.       He completed approximately 5 weeks of maintenance Revlimid 10 mg daily which was held for worsening neuropathy August 2016.     3/ 2018: IgG M Braxton rising to 0.2gm/DL and light chain rising; restarted on Revlimid maintenance at 5mg EOD.      Revlimid stopped in 3/2021 due to stable disease.     Vacto/Pom Trial  7/2021 his M spike was on the rise. He was consented for Vacto/Pom trial which he began on 8/5/2021. He continued Pom  post finishing the trial.      Sarclisa/Kyprolis  Myeloma markers increasing in 6/2022, consented for Sarclisa /Kyprolis, which began 6/30/22. He continued this through September of 2022.         Cytoxan/Kyprolis  His therapy was subsequently changed in 9/2022 due to continued disease  progression in the face of Sarclisa. His therapy was changed to CYYCLON regimen (Cytoxan/Kyprolis).     PET CT scan 9/14/2022 shows FDG avid lytic lesion within the left midshaft clavicle with pathologic fracture, FDG avid lytic lesions involving bilateral scapulas and FDG avidity within the T9 vertebral body, suggestive of active multiple myeloma.  There  are non FDG avid lytic lesions in the right iliac bone and right clavicle, likely representing nonactive multiple myeloma.  There is no PET evidence of extramedullary involvement.  There is an FDG avid right thyroid nodule.      MRI in 10/2022 negative for myelomatous involvement in thoracic vertebrae.      Therapy placed on hold in 11/2022 due to need for hip replacement surgery. His myeloma markers were on the rise in 3/2023 with a new jaw lesion. Plan is to begin radiation therapy 4/11 and resume 2x/week Kyprolis 4/10.      Tracking to CAR T cell therapy. Collected CAR T cells on 5/26/23.      X-ray of L elbow (6/20/23):  IMPRESSION:  1. Osteolytic lesions in the proximal radius and distal humerus  consistent with patient's reported history of multiple myeloma.  2. No acute fracture or malalignment.     Began radiation x8 fractions on 7/3/23.     Resumed Cytoxan as of 8/7/23.   '  CAR T Cell Therapy   Admitted for CAR T cell therapy for his ISS stage 2 Kappa multiple myeloma with ABECMA T=0, 10/25/23     Hospital course complicated by Grade 1 CRS and Grade 1 ICANS, managed with dexamethasone 10mg x 3 days and one dose of tocilizumab.     ASSESSMENT and PLAN:    Multiple Myeloma:   - ISS Stage 2 IgG Kappa Multiple Myeloma  - S/p VRD, Autologous SCT, Vacto/Pom, Kyprolis/Sarclisa, Radiation, most recently Kyprolis/Cytoxan  - PET/CT showed mostly decreased metabolic activity, however some new areas  - Completed radiation to R arm with Dr. Patel  - BMBx performed 10/2, MRD prior to CAR T of 0, however progressing lytic lesions  - S/p CAR T w/ ABECMA (T=0,  10/25/23), hospital course complicated by grade 1 ICANS managed w/ Dex 10mg x3 days and 1 dose Tociluzimab    1/9/2024  biochemical response (CR  But lytic lesions on Xrays and pathologica fractures. (Inactive lesions??)  - PET/CT ( 1/19/202   , BMBx (2/12/24)     ID:  - Acyclovir 400mg BID (for 6 months post CAR T)  - Bactrim DS M, W, F (for 6 months post CAR T)  - Fluconazole 400mg daily (through D90)  - Will resume Levaquin if ANC <500  - Plan to start IVIG for IgG <400    Bone Health:  - Found to have fracture of the R radius  11/12/23  - Follows w/ Dr. Collins, no plans for surgery at this point  - Plan to start Zometa monthly, awaiting dental clearance (appointment 12/19/23)     DVT:  - Eliquis 5mg BID, when platelets increase consistently >50, may resume 12/4     Cardiac:  - Echo (5/2/23) showed EF of 50-55%  - Reports some dizziness, encouraged increasing fluid intake, sCr 1.35 (11/28)  - Will plan to hold Lisinopril and assess improvement in dizziness and BP (will wean, 2.5mg BID x2 days, every other day, done)  - Ongoing dizziness/orthostasis, given 1L NS 12/18    Derm:  - History of squamous cell carcinomas  - Has plans to have another removed on L forehead    Oncology History   IgG multiple myeloma (CMS/HCC)   7/1/2015 Initial Diagnosis    IgG multiple myeloma (CMS/HCC)     12/23/2015 -  Bone Marrow Transplant    Autologous Stem Cell Transplant      11/4/2022 - 11/4/2022 Chemotherapy    Carfilzomib (Weekly) / Cyclophosphamide / Thalidomide / Dexamethasone, 28 Day Cycles     10/25/2023 -  Cellular Therapy    Abecma        SUBJECTIVE:  History of Present Illness:  Cuong presents to clinic 1/9/24  with his wife Farida for a follow up visit.    T+76 s/p CAR T (10/25/23)    Overall he is doing well. But still with pathologic non-healing fracture of right radius  Also has lytic lesion in left side (Xray from 1/9)    Notes ongoing fatigue.           Review of Systems   Constitutional:  Negative for fatigue.   HENT:  Negative.     Eyes: Negative.    Respiratory: Negative.     Cardiovascular: Negative.    Gastrointestinal: Negative.    Endocrine: Negative.    Genitourinary: Negative.    Musculoskeletal: Negative.    Skin: Negative.    Allergic/Immunologic: Positive for immunocompromised state.   Neurological:  Negative for dizziness.   Hematological: Negative.    Psychiatric/Behavioral: Negative.       OBJECTIVE:  KPS: Karnofsky Score: 70 - Cares for self; unable to carry on normal activity or do normal work    VS:  /90   Pulse 90   Temp 37.1 °C (98.8 °F) (Skin)   Resp 17   Wt 98.7 kg (217 lb 11.2 oz)   SpO2 100%   BMI 31.24 kg/m²   BSA: 2.21 meters squared    Physical Exam  Constitutional:       Appearance: Normal appearance. He is normal weight.   HENT:      Head: Normocephalic and atraumatic.      Nose: Nose normal.      Mouth/Throat:      Mouth: Mucous membranes are moist.      Pharynx: Oropharynx is clear.   Eyes:      Extraocular Movements: Extraocular movements intact.      Conjunctiva/sclera: Conjunctivae normal.      Pupils: Pupils are equal, round, and reactive to light.   Cardiovascular:      Rate and Rhythm: Normal rate and regular rhythm.      Pulses: Normal pulses.      Heart sounds: Normal heart sounds.   Pulmonary:      Effort: Pulmonary effort is normal.      Breath sounds: Normal breath sounds.   Abdominal:      General: Abdomen is flat. Bowel sounds are normal.      Palpations: Abdomen is soft.   Musculoskeletal:         General: No tenderness or signs of injury. Normal range of motion.      Cervical back: Normal range of motion and neck supple.   Skin:     General: Skin is warm and dry.   Neurological:      General: No focal deficit present.      Mental Status: He is alert and oriented to person, place, and time. Mental status is at baseline.   Psychiatric:         Mood and Affect: Mood normal.         Behavior: Behavior normal.         Thought Content: Thought content normal.         Judgment:  Judgment normal.       Laboratory:  The pertinent laboratory results were reviewed and discussed with the patient.    Lab Results   Component Value Date    WBC 3.8 (L) 01/04/2024    HCT 32.3 (L) 01/04/2024    HGB 11.2 (L) 01/04/2024    PLT 89 (L) 01/04/2024    ANC 1.49 (L) 10/27/2023    K 4.7 01/04/2024    CALCIUM 9.6 01/04/2024     01/04/2024    MG 1.94 12/04/2023    ALT 18 01/04/2024    AST 22 01/04/2024    BUN 27 (H) 01/04/2024    CREATININE 1.24 01/04/2024    PHOS 3.9 11/13/2023    KAPPA <0.08 (L) 01/04/2024    LAMBDA <0.17 (L) 01/04/2024    KAPLS  01/04/2024      Comment:      One or more analytes used in this calculation   is outside of the analytical measurement range.  Calculation cannot be performed.    SPEP Decrease in polyclonal gamma globulins.    01/04/2024    IEPIN  01/04/2024     No monoclonal proteins detected by immunofixation.        (L) 01/04/2024    IGM <5 (L) 01/04/2024    IGA <7 (L) 01/04/2024      Note: for a comprehensive list of the patient's lab results, access the Results Review activity.    RTC:  1/4 potential Zometa  1/9 KVB  1/19 PET/CT  1/25 KVB  2/12 BMBx w/ ND    Wil Sun MD PhD

## 2024-01-09 NOTE — PROGRESS NOTES
[facility] Progress Note:    CC: Pathologic right proximal radius fracture secondary to myeloma    S: 73 y.o. male with a history of a right pathologic fracture of his proximal radius secondary to multiple myeloma which occurred in November.  He also states has been having increased left elbow pain for that reason we got radiographs of his left elbow as well today.  For his right side we decided to try nonoperative management.  He had both elbows radiated in the past.  He has been in a posterior splint on the right side and was given occupational therapy exercises to gradually improve his range of motion.  He states his pain has been getting much better on that side and right now he is largely not having any pain.  He comes out daily to perform range of motion exercises and actually has already been perform some activities of daily living with the right arm.    Objective:    Exam:  Gen: alert, appropriately conversational, no apparent distress  Chest: symmetric chest rise, non-labored breathing  Heart: RRR to peripheral palpation    Right upper extremity:   -Skin intact.   -No tenderness to palpation and only minimal pain with significant supination  -Full flexion and extension as well as full pronation present.  He is approximately 50 degrees of supination.  -Fires axillary/AIN/PIN/ulnar distributions  -SILT axillary/radial/median/ulnar distributions  -Hand warm, well perfused  -Palpable radial pulse, cap refill brisk  -Compartments soft and compressible     Imaging:  XR of the bilateral elbows, obtained and personally reviewed today, shows pathologic fracture of the right proximal radius through the biceps tuberosity with no signs of callus formation.  There is also an old fibrous union at the base of the radial neck.  No significant change in previous sounds.  Radiographs of the left elbow demonstrate previously known lesions consistent with multiple myeloma.  The proximal lesion at does have a  nondisplaced pathologic fracture line through it which is new from his films in August 2023.    A/P:    We discussed with the patient that he continues to show improvement on the right side I would recommend continued observation and symptomatic control on that side.  We can allow him to gradually wean himself out of the splints as he increases his activities of daily living on the right side.  I told him his goal should be to do this slowly over the next 6 weeks and by the time we get 5 to 6 weeks out I want him to be completely out of the splint and performing activities of daily living before he comes back to see us.  For the left side I would also recommend the decrease in activity in terms of weightbearing and range of motion.  Given that there is a nondisplaced fracture line I think bracing is not necessary at this time however I did tell him that I want him to limit weightbearing on both extremities to 10 pounds and just perform ADLs.  He is given a try to perform conservative management for the left side as well but ultimately on the left side if he feels that his symptoms are uncontrollable or getting worse we could always consider a curettage as well as plate fixation of the left proximal radius.  This would potentially placement increased risk of fracture given the other myeloma lesions I see more distal.  Because of this risk he ultimately wants to try conservative management which I think is reasonable.  Will have him see us in 6 weeks for repeat bilateral elbow films to track progress.

## 2024-01-11 ASSESSMENT — ENCOUNTER SYMPTOMS
DIZZINESS: 0
FATIGUE: 0

## 2024-01-15 ENCOUNTER — INFUSION (OUTPATIENT)
Dept: HEMATOLOGY/ONCOLOGY | Facility: CLINIC | Age: 74
End: 2024-01-15
Payer: COMMERCIAL

## 2024-01-15 VITALS
SYSTOLIC BLOOD PRESSURE: 145 MMHG | HEART RATE: 74 BPM | RESPIRATION RATE: 18 BRPM | OXYGEN SATURATION: 98 % | DIASTOLIC BLOOD PRESSURE: 82 MMHG | TEMPERATURE: 97.9 F | WEIGHT: 220.9 LBS | BODY MASS INDEX: 31.7 KG/M2

## 2024-01-15 DIAGNOSIS — C90.00 IGG MULTIPLE MYELOMA (MULTI): ICD-10-CM

## 2024-01-15 DIAGNOSIS — C90.00 IGG MULTIPLE MYELOMA (MULTI): Primary | ICD-10-CM

## 2024-01-15 DIAGNOSIS — D80.1 HYPOGAMMAGLOBULINEMIA DUE TO MULTIPLE MYELOMA (MULTI): ICD-10-CM

## 2024-01-15 DIAGNOSIS — C90.00 HYPOGAMMAGLOBULINEMIA DUE TO MULTIPLE MYELOMA (MULTI): ICD-10-CM

## 2024-01-15 DIAGNOSIS — C90.00 MULTIPLE MYELOMA WITHOUT REMISSION (MULTI): Primary | ICD-10-CM

## 2024-01-15 DIAGNOSIS — C90.00 MULTIPLE MYELOMA WITHOUT REMISSION (MULTI): ICD-10-CM

## 2024-01-15 DIAGNOSIS — Z92.850 HISTORY OF CHIMERIC ANTIGEN RECEPTOR T-CELL THERAPY: ICD-10-CM

## 2024-01-15 LAB
BASOPHILS # BLD AUTO: 0.01 X10*3/UL (ref 0–0.1)
BASOPHILS NFR BLD AUTO: 0.2 %
EOSINOPHIL # BLD AUTO: 0.04 X10*3/UL (ref 0–0.4)
EOSINOPHIL NFR BLD AUTO: 0.9 %
ERYTHROCYTE [DISTWIDTH] IN BLOOD BY AUTOMATED COUNT: 13.5 % (ref 11.5–14.5)
HCT VFR BLD AUTO: 33.6 % (ref 41–52)
HGB BLD-MCNC: 11.6 G/DL (ref 13.5–17.5)
IMM GRANULOCYTES # BLD AUTO: 0.02 X10*3/UL (ref 0–0.5)
IMM GRANULOCYTES NFR BLD AUTO: 0.4 % (ref 0–0.9)
LYMPHOCYTES # BLD AUTO: 0.66 X10*3/UL (ref 0.8–3)
LYMPHOCYTES NFR BLD AUTO: 14.8 %
MCH RBC QN AUTO: 36.7 PG (ref 26–34)
MCHC RBC AUTO-ENTMCNC: 34.5 G/DL (ref 32–36)
MCV RBC AUTO: 106 FL (ref 80–100)
MONOCYTES # BLD AUTO: 0.6 X10*3/UL (ref 0.05–0.8)
MONOCYTES NFR BLD AUTO: 13.5 %
NEUTROPHILS # BLD AUTO: 3.13 X10*3/UL (ref 1.6–5.5)
NEUTROPHILS NFR BLD AUTO: 70.2 %
NRBC BLD-RTO: ABNORMAL /100{WBCS}
PLATELET # BLD AUTO: 127 X10*3/UL (ref 150–450)
RBC # BLD AUTO: 3.16 X10*6/UL (ref 4.5–5.9)
WBC # BLD AUTO: 4.5 X10*3/UL (ref 4.4–11.3)

## 2024-01-15 PROCEDURE — 2500000004 HC RX 250 GENERAL PHARMACY W/ HCPCS (ALT 636 FOR OP/ED)

## 2024-01-15 PROCEDURE — 85025 COMPLETE CBC W/AUTO DIFF WBC: CPT

## 2024-01-15 PROCEDURE — 96366 THER/PROPH/DIAG IV INF ADDON: CPT | Mod: INF

## 2024-01-15 PROCEDURE — 2500000004 HC RX 250 GENERAL PHARMACY W/ HCPCS (ALT 636 FOR OP/ED): Mod: JZ,JG

## 2024-01-15 PROCEDURE — 96375 TX/PRO/DX INJ NEW DRUG ADDON: CPT | Mod: INF

## 2024-01-15 PROCEDURE — 96365 THER/PROPH/DIAG IV INF INIT: CPT | Mod: INF

## 2024-01-15 PROCEDURE — 80053 COMPREHEN METABOLIC PANEL: CPT

## 2024-01-15 PROCEDURE — 82784 ASSAY IGA/IGD/IGG/IGM EACH: CPT

## 2024-01-15 PROCEDURE — 2500000001 HC RX 250 WO HCPCS SELF ADMINISTERED DRUGS (ALT 637 FOR MEDICARE OP)

## 2024-01-15 RX ORDER — ACETAMINOPHEN 325 MG/1
650 TABLET ORAL ONCE
Status: CANCELLED | OUTPATIENT
Start: 2024-02-12

## 2024-01-15 RX ORDER — HEPARIN 100 UNIT/ML
500 SYRINGE INTRAVENOUS AS NEEDED
Status: CANCELLED | OUTPATIENT
Start: 2024-01-15

## 2024-01-15 RX ORDER — FAMOTIDINE 10 MG/ML
20 INJECTION INTRAVENOUS ONCE AS NEEDED
Status: COMPLETED | OUTPATIENT
Start: 2024-01-15 | End: 2024-01-15

## 2024-01-15 RX ORDER — MONTELUKAST SODIUM 10 MG/1
10 TABLET ORAL ONCE
Status: CANCELLED
Start: 2024-01-15

## 2024-01-15 RX ORDER — FAMOTIDINE 10 MG/ML
20 INJECTION INTRAVENOUS ONCE AS NEEDED
Status: CANCELLED | OUTPATIENT
Start: 2024-02-12

## 2024-01-15 RX ORDER — EPINEPHRINE 0.3 MG/.3ML
0.3 INJECTION SUBCUTANEOUS EVERY 5 MIN PRN
Status: DISCONTINUED | OUTPATIENT
Start: 2024-01-15 | End: 2024-04-25 | Stop reason: HOSPADM

## 2024-01-15 RX ORDER — MORPHINE SULFATE 2 MG/ML
2 INJECTION, SOLUTION INTRAMUSCULAR; INTRAVENOUS EVERY 4 HOURS PRN
Status: DISCONTINUED | OUTPATIENT
Start: 2024-01-15 | End: 2024-04-25 | Stop reason: HOSPADM

## 2024-01-15 RX ORDER — ALBUTEROL SULFATE 0.83 MG/ML
3 SOLUTION RESPIRATORY (INHALATION) AS NEEDED
Status: CANCELLED | OUTPATIENT
Start: 2024-02-12

## 2024-01-15 RX ORDER — DIPHENHYDRAMINE HCL 25 MG
25 CAPSULE ORAL ONCE
Status: CANCELLED | OUTPATIENT
Start: 2024-02-12

## 2024-01-15 RX ORDER — MONTELUKAST SODIUM 10 MG/1
10 TABLET ORAL ONCE
Status: CANCELLED
Start: 2024-02-12

## 2024-01-15 RX ORDER — DIPHENHYDRAMINE HYDROCHLORIDE 50 MG/ML
25 INJECTION INTRAMUSCULAR; INTRAVENOUS ONCE
Status: CANCELLED
Start: 2024-02-12

## 2024-01-15 RX ORDER — FAMOTIDINE 10 MG/ML
20 INJECTION INTRAVENOUS ONCE
Status: CANCELLED
Start: 2024-01-15

## 2024-01-15 RX ORDER — EPINEPHRINE 0.3 MG/.3ML
0.3 INJECTION SUBCUTANEOUS EVERY 5 MIN PRN
Status: CANCELLED | OUTPATIENT
Start: 2024-02-12

## 2024-01-15 RX ORDER — FAMOTIDINE 10 MG/ML
20 INJECTION INTRAVENOUS ONCE
Status: CANCELLED
Start: 2024-02-12

## 2024-01-15 RX ORDER — DIPHENHYDRAMINE HYDROCHLORIDE 50 MG/ML
50 INJECTION INTRAMUSCULAR; INTRAVENOUS AS NEEDED
Status: CANCELLED | OUTPATIENT
Start: 2024-02-12

## 2024-01-15 RX ORDER — DIPHENHYDRAMINE HYDROCHLORIDE 50 MG/ML
50 INJECTION INTRAMUSCULAR; INTRAVENOUS AS NEEDED
Status: COMPLETED | OUTPATIENT
Start: 2024-01-15 | End: 2024-01-15

## 2024-01-15 RX ORDER — DIPHENHYDRAMINE HCL 25 MG
25 CAPSULE ORAL ONCE
Status: COMPLETED | OUTPATIENT
Start: 2024-01-15 | End: 2024-01-15

## 2024-01-15 RX ORDER — HEPARIN SODIUM,PORCINE/PF 10 UNIT/ML
50 SYRINGE (ML) INTRAVENOUS AS NEEDED
Status: CANCELLED | OUTPATIENT
Start: 2024-01-15

## 2024-01-15 RX ORDER — ACETAMINOPHEN 325 MG/1
650 TABLET ORAL ONCE
Status: COMPLETED | OUTPATIENT
Start: 2024-01-15 | End: 2024-01-15

## 2024-01-15 RX ORDER — ALBUTEROL SULFATE 0.83 MG/ML
3 SOLUTION RESPIRATORY (INHALATION) AS NEEDED
Status: DISCONTINUED | OUTPATIENT
Start: 2024-01-15 | End: 2024-04-25 | Stop reason: HOSPADM

## 2024-01-15 RX ORDER — DIPHENHYDRAMINE HYDROCHLORIDE 50 MG/ML
25 INJECTION INTRAMUSCULAR; INTRAVENOUS ONCE
Status: CANCELLED
Start: 2024-01-15

## 2024-01-15 RX ORDER — DIPHENHYDRAMINE HCL 25 MG
25 CAPSULE ORAL ONCE
Status: DISCONTINUED | OUTPATIENT
Start: 2024-01-15 | End: 2024-04-25 | Stop reason: HOSPADM

## 2024-01-15 RX ORDER — MORPHINE SULFATE 2 MG/ML
2 INJECTION, SOLUTION INTRAMUSCULAR; INTRAVENOUS EVERY 4 HOURS PRN
Status: CANCELLED | OUTPATIENT
Start: 2024-01-15

## 2024-01-15 RX ORDER — ACETAMINOPHEN 325 MG/1
650 TABLET ORAL ONCE
Status: DISCONTINUED | OUTPATIENT
Start: 2024-01-15 | End: 2024-04-25 | Stop reason: HOSPADM

## 2024-01-15 RX ADMIN — DIPHENHYDRAMINE HYDROCHLORIDE 50 MG: 50 INJECTION INTRAMUSCULAR; INTRAVENOUS at 15:36

## 2024-01-15 RX ADMIN — ACETAMINOPHEN 650 MG: 325 TABLET ORAL at 13:53

## 2024-01-15 RX ADMIN — DIPHENHYDRAMINE HYDROCHLORIDE 25 MG: 25 CAPSULE ORAL at 13:52

## 2024-01-15 RX ADMIN — METHYLPREDNISOLONE SODIUM SUCCINATE 40 MG: 40 INJECTION, POWDER, FOR SOLUTION INTRAMUSCULAR; INTRAVENOUS at 15:39

## 2024-01-15 RX ADMIN — IMMUNE GLOBULIN INFUSION (HUMAN) 20 G: 100 INJECTION, SOLUTION INTRAVENOUS; SUBCUTANEOUS at 14:31

## 2024-01-15 RX ADMIN — MEPERIDINE HYDROCHLORIDE 12.5 MG: 25 INJECTION, SOLUTION INTRAMUSCULAR; INTRAVENOUS; SUBCUTANEOUS at 16:06

## 2024-01-15 RX ADMIN — FAMOTIDINE 20 MG: 10 INJECTION, SOLUTION INTRAVENOUS at 15:37

## 2024-01-15 RX ADMIN — SODIUM CHLORIDE 500 ML: 9 INJECTION, SOLUTION INTRAVENOUS at 16:08

## 2024-01-15 RX ADMIN — SODIUM CHLORIDE 1000 ML/HR: 9 INJECTION, SOLUTION INTRAVENOUS at 16:39

## 2024-01-15 ASSESSMENT — PAIN SCALES - GENERAL: PAINLEVEL: 0-NO PAIN

## 2024-01-15 NOTE — PROGRESS NOTES
Patient and wife instructed to call for any fever or other changes.  Instructed to call 911 with any chest pain or SOB.

## 2024-01-15 NOTE — PROGRESS NOTES
Patient asking about his Eliquis. Seeing Dr. Resendez on  but will run out prior to that. Probably around the . He is under the impression Eliquis may be stopped at this visit. Does not want to fill a 90 day script. Message to / Pedro Olson. Per Dr. Duenas patient okay to stop taking.  Patient aware to stop after his current supply.1506 vitals not taken by RN increasing dose due to patient sleeping.   Adverse Event Note     Name:Maldonado Mccloud  : 1950  MRN: [id number]      Adverse Event:  Medication: IVIG  Administered Date/Time: 01/15/2024/ 1430  Reactions/Symptoms Started Time:    Symptoms: (check all that apply)   [x] Back Pain   [] Erythema Face     [] Hypotension     [x] Rash/Rigors [] Other   [] Bleeding    [] Erythema Hands  [] Itching  [] Swelling/Edema [] Unknown   [] Chest Pain [] Hives/Urticaria     [] Low platelet Ct  [] Syncope   [] Cytopenia  [] Hypertension       [] Neutropenia    Severity: Moderate   Provider Notified: Yes   Medications Given(Add all medications to the chart via , or treatment plan)   [] Acetaminophen-Tylenol       [x] Famotidine-Pepcid  [] Nitroglycerine-NTG   [] Albuterol                               [] Hydrocortisone       [] Ondansetron-Zofran   [x] Diphenhydramine-Benadryl  [] Lorazepam-Ativan  [] Naloxone-Narcan   [] Epinephrine-Epi                     [x] Methylprednisone   Additional Details/ Comments: Notified by charge rn that patient is having back spasms and rigors. Patient just returned from ambulating to bathroom.   (This RN in with another patient.)  1532 IVIG stopped and at 1534 NS wide open started.  See MAR for solumedrol, benadryl, provider at bedside.  1537- 36.3-91-99% room air, unable to obtain BP due to rigors 1545 148/87- %room air back pain 9.9/10. 1603 rigors continue. IV rate decreased to 555 until patient stable.  36.8-140/81- %room air.1609 demerol given. Rigors continuing. Provider at  "bedside. 1615 153/75-98-% room air. Rigors much better.  Patient states he is feeling much better.  Back pain cyclical from 3-8/10..1625 back pain \"substantially better\"  \"I think Im going to make it\"  patient dozing on and off. Easily arousable.  133/86-24- % room air. Wife notified earlier and on her way. IV site benign.  See MAR for all meds given. 1631 IVIG not resumed. No changes with patient.  141/-20-98%room air.  1635 - N. Hawk NP aware. 1637 N. Hawk at bedside.   on dinamap. 1639 IV NS at 999ml/hr. 1646 EKG in process. 1650 146/-20- 100%room air.  Patient without complaints.  No rigors noted.  Back pain much better per patient. Wife at side. 1700 patient resting quietly in room with wife and provider at bedside.  In NAD and no further complaints.  Will discharge home after 500ml of current IVF bag and if VS stable per provider.       "

## 2024-01-15 NOTE — PROGRESS NOTES
Adverse Event Note     Name:Maldonado Mccloud  : 1950  MRN: [id number]      Adverse Event:  Medication: IVIG  Administered Date/Time: 01/15/2024/1430  Reactions/Symptoms Started Time:    Symptoms: (check all that apply)   [x] Back Pain   [] Erythema Face     [] Hypotension     [x] Rash/Rigors [] Other   [] Bleeding    [] Erythema Hands  [] Itching  [] Swelling/Edema [] Unknown   [] Chest Pain [] Hives/Urticaria     [] Low platelet Ct  [] Syncope   [] Cytopenia  [] Hypertension       [] Neutropenia    Severity:mod   Provider Notified: Yes   Medications Given(Add all medications to the chart via , or treatment plan)   [] Acetaminophen-Tylenol       [x] Famotidine-Pepcid  [] Nitroglycerine-NTG   [] Albuterol                               [] Hydrocortisone       [] Ondansetron-Zofran   [x] Diphenhydramine-Benadryl  [] Lorazepam-Ativan  [] Naloxone-Narcan   [] Epinephrine-Epi                     [x] Methylprednisone   Additional Details/ Comments:

## 2024-01-16 ENCOUNTER — APPOINTMENT (OUTPATIENT)
Dept: HEMATOLOGY/ONCOLOGY | Facility: CLINIC | Age: 74
End: 2024-01-16
Payer: MEDICARE

## 2024-01-16 LAB
ALBUMIN SERPL BCP-MCNC: 4.5 G/DL (ref 3.4–5)
ALP SERPL-CCNC: 65 U/L (ref 33–136)
ALT SERPL W P-5'-P-CCNC: 18 U/L (ref 10–52)
ANION GAP SERPL CALC-SCNC: 12 MMOL/L (ref 10–20)
AST SERPL W P-5'-P-CCNC: 20 U/L (ref 9–39)
BILIRUB SERPL-MCNC: 0.7 MG/DL (ref 0–1.2)
BUN SERPL-MCNC: 21 MG/DL (ref 6–23)
CALCIUM SERPL-MCNC: 9.3 MG/DL (ref 8.6–10.6)
CHLORIDE SERPL-SCNC: 107 MMOL/L (ref 98–107)
CO2 SERPL-SCNC: 26 MMOL/L (ref 21–32)
CREAT SERPL-MCNC: 1.1 MG/DL (ref 0.5–1.3)
EGFRCR SERPLBLD CKD-EPI 2021: 70 ML/MIN/1.73M*2
GLUCOSE SERPL-MCNC: 101 MG/DL (ref 74–99)
IGG SERPL-MCNC: 331 MG/DL (ref 700–1600)
POTASSIUM SERPL-SCNC: 4.3 MMOL/L (ref 3.5–5.3)
PROT SERPL-MCNC: 6.1 G/DL (ref 6.4–8.2)
SODIUM SERPL-SCNC: 141 MMOL/L (ref 136–145)

## 2024-01-19 ENCOUNTER — HOSPITAL ENCOUNTER (OUTPATIENT)
Dept: RADIOLOGY | Facility: HOSPITAL | Age: 74
Discharge: HOME | End: 2024-01-19
Payer: MEDICARE

## 2024-01-19 DIAGNOSIS — C90.00 IGG MULTIPLE MYELOMA (MULTI): ICD-10-CM

## 2024-01-19 PROCEDURE — 78816 PET IMAGE W/CT FULL BODY: CPT | Mod: PS

## 2024-01-19 PROCEDURE — 78816 PET IMAGE W/CT FULL BODY: CPT | Mod: PET TUMOR SUBSQ TX STRATEGY | Performed by: STUDENT IN AN ORGANIZED HEALTH CARE EDUCATION/TRAINING PROGRAM

## 2024-01-19 PROCEDURE — A9552 F18 FDG: HCPCS

## 2024-01-19 PROCEDURE — 3430000001 HC RX 343 DIAGNOSTIC RADIOPHARMACEUTICALS

## 2024-01-19 RX ORDER — FLUDEOXYGLUCOSE F 18 200 MCI/ML
12.4 INJECTION, SOLUTION INTRAVENOUS
Status: COMPLETED | OUTPATIENT
Start: 2024-01-19 | End: 2024-01-19

## 2024-01-19 RX ADMIN — FLUDEOXYGLUCOSE F 18 12.4 MILLICURIE: 200 INJECTION, SOLUTION INTRAVENOUS at 10:26

## 2024-01-25 ENCOUNTER — OFFICE VISIT (OUTPATIENT)
Dept: HEMATOLOGY/ONCOLOGY | Facility: HOSPITAL | Age: 74
End: 2024-01-25
Payer: COMMERCIAL

## 2024-01-25 ENCOUNTER — ONCOLOGY MEDICATION OUTREACH (OUTPATIENT)
Dept: HEMATOLOGY/ONCOLOGY | Facility: HOSPITAL | Age: 74
End: 2024-01-25
Payer: MEDICARE

## 2024-01-25 VITALS
HEART RATE: 101 BPM | TEMPERATURE: 97.5 F | SYSTOLIC BLOOD PRESSURE: 124 MMHG | DIASTOLIC BLOOD PRESSURE: 78 MMHG | RESPIRATION RATE: 17 BRPM | OXYGEN SATURATION: 100 %

## 2024-01-25 DIAGNOSIS — C90.00 MULTIPLE MYELOMA WITHOUT REMISSION (MULTI): ICD-10-CM

## 2024-01-25 DIAGNOSIS — C90.00 IGG MULTIPLE MYELOMA (MULTI): ICD-10-CM

## 2024-01-25 DIAGNOSIS — Z92.850 HISTORY OF CHIMERIC ANTIGEN RECEPTOR T-CELL THERAPY: Primary | ICD-10-CM

## 2024-01-25 PROCEDURE — 3074F SYST BP LT 130 MM HG: CPT | Performed by: INTERNAL MEDICINE

## 2024-01-25 PROCEDURE — 3078F DIAST BP <80 MM HG: CPT | Performed by: INTERNAL MEDICINE

## 2024-01-25 PROCEDURE — 99215 OFFICE O/P EST HI 40 MIN: CPT | Performed by: INTERNAL MEDICINE

## 2024-01-25 PROCEDURE — 1159F MED LIST DOCD IN RCRD: CPT | Performed by: INTERNAL MEDICINE

## 2024-01-25 PROCEDURE — 1126F AMNT PAIN NOTED NONE PRSNT: CPT | Performed by: INTERNAL MEDICINE

## 2024-01-25 PROCEDURE — 1160F RVW MEDS BY RX/DR IN RCRD: CPT | Performed by: INTERNAL MEDICINE

## 2024-01-25 RX ORDER — ALBUTEROL SULFATE 0.83 MG/ML
3 SOLUTION RESPIRATORY (INHALATION) AS NEEDED
Status: CANCELLED | OUTPATIENT
Start: 2024-02-23

## 2024-01-25 RX ORDER — DIPHENHYDRAMINE HYDROCHLORIDE 50 MG/ML
50 INJECTION INTRAMUSCULAR; INTRAVENOUS AS NEEDED
Status: CANCELLED | OUTPATIENT
Start: 2024-02-23

## 2024-01-25 RX ORDER — EPINEPHRINE 0.3 MG/.3ML
0.3 INJECTION SUBCUTANEOUS EVERY 5 MIN PRN
Status: CANCELLED | OUTPATIENT
Start: 2024-02-23

## 2024-01-25 RX ORDER — FAMOTIDINE 10 MG/ML
20 INJECTION INTRAVENOUS ONCE AS NEEDED
Status: CANCELLED | OUTPATIENT
Start: 2024-02-23

## 2024-01-25 ASSESSMENT — ENCOUNTER SYMPTOMS
HEMATOLOGIC/LYMPHATIC NEGATIVE: 1
GASTROINTESTINAL NEGATIVE: 1
ENDOCRINE NEGATIVE: 1
DIZZINESS: 0
EYES NEGATIVE: 1
RESPIRATORY NEGATIVE: 1
PSYCHIATRIC NEGATIVE: 1
CARDIOVASCULAR NEGATIVE: 1
FATIGUE: 0
MUSCULOSKELETAL NEGATIVE: 1

## 2024-01-25 ASSESSMENT — PAIN SCALES - GENERAL: PAINLEVEL: 0-NO PAIN

## 2024-01-25 NOTE — PATIENT INSTRUCTIONS
24    To whom it may concern,    I am writing on behalf of our patient, Maldonado Mccloud (: 1950), who received a hematopoietic stem cell transplant (HSCT) or CAR T-cell Therapy on 10/25/2023. Per the CDC recommendations, “Recipients of HSCT or CAR-T-cell therapy who received 1 or more doses of COVID-19 vaccine prior to or during treatment should be revaccinated. Revaccination should start at least 3 months (12 weeks) after transplant or CAR-T-cell therapy and should follow the currently recommended schedule for people who are unvaccinated.”                                                                                          Following the CDC recommendations, Maldonado Mccloud is due for 2-3 doses of the [id number] Formula COVID-19 vaccines, and they would like to get them at your facility. I have included the dosing schedule below:   Pfizer-BevyUpech Product  Administer first dose at 3 months post HSCT & CAR T-cell  Administer second dose 3 weeks following the first dose  Administer third dose >= 4 weeks following the second dose  Moderna Product  Administer first dose at 3 months post HSCT & CAR T-cell  Administer second dose 4 weeks following the first dose  Administer third dose >= 4 weeks following the second dose  Novavax Product  Administer first dose at 3 months post HSCT & CAR T-cell  Administer second dose 3 weeks following the first dose    If you would like to review the CDC recommendations, they can be found using the link below:  https://www.cdc.gov/vaccines/covid-19/clinical-considerations/egrcrmi-mlswjdyukmptqc-wg.html#immunocompromised    Please reach out with any questions or concerns. Thank you for your help caring for our patient.     Sincerely,  Jacquie Sanchez, PharmD, BCOP         Ambulatory Stem Cell Transplant Pharmacist  [facility] Department of Pharmacy Services  [address] [place] [address]  Phone: [phone]  Fax: [phone]

## 2024-01-25 NOTE — PROGRESS NOTES
Oncology Pharmacy Medication Education Note   Maldonado Mccloud is a 73 y.o. male patient currently day + 92 following Abecma CAR T-cell therapy for a diagnosis of multiple myeloma. Pharmacist was consulted to provide home medication education.     Medication Education: Education was provided regarding all home medication changes. The schedule was discussed in detail with the patient, including drug name, use and dose, and the appropriate timing of self-administration.     Vaccination Plan: The patient is due for COVID-19 series. Discussed the importance of this vaccine with the patient but he is hesitant to receive. Gave patient letter below with administration instructions. Patient is agreeable to receive influenza and RSV vaccine, entered these for 2/23 on the same day as an ortho appt. Provider will need to enter 6 month vaccines when they are due.     The patient demonstrated excellent understanding of their current medication list.    All questions were answered. Patient/family verbalized understanding of the plan of care and information provided. Will follow as necessary. Time spent with patient/family and/or coordinating care: 30 minutes.    Jacquie Sanchez, PharmD, [facility]  Ambulatory Bone and Marrow Transplant Pharmacist    Medication Indication 7am 9am 3pm 9pm   Prophylactic Medications   Acyclovir (Zovirax®)  400 mg tablet Prevents Viral Infections  1 tab  1 tab   Sulfamethoxazole-Trimethoprim  (Bactrim DS)  800-160 mg tablet Prevents PCP Pneumonia 1 tab once daily on   Mondays, Wednesdays, and Fridays   Maintenance Medications   Trazodone  100 mg tablet Insomnia    3 tabs

## 2024-01-25 NOTE — PROGRESS NOTES
Patient ID: Maldonado Mccloud is a 73 y.o. male.  Referring Physician: No referring provider defined for this encounter.  Primary Care Provider: Timothy Almodovar MD  Visit Type: {Visit Type:75606}      Subjective    HPI    Review of Systems - Oncology     Objective   BSA: There is no height or weight on file to calculate BSA.  /78   Pulse 101   Temp 36.4 °C (97.5 °F)   Resp 17   SpO2 100%      has a past medical history of Autonomic orthostatic hypotension (03/14/2023), Bursitis of left shoulder (08/22/2023), Cervical stenosis of spine (03/14/2023), Decreased GFR (03/14/2023), Erectile dysfunction (03/14/2023), Fever (10/02/2023), Finger injury, right, initial encounter (03/14/2023), Fracture of clavicle, left, closed (03/14/2023), Other conditions influencing health status (05/24/2017), Personal history of malignant melanoma of skin (11/20/2015), Personal history of other diseases of the respiratory system (05/12/2018), Personal history of other diseases of the respiratory system (04/21/2017), Personal history of other diseases of the respiratory system (04/21/2017), Strain of unspecified muscle and tendon at ankle and foot level, left foot, initial encounter (04/05/2021), and Swelling of extremity, left (03/14/2023).   has a past surgical history that includes Total knee arthroplasty (04/17/2015); Other surgical history (10/23/2015); IR CVC tunneled (12/14/2015); and IR CVC tunneled (5/26/2023).  Family History   Problem Relation Name Age of Onset    Osteoporosis Mother      Diabetes Father      Heart disease Father      Pancreatic cancer Father      Squamous cell carcinoma Father       Oncology History   IgG multiple myeloma (CMS/HCC)   7/1/2015 Initial Diagnosis    IgG multiple myeloma (CMS/HCC)     12/23/2015 -  Bone Marrow Transplant    Autologous Stem Cell Transplant      11/4/2022 - 11/4/2022 Chemotherapy    Carfilzomib (Weekly) / Cyclophosphamide / Thalidomide / Dexamethasone, 28 Day Cycles      10/25/2023 -  Cellular Therapy    Evette Mccloud  reports that he has been smoking cigars. He has never used smokeless tobacco.  He  reports that he does not currently use alcohol.  He  reports no history of drug use.    Physical Exam    WBC   Date/Time Value Ref Range Status   01/15/2024 01:29 PM 4.5 4.4 - 11.3 x10*3/uL Final   01/04/2024 02:22 PM 3.8 (L) 4.4 - 11.3 x10*3/uL Final   12/18/2023 11:19 AM 2.6 (L) 4.4 - 11.3 x10*3/uL Final     nRBC   Date Value Ref Range Status   01/15/2024   Final     Comment:     Not Measured   01/04/2024   Final     Comment:     Not Measured   12/18/2023   Final     Comment:     Not Measured     RBC   Date Value Ref Range Status   01/15/2024 3.16 (L) 4.50 - 5.90 x10*6/uL Final   01/04/2024 3.01 (L) 4.50 - 5.90 x10*6/uL Final   12/18/2023 3.03 (L) 4.50 - 5.90 x10*6/uL Final     Hemoglobin   Date Value Ref Range Status   01/15/2024 11.6 (L) 13.5 - 17.5 g/dL Final   01/04/2024 11.2 (L) 13.5 - 17.5 g/dL Final   12/18/2023 11.3 (L) 13.5 - 17.5 g/dL Final     Hematocrit   Date Value Ref Range Status   01/15/2024 33.6 (L) 41.0 - 52.0 % Final   01/04/2024 32.3 (L) 41.0 - 52.0 % Final   12/18/2023 32.0 (L) 41.0 - 52.0 % Final     MCV   Date/Time Value Ref Range Status   01/15/2024 01:29  (H) 80 - 100 fL Final   01/04/2024 02:22  (H) 80 - 100 fL Final   12/18/2023 11:19  (H) 80 - 100 fL Final     MCH   Date/Time Value Ref Range Status   01/15/2024 01:29 PM 36.7 (H) 26.0 - 34.0 pg Final   01/04/2024 02:22 PM 37.2 (H) 26.0 - 34.0 pg Final   12/18/2023 11:19 AM 37.3 (H) 26.0 - 34.0 pg Final     MCHC   Date/Time Value Ref Range Status   01/15/2024 01:29 PM 34.5 32.0 - 36.0 g/dL Final   01/04/2024 02:22 PM 34.7 32.0 - 36.0 g/dL Final   12/18/2023 11:19 AM 35.3 32.0 - 36.0 g/dL Final     RDW   Date/Time Value Ref Range Status   01/15/2024 01:29 PM 13.5 11.5 - 14.5 % Final   01/04/2024 02:22 PM 13.9 11.5 - 14.5 % Final   12/18/2023 11:19 AM 14.2 11.5 - 14.5 %  Final     Platelets   Date/Time Value Ref Range Status   01/15/2024 01:29  (L) 150 - 450 x10*3/uL Final   01/04/2024 02:22 PM 89 (L) 150 - 450 x10*3/uL Final   12/18/2023 11:19 AM 86 (L) 150 - 450 x10*3/uL Final     MPV   Date/Time Value Ref Range Status   11/01/2023 02:09 AM 9.7 7.5 - 11.5 fL Final   10/31/2023 02:23 AM 10.1 7.5 - 11.5 fL Final   10/30/2023 01:55 AM 10.0 7.5 - 11.5 fL Final     Neutrophils %   Date/Time Value Ref Range Status   01/15/2024 01:29 PM 70.2 40.0 - 80.0 % Final   01/04/2024 02:22 PM 66.7 40.0 - 80.0 % Final   12/18/2023 11:19 AM 53.4 40.0 - 80.0 % Final     Immature Granulocytes %, Automated   Date/Time Value Ref Range Status   01/15/2024 01:29 PM 0.4 0.0 - 0.9 % Final     Comment:     Immature Granulocyte Count (IG) includes promyelocytes, myelocytes and metamyelocytes but does not include bands. Percent differential counts (%) should be interpreted in the context of the absolute cell counts (cells/UL).   01/04/2024 02:22 PM 0.3 0.0 - 0.9 % Final     Comment:     Immature Granulocyte Count (IG) includes promyelocytes, myelocytes and metamyelocytes but does not include bands. Percent differential counts (%) should be interpreted in the context of the absolute cell counts (cells/UL).   12/18/2023 11:19 AM 0.4 0.0 - 0.9 % Final     Comment:     Immature Granulocyte Count (IG) includes promyelocytes, myelocytes and metamyelocytes but does not include bands. Percent differential counts (%) should be interpreted in the context of the absolute cell counts (cells/UL).     Lymphocytes %, Manual   Date/Time Value Ref Range Status   11/12/2023 05:12 PM 16.5 13.0 - 44.0 % Final   11/08/2023 01:51 AM 25.8 13.0 - 44.0 % Final   11/05/2023 12:42 AM 47.1 13.0 - 44.0 % Final     Lymphocytes %   Date/Time Value Ref Range Status   01/15/2024 01:29 PM 14.8 13.0 - 44.0 % Final   01/04/2024 02:22 PM 17.6 13.0 - 44.0 % Final   12/18/2023 11:19 AM 21.7 13.0 - 44.0 % Final     Monocytes %, Manual    Date/Time Value Ref Range Status   11/12/2023 05:12 PM 15.4 2.0 - 10.0 % Final   11/08/2023 01:51 AM 7.5 2.0 - 10.0 % Final   11/05/2023 12:42 AM 17.6 2.0 - 10.0 % Final     Monocytes %   Date/Time Value Ref Range Status   01/15/2024 01:29 PM 13.5 2.0 - 10.0 % Final   01/04/2024 02:22 PM 13.8 2.0 - 10.0 % Final   12/18/2023 11:19 AM 22.5 2.0 - 10.0 % Final     Eosinophils %, Manual   Date/Time Value Ref Range Status   11/12/2023 05:12 PM 0.0 0.0 - 6.0 % Final   11/08/2023 01:51 AM 5.4 0.0 - 6.0 % Final   11/05/2023 12:42 AM 5.9 0.0 - 6.0 % Final     Eosinophils %   Date/Time Value Ref Range Status   01/15/2024 01:29 PM 0.9 0.0 - 6.0 % Final   01/04/2024 02:22 PM 1.3 0.0 - 6.0 % Final   12/18/2023 11:19 AM 1.2 0.0 - 6.0 % Final     Basophils %, Manual   Date/Time Value Ref Range Status   11/12/2023 05:12 PM 3.3 0.0 - 2.0 % Final   11/08/2023 01:51 AM 2.2 0.0 - 2.0 % Final   11/05/2023 12:42 AM 0.0 0.0 - 2.0 % Final     Basophils %   Date/Time Value Ref Range Status   01/15/2024 01:29 PM 0.2 0.0 - 2.0 % Final   01/04/2024 02:22 PM 0.3 0.0 - 2.0 % Final   12/18/2023 11:19 AM 0.8 0.0 - 2.0 % Final     Neutrophils Absolute   Date/Time Value Ref Range Status   01/15/2024 01:29 PM 3.13 1.60 - 5.50 x10*3/uL Final     Comment:     Percent differential counts (%) should be interpreted in the context of the absolute cell counts (cells/uL).   01/04/2024 02:22 PM 2.51 1.60 - 5.50 x10*3/uL Final     Comment:     Percent differential counts (%) should be interpreted in the context of the absolute cell counts (cells/uL).   12/18/2023 11:19 AM 1.38 (L) 1.60 - 5.50 x10*3/uL Final     Comment:     Percent differential counts (%) should be interpreted in the context of the absolute cell counts (cells/uL).     Immature Granulocytes Absolute, Automated   Date/Time Value Ref Range Status   01/15/2024 01:29 PM 0.02 0.00 - 0.50 x10*3/uL Final   01/04/2024 02:22 PM 0.01 0.00 - 0.50 x10*3/uL Final   12/18/2023 11:19 AM 0.01 0.00 - 0.50  "x10*3/uL Final     Lymphocytes Absolute   Date/Time Value Ref Range Status   01/15/2024 01:29 PM 0.66 (L) 0.80 - 3.00 x10*3/uL Final   01/04/2024 02:22 PM 0.66 (L) 0.80 - 3.00 x10*3/uL Final   12/18/2023 11:19 AM 0.56 (L) 0.80 - 3.00 x10*3/uL Final     Monocytes Absolute   Date/Time Value Ref Range Status   01/15/2024 01:29 PM 0.60 0.05 - 0.80 x10*3/uL Final   01/04/2024 02:22 PM 0.52 0.05 - 0.80 x10*3/uL Final   12/18/2023 11:19 AM 0.58 0.05 - 0.80 x10*3/uL Final     Eosinophils Absolute   Date/Time Value Ref Range Status   01/15/2024 01:29 PM 0.04 0.00 - 0.40 x10*3/uL Final   01/04/2024 02:22 PM 0.05 0.00 - 0.40 x10*3/uL Final   12/18/2023 11:19 AM 0.03 0.00 - 0.40 x10*3/uL Final     Eosinophils Absolute, Manual   Date/Time Value Ref Range Status   11/12/2023 05:12 PM 0.00 0.00 - 0.40 x10*3/uL Final   11/08/2023 01:51 AM 0.07 0.00 - 0.40 x10*3/uL Final   11/05/2023 12:42 AM 0.05 0.00 - 0.40 x10*3/uL Final     Basophils Absolute   Date/Time Value Ref Range Status   01/15/2024 01:29 PM 0.01 0.00 - 0.10 x10*3/uL Final   01/04/2024 02:22 PM 0.01 0.00 - 0.10 x10*3/uL Final   12/18/2023 11:19 AM 0.02 0.00 - 0.10 x10*3/uL Final     Basophils Absolute, Manual   Date/Time Value Ref Range Status   11/12/2023 05:12 PM 0.06 0.00 - 0.10 x10*3/uL Final   11/08/2023 01:51 AM 0.03 0.00 - 0.10 x10*3/uL Final   11/05/2023 12:42 AM 0.00 0.00 - 0.10 x10*3/uL Final       No components found for: \"PT\"  aPTT   Date/Time Value Ref Range Status   11/08/2023 01:51 AM 28 27 - 38 seconds Final   11/06/2023 02:30 AM 30 27 - 38 seconds Final   11/03/2023 02:37 AM 27 27 - 38 seconds Final       Assessment/Plan         {Assess/PlanSmartLinks:28372}         Wil Sun MD PhD                         "

## 2024-01-26 NOTE — PROGRESS NOTES
Patient ID: Maldonado Mccloud is a 73 y.o. male.  Referring Physician: No referring provider defined for this encounter.  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 Bone marrow biopsy suggested multiple myeloma IgG kappa, ISS Stage II, bone survey revealed lytic lesions; Urine light chains present kappa restricted with 2gm proteinuria.  No adverse prognostic factors identified.    Treatment Hx:    VRD  Initiated Aug 2015; currently s/p 3 cycles.  BMBx 10/2015 complete response.    Stem Cell Transplant  Underwent stem cell mobilization with Neupogen/Plerixafor and 2 day collection December 16-17, 2015, for 8.62 CD34 x 10^6/kg. During procedure for right IJ Trialysis placement developed A. Fib with RVR which spontaneously resolved on December 14, 2015. He was admitted and placed on monitor for stem cell collection.     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  Hospitalization complicated by orthostatic hypotension, electrolyte replacement, drug induced rash, culture negative fever.      He completed approximately 5 weeks of maintenance Revlimid 10 mg daily which was held for worsening neuropathy August 2016.    3/ 2018: IgG M Braxton rising to 0.2gm/DL and light chain rising; restarted on Revlimid maintenance at 5mg EOD.     Revlimid stopped in 3/2021 due to stable disease.    Vacto/Pom Trial  7/2021 his M spike was on the rise. He was consented for Vacto/Pom trial which he began on 8/5/2021. He continued Pom post finishing the trial.     Sarclisa/Kyprolis  Myeloma markers increasing in 6/2022, consented for Sarclisa/Kyprolis, which began 6/30/22. He continued this through September of 2022.       Cytoxan/Kyprolis  His therapy was subsequently changed in 9/2022 due  to continued disease progression in the face of Sarclisa. His therapy was changed to CYYCLON regimen (Cytoxan/Kyprolis).    PET CT scan 9/14/2022 shows FDG avid lytic lesion within the left midshaft clavicle with pathologic fracture, FDG avid lytic lesions involving bilateral scapulas and FDG avidity within the T9 vertebral body, suggestive of active multiple myeloma.  There are non FDG avid lytic lesions in the right iliac bone and right clavicle, likely representing nonactive multiple myeloma.  There is no PET evidence of extramedullary involvement.  There is an FDG avid right thyroid nodule.     MRI in 10/2022 negative for myelomatous involvement in thoracic vertebrae.     Therapy placed on hold in 11/2022 due to need for hip replacement surgery. His myeloma markers were on the rise in 3/2023 with a new jaw lesion. Plan is to begin radiation therapy 4/11 and resume 2x/week Kyprolis 4/10.     Tracking to CAR T cell therapy. Collected CAR T cells on 5/26/23.     X-ray of L elbow (6/20/23):  IMPRESSION:  1. Osteolytic lesions in the proximal radius and distal humerus  consistent with patient's reported history of multiple myeloma.  2. No acute fracture or malalignment.    Began radiation x8 fractions on 7/3/23.    Resumed Cytoxan as of 8/7/23.       CAR T Cell Therapy   Admitted for CAR T cell therapy for his ISS stage 2 Kappa multiple myeloma with ABECMA T=0, 10/25/23     Hospital course complicated by Grade 1 CRS and Grade 1 ICANS, managed with dexamethasone 10mg x 3 days and one dose of tocilizumab.      IgG multiple myeloma (CMS/HCC)   7/1/2015 Initial Diagnosis    IgG multiple myeloma (CMS/HCC)     12/23/2015 -  Bone Marrow Transplant    Autologous Stem Cell Transplant      11/4/2022 - 11/4/2022 Chemotherapy    Carfilzomib (Weekly) / Cyclophosphamide / Thalidomide / Dexamethasone, 28 Day Cycles     10/25/2023 -  Cellular Therapy    Abecma        SUBJECTIVE:  History of Present Illness:  Cuong presents to clinic  "1/25/24  with his wife for a follow up visit.    T+90 s/p CAR T (10/25/23)    Overall he is doing well. But still with pathologic non-healing fracture of right radius  Also has lytic lesion in left side (Xray from 1/9)    Notes ongoing fatigue. His wife is noticing some mental decline, \"he's not as sharp as he used to be\"  He denies any recent infections, fever, skin rash.    During last IVIG he developed an infusion reaction but was controled with steroids and antihistamine.      Review of Systems   Constitutional:  Negative for fatigue.   HENT: Negative.     Eyes: Negative.    Respiratory: Negative.     Cardiovascular: Negative.    Gastrointestinal: Negative.    Endocrine: Negative.    Genitourinary: Negative.    Musculoskeletal: Negative.    Skin: Negative.    Allergic/Immunologic: Positive for immunocompromised state.   Neurological:  Negative for dizziness.   Hematological: Negative.    Psychiatric/Behavioral: Negative.       OBJECTIVE:  KPS: Karnofsky Score: 70 - Cares for self; unable to carry on normal activity or do normal work    VS:  /78   Pulse 101   Temp 36.4 °C (97.5 °F)   Resp 17   SpO2 100%   BSA: There is no height or weight on file to calculate BSA.    Physical Exam  Constitutional:       Appearance: Normal appearance. He is normal weight.   HENT:      Head: Normocephalic and atraumatic.      Nose: Nose normal.      Mouth/Throat:      Mouth: Mucous membranes are moist.      Pharynx: Oropharynx is clear.   Eyes:      Extraocular Movements: Extraocular movements intact.      Conjunctiva/sclera: Conjunctivae normal.      Pupils: Pupils are equal, round, and reactive to light.   Cardiovascular:      Rate and Rhythm: Normal rate and regular rhythm.      Pulses: Normal pulses.      Heart sounds: Normal heart sounds.   Pulmonary:      Effort: Pulmonary effort is normal.      Breath sounds: Normal breath sounds.   Abdominal:      General: Abdomen is flat. Bowel sounds are normal.      Palpations: " Abdomen is soft.   Musculoskeletal:         General: No tenderness or signs of injury. Normal range of motion.      Cervical back: Normal range of motion and neck supple.   Skin:     General: Skin is warm and dry.   Neurological:      General: No focal deficit present.      Mental Status: He is alert and oriented to person, place, and time. Mental status is at baseline.   Psychiatric:         Mood and Affect: Mood normal.         Behavior: Behavior normal.         Thought Content: Thought content normal.         Judgment: Judgment normal.       Laboratory:  The pertinent laboratory results were reviewed and discussed with the patient.    Lab Results   Component Value Date    WBC 4.5 01/15/2024    HCT 33.6 (L) 01/15/2024    HGB 11.6 (L) 01/15/2024     (L) 01/15/2024    ANC 1.49 (L) 10/27/2023    K 4.3 01/15/2024    CALCIUM 9.3 01/15/2024     01/15/2024    MG 1.94 12/04/2023    ALT 18 01/15/2024    AST 20 01/15/2024    BUN 21 01/15/2024    CREATININE 1.10 01/15/2024    PHOS 3.9 11/13/2023    KAPPA <0.08 (L) 01/04/2024    LAMBDA <0.17 (L) 01/04/2024    KAPLS  01/04/2024      Comment:      One or more analytes used in this calculation   is outside of the analytical measurement range.  Calculation cannot be performed.    SPEP Decrease in polyclonal gamma globulins.    01/04/2024    IEPIN  01/04/2024     No monoclonal proteins detected by immunofixation.        (L) 01/15/2024    IGM <5 (L) 01/04/2024    IGA <7 (L) 01/04/2024      Note: for a comprehensive list of the patient's lab results, access the Results Review activity.    ASSESSMENT and PLAN:    Multiple Myeloma:   - ISS Stage 2 IgG Kappa Multiple Myeloma  - S/p VRD, Autologous SCT, Vacto/Pom, Kyprolis/Sarclisa, Radiation, most recently Kyprolis/Cytoxan  - PET/CT showed mostly decreased metabolic activity, however some new areas  - Completed radiation to R arm with Dr. Patel  - BMBx performed 10/2 shows BULMARO with clonoseq detected but below LOD,  however progressing lytic lesions  - S/p CAR T w/ ABECMA (T=0, 10/25/23), hospital course complicated by grade 1 ICANS managed w/ Dex 10mg x3 days and 1 dose Tociluzimab    1/9/2024  biochemical response (CR But lytic lesions on Xrays and pathologica fractures. (Inactive lesions??)  - PET/CT (1/19/202) Multiple FDG avid lytic lesions above are stable in size and FDG avidity  - BMBx (2/12/24)     ID:  - Acyclovir 400mg BID (for 6 months post CAR T)  - Bactrim DS M, W, F (for 6 months post CAR T)  - Fluconazole 400mg daily (completed through [facility])  - Will resume Levaquin if ANC <500  - IVIG for IgG <400    Bone Health:  - Found to have fracture of the R radius  11/12/23  - Follows w/ Dr. Collins, no plans for surgery at this point  - Started Zometa monthly, 1/4  - Advised about increasing Calcium and Vit D intake     DVT:  - 6/20/22 acute occlusive DVT demonstrated within the left common femoral, profunda, femoral and popliteal veins.  - Has been on Eliquis 5mg BID, inturrupted around time of CAR-T cell then was discontinued as VTE was thought to be malignancy related while on treatment, as he's in remission now off treatment, Eliquis was stopped     Cardiac:  - Echo (5/2/23) showed EF of 50-55%  - Reports some dizziness, encouraged increasing fluid intake, sCr 1.35 (11/28)  - Will plan to hold Lisinopril and assess improvement in dizziness and BP (will wean, 2.5mg BID x2 days, every other day, done)  - Ongoing dizziness/orthostasis, given 1L NS 12/18    Neurology  - LE neuropathy, started with Velcade years ago, stable  - Mental decline and memory loss, more noticeable after CAR-T   - Monitor for now    Derm:  - History of squamous cell carcinomas  - Has plans to have another removed on L forehead    RTC:  2/12 BMBx w/ ND    Discussed with Dr. Eliseo MD

## 2024-01-30 ENCOUNTER — HOSPITAL ENCOUNTER (OUTPATIENT)
Dept: RADIOLOGY | Facility: CLINIC | Age: 74
Discharge: HOME | End: 2024-01-30
Payer: COMMERCIAL

## 2024-01-30 DIAGNOSIS — M84.433K: ICD-10-CM

## 2024-01-30 PROCEDURE — 73080 X-RAY EXAM OF ELBOW: CPT | Mod: 50

## 2024-01-30 PROCEDURE — 73080 X-RAY EXAM OF ELBOW: CPT | Mod: BILATERAL PROCEDURE | Performed by: RADIOLOGY

## 2024-02-01 DIAGNOSIS — C90.00 MULTIPLE MYELOMA WITHOUT REMISSION (MULTI): Primary | ICD-10-CM

## 2024-02-05 ENCOUNTER — OFFICE VISIT (OUTPATIENT)
Dept: HEMATOLOGY/ONCOLOGY | Facility: CLINIC | Age: 74
End: 2024-02-05
Payer: COMMERCIAL

## 2024-02-05 ENCOUNTER — INFUSION (OUTPATIENT)
Dept: HEMATOLOGY/ONCOLOGY | Facility: CLINIC | Age: 74
End: 2024-02-05
Payer: COMMERCIAL

## 2024-02-05 ENCOUNTER — PROCEDURE VISIT (OUTPATIENT)
Dept: HEMATOLOGY/ONCOLOGY | Facility: CLINIC | Age: 74
End: 2024-02-05
Payer: COMMERCIAL

## 2024-02-05 VITALS
DIASTOLIC BLOOD PRESSURE: 78 MMHG | WEIGHT: 225.75 LBS | TEMPERATURE: 97.9 F | SYSTOLIC BLOOD PRESSURE: 129 MMHG | RESPIRATION RATE: 18 BRPM | OXYGEN SATURATION: 97 % | HEART RATE: 77 BPM | BODY MASS INDEX: 32.39 KG/M2

## 2024-02-05 VITALS
DIASTOLIC BLOOD PRESSURE: 95 MMHG | RESPIRATION RATE: 16 BRPM | TEMPERATURE: 97.5 F | HEART RATE: 62 BPM | SYSTOLIC BLOOD PRESSURE: 143 MMHG

## 2024-02-05 DIAGNOSIS — C90.00 MULTIPLE MYELOMA WITHOUT REMISSION (MULTI): Primary | ICD-10-CM

## 2024-02-05 DIAGNOSIS — C90.00 IGG MULTIPLE MYELOMA (MULTI): ICD-10-CM

## 2024-02-05 DIAGNOSIS — Z92.850 HISTORY OF CHIMERIC ANTIGEN RECEPTOR T-CELL THERAPY: ICD-10-CM

## 2024-02-05 DIAGNOSIS — C90.00 HYPOGAMMAGLOBULINEMIA DUE TO MULTIPLE MYELOMA (MULTI): ICD-10-CM

## 2024-02-05 DIAGNOSIS — D80.1 HYPOGAMMAGLOBULINEMIA DUE TO MULTIPLE MYELOMA (MULTI): ICD-10-CM

## 2024-02-05 DIAGNOSIS — C90.00 MULTIPLE MYELOMA WITHOUT REMISSION (MULTI): ICD-10-CM

## 2024-02-05 DIAGNOSIS — C90.00 IGG MULTIPLE MYELOMA (MULTI): Primary | ICD-10-CM

## 2024-02-05 LAB
ANION GAP SERPL CALC-SCNC: 14 MMOL/L (ref 10–20)
BASOPHILS # BLD AUTO: 0.01 X10*3/UL (ref 0–0.1)
BASOPHILS NFR BLD AUTO: 0.3 %
BUN SERPL-MCNC: 28 MG/DL (ref 6–23)
CA-I BLD-SCNC: 1.26 MMOL/L (ref 1.1–1.33)
CHLORIDE SERPL-SCNC: 108 MMOL/L (ref 98–107)
CO2 SERPL-SCNC: 24 MMOL/L (ref 21–32)
CREAT SERPL-MCNC: 1.2 MG/DL (ref 0.6–1.3)
EOSINOPHIL # BLD AUTO: 0.05 X10*3/UL (ref 0–0.4)
EOSINOPHIL NFR BLD AUTO: 1.7 %
ERYTHROCYTE [DISTWIDTH] IN BLOOD BY AUTOMATED COUNT: 12.7 % (ref 11.5–14.5)
GFR SERPL CREATININE-BSD FRML MDRD: 63 ML/MIN/1.73M*2
GLUCOSE SERPL-MCNC: 86 MG/DL (ref 74–99)
HCT VFR BLD AUTO: 34.4 % (ref 41–52)
HGB BLD-MCNC: 11.6 G/DL (ref 13.5–17.5)
IMM GRANULOCYTES # BLD AUTO: 0.01 X10*3/UL (ref 0–0.5)
IMM GRANULOCYTES NFR BLD AUTO: 0.3 % (ref 0–0.9)
LYMPHOCYTES # BLD AUTO: 0.63 X10*3/UL (ref 0.8–3)
LYMPHOCYTES NFR BLD AUTO: 21.5 %
MCH RBC QN AUTO: 36.7 PG (ref 26–34)
MCHC RBC AUTO-ENTMCNC: 33.7 G/DL (ref 32–36)
MCV RBC AUTO: 109 FL (ref 80–100)
MONOCYTES # BLD AUTO: 0.4 X10*3/UL (ref 0.05–0.8)
MONOCYTES NFR BLD AUTO: 13.7 %
NEUTROPHILS # BLD AUTO: 1.83 X10*3/UL (ref 1.6–5.5)
NEUTROPHILS NFR BLD AUTO: 62.5 %
NRBC BLD-RTO: ABNORMAL /100{WBCS}
PLATELET # BLD AUTO: 100 X10*3/UL (ref 150–450)
POTASSIUM SERPL-SCNC: 4.3 MMOL/L (ref 3.5–5.3)
RBC # BLD AUTO: 3.16 X10*6/UL (ref 4.5–5.9)
SODIUM SERPL-SCNC: 142 MMOL/L (ref 136–145)
WBC # BLD AUTO: 2.9 X10*3/UL (ref 4.4–11.3)

## 2024-02-05 PROCEDURE — 99215 OFFICE O/P EST HI 40 MIN: CPT | Mod: 25

## 2024-02-05 PROCEDURE — 84155 ASSAY OF PROTEIN SERUM: CPT | Mod: 59

## 2024-02-05 PROCEDURE — 86334 IMMUNOFIX E-PHORESIS SERUM: CPT

## 2024-02-05 PROCEDURE — 88341 IMHCHEM/IMCYTCHM EA ADD ANTB: CPT | Performed by: PATHOLOGY

## 2024-02-05 PROCEDURE — 82784 ASSAY IGA/IGD/IGG/IGM EACH: CPT

## 2024-02-05 PROCEDURE — 2500000004 HC RX 250 GENERAL PHARMACY W/ HCPCS (ALT 636 FOR OP/ED)

## 2024-02-05 PROCEDURE — 1160F RVW MEDS BY RX/DR IN RCRD: CPT

## 2024-02-05 PROCEDURE — 3078F DIAST BP <80 MM HG: CPT

## 2024-02-05 PROCEDURE — 96367 TX/PROPH/DG ADDL SEQ IV INF: CPT | Mod: 59

## 2024-02-05 PROCEDURE — 99417 PROLNG OP E/M EACH 15 MIN: CPT

## 2024-02-05 PROCEDURE — 99215 OFFICE O/P EST HI 40 MIN: CPT

## 2024-02-05 PROCEDURE — 88305 TISSUE EXAM BY PATHOLOGIST: CPT | Performed by: PATHOLOGY

## 2024-02-05 PROCEDURE — 88305 TISSUE EXAM BY PATHOLOGIST: CPT | Mod: TC,SUR

## 2024-02-05 PROCEDURE — 88342 IMHCHEM/IMCYTCHM 1ST ANTB: CPT | Performed by: PATHOLOGY

## 2024-02-05 PROCEDURE — 88237 TISSUE CULTURE BONE MARROW: CPT

## 2024-02-05 PROCEDURE — 88185 FLOWCYTOMETRY/TC ADD-ON: CPT | Mod: TC

## 2024-02-05 PROCEDURE — 88311 DECALCIFY TISSUE: CPT | Performed by: PATHOLOGY

## 2024-02-05 PROCEDURE — 96374 THER/PROPH/DIAG INJ IV PUSH: CPT | Mod: INF

## 2024-02-05 PROCEDURE — 80047 BASIC METABLC PNL IONIZED CA: CPT | Mod: 59

## 2024-02-05 PROCEDURE — 96366 THER/PROPH/DIAG IV INF ADDON: CPT | Mod: INF

## 2024-02-05 PROCEDURE — 38222 DX BONE MARROW BX & ASPIR: CPT

## 2024-02-05 PROCEDURE — 85097 BONE MARROW INTERPRETATION: CPT | Mod: TC

## 2024-02-05 PROCEDURE — 88189 FLOWCYTOMETRY/READ 16 & >: CPT

## 2024-02-05 PROCEDURE — 96365 THER/PROPH/DIAG IV INF INIT: CPT | Mod: INF

## 2024-02-05 PROCEDURE — 96375 TX/PRO/DX INJ NEW DRUG ADDON: CPT | Mod: INF

## 2024-02-05 PROCEDURE — 86320 SERUM IMMUNOELECTROPHORESIS: CPT | Performed by: PATHOLOGY

## 2024-02-05 PROCEDURE — 88365 INSITU HYBRIDIZATION (FISH): CPT | Performed by: PATHOLOGY

## 2024-02-05 PROCEDURE — 1125F AMNT PAIN NOTED PAIN PRSNT: CPT

## 2024-02-05 PROCEDURE — 1159F MED LIST DOCD IN RCRD: CPT

## 2024-02-05 PROCEDURE — 96367 TX/PROPH/DG ADDL SEQ IV INF: CPT

## 2024-02-05 PROCEDURE — 85025 COMPLETE CBC W/AUTO DIFF WBC: CPT

## 2024-02-05 PROCEDURE — 88313 SPECIAL STAINS GROUP 2: CPT | Performed by: PATHOLOGY

## 2024-02-05 PROCEDURE — 2500000001 HC RX 250 WO HCPCS SELF ADMINISTERED DRUGS (ALT 637 FOR MEDICARE OP)

## 2024-02-05 PROCEDURE — 80053 COMPREHEN METABOLIC PANEL: CPT

## 2024-02-05 PROCEDURE — 83521 IG LIGHT CHAINS FREE EACH: CPT

## 2024-02-05 PROCEDURE — 3074F SYST BP LT 130 MM HG: CPT

## 2024-02-05 PROCEDURE — 36415 COLL VENOUS BLD VENIPUNCTURE: CPT

## 2024-02-05 PROCEDURE — 84165 PROTEIN E-PHORESIS SERUM: CPT | Performed by: PATHOLOGY

## 2024-02-05 RX ORDER — MONTELUKAST SODIUM 10 MG/1
10 TABLET ORAL ONCE
Status: COMPLETED | OUTPATIENT
Start: 2024-02-05 | End: 2024-02-05

## 2024-02-05 RX ORDER — MONTELUKAST SODIUM 10 MG/1
10 TABLET ORAL ONCE
Status: DISCONTINUED | OUTPATIENT
Start: 2024-02-05 | End: 2024-02-05 | Stop reason: HOSPADM

## 2024-02-05 RX ORDER — ALBUTEROL SULFATE 0.83 MG/ML
3 SOLUTION RESPIRATORY (INHALATION) AS NEEDED
Status: CANCELLED | OUTPATIENT
Start: 2024-02-29

## 2024-02-05 RX ORDER — HEPARIN SODIUM,PORCINE/PF 10 UNIT/ML
50 SYRINGE (ML) INTRAVENOUS AS NEEDED
Status: CANCELLED | OUTPATIENT
Start: 2024-02-05

## 2024-02-05 RX ORDER — FAMOTIDINE 10 MG/ML
20 INJECTION INTRAVENOUS ONCE
Status: COMPLETED | OUTPATIENT
Start: 2024-02-05 | End: 2024-02-05

## 2024-02-05 RX ORDER — MONTELUKAST SODIUM 10 MG/1
10 TABLET ORAL ONCE
Status: CANCELLED
Start: 2024-02-12 | End: 2024-02-12

## 2024-02-05 RX ORDER — MORPHINE SULFATE 2 MG/ML
2 INJECTION, SOLUTION INTRAMUSCULAR; INTRAVENOUS ONCE
Status: CANCELLED | OUTPATIENT
Start: 2024-02-05

## 2024-02-05 RX ORDER — EPINEPHRINE 0.3 MG/.3ML
0.3 INJECTION SUBCUTANEOUS ONCE AS NEEDED
Status: CANCELLED | OUTPATIENT
Start: 2024-02-29

## 2024-02-05 RX ORDER — DIPHENHYDRAMINE HYDROCHLORIDE 50 MG/ML
50 INJECTION INTRAMUSCULAR; INTRAVENOUS AS NEEDED
Status: CANCELLED | OUTPATIENT
Start: 2024-02-12

## 2024-02-05 RX ORDER — ACETAMINOPHEN 325 MG/1
650 TABLET ORAL ONCE
Status: COMPLETED | OUTPATIENT
Start: 2024-02-05 | End: 2024-02-05

## 2024-02-05 RX ORDER — MORPHINE SULFATE 2 MG/ML
2 INJECTION, SOLUTION INTRAMUSCULAR; INTRAVENOUS ONCE
OUTPATIENT
Start: 2024-02-05

## 2024-02-05 RX ORDER — MONTELUKAST SODIUM 10 MG/1
10 TABLET ORAL ONCE
Status: CANCELLED
Start: 2024-02-05

## 2024-02-05 RX ORDER — FAMOTIDINE 10 MG/ML
20 INJECTION INTRAVENOUS ONCE
Status: CANCELLED
Start: 2024-02-05

## 2024-02-05 RX ORDER — ALBUTEROL SULFATE 0.83 MG/ML
3 SOLUTION RESPIRATORY (INHALATION) AS NEEDED
Status: CANCELLED | OUTPATIENT
Start: 2024-02-12

## 2024-02-05 RX ORDER — FAMOTIDINE 10 MG/ML
20 INJECTION INTRAVENOUS ONCE AS NEEDED
Status: CANCELLED | OUTPATIENT
Start: 2024-02-12

## 2024-02-05 RX ORDER — FAMOTIDINE 10 MG/ML
20 INJECTION INTRAVENOUS ONCE AS NEEDED
Status: DISCONTINUED | OUTPATIENT
Start: 2024-02-05 | End: 2024-02-05 | Stop reason: HOSPADM

## 2024-02-05 RX ORDER — DIPHENHYDRAMINE HYDROCHLORIDE 50 MG/ML
25 INJECTION INTRAMUSCULAR; INTRAVENOUS ONCE
Status: COMPLETED | OUTPATIENT
Start: 2024-02-05 | End: 2024-02-05

## 2024-02-05 RX ORDER — DIPHENHYDRAMINE HYDROCHLORIDE 50 MG/ML
50 INJECTION INTRAMUSCULAR; INTRAVENOUS AS NEEDED
Status: CANCELLED | OUTPATIENT
Start: 2024-02-29

## 2024-02-05 RX ORDER — FAMOTIDINE 10 MG/ML
20 INJECTION INTRAVENOUS ONCE
Status: CANCELLED
Start: 2024-02-12 | End: 2024-02-12

## 2024-02-05 RX ORDER — EPINEPHRINE 0.3 MG/.3ML
0.3 INJECTION SUBCUTANEOUS EVERY 5 MIN PRN
Status: DISCONTINUED | OUTPATIENT
Start: 2024-02-05 | End: 2024-02-05 | Stop reason: HOSPADM

## 2024-02-05 RX ORDER — MORPHINE SULFATE 2 MG/ML
2 INJECTION, SOLUTION INTRAMUSCULAR; INTRAVENOUS ONCE
Status: COMPLETED | OUTPATIENT
Start: 2024-02-05 | End: 2024-02-05

## 2024-02-05 RX ORDER — HEPARIN 100 UNIT/ML
500 SYRINGE INTRAVENOUS AS NEEDED
Status: CANCELLED | OUTPATIENT
Start: 2024-02-05

## 2024-02-05 RX ORDER — ACETAMINOPHEN 325 MG/1
650 TABLET ORAL ONCE
Status: CANCELLED | OUTPATIENT
Start: 2024-02-12

## 2024-02-05 RX ORDER — ALBUTEROL SULFATE 0.83 MG/ML
3 SOLUTION RESPIRATORY (INHALATION) AS NEEDED
Status: DISCONTINUED | OUTPATIENT
Start: 2024-02-05 | End: 2024-02-05 | Stop reason: HOSPADM

## 2024-02-05 RX ORDER — FAMOTIDINE 10 MG/ML
20 INJECTION INTRAVENOUS ONCE AS NEEDED
Status: CANCELLED | OUTPATIENT
Start: 2024-02-29

## 2024-02-05 RX ORDER — EPINEPHRINE 0.3 MG/.3ML
0.3 INJECTION SUBCUTANEOUS EVERY 5 MIN PRN
Status: CANCELLED | OUTPATIENT
Start: 2024-02-12

## 2024-02-05 RX ORDER — FAMOTIDINE 10 MG/ML
20 INJECTION INTRAVENOUS ONCE
Status: DISCONTINUED | OUTPATIENT
Start: 2024-02-05 | End: 2024-02-05 | Stop reason: HOSPADM

## 2024-02-05 RX ORDER — DIPHENHYDRAMINE HYDROCHLORIDE 50 MG/ML
25 INJECTION INTRAMUSCULAR; INTRAVENOUS ONCE
Status: CANCELLED
Start: 2024-02-12 | End: 2024-02-12

## 2024-02-05 RX ORDER — DIPHENHYDRAMINE HYDROCHLORIDE 50 MG/ML
50 INJECTION INTRAMUSCULAR; INTRAVENOUS AS NEEDED
Status: DISCONTINUED | OUTPATIENT
Start: 2024-02-05 | End: 2024-02-05 | Stop reason: HOSPADM

## 2024-02-05 RX ADMIN — ACETAMINOPHEN 650 MG: 325 TABLET ORAL at 09:17

## 2024-02-05 RX ADMIN — ZOLEDRONIC ACID 4 MG: 4 INJECTION, SOLUTION, CONCENTRATE INTRAVENOUS at 12:53

## 2024-02-05 RX ADMIN — MORPHINE SULFATE 2 MG: 2 INJECTION, SOLUTION INTRAMUSCULAR; INTRAVENOUS at 11:27

## 2024-02-05 RX ADMIN — METHYLPREDNISOLONE SODIUM SUCCINATE 40 MG: 40 INJECTION, POWDER, FOR SOLUTION INTRAMUSCULAR; INTRAVENOUS at 11:44

## 2024-02-05 RX ADMIN — IMMUNE GLOBULIN INFUSION (HUMAN) 20 G: 100 INJECTION, SOLUTION INTRAVENOUS; SUBCUTANEOUS at 10:06

## 2024-02-05 RX ADMIN — FAMOTIDINE 20 MG: 10 INJECTION, SOLUTION INTRAVENOUS at 10:01

## 2024-02-05 RX ADMIN — DIPHENHYDRAMINE HYDROCHLORIDE 25 MG: 50 INJECTION INTRAMUSCULAR; INTRAVENOUS at 10:00

## 2024-02-05 RX ADMIN — MONTELUKAST 10 MG: 10 TABLET, FILM COATED ORAL at 09:15

## 2024-02-05 ASSESSMENT — ENCOUNTER SYMPTOMS
HEMATOLOGIC/LYMPHATIC NEGATIVE: 1
BACK PAIN: 1
ENDOCRINE NEGATIVE: 1
FATIGUE: 1
PSYCHIATRIC NEGATIVE: 1
RESPIRATORY NEGATIVE: 1
ENDOCRINE NEGATIVE: 1
CARDIOVASCULAR NEGATIVE: 1
ARTHRALGIAS: 1
GASTROINTESTINAL NEGATIVE: 1
DIZZINESS: 1
HEMATOLOGIC/LYMPHATIC NEGATIVE: 1
CARDIOVASCULAR NEGATIVE: 1
NEUROLOGICAL NEGATIVE: 1
BACK PAIN: 1
RESPIRATORY NEGATIVE: 1
PSYCHIATRIC NEGATIVE: 1
EYES NEGATIVE: 1
GASTROINTESTINAL NEGATIVE: 1
EYES NEGATIVE: 1
ARTHRALGIAS: 1
FATIGUE: 1

## 2024-02-05 ASSESSMENT — PAIN SCALES - GENERAL: PAINLEVEL: 3

## 2024-02-05 NOTE — PROGRESS NOTES
IVIG not continued. Zometa completed. Peripheral IV to left forearm flushed and discontinued. No complications. Patient reporting feeling no more back pain or any side effects. Discharged home ambulatory offering no complaints.

## 2024-02-05 NOTE — PROGRESS NOTES
Patient ID: Maldonado Mccloud is a 73 y.o. male.    Biopsy bone marrow    Date/Time: 2/5/2024 2:19 PM    Performed by: SHANELL John  Authorized by: SHANELL John    Consent:     Consent obtained:  Verbal and written    Consent given by:  Patient    Risks, benefits, and alternatives were discussed: yes      Risks discussed:  Bleeding, infection, pain and nerve damage  Universal protocol:     Procedure explained and questions answered to patient or proxy's satisfaction: yes      Relevant documents present and verified: yes      Test results available: yes      Imaging studies available: yes      Required blood products, implants, devices, and special equipment available: yes      Site/side marked: yes      Immediately prior to procedure, a time out was called: yes      Patient identity confirmed:  Verbally with patient, arm band, provided demographic data and hospital-assigned identification number  Indications:     Indications:  Multiple Myeloma  Pre-procedure details:     Skin preparation:  Chlorhexidine  Sedation:     Sedation type:  None  Anesthesia:     Anesthesia method:  Local infiltration    Local anesthetic:  Lidocaine 2% w/o epi  Procedure specific details:      Bone Marrow Biopsy and Aspiration Procedure Note     Informed consent was obtained and potential risks including bleeding, infection and pain were reviewed with the patient.     The left posterior iliac crest was prepped with chlorhexidine.     10 ml of lidocaine 2% local anesthesia infiltrated into the subcutaneous tissue.    Left bone marrow biopsy and left bone marrow aspirate was obtained.     The procedure was tolerated well and there were no complications.    Specimens sent for: routine histopathologic stains and sectioning, flow cytometry, cytogenetics, molecular analysis, and clonoseq    Procedure completed by: SHANELL John     Post-procedure details:     Procedure completion:  Tolerated well, no  immediate complications

## 2024-02-05 NOTE — PROGRESS NOTES
Patient ID: Maldonado Mccloud is a 73 y.o. male.  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 Bone marrow biopsy suggested multiple myeloma IgG kappa, ISS Stage II, bone survey revealed lytic lesions; Urine light chains present kappa restricted with 2gm proteinuria.  No adverse prognostic factors identified.    Treatment Hx:    VRD  Initiated Aug 2015; currently s/p 3 cycles.  BMBx 10/2015 complete response.    Stem Cell Transplant  Underwent stem cell mobilization with Neupogen/Plerixafor and 2 day collection December 16-17, 2015, for 8.62 CD34 x 10^6/kg. During procedure for right IJ Trialysis placement developed A. Fib with RVR which spontaneously resolved on December 14, 2015. He was admitted and placed on monitor for stem cell collection.     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  Hospitalization complicated by orthostatic hypotension, electrolyte replacement, drug induced rash, culture negative fever.      He completed approximately 5 weeks of maintenance Revlimid 10 mg daily which was held for worsening neuropathy August 2016.    3/ 2018: IgG M Braxton rising to 0.2gm/DL and light chain rising; restarted on Revlimid maintenance at 5mg EOD.     Revlimid stopped in 3/2021 due to stable disease.    Vacto/Pom Trial  7/2021 his M spike was on the rise. He was consented for Vacto/Pom trial which he began on 8/5/2021. He continued Pom post finishing the trial.     Sarclisa/Kyprolis  Myeloma markers increasing in 6/2022, consented for Sarclisa/Kyprolis, which began 6/30/22. He continued this through September of 2022.       Cytoxan/Kyprolis  His therapy was subsequently changed in  9/2022 due to continued disease progression in the face of Sarclisa. His therapy was changed to CYYCLON regimen (Cytoxan/Kyprolis).    PET CT scan 9/14/2022 shows FDG avid lytic lesion within the left midshaft clavicle with pathologic fracture, FDG avid lytic lesions involving bilateral scapulas and FDG avidity within the T9 vertebral body, suggestive of active multiple myeloma.  There are non FDG avid lytic lesions in the right iliac bone and right clavicle, likely representing nonactive multiple myeloma.  There is no PET evidence of extramedullary involvement.  There is an FDG avid right thyroid nodule.     MRI in 10/2022 negative for myelomatous involvement in thoracic vertebrae.     Therapy placed on hold in 11/2022 due to need for hip replacement surgery. His myeloma markers were on the rise in 3/2023 with a new jaw lesion. Plan is to begin radiation therapy 4/11 and resume 2x/week Kyprolis 4/10.     Tracking to CAR T cell therapy. Collected CAR T cells on 5/26/23.     X-ray of L elbow (6/20/23):  IMPRESSION:  1. Osteolytic lesions in the proximal radius and distal humerus  consistent with patient's reported history of multiple myeloma.  2. No acute fracture or malalignment.    Began radiation x8 fractions on 7/3/23.    Resumed Cytoxan as of 8/7/23.       CAR T Cell Therapy   Admitted for CAR T cell therapy for his ISS stage 2 Kappa multiple myeloma with ABECMA T=0, 10/25/23     Hospital course complicated by Grade 1 CRS and Grade 1 ICANS, managed with dexamethasone 10mg x 3 days and one dose of tocilizumab.     Post CAR T restaging:    PET/CT (1/1/9/24):  IMPRESSION:  1. Multiple FDG avid lytic lesions as described above are stable in  size and FDG avidity when compared to prior study, consistent with  known multiple myeloma.  2. No PET evidence of extramedullary disease.  3. Mild FDG avid focus in the right thyroid gland, further evaluation  with thyroid ultrasound is recommended.    BMBx (2/5/24): pending      IgG multiple myeloma (CMS/[facility])   7/1/2015 Initial Diagnosis    IgG multiple myeloma (CMS/HCC)     12/23/2015 -  Bone Marrow Transplant    Autologous Stem Cell Transplant      11/4/2022 - 11/4/2022 Chemotherapy    Carfilzomib (Weekly) / Cyclophosphamide / Thalidomide / Dexamethasone, 28 Day Cycles     10/25/2023 -  Cellular Therapy    Abecma        SUBJECTIVE:  History of Present Illness:  Cuong presents to clinic 2/5/24 for a follow up visit.    Overall he is doing well.    Notes ongoing fatigue.    Has pain in his L arm and bilateral shoulders.       Review of Systems   Constitutional:  Positive for fatigue.   HENT: Negative.     Eyes: Negative.    Respiratory: Negative.     Cardiovascular: Negative.    Gastrointestinal: Negative.    Endocrine: Negative.    Genitourinary: Negative.    Musculoskeletal:  Positive for arthralgias and back pain.   Skin: Negative.    Allergic/Immunologic: Positive for immunocompromised state.   Neurological: Negative.    Hematological: Negative.    Psychiatric/Behavioral: Negative.       OBJECTIVE:  KPS: Karnofsky Score: 70 - Cares for self; unable to carry on normal activity or do normal work    VS:  /78   Pulse 77   Temp 36.6 °C (97.9 °F)   Resp 18   Wt 102 kg (225 lb 12 oz) Comment: with shoes  SpO2 97%   BMI 32.39 kg/m²   BSA: 2.24 meters squared    Physical Exam  Constitutional:       Appearance: Normal appearance. He is normal weight.   HENT:      Head: Normocephalic and atraumatic.      Nose: Nose normal.      Mouth/Throat:      Mouth: Mucous membranes are moist.      Pharynx: Oropharynx is clear.   Eyes:      Extraocular Movements: Extraocular movements intact.      Conjunctiva/sclera: Conjunctivae normal.      Pupils: Pupils are equal, round, and reactive to light.   Cardiovascular:      Rate and Rhythm: Normal rate and regular rhythm.      Pulses: Normal pulses.      Heart sounds: Normal heart sounds.   Pulmonary:      Effort: Pulmonary effort is normal.       Breath sounds: Normal breath sounds.   Abdominal:      General: Abdomen is flat. Bowel sounds are normal.      Palpations: Abdomen is soft.   Musculoskeletal:         General: Tenderness present. Normal range of motion.      Cervical back: Normal range of motion and neck supple.   Skin:     General: Skin is warm and dry.   Neurological:      General: No focal deficit present.      Mental Status: He is alert and oriented to person, place, and time. Mental status is at baseline.   Psychiatric:         Mood and Affect: Mood normal.         Behavior: Behavior normal.         Thought Content: Thought content normal.         Judgment: Judgment normal.       Laboratory:  The pertinent laboratory results were reviewed and discussed with the patient.    Lab Results   Component Value Date    WBC 2.9 (L) 02/05/2024    HCT 34.4 (L) 02/05/2024    HGB 11.6 (L) 02/05/2024     (L) 02/05/2024    ANC 1.49 (L) 10/27/2023    K 4.3 01/15/2024    CALCIUM 9.3 01/15/2024     01/15/2024    MG 1.94 12/04/2023    ALT 18 01/15/2024    AST 20 01/15/2024    BUN 21 01/15/2024    CREATININE 1.10 01/15/2024    PHOS 3.9 11/13/2023    KAPPA <0.08 (L) 01/04/2024    LAMBDA <0.17 (L) 01/04/2024    KAPLS  01/04/2024      Comment:      One or more analytes used in this calculation   is outside of the analytical measurement range.  Calculation cannot be performed.    SPEP Decrease in polyclonal gamma globulins.    01/04/2024    IEPIN  01/04/2024     No monoclonal proteins detected by immunofixation.        (L) 01/15/2024    IGM <5 (L) 01/04/2024    IGA <7 (L) 01/04/2024      Note: for a comprehensive list of the patient's lab results, access the Results Review activity.    ASSESSMENT and PLAN:    Multiple Myeloma:   - ISS Stage 2 IgG Kappa Multiple Myeloma  - S/p VRD, Autologous SCT, Vacto/Pom, Kyprolis/Sarclisa, Radiation, most recently Kyprolis/Cytoxan  - PET/CT showed mostly decreased metabolic activity, however some new areas  -  Completed radiation to R arm with Dr. Patel  - BMBx performed 10/2 shows BULMARO with clonoseq detected but below LOD, however progressing lytic lesions  - S/p CAR T w/ ABECMA (T=0, 10/25/23), hospital course complicated by grade 1 ICANS managed w/ Dex 10mg x3 days and 1 dose Tociluzimab  - 1/9/2024  biochemical response (CR But lytic lesions on Xrays and pathological fractures. (Inactive lesions?)  - PET/CT (1/19/202) Multiple FDG avid lytic lesions above are stable in size and FDG avidity  - BMBx (2/5/24) pending MRD     ID:  - Acyclovir 400mg BID (for 6 months post CAR T)  - Bactrim DS M, W, F (for 6 months post CAR T)  - Fluconazole 400mg daily (completed through D90)  - Will resume Levaquin if ANC <500  - IVIG for IgG <400, unable to tolerate Gammagard x2 (excruciating back pain, even with additional pre meds), will send Arbovax- for precert     Bone Health:  - Found to have fracture of the R radius  11/12/23  - Follows w/ Dr. Collins, no plans for surgery at this point  - Started Zometa monthly, 1/4  - Advised about increasing Calcium and Vit D intake     DVT:  - 6/20/22 acute occlusive DVT demonstrated within the left common femoral, profunda, femoral and popliteal veins.  - Has been on Eliquis 5mg BID, inturrupted around time of CAR-T cell then was discontinued as VTE was thought to be malignancy related while on treatment, as he's in remission now off treatment, Eliquis was stopped     Cardiac:  - Echo (5/2/23) showed EF of 50-55%  - Reports some dizziness, encouraged increasing fluid intake, sCr 1.35 (11/28)  - Will plan to hold Lisinopril and assess improvement in dizziness and BP (will wean, 2.5mg BID x2 days, every other day, done)  - Ongoing dizziness/orthostasis, given 1L NS 12/18    Neurology  - LE neuropathy, started with Velcade years ago, stable  - Mental decline and memory loss, more noticeable after CAR-T   - Monitor for now    Derm:  - History of squamous cell carcinomas  - Has plans to have  another removed on L forehead    RTC:  2 weeks w/ BMBx reslts  1 month w/ IVIG    Wiley Arechiga, APRN-CNP

## 2024-02-05 NOTE — PROGRESS NOTES
Patient ID: Maldonado Mccloud is a 73 y.o. male.  Referring Physician: No referring provider defined for this encounter.  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 Bone marrow biopsy suggested multiple myeloma IgG kappa, ISS Stage II, bone survey revealed lytic lesions; Urine light chains present kappa restricted with 2gm proteinuria.  No adverse prognostic factors identified.    Treatment Hx:    VRD  Initiated Aug 2015; currently s/p 3 cycles.  BMBx 10/2015 complete response.    Stem Cell Transplant  Underwent stem cell mobilization with Neupogen/Plerixafor and 2 day collection December 16-17, 2015, for 8.62 CD34 x 10^6/kg. During procedure for right IJ Trialysis placement developed A. Fib with RVR which spontaneously resolved on December 14, 2015. He was admitted and placed on monitor for stem cell collection.     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  Hospitalization complicated by orthostatic hypotension, electrolyte replacement, drug induced rash, culture negative fever.      He completed approximately 5 weeks of maintenance Revlimid 10 mg daily which was held for worsening neuropathy August 2016.    3/ 2018: IgG M Braxton rising to 0.2gm/DL and light chain rising; restarted on Revlimid maintenance at 5mg EOD.     Revlimid stopped in 3/2021 due to stable disease.    Vacto/Pom Trial  7/2021 his M spike was on the rise. He was consented for Vacto/Pom trial which he began on 8/5/2021. He continued Pom post finishing the trial.     Sarclisa/Kyprolis  Myeloma markers increasing in 6/2022, consented for Sarclisa/Kyprolis, which began 6/30/22. He continued this through September of 2022.       Cytoxan/Kyprolis  His therapy was subsequently changed in 9/2022 due to  continued disease progression in the face of Sarclisa. His therapy was changed to CYYCLON regimen (Cytoxan/Kyprolis).    PET CT scan 9/14/2022 shows FDG avid lytic lesion within the left midshaft clavicle with pathologic fracture, FDG avid lytic lesions involving bilateral scapulas and FDG avidity within the T9 vertebral body, suggestive of active multiple myeloma.  There are non FDG avid lytic lesions in the right iliac bone and right clavicle, likely representing nonactive multiple myeloma.  There is no PET evidence of extramedullary involvement.  There is an FDG avid right thyroid nodule.     MRI in 10/2022 negative for myelomatous involvement in thoracic vertebrae.     Therapy placed on hold in 11/2022 due to need for hip replacement surgery. His myeloma markers were on the rise in 3/2023 with a new jaw lesion. Plan is to begin radiation therapy 4/11 and resume 2x/week Kyprolis 4/10.     Tracking to CAR T cell therapy. Collected CAR T cells on 5/26/23.     X-ray of L elbow (6/20/23):  IMPRESSION:  1. Osteolytic lesions in the proximal radius and distal humerus  consistent with patient's reported history of multiple myeloma.  2. No acute fracture or malalignment.    Began radiation x8 fractions on 7/3/23.    Resumed Cytoxan as of 8/7/23.       CAR T Cell Therapy   Admitted for CAR T cell therapy for his ISS stage 2 Kappa multiple myeloma with ABECMA T=0, 10/25/23     Hospital course complicated by Grade 1 CRS and Grade 1 ICANS, managed with dexamethasone 10mg x 3 days and one dose of tocilizumab.      IgG multiple myeloma (CMS/HCC)   7/1/2015 Initial Diagnosis    IgG multiple myeloma (CMS/HCC)     12/23/2015 -  Bone Marrow Transplant    Autologous Stem Cell Transplant      11/4/2022 - 11/4/2022 Chemotherapy    Carfilzomib (Weekly) / Cyclophosphamide / Thalidomide / Dexamethasone, 28 Day Cycles     10/25/2023 -  Cellular Therapy    Abecma        SUBJECTIVE:  History of Present Illness:  Cuong presents to clinic  2/5/24 for a follow up visit and BMBx.    Overall he feels well.    Has pain in his L arm and bilateral shoulders.     Ongoing fatigue that is improving.       Review of Systems   Constitutional:  Positive for fatigue.   HENT: Negative.     Eyes: Negative.    Respiratory: Negative.     Cardiovascular: Negative.    Gastrointestinal: Negative.    Endocrine: Negative.    Genitourinary: Negative.    Musculoskeletal:  Positive for arthralgias and back pain.   Skin: Negative.    Allergic/Immunologic: Positive for immunocompromised state.   Neurological:  Positive for dizziness.   Hematological: Negative.    Psychiatric/Behavioral: Negative.       OBJECTIVE:  KPS: Karnofsky Score: 70 - Cares for self; unable to carry on normal activity or do normal work    VS:  There were no vitals taken for this visit.  BSA: There is no height or weight on file to calculate BSA.    Physical Exam  Constitutional:       Appearance: Normal appearance. He is normal weight.   HENT:      Head: Normocephalic and atraumatic.      Nose: Nose normal.      Mouth/Throat:      Mouth: Mucous membranes are moist.      Pharynx: Oropharynx is clear.   Eyes:      Extraocular Movements: Extraocular movements intact.      Conjunctiva/sclera: Conjunctivae normal.      Pupils: Pupils are equal, round, and reactive to light.   Cardiovascular:      Rate and Rhythm: Normal rate and regular rhythm.      Pulses: Normal pulses.      Heart sounds: Normal heart sounds.   Pulmonary:      Effort: Pulmonary effort is normal.      Breath sounds: Normal breath sounds.   Abdominal:      General: Abdomen is flat. Bowel sounds are normal.      Palpations: Abdomen is soft.   Musculoskeletal:         General: Tenderness present. Normal range of motion.      Cervical back: Normal range of motion and neck supple.   Skin:     General: Skin is warm and dry.   Neurological:      General: No focal deficit present.      Mental Status: He is alert and oriented to person, place, and  time. Mental status is at baseline.   Psychiatric:         Mood and Affect: Mood normal.         Behavior: Behavior normal.         Thought Content: Thought content normal.         Judgment: Judgment normal.       Laboratory:  The pertinent laboratory results were reviewed and discussed with the patient.    Lab Results   Component Value Date    WBC 2.9 (L) 02/05/2024    HCT 34.4 (L) 02/05/2024    HGB 11.6 (L) 02/05/2024     (L) 02/05/2024    ANC 1.49 (L) 10/27/2023    K 4.3 01/15/2024    CALCIUM 9.3 01/15/2024     01/15/2024    MG 1.94 12/04/2023    ALT 18 01/15/2024    AST 20 01/15/2024    BUN 21 01/15/2024    CREATININE 1.10 01/15/2024    PHOS 3.9 11/13/2023    KAPPA <0.08 (L) 01/04/2024    LAMBDA <0.17 (L) 01/04/2024    KAPLS  01/04/2024      Comment:      One or more analytes used in this calculation   is outside of the analytical measurement range.  Calculation cannot be performed.    SPEP Decrease in polyclonal gamma globulins.    01/04/2024    IEPIN  01/04/2024     No monoclonal proteins detected by immunofixation.        (L) 01/15/2024    IGM <5 (L) 01/04/2024    IGA <7 (L) 01/04/2024      Note: for a comprehensive list of the patient's lab results, access the Results Review activity.    ASSESSMENT and PLAN:    Multiple Myeloma:   - ISS Stage 2 IgG Kappa Multiple Myeloma  - S/p VRD, Autologous SCT, Vacto/Pom, Kyprolis/Sarclisa, Radiation, most recently Kyprolis/Cytoxan  - PET/CT showed mostly decreased metabolic activity, however some new areas  - Completed radiation to R arm with Dr. Patel  - BMBx performed 10/2, MRD prior to CAR T of 0, however progressing lytic lesions  - S/p CAR T w/ ABECMA (T=0, 10/25/23), hospital course complicated by grade 1 ICANS managed w/ Dex 10mg x3 days and 1 dose Tociluzimab  - 1/9/2024  biochemical response (CR But lytic lesions on Xrays and pathological fractures. (Inactive lesions?)  - PET/CT (1/19/24) Multiple FDG avid lytic lesions above are stable in  size and FDG avidity  - BMBx performed 2/5/24    ID:  - Acyclovir 400mg BID (for 6 months post CAR T)  - Bactrim DS M, W, F (for 6 months post CAR T)  - Fluconazole 400mg daily (through D90)  - Will resume Levaquin if ANC <500  - IVIG PRN for IgG <400, infusion reaction x2 to Gammagard, excruciating back pain (even with extra pre meds), will precert Gammunex-C    Bone Health:  - Found to have fracture of the R radius  11/12/23  - Follows w/ Dr. Collins, no plans for surgery at this point  - Started Zometa monthly, 1/4  - Advised about increasing Calcium and Vit D intake     DVT:  - 6/20/22 acute occlusive DVT demonstrated within the left common femoral, profunda, femoral and popliteal veins.  - Has been on Eliquis 5mg BID, inturrupted around time of CAR-T cell then was discontinued as VTE was thought to be malignancy related while on treatment, as he's in remission now off treatment, Eliquis was stopped    Cardiac:  - Echo (5/2/23) showed EF of 50-55%  - Reports some dizziness, encouraged increasing fluid intake, sCr 1.35 (11/28)  - Will plan to hold Lisinopril and assess improvement in dizziness and BP (will wean, 2.5mg BID x2 days, every other day, done)  - Ongoing dizziness/orthostasis, given 1L NS 12/18    Neurology  - LE neuropathy, started with Velcade years ago, stable  - Mental decline and memory loss, more noticeable after CAR-T   - Monitor for now    Derm:  - History of squamous cell carcinomas  - Has plans to have another removed on L forehead    RTC:  2 weeks to discuss BMBx results  1 month IVIG/FUV    Wiley Arechiga, APRN-CNP

## 2024-02-05 NOTE — PROGRESS NOTES
Patient ID: Maldonado Mccloud is a 73 y.o. male.    Biopsy bone marrow    Date/Time: 2/5/2024 1:54 PM    Performed by: SHANELL John  Authorized by: SHANELL John    Consent:     Consent obtained:  Written    Consent given by:  Patient    Risks, benefits, and alternatives were discussed: yes      Risks discussed:  Bleeding, infection, pain and nerve damage  Universal protocol:     Procedure explained and questions answered to patient or proxy's satisfaction: yes      Relevant documents present and verified: yes      Test results available: yes      Required blood products, implants, devices, and special equipment available: yes      Site/side marked: yes      Immediately prior to procedure, a time out was called: yes      Patient identity confirmed:  Verbally with patient, arm band, hospital-assigned identification number and provided demographic data  Indications:     Indications:  Multiple Myeloma  Pre-procedure details:     Skin preparation:  Chlorhexidine  Sedation:     Sedation type:  None  Anesthesia:     Anesthesia method:  Local infiltration    Local anesthetic:  Lidocaine 2% w/o epi  Procedure specific details:      Bone Marrow Biopsy and Aspiration Procedure Note     Informed consent was obtained and potential risks including bleeding, infection and pain were reviewed with the patient.     The left posterior iliac crest was prepped with chlorhexidine.     10 ml of lidocaine 2% local anesthesia infiltrated into the subcutaneous tissue.    Left bone marrow biopsy and left bone marrow aspirate was obtained.     The procedure was tolerated well and there were no complications.    Specimens sent for: routine histopathologic stains and sectioning, flow cytometry, cytogenetics, and molecular analysis    Procedure completed by: SHANELL John     Post-procedure details:     Procedure completion:  Tolerated well, no immediate complications

## 2024-02-05 NOTE — PROGRESS NOTES
[id number]  Adverse Event Note     Name:Maldonado Mccloud  : 1950  MRN: [id number]      Adverse Event:  Medication: IVIG  Administered Date/Time: 24 @ 1010  Reactions/Symptoms Started Time: 1111   Symptoms: (check all that apply)   [x] Back Pain   [] Erythema Face     [] Hypotension     [] Rash/Rigors [] Other   [] Bleeding    [] Erythema Hands  [] Itching  [] Swelling/Edema [] Unknown   [] Chest Pain [] Hives/Urticaria     [] Low platelet Ct  [] Syncope   [] Cytopenia  [] Hypertension       [] Neutropenia    Severity: Mild   Provider Notified: Yes   Medications Given(Add all medications to the chart via , or treatment plan)   [] Acetaminophen-Tylenol       [] Famotidine-Pepcid  [] Nitroglycerine-NTG   [] Albuterol                               [] Hydrocortisone       [] Ondansetron-Zofran   [] Diphenhydramine-Benadryl  [] Lorazepam-Ativan  [] Naloxone-Narcan   [] Epinephrine-Epi                     [] Methylprednisone   Additional Details/ Comments Solumedrol 40 mg IVP Morphine 2 mg IVP     IVIG rates:  0.5ml/kg/hr rate 43 ml/hr  1ml/kg/hr    rate 85 ml/hr    1110 Patient complained of low back pain> IVIG stopped and Normal saline hung- Vital signs 122/66, 68, 18, pulse ox 99%.  1115 Pain lower back 6/10- awake and alert- 135/81, 70, 18 pulse ox 99 %  1129- Morphine 2 mg IVP given pain continued 8/10   1130- Pain 10 140/84,66, 16 PULSE %  1144- Pain continues 8/10- Solu Medrol 40 mg given IVP- 126/83, 69, 16 100% pulse ox  1155- Mild back pain continues- 2/10 -vitals 130/85, 66, 36.4  1204 Back pain reported 1/10- 128/81, 65, 16   1220- Back pain mild ache reported

## 2024-02-06 LAB
ALBUMIN SERPL BCP-MCNC: 4.2 G/DL (ref 3.4–5)
ALP SERPL-CCNC: 62 U/L (ref 33–136)
ALT SERPL W P-5'-P-CCNC: 19 U/L (ref 10–52)
ANION GAP SERPL CALC-SCNC: 14 MMOL/L (ref 10–20)
AST SERPL W P-5'-P-CCNC: 19 U/L (ref 9–39)
BILIRUB SERPL-MCNC: 0.5 MG/DL (ref 0–1.2)
BUN SERPL-MCNC: 30 MG/DL (ref 6–23)
CALCIUM SERPL-MCNC: 9.1 MG/DL (ref 8.6–10.6)
CHLORIDE SERPL-SCNC: 110 MMOL/L (ref 98–107)
CO2 SERPL-SCNC: 25 MMOL/L (ref 21–32)
CREAT SERPL-MCNC: 1.15 MG/DL (ref 0.5–1.3)
EGFRCR SERPLBLD CKD-EPI 2021: 67 ML/MIN/1.73M*2
GLUCOSE SERPL-MCNC: 74 MG/DL (ref 74–99)
IGA SERPL-MCNC: <7 MG/DL (ref 70–400)
IGG SERPL-MCNC: 306 MG/DL (ref 700–1600)
IGM SERPL-MCNC: <5 MG/DL (ref 40–230)
KAPPA LC SERPL-MCNC: 0.08 MG/DL (ref 0.33–1.94)
KAPPA LC/LAMBDA SER: ABNORMAL {RATIO}
LAMBDA LC SERPL-MCNC: <0.17 MG/DL (ref 0.57–2.63)
POTASSIUM SERPL-SCNC: 4.3 MMOL/L (ref 3.5–5.3)
PROT SERPL-MCNC: 5.6 G/DL (ref 6.4–8.2)
PROT SERPL-MCNC: 5.6 G/DL (ref 6.4–8.2)
SODIUM SERPL-SCNC: 145 MMOL/L (ref 136–145)

## 2024-02-07 LAB
ALBUMIN: 3.9 G/DL (ref 3.4–5)
ALPHA 1 GLOBULIN: 0.2 G/DL (ref 0.2–0.6)
ALPHA 2 GLOBULIN: 0.6 G/DL (ref 0.4–1.1)
BETA GLOBULIN: 0.6 G/DL (ref 0.5–1.2)
GAMMA GLOBULIN: 0.3 G/DL (ref 0.5–1.4)
IMMUNOFIXATION COMMENT: ABNORMAL
PATH REVIEW - SERUM IMMUNOFIXATION: ABNORMAL
PATH REVIEW-SERUM PROTEIN ELECTROPHORESIS: ABNORMAL
PROTEIN ELECTROPHORESIS COMMENT: ABNORMAL

## 2024-02-08 ENCOUNTER — OFFICE VISIT (OUTPATIENT)
Dept: PRIMARY CARE | Facility: CLINIC | Age: 74
End: 2024-02-08
Payer: COMMERCIAL

## 2024-02-08 VITALS
HEART RATE: 99 BPM | TEMPERATURE: 98.5 F | SYSTOLIC BLOOD PRESSURE: 116 MMHG | OXYGEN SATURATION: 97 % | DIASTOLIC BLOOD PRESSURE: 75 MMHG

## 2024-02-08 DIAGNOSIS — J40 BRONCHITIS: Primary | ICD-10-CM

## 2024-02-08 LAB
PATH REPORT.COMMENTS IMP SPEC: NORMAL
PATH REPORT.FINAL DX SPEC: NORMAL
PATH REPORT.GROSS SPEC: NORMAL
PATH REPORT.MICROSCOPIC SPEC OTHER STN: NORMAL
PATH REPORT.RELEVANT HX SPEC: NORMAL
PATH REPORT.TOTAL CANCER: NORMAL

## 2024-02-08 PROCEDURE — 1160F RVW MEDS BY RX/DR IN RCRD: CPT | Performed by: FAMILY MEDICINE

## 2024-02-08 PROCEDURE — 1125F AMNT PAIN NOTED PAIN PRSNT: CPT | Performed by: FAMILY MEDICINE

## 2024-02-08 PROCEDURE — 99213 OFFICE O/P EST LOW 20 MIN: CPT | Performed by: FAMILY MEDICINE

## 2024-02-08 PROCEDURE — 1159F MED LIST DOCD IN RCRD: CPT | Performed by: FAMILY MEDICINE

## 2024-02-08 PROCEDURE — 3078F DIAST BP <80 MM HG: CPT | Performed by: FAMILY MEDICINE

## 2024-02-08 PROCEDURE — 3074F SYST BP LT 130 MM HG: CPT | Performed by: FAMILY MEDICINE

## 2024-02-08 RX ORDER — AZITHROMYCIN 250 MG/1
TABLET, FILM COATED ORAL
Qty: 6 TABLET | Refills: 0 | Status: SHIPPED | OUTPATIENT
Start: 2024-02-08 | End: 2024-02-13

## 2024-02-08 RX ORDER — BENZONATATE 100 MG/1
100 CAPSULE ORAL 3 TIMES DAILY PRN
Qty: 42 CAPSULE | Refills: 0 | Status: SHIPPED | OUTPATIENT
Start: 2024-02-08 | End: 2024-02-28 | Stop reason: ALTCHOICE

## 2024-02-08 ASSESSMENT — ENCOUNTER SYMPTOMS
COUGH: 1
SHORTNESS OF BREATH: 0
RHINORRHEA: 1
SORE THROAT: 1
FEVER: 0
CHILLS: 0

## 2024-02-08 NOTE — PROGRESS NOTES
Subjective   Patient ID: Maldonado Mccloud is a 73 y.o. male who presents for Nasal Congestion, Sinusitis, and Cough (X 2 days ).    Sinusitis  Associated symptoms include congestion, coughing and a sore throat. Pertinent negatives include no chills or shortness of breath.   Cough  Associated symptoms include postnasal drip, rhinorrhea and a sore throat. Pertinent negatives include no chest pain, chills, fever or shortness of breath.        Here today for sinus congestion, cough, concern for sinus infection / bronchitis   Lots of congestion, runny nose  Sore throat  No fevers   Temp 99 today at home  Symptoms x 2 days  He has multiple myeloma - just did CAR-T cell transplant   Also gets IVIG    Current Outpatient Medications   Medication Sig Dispense Refill    acyclovir (Zovirax) 400 mg tablet Take 1 tablet (400 mg) by mouth 2 times a day. 180 tablet 0    sulfamethoxazole-trimethoprim (Bactrim DS) 800-160 mg tablet Take 1 tablet by mouth once a day on Monday, Wednesday, and Friday. 36 tablet 0    traZODone (Desyrel) 100 mg tablet 3 tablets nightly at bedtime 270 tablet 1    azithromycin (Zithromax) 250 mg tablet Take 2 tablets (500 mg) by mouth once daily for 1 day, THEN 1 tablet (250 mg) once daily for 4 days. Take 2 tabs (500 mg) by mouth today, than 1 daily for 4 days.. 6 tablet 0    benzonatate (Tessalon) 100 mg capsule Take 1 capsule (100 mg) by mouth 3 times a day as needed for cough. Do not crush or chew. 42 capsule 0     No current facility-administered medications for this visit.     Facility-Administered Medications Ordered in Other Visits   Medication Dose Route Frequency Provider Last Rate Last Admin    acetaminophen (Tylenol) tablet 650 mg  650 mg oral Once GEOFFREY John-CNP        albuterol 2.5 mg /3 mL (0.083 %) nebulizer solution 3 mL  3 mL nebulization PRN SHANELL John        dextrose 5 % in water (D5W) bolus  500 mL intravenous PRN SHANELL John         diphenhydrAMINE (BENADryl) capsule 25 mg  25 mg oral Once SHANELL John        EPINEPHrine (Epipen) injection syringe 0.3 mg  0.3 mg intramuscular q5 min PRN SHANELL John        immune globulin (human) (Gammagard Liquid 10%) infusion 40 g  40 g intravenous Once SHANELL John        morphine injection 2 mg  2 mg intravenous q4h PRN SHANELL John        sodium chloride 0.9 % bolus 500 mL  500 mL intravenous PRN SHANELL John 1,000 mL/hr at 01/15/24 1608 500 mL at 01/15/24 1608       Review of Systems   Constitutional:  Negative for chills and fever.   HENT:  Positive for congestion, postnasal drip, rhinorrhea and sore throat.    Respiratory:  Positive for cough. Negative for shortness of breath.    Cardiovascular:  Negative for chest pain.         Objective   /75 (BP Location: Left arm, Patient Position: Sitting, BP Cuff Size: Adult)   Pulse 99   Temp 36.9 °C (98.5 °F) (Temporal)   SpO2 97%     Physical Exam      Constitutional: Well developed, well nourished, alert and in no acute distress.  Head and Face: NC/AT  Eyes: Normal external exam. Pupils equally round and reactive to light with normal accommodation and extraocular movements intact.  ENT: External inspection of ears normal, tympanic membranes visualized and normal. Nasal mucosa and turbinates swollen and erythematous, clear nasal discharge present. Oral mucosa moist, oropharynx clear without tonsillar exudate or erythema. +PND   Neck: Supple. No cervical lymphadenopathy   Cardiovascular: Regular rate and rhythm, normal S1 and S2, no murmurs, gallops, or rubs.   Pulmonary: No respiratory distress, lungs clear to auscultation bilaterally. No wheezes, rhonchi, rales. Dry cough.    Skin: Warm, well perfused, normal skin turgor and color.   Neurologic: Cranial nerves II-XII grossly intact.      Assessment/Plan   Problem List Items Addressed This Visit    None  Visit Diagnoses          Codes    Bronchitis    -  Primary J40    Relevant Medications    benzonatate (Tessalon) 100 mg capsule    azithromycin (Zithromax) 250 mg tablet          UPPER RESPIRATORY INFECTION PLAN:    You have been diagnosed with an upper respiratory tract infection (URI), which is an inflammation/infection of the upper respiratory tract /lungs.  These are almost viral in nature, and therefore treatment is management of symptoms. Treatment with an antibiotic for typical URI does not help, and can cause harm from side effects of medications and bacterial resistance.     Fill antibiotic only if symptoms last 10 days, you start to get better and then worsen, or if you develop fevers with the nasal congestion / cough.      Drink at least 6-8 glasses of water daily to thin secretions.  Mucinex can be used if secretions remain thick.      A teaspoon of honey every 4 hours as needed for cough has been shown to reduce cough as well or better than over-the-counter cough suppressants.  Delsym or Robitussin are recommended to stop cough.  Cough drops can also be helpful.   For nasal congestion, please use Robin Med Sinus Rinse at least once to twice daily to rinse out your sinuses. I recommend that you first use Afrin decongestant nasal spray (oxymetazoline), wait 5 minutes, and then use the sinus rinse.  Afrin can also be used for 2-3 days only and then must be stopped.    You can also try Flonase nasal spray over the counter - 1-2 sprays in each nostril daily. You can also consider Sudafed or Phenylephrine for nasal congestion but avoid if have hypertension and be aware can stimulate and cause problems sleeping.  Do not use for more than 5 days.     For drainage problems, try Allegra, Claritin or Zyrtec.      For sore throat, try honey in tea, Chloraseptic, Cepacol throat lozenges, and salt water gargles.      Fever or aches can be helped by taking acetaminophen (Tylenol) every four hours as needed, or ibuprofen (Motrin, Advil) or  naproxen (Aleve) as directed if you are able.   Maximum dosing of ibuprofen is 800 mg every 8 hours and maximum dose of tylenol is 1,000 mg every 8 hours - do not use for longer than 1 week unless directed by your doctor.       URIs usually improves within 2 weeks, but at times the cough will persist for three  or four weeks beyond that. If you are experiencing any shortness of breath, developing wheezing, chest pain, or getting worse rather than better after the above measures, and call the office for further evaluation.      Danica Dawson, DO  2/8/2024

## 2024-02-10 LAB
CELL COUNT (BLOOD): 4.46 X10*3/UL
CELL POPULATIONS: NORMAL
DIAGNOSIS: NORMAL
FLOW DIFFERENTIAL: NORMAL
FLOW TEST ORDERED: NORMAL
LAB TEST METHOD: NORMAL
NUMBER OF CELLS COLLECTED: NORMAL PER TUBE
PATH REPORT.TOTAL CANCER: NORMAL
SIGNATURE COMMENT: NORMAL
SPECIMEN VIABILITY: NORMAL

## 2024-02-12 ENCOUNTER — APPOINTMENT (OUTPATIENT)
Dept: HEMATOLOGY/ONCOLOGY | Facility: CLINIC | Age: 74
End: 2024-02-12
Payer: COMMERCIAL

## 2024-02-15 ENCOUNTER — TELEMEDICINE (OUTPATIENT)
Dept: HEMATOLOGY/ONCOLOGY | Facility: HOSPITAL | Age: 74
End: 2024-02-15
Payer: COMMERCIAL

## 2024-02-15 DIAGNOSIS — C90.00 IGG MULTIPLE MYELOMA (MULTI): ICD-10-CM

## 2024-02-15 DIAGNOSIS — Z92.850 HISTORY OF CHIMERIC ANTIGEN RECEPTOR T-CELL THERAPY: Primary | ICD-10-CM

## 2024-02-15 PROCEDURE — 99213 OFFICE O/P EST LOW 20 MIN: CPT | Performed by: INTERNAL MEDICINE

## 2024-02-15 PROCEDURE — 1125F AMNT PAIN NOTED PAIN PRSNT: CPT | Performed by: INTERNAL MEDICINE

## 2024-02-15 PROCEDURE — 1160F RVW MEDS BY RX/DR IN RCRD: CPT | Performed by: INTERNAL MEDICINE

## 2024-02-15 PROCEDURE — 1159F MED LIST DOCD IN RCRD: CPT | Performed by: INTERNAL MEDICINE

## 2024-02-19 ASSESSMENT — ENCOUNTER SYMPTOMS
MUSCULOSKELETAL NEGATIVE: 1
CARDIOVASCULAR NEGATIVE: 1
EYES NEGATIVE: 1
RESPIRATORY NEGATIVE: 1
DIZZINESS: 0
GASTROINTESTINAL NEGATIVE: 1
FATIGUE: 0
PSYCHIATRIC NEGATIVE: 1
HEMATOLOGIC/LYMPHATIC NEGATIVE: 1
ENDOCRINE NEGATIVE: 1

## 2024-02-19 NOTE — PROGRESS NOTES
Patient ID: Maldonado Mccloud is a 73 y.o. male.  Referring Physician: No referring provider defined for this encounter.  Primary Care Provider: Timothy Almodovar MD  Date of Service:  2/15/2024    Verbal consent was requested and obtained from patient on this date for a telehealth visit.       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 Bone marrow biopsy suggested multiple myeloma IgG kappa, ISS Stage II, bone survey revealed lytic lesions; Urine light chains present kappa restricted with 2gm proteinuria.  No adverse prognostic factors identified.    Treatment Hx:    VRD  Initiated Aug 2015; currently s/p 3 cycles.  BMBx 10/2015 complete response.    Stem Cell Transplant  Underwent stem cell mobilization with Neupogen/Plerixafor and 2 day collection December 16-17, 2015, for 8.62 CD34 x 10^6/kg. During procedure for right IJ Trialysis placement developed A. Fib with RVR which spontaneously resolved on December 14, 2015. He was admitted and placed on monitor for stem cell collection.     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  Hospitalization complicated by orthostatic hypotension, electrolyte replacement, drug induced rash, culture negative fever.      He completed approximately 5 weeks of maintenance Revlimid 10 mg daily which was held for worsening neuropathy August 2016.    3/ 2018: IgG M Braxton rising to 0.2gm/DL and light chain rising; restarted on Revlimid maintenance at 5mg EOD.     Revlimid stopped in 3/2021 due to stable disease.    Vacto/Pom Trial  7/2021 his M spike was on the rise. He was consented for Vacto/Pom trial which he began on 8/5/2021. He continued Pom post finishing the trial.     Sarclisa/Kyprolis  Myeloma markers increasing in 6/2022, consented for Sarclisa/Kyprolis, which began 6/30/22. He continued this through  September of 2022.       Cytoxan/Kyprolis  His therapy was subsequently changed in 9/2022 due to continued disease progression in the face of Sarclisa. His therapy was changed to CYYCLON regimen (Cytoxan/Kyprolis).    PET CT scan 9/14/2022 shows FDG avid lytic lesion within the left midshaft clavicle with pathologic fracture, FDG avid lytic lesions involving bilateral scapulas and FDG avidity within the T9 vertebral body, suggestive of active multiple myeloma.  There are non FDG avid lytic lesions in the right iliac bone and right clavicle, likely representing nonactive multiple myeloma.  There is no PET evidence of extramedullary involvement.  There is an FDG avid right thyroid nodule.     MRI in 10/2022 negative for myelomatous involvement in thoracic vertebrae.     Therapy placed on hold in 11/2022 due to need for hip replacement surgery. His myeloma markers were on the rise in 3/2023 with a new jaw lesion. Plan is to begin radiation therapy 4/11 and resume 2x/week Kyprolis 4/10.     Tracking to CAR T cell therapy. Collected CAR T cells on 5/26/23.     X-ray of L elbow (6/20/23):  IMPRESSION:  1. Osteolytic lesions in the proximal radius and distal humerus  consistent with patient's reported history of multiple myeloma.  2. No acute fracture or malalignment.    Began radiation x8 fractions on 7/3/23.    Resumed Cytoxan as of 8/7/23.       CAR T Cell Therapy   Admitted for CAR T cell therapy for his ISS stage 2 Kappa multiple myeloma with ABECMA T=0, 10/25/23     Hospital course complicated by Grade 1 CRS and Grade 1 ICANS, managed with dexamethasone 10mg x 3 days and one dose of tocilizumab.     Post CAR T restaging:    PET/CT (1/1/9/24):  IMPRESSION:  1. Multiple FDG avid lytic lesions as described above are stable in  size and FDG avidity when compared to prior study, consistent with  known multiple myeloma.  2. No PET evidence of extramedullary disease.  3. Mild FDG avid focus in the right thyroid gland,  "further evaluation  with thyroid ultrasound is recommended.    BMBx (2/5/24): pending     IgG multiple myeloma (CMS/[facility])   7/1/2015 Initial Diagnosis    IgG multiple myeloma (CMS/[facility])     12/23/2015 -  Bone Marrow Transplant    Autologous Stem Cell Transplant      11/4/2022 - 11/4/2022 Chemotherapy    Carfilzomib (Weekly) / Cyclophosphamide / Thalidomide / Dexamethasone, 28 Day Cycles     10/25/2023 -  Cellular Therapy    Abecma        SUBJECTIVE:  History of Present Illness:    Phone visit 2/15/24    T+110 s/p CAR T (10/25/23)    Overall he is doing well. But still with pathologic non-healing fracture of right radius  Also has lytic lesion in left side (Xray from 1/9)    Notes ongoing fatigue. His wife is noticing some mental decline, \"he's not as sharp as he used to be\"  He denies any recent infections, fever, skin rash.    During last IVIG he developed an infusion reaction but was controled with steroids and antihistamine.    2/15 clinicallly doing well       Review of Systems   Constitutional:  Negative for fatigue.   HENT: Negative.     Eyes: Negative.    Respiratory: Negative.     Cardiovascular: Negative.    Gastrointestinal: Negative.    Endocrine: Negative.    Genitourinary: Negative.    Musculoskeletal: Negative.    Skin: Negative.    Allergic/Immunologic: Positive for immunocompromised state.   Neurological:  Negative for dizziness.   Hematological: Negative.    Psychiatric/Behavioral: Negative.       OBJECTIVE:  KPS: Karnofsky Score: 70 - Cares for self; unable to carry on normal activity or do normal work    VS:  There were no vitals taken for this visit.  BSA: There is no height or weight on file to calculate BSA.    Physical Exam  Neurological:      Mental Status: He is alert.       Laboratory:  The pertinent laboratory results were reviewed and discussed with the patient.    Lab Results   Component Value Date    WBC 2.9 (L) 02/05/2024    HCT 34.4 (L) 02/05/2024    HGB 11.6 (L) 02/05/2024     " (L) 02/05/2024    ANC 1.49 (L) 10/27/2023    K 4.3 02/05/2024    CALCIUM 9.1 02/05/2024     02/05/2024    MG 1.94 12/04/2023    ALT 19 02/05/2024    AST 19 02/05/2024    BUN 30 (H) 02/05/2024    CREATININE 1.15 02/05/2024    PHOS 3.9 11/13/2023    KAPPA 0.08 (L) 02/05/2024    LAMBDA <0.17 (L) 02/05/2024    KAPLS  02/05/2024      Comment:      One or more analytes used in this calculation   is outside of the analytical measurement range.  Calculation cannot be performed.    SPEP Decrease in polyclonal gamma globulins. 02/05/2024    IEPIN No monoclonal protein detected by immunofixation. 02/05/2024     (L) 02/05/2024    IGM <5 (L) 02/05/2024    IGA <7 (L) 02/05/2024      Note: for a comprehensive list of the patient's lab results, access the Results Review activity.    ASSESSMENT and PLAN:    Multiple Myeloma:   - ISS Stage 2 IgG Kappa Multiple Myeloma  - S/p VRD, Autologous SCT, Vacto/Pom, Kyprolis/Sarclisa, Radiation, most recently Kyprolis/Cytoxan  - PET/CT showed mostly decreased metabolic activity, however some new areas  - Completed radiation to R arm with Dr. Patel  - BMBx performed 10/2 shows BULMARO with clonoseq detected but below LOD, however progressing lytic lesions  - S/p CAR T w/ ABECMA (T=0, 10/25/23), hospital course complicated by grade 1 ICANS managed w/ Dex 10mg x3 days and 1 dose Tociluzimab    1/9/2024  biochemical response (CR But lytic lesions on Xrays and pathologica fractures. (Inactive lesions??)  - PET/CT (1/19/202) Multiple FDG avid lytic lesions above are stable in size and FDG avidity  - BMBx (2/12/24)    2/15 Bone marrow biopsy 2/5/2024 BULMARO no plasma cells  Clonoseq negative    Patient doing well in ongoing remission.    ID:  - Acyclovir 400mg BID (for 6 months post CAR T)  - Bactrim DS M, W, F (for 6 months post CAR T)  - Fluconazole 400mg daily (completed through D[facility])  - Will resume Levaquin if ANC <500  - IVIG for IgG <400    Bone Health:  - Found to have fracture of the R  radius  11/12/23  - Follows w/ Dr. Collins, no plans for surgery at this point  - Started Zometa monthly, 1/4  - Advised about increasing Calcium and Vit D intake     DVT:  - 6/20/22 acute occlusive DVT demonstrated within the left common femoral, profunda, femoral and popliteal veins.  - Has been on Eliquis 5mg BID, inturrupted around time of CAR-T cell then was discontinued as VTE was thought to be malignancy related while on treatment, as he's in remission now off treatment, Eliquis was stopped     Cardiac:  - Echo (5/2/23) showed EF of 50-55%  - Reports some dizziness, encouraged increasing fluid intake, sCr 1.35 (11/28)  - Will plan to hold Lisinopril and assess improvement in dizziness and BP (will wean, 2.5mg BID x2 days, every other day, done)  - Ongoing dizziness/orthostasis, given 1L NS 12/18    Neurology  - LE neuropathy, started with Velcade years ago, stable  - Mental decline and memory loss, more noticeable after CAR-T   - Monitor for now    Derm:  - History of squamous cell carcinomas  - Has plans to have another removed on L forehead      Wil Sun MD

## 2024-02-20 DIAGNOSIS — C90.00 MULTIPLE MYELOMA WITHOUT REMISSION (MULTI): ICD-10-CM

## 2024-02-20 PROBLEM — B34.9 VIRAL INFECTION: Status: ACTIVE | Noted: 2024-02-20

## 2024-02-20 PROBLEM — Z85.820 HISTORY OF MALIGNANT MELANOMA OF SKIN: Status: ACTIVE | Noted: 2024-02-20

## 2024-02-20 PROBLEM — R55 SYNCOPE: Status: ACTIVE | Noted: 2024-02-20

## 2024-02-20 PROBLEM — R06.2 WHEEZING: Status: ACTIVE | Noted: 2024-02-20

## 2024-02-20 PROBLEM — M84.534A: Status: ACTIVE | Noted: 2023-11-12

## 2024-02-20 PROBLEM — H54.7 UNSPECIFIED VISUAL LOSS: Status: ACTIVE | Noted: 2022-12-08

## 2024-02-20 PROBLEM — R22.30 MASS OF SHOULDER REGION: Status: ACTIVE | Noted: 2018-04-12

## 2024-02-20 PROBLEM — J18.9 PNEUMONIA: Status: ACTIVE | Noted: 2024-02-20

## 2024-02-20 PROBLEM — E66.9 OBESITY: Status: ACTIVE | Noted: 2024-02-20

## 2024-02-20 PROBLEM — M84.353A STRESS FRACTURE OF NECK OF FEMUR: Status: ACTIVE | Noted: 2023-03-14

## 2024-02-20 PROBLEM — M75.40 IMPINGEMENT SYNDROME OF SHOULDER REGION: Status: ACTIVE | Noted: 2018-04-12

## 2024-02-20 PROBLEM — S39.91XA INJURY OF ABDOMINAL WALL: Status: ACTIVE | Noted: 2024-02-20

## 2024-02-20 PROBLEM — L03.90 CELLULITIS: Status: ACTIVE | Noted: 2024-02-20

## 2024-02-20 PROBLEM — G89.3 PAIN DUE TO NEOPLASM: Status: ACTIVE | Noted: 2023-11-10

## 2024-02-20 PROBLEM — I10 BENIGN ESSENTIAL HYPERTENSION: Status: ACTIVE | Noted: 2023-03-14

## 2024-02-20 PROBLEM — R07.89 CHEST DISCOMFORT: Status: ACTIVE | Noted: 2024-02-20

## 2024-02-20 PROBLEM — Z92.850: Status: ACTIVE | Noted: 2023-11-10

## 2024-02-20 PROBLEM — J32.9 SINUSITIS: Status: ACTIVE | Noted: 2024-02-20

## 2024-02-20 RX ORDER — DOXYCYCLINE 100 MG/1
100 TABLET ORAL 2 TIMES DAILY
COMMUNITY
Start: 2024-02-20 | End: 2024-02-28 | Stop reason: ALTCHOICE

## 2024-02-21 LAB
CHROM ANALY OVERALL INTERP-IMP: NORMAL
ELECTRONICALLY SIGNED BY CYTOGENETICS: NORMAL

## 2024-02-22 ENCOUNTER — DOCUMENTATION (OUTPATIENT)
Dept: OCCUPATIONAL THERAPY | Facility: CLINIC | Age: 74
End: 2024-02-22
Payer: MEDICARE

## 2024-02-22 NOTE — PROGRESS NOTES
Occupational Therapy    Discharge Summary    Name: Maldonado Mccloud  MRN: [id number]  : 1950  Date: 24    Discharge Summary: OT    Discharge Information: Date of discharge 24, Date of last visit 23, Date of evaluation 23, Number of attended visits 2, Referred by Dr. Collins, and Referred for Fractured arm    Therapy Summary: Pt will be transferring to private practice.    Discharge Status: Discharged     Rehab Discharge Reason: Other Pt requests to go to private practice.

## 2024-02-23 ENCOUNTER — HOSPITAL ENCOUNTER (OUTPATIENT)
Dept: RADIOLOGY | Facility: HOSPITAL | Age: 74
Discharge: HOME | End: 2024-02-23
Payer: COMMERCIAL

## 2024-02-23 ENCOUNTER — OFFICE VISIT (OUTPATIENT)
Dept: ORTHOPEDIC SURGERY | Facility: HOSPITAL | Age: 74
End: 2024-02-23
Payer: COMMERCIAL

## 2024-02-23 ENCOUNTER — INFUSION (OUTPATIENT)
Dept: HEMATOLOGY/ONCOLOGY | Facility: HOSPITAL | Age: 74
End: 2024-02-23
Payer: COMMERCIAL

## 2024-02-23 VITALS
HEART RATE: 86 BPM | SYSTOLIC BLOOD PRESSURE: 141 MMHG | TEMPERATURE: 97.5 F | DIASTOLIC BLOOD PRESSURE: 87 MMHG | RESPIRATION RATE: 18 BRPM | BODY MASS INDEX: 32.3 KG/M2 | WEIGHT: 225.09 LBS | OXYGEN SATURATION: 100 %

## 2024-02-23 DIAGNOSIS — C90.00 MULTIPLE MYELOMA WITHOUT REMISSION (MULTI): ICD-10-CM

## 2024-02-23 DIAGNOSIS — M84.433K: ICD-10-CM

## 2024-02-23 DIAGNOSIS — Z92.850 HISTORY OF CHIMERIC ANTIGEN RECEPTOR T-CELL THERAPY: ICD-10-CM

## 2024-02-23 PROBLEM — D64.9 ANEMIA: Status: ACTIVE | Noted: 2022-12-08

## 2024-02-23 PROBLEM — Z96.652 PRESENCE OF LEFT ARTIFICIAL KNEE JOINT: Status: ACTIVE | Noted: 2022-11-21

## 2024-02-23 PROCEDURE — 73070 X-RAY EXAM OF ELBOW: CPT | Mod: 50

## 2024-02-23 PROCEDURE — 99214 OFFICE O/P EST MOD 30 MIN: CPT | Performed by: STUDENT IN AN ORGANIZED HEALTH CARE EDUCATION/TRAINING PROGRAM

## 2024-02-23 PROCEDURE — 1160F RVW MEDS BY RX/DR IN RCRD: CPT | Performed by: STUDENT IN AN ORGANIZED HEALTH CARE EDUCATION/TRAINING PROGRAM

## 2024-02-23 PROCEDURE — 96372 THER/PROPH/DIAG INJ SC/IM: CPT | Performed by: INTERNAL MEDICINE

## 2024-02-23 PROCEDURE — 1125F AMNT PAIN NOTED PAIN PRSNT: CPT | Performed by: STUDENT IN AN ORGANIZED HEALTH CARE EDUCATION/TRAINING PROGRAM

## 2024-02-23 PROCEDURE — 2500000004 HC RX 250 GENERAL PHARMACY W/ HCPCS (ALT 636 FOR OP/ED): Performed by: INTERNAL MEDICINE

## 2024-02-23 PROCEDURE — 90472 IMMUNIZATION ADMIN EACH ADD: CPT | Performed by: INTERNAL MEDICINE

## 2024-02-23 PROCEDURE — 1159F MED LIST DOCD IN RCRD: CPT | Performed by: STUDENT IN AN ORGANIZED HEALTH CARE EDUCATION/TRAINING PROGRAM

## 2024-02-23 PROCEDURE — 90679 RSV VACC PREF RECOMB ADJT IM: CPT | Performed by: INTERNAL MEDICINE

## 2024-02-23 PROCEDURE — 96372 THER/PROPH/DIAG INJ SC/IM: CPT

## 2024-02-23 PROCEDURE — 90471 IMMUNIZATION ADMIN: CPT | Performed by: INTERNAL MEDICINE

## 2024-02-23 PROCEDURE — 73070 X-RAY EXAM OF ELBOW: CPT | Mod: BILATERAL PROCEDURE | Performed by: RADIOLOGY

## 2024-02-23 PROCEDURE — 90662 IIV NO PRSV INCREASED AG IM: CPT | Performed by: INTERNAL MEDICINE

## 2024-02-23 RX ORDER — DIPHENHYDRAMINE HYDROCHLORIDE 50 MG/ML
50 INJECTION INTRAMUSCULAR; INTRAVENOUS AS NEEDED
Status: CANCELLED | OUTPATIENT
Start: 2024-02-23

## 2024-02-23 RX ORDER — EPINEPHRINE 0.3 MG/.3ML
0.3 INJECTION SUBCUTANEOUS EVERY 5 MIN PRN
Status: CANCELLED | OUTPATIENT
Start: 2024-02-23

## 2024-02-23 RX ORDER — FAMOTIDINE 10 MG/ML
20 INJECTION INTRAVENOUS ONCE AS NEEDED
Status: CANCELLED | OUTPATIENT
Start: 2024-02-23

## 2024-02-23 RX ORDER — ALBUTEROL SULFATE 0.83 MG/ML
3 SOLUTION RESPIRATORY (INHALATION) AS NEEDED
Status: CANCELLED | OUTPATIENT
Start: 2024-02-23

## 2024-02-23 RX ADMIN — RESPIRATORY SYNCYTIAL VISUS VACCINE RECOMBINANT, ADJUVANTED 0.5 ML: KIT at 11:15

## 2024-02-23 RX ADMIN — INFLUENZA A VIRUS A/VICTORIA/4897/2022 IVR-238 (H1N1) ANTIGEN (FORMALDEHYDE INACTIVATED), INFLUENZA A VIRUS A/DARWIN/9/2021 SAN-010 (H3N2) ANTIGEN (FORMALDEHYDE INACTIVATED), INFLUENZA B VIRUS B/PHUKET/3073/2013 ANTIGEN (FORMALDEHYDE INACTIVATED), AND INFLUENZA B VIRUS B/MICHIGAN/01/2021 ANTIGEN (FORMALDEHYDE INACTIVATED) 0.7 ML: 60; 60; 60; 60 INJECTION, SUSPENSION INTRAMUSCULAR at 11:06

## 2024-02-23 ASSESSMENT — PAIN SCALES - GENERAL
PAINLEVEL: 0-NO PAIN
PAINLEVEL_OUTOF10: 1

## 2024-02-23 ASSESSMENT — ENCOUNTER SYMPTOMS
LOSS OF SENSATION IN FEET: 0
DEPRESSION: 0
OCCASIONAL FEELINGS OF UNSTEADINESS: 0

## 2024-02-23 ASSESSMENT — PAIN - FUNCTIONAL ASSESSMENT: PAIN_FUNCTIONAL_ASSESSMENT: 0-10

## 2024-02-23 NOTE — PROGRESS NOTES
Pt arrived ambulatory to infusion for treatment of vaccines.  Denies any new or worsening symptoms. Tolerated injections without issue. Discharged in stable condition.

## 2024-02-23 NOTE — PROGRESS NOTES
[facility] Clinic Progress Note:    CC: Pathologic right proximal radius fracture secondary to myeloma    S: 73 y.o. male with a history of a right pathologic fracture of his proximal radius secondary to multiple myeloma which occurred in November.  He also states has been having left elbow pain which is actually slightly worse than the right but still was not limiting his daily activity.  He states that sometimes wakes him up at night but otherwise he having no major issues.  We discussed previously options in terms of surgical treatments and ultimately told him that with the myeloma that fixation usually is not all that successful given the short segment and poor bone quality.  For these areas sometimes replacements or excision are recommended.  His right side is actually functioning very well and he states really it does not bother him at all anymore.  Left side bothers him occasionally but really limiting his activity.  Has been out of his splint and range of motion as tolerated.  He is also been on a 10 pound weightbearing restriction since seeing me last.    Objective:    Exam:  Gen: alert, appropriately conversational, no apparent distress  Chest: symmetric chest rise, non-labored breathing  Heart: RRR to peripheral palpation    Right upper extremity:   -Skin intact.   -No tenderness to palpation or pain with ROM  -Full flexion and extension as well as full pronation present.  He is approximately 50 degrees of supination.  -Fires axillary/AIN/PIN/ulnar distributions  -SILT axillary/radial/median/ulnar distributions  -Hand warm, well perfused  -Palpable radial pulse, cap refill brisk  -Compartments soft and compressible     Left upper extremity:   -Skin intact.   -No tenderness to palpation or ROM  -Full flexion and extension as well as full pronation/supination   -Fires axillary/AIN/PIN/ulnar distributions  -SILT axillary/radial/median/ulnar distributions  -Hand warm, well perfused  -Palpable radial  pulse, cap refill brisk  -Compartments soft and compressible     Imaging:  XR of the bilateral elbows, obtained and personally reviewed today, shows pathologic fracture of the right proximal radius through the biceps tuberosity with no signs of callus formation.  There is also an old fibrous union at the base of the radial neck.  No significant change in previous x-rays.  Radiographs of the left elbow demonstrate previously known lesions consistent with multiple myeloma with nondisplaced pathologic fracture unchanged in appearance from previous films.      A/P:    I discussed with the patient that the goals of this are palliative and pain relief in nature.  If he is able to get to the point where he is happy functionally with his current level of pain then no operative intervention is required.  If it is limiting his daily activity then we can certainly discuss future surgical options whether that be fixation, curettage, arthroplasty or even excision for the primary goals of pain relief.  Given his function is actually getting better and his pain is very well-controlled on both elbows for right now he wants to continue to observe what I think is reasonable.  We will continue to try to test his function and he is now range of motion as tolerated as well as weightbearing as tolerated.  Will see how he does with the increased activity level and I will follow him back in 3 months with repeat bilateral elbow films.  If he still functioning well and having minimal pain he can always see us as a virtual visit rather than making the trip and or he may see us sooner if his pain starts to increase with his new activities and full weightbearing.

## 2024-02-28 ENCOUNTER — OFFICE VISIT (OUTPATIENT)
Dept: PRIMARY CARE | Facility: CLINIC | Age: 74
End: 2024-02-28
Payer: COMMERCIAL

## 2024-02-28 VITALS
DIASTOLIC BLOOD PRESSURE: 80 MMHG | HEART RATE: 80 BPM | OXYGEN SATURATION: 95 % | WEIGHT: 224.7 LBS | RESPIRATION RATE: 16 BRPM | BODY MASS INDEX: 32.24 KG/M2 | TEMPERATURE: 98.3 F | SYSTOLIC BLOOD PRESSURE: 128 MMHG

## 2024-02-28 DIAGNOSIS — G47.09 OTHER INSOMNIA: Primary | ICD-10-CM

## 2024-02-28 DIAGNOSIS — C90.00 MULTIPLE MYELOMA NOT HAVING ACHIEVED REMISSION (MULTI): ICD-10-CM

## 2024-02-28 DIAGNOSIS — Z92.850 HISTORY OF CHIMERIC ANTIGEN RECEPTOR T-CELL THERAPY: Primary | ICD-10-CM

## 2024-02-28 DIAGNOSIS — C90.00 MULTIPLE MYELOMA WITHOUT REMISSION (MULTI): ICD-10-CM

## 2024-02-28 PROCEDURE — 1160F RVW MEDS BY RX/DR IN RCRD: CPT | Performed by: FAMILY MEDICINE

## 2024-02-28 PROCEDURE — 99214 OFFICE O/P EST MOD 30 MIN: CPT | Performed by: FAMILY MEDICINE

## 2024-02-28 PROCEDURE — 3074F SYST BP LT 130 MM HG: CPT | Performed by: FAMILY MEDICINE

## 2024-02-28 PROCEDURE — 1126F AMNT PAIN NOTED NONE PRSNT: CPT | Performed by: FAMILY MEDICINE

## 2024-02-28 PROCEDURE — 1159F MED LIST DOCD IN RCRD: CPT | Performed by: FAMILY MEDICINE

## 2024-02-28 PROCEDURE — 3079F DIAST BP 80-89 MM HG: CPT | Performed by: FAMILY MEDICINE

## 2024-02-28 RX ORDER — DIPHENHYDRAMINE HYDROCHLORIDE 50 MG/ML
50 INJECTION INTRAMUSCULAR; INTRAVENOUS AS NEEDED
Status: CANCELLED | OUTPATIENT
Start: 2024-04-29

## 2024-02-28 RX ORDER — ALBUTEROL SULFATE 0.83 MG/ML
3 SOLUTION RESPIRATORY (INHALATION) AS NEEDED
Status: CANCELLED | OUTPATIENT
Start: 2024-04-29

## 2024-02-28 RX ORDER — EPINEPHRINE 0.3 MG/.3ML
0.3 INJECTION SUBCUTANEOUS EVERY 5 MIN PRN
Status: CANCELLED | OUTPATIENT
Start: 2024-04-29

## 2024-02-28 RX ORDER — FAMOTIDINE 10 MG/ML
20 INJECTION INTRAVENOUS ONCE AS NEEDED
Status: CANCELLED | OUTPATIENT
Start: 2024-04-29

## 2024-02-28 NOTE — PATIENT INSTRUCTIONS
Continue trazodone as prescribed, routine oncology follow-up  Blood pressure remains excellent on no medications

## 2024-02-28 NOTE — PROGRESS NOTES
Entered 6-14 month vaccines to be administered at [facility]. Patient is varicella virus positive, making him eligible for Shingrix vaccination. Flu shot added for 10/14/2024 with scheduling appointment but can be adjusted.

## 2024-02-28 NOTE — PROGRESS NOTES
Subjective   Patient ID: Maldonado Mccloud is a 73 y.o. male who presents for Follow-up (6 mo fuv) and Hypertension.    HPI   Maldonado was seen today for a routine follow-up of his insomnia, history of hypertension.  He has been off of his lisinopril for quite some time, blood pressures have remained reassuring.  Hematology/oncology care is up-to-date and ongoing.  Most recent labs reviewed.  Since his last visit he had a bronchitis type illness, antibiotics cleared it, he has no concerns today.    Review of Systems  The full, 10+ multi-organ review of systems, is within normal limits with the exception of what is noted above in HPI.  Objective   /80 (BP Location: Right arm, Patient Position: Sitting, BP Cuff Size: Adult)   Pulse 80   Temp 36.8 °C (98.3 °F)   Resp 16   Wt 102 kg (224 lb 11.2 oz)   SpO2 95%   BMI 32.24 kg/m²     Physical Exam  Constitutional/General appearance: alert, oriented, well-appearing, in no distress  Head and face exam is normal  No scleral icterus or conjunctival erythema present  Hearing is grossly normal  Respiratory effort is normal, no dyspnea noted  Cortical function is normal  Mood, affect, are pleasant, appropriate, and interactive.  Insight is normal  Cardiac exam reveals a regular rate and rhythm, soft murmur noted.  Lungs are clear, no lower extremity edema present.  Assessment/Plan     Blood pressure remains excellent, no pharmacologic treatment necessary.  For occasional orthostatic type dizziness, recommend increasing fluid intake to at least 64 ounces daily, also discussed compression stockings, on in the morning off at night.  Lab work up-to-date, will follow along with hematology/oncology.    Continue trazodone 300 mg nightly for insomnia, continues to work sufficiently well as it has for years.    Follow-up as scheduled  **Portions of this medical record have been created using voice recognition software and may have minor errors which are inherent in voice  recognition systems. It has not been fully edited for typographical or grammatical errors**

## 2024-03-04 ENCOUNTER — OFFICE VISIT (OUTPATIENT)
Dept: HEMATOLOGY/ONCOLOGY | Facility: CLINIC | Age: 74
End: 2024-03-04
Payer: COMMERCIAL

## 2024-03-04 ENCOUNTER — INFUSION (OUTPATIENT)
Dept: HEMATOLOGY/ONCOLOGY | Facility: CLINIC | Age: 74
End: 2024-03-04
Payer: COMMERCIAL

## 2024-03-04 ENCOUNTER — APPOINTMENT (OUTPATIENT)
Dept: LAB | Facility: CLINIC | Age: 74
End: 2024-03-04
Payer: COMMERCIAL

## 2024-03-04 VITALS
RESPIRATION RATE: 16 BRPM | BODY MASS INDEX: 32.34 KG/M2 | TEMPERATURE: 98.1 F | OXYGEN SATURATION: 98 % | SYSTOLIC BLOOD PRESSURE: 137 MMHG | HEART RATE: 77 BPM | WEIGHT: 225.42 LBS | DIASTOLIC BLOOD PRESSURE: 82 MMHG

## 2024-03-04 VITALS
HEART RATE: 67 BPM | DIASTOLIC BLOOD PRESSURE: 88 MMHG | SYSTOLIC BLOOD PRESSURE: 140 MMHG | TEMPERATURE: 97.2 F | RESPIRATION RATE: 16 BRPM

## 2024-03-04 DIAGNOSIS — Z92.850 HISTORY OF CHIMERIC ANTIGEN RECEPTOR T-CELL THERAPY: ICD-10-CM

## 2024-03-04 DIAGNOSIS — C90.00 HYPOGAMMAGLOBULINEMIA DUE TO MULTIPLE MYELOMA (MULTI): ICD-10-CM

## 2024-03-04 DIAGNOSIS — C90.00 MULTIPLE MYELOMA WITHOUT REMISSION (MULTI): ICD-10-CM

## 2024-03-04 DIAGNOSIS — D80.1 HYPOGAMMAGLOBULINEMIA DUE TO MULTIPLE MYELOMA (MULTI): ICD-10-CM

## 2024-03-04 DIAGNOSIS — C90.00 IGG MULTIPLE MYELOMA (MULTI): ICD-10-CM

## 2024-03-04 DIAGNOSIS — C90.00 MULTIPLE MYELOMA WITHOUT REMISSION (MULTI): Primary | ICD-10-CM

## 2024-03-04 LAB
ALBUMIN SERPL BCP-MCNC: 4.1 G/DL (ref 3.4–5)
ALP SERPL-CCNC: 59 U/L (ref 33–136)
ALT SERPL W P-5'-P-CCNC: 21 U/L (ref 10–52)
ANION GAP SERPL CALC-SCNC: 12 MMOL/L (ref 10–20)
AST SERPL W P-5'-P-CCNC: 23 U/L (ref 9–39)
BASOPHILS # BLD AUTO: 0.01 X10*3/UL (ref 0–0.1)
BASOPHILS NFR BLD AUTO: 0.3 %
BILIRUB SERPL-MCNC: 0.8 MG/DL (ref 0–1.2)
BUN SERPL-MCNC: 21 MG/DL (ref 6–23)
CALCIUM SERPL-MCNC: 9.2 MG/DL (ref 8.6–10.6)
CHLORIDE SERPL-SCNC: 109 MMOL/L (ref 98–107)
CO2 SERPL-SCNC: 27 MMOL/L (ref 21–32)
CREAT SERPL-MCNC: 1.25 MG/DL (ref 0.5–1.3)
CRP SERPL-MCNC: 0.14 MG/DL
EGFRCR SERPLBLD CKD-EPI 2021: 60 ML/MIN/1.73M*2
EOSINOPHIL # BLD AUTO: 0.07 X10*3/UL (ref 0–0.4)
EOSINOPHIL NFR BLD AUTO: 2.3 %
ERYTHROCYTE [DISTWIDTH] IN BLOOD BY AUTOMATED COUNT: 12 % (ref 11.5–14.5)
FERRITIN SERPL-MCNC: 102 NG/ML (ref 20–300)
GLUCOSE SERPL-MCNC: 104 MG/DL (ref 74–99)
HCT VFR BLD AUTO: 35.3 % (ref 41–52)
HGB BLD-MCNC: 12.1 G/DL (ref 13.5–17.5)
IGA SERPL-MCNC: <26 MG/DL (ref 70–400)
IGG SERPL-MCNC: 318 MG/DL (ref 700–1600)
IGG SERPL-MCNC: 330 MG/DL (ref 700–1600)
IGM SERPL-MCNC: <5 MG/DL (ref 40–230)
IMM GRANULOCYTES # BLD AUTO: 0.02 X10*3/UL (ref 0–0.5)
IMM GRANULOCYTES NFR BLD AUTO: 0.7 % (ref 0–0.9)
LDH SERPL L TO P-CCNC: 140 U/L (ref 84–246)
LYMPHOCYTES # BLD AUTO: 0.9 X10*3/UL (ref 0.8–3)
LYMPHOCYTES NFR BLD AUTO: 29.8 %
MAGNESIUM SERPL-MCNC: 1.94 MG/DL (ref 1.6–2.4)
MCH RBC QN AUTO: 35.9 PG (ref 26–34)
MCHC RBC AUTO-ENTMCNC: 34.3 G/DL (ref 32–36)
MCV RBC AUTO: 105 FL (ref 80–100)
MONOCYTES # BLD AUTO: 0.43 X10*3/UL (ref 0.05–0.8)
MONOCYTES NFR BLD AUTO: 14.2 %
NEUTROPHILS # BLD AUTO: 1.59 X10*3/UL (ref 1.6–5.5)
NEUTROPHILS NFR BLD AUTO: 52.7 %
NRBC BLD-RTO: ABNORMAL /100{WBCS}
PLATELET # BLD AUTO: 98 X10*3/UL (ref 150–450)
POTASSIUM SERPL-SCNC: 4.5 MMOL/L (ref 3.5–5.3)
PROT SERPL-MCNC: 5.7 G/DL (ref 6.4–8.2)
PROT SERPL-MCNC: 5.8 G/DL (ref 6.4–8.2)
RBC # BLD AUTO: 3.37 X10*6/UL (ref 4.5–5.9)
SODIUM SERPL-SCNC: 143 MMOL/L (ref 136–145)
URATE SERPL-MCNC: 6.4 MG/DL (ref 4–7.5)
WBC # BLD AUTO: 3 X10*3/UL (ref 4.4–11.3)

## 2024-03-04 PROCEDURE — 84550 ASSAY OF BLOOD/URIC ACID: CPT

## 2024-03-04 PROCEDURE — 83521 IG LIGHT CHAINS FREE EACH: CPT

## 2024-03-04 PROCEDURE — 96375 TX/PRO/DX INJ NEW DRUG ADDON: CPT | Mod: INF

## 2024-03-04 PROCEDURE — 1126F AMNT PAIN NOTED NONE PRSNT: CPT

## 2024-03-04 PROCEDURE — 83615 LACTATE (LD) (LDH) ENZYME: CPT

## 2024-03-04 PROCEDURE — 1160F RVW MEDS BY RX/DR IN RCRD: CPT

## 2024-03-04 PROCEDURE — 82784 ASSAY IGA/IGD/IGG/IGM EACH: CPT

## 2024-03-04 PROCEDURE — 86334 IMMUNOFIX E-PHORESIS SERUM: CPT

## 2024-03-04 PROCEDURE — 36415 COLL VENOUS BLD VENIPUNCTURE: CPT

## 2024-03-04 PROCEDURE — 2500000004 HC RX 250 GENERAL PHARMACY W/ HCPCS (ALT 636 FOR OP/ED)

## 2024-03-04 PROCEDURE — 3079F DIAST BP 80-89 MM HG: CPT

## 2024-03-04 PROCEDURE — 82330 ASSAY OF CALCIUM: CPT

## 2024-03-04 PROCEDURE — 2500000001 HC RX 250 WO HCPCS SELF ADMINISTERED DRUGS (ALT 637 FOR MEDICARE OP)

## 2024-03-04 PROCEDURE — 84165 PROTEIN E-PHORESIS SERUM: CPT

## 2024-03-04 PROCEDURE — 99215 OFFICE O/P EST HI 40 MIN: CPT

## 2024-03-04 PROCEDURE — 86320 SERUM IMMUNOELECTROPHORESIS: CPT

## 2024-03-04 PROCEDURE — 2500000004 HC RX 250 GENERAL PHARMACY W/ HCPCS (ALT 636 FOR OP/ED): Mod: JZ,JG

## 2024-03-04 PROCEDURE — 85384 FIBRINOGEN ACTIVITY: CPT

## 2024-03-04 PROCEDURE — 3075F SYST BP GE 130 - 139MM HG: CPT

## 2024-03-04 PROCEDURE — 83735 ASSAY OF MAGNESIUM: CPT

## 2024-03-04 PROCEDURE — 86140 C-REACTIVE PROTEIN: CPT

## 2024-03-04 PROCEDURE — 80053 COMPREHEN METABOLIC PANEL: CPT

## 2024-03-04 PROCEDURE — 96365 THER/PROPH/DIAG IV INF INIT: CPT | Mod: INF

## 2024-03-04 PROCEDURE — 1159F MED LIST DOCD IN RCRD: CPT

## 2024-03-04 PROCEDURE — 96366 THER/PROPH/DIAG IV INF ADDON: CPT | Mod: INF

## 2024-03-04 PROCEDURE — 85025 COMPLETE CBC W/AUTO DIFF WBC: CPT

## 2024-03-04 PROCEDURE — 82728 ASSAY OF FERRITIN: CPT

## 2024-03-04 RX ORDER — FAMOTIDINE 10 MG/ML
20 INJECTION INTRAVENOUS ONCE AS NEEDED
Status: CANCELLED | OUTPATIENT
Start: 2024-03-11

## 2024-03-04 RX ORDER — DIPHENHYDRAMINE HYDROCHLORIDE 50 MG/ML
25 INJECTION INTRAMUSCULAR; INTRAVENOUS ONCE
Status: CANCELLED
Start: 2024-03-11 | End: 2024-03-11

## 2024-03-04 RX ORDER — ALBUTEROL SULFATE 0.83 MG/ML
3 SOLUTION RESPIRATORY (INHALATION) AS NEEDED
Status: CANCELLED | OUTPATIENT
Start: 2024-04-01

## 2024-03-04 RX ORDER — EPINEPHRINE 0.3 MG/.3ML
0.3 INJECTION SUBCUTANEOUS EVERY 5 MIN PRN
Status: CANCELLED | OUTPATIENT
Start: 2024-03-11

## 2024-03-04 RX ORDER — ACETAMINOPHEN 325 MG/1
650 TABLET ORAL ONCE
Status: CANCELLED | OUTPATIENT
Start: 2024-03-11

## 2024-03-04 RX ORDER — FAMOTIDINE 10 MG/ML
20 INJECTION INTRAVENOUS ONCE
Status: COMPLETED | OUTPATIENT
Start: 2024-03-04 | End: 2024-03-04

## 2024-03-04 RX ORDER — DIPHENHYDRAMINE HYDROCHLORIDE 50 MG/ML
25 INJECTION INTRAMUSCULAR; INTRAVENOUS ONCE
Status: COMPLETED | OUTPATIENT
Start: 2024-03-04 | End: 2024-03-04

## 2024-03-04 RX ORDER — ACETAMINOPHEN 325 MG/1
650 TABLET ORAL ONCE
Status: COMPLETED | OUTPATIENT
Start: 2024-03-04 | End: 2024-03-04

## 2024-03-04 RX ORDER — MONTELUKAST SODIUM 10 MG/1
10 TABLET ORAL ONCE
Status: COMPLETED | OUTPATIENT
Start: 2024-03-04 | End: 2024-03-04

## 2024-03-04 RX ORDER — SULFAMETHOXAZOLE AND TRIMETHOPRIM 800; 160 MG/1; MG/1
1 TABLET ORAL
Qty: 24 TABLET | Refills: 0 | Status: SHIPPED | OUTPATIENT
Start: 2024-03-04 | End: 2024-04-29 | Stop reason: WASHOUT

## 2024-03-04 RX ORDER — DIPHENHYDRAMINE HYDROCHLORIDE 50 MG/ML
50 INJECTION INTRAMUSCULAR; INTRAVENOUS AS NEEDED
Status: CANCELLED | OUTPATIENT
Start: 2024-03-11

## 2024-03-04 RX ORDER — FAMOTIDINE 10 MG/ML
20 INJECTION INTRAVENOUS ONCE AS NEEDED
Status: CANCELLED | OUTPATIENT
Start: 2024-04-01

## 2024-03-04 RX ORDER — FAMOTIDINE 10 MG/ML
20 INJECTION INTRAVENOUS ONCE
Status: CANCELLED
Start: 2024-03-11 | End: 2024-03-11

## 2024-03-04 RX ORDER — DIPHENHYDRAMINE HYDROCHLORIDE 50 MG/ML
50 INJECTION INTRAMUSCULAR; INTRAVENOUS AS NEEDED
Status: CANCELLED | OUTPATIENT
Start: 2024-04-01

## 2024-03-04 RX ORDER — EPINEPHRINE 0.3 MG/.3ML
0.3 INJECTION SUBCUTANEOUS ONCE AS NEEDED
Status: CANCELLED | OUTPATIENT
Start: 2024-04-01

## 2024-03-04 RX ORDER — MONTELUKAST SODIUM 10 MG/1
10 TABLET ORAL ONCE
Status: CANCELLED
Start: 2024-03-11 | End: 2024-03-11

## 2024-03-04 RX ORDER — ALBUTEROL SULFATE 0.83 MG/ML
3 SOLUTION RESPIRATORY (INHALATION) AS NEEDED
Status: CANCELLED | OUTPATIENT
Start: 2024-03-11

## 2024-03-04 RX ADMIN — IMMUNE GLOBULIN (HUMAN) 20 G: 10 INJECTION INTRAVENOUS; SUBCUTANEOUS at 10:26

## 2024-03-04 RX ADMIN — ACETAMINOPHEN 650 MG: 325 TABLET ORAL at 09:36

## 2024-03-04 RX ADMIN — MONTELUKAST 10 MG: 10 TABLET, FILM COATED ORAL at 10:03

## 2024-03-04 RX ADMIN — DIPHENHYDRAMINE HYDROCHLORIDE 25 MG: 50 INJECTION INTRAMUSCULAR; INTRAVENOUS at 09:36

## 2024-03-04 RX ADMIN — FAMOTIDINE 20 MG: 10 INJECTION, SOLUTION INTRAVENOUS at 09:36

## 2024-03-04 RX ADMIN — ZOLEDRONIC ACID 4 MG: 4 INJECTION, SOLUTION, CONCENTRATE INTRAVENOUS at 10:03

## 2024-03-04 ASSESSMENT — ENCOUNTER SYMPTOMS
NEUROLOGICAL NEGATIVE: 1
GASTROINTESTINAL NEGATIVE: 1
EYES NEGATIVE: 1
ARTHRALGIAS: 1
ENDOCRINE NEGATIVE: 1
CARDIOVASCULAR NEGATIVE: 1
RESPIRATORY NEGATIVE: 1
HEMATOLOGIC/LYMPHATIC NEGATIVE: 1
BACK PAIN: 1
FATIGUE: 1
PSYCHIATRIC NEGATIVE: 1

## 2024-03-04 ASSESSMENT — PAIN SCALES - GENERAL: PAINLEVEL: 0-NO PAIN

## 2024-03-04 NOTE — PROGRESS NOTES
Patient ID: Maldonado Mccloud is a 73 y.o. male.  Referring Physician: No referring provider defined for this encounter.  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 Bone marrow biopsy suggested multiple myeloma IgG kappa, ISS Stage II, bone survey revealed lytic lesions; Urine light chains present kappa restricted with 2gm proteinuria.  No adverse prognostic factors identified.    Treatment Hx:    VRD  Initiated Aug 2015; currently s/p 3 cycles.  BMBx 10/2015 complete response.    Stem Cell Transplant  Underwent stem cell mobilization with Neupogen/Plerixafor and 2 day collection December 16-17, 2015, for 8.62 CD34 x 10^6/kg. During procedure for right IJ Trialysis placement developed A. Fib with RVR which spontaneously resolved on December 14, 2015. He was admitted and placed on monitor for stem cell collection.     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  Hospitalization complicated by orthostatic hypotension, electrolyte replacement, drug induced rash, culture negative fever.      He completed approximately 5 weeks of maintenance Revlimid 10 mg daily which was held for worsening neuropathy August 2016.    3/ 2018: IgG M Braxton rising to 0.2gm/DL and light chain rising; restarted on Revlimid maintenance at 5mg EOD.     Revlimid stopped in 3/2021 due to stable disease.    Vacto/Pom Trial  7/2021 his M spike was on the rise. He was consented for Vacto/Pom trial which he began on 8/5/2021. He continued Pom post finishing the trial.     Sarclisa/Kyprolis  Myeloma markers increasing in 6/2022, consented for Sarclisa/Kyprolis, which began 6/30/22. He continued this through September of 2022.       Cytoxan/Kyprolis  His therapy was subsequently changed in 9/2022 due to  continued disease progression in the face of Sarclisa. His therapy was changed to CYYCLON regimen (Cytoxan/Kyprolis).    PET CT scan 9/14/2022 shows FDG avid lytic lesion within the left midshaft clavicle with pathologic fracture, FDG avid lytic lesions involving bilateral scapulas and FDG avidity within the T9 vertebral body, suggestive of active multiple myeloma.  There are non FDG avid lytic lesions in the right iliac bone and right clavicle, likely representing nonactive multiple myeloma.  There is no PET evidence of extramedullary involvement.  There is an FDG avid right thyroid nodule.     MRI in 10/2022 negative for myelomatous involvement in thoracic vertebrae.     Therapy placed on hold in 11/2022 due to need for hip replacement surgery. His myeloma markers were on the rise in 3/2023 with a new jaw lesion. Plan is to begin radiation therapy 4/11 and resume 2x/week Kyprolis 4/10.     Tracking to CAR T cell therapy. Collected CAR T cells on 5/26/23.     X-ray of L elbow (6/20/23):  IMPRESSION:  1. Osteolytic lesions in the proximal radius and distal humerus  consistent with patient's reported history of multiple myeloma.  2. No acute fracture or malalignment.    Began radiation x8 fractions on 7/3/23.    Resumed Cytoxan as of 8/7/23.       CAR T Cell Therapy   Admitted for CAR T cell therapy for his ISS stage 2 Kappa multiple myeloma with ABECMA T=0, 10/25/23     Hospital course complicated by Grade 1 CRS and Grade 1 ICANS, managed with dexamethasone 10mg x 3 days and one dose of tocilizumab.     Post CAR T restaging:    PET/CT (1/1/9/24):  IMPRESSION:  1. Multiple FDG avid lytic lesions as described above are stable in  size and FDG avidity when compared to prior study, consistent with  known multiple myeloma.  2. No PET evidence of extramedullary disease.  3. Mild FDG avid focus in the right thyroid gland, further evaluation  with thyroid ultrasound is recommended.    BMBx (2/5/24): pending     IgG multiple  myeloma (CMS/[facility])   7/1/2015 Initial Diagnosis    IgG multiple myeloma (CMS/[facility])     12/23/2015 -  Bone Marrow Transplant    Autologous Stem Cell Transplant      11/4/2022 - 11/4/2022 Chemotherapy    Carfilzomib (Weekly) / Cyclophosphamide / Thalidomide / Dexamethasone, 28 Day Cycles     10/25/2023 -  Cellular Therapy    Abecma        SUBJECTIVE:  History of Present Illness:  Cuong presents to clinic 3/4/24 for a follow up visit.    Overall he is doing well.    Pain in his L arm is better and rates it about a 2/10 at all times. R arm pain no longer exists.    He feels as if he is a bit mentally fuzzy, he has a hard time keeping attention and losing his train of thought.       Review of Systems   Constitutional:  Positive for fatigue.   HENT: Negative.     Eyes: Negative.    Respiratory: Negative.     Cardiovascular: Negative.    Gastrointestinal: Negative.    Endocrine: Negative.    Genitourinary: Negative.    Musculoskeletal:  Positive for arthralgias and back pain.   Skin: Negative.    Allergic/Immunologic: Positive for immunocompromised state.   Neurological: Negative.    Hematological: Negative.    Psychiatric/Behavioral: Negative.          Brain fog     OBJECTIVE:  KPS: Karnofsky Score: 70 - Cares for self; unable to carry on normal activity or do normal work    VS:  /82   Pulse 77   Temp 36.7 °C (98.1 °F)   Resp 16   Wt 102 kg (225 lb 6.7 oz)   SpO2 98%   BMI 32.34 kg/m²   BSA: 2.24 meters squared    Physical Exam  Constitutional:       Appearance: Normal appearance. He is normal weight.   HENT:      Head: Normocephalic and atraumatic.      Nose: Nose normal.      Mouth/Throat:      Mouth: Mucous membranes are moist.      Pharynx: Oropharynx is clear.   Eyes:      Extraocular Movements: Extraocular movements intact.      Conjunctiva/sclera: Conjunctivae normal.      Pupils: Pupils are equal, round, and reactive to light.   Cardiovascular:      Rate and Rhythm: Normal rate and regular rhythm.       Pulses: Normal pulses.      Heart sounds: Normal heart sounds.   Pulmonary:      Effort: Pulmonary effort is normal.      Breath sounds: Normal breath sounds.   Abdominal:      General: Abdomen is flat. Bowel sounds are normal.      Palpations: Abdomen is soft.   Musculoskeletal:         General: Tenderness present. Normal range of motion.      Cervical back: Normal range of motion and neck supple.   Skin:     General: Skin is warm and dry.   Neurological:      General: No focal deficit present.      Mental Status: He is alert and oriented to person, place, and time. Mental status is at baseline.   Psychiatric:         Mood and Affect: Mood normal.         Behavior: Behavior normal.         Thought Content: Thought content normal.         Judgment: Judgment normal.     Laboratory:  The pertinent laboratory results were reviewed and discussed with the patient.    Lab Results   Component Value Date    WBC 3.0 (L) 03/04/2024    HCT 35.3 (L) 03/04/2024    HGB 12.1 (L) 03/04/2024    PLT 98 (L) 03/04/2024    ANC 1.49 (L) 10/27/2023    K 4.3 02/05/2024    CALCIUM 9.1 02/05/2024     02/05/2024    MG 1.94 12/04/2023    ALT 19 02/05/2024    AST 19 02/05/2024    BUN 30 (H) 02/05/2024    CREATININE 1.15 02/05/2024    PHOS 3.9 11/13/2023    KAPPA 0.08 (L) 02/05/2024    LAMBDA <0.17 (L) 02/05/2024    KAPLS  02/05/2024      Comment:      One or more analytes used in this calculation   is outside of the analytical measurement range.  Calculation cannot be performed.    SPEP Decrease in polyclonal gamma globulins. 02/05/2024    IEPIN No monoclonal protein detected by immunofixation. 02/05/2024     (L) 02/05/2024    IGM <5 (L) 02/05/2024    IGA <7 (L) 02/05/2024      Note: for a comprehensive list of the patient's lab results, access the Results Review activity.    ASSESSMENT and PLAN:    Multiple Myeloma:   - ISS Stage 2 IgG Kappa Multiple Myeloma  - S/p VRD, Autologous SCT, Vacto/Pom, Kyprolis/Sarclisa, Radiation, most  recently Kyprolis/Cytoxan  - PET/CT showed mostly decreased metabolic activity, however some new areas  - Completed radiation to R arm with Dr. Patel  - BMBx performed 10/2 shows BULMARO with clonoseq detected but below LOD, however progressing lytic lesions  - S/p CAR T w/ ABECMA (T=0, 10/25/23), hospital course complicated by grade 1 ICANS managed w/ Dex 10mg x3 days and 1 dose Tociluzimab  - 1/9/2024  biochemical response (CR But lytic lesions on Xrays and pathological fractures. (Inactive lesions?)  - PET/CT (1/19/202) Multiple FDG avid lytic lesions above are stable in size and FDG avidity  - BMBx (2/5/24): BULMARO, MRD negative     ID:  - Acyclovir 400mg BID (for 6 months post CAR T)  - Bactrim DS M, W, F (for 6 months post CAR T), end of 4/2024  - IVIG for IgG <400, unable to tolerate Gammagard x2 (excruciating back pain, even with additional pre meds)  - Started Gammunex-C without incidence on 3/4/24    Bone Health:  - Found to have fracture of the R radius  11/12/23  - Follows w/ Dr. Collins, no plans for surgery at this point  - Started Zometa monthly, 1/4  - Advised about increasing Calcium and Vit D intake     DVT:  - 6/20/22 acute occlusive DVT demonstrated within the left common femoral, profunda, femoral and popliteal veins.  - Has been on Eliquis 5mg BID, inturrupted around time of CAR-T cell then was discontinued as VTE was thought to be malignancy related while on treatment, as he's in remission now off treatment, Eliquis was stopped     Cardiac:  - Echo (5/2/23) showed EF of 50-55%  - Reports some dizziness, encouraged increasing fluid intake, sCr 1.35 (11/28)  - Will plan to hold Lisinopril and assess improvement in dizziness and BP (will wean, 2.5mg BID x2 days, every other day, done)  - Ongoing dizziness/orthostasis, given 1L NS 12/18    Neurology  - LE neuropathy, started with Velcade years ago, stable  - Mental decline and memory loss, more noticeable after CAR-T   - Monitor for now    Derm:  -  History of squamous cell carcinomas  - Has plans to have another removed on L forehead    RTC:  1 month    Wiley Arechiga, APRN-CNP

## 2024-03-05 LAB
CA-I BLD-SCNC: 1.18 MMOL/L (ref 1.1–1.33)
CMV DNA SERPL NAA+PROBE-LOG IU: NORMAL {LOG_IU}/ML
FIBRINOGEN PPP-MCNC: 244 MG/DL (ref 200–400)
KAPPA LC SERPL-MCNC: 0.12 MG/DL (ref 0.33–1.94)
KAPPA LC/LAMBDA SER: ABNORMAL {RATIO}
LABORATORY COMMENT REPORT: NOT DETECTED
LAMBDA LC SERPL-MCNC: <0.17 MG/DL (ref 0.57–2.63)

## 2024-03-06 LAB
ALBUMIN: 4 G/DL (ref 3.4–5)
ALPHA 1 GLOBULIN: 0.2 G/DL (ref 0.2–0.6)
ALPHA 2 GLOBULIN: 0.6 G/DL (ref 0.4–1.1)
BETA GLOBULIN: 0.6 G/DL (ref 0.5–1.2)
GAMMA GLOBULIN: 0.3 G/DL (ref 0.5–1.4)
IMMUNOFIXATION COMMENT: ABNORMAL
PATH REVIEW - SERUM IMMUNOFIXATION: ABNORMAL
PATH REVIEW-SERUM PROTEIN ELECTROPHORESIS: ABNORMAL
PROTEIN ELECTROPHORESIS COMMENT: ABNORMAL

## 2024-03-11 ENCOUNTER — APPOINTMENT (OUTPATIENT)
Dept: HEMATOLOGY/ONCOLOGY | Facility: CLINIC | Age: 74
End: 2024-03-11
Payer: COMMERCIAL

## 2024-03-19 PROBLEM — K59.03 CONSTIPATION DUE TO OPIOID THERAPY: Status: ACTIVE | Noted: 2023-11-21

## 2024-03-19 PROBLEM — T40.2X5A CONSTIPATION DUE TO OPIOID THERAPY: Status: ACTIVE | Noted: 2023-11-21

## 2024-03-19 PROBLEM — H54.7 VISUAL IMPAIRMENT: Status: ACTIVE | Noted: 2022-12-08

## 2024-03-19 PROBLEM — Z96.659 ARTIFICIAL KNEE JOINT PRESENT: Status: ACTIVE | Noted: 2022-11-21

## 2024-04-01 ENCOUNTER — INFUSION (OUTPATIENT)
Dept: HEMATOLOGY/ONCOLOGY | Facility: CLINIC | Age: 74
End: 2024-04-01
Payer: COMMERCIAL

## 2024-04-01 ENCOUNTER — OFFICE VISIT (OUTPATIENT)
Dept: HEMATOLOGY/ONCOLOGY | Facility: CLINIC | Age: 74
End: 2024-04-01
Payer: COMMERCIAL

## 2024-04-01 VITALS
OXYGEN SATURATION: 97 % | SYSTOLIC BLOOD PRESSURE: 149 MMHG | BODY MASS INDEX: 32.39 KG/M2 | RESPIRATION RATE: 16 BRPM | WEIGHT: 225.75 LBS | DIASTOLIC BLOOD PRESSURE: 84 MMHG | HEART RATE: 74 BPM | TEMPERATURE: 98.6 F

## 2024-04-01 VITALS
SYSTOLIC BLOOD PRESSURE: 143 MMHG | HEART RATE: 66 BPM | TEMPERATURE: 97.5 F | RESPIRATION RATE: 16 BRPM | DIASTOLIC BLOOD PRESSURE: 85 MMHG

## 2024-04-01 DIAGNOSIS — D80.1 HYPOGAMMAGLOBULINEMIA DUE TO MULTIPLE MYELOMA (MULTI): ICD-10-CM

## 2024-04-01 DIAGNOSIS — C90.00 IGG MULTIPLE MYELOMA (MULTI): ICD-10-CM

## 2024-04-01 DIAGNOSIS — R41.89 COGNITIVE DECLINE: ICD-10-CM

## 2024-04-01 DIAGNOSIS — D84.9 IMMUNOCOMPROMISED (MULTI): ICD-10-CM

## 2024-04-01 DIAGNOSIS — M84.353S STRESS FRACTURE OF NECK OF FEMUR, SEQUELA: ICD-10-CM

## 2024-04-01 DIAGNOSIS — Z92.850 PERSONAL HISTORY OF CHIMERIC ANTIGEN RECEPTOR T-CELL THERAPY: ICD-10-CM

## 2024-04-01 DIAGNOSIS — C90.00 MULTIPLE MYELOMA WITHOUT REMISSION (MULTI): ICD-10-CM

## 2024-04-01 DIAGNOSIS — C90.00 MULTIPLE MYELOMA WITHOUT REMISSION (MULTI): Primary | ICD-10-CM

## 2024-04-01 DIAGNOSIS — Z92.850 HISTORY OF CHIMERIC ANTIGEN RECEPTOR T-CELL THERAPY: ICD-10-CM

## 2024-04-01 DIAGNOSIS — C90.00 HYPOGAMMAGLOBULINEMIA DUE TO MULTIPLE MYELOMA (MULTI): ICD-10-CM

## 2024-04-01 LAB
BASOPHILS # BLD AUTO: 0.01 X10*3/UL (ref 0–0.1)
BASOPHILS NFR BLD AUTO: 0.3 %
EOSINOPHIL # BLD AUTO: 0.04 X10*3/UL (ref 0–0.4)
EOSINOPHIL NFR BLD AUTO: 1.1 %
ERYTHROCYTE [DISTWIDTH] IN BLOOD BY AUTOMATED COUNT: 12.2 % (ref 11.5–14.5)
HCT VFR BLD AUTO: 33.4 % (ref 41–52)
HGB BLD-MCNC: 11.5 G/DL (ref 13.5–17.5)
IMM GRANULOCYTES # BLD AUTO: 0 X10*3/UL (ref 0–0.5)
IMM GRANULOCYTES NFR BLD AUTO: 0 % (ref 0–0.9)
LYMPHOCYTES # BLD AUTO: 0.73 X10*3/UL (ref 0.8–3)
LYMPHOCYTES NFR BLD AUTO: 19.6 %
MCH RBC QN AUTO: 35.6 PG (ref 26–34)
MCHC RBC AUTO-ENTMCNC: 34.4 G/DL (ref 32–36)
MCV RBC AUTO: 103 FL (ref 80–100)
MONOCYTES # BLD AUTO: 0.4 X10*3/UL (ref 0.05–0.8)
MONOCYTES NFR BLD AUTO: 10.8 %
NEUTROPHILS # BLD AUTO: 2.54 X10*3/UL (ref 1.6–5.5)
NEUTROPHILS NFR BLD AUTO: 68.2 %
NRBC BLD-RTO: ABNORMAL /100{WBCS}
PLATELET # BLD AUTO: 90 X10*3/UL (ref 150–450)
RBC # BLD AUTO: 3.23 X10*6/UL (ref 4.5–5.9)
WBC # BLD AUTO: 3.7 X10*3/UL (ref 4.4–11.3)

## 2024-04-01 PROCEDURE — 85025 COMPLETE CBC W/AUTO DIFF WBC: CPT

## 2024-04-01 PROCEDURE — 96375 TX/PRO/DX INJ NEW DRUG ADDON: CPT | Mod: INF

## 2024-04-01 PROCEDURE — 82784 ASSAY IGA/IGD/IGG/IGM EACH: CPT

## 2024-04-01 PROCEDURE — 80053 COMPREHEN METABOLIC PANEL: CPT

## 2024-04-01 PROCEDURE — 1126F AMNT PAIN NOTED NONE PRSNT: CPT

## 2024-04-01 PROCEDURE — 99215 OFFICE O/P EST HI 40 MIN: CPT

## 2024-04-01 PROCEDURE — 84155 ASSAY OF PROTEIN SERUM: CPT | Mod: 59

## 2024-04-01 PROCEDURE — 2500000004 HC RX 250 GENERAL PHARMACY W/ HCPCS (ALT 636 FOR OP/ED)

## 2024-04-01 PROCEDURE — 1159F MED LIST DOCD IN RCRD: CPT

## 2024-04-01 PROCEDURE — 96365 THER/PROPH/DIAG IV INF INIT: CPT | Mod: INF

## 2024-04-01 PROCEDURE — 96366 THER/PROPH/DIAG IV INF ADDON: CPT | Mod: INF

## 2024-04-01 PROCEDURE — 3079F DIAST BP 80-89 MM HG: CPT

## 2024-04-01 PROCEDURE — 96367 TX/PROPH/DG ADDL SEQ IV INF: CPT

## 2024-04-01 PROCEDURE — 3077F SYST BP >= 140 MM HG: CPT

## 2024-04-01 PROCEDURE — 83521 IG LIGHT CHAINS FREE EACH: CPT

## 2024-04-01 PROCEDURE — 1160F RVW MEDS BY RX/DR IN RCRD: CPT

## 2024-04-01 PROCEDURE — 86334 IMMUNOFIX E-PHORESIS SERUM: CPT

## 2024-04-01 PROCEDURE — 2500000001 HC RX 250 WO HCPCS SELF ADMINISTERED DRUGS (ALT 637 FOR MEDICARE OP)

## 2024-04-01 RX ORDER — FAMOTIDINE 10 MG/ML
20 INJECTION INTRAVENOUS ONCE AS NEEDED
OUTPATIENT
Start: 2024-04-29

## 2024-04-01 RX ORDER — DIPHENHYDRAMINE HYDROCHLORIDE 50 MG/ML
25 INJECTION INTRAMUSCULAR; INTRAVENOUS ONCE
Status: CANCELLED
Start: 2024-04-08 | End: 2024-04-08

## 2024-04-01 RX ORDER — DIPHENHYDRAMINE HYDROCHLORIDE 50 MG/ML
25 INJECTION INTRAMUSCULAR; INTRAVENOUS ONCE
Status: COMPLETED | OUTPATIENT
Start: 2024-04-01 | End: 2024-04-01

## 2024-04-01 RX ORDER — ALBUTEROL SULFATE 0.83 MG/ML
3 SOLUTION RESPIRATORY (INHALATION) AS NEEDED
OUTPATIENT
Start: 2024-04-29

## 2024-04-01 RX ORDER — HEPARIN 100 UNIT/ML
500 SYRINGE INTRAVENOUS AS NEEDED
Status: CANCELLED | OUTPATIENT
Start: 2024-04-01

## 2024-04-01 RX ORDER — EPINEPHRINE 0.3 MG/.3ML
0.3 INJECTION SUBCUTANEOUS EVERY 5 MIN PRN
Status: CANCELLED | OUTPATIENT
Start: 2024-04-08

## 2024-04-01 RX ORDER — DIPHENHYDRAMINE HYDROCHLORIDE 50 MG/ML
50 INJECTION INTRAMUSCULAR; INTRAVENOUS AS NEEDED
Status: CANCELLED | OUTPATIENT
Start: 2024-04-08

## 2024-04-01 RX ORDER — MONTELUKAST SODIUM 10 MG/1
10 TABLET ORAL ONCE
Status: CANCELLED
Start: 2024-04-08 | End: 2024-04-08

## 2024-04-01 RX ORDER — MONTELUKAST SODIUM 10 MG/1
10 TABLET ORAL ONCE
Status: COMPLETED | OUTPATIENT
Start: 2024-04-01 | End: 2024-04-01

## 2024-04-01 RX ORDER — FAMOTIDINE 10 MG/ML
20 INJECTION INTRAVENOUS ONCE
Status: CANCELLED
Start: 2024-04-08 | End: 2024-04-08

## 2024-04-01 RX ORDER — FAMOTIDINE 10 MG/ML
20 INJECTION INTRAVENOUS ONCE AS NEEDED
Status: CANCELLED | OUTPATIENT
Start: 2024-04-08

## 2024-04-01 RX ORDER — HEPARIN SODIUM,PORCINE/PF 10 UNIT/ML
50 SYRINGE (ML) INTRAVENOUS AS NEEDED
Status: CANCELLED | OUTPATIENT
Start: 2024-04-01

## 2024-04-01 RX ORDER — ACETAMINOPHEN 325 MG/1
650 TABLET ORAL ONCE
Status: CANCELLED | OUTPATIENT
Start: 2024-04-08

## 2024-04-01 RX ORDER — ALBUTEROL SULFATE 0.83 MG/ML
3 SOLUTION RESPIRATORY (INHALATION) AS NEEDED
Status: CANCELLED | OUTPATIENT
Start: 2024-04-08

## 2024-04-01 RX ORDER — FAMOTIDINE 10 MG/ML
20 INJECTION INTRAVENOUS ONCE
Status: COMPLETED | OUTPATIENT
Start: 2024-04-01 | End: 2024-04-01

## 2024-04-01 RX ORDER — EPINEPHRINE 0.3 MG/.3ML
0.3 INJECTION SUBCUTANEOUS ONCE AS NEEDED
OUTPATIENT
Start: 2024-04-29

## 2024-04-01 RX ORDER — DIPHENHYDRAMINE HYDROCHLORIDE 50 MG/ML
50 INJECTION INTRAMUSCULAR; INTRAVENOUS AS NEEDED
OUTPATIENT
Start: 2024-04-29

## 2024-04-01 RX ORDER — ACETAMINOPHEN 325 MG/1
650 TABLET ORAL ONCE
Status: COMPLETED | OUTPATIENT
Start: 2024-04-01 | End: 2024-04-01

## 2024-04-01 RX ADMIN — ACETAMINOPHEN 650 MG: 325 TABLET ORAL at 10:07

## 2024-04-01 RX ADMIN — DIPHENHYDRAMINE HYDROCHLORIDE 25 MG: 50 INJECTION INTRAMUSCULAR; INTRAVENOUS at 10:07

## 2024-04-01 RX ADMIN — ZOLEDRONIC ACID 4 MG: 4 INJECTION, SOLUTION, CONCENTRATE INTRAVENOUS at 10:08

## 2024-04-01 RX ADMIN — FAMOTIDINE 20 MG: 10 INJECTION, SOLUTION INTRAVENOUS at 10:07

## 2024-04-01 RX ADMIN — IMMUNE GLOBULIN (HUMAN) 20 G: 10 INJECTION INTRAVENOUS; SUBCUTANEOUS at 10:55

## 2024-04-01 RX ADMIN — MONTELUKAST 10 MG: 10 TABLET, FILM COATED ORAL at 10:41

## 2024-04-01 ASSESSMENT — ENCOUNTER SYMPTOMS
CARDIOVASCULAR NEGATIVE: 1
NEUROLOGICAL NEGATIVE: 1
EYES NEGATIVE: 1
BACK PAIN: 1
RESPIRATORY NEGATIVE: 1
FATIGUE: 1
PSYCHIATRIC NEGATIVE: 1
ARTHRALGIAS: 1
ENDOCRINE NEGATIVE: 1
HEMATOLOGIC/LYMPHATIC NEGATIVE: 1
GASTROINTESTINAL NEGATIVE: 1

## 2024-04-01 ASSESSMENT — PAIN SCALES - GENERAL: PAINLEVEL: 0-NO PAIN

## 2024-04-01 NOTE — PROGRESS NOTES
Patient is here today for infusion of Zometa and IVIG- no complication since last being seen-  b/h/ lung sounds not auscultated  Patient tolerated infusion well.  No complaints. Call back instructions reviewed.    Patient verbalizes understanding of plan of care.  Ambulated off unit without difficulty, steady gait.

## 2024-04-01 NOTE — PROGRESS NOTES
Patient ID: Maldonado Mccloud is a 73 y.o. male.  Referring Physician: No referring provider defined for this encounter.  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 Bone marrow biopsy suggested multiple myeloma IgG kappa, ISS Stage II, bone survey revealed lytic lesions; Urine light chains present kappa restricted with 2gm proteinuria.  No adverse prognostic factors identified.    Treatment Hx:    VRD  Initiated Aug 2015; currently s/p 3 cycles.  BMBx 10/2015 complete response.    Stem Cell Transplant  Underwent stem cell mobilization with Neupogen/Plerixafor and 2 day collection December 16-17, 2015, for 8.62 CD34 x 10^6/kg. During procedure for right IJ Trialysis placement developed A. Fib with RVR which spontaneously resolved on December 14, 2015. He was admitted and placed on monitor for stem cell collection.     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  Hospitalization complicated by orthostatic hypotension, electrolyte replacement, drug induced rash, culture negative fever.      He completed approximately 5 weeks of maintenance Revlimid 10 mg daily which was held for worsening neuropathy August 2016.    3/ 2018: IgG M Braxton rising to 0.2gm/DL and light chain rising; restarted on Revlimid maintenance at 5mg EOD.     Revlimid stopped in 3/2021 due to stable disease.    Vacto/Pom Trial  7/2021 his M spike was on the rise. He was consented for Vacto/Pom trial which he began on 8/5/2021. He continued Pom post finishing the trial.     Sarclisa/Kyprolis  Myeloma markers increasing in 6/2022, consented for Sarclisa/Kyprolis, which began 6/30/22. He continued this through September of 2022.       Cytoxan/Kyprolis  His therapy was subsequently changed in 9/2022 due to  continued disease progression in the face of Sarclisa. His therapy was changed to CYYCLON regimen (Cytoxan/Kyprolis).    PET CT scan 9/14/2022 shows FDG avid lytic lesion within the left midshaft clavicle with pathologic fracture, FDG avid lytic lesions involving bilateral scapulas and FDG avidity within the T9 vertebral body, suggestive of active multiple myeloma.  There are non FDG avid lytic lesions in the right iliac bone and right clavicle, likely representing nonactive multiple myeloma.  There is no PET evidence of extramedullary involvement.  There is an FDG avid right thyroid nodule.     MRI in 10/2022 negative for myelomatous involvement in thoracic vertebrae.     Therapy placed on hold in 11/2022 due to need for hip replacement surgery. His myeloma markers were on the rise in 3/2023 with a new jaw lesion. Plan is to begin radiation therapy 4/11 and resume 2x/week Kyprolis 4/10.     Tracking to CAR T cell therapy. Collected CAR T cells on 5/26/23.     X-ray of L elbow (6/20/23):  IMPRESSION:  1. Osteolytic lesions in the proximal radius and distal humerus  consistent with patient's reported history of multiple myeloma.  2. No acute fracture or malalignment.    Began radiation x8 fractions on 7/3/23.    Resumed Cytoxan as of 8/7/23.       CAR T Cell Therapy   Admitted for CAR T cell therapy for his ISS stage 2 Kappa multiple myeloma with ABECMA T=0, 10/25/23     Hospital course complicated by Grade 1 CRS and Grade 1 ICANS, managed with dexamethasone 10mg x 3 days and one dose of tocilizumab.     Post CAR T restaging:    PET/CT (1/1/9/24):  IMPRESSION:  1. Multiple FDG avid lytic lesions as described above are stable in  size and FDG avidity when compared to prior study, consistent with  known multiple myeloma.  2. No PET evidence of extramedullary disease.  3. Mild FDG avid focus in the right thyroid gland, further evaluation  with thyroid ultrasound is recommended.    BMBx (2/5/24): pending     IgG multiple  myeloma (CMS/[facility])   7/1/2015 Initial Diagnosis    IgG multiple myeloma (CMS/[facility])     12/23/2015 -  Bone Marrow Transplant    Autologous Stem Cell Transplant      11/4/2022 - 11/4/2022 Chemotherapy    Carfilzomib (Weekly) / Cyclophosphamide / Thalidomide / Dexamethasone, 28 Day Cycles     10/25/2023 -  Cellular Therapy    Abecma        SUBJECTIVE:  History of Present Illness:  Cuong presents to clinic 4/1/24 for a follow up visit.    Overall he is doing okay.     Still struggling cognitively. Having issues with his attention and thought process. Finds himself being very forgetful.    Pain is doing well, staying active.       Review of Systems   Constitutional:  Positive for fatigue.   HENT: Negative.     Eyes: Negative.    Respiratory: Negative.     Cardiovascular: Negative.    Gastrointestinal: Negative.    Endocrine: Negative.    Genitourinary: Negative.    Musculoskeletal:  Positive for arthralgias and back pain.   Skin: Negative.    Allergic/Immunologic: Positive for immunocompromised state.   Neurological: Negative.    Hematological: Negative.    Psychiatric/Behavioral: Negative.          Brain fog     OBJECTIVE:  KPS: Karnofsky Score: 70 - Cares for self; unable to carry on normal activity or do normal work    VS:  /84   Pulse 74   Temp 37 °C (98.6 °F)   Resp 16   Wt 102 kg (225 lb 12 oz)   SpO2 97%   BMI 32.39 kg/m²   BSA: 2.24 meters squared    Physical Exam  Constitutional:       Appearance: Normal appearance. He is normal weight.   HENT:      Head: Normocephalic and atraumatic.      Nose: Nose normal.      Mouth/Throat:      Mouth: Mucous membranes are moist.      Pharynx: Oropharynx is clear.   Eyes:      Extraocular Movements: Extraocular movements intact.      Conjunctiva/sclera: Conjunctivae normal.      Pupils: Pupils are equal, round, and reactive to light.   Cardiovascular:      Rate and Rhythm: Normal rate and regular rhythm.      Pulses: Normal pulses.      Heart sounds: Normal heart  sounds.   Pulmonary:      Effort: Pulmonary effort is normal.      Breath sounds: Normal breath sounds.   Abdominal:      General: Abdomen is flat. Bowel sounds are normal.      Palpations: Abdomen is soft.   Musculoskeletal:         General: Tenderness present. Normal range of motion.      Cervical back: Normal range of motion and neck supple.   Skin:     General: Skin is warm and dry.   Neurological:      General: No focal deficit present.      Mental Status: He is alert and oriented to person, place, and time. Mental status is at baseline.   Psychiatric:         Mood and Affect: Mood normal.         Behavior: Behavior normal.         Thought Content: Thought content normal.         Judgment: Judgment normal.     Laboratory:  The pertinent laboratory results were reviewed and discussed with the patient.    Lab Results   Component Value Date    WBC 3.7 (L) 04/01/2024    HCT 33.4 (L) 04/01/2024    HGB 11.5 (L) 04/01/2024    PLT 90 (L) 04/01/2024    ANC 1.49 (L) 10/27/2023    K 4.5 03/04/2024    CALCIUM 9.2 03/04/2024     03/04/2024    MG 1.94 03/04/2024    ALT 21 03/04/2024    AST 23 03/04/2024    BUN 21 03/04/2024    CREATININE 1.25 03/04/2024    PHOS 3.9 11/13/2023    KAPPA 0.12 (L) 03/04/2024    LAMBDA <0.17 (L) 03/04/2024    KAPLS  03/04/2024      Comment:      One or more analytes used in this calculation   is outside of the analytical measurement range.  Calculation cannot be performed.    SPEP Decrease in polyclonal gamma globulins.    03/04/2024    IEPIN No monoclonal protein detected by immunofixation. 03/04/2024     (L) 03/04/2024     (L) 03/04/2024    IGM <5 (L) 03/04/2024    IGA <26 (L) 03/04/2024      Note: for a comprehensive list of the patient's lab results, access the Results Review activity.    ASSESSMENT and PLAN:    Multiple Myeloma:   - ISS Stage 2 IgG Kappa Multiple Myeloma  - S/p VRD, Autologous SCT, Vacto/Pom, Kyprolis/Sarclisa, Radiation, most recently Kyprolis/Cytoxan  -  PET/CT showed mostly decreased metabolic activity, however some new areas  - Completed radiation to R arm with Dr. Patel  - BMBx performed 10/2 shows BULMARO with clonoseq detected but below LOD, however progressing lytic lesions  - S/p CAR T w/ ABECMA (T=0, 10/25/23), hospital course complicated by grade 1 ICANS managed w/ Dex 10mg x3 days and 1 dose Tociluzimab  - 1/9/2024  biochemical response (CR But lytic lesions on Xrays and pathological fractures. (Inactive lesions?)  - PET/CT (1/19/202) Multiple FDG avid lytic lesions above are stable in size and FDG avidity  - BMBx (2/5/24): BULMARO, MRD negative     ID:  - Acyclovir 400mg BID (for 6 months post CAR T)  - Bactrim DS M, W, F (for 6 months post CAR T), end of 4/2024  - IVIG for IgG <400, unable to tolerate Gammagard x2 (excruciating back pain, even with additional pre meds)  - Started Gammunex-C without incidence on 3/4/24    Bone Health:  - Found to have fracture of the R radius  11/12/23  - Follows w/ Dr. Collins, no plans for surgery at this point  - Started Zometa monthly, 1/4  - Advised about increasing Calcium and Vit D intake     DVT:  - 6/20/22 acute occlusive DVT demonstrated within the left common femoral, profunda, femoral and popliteal veins.  - Has been on Eliquis 5mg BID, inturrupted around time of CAR-T cell then was discontinued as VTE was thought to be malignancy related while on treatment, as he's in remission now off treatment, Eliquis was stopped     Cardiac:  - Echo (5/2/23) showed EF of 50-55%  - Reports some dizziness, encouraged increasing fluid intake, sCr 1.35 (11/28)  - Will plan to hold Lisinopril and assess improvement in dizziness and BP (will wean, 2.5mg BID x2 days, every other day, done)    Neurology  - LE neuropathy, started with Velcade years ago, stable  - Mental decline and memory loss, more noticeable after CAR-T  - Neurology referral     Derm:  - History of squamous cell carcinomas  - Has plans to have another removed on L  forehead    RTC:  1 month    Wiley Arechiga, APRN-CNP

## 2024-04-02 LAB
ALBUMIN SERPL BCP-MCNC: 3.3 G/DL (ref 3.4–5)
ALP SERPL-CCNC: 45 U/L (ref 33–136)
ALT SERPL W P-5'-P-CCNC: 14 U/L (ref 10–52)
ANION GAP SERPL CALC-SCNC: 10 MMOL/L (ref 10–20)
AST SERPL W P-5'-P-CCNC: 18 U/L (ref 9–39)
BILIRUB SERPL-MCNC: 0.5 MG/DL (ref 0–1.2)
BUN SERPL-MCNC: 23 MG/DL (ref 6–23)
CALCIUM SERPL-MCNC: 7.7 MG/DL (ref 8.6–10.6)
CHLORIDE SERPL-SCNC: 115 MMOL/L (ref 98–107)
CO2 SERPL-SCNC: 22 MMOL/L (ref 21–32)
CREAT SERPL-MCNC: 1.01 MG/DL (ref 0.5–1.3)
EGFRCR SERPLBLD CKD-EPI 2021: 78 ML/MIN/1.73M*2
GLUCOSE SERPL-MCNC: 92 MG/DL (ref 74–99)
IGA SERPL-MCNC: <7 MG/DL (ref 70–400)
IGG SERPL-MCNC: 433 MG/DL (ref 700–1600)
IGM SERPL-MCNC: 5 MG/DL (ref 40–230)
KAPPA LC SERPL-MCNC: 0.38 MG/DL (ref 0.33–1.94)
KAPPA LC/LAMBDA SER: ABNORMAL {RATIO}
LAMBDA LC SERPL-MCNC: <0.17 MG/DL (ref 0.57–2.63)
POTASSIUM SERPL-SCNC: 3.7 MMOL/L (ref 3.5–5.3)
PROT SERPL-MCNC: 4.5 G/DL (ref 6.4–8.2)
PROT SERPL-MCNC: 4.5 G/DL (ref 6.4–8.2)
SODIUM SERPL-SCNC: 143 MMOL/L (ref 136–145)

## 2024-04-04 LAB
ALBUMIN: 4 G/DL (ref 3.4–5)
ALPHA 1 GLOBULIN: 0.3 G/DL (ref 0.2–0.6)
ALPHA 2 GLOBULIN: 0.6 G/DL (ref 0.4–1.1)
BETA GLOBULIN: 0.6 G/DL (ref 0.5–1.2)
GAMMA GLOBULIN: 0.4 G/DL (ref 0.5–1.4)
IMMUNOFIXATION COMMENT: ABNORMAL
PATH REVIEW - SERUM IMMUNOFIXATION: ABNORMAL
PATH REVIEW-SERUM PROTEIN ELECTROPHORESIS: ABNORMAL
PROTEIN ELECTROPHORESIS COMMENT: ABNORMAL

## 2024-04-18 DIAGNOSIS — J40 BRONCHITIS: Primary | ICD-10-CM

## 2024-04-18 DIAGNOSIS — R05.9 COUGH, UNSPECIFIED TYPE: ICD-10-CM

## 2024-04-18 RX ORDER — AZITHROMYCIN 250 MG/1
TABLET, FILM COATED ORAL
Qty: 6 TABLET | Refills: 0 | Status: SHIPPED | OUTPATIENT
Start: 2024-04-18

## 2024-04-29 ENCOUNTER — APPOINTMENT (OUTPATIENT)
Dept: HEMATOLOGY/ONCOLOGY | Facility: CLINIC | Age: 74
End: 2024-04-29
Payer: COMMERCIAL

## 2024-04-29 ENCOUNTER — INFUSION (OUTPATIENT)
Dept: HEMATOLOGY/ONCOLOGY | Facility: CLINIC | Age: 74
End: 2024-04-29
Payer: COMMERCIAL

## 2024-04-29 ENCOUNTER — OFFICE VISIT (OUTPATIENT)
Dept: HEMATOLOGY/ONCOLOGY | Facility: CLINIC | Age: 74
End: 2024-04-29
Payer: COMMERCIAL

## 2024-04-29 VITALS
HEART RATE: 69 BPM | SYSTOLIC BLOOD PRESSURE: 152 MMHG | RESPIRATION RATE: 16 BRPM | TEMPERATURE: 97.9 F | DIASTOLIC BLOOD PRESSURE: 85 MMHG | WEIGHT: 224.65 LBS | OXYGEN SATURATION: 99 % | BODY MASS INDEX: 32.23 KG/M2

## 2024-04-29 VITALS
SYSTOLIC BLOOD PRESSURE: 139 MMHG | RESPIRATION RATE: 16 BRPM | TEMPERATURE: 98.4 F | DIASTOLIC BLOOD PRESSURE: 87 MMHG | HEART RATE: 61 BPM

## 2024-04-29 DIAGNOSIS — C90.00 HYPOGAMMAGLOBULINEMIA DUE TO MULTIPLE MYELOMA (MULTI): ICD-10-CM

## 2024-04-29 DIAGNOSIS — M84.534S: ICD-10-CM

## 2024-04-29 DIAGNOSIS — C90.00 IGG MULTIPLE MYELOMA (MULTI): Primary | ICD-10-CM

## 2024-04-29 DIAGNOSIS — C90.00 MULTIPLE MYELOMA WITHOUT REMISSION (MULTI): ICD-10-CM

## 2024-04-29 DIAGNOSIS — C90.00 IGG MULTIPLE MYELOMA (MULTI): ICD-10-CM

## 2024-04-29 DIAGNOSIS — Z94.84 STEM CELLS TRANSPLANT STATUS (MULTI): ICD-10-CM

## 2024-04-29 DIAGNOSIS — D80.1 HYPOGAMMAGLOBULINEMIA DUE TO MULTIPLE MYELOMA (MULTI): ICD-10-CM

## 2024-04-29 DIAGNOSIS — Z92.850 PERSONAL HISTORY OF CHIMERIC ANTIGEN RECEPTOR T-CELL THERAPY: ICD-10-CM

## 2024-04-29 DIAGNOSIS — Z92.850 HISTORY OF CHIMERIC ANTIGEN RECEPTOR T-CELL THERAPY: ICD-10-CM

## 2024-04-29 LAB
ANION GAP SERPL CALC-SCNC: 14 MMOL/L (ref 10–20)
BASOPHILS # BLD AUTO: 0.01 X10*3/UL (ref 0–0.1)
BASOPHILS NFR BLD AUTO: 0.3 %
BUN SERPL-MCNC: 23 MG/DL (ref 6–23)
CA-I BLD-SCNC: 1.28 MMOL/L (ref 1.1–1.33)
CHLORIDE SERPL-SCNC: 107 MMOL/L (ref 98–107)
CO2 SERPL-SCNC: 24 MMOL/L (ref 21–32)
CREAT SERPL-MCNC: 1.2 MG/DL (ref 0.6–1.3)
EOSINOPHIL # BLD AUTO: 0.05 X10*3/UL (ref 0–0.4)
EOSINOPHIL NFR BLD AUTO: 1.7 %
ERYTHROCYTE [DISTWIDTH] IN BLOOD BY AUTOMATED COUNT: 12.4 % (ref 11.5–14.5)
GFR SERPL CREATININE-BSD FRML MDRD: 63 ML/MIN/1.73M*2
GLUCOSE SERPL-MCNC: 99 MG/DL (ref 74–99)
HCT VFR BLD AUTO: 32.8 % (ref 41–52)
HGB BLD-MCNC: 11.3 G/DL (ref 13.5–17.5)
IMM GRANULOCYTES # BLD AUTO: 0 X10*3/UL (ref 0–0.5)
IMM GRANULOCYTES NFR BLD AUTO: 0 % (ref 0–0.9)
LYMPHOCYTES # BLD AUTO: 0.59 X10*3/UL (ref 0.8–3)
LYMPHOCYTES NFR BLD AUTO: 19.8 %
MCH RBC QN AUTO: 35.1 PG (ref 26–34)
MCHC RBC AUTO-ENTMCNC: 34.5 G/DL (ref 32–36)
MCV RBC AUTO: 102 FL (ref 80–100)
MONOCYTES # BLD AUTO: 0.32 X10*3/UL (ref 0.05–0.8)
MONOCYTES NFR BLD AUTO: 10.7 %
NEUTROPHILS # BLD AUTO: 2.01 X10*3/UL (ref 1.6–5.5)
NEUTROPHILS NFR BLD AUTO: 67.5 %
NRBC BLD-RTO: ABNORMAL /100{WBCS}
PLATELET # BLD AUTO: 102 X10*3/UL (ref 150–450)
POTASSIUM SERPL-SCNC: 4.4 MMOL/L (ref 3.5–5.3)
RBC # BLD AUTO: 3.22 X10*6/UL (ref 4.5–5.9)
SODIUM SERPL-SCNC: 141 MMOL/L (ref 136–145)
WBC # BLD AUTO: 3 X10*3/UL (ref 4.4–11.3)

## 2024-04-29 PROCEDURE — 96365 THER/PROPH/DIAG IV INF INIT: CPT | Mod: INF

## 2024-04-29 PROCEDURE — 2500000004 HC RX 250 GENERAL PHARMACY W/ HCPCS (ALT 636 FOR OP/ED)

## 2024-04-29 PROCEDURE — 84550 ASSAY OF BLOOD/URIC ACID: CPT

## 2024-04-29 PROCEDURE — 3077F SYST BP >= 140 MM HG: CPT

## 2024-04-29 PROCEDURE — 82728 ASSAY OF FERRITIN: CPT

## 2024-04-29 PROCEDURE — 83735 ASSAY OF MAGNESIUM: CPT

## 2024-04-29 PROCEDURE — 80053 COMPREHEN METABOLIC PANEL: CPT

## 2024-04-29 PROCEDURE — 2500000005 HC RX 250 GENERAL PHARMACY W/O HCPCS

## 2024-04-29 PROCEDURE — 2500000001 HC RX 250 WO HCPCS SELF ADMINISTERED DRUGS (ALT 637 FOR MEDICARE OP)

## 2024-04-29 PROCEDURE — 85384 FIBRINOGEN ACTIVITY: CPT

## 2024-04-29 PROCEDURE — 80047 BASIC METABLC PNL IONIZED CA: CPT | Mod: 59

## 2024-04-29 PROCEDURE — 1159F MED LIST DOCD IN RCRD: CPT

## 2024-04-29 PROCEDURE — 96372 THER/PROPH/DIAG INJ SC/IM: CPT

## 2024-04-29 PROCEDURE — 90696 DTAP-IPV VACCINE 4-6 YRS IM: CPT

## 2024-04-29 PROCEDURE — 99215 OFFICE O/P EST HI 40 MIN: CPT | Mod: 25

## 2024-04-29 PROCEDURE — 99215 OFFICE O/P EST HI 40 MIN: CPT

## 2024-04-29 PROCEDURE — 3079F DIAST BP 80-89 MM HG: CPT

## 2024-04-29 PROCEDURE — 96375 TX/PRO/DX INJ NEW DRUG ADDON: CPT | Mod: INF

## 2024-04-29 PROCEDURE — 86140 C-REACTIVE PROTEIN: CPT

## 2024-04-29 PROCEDURE — 90472 IMMUNIZATION ADMIN EACH ADD: CPT

## 2024-04-29 PROCEDURE — 96367 TX/PROPH/DG ADDL SEQ IV INF: CPT

## 2024-04-29 PROCEDURE — 90648 HIB PRP-T VACCINE 4 DOSE IM: CPT

## 2024-04-29 PROCEDURE — 90750 HZV VACC RECOMBINANT IM: CPT

## 2024-04-29 PROCEDURE — 96366 THER/PROPH/DIAG IV INF ADDON: CPT | Mod: INF

## 2024-04-29 PROCEDURE — 83615 LACTATE (LD) (LDH) ENZYME: CPT

## 2024-04-29 PROCEDURE — 1160F RVW MEDS BY RX/DR IN RCRD: CPT

## 2024-04-29 PROCEDURE — 85025 COMPLETE CBC W/AUTO DIFF WBC: CPT

## 2024-04-29 PROCEDURE — 90471 IMMUNIZATION ADMIN: CPT

## 2024-04-29 PROCEDURE — 1126F AMNT PAIN NOTED NONE PRSNT: CPT

## 2024-04-29 RX ORDER — FAMOTIDINE 10 MG/ML
20 INJECTION INTRAVENOUS ONCE AS NEEDED
OUTPATIENT
Start: 2024-05-27

## 2024-04-29 RX ORDER — DIPHENHYDRAMINE HYDROCHLORIDE 50 MG/ML
50 INJECTION INTRAMUSCULAR; INTRAVENOUS AS NEEDED
OUTPATIENT
Start: 2024-05-13

## 2024-04-29 RX ORDER — MONTELUKAST SODIUM 10 MG/1
10 TABLET ORAL ONCE
Status: COMPLETED | OUTPATIENT
Start: 2024-04-29 | End: 2024-04-29

## 2024-04-29 RX ORDER — ALBUTEROL SULFATE 0.83 MG/ML
3 SOLUTION RESPIRATORY (INHALATION) AS NEEDED
OUTPATIENT
Start: 2024-05-27

## 2024-04-29 RX ORDER — EPINEPHRINE 0.3 MG/.3ML
0.3 INJECTION SUBCUTANEOUS EVERY 5 MIN PRN
OUTPATIENT
Start: 2024-05-13

## 2024-04-29 RX ORDER — DIPHENHYDRAMINE HYDROCHLORIDE 50 MG/ML
25 INJECTION INTRAMUSCULAR; INTRAVENOUS ONCE
Start: 2024-05-13 | End: 2024-05-13

## 2024-04-29 RX ORDER — FAMOTIDINE 10 MG/ML
20 INJECTION INTRAVENOUS ONCE
Start: 2024-05-13 | End: 2024-05-13

## 2024-04-29 RX ORDER — MONTELUKAST SODIUM 10 MG/1
10 TABLET ORAL ONCE
Start: 2024-05-13 | End: 2024-05-13

## 2024-04-29 RX ORDER — HEPARIN 100 UNIT/ML
500 SYRINGE INTRAVENOUS AS NEEDED
OUTPATIENT
Start: 2024-04-29

## 2024-04-29 RX ORDER — DIPHENHYDRAMINE HYDROCHLORIDE 50 MG/ML
50 INJECTION INTRAMUSCULAR; INTRAVENOUS AS NEEDED
OUTPATIENT
Start: 2024-05-27

## 2024-04-29 RX ORDER — DIPHENHYDRAMINE HYDROCHLORIDE 50 MG/ML
50 INJECTION INTRAMUSCULAR; INTRAVENOUS AS NEEDED
Status: DISCONTINUED | OUTPATIENT
Start: 2024-04-29 | End: 2024-04-29 | Stop reason: HOSPADM

## 2024-04-29 RX ORDER — ALBUTEROL SULFATE 0.83 MG/ML
3 SOLUTION RESPIRATORY (INHALATION) AS NEEDED
Status: DISCONTINUED | OUTPATIENT
Start: 2024-04-29 | End: 2024-04-29 | Stop reason: HOSPADM

## 2024-04-29 RX ORDER — EPINEPHRINE 0.3 MG/.3ML
0.3 INJECTION SUBCUTANEOUS EVERY 5 MIN PRN
Status: DISCONTINUED | OUTPATIENT
Start: 2024-04-29 | End: 2024-04-29 | Stop reason: HOSPADM

## 2024-04-29 RX ORDER — FAMOTIDINE 10 MG/ML
20 INJECTION INTRAVENOUS ONCE AS NEEDED
Status: DISCONTINUED | OUTPATIENT
Start: 2024-04-29 | End: 2024-04-29 | Stop reason: HOSPADM

## 2024-04-29 RX ORDER — ACETAMINOPHEN 325 MG/1
650 TABLET ORAL ONCE
OUTPATIENT
Start: 2024-05-13

## 2024-04-29 RX ORDER — DIPHENHYDRAMINE HYDROCHLORIDE 50 MG/ML
25 INJECTION INTRAMUSCULAR; INTRAVENOUS ONCE
Status: COMPLETED | OUTPATIENT
Start: 2024-04-29 | End: 2024-04-29

## 2024-04-29 RX ORDER — ALBUTEROL SULFATE 0.83 MG/ML
3 SOLUTION RESPIRATORY (INHALATION) AS NEEDED
OUTPATIENT
Start: 2024-05-13

## 2024-04-29 RX ORDER — ACETAMINOPHEN 325 MG/1
650 TABLET ORAL ONCE
Status: COMPLETED | OUTPATIENT
Start: 2024-04-29 | End: 2024-04-29

## 2024-04-29 RX ORDER — HEPARIN SODIUM,PORCINE/PF 10 UNIT/ML
50 SYRINGE (ML) INTRAVENOUS AS NEEDED
OUTPATIENT
Start: 2024-04-29

## 2024-04-29 RX ORDER — DIPHENHYDRAMINE HYDROCHLORIDE 50 MG/ML
50 INJECTION INTRAMUSCULAR; INTRAVENOUS AS NEEDED
OUTPATIENT
Start: 2024-06-24

## 2024-04-29 RX ORDER — EPINEPHRINE 0.3 MG/.3ML
0.3 INJECTION SUBCUTANEOUS EVERY 5 MIN PRN
OUTPATIENT
Start: 2024-06-24

## 2024-04-29 RX ORDER — FAMOTIDINE 10 MG/ML
20 INJECTION INTRAVENOUS ONCE AS NEEDED
OUTPATIENT
Start: 2024-06-24

## 2024-04-29 RX ORDER — FAMOTIDINE 10 MG/ML
20 INJECTION INTRAVENOUS ONCE AS NEEDED
OUTPATIENT
Start: 2024-05-13

## 2024-04-29 RX ORDER — FAMOTIDINE 10 MG/ML
20 INJECTION INTRAVENOUS ONCE
Status: COMPLETED | OUTPATIENT
Start: 2024-04-29 | End: 2024-04-29

## 2024-04-29 RX ORDER — ALBUTEROL SULFATE 0.83 MG/ML
3 SOLUTION RESPIRATORY (INHALATION) AS NEEDED
OUTPATIENT
Start: 2024-06-24

## 2024-04-29 RX ORDER — EPINEPHRINE 0.3 MG/.3ML
0.3 INJECTION SUBCUTANEOUS ONCE AS NEEDED
OUTPATIENT
Start: 2024-05-27

## 2024-04-29 RX ADMIN — IMMUNE GLOBULIN (HUMAN) 20 G: 10 INJECTION INTRAVENOUS; SUBCUTANEOUS at 10:24

## 2024-04-29 RX ADMIN — ACETAMINOPHEN 650 MG: 325 TABLET ORAL at 10:02

## 2024-04-29 RX ADMIN — DIPHTHERIA AND TETANUS TOXOIDS AND ACELLULAR PERTUSSIS ADSORBED AND INACTIVATED POLIOVIRUS VACCINE 0.5 ML: 25; 10; 25; 8; 25; 40; 8; 32 INJECTION, SUSPENSION INTRAMUSCULAR at 12:01

## 2024-04-29 RX ADMIN — ZOLEDRONIC ACID 4 MG: 4 INJECTION, SOLUTION, CONCENTRATE INTRAVENOUS at 12:57

## 2024-04-29 RX ADMIN — PNEUMOCOCCAL 20-VALENT CONJUGATE VACCINE 0.5 ML
2.2; 2.2; 2.2; 2.2; 2.2; 2.2; 2.2; 2.2; 2.2; 2.2; 2.2; 2.2; 2.2; 2.2; 2.2; 2.2; 4.4; 2.2; 2.2; 2.2 INJECTION, SUSPENSION INTRAMUSCULAR at 12:03

## 2024-04-29 RX ADMIN — HEPATITIS B VACCINE (RECOMBINANT) ADJUVANTED 20 MCG: 20 INJECTION, SOLUTION INTRAMUSCULAR at 12:06

## 2024-04-29 RX ADMIN — MONTELUKAST 10 MG: 10 TABLET, FILM COATED ORAL at 10:02

## 2024-04-29 RX ADMIN — HAEMOPHILUS B POLYSACCHARIDE CONJUGATE VACCINE FOR INJ 0.5 ML: RECON SOLN at 11:59

## 2024-04-29 RX ADMIN — FAMOTIDINE 20 MG: 10 INJECTION, SOLUTION INTRAVENOUS at 10:02

## 2024-04-29 RX ADMIN — Medication 0.5 ML: at 12:03

## 2024-04-29 RX ADMIN — DIPHENHYDRAMINE HYDROCHLORIDE 25 MG: 50 INJECTION INTRAMUSCULAR; INTRAVENOUS at 10:04

## 2024-04-29 ASSESSMENT — ENCOUNTER SYMPTOMS
CARDIOVASCULAR NEGATIVE: 1
ARTHRALGIAS: 1
BACK PAIN: 1
NEUROLOGICAL NEGATIVE: 1
GASTROINTESTINAL NEGATIVE: 1
RESPIRATORY NEGATIVE: 1
HEMATOLOGIC/LYMPHATIC NEGATIVE: 1
PSYCHIATRIC NEGATIVE: 1
EYES NEGATIVE: 1
FATIGUE: 1
ENDOCRINE NEGATIVE: 1

## 2024-04-29 ASSESSMENT — PAIN SCALES - GENERAL: PAINLEVEL: 0-NO PAIN

## 2024-04-29 NOTE — PROGRESS NOTES
Patient ID: Maldonado Mccloud is a 73 y.o. male.  Referring Physician: No referring provider defined for this encounter.  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 Bone marrow biopsy suggested multiple myeloma IgG kappa, ISS Stage II, bone survey revealed lytic lesions; Urine light chains present kappa restricted with 2gm proteinuria.  No adverse prognostic factors identified.    Treatment Hx:    VRD  Initiated Aug 2015; currently s/p 3 cycles.  BMBx 10/2015 complete response.    Stem Cell Transplant  Underwent stem cell mobilization with Neupogen/Plerixafor and 2 day collection December 16-17, 2015, for 8.62 CD34 x 10^6/kg. During procedure for right IJ Trialysis placement developed A. Fib with RVR which spontaneously resolved on December 14, 2015. He was admitted and placed on monitor for stem cell collection.     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  Hospitalization complicated by orthostatic hypotension, electrolyte replacement, drug induced rash, culture negative fever.      He completed approximately 5 weeks of maintenance Revlimid 10 mg daily which was held for worsening neuropathy August 2016.    3/ 2018: IgG M Braxton rising to 0.2gm/DL and light chain rising; restarted on Revlimid maintenance at 5mg EOD.     Revlimid stopped in 3/2021 due to stable disease.    Vacto/Pom Trial  7/2021 his M spike was on the rise. He was consented for Vacto/Pom trial which he began on 8/5/2021. He continued Pom post finishing the trial.     Sarclisa/Kyprolis  Myeloma markers increasing in 6/2022, consented for Sarclisa/Kyprolis, which began 6/30/22. He continued this through September of 2022.       Cytoxan/Kyprolis  His therapy was subsequently changed in 9/2022 due to  continued disease progression in the face of Sarclisa. His therapy was changed to CYYCLON regimen (Cytoxan/Kyprolis).    PET CT scan 9/14/2022 shows FDG avid lytic lesion within the left midshaft clavicle with pathologic fracture, FDG avid lytic lesions involving bilateral scapulas and FDG avidity within the T9 vertebral body, suggestive of active multiple myeloma.  There are non FDG avid lytic lesions in the right iliac bone and right clavicle, likely representing nonactive multiple myeloma.  There is no PET evidence of extramedullary involvement.  There is an FDG avid right thyroid nodule.     MRI in 10/2022 negative for myelomatous involvement in thoracic vertebrae.     Therapy placed on hold in 11/2022 due to need for hip replacement surgery. His myeloma markers were on the rise in 3/2023 with a new jaw lesion. Plan is to begin radiation therapy 4/11 and resume 2x/week Kyprolis 4/10.     Tracking to CAR T cell therapy. Collected CAR T cells on 5/26/23.     X-ray of L elbow (6/20/23):  IMPRESSION:  1. Osteolytic lesions in the proximal radius and distal humerus  consistent with patient's reported history of multiple myeloma.  2. No acute fracture or malalignment.    Began radiation x8 fractions on 7/3/23.    Resumed Cytoxan as of 8/7/23.       CAR T Cell Therapy   Admitted for CAR T cell therapy for his ISS stage 2 Kappa multiple myeloma with ABECMA T=0, 10/25/23     Hospital course complicated by Grade 1 CRS and Grade 1 ICANS, managed with dexamethasone 10mg x 3 days and one dose of tocilizumab.     Post CAR T restaging:    PET/CT (1/1/9/24):  IMPRESSION:  1. Multiple FDG avid lytic lesions as described above are stable in  size and FDG avidity when compared to prior study, consistent with  known multiple myeloma.  2. No PET evidence of extramedullary disease.  3. Mild FDG avid focus in the right thyroid gland, further evaluation  with thyroid ultrasound is recommended.    BMBx (2/5/24): pending     IgG multiple  myeloma (Multi)   7/1/2015 Initial Diagnosis    IgG multiple myeloma (CMS/[facility])     12/23/2015 -  Bone Marrow Transplant    Autologous Stem Cell Transplant      11/4/2022 - 11/4/2022 Chemotherapy    Carfilzomib (Weekly) / Cyclophosphamide / Thalidomide / Dexamethasone, 28 Day Cycles     10/25/2023 -  Cellular Therapy    Abecma        SUBJECTIVE:  History of Present Illness:  Cuong presents to clinic 4/29/24 for a follow up visit.     Overall he is feeling about the same.     Notes ongoing brain fog. Has follow up with neurology in September.    Currently on a Z pack for bronchitis. Notes frequent URIs.     His R arm continues to get stronger each day.       Review of Systems   Constitutional:  Positive for fatigue.   HENT: Negative.     Eyes: Negative.    Respiratory: Negative.     Cardiovascular: Negative.    Gastrointestinal: Negative.    Endocrine: Negative.    Genitourinary: Negative.    Musculoskeletal:  Positive for arthralgias and back pain.   Skin: Negative.    Allergic/Immunologic: Positive for immunocompromised state.   Neurological: Negative.    Hematological: Negative.    Psychiatric/Behavioral: Negative.          Brain fog     OBJECTIVE:  KPS: Karnofsky Score: 70 - Cares for self; unable to carry on normal activity or do normal work    VS:  /85   Pulse 69   Temp 36.6 °C (97.9 °F)   Resp 16   Wt 102 kg (224 lb 10.4 oz)   SpO2 99%   BMI 32.23 kg/m²   BSA: 2.24 meters squared    Physical Exam  Constitutional:       Appearance: Normal appearance. He is normal weight.   HENT:      Head: Normocephalic and atraumatic.      Nose: Nose normal.      Mouth/Throat:      Mouth: Mucous membranes are moist.      Pharynx: Oropharynx is clear.   Eyes:      Extraocular Movements: Extraocular movements intact.      Conjunctiva/sclera: Conjunctivae normal.      Pupils: Pupils are equal, round, and reactive to light.   Cardiovascular:      Rate and Rhythm: Normal rate and regular rhythm.      Pulses: Normal  pulses.      Heart sounds: Normal heart sounds.   Pulmonary:      Effort: Pulmonary effort is normal.      Breath sounds: Normal breath sounds.   Abdominal:      General: Abdomen is flat. Bowel sounds are normal.      Palpations: Abdomen is soft.   Musculoskeletal:         General: Tenderness present. Normal range of motion.      Cervical back: Normal range of motion and neck supple.   Skin:     General: Skin is warm and dry.   Neurological:      General: No focal deficit present.      Mental Status: He is alert and oriented to person, place, and time. Mental status is at baseline.   Psychiatric:         Mood and Affect: Mood normal.         Behavior: Behavior normal.         Thought Content: Thought content normal.         Judgment: Judgment normal.     Laboratory:  The pertinent laboratory results were reviewed and discussed with the patient.    Lab Results   Component Value Date    WBC 3.7 (L) 04/01/2024    HCT 33.4 (L) 04/01/2024    HGB 11.5 (L) 04/01/2024    PLT 90 (L) 04/01/2024    ANC 1.49 (L) 10/27/2023    K 3.7 04/01/2024    CALCIUM 7.7 (L) 04/01/2024     04/01/2024    MG 1.94 03/04/2024    ALT 14 04/01/2024    AST 18 04/01/2024    BUN 23 04/01/2024    CREATININE 1.01 04/01/2024    PHOS 3.9 11/13/2023    KAPPA 0.38 04/01/2024    LAMBDA <0.17 (L) 04/01/2024    KAPLS  04/01/2024      Comment:      One or more analytes used in this calculation   is outside of the analytical measurement range.  Calculation cannot be performed.    SPEP Decrease in polyclonal gamma globulins.    04/01/2024    IEPIN No monoclonal protein detected by immunofixation. 04/01/2024     (L) 04/01/2024    IGM 5 (L) 04/01/2024    IGA <7 (L) 04/01/2024      Note: for a comprehensive list of the patient's lab results, access the Results Review activity.    ASSESSMENT and PLAN:    Multiple Myeloma:   - ISS Stage 2 IgG Kappa Multiple Myeloma  - S/p VRD, Autologous SCT, Vacto/Pom, Kyprolis/Sarclisa, Radiation, most recently  Kyprolis/Cytoxan  - PET/CT showed mostly decreased metabolic activity, however some new areas  - Completed radiation to R arm with Dr. Patel  - BMBx performed 10/2 shows BULMARO with clonoseq detected but below LOD, however progressing lytic lesions  - S/p CAR T w/ ABECMA (T=0, 10/25/23), hospital course complicated by grade 1 ICANS managed w/ Dex 10mg x3 days and 1 dose Tociluzimab  - 1/9/2024  biochemical response (CR But lytic lesions on Xrays and pathological fractures. (Inactive lesions?)  - PET/CT (1/19/202) Multiple FDG avid lytic lesions above are stable in size and FDG avidity  - BMBx (2/5/24): BULMARO, MRD negative     ID:  - Acyclovir 400mg BID (for 6 months post CAR T), stopped   - Bactrim DS M, W, F (for 6 months post CAR T), will stop when he runs out  - IVIG for IgG <400, unable to tolerate Gammagard x2 (excruciating back pain, even with additional pre meds), given on 4/29 due to infections   - Started Gammunex-C without incidence on 3/4/24  - T+6 month vaccines given 4/29/24    Bone Health:  - Found to have fracture of the R radius  11/12/23  - Follows w/ Dr. Collins, no plans for surgery at this point  - Started Zometa monthly, 1/4  - Advised about increasing Calcium and Vit D intake     DVT:  - 6/20/22 acute occlusive DVT demonstrated within the left common femoral, profunda, femoral and popliteal veins.  - Has been on Eliquis 5mg BID, inturrupted around time of CAR-T cell then was discontinued as VTE was thought to be malignancy related while on treatment, as he's in remission now off treatment, Eliquis was stopped     Cardiac:  - Echo (5/2/23) showed EF of 50-55%  - Reports some dizziness, encouraged increasing fluid intake, sCr 1.35 (11/28)  - Will plan to hold Lisinopril and assess improvement in dizziness and BP (will wean, 2.5mg BID x2 days, every other day, done)    Neurology  - LE neuropathy, started with Velcade years ago, stable  - Mental decline and memory loss, more noticeable after CAR-T  -  Neurology referral, has appointment in September     Derm:  - History of squamous cell carcinomas  - Has plans to have another removed on L forehead    RTC:  5/14 Dr. Moran, APRN-CNP

## 2024-04-30 LAB
ALBUMIN SERPL BCP-MCNC: 3.9 G/DL (ref 3.4–5)
ALP SERPL-CCNC: 60 U/L (ref 33–136)
ALT SERPL W P-5'-P-CCNC: 17 U/L (ref 10–52)
ANION GAP SERPL CALC-SCNC: 14 MMOL/L (ref 10–20)
AST SERPL W P-5'-P-CCNC: 21 U/L (ref 9–39)
BILIRUB SERPL-MCNC: 0.6 MG/DL (ref 0–1.2)
BUN SERPL-MCNC: 24 MG/DL (ref 6–23)
CALCIUM SERPL-MCNC: 8.6 MG/DL (ref 8.6–10.6)
CHLORIDE SERPL-SCNC: 110 MMOL/L (ref 98–107)
CMV DNA SERPL NAA+PROBE-LOG IU: NORMAL {LOG_IU}/ML
CO2 SERPL-SCNC: 23 MMOL/L (ref 21–32)
CREAT SERPL-MCNC: 1.06 MG/DL (ref 0.5–1.3)
CRP SERPL-MCNC: 0.15 MG/DL
EGFRCR SERPLBLD CKD-EPI 2021: 74 ML/MIN/1.73M*2
FERRITIN SERPL-MCNC: 141 NG/ML (ref 20–300)
FIBRINOGEN PPP-MCNC: 298 MG/DL (ref 200–400)
GLUCOSE SERPL-MCNC: 96 MG/DL (ref 74–99)
LABORATORY COMMENT REPORT: NOT DETECTED
LDH SERPL L TO P-CCNC: 121 U/L (ref 84–246)
MAGNESIUM SERPL-MCNC: 1.79 MG/DL (ref 1.6–2.4)
POTASSIUM SERPL-SCNC: 4.5 MMOL/L (ref 3.5–5.3)
PROT SERPL-MCNC: 5.4 G/DL (ref 6.4–8.2)
SODIUM SERPL-SCNC: 142 MMOL/L (ref 136–145)
URATE SERPL-MCNC: 4.6 MG/DL (ref 4–7.5)

## 2024-05-14 ENCOUNTER — OFFICE VISIT (OUTPATIENT)
Dept: HEMATOLOGY/ONCOLOGY | Facility: HOSPITAL | Age: 74
End: 2024-05-14
Payer: COMMERCIAL

## 2024-05-14 ENCOUNTER — LAB (OUTPATIENT)
Dept: LAB | Facility: HOSPITAL | Age: 74
End: 2024-05-14
Payer: COMMERCIAL

## 2024-05-14 VITALS
HEART RATE: 69 BPM | OXYGEN SATURATION: 100 % | DIASTOLIC BLOOD PRESSURE: 69 MMHG | SYSTOLIC BLOOD PRESSURE: 122 MMHG | TEMPERATURE: 97.2 F | WEIGHT: 224.3 LBS | BODY MASS INDEX: 32.18 KG/M2 | RESPIRATION RATE: 16 BRPM

## 2024-05-14 DIAGNOSIS — D80.1 HYPOGAMMAGLOBULINEMIA DUE TO MULTIPLE MYELOMA (MULTI): ICD-10-CM

## 2024-05-14 DIAGNOSIS — C90.00 IGG MULTIPLE MYELOMA (MULTI): ICD-10-CM

## 2024-05-14 DIAGNOSIS — C90.00 MULTIPLE MYELOMA WITHOUT REMISSION (MULTI): ICD-10-CM

## 2024-05-14 DIAGNOSIS — Z92.850 HISTORY OF CHIMERIC ANTIGEN RECEPTOR T-CELL THERAPY: ICD-10-CM

## 2024-05-14 DIAGNOSIS — C90.00 HYPOGAMMAGLOBULINEMIA DUE TO MULTIPLE MYELOMA (MULTI): ICD-10-CM

## 2024-05-14 LAB
ALBUMIN SERPL BCP-MCNC: 4.3 G/DL (ref 3.4–5)
ALP SERPL-CCNC: 55 U/L (ref 33–136)
ALT SERPL W P-5'-P-CCNC: 16 U/L (ref 10–52)
ANION GAP SERPL CALC-SCNC: 13 MMOL/L (ref 10–20)
AST SERPL W P-5'-P-CCNC: 21 U/L (ref 9–39)
BASOPHILS # BLD AUTO: 0.01 X10*3/UL (ref 0–0.1)
BASOPHILS NFR BLD AUTO: 0.4 %
BILIRUB SERPL-MCNC: 0.7 MG/DL (ref 0–1.2)
BUN SERPL-MCNC: 21 MG/DL (ref 6–23)
CALCIUM SERPL-MCNC: 9 MG/DL (ref 8.6–10.3)
CHLORIDE SERPL-SCNC: 108 MMOL/L (ref 98–107)
CO2 SERPL-SCNC: 26 MMOL/L (ref 21–32)
CREAT SERPL-MCNC: 1.3 MG/DL (ref 0.5–1.3)
CRP SERPL-MCNC: 0.19 MG/DL
EGFRCR SERPLBLD CKD-EPI 2021: 58 ML/MIN/1.73M*2
EOSINOPHIL # BLD AUTO: 0.03 X10*3/UL (ref 0–0.4)
EOSINOPHIL NFR BLD AUTO: 1.2 %
ERYTHROCYTE [DISTWIDTH] IN BLOOD BY AUTOMATED COUNT: 12.4 % (ref 11.5–14.5)
FERRITIN SERPL-MCNC: 178 NG/ML (ref 20–300)
FIBRINOGEN PPP-MCNC: 302 MG/DL (ref 200–400)
GLUCOSE SERPL-MCNC: 113 MG/DL (ref 74–99)
HCT VFR BLD AUTO: 33.2 % (ref 41–52)
HGB BLD-MCNC: 11.5 G/DL (ref 13.5–17.5)
IGA SERPL-MCNC: <7 MG/DL (ref 70–400)
IGG SERPL-MCNC: 599 MG/DL (ref 700–1600)
IGM SERPL-MCNC: 9 MG/DL (ref 40–230)
IMM GRANULOCYTES # BLD AUTO: 0 X10*3/UL (ref 0–0.5)
IMM GRANULOCYTES NFR BLD AUTO: 0 % (ref 0–0.9)
KAPPA LC SERPL-MCNC: 0.8 MG/DL (ref 0.33–1.94)
KAPPA LC/LAMBDA SER: ABNORMAL {RATIO}
LAMBDA LC SERPL-MCNC: <0.17 MG/DL (ref 0.57–2.63)
LDH SERPL L TO P-CCNC: 131 U/L (ref 84–246)
LYMPHOCYTES # BLD AUTO: 0.62 X10*3/UL (ref 0.8–3)
LYMPHOCYTES NFR BLD AUTO: 25.7 %
MAGNESIUM SERPL-MCNC: 1.75 MG/DL (ref 1.6–2.4)
MCH RBC QN AUTO: 34.1 PG (ref 26–34)
MCHC RBC AUTO-ENTMCNC: 34.6 G/DL (ref 32–36)
MCV RBC AUTO: 99 FL (ref 80–100)
MONOCYTES # BLD AUTO: 0.29 X10*3/UL (ref 0.05–0.8)
MONOCYTES NFR BLD AUTO: 12 %
NEUTROPHILS # BLD AUTO: 1.46 X10*3/UL (ref 1.6–5.5)
NEUTROPHILS NFR BLD AUTO: 60.7 %
NRBC BLD-RTO: 0 /100 WBCS (ref 0–0)
PLATELET # BLD AUTO: 105 X10*3/UL (ref 150–450)
POTASSIUM SERPL-SCNC: 4.7 MMOL/L (ref 3.5–5.3)
PROT SERPL-MCNC: 6.3 G/DL (ref 6.4–8.2)
PROT SERPL-MCNC: 6.5 G/DL (ref 6.4–8.2)
RBC # BLD AUTO: 3.37 X10*6/UL (ref 4.5–5.9)
SODIUM SERPL-SCNC: 142 MMOL/L (ref 136–145)
URATE SERPL-MCNC: 5.1 MG/DL (ref 4–7.5)
WBC # BLD AUTO: 2.4 X10*3/UL (ref 4.4–11.3)

## 2024-05-14 PROCEDURE — 99215 OFFICE O/P EST HI 40 MIN: CPT | Performed by: INTERNAL MEDICINE

## 2024-05-14 PROCEDURE — 1126F AMNT PAIN NOTED NONE PRSNT: CPT | Performed by: INTERNAL MEDICINE

## 2024-05-14 PROCEDURE — 85384 FIBRINOGEN ACTIVITY: CPT

## 2024-05-14 PROCEDURE — 83521 IG LIGHT CHAINS FREE EACH: CPT

## 2024-05-14 PROCEDURE — 82728 ASSAY OF FERRITIN: CPT

## 2024-05-14 PROCEDURE — 86334 IMMUNOFIX E-PHORESIS SERUM: CPT

## 2024-05-14 PROCEDURE — 1160F RVW MEDS BY RX/DR IN RCRD: CPT | Performed by: INTERNAL MEDICINE

## 2024-05-14 PROCEDURE — 84550 ASSAY OF BLOOD/URIC ACID: CPT

## 2024-05-14 PROCEDURE — 87799 DETECT AGENT NOS DNA QUANT: CPT

## 2024-05-14 PROCEDURE — 82784 ASSAY IGA/IGD/IGG/IGM EACH: CPT

## 2024-05-14 PROCEDURE — 83735 ASSAY OF MAGNESIUM: CPT

## 2024-05-14 PROCEDURE — 84075 ASSAY ALKALINE PHOSPHATASE: CPT

## 2024-05-14 PROCEDURE — 86140 C-REACTIVE PROTEIN: CPT

## 2024-05-14 PROCEDURE — 3074F SYST BP LT 130 MM HG: CPT | Performed by: INTERNAL MEDICINE

## 2024-05-14 PROCEDURE — 3078F DIAST BP <80 MM HG: CPT | Performed by: INTERNAL MEDICINE

## 2024-05-14 PROCEDURE — 36415 COLL VENOUS BLD VENIPUNCTURE: CPT

## 2024-05-14 PROCEDURE — 84155 ASSAY OF PROTEIN SERUM: CPT

## 2024-05-14 PROCEDURE — 85025 COMPLETE CBC W/AUTO DIFF WBC: CPT

## 2024-05-14 PROCEDURE — 1159F MED LIST DOCD IN RCRD: CPT | Performed by: INTERNAL MEDICINE

## 2024-05-14 PROCEDURE — 83615 LACTATE (LD) (LDH) ENZYME: CPT

## 2024-05-14 ASSESSMENT — PAIN SCALES - GENERAL: PAINLEVEL_OUTOF10: 0-NO PAIN

## 2024-05-14 ASSESSMENT — ENCOUNTER SYMPTOMS
ARTHRALGIAS: 1
EYES NEGATIVE: 1
HEMATOLOGIC/LYMPHATIC NEGATIVE: 1
NEUROLOGICAL NEGATIVE: 1
RESPIRATORY NEGATIVE: 1
CARDIOVASCULAR NEGATIVE: 1
BACK PAIN: 1
PSYCHIATRIC NEGATIVE: 1
ENDOCRINE NEGATIVE: 1
FATIGUE: 0
GASTROINTESTINAL NEGATIVE: 1

## 2024-05-14 NOTE — PROGRESS NOTES
Patient ID: Maldonado Mccloud is a 73 y.o. male.  Referring Physician: No referring provider defined for this encounter.  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 Bone marrow biopsy suggested multiple myeloma IgG kappa, ISS Stage II, bone survey revealed lytic lesions; Urine light chains present kappa restricted with 2gm proteinuria.  No adverse prognostic factors identified.    Treatment Hx:    VRD  Initiated Aug 2015; currently s/p 3 cycles.  BMBx 10/2015 complete response.    Stem Cell Transplant  Underwent stem cell mobilization with Neupogen/Plerixafor and 2 day collection December 16-17, 2015, for 8.62 CD34 x 10^6/kg. During procedure for right IJ Trialysis placement developed A. Fib with RVR which spontaneously resolved on December 14, 2015. He was admitted and placed on monitor for stem cell collection.     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  Hospitalization complicated by orthostatic hypotension, electrolyte replacement, drug induced rash, culture negative fever.      He completed approximately 5 weeks of maintenance Revlimid 10 mg daily which was held for worsening neuropathy August 2016.    3/ 2018: IgG M Braxton rising to 0.2gm/DL and light chain rising; restarted on Revlimid maintenance at 5mg EOD.     Revlimid stopped in 3/2021 due to stable disease.    Vacto/Pom Trial  7/2021 his M spike was on the rise. He was consented for Vacto/Pom trial which he began on 8/5/2021. He continued Pom post finishing the trial.     Sarclisa/Kyprolis  Myeloma markers increasing in 6/2022, consented for Sarclisa/Kyprolis, which began 6/30/22. He continued this through September of 2022.       Cytoxan/Kyprolis  His therapy was subsequently changed in 9/2022 due to  continued disease progression in the face of Sarclisa. His therapy was changed to CYYCLON regimen (Cytoxan/Kyprolis).    PET CT scan 9/14/2022 shows FDG avid lytic lesion within the left midshaft clavicle with pathologic fracture, FDG avid lytic lesions involving bilateral scapulas and FDG avidity within the T9 vertebral body, suggestive of active multiple myeloma.  There are non FDG avid lytic lesions in the right iliac bone and right clavicle, likely representing nonactive multiple myeloma.  There is no PET evidence of extramedullary involvement.  There is an FDG avid right thyroid nodule.     MRI in 10/2022 negative for myelomatous involvement in thoracic vertebrae.     Therapy placed on hold in 11/2022 due to need for hip replacement surgery. His myeloma markers were on the rise in 3/2023 with a new jaw lesion. Plan is to begin radiation therapy 4/11 and resume 2x/week Kyprolis 4/10.     Tracking to CAR T cell therapy. Collected CAR T cells on 5/26/23.     X-ray of L elbow (6/20/23):  IMPRESSION:  1. Osteolytic lesions in the proximal radius and distal humerus  consistent with patient's reported history of multiple myeloma.  2. No acute fracture or malalignment.    Began radiation x8 fractions on 7/3/23.    Resumed Cytoxan as of 8/7/23.       CAR T Cell Therapy   Admitted for CAR T cell therapy for his ISS stage 2 Kappa multiple myeloma with ABECMA T=0, 10/25/23     Hospital course complicated by Grade 1 CRS and Grade 1 ICANS, managed with dexamethasone 10mg x 3 days and one dose of tocilizumab.     Post CAR T restaging:    PET/CT (1/1/9/24):  IMPRESSION:  1. Multiple FDG avid lytic lesions as described above are stable in  size and FDG avidity when compared to prior study, consistent with  known multiple myeloma.  2. No PET evidence of extramedullary disease.  3. Mild FDG avid focus in the right thyroid gland, further evaluation  with thyroid ultrasound is recommended.    BMBx (2/5/24): pending     IgG multiple  myeloma (Multi)   7/1/2015 Initial Diagnosis    IgG multiple myeloma (CMS/[facility])     12/23/2015 -  Bone Marrow Transplant    Autologous Stem Cell Transplant      11/4/2022 - 11/4/2022 Chemotherapy    Carfilzomib (Weekly) / Cyclophosphamide / Thalidomide / Dexamethasone, 28 Day Cycles     10/25/2023 -  Cellular Therapy    Abecma        SUBJECTIVE:  History of Present Illness:  Cuong presents to clinic 5/15/24 for a follow up visit.     Doing better   Pain in arms improved and function improved.     Still with brain fog - some forgetting of simple tasks. Wordfinding problems,       Review of Systems   Constitutional:  Negative for fatigue.   HENT: Negative.     Eyes: Negative.    Respiratory: Negative.     Cardiovascular: Negative.    Gastrointestinal: Negative.    Endocrine: Negative.    Genitourinary: Negative.    Musculoskeletal:  Positive for arthralgias and back pain.   Skin: Negative.    Allergic/Immunologic: Positive for immunocompromised state.   Neurological: Negative.    Hematological: Negative.    Psychiatric/Behavioral: Negative.          Brain fog     OBJECTIVE:  KPS: Karnofsky Score: 70 - Cares for self; unable to carry on normal activity or do normal work    VS:  /69 (BP Location: Left arm, Patient Position: Sitting, BP Cuff Size: Large adult)   Pulse 69   Temp 36.2 °C (97.2 °F) (Skin)   Resp 16   Wt 102 kg (224 lb 4.8 oz)   SpO2 100%   BMI 32.18 kg/m²   BSA: 2.24 meters squared    Physical Exam  Constitutional:       Appearance: Normal appearance. He is normal weight.   HENT:      Head: Normocephalic and atraumatic.      Nose: Nose normal.      Mouth/Throat:      Mouth: Mucous membranes are moist.      Pharynx: Oropharynx is clear.   Eyes:      Extraocular Movements: Extraocular movements intact.      Conjunctiva/sclera: Conjunctivae normal.      Pupils: Pupils are equal, round, and reactive to light.   Cardiovascular:      Rate and Rhythm: Normal rate and regular rhythm.      Pulses: Normal  pulses.      Heart sounds: Normal heart sounds.   Pulmonary:      Effort: Pulmonary effort is normal.      Breath sounds: Normal breath sounds.   Abdominal:      General: Abdomen is flat. Bowel sounds are normal.      Palpations: Abdomen is soft.   Musculoskeletal:         General: Tenderness present. Normal range of motion.      Cervical back: Normal range of motion and neck supple.   Skin:     General: Skin is warm and dry.   Neurological:      General: No focal deficit present.      Mental Status: He is alert and oriented to person, place, and time. Mental status is at baseline.   Psychiatric:         Mood and Affect: Mood normal.         Behavior: Behavior normal.         Thought Content: Thought content normal.         Judgment: Judgment normal.     Laboratory:  The pertinent laboratory results were reviewed and discussed with the patient.    Lab Results   Component Value Date    WBC 2.4 (L) 05/14/2024    HCT 33.2 (L) 05/14/2024    HGB 11.5 (L) 05/14/2024     (L) 05/14/2024    ANC 1.49 (L) 10/27/2023    K 4.7 05/14/2024    CALCIUM 9.0 05/14/2024     05/14/2024    MG 1.75 05/14/2024    ALT 16 05/14/2024    AST 21 05/14/2024    BUN 21 05/14/2024    CREATININE 1.30 05/14/2024    PHOS 3.9 11/13/2023    KAPPA 0.38 04/01/2024    LAMBDA <0.17 (L) 04/01/2024    KAPLS  04/01/2024      Comment:      One or more analytes used in this calculation   is outside of the analytical measurement range.  Calculation cannot be performed.    SPEP Decrease in polyclonal gamma globulins.    04/01/2024    IEPIN No monoclonal protein detected by immunofixation. 04/01/2024     (L) 04/01/2024    IGM 5 (L) 04/01/2024    IGA <7 (L) 04/01/2024      Note: for a comprehensive list of the patient's lab results, access the Results Review activity.    ASSESSMENT and PLAN:    Multiple Myeloma:   - ISS Stage 2 IgG Kappa Multiple Myeloma  - S/p VRD, Autologous SCT, Vacto/Pom, Kyprolis/Sarclisa, Radiation, most recently  Kyprolis/Cytoxan  - PET/CT showed mostly decreased metabolic activity, however some new areas  - Completed radiation to R arm with Dr. Patel  - BMBx performed 10/2 shows BULMARO with clonoseq detected but below LOD, however progressing lytic lesions  - S/p CAR T w/ ABECMA (T=0, 10/25/23), hospital course complicated by grade 1 ICANS managed w/ Dex 10mg x3 days and 1 dose Tociluzimab  - 1/9/2024  biochemical response (CR But lytic lesions on Xrays and pathological fractures. (Inactive lesions?)  - PET/CT (1/19/202) Multiple FDG avid lytic lesions above are stable in size and FDG avidity  - BMBx (2/5/24): BULMARO, MRD negative    5/14/20234 ongoing response to CART. - today kl results pending.     ID:  - Acyclovir 400mg BID (for 6 months post CAR T), stopped   - Bactrim DS M, W, F (for 6 months post CAR T), will stop when he runs out  - IVIG for IgG <400, unable to tolerate Gammagard x2 (excruciating back pain, even with additional pre meds), given on 4/29 due to infections   - Started Gammunex-C without incidence on 3/4/24  - T+6 month vaccines given 4/29/24    Bone Health:  - Found to have fracture of the R radius  11/12/23  - Follows w/ Dr. Collins, no plans for surgery at this point  - Started Zometa monthly, 1/4  - Advised about increasing Calcium and Vit D intake     DVT:  - 6/20/22 acute occlusive DVT demonstrated within the left common femoral, profunda, femoral and popliteal veins.  - Has been on Eliquis 5mg BID, inturrupted around time of CAR-T cell then was discontinued as VTE was thought to be malignancy related while on treatment, as he's in remission now off treatment, Eliquis was stopped     Cardiac:  - Echo (5/2/23) showed EF of 50-55%  - Reports some dizziness, encouraged increasing fluid intake, sCr 1.35 (11/28)  - Will plan to hold Lisinopril and assess improvement in dizziness and BP (will wean, 2.5mg BID x2 days, every other day, done)    Neurology  - LE neuropathy, started with Velcade years ago,  stable  - Mental decline and memory loss, more noticeable after CAR-T but overall mild  - Neurology referral, has appointment in September     Derm:  - History of squamous cell carcinomas  - Has plans to have another removed on L forehead    RTC:      Wil Sun MD PhD

## 2024-05-15 LAB
EBV DNA SPEC NAA+PROBE-LOG#: NORMAL {LOG_COPIES}/ML
LABORATORY COMMENT REPORT: NOT DETECTED

## 2024-05-16 LAB
ALBUMIN: 4.1 G/DL (ref 3.4–5)
ALPHA 1 GLOBULIN: 0.3 G/DL (ref 0.2–0.6)
ALPHA 2 GLOBULIN: 0.7 G/DL (ref 0.4–1.1)
BETA GLOBULIN: 0.6 G/DL (ref 0.5–1.2)
GAMMA GLOBULIN: 0.6 G/DL (ref 0.5–1.4)
IMMUNOFIXATION COMMENT: NORMAL
PATH REVIEW - SERUM IMMUNOFIXATION: NORMAL
PATH REVIEW-SERUM PROTEIN ELECTROPHORESIS: NORMAL
PROTEIN ELECTROPHORESIS COMMENT: NORMAL

## 2024-05-24 ENCOUNTER — TELEPHONE (OUTPATIENT)
Dept: SCHEDULING | Age: 74
End: 2024-05-24
Payer: MEDICARE

## 2024-05-24 NOTE — TELEPHONE ENCOUNTER
Placed call to patient to advise him that his infusion appt on 05/28 is being cancelled per Jacob Arechiga as his levels are high enough he does not need an infusion.     No answer. Left vm advising patient that infusion appt is cancelled on 05/28. Advised to call the office with any questions or concerns.

## 2024-05-26 DIAGNOSIS — G47.09 OTHER INSOMNIA: ICD-10-CM

## 2024-05-28 ENCOUNTER — APPOINTMENT (OUTPATIENT)
Dept: HEMATOLOGY/ONCOLOGY | Facility: CLINIC | Age: 74
End: 2024-05-28
Payer: COMMERCIAL

## 2024-05-28 RX ORDER — TRAZODONE HYDROCHLORIDE 100 MG/1
TABLET ORAL
Qty: 270 TABLET | Refills: 3 | Status: SHIPPED | OUTPATIENT
Start: 2024-05-28

## 2024-05-29 ENCOUNTER — HOSPITAL ENCOUNTER (OUTPATIENT)
Dept: RADIOLOGY | Facility: CLINIC | Age: 74
Discharge: HOME | End: 2024-05-29
Payer: COMMERCIAL

## 2024-05-29 DIAGNOSIS — M84.433K: ICD-10-CM

## 2024-05-29 PROCEDURE — 73080 X-RAY EXAM OF ELBOW: CPT | Mod: 50

## 2024-05-31 ENCOUNTER — TELEPHONE (OUTPATIENT)
Dept: ORTHOPEDIC SURGERY | Facility: HOSPITAL | Age: 74
End: 2024-05-31
Payer: MEDICARE

## 2024-05-31 NOTE — TELEPHONE ENCOUNTER
Was told his appointment would be virtual today. Didn't know it was still on for in person. Will reschedule him. NEELAM.

## 2024-06-10 ENCOUNTER — APPOINTMENT (OUTPATIENT)
Dept: HEMATOLOGY/ONCOLOGY | Facility: CLINIC | Age: 74
End: 2024-06-10
Payer: COMMERCIAL

## 2024-06-24 ENCOUNTER — APPOINTMENT (OUTPATIENT)
Dept: HEMATOLOGY/ONCOLOGY | Facility: CLINIC | Age: 74
End: 2024-06-24
Payer: COMMERCIAL

## 2024-06-24 ENCOUNTER — INFUSION (OUTPATIENT)
Dept: HEMATOLOGY/ONCOLOGY | Facility: CLINIC | Age: 74
End: 2024-06-24
Payer: COMMERCIAL

## 2024-06-24 ENCOUNTER — OFFICE VISIT (OUTPATIENT)
Dept: HEMATOLOGY/ONCOLOGY | Facility: CLINIC | Age: 74
End: 2024-06-24
Payer: COMMERCIAL

## 2024-06-24 VITALS
SYSTOLIC BLOOD PRESSURE: 144 MMHG | DIASTOLIC BLOOD PRESSURE: 89 MMHG | HEART RATE: 62 BPM | TEMPERATURE: 96.8 F | RESPIRATION RATE: 16 BRPM | OXYGEN SATURATION: 99 %

## 2024-06-24 VITALS
OXYGEN SATURATION: 99 % | BODY MASS INDEX: 32.46 KG/M2 | DIASTOLIC BLOOD PRESSURE: 87 MMHG | WEIGHT: 226.19 LBS | RESPIRATION RATE: 16 BRPM | TEMPERATURE: 97.5 F | HEART RATE: 63 BPM | SYSTOLIC BLOOD PRESSURE: 149 MMHG

## 2024-06-24 DIAGNOSIS — Z92.850 HISTORY OF CHIMERIC ANTIGEN RECEPTOR T-CELL THERAPY: ICD-10-CM

## 2024-06-24 DIAGNOSIS — D80.1 HYPOGAMMAGLOBULINEMIA DUE TO MULTIPLE MYELOMA (MULTI): ICD-10-CM

## 2024-06-24 DIAGNOSIS — C90.00 MULTIPLE MYELOMA WITHOUT REMISSION (MULTI): Primary | ICD-10-CM

## 2024-06-24 DIAGNOSIS — C90.00 IGG MULTIPLE MYELOMA (MULTI): Primary | ICD-10-CM

## 2024-06-24 DIAGNOSIS — C90.00 IGG MULTIPLE MYELOMA (MULTI): ICD-10-CM

## 2024-06-24 DIAGNOSIS — M84.534S: ICD-10-CM

## 2024-06-24 DIAGNOSIS — D84.9 IMMUNOCOMPROMISED (MULTI): ICD-10-CM

## 2024-06-24 DIAGNOSIS — Z76.82 STEM CELL TRANSPLANT CANDIDATE: ICD-10-CM

## 2024-06-24 DIAGNOSIS — R41.89 COGNITIVE DECLINE: ICD-10-CM

## 2024-06-24 DIAGNOSIS — Z94.84 STEM CELLS TRANSPLANT STATUS (MULTI): ICD-10-CM

## 2024-06-24 DIAGNOSIS — C90.00 MULTIPLE MYELOMA WITHOUT REMISSION (MULTI): ICD-10-CM

## 2024-06-24 DIAGNOSIS — R42 DIZZINESS: ICD-10-CM

## 2024-06-24 DIAGNOSIS — C90.00 HYPOGAMMAGLOBULINEMIA DUE TO MULTIPLE MYELOMA (MULTI): ICD-10-CM

## 2024-06-24 LAB
ANION GAP SERPL CALC-SCNC: 16 MMOL/L (ref 10–20)
BASOPHILS # BLD AUTO: 0.01 X10*3/UL (ref 0–0.1)
BASOPHILS NFR BLD AUTO: 0.3 %
BUN SERPL-MCNC: 20 MG/DL (ref 6–23)
CA-I BLD-SCNC: 1.24 MMOL/L (ref 1.1–1.33)
CHLORIDE SERPL-SCNC: 105 MMOL/L (ref 98–107)
CO2 SERPL-SCNC: 23 MMOL/L (ref 21–32)
CREAT SERPL-MCNC: 1.2 MG/DL (ref 0.6–1.3)
EOSINOPHIL # BLD AUTO: 0.03 X10*3/UL (ref 0–0.4)
EOSINOPHIL NFR BLD AUTO: 1 %
ERYTHROCYTE [DISTWIDTH] IN BLOOD BY AUTOMATED COUNT: 12.4 % (ref 11.5–14.5)
GFR SERPL CREATININE-BSD FRML MDRD: 63 ML/MIN/1.73M*2
GLUCOSE SERPL-MCNC: 135 MG/DL (ref 74–99)
HCT VFR BLD AUTO: 31.2 % (ref 41–52)
HGB BLD-MCNC: 10.8 G/DL (ref 13.5–17.5)
IMM GRANULOCYTES # BLD AUTO: 0.01 X10*3/UL (ref 0–0.5)
IMM GRANULOCYTES NFR BLD AUTO: 0.3 % (ref 0–0.9)
LYMPHOCYTES # BLD AUTO: 0.53 X10*3/UL (ref 0.8–3)
LYMPHOCYTES NFR BLD AUTO: 17 %
MCH RBC QN AUTO: 34.7 PG (ref 26–34)
MCHC RBC AUTO-ENTMCNC: 34.6 G/DL (ref 32–36)
MCV RBC AUTO: 100 FL (ref 80–100)
MONOCYTES # BLD AUTO: 0.3 X10*3/UL (ref 0.05–0.8)
MONOCYTES NFR BLD AUTO: 9.6 %
NEUTROPHILS # BLD AUTO: 2.24 X10*3/UL (ref 1.6–5.5)
NEUTROPHILS NFR BLD AUTO: 71.8 %
NRBC BLD-RTO: ABNORMAL /100{WBCS}
PLATELET # BLD AUTO: 92 X10*3/UL (ref 150–450)
POTASSIUM SERPL-SCNC: 4.3 MMOL/L (ref 3.5–5.3)
RBC # BLD AUTO: 3.11 X10*6/UL (ref 4.5–5.9)
RBC MORPH BLD: NORMAL
SODIUM SERPL-SCNC: 140 MMOL/L (ref 136–145)
WBC # BLD AUTO: 3.1 X10*3/UL (ref 4.4–11.3)

## 2024-06-24 PROCEDURE — 85025 COMPLETE CBC W/AUTO DIFF WBC: CPT

## 2024-06-24 PROCEDURE — 2500000004 HC RX 250 GENERAL PHARMACY W/ HCPCS (ALT 636 FOR OP/ED)

## 2024-06-24 PROCEDURE — 90472 IMMUNIZATION ADMIN EACH ADD: CPT

## 2024-06-24 PROCEDURE — 96366 THER/PROPH/DIAG IV INF ADDON: CPT | Mod: INF

## 2024-06-24 PROCEDURE — 86334 IMMUNOFIX E-PHORESIS SERUM: CPT

## 2024-06-24 PROCEDURE — 82784 ASSAY IGA/IGD/IGG/IGM EACH: CPT

## 2024-06-24 PROCEDURE — 84155 ASSAY OF PROTEIN SERUM: CPT

## 2024-06-24 PROCEDURE — 90648 HIB PRP-T VACCINE 4 DOSE IM: CPT

## 2024-06-24 PROCEDURE — 90696 DTAP-IPV VACCINE 4-6 YRS IM: CPT

## 2024-06-24 PROCEDURE — 96375 TX/PRO/DX INJ NEW DRUG ADDON: CPT | Mod: INF

## 2024-06-24 PROCEDURE — 99215 OFFICE O/P EST HI 40 MIN: CPT

## 2024-06-24 PROCEDURE — 90750 HZV VACC RECOMBINANT IM: CPT

## 2024-06-24 PROCEDURE — 2500000005 HC RX 250 GENERAL PHARMACY W/O HCPCS

## 2024-06-24 PROCEDURE — 2500000001 HC RX 250 WO HCPCS SELF ADMINISTERED DRUGS (ALT 637 FOR MEDICARE OP)

## 2024-06-24 PROCEDURE — 90471 IMMUNIZATION ADMIN: CPT

## 2024-06-24 PROCEDURE — 96365 THER/PROPH/DIAG IV INF INIT: CPT | Mod: INF

## 2024-06-24 PROCEDURE — 96367 TX/PROPH/DG ADDL SEQ IV INF: CPT

## 2024-06-24 PROCEDURE — 2500000004 HC RX 250 GENERAL PHARMACY W/ HCPCS (ALT 636 FOR OP/ED): Mod: JZ,JG

## 2024-06-24 PROCEDURE — 83521 IG LIGHT CHAINS FREE EACH: CPT

## 2024-06-24 PROCEDURE — 80047 BASIC METABLC PNL IONIZED CA: CPT

## 2024-06-24 RX ORDER — HEPARIN SODIUM,PORCINE/PF 10 UNIT/ML
50 SYRINGE (ML) INTRAVENOUS AS NEEDED
OUTPATIENT
Start: 2024-06-24

## 2024-06-24 RX ORDER — DIPHENHYDRAMINE HYDROCHLORIDE 50 MG/ML
50 INJECTION INTRAMUSCULAR; INTRAVENOUS AS NEEDED
OUTPATIENT
Start: 2024-07-22

## 2024-06-24 RX ORDER — FAMOTIDINE 10 MG/ML
20 INJECTION INTRAVENOUS ONCE
Start: 2024-07-08 | End: 2024-07-08

## 2024-06-24 RX ORDER — DIPHENHYDRAMINE HYDROCHLORIDE 50 MG/ML
50 INJECTION INTRAMUSCULAR; INTRAVENOUS AS NEEDED
OUTPATIENT
Start: 2024-08-19

## 2024-06-24 RX ORDER — MONTELUKAST SODIUM 10 MG/1
10 TABLET ORAL ONCE
Status: COMPLETED | OUTPATIENT
Start: 2024-06-24 | End: 2024-06-24

## 2024-06-24 RX ORDER — MONTELUKAST SODIUM 10 MG/1
10 TABLET ORAL ONCE
Start: 2024-07-08 | End: 2024-07-08

## 2024-06-24 RX ORDER — DIPHENHYDRAMINE HYDROCHLORIDE 50 MG/ML
25 INJECTION INTRAMUSCULAR; INTRAVENOUS ONCE
Status: COMPLETED | OUTPATIENT
Start: 2024-06-24 | End: 2024-06-24

## 2024-06-24 RX ORDER — ALBUTEROL SULFATE 0.83 MG/ML
3 SOLUTION RESPIRATORY (INHALATION) AS NEEDED
OUTPATIENT
Start: 2024-07-22

## 2024-06-24 RX ORDER — DIPHENHYDRAMINE HYDROCHLORIDE 50 MG/ML
50 INJECTION INTRAMUSCULAR; INTRAVENOUS AS NEEDED
OUTPATIENT
Start: 2024-07-08

## 2024-06-24 RX ORDER — ALBUTEROL SULFATE 0.83 MG/ML
3 SOLUTION RESPIRATORY (INHALATION) AS NEEDED
OUTPATIENT
Start: 2024-08-19

## 2024-06-24 RX ORDER — EPINEPHRINE 0.3 MG/.3ML
0.3 INJECTION SUBCUTANEOUS EVERY 5 MIN PRN
OUTPATIENT
Start: 2024-07-08

## 2024-06-24 RX ORDER — HEPARIN 100 UNIT/ML
500 SYRINGE INTRAVENOUS AS NEEDED
OUTPATIENT
Start: 2024-06-24

## 2024-06-24 RX ORDER — FAMOTIDINE 10 MG/ML
20 INJECTION INTRAVENOUS ONCE AS NEEDED
OUTPATIENT
Start: 2024-07-22

## 2024-06-24 RX ORDER — ACETAMINOPHEN 325 MG/1
650 TABLET ORAL ONCE
Status: COMPLETED | OUTPATIENT
Start: 2024-06-24 | End: 2024-06-24

## 2024-06-24 RX ORDER — DIPHENHYDRAMINE HYDROCHLORIDE 50 MG/ML
25 INJECTION INTRAMUSCULAR; INTRAVENOUS ONCE
Start: 2024-07-08 | End: 2024-07-08

## 2024-06-24 RX ORDER — ALBUTEROL SULFATE 0.83 MG/ML
3 SOLUTION RESPIRATORY (INHALATION) AS NEEDED
OUTPATIENT
Start: 2024-07-08

## 2024-06-24 RX ORDER — EPINEPHRINE 0.3 MG/.3ML
0.3 INJECTION SUBCUTANEOUS EVERY 5 MIN PRN
OUTPATIENT
Start: 2024-08-19

## 2024-06-24 RX ORDER — FAMOTIDINE 10 MG/ML
20 INJECTION INTRAVENOUS ONCE AS NEEDED
OUTPATIENT
Start: 2024-07-08

## 2024-06-24 RX ORDER — EPINEPHRINE 0.3 MG/.3ML
0.3 INJECTION SUBCUTANEOUS ONCE AS NEEDED
OUTPATIENT
Start: 2024-07-22

## 2024-06-24 RX ORDER — ACETAMINOPHEN 325 MG/1
650 TABLET ORAL ONCE
OUTPATIENT
Start: 2024-07-08

## 2024-06-24 RX ORDER — FAMOTIDINE 10 MG/ML
20 INJECTION INTRAVENOUS ONCE
Status: COMPLETED | OUTPATIENT
Start: 2024-06-24 | End: 2024-06-24

## 2024-06-24 RX ORDER — FAMOTIDINE 10 MG/ML
20 INJECTION INTRAVENOUS ONCE AS NEEDED
OUTPATIENT
Start: 2024-08-19

## 2024-06-24 ASSESSMENT — ENCOUNTER SYMPTOMS
ARTHRALGIAS: 1
RESPIRATORY NEGATIVE: 1
PSYCHIATRIC NEGATIVE: 1
FATIGUE: 1
EYES NEGATIVE: 1
BACK PAIN: 1
LIGHT-HEADEDNESS: 1
HEMATOLOGIC/LYMPHATIC NEGATIVE: 1
GASTROINTESTINAL NEGATIVE: 1
ENDOCRINE NEGATIVE: 1
CARDIOVASCULAR NEGATIVE: 1
DIZZINESS: 1

## 2024-06-24 ASSESSMENT — PAIN SCALES - GENERAL: PAINLEVEL: 0-NO PAIN

## 2024-06-24 NOTE — PROGRESS NOTES
Patient ID: Maldonado Mccloud is a 73 y.o. male.  Referring Physician: No referring provider defined for this encounter.  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 Bone marrow biopsy suggested multiple myeloma IgG kappa, ISS Stage II, bone survey revealed lytic lesions; Urine light chains present kappa restricted with 2gm proteinuria.  No adverse prognostic factors identified.    Treatment Hx:    VRD  Initiated Aug 2015; currently s/p 3 cycles.  BMBx 10/2015 complete response.    Stem Cell Transplant  Underwent stem cell mobilization with Neupogen/Plerixafor and 2 day collection December 16-17, 2015, for 8.62 CD34 x 10^6/kg. During procedure for right IJ Trialysis placement developed A. Fib with RVR which spontaneously resolved on December 14, 2015. He was admitted and placed on monitor for stem cell collection.     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  Hospitalization complicated by orthostatic hypotension, electrolyte replacement, drug induced rash, culture negative fever.      He completed approximately 5 weeks of maintenance Revlimid 10 mg daily which was held for worsening neuropathy August 2016.    3/ 2018: IgG M Braxton rising to 0.2gm/DL and light chain rising; restarted on Revlimid maintenance at 5mg EOD.     Revlimid stopped in 3/2021 due to stable disease.    Vacto/Pom Trial  7/2021 his M spike was on the rise. He was consented for Vacto/Pom trial which he began on 8/5/2021. He continued Pom post finishing the trial.     Sarclisa/Kyprolis  Myeloma markers increasing in 6/2022, consented for Sarclisa/Kyprolis, which began 6/30/22. He continued this through September of 2022.       Cytoxan/Kyprolis  His therapy was subsequently changed in 9/2022 due to  continued disease progression in the face of Sarclisa. His therapy was changed to CYYCLON regimen (Cytoxan/Kyprolis).    PET CT scan 9/14/2022 shows FDG avid lytic lesion within the left midshaft clavicle with pathologic fracture, FDG avid lytic lesions involving bilateral scapulas and FDG avidity within the T9 vertebral body, suggestive of active multiple myeloma.  There are non FDG avid lytic lesions in the right iliac bone and right clavicle, likely representing nonactive multiple myeloma.  There is no PET evidence of extramedullary involvement.  There is an FDG avid right thyroid nodule.     MRI in 10/2022 negative for myelomatous involvement in thoracic vertebrae.     Therapy placed on hold in 11/2022 due to need for hip replacement surgery. His myeloma markers were on the rise in 3/2023 with a new jaw lesion. Plan is to begin radiation therapy 4/11 and resume 2x/week Kyprolis 4/10.     Tracking to CAR T cell therapy. Collected CAR T cells on 5/26/23.     X-ray of L elbow (6/20/23):  IMPRESSION:  1. Osteolytic lesions in the proximal radius and distal humerus  consistent with patient's reported history of multiple myeloma.  2. No acute fracture or malalignment.    Began radiation x8 fractions on 7/3/23.    Resumed Cytoxan as of 8/7/23.       CAR T Cell Therapy   Admitted for CAR T cell therapy for his ISS stage 2 Kappa multiple myeloma with ABECMA T=0, 10/25/23     Hospital course complicated by Grade 1 CRS and Grade 1 ICANS, managed with dexamethasone 10mg x 3 days and one dose of tocilizumab.     Post CAR T restaging:    PET/CT (1/1/9/24):  IMPRESSION:  1. Multiple FDG avid lytic lesions as described above are stable in  size and FDG avidity when compared to prior study, consistent with  known multiple myeloma.  2. No PET evidence of extramedullary disease.  3. Mild FDG avid focus in the right thyroid gland, further evaluation  with thyroid ultrasound is recommended.    BMBx (2/5/24): pending     IgG multiple  myeloma (Multi)   7/1/2015 Initial Diagnosis    IgG multiple myeloma (CMS/[facility])     12/23/2015 -  Bone Marrow Transplant    Autologous Stem Cell Transplant      11/4/2022 - 11/4/2022 Chemotherapy    Carfilzomib (Weekly) / Cyclophosphamide / Thalidomide / Dexamethasone, 28 Day Cycles     10/25/2023 -  Cellular Therapy    Abecma        SUBJECTIVE:  History of Present Illness:  Cuong presents to clinic 6/24/24 for a follow up visit accompanied by his wife Farida.    He is doing well overall.    Notes some dizziness with standing. Seems to be more frequent per his wife. He feels that his instability is more so related to neuropathy. Open to PT.     Ongoing brain fog, not as sharp as he once was.     Has ortho follow up tomorrow.       Review of Systems   Constitutional:  Positive for fatigue.   HENT: Negative.     Eyes: Negative.    Respiratory: Negative.     Cardiovascular: Negative.    Gastrointestinal: Negative.    Endocrine: Negative.    Genitourinary: Negative.    Musculoskeletal:  Positive for arthralgias and back pain.   Skin: Negative.    Allergic/Immunologic: Positive for immunocompromised state.   Neurological:  Positive for dizziness and light-headedness.   Hematological: Negative.    Psychiatric/Behavioral: Negative.          Brain fog     OBJECTIVE:  KPS: Karnofsky Score: 70 - Cares for self; unable to carry on normal activity or do normal work    VS:  /87   Pulse 63   Temp 36.4 °C (97.5 °F)   Resp 16   Wt 103 kg (226 lb 3.1 oz)   SpO2 99%   BMI 32.46 kg/m²   BSA: 2.26 meters squared    Physical Exam  Constitutional:       Appearance: Normal appearance. He is normal weight.   HENT:      Head: Normocephalic and atraumatic.      Nose: Nose normal.      Mouth/Throat:      Mouth: Mucous membranes are moist.      Pharynx: Oropharynx is clear.   Eyes:      Extraocular Movements: Extraocular movements intact.      Conjunctiva/sclera: Conjunctivae normal.      Pupils: Pupils are equal, round, and reactive to  light.   Cardiovascular:      Rate and Rhythm: Normal rate and regular rhythm.      Pulses: Normal pulses.      Heart sounds: Normal heart sounds.   Pulmonary:      Effort: Pulmonary effort is normal.      Breath sounds: Normal breath sounds.   Abdominal:      General: Abdomen is flat. Bowel sounds are normal.      Palpations: Abdomen is soft.   Musculoskeletal:         General: Normal range of motion.      Cervical back: Normal range of motion and neck supple.   Skin:     General: Skin is warm and dry.   Neurological:      General: No focal deficit present.      Mental Status: He is alert and oriented to person, place, and time. Mental status is at baseline.      Coordination: Coordination abnormal.   Psychiatric:         Mood and Affect: Mood normal.         Behavior: Behavior normal.         Thought Content: Thought content normal.         Judgment: Judgment normal.       Laboratory:  The pertinent laboratory results were reviewed and discussed with the patient.    Lab Results   Component Value Date    WBC 3.1 (L) 06/24/2024    HCT 31.2 (L) 06/24/2024    HGB 10.8 (L) 06/24/2024    PLT 92 (L) 06/24/2024    ANC 1.49 (L) 10/27/2023    K 4.7 05/14/2024    CALCIUM 9.0 05/14/2024     05/14/2024    MG 1.75 05/14/2024    ALT 16 05/14/2024    AST 21 05/14/2024    BUN 21 05/14/2024    CREATININE 1.30 05/14/2024    PHOS 3.9 11/13/2023    KAPPA 0.80 05/14/2024    LAMBDA <0.17 (L) 05/14/2024    KAPLS  05/14/2024      Comment:      One or more analytes used in this calculation   is outside of the analytical measurement range.  Calculation cannot be performed.    SPEP Normal. 05/14/2024    IEPIN No monoclonal protein detected by immunofixation. 05/14/2024     (L) 05/14/2024    IGM 9 (L) 05/14/2024    IGA <7 (L) 05/14/2024      Note: for a comprehensive list of the patient's lab results, access the Results Review activity.    ASSESSMENT and PLAN:    Multiple Myeloma:   - ISS Stage 2 IgG Kappa Multiple Myeloma  - S/p  VRD, Autologous SCT, Vacto/Pom, Kyprolis/Sarclisa, Radiation, most recently Kyprolis/Cytoxan  - PET/CT showed mostly decreased metabolic activity, however some new areas  - Completed radiation to R arm with Dr. Patel  - BMBx performed 10/2 shows BULMARO with clonoseq detected but below LOD, however progressing lytic lesions  - S/p CAR T w/ ABECMA (T=0, 10/25/23), hospital course complicated by grade 1 ICANS managed w/ Dex 10mg x3 days and 1 dose Tociluzimab  - 1/9/2024  biochemical response (CR But lytic lesions on Xrays and pathological fractures. (Inactive lesions?)  - PET/CT (1/19/2024) Multiple FDG avid lytic lesions above are stable in size and FDG avidity  - BMBx (2/5/24): BULMARO, MRD negative  - 5/14/20234 ongoing response to CART  - Ordered restaging PET/CT for 7/2024      ID:  - Acyclovir 400mg BID (for 6 months post CAR T), stopped   - Bactrim DS M, W, F (for 6 months post CAR T), will stop when he runs out  - IVIG for IgG <400, unable to tolerate Gammagard x2 (excruciating back pain, even with additional pre meds), given on 4/29 due to infections   - Started Gammunex-C without incidence on 3/4/24  - T+6 month vaccines given 4/29/24, T+8 month vaccines given 6/24/24    Bone Health:  - Found to have fracture of the R radius  11/12/23  - Follows w/ Dr. Collins, no plans for surgery at this point  - Started Zometa monthly  - Advised about increasing Calcium and Vit D intake  - Ordered DEXA     DVT:  - 6/20/22 acute occlusive DVT demonstrated within the left common femoral, profunda, femoral and popliteal veins.  - Has been on Eliquis 5mg BID, inturrupted around time of CAR-T cell then was discontinued as VTE was thought to be malignancy related while on treatment, as he's in remission now off treatment, Eliquis was stopped     Cardiac:  - Echo (5/2/23) showed EF of 50-55%  - Reports some dizziness, encouraged increasing fluid intake, sCr 1.35 (11/28)  - Will plan to hold Lisinopril and assess improvement in  dizziness and BP (will wean, 2.5mg BID x2 days, every other day, done)    Neurology  - LE neuropathy, started with Velcade years ago, stable, placed PT referral  - Mental decline and memory loss, more noticeable after CAR-T but overall mild  - Neurology referral, has appointment in September     Derm:  - History of squamous cell carcinomas  - Has plans to have another removed on L forehead    RTC:  PET/CT in 7/2024  Dr. Resendez 7/23/24  NICK Arechiga 8/2024 w/ 10 month vaccines     Wiley Arechiga, APRN-CNP

## 2024-06-24 NOTE — PROGRESS NOTES
Patient is here today for IVIG and Zometa infusions and vaccines. - no complications since last being seen-  b/h/ lung sounds not auscultated  Patient tolerated infusions and vaccines well.  No complaints. Call back instructions reviewed.    Patient verbalizes understanding of plan of care.  Ambulated off unit without difficulty, steady gait.  Accompanied by wife.

## 2024-06-25 ENCOUNTER — TELEMEDICINE (OUTPATIENT)
Dept: ORTHOPEDIC SURGERY | Facility: HOSPITAL | Age: 74
End: 2024-06-25
Payer: COMMERCIAL

## 2024-06-25 VITALS — BODY MASS INDEX: 31.5 KG/M2 | HEIGHT: 70 IN | WEIGHT: 220 LBS

## 2024-06-25 DIAGNOSIS — M16.11 PRIMARY OSTEOARTHRITIS OF RIGHT HIP: ICD-10-CM

## 2024-06-25 DIAGNOSIS — M84.534S: Primary | ICD-10-CM

## 2024-06-25 DIAGNOSIS — M84.433K: ICD-10-CM

## 2024-06-25 LAB
ALBUMIN SERPL BCP-MCNC: 4 G/DL (ref 3.4–5)
ALP SERPL-CCNC: 48 U/L (ref 33–136)
ALT SERPL W P-5'-P-CCNC: 14 U/L (ref 10–52)
ANION GAP SERPL CALC-SCNC: 14 MMOL/L (ref 10–20)
AST SERPL W P-5'-P-CCNC: 16 U/L (ref 9–39)
BILIRUB SERPL-MCNC: 0.7 MG/DL (ref 0–1.2)
BUN SERPL-MCNC: 22 MG/DL (ref 6–23)
CALCIUM SERPL-MCNC: 8.9 MG/DL (ref 8.6–10.6)
CHLORIDE SERPL-SCNC: 109 MMOL/L (ref 98–107)
CO2 SERPL-SCNC: 24 MMOL/L (ref 21–32)
CREAT SERPL-MCNC: 1.2 MG/DL (ref 0.5–1.3)
EGFRCR SERPLBLD CKD-EPI 2021: 63 ML/MIN/1.73M*2
GLUCOSE SERPL-MCNC: 101 MG/DL (ref 74–99)
IGA SERPL-MCNC: <7 MG/DL (ref 70–400)
IGG SERPL-MCNC: 434 MG/DL (ref 700–1600)
IGM SERPL-MCNC: 13 MG/DL (ref 40–230)
KAPPA LC SERPL-MCNC: 1.6 MG/DL (ref 0.33–1.94)
KAPPA LC/LAMBDA SER: 5.71 {RATIO} (ref 0.26–1.65)
LAMBDA LC SERPL-MCNC: 0.28 MG/DL (ref 0.57–2.63)
POTASSIUM SERPL-SCNC: 4.6 MMOL/L (ref 3.5–5.3)
PROT SERPL-MCNC: 5.4 G/DL (ref 6.4–8.2)
PROT SERPL-MCNC: 5.4 G/DL (ref 6.4–8.2)
SODIUM SERPL-SCNC: 142 MMOL/L (ref 136–145)

## 2024-06-25 RX ORDER — LANOLIN ALCOHOL/MO/W.PET/CERES
1000 CREAM (GRAM) TOPICAL DAILY
COMMUNITY

## 2024-06-25 ASSESSMENT — ENCOUNTER SYMPTOMS
DEPRESSION: 0
LOSS OF SENSATION IN FEET: 0
OCCASIONAL FEELINGS OF UNSTEADINESS: 0

## 2024-06-25 ASSESSMENT — PAIN - FUNCTIONAL ASSESSMENT
PAIN_FUNCTIONAL_ASSESSMENT: NO/DENIES PAIN
PAIN_FUNCTIONAL_ASSESSMENT: NO/DENIES PAIN

## 2024-06-25 NOTE — PROGRESS NOTES
[facility] Progress Note:    CC: Follow-up elbow x-rays    S: 73 y.o. male with a history of multiple myeloma with bilateral proximal radius fractures, the right of which was significantly displaced however he did not progressing with range of motion very well.  He missed his last appointment because he thought it was a virtual visit but is here to discuss the x-rays now.  States that his pain is very well-controlled.  He is progressing with range of motion and states that he continues to feel that he improves with no significant limitation in activities of daily living or function.  His right side is slightly more bothersome than the left but overall he is actually pretty happy with where he is at right now and the progress he continues make.  He is requesting a right hip film because he has a history of a right total hip arthroplasty performed in December 2022 by Dr. Daley.  He subsequently developed a dislocation which was reduced at [facility] in [place] because he is having some increased pain just wanted to make sure there was nothing wrong.    Objective:    Exam:  Gen: alert, appropriately conversational, no apparent distress    Physical exam is unable to be performed due to the virtual nature of the visit the patient is overall well-appearing with apparent distress    Imaging:  XR of the bilateral elbows, obtained and personally reviewed today, shows pathologic fracture of the right proximal radius through the biceps tuberosity with no signs of callus formation.  There is also an old fibrous union at the base of the radial neck.  No significant change in previous x-rays.  Radiographs of the left elbow demonstrate previously known lesions consistent with multiple myeloma with nondisplaced pathologic fracture unchanged in appearance from previous films.      A/P:    We discussed that overall he feels that clinically he continues to progress and is happy with his progress that certainly we can leave  these as fibrous nonunions and he can continue to work on range of motion and strengthening.  Essentially he can create a pseudoarthrosis for which he rotates and as long as he is happy we do not need to do anything from a surgical standpoint.  This certainly is in a rush at this point either given that it has already been 5 months since his fracture.  I think bring him back in 6 months with bilateral elbow films to be appropriate and as long as symptomatically he feels that he is functioning okay and his pain is minimal then we can certainly treat this continuously in a nonoperative manner.  We also placed a right hip films today for him to have the right hip checked given that he was having some slight increase in pain.  As long as there is nothing wrong I told we can then go over this when he comes back to see us but if we see any acute things that need more urgent attention we will call him to get him to come back for sooner appointment.

## 2024-06-27 LAB
ALBUMIN: 3.7 G/DL (ref 3.4–5)
ALPHA 1 GLOBULIN: 0.2 G/DL (ref 0.2–0.6)
ALPHA 2 GLOBULIN: 0.6 G/DL (ref 0.4–1.1)
BETA GLOBULIN: 0.5 G/DL (ref 0.5–1.2)
GAMMA GLOBULIN: 0.4 G/DL (ref 0.5–1.4)
IMMUNOFIXATION COMMENT: ABNORMAL
PATH REVIEW - SERUM IMMUNOFIXATION: ABNORMAL
PATH REVIEW-SERUM PROTEIN ELECTROPHORESIS: ABNORMAL
PROTEIN ELECTROPHORESIS COMMENT: ABNORMAL

## 2024-07-08 ENCOUNTER — HOSPITAL ENCOUNTER (OUTPATIENT)
Dept: RADIOLOGY | Facility: CLINIC | Age: 74
Discharge: HOME | End: 2024-07-08
Payer: COMMERCIAL

## 2024-07-08 DIAGNOSIS — C90.00 IGG MULTIPLE MYELOMA (MULTI): ICD-10-CM

## 2024-07-08 PROCEDURE — 77080 DXA BONE DENSITY AXIAL: CPT

## 2024-07-22 ENCOUNTER — INFUSION (OUTPATIENT)
Dept: HEMATOLOGY/ONCOLOGY | Facility: CLINIC | Age: 74
End: 2024-07-22
Payer: COMMERCIAL

## 2024-07-22 VITALS
DIASTOLIC BLOOD PRESSURE: 80 MMHG | HEIGHT: 70 IN | RESPIRATION RATE: 14 BRPM | TEMPERATURE: 97.9 F | WEIGHT: 221.56 LBS | BODY MASS INDEX: 31.72 KG/M2 | HEART RATE: 66 BPM | OXYGEN SATURATION: 98 % | SYSTOLIC BLOOD PRESSURE: 124 MMHG

## 2024-07-22 DIAGNOSIS — C90.00 MULTIPLE MYELOMA WITHOUT REMISSION (MULTI): ICD-10-CM

## 2024-07-22 DIAGNOSIS — C90.00 HYPOGAMMAGLOBULINEMIA DUE TO MULTIPLE MYELOMA (MULTI): ICD-10-CM

## 2024-07-22 DIAGNOSIS — C90.00 IGG MULTIPLE MYELOMA (MULTI): ICD-10-CM

## 2024-07-22 DIAGNOSIS — D80.1 HYPOGAMMAGLOBULINEMIA DUE TO MULTIPLE MYELOMA (MULTI): ICD-10-CM

## 2024-07-22 DIAGNOSIS — Z92.850 HISTORY OF CHIMERIC ANTIGEN RECEPTOR T-CELL THERAPY: ICD-10-CM

## 2024-07-22 LAB
BASOPHILS # BLD AUTO: 0 X10*3/UL (ref 0–0.1)
BASOPHILS NFR BLD AUTO: 0 %
EOSINOPHIL # BLD AUTO: 0.07 X10*3/UL (ref 0–0.4)
EOSINOPHIL NFR BLD AUTO: 2.1 %
ERYTHROCYTE [DISTWIDTH] IN BLOOD BY AUTOMATED COUNT: 12.7 % (ref 11.5–14.5)
HCT VFR BLD AUTO: 33.3 % (ref 41–52)
HGB BLD-MCNC: 11.4 G/DL (ref 13.5–17.5)
IMM GRANULOCYTES # BLD AUTO: 0 X10*3/UL (ref 0–0.5)
IMM GRANULOCYTES NFR BLD AUTO: 0 % (ref 0–0.9)
LYMPHOCYTES # BLD AUTO: 0.65 X10*3/UL (ref 0.8–3)
LYMPHOCYTES NFR BLD AUTO: 19.8 %
MCH RBC QN AUTO: 34.2 PG (ref 26–34)
MCHC RBC AUTO-ENTMCNC: 34.2 G/DL (ref 32–36)
MCV RBC AUTO: 100 FL (ref 80–100)
MONOCYTES # BLD AUTO: 0.38 X10*3/UL (ref 0.05–0.8)
MONOCYTES NFR BLD AUTO: 11.6 %
NEUTROPHILS # BLD AUTO: 2.18 X10*3/UL (ref 1.6–5.5)
NEUTROPHILS NFR BLD AUTO: 66.5 %
NRBC BLD-RTO: ABNORMAL /100{WBCS}
PLATELET # BLD AUTO: 105 X10*3/UL (ref 150–450)
RBC # BLD AUTO: 3.33 X10*6/UL (ref 4.5–5.9)
WBC # BLD AUTO: 3.3 X10*3/UL (ref 4.4–11.3)

## 2024-07-22 PROCEDURE — 83615 LACTATE (LD) (LDH) ENZYME: CPT

## 2024-07-22 PROCEDURE — 84155 ASSAY OF PROTEIN SERUM: CPT

## 2024-07-22 PROCEDURE — 83521 IG LIGHT CHAINS FREE EACH: CPT

## 2024-07-22 PROCEDURE — 85025 COMPLETE CBC W/AUTO DIFF WBC: CPT

## 2024-07-22 PROCEDURE — 86334 IMMUNOFIX E-PHORESIS SERUM: CPT

## 2024-07-22 PROCEDURE — 82784 ASSAY IGA/IGD/IGG/IGM EACH: CPT

## 2024-07-22 PROCEDURE — 2500000004 HC RX 250 GENERAL PHARMACY W/ HCPCS (ALT 636 FOR OP/ED)

## 2024-07-22 PROCEDURE — 80053 COMPREHEN METABOLIC PANEL: CPT

## 2024-07-22 PROCEDURE — 84550 ASSAY OF BLOOD/URIC ACID: CPT

## 2024-07-22 PROCEDURE — 96365 THER/PROPH/DIAG IV INF INIT: CPT | Mod: INF

## 2024-07-22 PROCEDURE — 83735 ASSAY OF MAGNESIUM: CPT

## 2024-07-22 PROCEDURE — 82728 ASSAY OF FERRITIN: CPT

## 2024-07-22 PROCEDURE — 86140 C-REACTIVE PROTEIN: CPT

## 2024-07-22 RX ORDER — HEPARIN 100 UNIT/ML
500 SYRINGE INTRAVENOUS AS NEEDED
OUTPATIENT
Start: 2024-07-22

## 2024-07-22 RX ORDER — DIPHENHYDRAMINE HYDROCHLORIDE 50 MG/ML
25 INJECTION INTRAMUSCULAR; INTRAVENOUS ONCE
Start: 2024-08-12 | End: 2024-08-12

## 2024-07-22 RX ORDER — MONTELUKAST SODIUM 10 MG/1
10 TABLET ORAL ONCE
Start: 2024-08-12 | End: 2024-08-12

## 2024-07-22 RX ORDER — FAMOTIDINE 10 MG/ML
20 INJECTION INTRAVENOUS ONCE
Start: 2024-08-12 | End: 2024-08-12

## 2024-07-22 RX ORDER — FAMOTIDINE 10 MG/ML
20 INJECTION INTRAVENOUS ONCE AS NEEDED
OUTPATIENT
Start: 2024-08-19

## 2024-07-22 RX ORDER — HEPARIN SODIUM,PORCINE/PF 10 UNIT/ML
50 SYRINGE (ML) INTRAVENOUS AS NEEDED
OUTPATIENT
Start: 2024-07-22

## 2024-07-22 RX ORDER — DIPHENHYDRAMINE HYDROCHLORIDE 50 MG/ML
50 INJECTION INTRAMUSCULAR; INTRAVENOUS AS NEEDED
OUTPATIENT
Start: 2024-08-12

## 2024-07-22 RX ORDER — DIPHENHYDRAMINE HYDROCHLORIDE 50 MG/ML
50 INJECTION INTRAMUSCULAR; INTRAVENOUS AS NEEDED
OUTPATIENT
Start: 2024-08-19

## 2024-07-22 RX ORDER — ACETAMINOPHEN 325 MG/1
650 TABLET ORAL ONCE
OUTPATIENT
Start: 2024-08-12

## 2024-07-22 RX ORDER — ALBUTEROL SULFATE 0.83 MG/ML
3 SOLUTION RESPIRATORY (INHALATION) AS NEEDED
OUTPATIENT
Start: 2024-08-12

## 2024-07-22 RX ORDER — EPINEPHRINE 0.3 MG/.3ML
0.3 INJECTION SUBCUTANEOUS ONCE AS NEEDED
OUTPATIENT
Start: 2024-08-19

## 2024-07-22 RX ORDER — FAMOTIDINE 10 MG/ML
20 INJECTION INTRAVENOUS ONCE AS NEEDED
OUTPATIENT
Start: 2024-08-12

## 2024-07-22 RX ORDER — ALBUTEROL SULFATE 0.83 MG/ML
3 SOLUTION RESPIRATORY (INHALATION) AS NEEDED
OUTPATIENT
Start: 2024-08-19

## 2024-07-22 RX ORDER — EPINEPHRINE 0.3 MG/.3ML
0.3 INJECTION SUBCUTANEOUS EVERY 5 MIN PRN
OUTPATIENT
Start: 2024-08-12

## 2024-07-22 ASSESSMENT — PAIN SCALES - GENERAL: PAINLEVEL: 0-NO PAIN

## 2024-07-22 NOTE — PROGRESS NOTES
Patient is here today for infusion - no complications since last being seen-  b/h/ lung sounds not auscultated  Patient tolerated infusion well.  No complaints. Call back instructions reviewed.    Patient verbalizes understanding of plan of care.  Ambulated off unit without difficulty, steady gait.

## 2024-07-23 ENCOUNTER — APPOINTMENT (OUTPATIENT)
Dept: HEMATOLOGY/ONCOLOGY | Facility: HOSPITAL | Age: 74
End: 2024-07-23
Payer: COMMERCIAL

## 2024-07-23 LAB
ALBUMIN SERPL BCP-MCNC: 3.9 G/DL (ref 3.4–5)
ALP SERPL-CCNC: 52 U/L (ref 33–136)
ALT SERPL W P-5'-P-CCNC: 15 U/L (ref 10–52)
ANION GAP SERPL CALC-SCNC: 12 MMOL/L (ref 10–20)
AST SERPL W P-5'-P-CCNC: 20 U/L (ref 9–39)
BILIRUB SERPL-MCNC: 0.8 MG/DL (ref 0–1.2)
BUN SERPL-MCNC: 22 MG/DL (ref 6–23)
CALCIUM SERPL-MCNC: 9.1 MG/DL (ref 8.6–10.6)
CHLORIDE SERPL-SCNC: 107 MMOL/L (ref 98–107)
CO2 SERPL-SCNC: 26 MMOL/L (ref 21–32)
CREAT SERPL-MCNC: 1.25 MG/DL (ref 0.5–1.3)
CRP SERPL-MCNC: 0.23 MG/DL
EGFRCR SERPLBLD CKD-EPI 2021: 60 ML/MIN/1.73M*2
FERRITIN SERPL-MCNC: 121 NG/ML (ref 20–300)
GLUCOSE SERPL-MCNC: 116 MG/DL (ref 74–99)
IGA SERPL-MCNC: <7 MG/DL (ref 70–400)
IGG SERPL-MCNC: 506 MG/DL (ref 700–1600)
IGM SERPL-MCNC: 29 MG/DL (ref 40–230)
KAPPA LC SERPL-MCNC: 4 MG/DL (ref 0.33–1.94)
KAPPA LC/LAMBDA SER: 7.27 {RATIO} (ref 0.26–1.65)
LAMBDA LC SERPL-MCNC: 0.55 MG/DL (ref 0.57–2.63)
LDH SERPL L TO P-CCNC: 123 U/L (ref 84–246)
MAGNESIUM SERPL-MCNC: 1.93 MG/DL (ref 1.6–2.4)
POTASSIUM SERPL-SCNC: 4.5 MMOL/L (ref 3.5–5.3)
PROT SERPL-MCNC: 5.7 G/DL (ref 6.4–8.2)
PROT SERPL-MCNC: 5.8 G/DL (ref 6.4–8.2)
SODIUM SERPL-SCNC: 140 MMOL/L (ref 136–145)
URATE SERPL-MCNC: 5.5 MG/DL (ref 4–7.5)

## 2024-07-24 LAB
ALBUMIN: 3.8 G/DL (ref 3.4–5)
ALPHA 1 GLOBULIN: 0.3 G/DL (ref 0.2–0.6)
ALPHA 2 GLOBULIN: 0.6 G/DL (ref 0.4–1.1)
BETA GLOBULIN: 0.6 G/DL (ref 0.5–1.2)
GAMMA GLOBULIN: 0.4 G/DL (ref 0.5–1.4)
IMMUNOFIXATION COMMENT: ABNORMAL
PATH REVIEW - SERUM IMMUNOFIXATION: ABNORMAL
PATH REVIEW-SERUM PROTEIN ELECTROPHORESIS: ABNORMAL
PROTEIN ELECTROPHORESIS COMMENT: ABNORMAL

## 2024-07-25 ENCOUNTER — HOSPITAL ENCOUNTER (OUTPATIENT)
Dept: RADIOLOGY | Facility: CLINIC | Age: 74
Discharge: HOME | End: 2024-07-25
Payer: COMMERCIAL

## 2024-07-25 DIAGNOSIS — C90.00 IGG MULTIPLE MYELOMA (MULTI): ICD-10-CM

## 2024-07-25 DIAGNOSIS — M16.11 PRIMARY OSTEOARTHRITIS OF RIGHT HIP: ICD-10-CM

## 2024-07-25 PROCEDURE — 78816 PET IMAGE W/CT FULL BODY: CPT | Mod: PS

## 2024-07-25 PROCEDURE — 3430000001 HC RX 343 DIAGNOSTIC RADIOPHARMACEUTICALS

## 2024-07-25 PROCEDURE — A9552 F18 FDG: HCPCS

## 2024-07-25 PROCEDURE — 73502 X-RAY EXAM HIP UNI 2-3 VIEWS: CPT | Mod: RT

## 2024-07-25 RX ORDER — FLUDEOXYGLUCOSE F 18 200 MCI/ML
11.3 INJECTION, SOLUTION INTRAVENOUS
Status: COMPLETED | OUTPATIENT
Start: 2024-07-25 | End: 2024-07-25

## 2024-07-29 ASSESSMENT — ENCOUNTER SYMPTOMS
DIZZINESS: 1
LIGHT-HEADEDNESS: 1
EYES NEGATIVE: 1
ENDOCRINE NEGATIVE: 1
PSYCHIATRIC NEGATIVE: 1
ARTHRALGIAS: 1
FATIGUE: 1
HEMATOLOGIC/LYMPHATIC NEGATIVE: 1
BACK PAIN: 1
RESPIRATORY NEGATIVE: 1
GASTROINTESTINAL NEGATIVE: 1
CARDIOVASCULAR NEGATIVE: 1

## 2024-07-29 NOTE — PROGRESS NOTES
Patient ID: Maldonado Mccloud is a 73 y.o. male.  Referring Physician: No referring provider defined for this encounter.  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 Bone marrow biopsy suggested multiple myeloma IgG kappa, ISS Stage II, bone survey revealed lytic lesions; Urine light chains present kappa restricted with 2gm proteinuria.  No adverse prognostic factors identified. Karyotype XY,   FISH neg for 1q abnormalities, trisomy 3,7,11, t(4;14), del 17p, del 13q    Treatment Hx:    VRD  Initiated Aug 2015; currently s/p 3 cycles.  BMBx 10/2015 complete response.    Stem Cell Transplant  Underwent stem cell mobilization with Neupogen/Plerixafor and 2 day collection December 16-17, 2015, for 8.62 CD34 x 10^6/kg. During procedure for right IJ Trialysis placement developed A. Fib with RVR which spontaneously resolved on December 14, 2015. He was admitted and placed on monitor for stem cell collection.     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  Hospitalization complicated by orthostatic hypotension, electrolyte replacement, drug induced rash, culture negative fever.      He completed approximately 5 weeks of maintenance Revlimid 10 mg daily which was held for worsening neuropathy August 2016.    3/ 2018: IgG M Braxton rising to 0.2gm/DL and light chain rising; restarted on Revlimid maintenance at 5mg EOD.     Revlimid stopped in 3/2021 due to stable disease.    Vacto/Pom Trial  7/2021 his M spike was on the rise. He was consented for Vacto/Pom trial which he began on 8/5/2021. He continued Pom post finishing the trial.     Sarclisa/Kyprolis  Myeloma markers increasing in 6/2022, consented for Sarclisa/Kyprolis, which began 6/30/22. He continued this through September  of 2022.       Cytoxan/Kyprolis  His therapy was subsequently changed in 9/2022 due to continued disease progression in the face of Sarclisa. His therapy was changed to CYYCLON regimen (Cytoxan/Kyprolis).    PET CT scan 9/14/2022 shows FDG avid lytic lesion within the left midshaft clavicle with pathologic fracture, FDG avid lytic lesions involving bilateral scapulas and FDG avidity within the T9 vertebral body, suggestive of active multiple myeloma.  There are non FDG avid lytic lesions in the right iliac bone and right clavicle, likely representing nonactive multiple myeloma.  There is no PET evidence of extramedullary involvement.  There is an FDG avid right thyroid nodule.     MRI in 10/2022 negative for myelomatous involvement in thoracic vertebrae.     Therapy placed on hold in 11/2022 due to need for hip replacement surgery. His myeloma markers were on the rise in 3/2023 with a new jaw lesion. Plan is to begin radiation therapy 4/11 and resume 2x/week Kyprolis 4/10.     Tracking to CAR T cell therapy. Collected CAR T cells on 5/26/23.     X-ray of L elbow (6/20/23):  IMPRESSION:  1. Osteolytic lesions in the proximal radius and distal humerus  consistent with patient's reported history of multiple myeloma.  2. No acute fracture or malalignment.    Began radiation x8 fractions on 7/3/23.    Resumed Cytoxan as of 8/7/23.       CAR T Cell Therapy   Admitted for CAR T cell therapy for his ISS stage 2 Kappa multiple myeloma with ABECMA T=0, 10/25/23     Hospital course complicated by Grade 1 CRS and Grade 1 ICANS, managed with dexamethasone 10mg x 3 days and one dose of tocilizumab.     Post CAR T restaging:    PET/CT (1/1/9/24):  IMPRESSION:  1. Multiple FDG avid lytic lesions as described above are stable in  size and FDG avidity when compared to prior study, consistent with  known multiple myeloma.  2. No PET evidence of extramedullary disease.  3. Mild FDG avid focus in the right thyroid gland, further  evaluation  with thyroid ultrasound is recommended.    BMBx (2/5/24): --No discrete plasma cell population identified.   --No immunophenotypic evidence of a lymphoproliferative disorder.   --No increased blast population.  Clonoseq = 0 (see media)    PET CT 7/25/24: 1. Overall stable multiple minimal or non FDG avid lytic lesions throughout the axial and appendicular skeleton as described above,  consistent with known multiple myeloma..  2. No new FDG avid multiple myeloma. No PET evidence of  extramedullary disease.     IgG multiple myeloma (Multi)   7/1/2015 Initial Diagnosis    IgG multiple myeloma (CMS/HCC)     12/23/2015 -  Bone Marrow Transplant    Autologous Stem Cell Transplant      11/4/2022 - 11/4/2022 Chemotherapy    Carfilzomib (Weekly) / Cyclophosphamide / Thalidomide / Dexamethasone, 28 Day Cycles     10/25/2023 -  Cellular Therapy    [facility]        SUBJECTIVE:  History of Present Illness:  Cuong presents to clinic 07/30/24 for a follow up visit accompanied by his wife Farida.    He is doing well overall.    Notes some dizziness with sudden standing. Every day throughout the day. No falls. Lasts for about 10-15 secs. Worse on days when doesn't drink enough. Seems to be more frequent per his wife.     Appetite good. No loss of weight.     Neuropathy same. Mental fog same - has neurology evaluation Sep.     Seen by ortho for bilateral arm fractures - continue conservative management    Review of Systems   Constitutional:  Positive for fatigue.   HENT: Negative.     Eyes: Negative.    Respiratory: Negative.     Cardiovascular: Negative.    Gastrointestinal: Negative.    Endocrine: Negative.    Genitourinary: Negative.    Musculoskeletal:  Positive for arthralgias and back pain.   Skin: Negative.    Allergic/Immunologic: Positive for immunocompromised state.   Neurological:  Positive for dizziness and light-headedness.   Hematological: Negative.    Psychiatric/Behavioral: Negative.          Brain fog      OBJECTIVE:  KPS: Karnofsky Score: 80 - Normal activity with effort; some signs or symptoms of disease   VS:  /70 (Patient Position: Standing)   Pulse 100   Temp 36.5 °C (97.7 °F) (Temporal)   Resp 16   Wt 94.3 kg (207 lb 12.8 oz)   SpO2 100%   BMI 29.82 kg/m²   BSA: 2.16 meters squared    Orthostatic:  Sitting 135/90, HR 81  Standing 114/70,     Physical Exam  Constitutional:       Appearance: Normal appearance. He is normal weight.   HENT:      Head: Normocephalic and atraumatic.      Nose: Nose normal.      Mouth/Throat:      Mouth: Mucous membranes are moist.      Pharynx: Oropharynx is clear.   Eyes:      Extraocular Movements: Extraocular movements intact.      Conjunctiva/sclera: Conjunctivae normal.      Pupils: Pupils are equal, round, and reactive to light.   Cardiovascular:      Rate and Rhythm: Normal rate and regular rhythm.      Pulses: Normal pulses.      Heart sounds: Normal heart sounds.   Pulmonary:      Effort: Pulmonary effort is normal.      Breath sounds: Normal breath sounds.   Abdominal:      General: Abdomen is flat. Bowel sounds are normal.      Palpations: Abdomen is soft.   Musculoskeletal:         General: Normal range of motion.      Cervical back: Normal range of motion and neck supple.   Skin:     General: Skin is warm and dry.   Neurological:      General: No focal deficit present.      Mental Status: He is alert and oriented to person, place, and time. Mental status is at baseline.      Coordination: Coordination abnormal.   Psychiatric:         Mood and Affect: Mood normal.         Behavior: Behavior normal.         Thought Content: Thought content normal.         Judgment: Judgment normal.       Laboratory:  The pertinent laboratory results were reviewed and discussed with the patient.    Lab Results   Component Value Date    WBC 3.3 (L) 07/22/2024    HCT 33.3 (L) 07/22/2024    HGB 11.4 (L) 07/22/2024     (L) 07/22/2024    ANC 1.49 (L) 10/27/2023    K  4.5 07/22/2024    CALCIUM 9.1 07/22/2024     07/22/2024    MG 1.93 07/22/2024    ALT 15 07/22/2024    AST 20 07/22/2024    BUN 22 07/22/2024    CREATININE 1.25 07/22/2024    PHOS 3.9 11/13/2023    KAPPA 4.00 (H) 07/22/2024    LAMBDA 0.55 (L) 07/22/2024    KAPLS 7.27 (H) 07/22/2024    SPEP Decrease in polyclonal gamma globulins.    07/22/2024    IEPIN No monoclonal protein detected by immunofixation. 07/22/2024     (L) 07/22/2024    IGM 29 (L) 07/22/2024    IGA <7 (L) 07/22/2024        Note: for a comprehensive list of the patient's lab results, access the Results Review activity.    ASSESSMENT and PLAN:    T+279 from [facility].     Multiple Myeloma:   - ISS Stage 2 IgG Kappa Multiple Myeloma  - S/p VRD, Autologous SCT, Vacto/Pom, Kyprolis/Sarclisa, Radiation, most recently Kyprolis/Cytoxan  - PET/CT showed mostly decreased metabolic activity, however some new areas  - Completed radiation to R arm with Dr. Patel  - BMBx performed 10/2 shows BULMARO with clonoseq detected but below LOD, however progressing lytic lesions  - S/p CAR T w/ ABECMA (T=0, 10/25/23), hospital course complicated by grade 1 ICANS managed w/ Dex 10mg x3 days and 1 dose Tociluzimab  - 1/9/2024  biochemical response (CR But lytic lesions on Xrays and pathological fractures. (Inactive lesions?)  - PET/CT (1/19/2024) Multiple FDG avid lytic lesions above are stable in size and FDG avidity  - BMBx (2/5/24): BULMARO, MRD negative  - 5/14/20234 ongoing response to CART  - PET CT 7/2024 - continued remission, Malcolm chain slight elevation - will continue to closely monitor with monthly labs.      ID:  - Acyclovir 400mg BID (for 6 months post CAR T), stopped   - Bactrim DS M, W, F (for 6 months post CAR T), will stop when he runs out  - IVIG for IgG <400, unable to tolerate Gammagard x2 (excruciating back pain, even with additional pre meds), given on 4/29 due to infections   - Started Gammunex-C without incidence on 3/4/24  - level 506 last week,  hold if level >400  - T+6 month vaccines given 4/29/24, T+8 month vaccines given 6/24/24, next set in August 2024.     Bone Health:  - Found to have fracture of the R radius  11/12/23  - Follows w/ Dr. Collins, no plans for surgery at this point  - Started Zometa monthly - wean to [id number] from next cycle.   - Advised about increasing Calcium and Vit D intake  - DEXA scan 7/5 - normal     DVT:  - 6/20/22 acute occlusive DVT demonstrated within the left common femoral, profunda, femoral and popliteal veins.  - Has been on Eliquis 5mg BID, inturrupted around time of CAR-T cell then was discontinued as VTE was thought to be malignancy related while on treatment, as he's in remission now off treatment, Eliquis was stopped     Cardiac:  - Echo (5/2/23) showed EF of 50-55%  - Reports some dizziness, encouraged increasing fluid intake, sCr 1.35 (11/28)  - off lisinopril, orthostatic positive - continue to monitor, anticipatory guidance provided.     Neurology  - LE neuropathy, started with Velcade years ago, stable, placed PT referral  - Mental decline and memory loss, more noticeable after CAR-T but overall mild  - Neurology referral, has appointment in September     Derm:  - History of basal cell carcinomas    RTC:  NICK Arechiga 8/2024 w/ 10 month vaccines     Raeann Mckeon MD

## 2024-07-30 ENCOUNTER — OFFICE VISIT (OUTPATIENT)
Dept: HEMATOLOGY/ONCOLOGY | Facility: HOSPITAL | Age: 74
End: 2024-07-30
Payer: COMMERCIAL

## 2024-07-30 VITALS
SYSTOLIC BLOOD PRESSURE: 114 MMHG | TEMPERATURE: 97.7 F | DIASTOLIC BLOOD PRESSURE: 70 MMHG | RESPIRATION RATE: 16 BRPM | OXYGEN SATURATION: 100 % | WEIGHT: 207.8 LBS | BODY MASS INDEX: 29.82 KG/M2 | HEART RATE: 100 BPM

## 2024-07-30 DIAGNOSIS — C90.00 MULTIPLE MYELOMA WITHOUT REMISSION (MULTI): ICD-10-CM

## 2024-07-30 PROCEDURE — 99215 OFFICE O/P EST HI 40 MIN: CPT | Performed by: INTERNAL MEDICINE

## 2024-07-30 RX ORDER — FAMOTIDINE 10 MG/ML
20 INJECTION INTRAVENOUS ONCE AS NEEDED
OUTPATIENT
Start: 2024-10-14

## 2024-07-30 RX ORDER — DIPHENHYDRAMINE HYDROCHLORIDE 50 MG/ML
50 INJECTION INTRAMUSCULAR; INTRAVENOUS AS NEEDED
OUTPATIENT
Start: 2024-10-14

## 2024-07-30 RX ORDER — EPINEPHRINE 0.3 MG/.3ML
0.3 INJECTION SUBCUTANEOUS ONCE AS NEEDED
OUTPATIENT
Start: 2024-10-14

## 2024-07-30 RX ORDER — ALBUTEROL SULFATE 0.83 MG/ML
3 SOLUTION RESPIRATORY (INHALATION) AS NEEDED
OUTPATIENT
Start: 2024-10-14

## 2024-07-30 ASSESSMENT — PAIN SCALES - GENERAL: PAINLEVEL: 0-NO PAIN

## 2024-08-19 ENCOUNTER — INFUSION (OUTPATIENT)
Dept: HEMATOLOGY/ONCOLOGY | Facility: CLINIC | Age: 74
End: 2024-08-19
Payer: COMMERCIAL

## 2024-08-19 ENCOUNTER — LAB (OUTPATIENT)
Dept: LAB | Facility: CLINIC | Age: 74
End: 2024-08-19
Payer: COMMERCIAL

## 2024-08-19 ENCOUNTER — OFFICE VISIT (OUTPATIENT)
Dept: HEMATOLOGY/ONCOLOGY | Facility: CLINIC | Age: 74
End: 2024-08-19
Payer: COMMERCIAL

## 2024-08-19 VITALS
OXYGEN SATURATION: 98 % | DIASTOLIC BLOOD PRESSURE: 82 MMHG | HEART RATE: 79 BPM | SYSTOLIC BLOOD PRESSURE: 126 MMHG | RESPIRATION RATE: 16 BRPM | TEMPERATURE: 97 F | WEIGHT: 220.9 LBS | BODY MASS INDEX: 31.7 KG/M2

## 2024-08-19 DIAGNOSIS — D80.1 HYPOGAMMAGLOBULINEMIA DUE TO MULTIPLE MYELOMA (MULTI): ICD-10-CM

## 2024-08-19 DIAGNOSIS — C90.00 IGG MULTIPLE MYELOMA (MULTI): ICD-10-CM

## 2024-08-19 DIAGNOSIS — C90.00 MULTIPLE MYELOMA WITHOUT REMISSION (MULTI): Primary | ICD-10-CM

## 2024-08-19 DIAGNOSIS — Z92.850 HISTORY OF CHIMERIC ANTIGEN RECEPTOR T-CELL THERAPY: ICD-10-CM

## 2024-08-19 DIAGNOSIS — C90.00 MULTIPLE MYELOMA WITHOUT REMISSION (MULTI): ICD-10-CM

## 2024-08-19 DIAGNOSIS — D84.9 IMMUNOCOMPROMISED (MULTI): ICD-10-CM

## 2024-08-19 DIAGNOSIS — C90.00 HYPOGAMMAGLOBULINEMIA DUE TO MULTIPLE MYELOMA (MULTI): ICD-10-CM

## 2024-08-19 DIAGNOSIS — M84.433D: ICD-10-CM

## 2024-08-19 DIAGNOSIS — Z94.84 STEM CELLS TRANSPLANT STATUS (MULTI): ICD-10-CM

## 2024-08-19 DIAGNOSIS — R42 DIZZINESS: ICD-10-CM

## 2024-08-19 LAB
BASOPHILS # BLD AUTO: 0.02 X10*3/UL (ref 0–0.1)
BASOPHILS NFR BLD AUTO: 0.4 %
EOSINOPHIL # BLD AUTO: 0.19 X10*3/UL (ref 0–0.4)
EOSINOPHIL NFR BLD AUTO: 4.1 %
ERYTHROCYTE [DISTWIDTH] IN BLOOD BY AUTOMATED COUNT: 12.6 % (ref 11.5–14.5)
HCT VFR BLD AUTO: 35 % (ref 41–52)
HGB BLD-MCNC: 11.9 G/DL (ref 13.5–17.5)
IMM GRANULOCYTES # BLD AUTO: 0.01 X10*3/UL (ref 0–0.5)
IMM GRANULOCYTES NFR BLD AUTO: 0.2 % (ref 0–0.9)
LYMPHOCYTES # BLD AUTO: 0.67 X10*3/UL (ref 0.8–3)
LYMPHOCYTES NFR BLD AUTO: 14.6 %
MCH RBC QN AUTO: 33.7 PG (ref 26–34)
MCHC RBC AUTO-ENTMCNC: 34 G/DL (ref 32–36)
MCV RBC AUTO: 99 FL (ref 80–100)
MONOCYTES # BLD AUTO: 0.54 X10*3/UL (ref 0.05–0.8)
MONOCYTES NFR BLD AUTO: 11.7 %
NEUTROPHILS # BLD AUTO: 3.17 X10*3/UL (ref 1.6–5.5)
NEUTROPHILS NFR BLD AUTO: 69 %
NRBC BLD-RTO: ABNORMAL /100{WBCS}
PLATELET # BLD AUTO: 119 X10*3/UL (ref 150–450)
RBC # BLD AUTO: 3.53 X10*6/UL (ref 4.5–5.9)
WBC # BLD AUTO: 4.6 X10*3/UL (ref 4.4–11.3)

## 2024-08-19 PROCEDURE — 90472 IMMUNIZATION ADMIN EACH ADD: CPT

## 2024-08-19 PROCEDURE — 82728 ASSAY OF FERRITIN: CPT

## 2024-08-19 PROCEDURE — 2500000004 HC RX 250 GENERAL PHARMACY W/ HCPCS (ALT 636 FOR OP/ED)

## 2024-08-19 PROCEDURE — 83615 LACTATE (LD) (LDH) ENZYME: CPT

## 2024-08-19 PROCEDURE — 84550 ASSAY OF BLOOD/URIC ACID: CPT

## 2024-08-19 PROCEDURE — 84155 ASSAY OF PROTEIN SERUM: CPT

## 2024-08-19 PROCEDURE — 90696 DTAP-IPV VACCINE 4-6 YRS IM: CPT

## 2024-08-19 PROCEDURE — 80053 COMPREHEN METABOLIC PANEL: CPT

## 2024-08-19 PROCEDURE — 86140 C-REACTIVE PROTEIN: CPT

## 2024-08-19 PROCEDURE — 90677 PCV20 VACCINE IM: CPT

## 2024-08-19 PROCEDURE — 86334 IMMUNOFIX E-PHORESIS SERUM: CPT

## 2024-08-19 PROCEDURE — 99215 OFFICE O/P EST HI 40 MIN: CPT | Mod: 25

## 2024-08-19 PROCEDURE — 90471 IMMUNIZATION ADMIN: CPT

## 2024-08-19 PROCEDURE — 99215 OFFICE O/P EST HI 40 MIN: CPT

## 2024-08-19 PROCEDURE — 3079F DIAST BP 80-89 MM HG: CPT

## 2024-08-19 PROCEDURE — 1159F MED LIST DOCD IN RCRD: CPT

## 2024-08-19 PROCEDURE — 85025 COMPLETE CBC W/AUTO DIFF WBC: CPT

## 2024-08-19 PROCEDURE — 36415 COLL VENOUS BLD VENIPUNCTURE: CPT

## 2024-08-19 PROCEDURE — 96372 THER/PROPH/DIAG INJ SC/IM: CPT

## 2024-08-19 PROCEDURE — 83735 ASSAY OF MAGNESIUM: CPT

## 2024-08-19 PROCEDURE — 3074F SYST BP LT 130 MM HG: CPT

## 2024-08-19 PROCEDURE — 90648 HIB PRP-T VACCINE 4 DOSE IM: CPT

## 2024-08-19 RX ORDER — FAMOTIDINE 10 MG/ML
20 INJECTION INTRAVENOUS ONCE AS NEEDED
OUTPATIENT
Start: 2024-10-14

## 2024-08-19 RX ORDER — EPINEPHRINE 0.3 MG/.3ML
0.3 INJECTION SUBCUTANEOUS EVERY 5 MIN PRN
Status: DISCONTINUED | OUTPATIENT
Start: 2024-08-19 | End: 2024-08-19 | Stop reason: HOSPADM

## 2024-08-19 RX ORDER — FAMOTIDINE 10 MG/ML
20 INJECTION INTRAVENOUS ONCE AS NEEDED
Status: DISCONTINUED | OUTPATIENT
Start: 2024-08-19 | End: 2024-08-19 | Stop reason: HOSPADM

## 2024-08-19 RX ORDER — DIPHENHYDRAMINE HYDROCHLORIDE 50 MG/ML
50 INJECTION INTRAMUSCULAR; INTRAVENOUS AS NEEDED
OUTPATIENT
Start: 2024-10-14

## 2024-08-19 RX ORDER — ALBUTEROL SULFATE 0.83 MG/ML
3 SOLUTION RESPIRATORY (INHALATION) AS NEEDED
Status: DISCONTINUED | OUTPATIENT
Start: 2024-08-19 | End: 2024-08-19 | Stop reason: HOSPADM

## 2024-08-19 RX ORDER — EPINEPHRINE 0.3 MG/.3ML
0.3 INJECTION SUBCUTANEOUS EVERY 5 MIN PRN
OUTPATIENT
Start: 2024-10-14

## 2024-08-19 RX ORDER — ALBUTEROL SULFATE 0.83 MG/ML
3 SOLUTION RESPIRATORY (INHALATION) AS NEEDED
OUTPATIENT
Start: 2024-10-14

## 2024-08-19 RX ORDER — DIPHENHYDRAMINE HYDROCHLORIDE 50 MG/ML
50 INJECTION INTRAMUSCULAR; INTRAVENOUS AS NEEDED
Status: DISCONTINUED | OUTPATIENT
Start: 2024-08-19 | End: 2024-08-19 | Stop reason: HOSPADM

## 2024-08-19 ASSESSMENT — ENCOUNTER SYMPTOMS
GASTROINTESTINAL NEGATIVE: 1
BACK PAIN: 1
FATIGUE: 1
EYES NEGATIVE: 1
ENDOCRINE NEGATIVE: 1
RESPIRATORY NEGATIVE: 1
DIZZINESS: 1
ARTHRALGIAS: 1
LIGHT-HEADEDNESS: 1
PSYCHIATRIC NEGATIVE: 1
CARDIOVASCULAR NEGATIVE: 1
HEMATOLOGIC/LYMPHATIC NEGATIVE: 1

## 2024-08-20 ENCOUNTER — DOCUMENTATION (OUTPATIENT)
Dept: HEMATOLOGY/ONCOLOGY | Facility: HOSPITAL | Age: 74
End: 2024-08-20
Payer: COMMERCIAL

## 2024-08-20 LAB
ALBUMIN SERPL BCP-MCNC: 4.2 G/DL (ref 3.4–5)
ALP SERPL-CCNC: 62 U/L (ref 33–136)
ALT SERPL W P-5'-P-CCNC: 18 U/L (ref 10–52)
ANION GAP SERPL CALC-SCNC: 13 MMOL/L (ref 10–20)
AST SERPL W P-5'-P-CCNC: 19 U/L (ref 9–39)
BILIRUB SERPL-MCNC: 0.9 MG/DL (ref 0–1.2)
BUN SERPL-MCNC: 22 MG/DL (ref 6–23)
CALCIUM SERPL-MCNC: 9 MG/DL (ref 8.6–10.6)
CHLORIDE SERPL-SCNC: 107 MMOL/L (ref 98–107)
CO2 SERPL-SCNC: 27 MMOL/L (ref 21–32)
CREAT SERPL-MCNC: 1.24 MG/DL (ref 0.5–1.3)
CRP SERPL-MCNC: 0.72 MG/DL
EGFRCR SERPLBLD CKD-EPI 2021: 61 ML/MIN/1.73M*2
FERRITIN SERPL-MCNC: 155 NG/ML (ref 20–300)
GLUCOSE SERPL-MCNC: 96 MG/DL (ref 74–99)
LDH SERPL L TO P-CCNC: 142 U/L (ref 84–246)
MAGNESIUM SERPL-MCNC: 2.16 MG/DL (ref 1.6–2.4)
POTASSIUM SERPL-SCNC: 4.6 MMOL/L (ref 3.5–5.3)
PROT SERPL-MCNC: 6 G/DL (ref 6.4–8.2)
PROT SERPL-MCNC: 6 G/DL (ref 6.4–8.2)
SODIUM SERPL-SCNC: 142 MMOL/L (ref 136–145)
URATE SERPL-MCNC: 5.3 MG/DL (ref 4–7.5)

## 2024-08-20 NOTE — PROGRESS NOTES
Provider and nurse partner notified of fibrinogen sample canceled by lab due to not enough sample.

## 2024-08-23 LAB
ALBUMIN: 3.9 G/DL (ref 3.4–5)
ALPHA 1 GLOBULIN: 0.3 G/DL (ref 0.2–0.6)
ALPHA 2 GLOBULIN: 0.8 G/DL (ref 0.4–1.1)
BETA GLOBULIN: 0.6 G/DL (ref 0.5–1.2)
GAMMA GLOBULIN: 0.4 G/DL (ref 0.5–1.4)
IMMUNOFIXATION COMMENT: ABNORMAL
PATH REVIEW - SERUM IMMUNOFIXATION: ABNORMAL
PATH REVIEW-SERUM PROTEIN ELECTROPHORESIS: ABNORMAL
PROTEIN ELECTROPHORESIS COMMENT: ABNORMAL

## 2024-08-29 DIAGNOSIS — J40 BRONCHITIS: Primary | ICD-10-CM

## 2024-08-29 RX ORDER — AZITHROMYCIN 250 MG/1
TABLET, FILM COATED ORAL
Qty: 6 TABLET | Refills: 0 | Status: SHIPPED | OUTPATIENT
Start: 2024-08-29

## 2024-09-05 ENCOUNTER — APPOINTMENT (OUTPATIENT)
Dept: NEUROLOGY | Facility: CLINIC | Age: 74
End: 2024-09-05
Payer: MEDICARE

## 2024-09-05 VITALS
HEIGHT: 70 IN | BODY MASS INDEX: 31.94 KG/M2 | SYSTOLIC BLOOD PRESSURE: 135 MMHG | WEIGHT: 223.1 LBS | DIASTOLIC BLOOD PRESSURE: 81 MMHG | HEART RATE: 77 BPM

## 2024-09-05 DIAGNOSIS — Z92.850 PERSONAL HISTORY OF CHIMERIC ANTIGEN RECEPTOR T-CELL THERAPY: ICD-10-CM

## 2024-09-05 DIAGNOSIS — R41.89 COGNITIVE DECLINE: ICD-10-CM

## 2024-09-05 PROBLEM — M54.50 ACUTE LOW BACK PAIN: Status: ACTIVE | Noted: 2024-09-05

## 2024-09-05 ASSESSMENT — SLU MENTAL STATUS EXAMINATION (SLUMS)
PROVIDE NAMES OF ANIMALS: 3
PATIENT HAS COMPLETED HIGH SCHOOL OR ABOVE: YES
WHAT DAY OF THE WEEK IS TODAY: 1
QUESTIONS ABOUT STORY: 6
BACKWARD DIGIT SPAN: 2
WHAT STATE ARE WE IN: 1
REMEMBER AND REPEAT FIVE WORDS: 5
TOTAL SCORE: 28
CALCULATE MONEY SPENT AND MONEY LEFT: 3
WHAT YEAR IS THIS: 1
DRAW A CLOCK: 4
PICK OUT TRIANGLE: 2

## 2024-09-05 ASSESSMENT — PAIN SCALES - GENERAL: PAINLEVEL: 6

## 2024-09-05 NOTE — PROGRESS NOTES
Subjective     Maldonado Mccloud is a right handed  73 y.o. year old male who presents with Memory Loss (NPV// has a broken arm/In room; wife). Patient is accompanied by: spouse.    Visit type: new patient visit    HPI     Over the last few years he has experienced mental decline.  He has been on chemo for the last 8 years.  He will forget names, drive past an exit.  He will forget conversations.  He will forget how to do something.  He has difficulty with working on a computer or using the remote at times.  He will lose his train of thought in the middle of a sentence.     He is retired.  He is more fearful to do things.  If he has to get up [employer] and make a presentation he will take more precautions.  It affects his confidence.     He has a BA.  He worked as .  He retired 15 years ago.  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      He has been off chemo since 10/2023.   He does have neuropathy in his feet and feels unsteady on his feet.     There is no family history of cognitive decline.  His parents is 97 and still doing well.      He does not drink alcohol.  He occasionally exercises.  There is no reason he can't not exercise.      Review of Systems  All other systems have been reviewed and are negative for complaint.     Past Medical History:   Diagnosis Date    Autonomic orthostatic hypotension 03/14/2023    Bursitis of left shoulder 08/22/2023    Cervical stenosis of spine 03/14/2023    Decreased GFR 03/14/2023    Erectile dysfunction 03/14/2023    Fever 10/02/2023    Finger injury, right, initial encounter 03/14/2023    Fracture of clavicle, left, closed 03/14/2023    Other conditions influencing health status 05/24/2017    History of cough    Personal history of malignant melanoma of skin 11/20/2015    History of malignant melanoma of skin    Personal history of other diseases of the respiratory system 05/12/2018    History of acute bronchitis    Personal history of other diseases  of the respiratory system 04/21/2017    History of acute sinusitis    Personal history of other diseases of the respiratory system 04/21/2017    History of acute bronchitis    Strain of unspecified muscle and tendon at ankle and foot level, left foot, initial encounter 04/05/2021    Strain of left foot, initial encounter    Swelling of extremity, left 03/14/2023     Family History   Problem Relation Name Age of Onset    Osteoporosis Mother      Diabetes Father      Heart disease Father      Pancreatic cancer Father      Squamous cell carcinoma Father       Past Surgical History:   Procedure Laterality Date    IR CVC TUNNELED  12/14/2015    IR CVC TUNNELED 12/14/2015 [facility]    IR CVC TUNNELED  5/26/2023    IR CVC TUNNELED 5/26/2023 [facility]    OTHER SURGICAL HISTORY  10/23/2015    Thorax Partial Excision Of A Rib    TOTAL KNEE ARTHROPLASTY  04/17/2015    Knee Replacement      Social History     Tobacco Use    Smoking status: Some Days     Types: Cigars    Smokeless tobacco: Never   Substance Use Topics    Alcohol use: Not Currently          Objective     Neurological Exam    Physical Exam    On general examination, the patient is well appearing and well groomed. Heart is regular, rate and rhythm. Lungs are clear to ausculation bilaterally. There is no peripheral edema. Normal pedal pulses bilaterally. No carotid bruits.   On neurologic examination, the mental status is unremarkable to informal testing. The history is related in quite good detail with no obvious deficit of attention, memory or language. Fund of knowledge is adequate. Orientation is intact to person, place and time. Spontaneous speech is fluent with no paraphasic or aphasic errors. On cranial nerve exam, the pupils are equal, round and reactive to light. Extraocular movements are full, without nystagmus. Visual fields are full to confrontation. The fundi are benign with normal sharp disc margins. There is no bulbofacial weakness or ptosis.  "There is no ptosis with sustained upgaze. The tongue is midline with no wasting or fasciculations. The palate elevates symmetrically. Facial sensation is intact to light touch and pinprick bilaterally. Hearing is intact to finger rub bilaterally. Shoulder shrug is 5/5 bilaterally.  Neck flexion and extension have full strength. Motor examination reveals normal tone and bulk in the upper and lower extremities bilaterally. There are no fasciculations. There is full strength in the proximal and distal muscles of the upper and lower extremities bilaterally. Deep tendon reflexes are 2/4 and symmetric throughout the upper and lower extremities bilaterally, including the ankle jerks. Plantar responses are flexor bilaterally. Fine finger movements and rapid alternating movements are done well in both hands. There is no tremor. On coordination testing, finger-nose-finger testing are done well bilaterally. Sensory examination reveals normal light touch sensation in the distal upper and lower extremities bilaterally. Gait is normal.     Assessment/Plan   Mr. Mccloud is a 73 year old man presenting to the neurology clinic today for initial evaluation of cognitive changes. Initially noted with chemotherapy however changes have continued to decline in the last year since stopping therapy.  He scored a 28/30 today on the SLUMs evaluations.  Changes reported are common with \"chemo-brain\" however since there has been progression since stopping it, will get an MRI brain and blood work to evaluate for secondary causes. Will also get Neuro-psych testing.    Recommend that he start regular aerobic exercise, working up to 4-5 times a week of 30  minutes of activity.    Follow up after the Neuro-psych testing.    Libby Ellis, DO            "

## 2024-09-05 NOTE — PATIENT INSTRUCTIONS
Recommend getting an MRI brain and blood work to look for secondary causes of memory loss. Will also recommend Neuro-psych testing.  Will call with results of the test and next steps. Follow up in the office after the Neuro-psych testing.

## 2024-09-16 ENCOUNTER — APPOINTMENT (OUTPATIENT)
Dept: HEMATOLOGY/ONCOLOGY | Facility: CLINIC | Age: 74
End: 2024-09-16
Payer: COMMERCIAL

## 2024-09-17 ENCOUNTER — LAB (OUTPATIENT)
Dept: LAB | Facility: LAB | Age: 74
End: 2024-09-17
Payer: COMMERCIAL

## 2024-09-17 ENCOUNTER — APPOINTMENT (OUTPATIENT)
Dept: PRIMARY CARE | Facility: CLINIC | Age: 74
End: 2024-09-17
Payer: MEDICARE

## 2024-09-17 VITALS
HEIGHT: 70 IN | HEART RATE: 65 BPM | SYSTOLIC BLOOD PRESSURE: 114 MMHG | BODY MASS INDEX: 31.58 KG/M2 | OXYGEN SATURATION: 97 % | WEIGHT: 220.6 LBS | DIASTOLIC BLOOD PRESSURE: 74 MMHG | TEMPERATURE: 97.6 F

## 2024-09-17 DIAGNOSIS — Z13.220 SCREENING, LIPID: ICD-10-CM

## 2024-09-17 DIAGNOSIS — C90.00 MULTIPLE MYELOMA WITHOUT REMISSION (MULTI): ICD-10-CM

## 2024-09-17 DIAGNOSIS — C90.00 IGG MULTIPLE MYELOMA (MULTI): ICD-10-CM

## 2024-09-17 DIAGNOSIS — Z12.5 SCREENING PSA (PROSTATE SPECIFIC ANTIGEN): ICD-10-CM

## 2024-09-17 DIAGNOSIS — Z00.00 MEDICARE ANNUAL WELLNESS VISIT, SUBSEQUENT: ICD-10-CM

## 2024-09-17 DIAGNOSIS — Z23 IMMUNIZATION DUE: ICD-10-CM

## 2024-09-17 DIAGNOSIS — C90.00 MULTIPLE MYELOMA NOT HAVING ACHIEVED REMISSION (MULTI): Primary | ICD-10-CM

## 2024-09-17 DIAGNOSIS — R41.89 COGNITIVE DECLINE: ICD-10-CM

## 2024-09-17 DIAGNOSIS — M25.512 ACUTE PAIN OF LEFT SHOULDER: ICD-10-CM

## 2024-09-17 DIAGNOSIS — G62.2 POLYNEUROPATHY DUE TO OTHER TOXIC AGENTS (MULTI): ICD-10-CM

## 2024-09-17 LAB
ALBUMIN SERPL BCP-MCNC: 4.4 G/DL (ref 3.4–5)
ALP SERPL-CCNC: 60 U/L (ref 33–136)
ALT SERPL W P-5'-P-CCNC: 18 U/L (ref 10–52)
ANION GAP SERPL CALC-SCNC: 12 MMOL/L (ref 10–20)
AST SERPL W P-5'-P-CCNC: 23 U/L (ref 9–39)
BASOPHILS # BLD AUTO: 0.01 X10*3/UL (ref 0–0.1)
BASOPHILS NFR BLD AUTO: 0.3 %
BILIRUB SERPL-MCNC: 0.8 MG/DL (ref 0–1.2)
BUN SERPL-MCNC: 20 MG/DL (ref 6–23)
CALCIUM SERPL-MCNC: 9.5 MG/DL (ref 8.6–10.6)
CHLORIDE SERPL-SCNC: 106 MMOL/L (ref 98–107)
CHOLEST SERPL-MCNC: 157 MG/DL (ref 0–199)
CHOLESTEROL/HDL RATIO: 3.7
CO2 SERPL-SCNC: 27 MMOL/L (ref 21–32)
CREAT SERPL-MCNC: 1.3 MG/DL (ref 0.5–1.3)
CRP SERPL-MCNC: 0.18 MG/DL
EGFRCR SERPLBLD CKD-EPI 2021: 58 ML/MIN/1.73M*2
EOSINOPHIL # BLD AUTO: 0.09 X10*3/UL (ref 0–0.4)
EOSINOPHIL NFR BLD AUTO: 3.1 %
ERYTHROCYTE [DISTWIDTH] IN BLOOD BY AUTOMATED COUNT: 13.2 % (ref 11.5–14.5)
FERRITIN SERPL-MCNC: 139 NG/ML (ref 20–300)
FOLATE SERPL-MCNC: 16.7 NG/ML
GLUCOSE SERPL-MCNC: 115 MG/DL (ref 74–99)
HCT VFR BLD AUTO: 34.6 % (ref 41–52)
HDLC SERPL-MCNC: 42.9 MG/DL
HGB BLD-MCNC: 11.8 G/DL (ref 13.5–17.5)
IGA SERPL-MCNC: <7 MG/DL (ref 70–400)
IGG SERPL-MCNC: 369 MG/DL (ref 700–1600)
IGM SERPL-MCNC: 128 MG/DL (ref 40–230)
IMM GRANULOCYTES # BLD AUTO: 0.01 X10*3/UL (ref 0–0.5)
IMM GRANULOCYTES NFR BLD AUTO: 0.3 % (ref 0–0.9)
LDH SERPL L TO P-CCNC: 150 U/L (ref 84–246)
LDLC SERPL CALC-MCNC: 97 MG/DL
LYMPHOCYTES # BLD AUTO: 0.75 X10*3/UL (ref 0.8–3)
LYMPHOCYTES NFR BLD AUTO: 25.7 %
MAGNESIUM SERPL-MCNC: 2.06 MG/DL (ref 1.6–2.4)
MCH RBC QN AUTO: 33.3 PG (ref 26–34)
MCHC RBC AUTO-ENTMCNC: 34.1 G/DL (ref 32–36)
MCV RBC AUTO: 98 FL (ref 80–100)
MONOCYTES # BLD AUTO: 0.42 X10*3/UL (ref 0.05–0.8)
MONOCYTES NFR BLD AUTO: 14.4 %
NEUTROPHILS # BLD AUTO: 1.64 X10*3/UL (ref 1.6–5.5)
NEUTROPHILS NFR BLD AUTO: 56.2 %
NON HDL CHOLESTEROL: 114 MG/DL (ref 0–149)
NRBC BLD-RTO: 0 /100 WBCS (ref 0–0)
PLATELET # BLD AUTO: 147 X10*3/UL (ref 150–450)
POTASSIUM SERPL-SCNC: 4.6 MMOL/L (ref 3.5–5.3)
PROT SERPL-MCNC: 6.2 G/DL (ref 6.4–8.2)
PROT SERPL-MCNC: 6.2 G/DL (ref 6.4–8.2)
PSA SERPL-MCNC: 2.17 NG/ML
RBC # BLD AUTO: 3.54 X10*6/UL (ref 4.5–5.9)
SODIUM SERPL-SCNC: 140 MMOL/L (ref 136–145)
TRIGL SERPL-MCNC: 88 MG/DL (ref 0–149)
TSH SERPL-ACNC: 2.1 MIU/L (ref 0.44–3.98)
URATE SERPL-MCNC: 6.2 MG/DL (ref 4–7.5)
VIT B12 SERPL-MCNC: 572 PG/ML (ref 211–911)
VLDL: 18 MG/DL (ref 0–40)
WBC # BLD AUTO: 2.9 X10*3/UL (ref 4.4–11.3)

## 2024-09-17 PROCEDURE — G0008 ADMIN INFLUENZA VIRUS VAC: HCPCS | Performed by: FAMILY MEDICINE

## 2024-09-17 PROCEDURE — 84425 ASSAY OF VITAMIN B-1: CPT

## 2024-09-17 PROCEDURE — 84153 ASSAY OF PSA TOTAL: CPT

## 2024-09-17 PROCEDURE — 84443 ASSAY THYROID STIM HORMONE: CPT

## 2024-09-17 PROCEDURE — 90662 IIV NO PRSV INCREASED AG IM: CPT | Performed by: FAMILY MEDICINE

## 2024-09-17 PROCEDURE — 99213 OFFICE O/P EST LOW 20 MIN: CPT | Performed by: FAMILY MEDICINE

## 2024-09-17 PROCEDURE — 36415 COLL VENOUS BLD VENIPUNCTURE: CPT

## 2024-09-17 PROCEDURE — 84165 PROTEIN E-PHORESIS SERUM: CPT

## 2024-09-17 PROCEDURE — 80061 LIPID PANEL: CPT

## 2024-09-17 PROCEDURE — 82728 ASSAY OF FERRITIN: CPT

## 2024-09-17 PROCEDURE — 86320 SERUM IMMUNOELECTROPHORESIS: CPT

## 2024-09-17 PROCEDURE — 84550 ASSAY OF BLOOD/URIC ACID: CPT

## 2024-09-17 PROCEDURE — 83521 IG LIGHT CHAINS FREE EACH: CPT

## 2024-09-17 PROCEDURE — 83735 ASSAY OF MAGNESIUM: CPT

## 2024-09-17 PROCEDURE — 85025 COMPLETE CBC W/AUTO DIFF WBC: CPT

## 2024-09-17 PROCEDURE — 85384 FIBRINOGEN ACTIVITY: CPT

## 2024-09-17 PROCEDURE — 83615 LACTATE (LD) (LDH) ENZYME: CPT

## 2024-09-17 PROCEDURE — 82784 ASSAY IGA/IGD/IGG/IGM EACH: CPT

## 2024-09-17 PROCEDURE — 80053 COMPREHEN METABOLIC PANEL: CPT

## 2024-09-17 PROCEDURE — 84155 ASSAY OF PROTEIN SERUM: CPT

## 2024-09-17 PROCEDURE — 86140 C-REACTIVE PROTEIN: CPT

## 2024-09-17 PROCEDURE — 86334 IMMUNOFIX E-PHORESIS SERUM: CPT

## 2024-09-17 ASSESSMENT — ACTIVITIES OF DAILY LIVING (ADL)
DRESSING: INDEPENDENT
MANAGING_FINANCES: INDEPENDENT
GROCERY_SHOPPING: INDEPENDENT
BATHING: INDEPENDENT
TAKING_MEDICATION: INDEPENDENT
DOING_HOUSEWORK: INDEPENDENT

## 2024-09-17 ASSESSMENT — PATIENT HEALTH QUESTIONNAIRE - PHQ9
1. LITTLE INTEREST OR PLEASURE IN DOING THINGS: NOT AT ALL
2. FEELING DOWN, DEPRESSED OR HOPELESS: NOT AT ALL
SUM OF ALL RESPONSES TO PHQ9 QUESTIONS 1 AND 2: 0

## 2024-09-17 NOTE — PROGRESS NOTES
"Subjective   Patient ID: Maldonado Mccloud is a 73 y.o. male who presents for Medicare Annual Wellness Visit Initial (Maldonado is here for his annual Medicare exam. Pt states he has a couple things to discuss today. ).    HPI   Maldonado was seen today for an annual Medicare wellness review, as well as a review of his insomnia, for which he takes and tolerates trazodone.    He is fasting for labs today, also has a lab order from neurology.  Hematology care is up-to-date.  His only concern today is that of left shoulder pain, atraumatic.  He does have a history of a left shoulder fracture secondary to multiple myeloma lesions.  He has no night pain, is functionally limited at times.  Tylenol helps.    Vaccines:  Influenza (annual)---needs today  Pneumococcal/Pneumonia--- up to date  Tdap (Tetanus/Pertussis, every 10 years)--- 8-19-24  Shingles (2 dose series)---current  Colon Cancer Screening---colonoscopy done 2017  Abdominal Aortic Aneurysm Screening (only men ages 65-75 who have ever smoked)--- N/A  Prostate Cancer screening---today    Review of Systems  The full review of systems is negative with the exception of what is noted in HPI    Objective   /74 (BP Location: Right arm, Patient Position: Sitting, BP Cuff Size: Large adult)   Pulse 65   Temp 36.4 °C (97.6 °F) (Temporal)   Ht 1.778 m (5' 10\")   Wt 100 kg (220 lb 9.6 oz)   SpO2 97%   BMI 31.65 kg/m²     Physical Exam  Constitutional/General appearance: alert, oriented, well-appearing, in no distress  Head and face exam is normal  No scleral icterus or conjunctival erythema present  Hearing is grossly normal  Respiratory effort is normal, no dyspnea noted  Cortical function is normal  Mood, affect, are pleasant, appropriate, and interactive.  Insight is normal  Cardiac exam reveals a regular rate and rhythm, trace murmur present.   Lungs are clear bilaterally.    No lower extremity edema present.  Muscle was seen today for an annual Medicare wellness " review, as well as a review of his  Left shoulder: Neer/Beard/empty can sign all negative.  Decreased internal ROM noted  Assessment/Plan     Continue trazodone, refills up-to-date  Check labs as ordered, as well as new labs.  Keep up hematology care  High-dose flu vaccine today  All others are up-to-date.  Shoulder pain, check x-ray, follow-up with orthopedics thereafter.  Follow-up as scheduled  **Portions of this medical record have been created using voice recognition software and may have minor errors which are inherent in voice recognition systems. It has not been fully edited for typographical or grammatical errors**

## 2024-09-18 LAB
FIBRINOGEN PPP-MCNC: 298 MG/DL (ref 200–400)
KAPPA LC SERPL-MCNC: 15.64 MG/DL (ref 0.33–1.94)
KAPPA LC/LAMBDA SER: 18.84 {RATIO} (ref 0.26–1.65)
LAMBDA LC SERPL-MCNC: 0.83 MG/DL (ref 0.57–2.63)

## 2024-09-20 ENCOUNTER — HOSPITAL ENCOUNTER (OUTPATIENT)
Dept: RADIOLOGY | Facility: CLINIC | Age: 74
Discharge: HOME | End: 2024-09-20
Payer: MEDICARE

## 2024-09-20 DIAGNOSIS — R41.89 COGNITIVE DECLINE: ICD-10-CM

## 2024-09-20 DIAGNOSIS — M25.512 ACUTE PAIN OF LEFT SHOULDER: ICD-10-CM

## 2024-09-20 PROCEDURE — 70551 MRI BRAIN STEM W/O DYE: CPT

## 2024-09-20 PROCEDURE — 73030 X-RAY EXAM OF SHOULDER: CPT | Mod: LT

## 2024-09-21 LAB
ALBUMIN: 4.2 G/DL (ref 3.4–5)
ALPHA 1 GLOBULIN: 0.3 G/DL (ref 0.2–0.6)
ALPHA 2 GLOBULIN: 0.7 G/DL (ref 0.4–1.1)
BETA GLOBULIN: 0.6 G/DL (ref 0.5–1.2)
GAMMA GLOBULIN: 0.4 G/DL (ref 0.5–1.4)
IMMUNOFIXATION COMMENT: ABNORMAL
M-PROTEIN 1: 0.1 G/DL
PATH REVIEW - SERUM IMMUNOFIXATION: ABNORMAL
PATH REVIEW-SERUM PROTEIN ELECTROPHORESIS: ABNORMAL
PROTEIN ELECTROPHORESIS COMMENT: ABNORMAL

## 2024-09-22 LAB — VIT B1 PYROPHOSHATE BLD-SCNC: 86 NMOL/L (ref 70–180)

## 2024-09-23 ENCOUNTER — OFFICE VISIT (OUTPATIENT)
Dept: HEMATOLOGY/ONCOLOGY | Facility: CLINIC | Age: 74
End: 2024-09-23
Payer: COMMERCIAL

## 2024-09-23 ENCOUNTER — LAB (OUTPATIENT)
Dept: LAB | Facility: CLINIC | Age: 74
End: 2024-09-23
Payer: COMMERCIAL

## 2024-09-23 VITALS
TEMPERATURE: 97.9 F | RESPIRATION RATE: 16 BRPM | HEART RATE: 80 BPM | WEIGHT: 220.46 LBS | BODY MASS INDEX: 31.63 KG/M2 | OXYGEN SATURATION: 94 % | DIASTOLIC BLOOD PRESSURE: 80 MMHG | SYSTOLIC BLOOD PRESSURE: 118 MMHG

## 2024-09-23 DIAGNOSIS — G62.89 OTHER POLYNEUROPATHY: ICD-10-CM

## 2024-09-23 DIAGNOSIS — C90.00 MULTIPLE MYELOMA WITHOUT REMISSION (MULTI): ICD-10-CM

## 2024-09-23 DIAGNOSIS — Z92.850 HISTORY OF CHIMERIC ANTIGEN RECEPTOR T-CELL THERAPY: ICD-10-CM

## 2024-09-23 DIAGNOSIS — D80.1 HYPOGAMMAGLOBULINEMIA DUE TO MULTIPLE MYELOMA (MULTI): ICD-10-CM

## 2024-09-23 DIAGNOSIS — C90.00 MULTIPLE MYELOMA WITHOUT REMISSION (MULTI): Primary | ICD-10-CM

## 2024-09-23 DIAGNOSIS — C90.00 HYPOGAMMAGLOBULINEMIA DUE TO MULTIPLE MYELOMA (MULTI): ICD-10-CM

## 2024-09-23 DIAGNOSIS — D84.9 IMMUNOCOMPROMISED: ICD-10-CM

## 2024-09-23 DIAGNOSIS — Z94.84 STEM CELLS TRANSPLANT STATUS (MULTI): ICD-10-CM

## 2024-09-23 PROBLEM — G62.9 PERIPHERAL NEUROPATHY: Status: ACTIVE | Noted: 2024-09-23

## 2024-09-23 LAB
BASOPHILS # BLD AUTO: 0.01 X10*3/UL (ref 0–0.1)
BASOPHILS NFR BLD AUTO: 0.3 %
EOSINOPHIL # BLD AUTO: 0.09 X10*3/UL (ref 0–0.4)
EOSINOPHIL NFR BLD AUTO: 2.3 %
ERYTHROCYTE [DISTWIDTH] IN BLOOD BY AUTOMATED COUNT: 12.8 % (ref 11.5–14.5)
HCT VFR BLD AUTO: 35.7 % (ref 41–52)
HGB BLD-MCNC: 12.2 G/DL (ref 13.5–17.5)
IMM GRANULOCYTES # BLD AUTO: 0.01 X10*3/UL (ref 0–0.5)
IMM GRANULOCYTES NFR BLD AUTO: 0.3 % (ref 0–0.9)
LYMPHOCYTES # BLD AUTO: 0.86 X10*3/UL (ref 0.8–3)
LYMPHOCYTES NFR BLD AUTO: 21.9 %
MCH RBC QN AUTO: 33.9 PG (ref 26–34)
MCHC RBC AUTO-ENTMCNC: 34.2 G/DL (ref 32–36)
MCV RBC AUTO: 99 FL (ref 80–100)
MONOCYTES # BLD AUTO: 0.42 X10*3/UL (ref 0.05–0.8)
MONOCYTES NFR BLD AUTO: 10.7 %
NEUTROPHILS # BLD AUTO: 2.54 X10*3/UL (ref 1.6–5.5)
NEUTROPHILS NFR BLD AUTO: 64.5 %
NRBC BLD-RTO: ABNORMAL /100{WBCS}
PLATELET # BLD AUTO: 116 X10*3/UL (ref 150–450)
RBC # BLD AUTO: 3.6 X10*6/UL (ref 4.5–5.9)
WBC # BLD AUTO: 3.9 X10*3/UL (ref 4.4–11.3)

## 2024-09-23 PROCEDURE — 85025 COMPLETE CBC W/AUTO DIFF WBC: CPT

## 2024-09-23 PROCEDURE — 3079F DIAST BP 80-89 MM HG: CPT

## 2024-09-23 PROCEDURE — 36415 COLL VENOUS BLD VENIPUNCTURE: CPT

## 2024-09-23 PROCEDURE — 84155 ASSAY OF PROTEIN SERUM: CPT

## 2024-09-23 PROCEDURE — 88305 TISSUE EXAM BY PATHOLOGIST: CPT | Mod: TC,SUR

## 2024-09-23 PROCEDURE — 99215 OFFICE O/P EST HI 40 MIN: CPT

## 2024-09-23 PROCEDURE — 1123F ACP DISCUSS/DSCN MKR DOCD: CPT

## 2024-09-23 PROCEDURE — 82784 ASSAY IGA/IGD/IGG/IGM EACH: CPT

## 2024-09-23 PROCEDURE — 80053 COMPREHEN METABOLIC PANEL: CPT

## 2024-09-23 PROCEDURE — 88237 TISSUE CULTURE BONE MARROW: CPT

## 2024-09-23 PROCEDURE — 3074F SYST BP LT 130 MM HG: CPT

## 2024-09-23 PROCEDURE — 1159F MED LIST DOCD IN RCRD: CPT

## 2024-09-23 PROCEDURE — 1126F AMNT PAIN NOTED NONE PRSNT: CPT

## 2024-09-23 PROCEDURE — 86334 IMMUNOFIX E-PHORESIS SERUM: CPT

## 2024-09-23 PROCEDURE — 88185 FLOWCYTOMETRY/TC ADD-ON: CPT | Mod: TC

## 2024-09-23 PROCEDURE — 38222 DX BONE MARROW BX & ASPIR: CPT | Mod: RT

## 2024-09-23 PROCEDURE — 83521 IG LIGHT CHAINS FREE EACH: CPT

## 2024-09-23 PROCEDURE — 85097 BONE MARROW INTERPRETATION: CPT

## 2024-09-23 PROCEDURE — 38222 DX BONE MARROW BX & ASPIR: CPT

## 2024-09-23 RX ORDER — DULOXETIN HYDROCHLORIDE 30 MG/1
60 CAPSULE, DELAYED RELEASE ORAL DAILY
Qty: 60 CAPSULE | Refills: 0 | Status: SHIPPED | OUTPATIENT
Start: 2024-09-23 | End: 2024-10-23

## 2024-09-23 ASSESSMENT — ENCOUNTER SYMPTOMS
DIZZINESS: 1
ENDOCRINE NEGATIVE: 1
BACK PAIN: 1
EYES NEGATIVE: 1
CARDIOVASCULAR NEGATIVE: 1
RESPIRATORY NEGATIVE: 1
HEMATOLOGIC/LYMPHATIC NEGATIVE: 1
FATIGUE: 1
ARTHRALGIAS: 1
LIGHT-HEADEDNESS: 1
PSYCHIATRIC NEGATIVE: 1
GASTROINTESTINAL NEGATIVE: 1

## 2024-09-23 ASSESSMENT — PAIN SCALES - GENERAL: PAINLEVEL: 0-NO PAIN

## 2024-09-23 NOTE — PROGRESS NOTES
Patient ID: Maldonado Mccloud is a 73 y.o. male.  Referring Physician: No referring provider defined for this encounter.  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 Bone marrow biopsy suggested multiple myeloma IgG kappa, ISS Stage II, bone survey revealed lytic lesions; Urine light chains present kappa restricted with 2gm proteinuria.  No adverse prognostic factors identified. Karyotype XY,   FISH neg for 1q abnormalities, trisomy 3,7,11, t(4;14), del 17p, del 13q    Treatment Hx:    VRD  Initiated Aug 2015; currently s/p 3 cycles.  BMBx 10/2015 complete response.    Stem Cell Transplant  Underwent stem cell mobilization with Neupogen/Plerixafor and 2 day collection December 16-17, 2015, for 8.62 CD34 x 10^6/kg. During procedure for right IJ Trialysis placement developed A. Fib with RVR which spontaneously resolved on December 14, 2015. He was admitted and placed on monitor for stem cell collection.     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  Hospitalization complicated by orthostatic hypotension, electrolyte replacement, drug induced rash, culture negative fever.      He completed approximately 5 weeks of maintenance Revlimid 10 mg daily which was held for worsening neuropathy August 2016.    3/ 2018: IgG M Braxton rising to 0.2gm/DL and light chain rising; restarted on Revlimid maintenance at 5mg EOD.     Revlimid stopped in 3/2021 due to stable disease.    Vacto/Pom Trial  7/2021 his M spike was on the rise. He was consented for Vacto/Pom trial which he began on 8/5/2021. He continued Pom post finishing the trial.     Sarclisa/Kyprolis  Myeloma markers increasing in 6/2022, consented for Sarclisa/Kyprolis, which began 6/30/22. He continued this through September  of 2022.       Cytoxan/Kyprolis  His therapy was subsequently changed in 9/2022 due to continued disease progression in the face of Sarclisa. His therapy was changed to CYYCLON regimen (Cytoxan/Kyprolis).    PET CT scan 9/14/2022 shows FDG avid lytic lesion within the left midshaft clavicle with pathologic fracture, FDG avid lytic lesions involving bilateral scapulas and FDG avidity within the T9 vertebral body, suggestive of active multiple myeloma.  There are non FDG avid lytic lesions in the right iliac bone and right clavicle, likely representing nonactive multiple myeloma.  There is no PET evidence of extramedullary involvement.  There is an FDG avid right thyroid nodule.     MRI in 10/2022 negative for myelomatous involvement in thoracic vertebrae.     Therapy placed on hold in 11/2022 due to need for hip replacement surgery. His myeloma markers were on the rise in 3/2023 with a new jaw lesion. Plan is to begin radiation therapy 4/11 and resume 2x/week Kyprolis 4/10.     Tracking to CAR T cell therapy. Collected CAR T cells on 5/26/23.     X-ray of L elbow (6/20/23):  IMPRESSION:  1. Osteolytic lesions in the proximal radius and distal humerus  consistent with patient's reported history of multiple myeloma.  2. No acute fracture or malalignment.    Began radiation x8 fractions on 7/3/23.    Resumed Cytoxan as of 8/7/23.       CAR T Cell Therapy   Admitted for CAR T cell therapy for his ISS stage 2 Kappa multiple myeloma with ABECMA T=0, 10/25/23     Hospital course complicated by Grade 1 CRS and Grade 1 ICANS, managed with dexamethasone 10mg x 3 days and one dose of tocilizumab.     Post CAR T restaging:    PET/CT (1/1/9/24):  IMPRESSION:  1. Multiple FDG avid lytic lesions as described above are stable in  size and FDG avidity when compared to prior study, consistent with  known multiple myeloma.  2. No PET evidence of extramedullary disease.  3. Mild FDG avid focus in the right thyroid gland, further  evaluation  with thyroid ultrasound is recommended.    BMBx (2/5/24): --No discrete plasma cell population identified.   --No immunophenotypic evidence of a lymphoproliferative disorder.   --No increased blast population.  Clonoseq = 0 (see media)    PET CT 7/25/24: 1. Overall stable multiple minimal or non FDG avid lytic lesions throughout the axial and appendicular skeleton as described above,  consistent with known multiple myeloma..  2. No new FDG avid multiple myeloma. No PET evidence of  extramedullary disease.     IgG multiple myeloma (Multi)   7/1/2015 Initial Diagnosis    IgG multiple myeloma (CMS/HCC)     12/23/2015 -  Bone Marrow Transplant    Autologous Stem Cell Transplant      11/4/2022 - 11/4/2022 Chemotherapy    Carfilzomib (Weekly) / Cyclophosphamide / Thalidomide / Dexamethasone, 28 Day Cycles     10/25/2023 -  Cellular Therapy    [facility]        SUBJECTIVE:  History of Present Illness:  Cuong presents to clinic 09/23/24 for a follow up visit accompanied by his wife Farida.    He is doing okay.     Wife notes that he is very sedentary.     L shoulder pain has increased which he recently had an x-ray that showed some arthritis.     Increased neuropathy in his bilateral legs from the knee down. Also notes some in his hands bilaterally.       Review of Systems   Constitutional:  Positive for fatigue.   HENT: Negative.     Eyes: Negative.    Respiratory: Negative.     Cardiovascular: Negative.    Gastrointestinal: Negative.    Endocrine: Negative.    Genitourinary: Negative.    Musculoskeletal:  Positive for arthralgias and back pain.   Skin: Negative.    Allergic/Immunologic: Positive for immunocompromised state.   Neurological:  Positive for dizziness and light-headedness.   Hematological: Negative.    Psychiatric/Behavioral: Negative.          Brain fog     OBJECTIVE:  KPS: Karnofsky Score: 80 - Normal activity with effort; some signs or symptoms of disease   VS:  /80   Pulse 80   Temp 36.6 °C  (97.9 °F) (Temporal)   Resp 16   Wt 100 kg (220 lb 7.4 oz)   SpO2 94%   BMI 31.63 kg/m²   BSA: 2.22 meters squared    Physical Exam  Constitutional:       Appearance: Normal appearance. He is normal weight.   HENT:      Head: Normocephalic and atraumatic.      Nose: Nose normal.      Mouth/Throat:      Mouth: Mucous membranes are moist.      Pharynx: Oropharynx is clear.   Eyes:      Extraocular Movements: Extraocular movements intact.      Conjunctiva/sclera: Conjunctivae normal.      Pupils: Pupils are equal, round, and reactive to light.   Cardiovascular:      Rate and Rhythm: Normal rate and regular rhythm.      Pulses: Normal pulses.      Heart sounds: Normal heart sounds.   Pulmonary:      Effort: Pulmonary effort is normal.      Breath sounds: Normal breath sounds.   Abdominal:      General: Abdomen is flat. Bowel sounds are normal.      Palpations: Abdomen is soft.   Musculoskeletal:         General: Normal range of motion.      Cervical back: Normal range of motion and neck supple.   Skin:     General: Skin is warm and dry.   Neurological:      General: No focal deficit present.      Mental Status: He is alert and oriented to person, place, and time. Mental status is at baseline.      Coordination: Coordination abnormal.   Psychiatric:         Mood and Affect: Mood normal.         Behavior: Behavior normal.         Thought Content: Thought content normal.         Judgment: Judgment normal.     Laboratory:  The pertinent laboratory results were reviewed and discussed with the patient.    Lab Results   Component Value Date    WBC 2.9 (L) 09/17/2024    HCT 34.6 (L) 09/17/2024    HGB 11.8 (L) 09/17/2024     (L) 09/17/2024    ANC 1.49 (L) 10/27/2023    K 4.6 09/17/2024    CALCIUM 9.5 09/17/2024     09/17/2024    MG 2.06 09/17/2024    ALT 18 09/17/2024    AST 23 09/17/2024    BUN 20 09/17/2024    CREATININE 1.30 09/17/2024    PHOS 3.9 11/13/2023    KAPPA 15.64 (H) 09/17/2024    LAMBDA 0.83  09/17/2024    KAPLS 18.84 (H) 09/17/2024    SPEP Aberrant band detected. See immunofixation. 09/17/2024    IEPIN  09/17/2024 9/17/24 Monoclonal IgM kappa in the gamma region at 0.1 g/dL.      Repeat testing is recommended after the resolution of any acute illness to monitor the evolution of this band.         (L) 09/17/2024     09/17/2024    IGA <7 (L) 09/17/2024        Note: for a comprehensive list of the patient's lab results, access the Results Review activity.    ASSESSMENT and PLAN:    T+334 from [facility].     Multiple Myeloma:   - ISS Stage 2 IgG Kappa Multiple Myeloma  - S/p VRD, Autologous SCT, Vacto/Pom, Kyprolis/Sarclisa, Radiation, most recently Kyprolis/Cytoxan  - PET/CT showed mostly decreased metabolic activity, however some new areas  - Completed radiation to R arm with Dr. Patel  - BMBx performed 10/2 shows BULMARO with clonoseq detected but below LOD, however progressing lytic lesions  - S/p CAR T w/ ABECMA (T=0, 10/25/23), hospital course complicated by grade 1 ICANS managed w/ Dex 10mg x3 days and 1 dose Tociluzimab  - 1/9/2024  biochemical response (CR But lytic lesions on Xrays and pathological fractures. (Inactive lesions?)  - PET/CT (1/19/2024) Multiple FDG avid lytic lesions above are stable in size and FDG avidity  - BMBx (2/5/24): BULMARO, MRD negative  - 5/14/20234 ongoing response to CART  - PET CT 7/25/2024 - continued remission, [place] chain slight elevation - will continue to closely monitor with monthly labs.   - Increase in Kappa FLC from 4 to 15.64mg/dL, FLC ratio from 7.27 to 18.84mg/dL, 0.1g/dL IgM Kappa M protein noted on SPEP  - Concern for relapse, performed BMBx w/ clonoseq MRD 9/23/24, PET/CT scheduled for 10/3/24     ID:  - Acyclovir 400mg BID (for 6 months post CAR T), completed   - Bactrim DS M, W, F (for 6 months post CAR T), completed   - IVIG for IgG <400, unable to tolerate Gammagard x2 (excruciating back pain, even with additional pre meds), given on 4/29  due to infections   - Started Gammunex-C without incidence on 3/4/24  - level 369 (9/17), hold if level >400  - T+6 month vaccines given 4/29/24, T+8 month vaccines given 6/24/24, T+10 month vaccines 8/19/24    Bone Health:  - Found to have fracture of the R radius  11/12/23  - Follows w/ Dr. Collins, no plans for surgery at this point  - Started Zometa monthly - wean to [id number] from next cycle.   - Advised about increasing Calcium and Vit D intake  - DEXA scan 7/8/24 - normal  - L shoulder pain, arthritis     DVT:  - 6/20/22 acute occlusive DVT demonstrated within the left common femoral, profunda, femoral and popliteal veins.  - Has been on Eliquis 5mg BID, inturrupted around time of CAR-T cell then was discontinued as VTE was thought to be malignancy related while on treatment, as he's in remission now off treatment, Eliquis was stopped     Cardiac:  - Echo (5/2/23) showed EF of 50-55%  - Reports some dizziness, encouraged increasing fluid intake, sCr 1.35 (11/28)  - off lisinopril, orthostatic positive - continue to monitor, anticipatory guidance provided.     Neurology  - LE neuropathy, started with Velcade years ago, stable, placed PT referral  - Mental decline and memory loss, more noticeable after CAR-T but overall mild  - Neurology , had brain MRI, FUV in 4 months    Neuropathy:  - Bilateral legs from knee down, hands  - Tried Gabapentin in past w/o relief  - Ordered Duloxetine 30mg x1 week at bedtime, will increase to 60mg at bedtime    Derm:  - History of basal cell carcinomas    RTC:  10/3 PET/CT, Dr. Huff new patient visit    Wiley Arechiga, APRN-CNP

## 2024-09-23 NOTE — PROGRESS NOTES
Patient ID: Maldonado Mccloud is a 73 y.o. male.    Biopsy bone marrow    Date/Time: 9/23/2024 12:26 PM    Performed by: SHANELL John  Authorized by: SHANELL John    Consent:     Consent obtained:  Verbal and written    Consent given by:  Patient    Risks discussed:  Bleeding, infection, pain and nerve damage  Universal protocol:     Procedure explained and questions answered to patient or proxy's satisfaction: yes      Relevant documents present and verified: yes      Test results available: yes      Imaging studies available: yes      Required blood products, implants, devices, and special equipment available: yes      Site/side marked: yes      Immediately prior to procedure, a time out was called: yes      Patient identity confirmed:  Verbally with patient, arm band, provided demographic data and hospital-assigned identification number  Indications:     Indications:  Multiple Myeloma  Pre-procedure details:     Skin preparation:  Chlorhexidine  Anesthesia:     Anesthesia method:  Local infiltration    Local anesthetic:  Lidocaine 2% w/o epi  Procedure specific details:      Bone Marrow Biopsy and Aspiration Procedure Note     Informed consent was obtained and potential risks including bleeding, infection and pain were reviewed with the patient.     The right posterior iliac crest was prepped with chlorhexidine.     10 ml of lidocaine 2% local anesthesia infiltrated into the subcutaneous tissue.    Right bone marrow biopsy and right bone marrow aspirate was obtained.     The procedure was tolerated well and there were no complications.    Specimens sent for: routine histopathologic stains and sectioning, flow cytometry, cytogenetics, and molecular analysis    Procedure completed by: SHANELL John     Post-procedure details:     Procedure completion:  Tolerated well, no immediate complications

## 2024-09-24 LAB
ALBUMIN SERPL BCP-MCNC: 4.5 G/DL (ref 3.4–5)
ALP SERPL-CCNC: 61 U/L (ref 33–136)
ALT SERPL W P-5'-P-CCNC: 20 U/L (ref 10–52)
ANION GAP SERPL CALC-SCNC: 15 MMOL/L (ref 10–20)
AST SERPL W P-5'-P-CCNC: 21 U/L (ref 9–39)
BILIRUB SERPL-MCNC: 0.8 MG/DL (ref 0–1.2)
BUN SERPL-MCNC: 27 MG/DL (ref 6–23)
CALCIUM SERPL-MCNC: 9.4 MG/DL (ref 8.6–10.6)
CHLORIDE SERPL-SCNC: 106 MMOL/L (ref 98–107)
CO2 SERPL-SCNC: 25 MMOL/L (ref 21–32)
CREAT SERPL-MCNC: 1.31 MG/DL (ref 0.5–1.3)
EGFRCR SERPLBLD CKD-EPI 2021: 57 ML/MIN/1.73M*2
GLUCOSE SERPL-MCNC: 114 MG/DL (ref 74–99)
IGA SERPL-MCNC: <7 MG/DL (ref 70–400)
IGG SERPL-MCNC: 372 MG/DL (ref 700–1600)
IGM SERPL-MCNC: 115 MG/DL (ref 40–230)
KAPPA LC SERPL-MCNC: 14.86 MG/DL (ref 0.33–1.94)
KAPPA LC/LAMBDA SER: 18.81 {RATIO} (ref 0.26–1.65)
LAMBDA LC SERPL-MCNC: 0.79 MG/DL (ref 0.57–2.63)
POTASSIUM SERPL-SCNC: 5 MMOL/L (ref 3.5–5.3)
PROT SERPL-MCNC: 6.2 G/DL (ref 6.4–8.2)
PROT SERPL-MCNC: 6.2 G/DL (ref 6.4–8.2)
SODIUM SERPL-SCNC: 141 MMOL/L (ref 136–145)

## 2024-09-25 ENCOUNTER — TELEPHONE (OUTPATIENT)
Dept: NEUROLOGY | Facility: CLINIC | Age: 74
End: 2024-09-25
Payer: MEDICARE

## 2024-09-25 LAB
CELL COUNT (BLOOD): 6.14 X10*3/UL
CELL POPULATIONS: NORMAL
DIAGNOSIS: NORMAL
FLOW DIFFERENTIAL: NORMAL
FLOW TEST ORDERED: NORMAL
LAB TEST METHOD: NORMAL
NUMBER OF CELLS COLLECTED: NORMAL PER TUBE
PATH REPORT.TOTAL CANCER: NORMAL
SIGNATURE COMMENT: NORMAL
SPECIMEN VIABILITY: NORMAL

## 2024-09-25 NOTE — TELEPHONE ENCOUNTER
----- Message from Libby Ellis sent at 9/25/2024 10:26 AM EDT -----  Ok then the most helpful thing will be the neuropsych testing.  ----- Message -----  From: Marlyn Cordon MA  Sent: 9/25/2024   9:16 AM EDT  To: Libby Ellis DO    He doesn't believe he's had and mri's before his chemo 9 yrs ago.  ----- Message -----  From: Libby Ellis DO  Sent: 9/24/2024   4:35 PM EDT  To: Marlyn Cordon MA    Please let the patient know that his MRI brain is showing moderate white matter changes.  These changes are most commonly due to vascular risk factors (HTN, HLD, smoking, diabetes) however can also be seen secondary to chemotherapy.  It would be helpful to look at an old image if he had one. Any change he had an MRI brain performed prior to starting chemotherapy?  I didn't find one in the [facility] system.

## 2024-09-26 LAB
ALBUMIN: 4.2 G/DL (ref 3.4–5)
ALPHA 1 GLOBULIN: 0.2 G/DL (ref 0.2–0.6)
ALPHA 2 GLOBULIN: 0.7 G/DL (ref 0.4–1.1)
BETA GLOBULIN: 0.6 G/DL (ref 0.5–1.2)
GAMMA GLOBULIN: 0.4 G/DL (ref 0.5–1.4)
IMMUNOFIXATION COMMENT: ABNORMAL
M-PROTEIN 1: 0.1 G/DL
PATH REPORT.COMMENTS IMP SPEC: NORMAL
PATH REPORT.FINAL DX SPEC: NORMAL
PATH REPORT.GROSS SPEC: NORMAL
PATH REPORT.MICROSCOPIC SPEC OTHER STN: NORMAL
PATH REPORT.RELEVANT HX SPEC: NORMAL
PATH REPORT.TOTAL CANCER: NORMAL
PATH REVIEW - SERUM IMMUNOFIXATION: ABNORMAL
PATH REVIEW-SERUM PROTEIN ELECTROPHORESIS: ABNORMAL
PROTEIN ELECTROPHORESIS COMMENT: ABNORMAL

## 2024-10-02 LAB
CHROM ANALY OVERALL INTERP-IMP: NORMAL
ELECTRONICALLY SIGNED BY CYTOGENETICS: NORMAL

## 2024-10-02 NOTE — PROGRESS NOTES
Patient ID: Maldonado Mccloud is a 73 y.o. male.  Referring Physician: No referring provider defined for this encounter.  Primary Care Provider: Timothy Almodovar MD    Interval hx:  The pt was previously followed by Jacob Arechiga. Since last visit he has noted some occasional, mild bilateral rib pains and still w/arm pains. He denies having had unexplained wt loss, fevers, chills, sweats, palpable FELY, cough, nausea, vomiting, diarrhea, mouth sores, skin rashes, abd/back pains, headaches, nosebleeds, GI,  or GYN bleeding, vision or hearing complaints. Still w/occasional numbness of his hands.    PMHx:  1) Multiple myeloma (see below)  2) Squamous cell ca's skin  3) DVT, LLE ; now off AC  4) Neuropathy  5) S/p right radius fracture 2023  6) HTN  7) Afib (transient in  w/stem cell collection)    FamHx  Dad  of pan ca age 92  SocHx:   w/5 kids; smokes cigars; no cigs in 50 years; no EtOH or illicit drugs. Was in [employer] in [place]  FamHx:    Meds:    All:  [employer]  PCN  Zosyn    Physical Exam  General: Ambulatory, looked comfortable, not requiring supplemental oxygen  Vital Signs   /82; P 63; afebrile; RR 18/min; Wt= 102.7 kg  HEENT: no oral lesions, tonsillar or tongue enlargement; Neck: no masses; Lymph nodes: no palpable cervical, supraclavicular, axillary, epitrochear or inguinal nodes; Heart: no murmurs; Lungs: clear; Abd: soft, +bowel sounds, non-tender, no palpable liver or spleen; Skin: no rashes; Ext's: No LE edema; Neuro: alert, answered questions appropriately, 5/5 motor strength upper and lower extremities    Performance Status:  Symptomatic; fully ambulatory      Assessment/recommendations:  73 year old man w/a hx of LLE DVT (, now off AC), HTN, neuropathy, multiple skin cancers and multiply relapsed multiple myeloma [presented with 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; Bone marrow biopsy suggested multiple myeloma IgG kappa, ISS Stage II, bone  "survey revealed lytic lesions; Urine light chains present kappa restricted with 2gm proteinuria; s/p VRD x 3 cycles with CR; then s/p auto SCTx 12/23/15 complicated by orthostatic hypotension, electrolyte replacement, drug induced rash, culture negative fever, then s/p approximately 5 weeks of maintenance Revlimid 10 mg daily which was held for worsening neuropathy in August 2016; then in 3/ 2018: IgG M Braxton alexei to 0.2gm/L and light chain alexei was; restarted on Revlimid maintenance at 5mg every other day, then revlimid stopped in 3/2021 due to stable disease; then in 7/2021 his M spike was on the rise, and was enrolled onto Vactosertib/Pom trial on 8/5/2021; then progressed in 6/2022, switched to Isatuximab/Carfilzomib on 6/30/22, then in 9/2022 progressed and was switched to Cytoxan/Carfilzomib; then had therapy placed on hold in 11/2022 due to need for hip replacement surgery; myeloma markers were on the rise in 3/2023 with a new jaw lesion, s/p XRT 4/2023 and resume 2x/week carfilzomib; then s/p CAR T w/ABECMA (T=0, 10/25/23), hospital course complicated by grade 1 ICANS managed w/ Dex 10mg x3 days and 1 dose tociluzimab] here for follow-up.     As for relapsed myeloma:  Labs from 9/23/24 concerning for progression-- free kappa was 14.86mg/dL (=148.6mg/L), ratio18, compared to free kappa of 4mg/dL in 7/22/24, and 1.6mg/dL on 6/24/24. Other labs from 9/23/24 included hgb 12, plts 116k, WBC 3900 and ANC 2540, Cr 1.31, normal calcium. PET/CT from 7/25/24 was w/o progression and the recent repeat BM Bx from 9/23/24 was negative (\"Normocellular bone marrow (30-40%) with maturing trilineage hematopoiesis and no increase in plasma cells...- Small kappa monotypic plasma cell population identified by flow cytometric analysis\"). However, unfortunately the repeat PET/CT from earlier today was c/w progression (\"New FDG avid lesions involving left posterior 5th rib, L4 vertebral body and left iliac bone and interval " "increase in right distal femoral lesion, consistent with disease progression\"). I explained that all of the above were c/w clinical relapse in need of treatment. I offered him either a BiTE (teclistamab or talquetamab) or a clinical trial (using a new CAR T product targeting BAFF). After some discussion and review of side effects, he and his wife said they would prefer a BiTE and preferred teclistamab (over concern about side effects). I reviewed the alternatives, risks, potential for severe toxicities/infections, and goal of therapy (disease control and modest prolongation of survival). He understood and agreed w/being started on teclistamab in the hospital next week (tentatively, on 10/9) and signed the consent form.   He may follow-up w/me tentatively on 10/17 after discharge,     DVT:  - 6/20/22 acute occlusive DVT demonstrated within the left common femoral, profunda, femoral and popliteal veins.  - Has been on Eliquis 5mg BID, inturrupted around time of CAR-T cell then was discontinued as VTE was thought to be malignancy related while on treatment, as he's in remission now off treatment, Eliquis was stopped     Cardiac:  - Echo (5/2/23) showed EF of 50-55%  - Reports some dizziness, encouraged increasing fluid intake, sCr 1.35 (11/28)  - off lisinopril, orthostatic positive - continue to monitor, anticipatory guidance provided.      Neurology  - LE neuropathy, started with Velcade years ago, stable, placed PT referral  - Mental decline and memory loss, more noticeable after CAR-T but overall mild     Derm:  - History of basal cell carcinomas    "

## 2024-10-03 ENCOUNTER — HOSPITAL ENCOUNTER (OUTPATIENT)
Dept: RADIOLOGY | Facility: HOSPITAL | Age: 74
Discharge: HOME | End: 2024-10-03
Payer: COMMERCIAL

## 2024-10-03 ENCOUNTER — OFFICE VISIT (OUTPATIENT)
Dept: HEMATOLOGY/ONCOLOGY | Facility: HOSPITAL | Age: 74
End: 2024-10-03
Payer: COMMERCIAL

## 2024-10-03 VITALS
RESPIRATION RATE: 18 BRPM | DIASTOLIC BLOOD PRESSURE: 82 MMHG | HEART RATE: 63 BPM | WEIGHT: 226.5 LBS | TEMPERATURE: 96.8 F | OXYGEN SATURATION: 98 % | BODY MASS INDEX: 32.5 KG/M2 | SYSTOLIC BLOOD PRESSURE: 140 MMHG

## 2024-10-03 DIAGNOSIS — C90.00 MULTIPLE MYELOMA WITHOUT REMISSION (MULTI): ICD-10-CM

## 2024-10-03 DIAGNOSIS — C90.00 IGG MULTIPLE MYELOMA (MULTI): Primary | ICD-10-CM

## 2024-10-03 PROBLEM — C90.02 MULTIPLE MYELOMA IN RELAPSE (MULTI): Status: ACTIVE | Noted: 2024-10-03

## 2024-10-03 LAB — GLUCOSE BLD MANUAL STRIP-MCNC: 124 MG/DL (ref 74–99)

## 2024-10-03 PROCEDURE — 99215 OFFICE O/P EST HI 40 MIN: CPT | Performed by: INTERNAL MEDICINE

## 2024-10-03 PROCEDURE — 3430000001 HC RX 343 DIAGNOSTIC RADIOPHARMACEUTICALS

## 2024-10-03 PROCEDURE — A9552 F18 FDG: HCPCS

## 2024-10-03 PROCEDURE — 3079F DIAST BP 80-89 MM HG: CPT | Performed by: INTERNAL MEDICINE

## 2024-10-03 PROCEDURE — 1123F ACP DISCUSS/DSCN MKR DOCD: CPT | Performed by: INTERNAL MEDICINE

## 2024-10-03 PROCEDURE — 82947 ASSAY GLUCOSE BLOOD QUANT: CPT

## 2024-10-03 PROCEDURE — 78816 PET IMAGE W/CT FULL BODY: CPT | Mod: PS

## 2024-10-03 PROCEDURE — 3077F SYST BP >= 140 MM HG: CPT | Performed by: INTERNAL MEDICINE

## 2024-10-03 RX ORDER — DIPHENHYDRAMINE HYDROCHLORIDE 50 MG/ML
50 INJECTION INTRAMUSCULAR; INTRAVENOUS AS NEEDED
OUTPATIENT
Start: 2024-10-09

## 2024-10-03 RX ORDER — DEXAMETHASONE 4 MG/1
16 TABLET ORAL ONCE
OUTPATIENT
Start: 2024-10-09

## 2024-10-03 RX ORDER — ACETAMINOPHEN 325 MG/1
650 TABLET ORAL ONCE
OUTPATIENT
Start: 2024-10-11

## 2024-10-03 RX ORDER — FAMOTIDINE 10 MG/ML
20 INJECTION INTRAVENOUS AS NEEDED
OUTPATIENT
Start: 2024-10-09

## 2024-10-03 RX ORDER — DEXAMETHASONE 4 MG/1
16 TABLET ORAL ONCE
OUTPATIENT
Start: 2024-10-13

## 2024-10-03 RX ORDER — ACETAMINOPHEN 325 MG/1
650 TABLET ORAL ONCE
OUTPATIENT
Start: 2024-10-13

## 2024-10-03 RX ORDER — DEXAMETHASONE 4 MG/1
16 TABLET ORAL ONCE
OUTPATIENT
Start: 2024-10-11

## 2024-10-03 RX ORDER — ACYCLOVIR 400 MG/1
400 TABLET ORAL 2 TIMES DAILY
Qty: 60 TABLET | Refills: 11 | Status: SHIPPED | OUTPATIENT
Start: 2024-10-03

## 2024-10-03 RX ORDER — DIPHENHYDRAMINE HCL 50 MG
50 CAPSULE ORAL ONCE
OUTPATIENT
Start: 2024-10-11

## 2024-10-03 RX ORDER — FLUDEOXYGLUCOSE F 18 200 MCI/ML
13.75 INJECTION, SOLUTION INTRAVENOUS
Status: COMPLETED | OUTPATIENT
Start: 2024-10-03 | End: 2024-10-03

## 2024-10-03 RX ORDER — PROCHLORPERAZINE MALEATE 10 MG
10 TABLET ORAL EVERY 6 HOURS PRN
OUTPATIENT
Start: 2024-10-09

## 2024-10-03 RX ORDER — ACETAMINOPHEN 325 MG/1
650 TABLET ORAL ONCE
OUTPATIENT
Start: 2024-10-09

## 2024-10-03 RX ORDER — SULFAMETHOXAZOLE AND TRIMETHOPRIM 800; 160 MG/1; MG/1
1 TABLET ORAL 3 TIMES WEEKLY
Qty: 12 TABLET | Refills: 11 | Status: SHIPPED | OUTPATIENT
Start: 2024-10-04

## 2024-10-03 RX ORDER — DIPHENHYDRAMINE HCL 50 MG
50 CAPSULE ORAL ONCE
OUTPATIENT
Start: 2024-10-13

## 2024-10-03 RX ORDER — EPINEPHRINE 1 MG/ML
0.3 INJECTION INTRAMUSCULAR; INTRAVENOUS; SUBCUTANEOUS EVERY 5 MIN PRN
OUTPATIENT
Start: 2024-10-09

## 2024-10-03 RX ORDER — DIPHENHYDRAMINE HCL 50 MG
50 CAPSULE ORAL ONCE
OUTPATIENT
Start: 2024-10-09

## 2024-10-03 RX ORDER — ALBUTEROL SULFATE 0.83 MG/ML
3 SOLUTION RESPIRATORY (INHALATION) AS NEEDED
OUTPATIENT
Start: 2024-10-09

## 2024-10-03 RX ORDER — PROCHLORPERAZINE EDISYLATE 5 MG/ML
10 INJECTION INTRAMUSCULAR; INTRAVENOUS EVERY 6 HOURS PRN
OUTPATIENT
Start: 2024-10-09

## 2024-10-03 RX ORDER — PROCHLORPERAZINE MALEATE 10 MG
10 TABLET ORAL EVERY 6 HOURS PRN
Qty: 30 TABLET | Refills: 5 | Status: SHIPPED | OUTPATIENT
Start: 2024-10-03

## 2024-10-04 DIAGNOSIS — C90.00 IGG MULTIPLE MYELOMA (MULTI): Primary | ICD-10-CM

## 2024-10-04 NOTE — PROGRESS NOTES
Called patient spoke with him about schedule . Covid test  on mon. 10/7/2024 at 1030a at [place]. Also will get cbcd  and cmp on Monday 10/7.   Plan is admission  on 10/9/2024 for teclistamab with tx  10/9-10/11 and 10/13. TEL NUMBER GIVEN  [phone] call for bed assignment   Hopefully discharged 10/15 Tuesday.   Appt made with Dr. Moreno. Post tx on 10/17. Will start outpatient weekly txs at [place]   on 10/21. Will include ivig q month .   Plan is dr. Moreno every 4 weeks at [facility] and tx's at [place].    Told patient will visit inhouse to review schedule. Grateful for my call.Brandi Miller RN

## 2024-10-07 ENCOUNTER — INFUSION (OUTPATIENT)
Dept: HEMATOLOGY/ONCOLOGY | Facility: CLINIC | Age: 74
End: 2024-10-07
Payer: COMMERCIAL

## 2024-10-07 ENCOUNTER — LAB (OUTPATIENT)
Dept: LAB | Facility: CLINIC | Age: 74
End: 2024-10-07
Payer: COMMERCIAL

## 2024-10-07 VITALS
HEART RATE: 78 BPM | TEMPERATURE: 97.7 F | SYSTOLIC BLOOD PRESSURE: 121 MMHG | HEIGHT: 70 IN | WEIGHT: 224.1 LBS | OXYGEN SATURATION: 97 % | RESPIRATION RATE: 16 BRPM | DIASTOLIC BLOOD PRESSURE: 82 MMHG | BODY MASS INDEX: 32.08 KG/M2

## 2024-10-07 DIAGNOSIS — C90.00 IGG MULTIPLE MYELOMA (MULTI): ICD-10-CM

## 2024-10-07 LAB
BASOPHILS # BLD AUTO: 0.01 X10*3/UL (ref 0–0.1)
BASOPHILS NFR BLD AUTO: 0.2 %
EOSINOPHIL # BLD AUTO: 0.04 X10*3/UL (ref 0–0.4)
EOSINOPHIL NFR BLD AUTO: 0.8 %
ERYTHROCYTE [DISTWIDTH] IN BLOOD BY AUTOMATED COUNT: 12.6 % (ref 11.5–14.5)
HCT VFR BLD AUTO: 36.1 % (ref 41–52)
HGB BLD-MCNC: 12.7 G/DL (ref 13.5–17.5)
IMM GRANULOCYTES # BLD AUTO: 0.01 X10*3/UL (ref 0–0.5)
IMM GRANULOCYTES NFR BLD AUTO: 0.2 % (ref 0–0.9)
LYMPHOCYTES # BLD AUTO: 1.12 X10*3/UL (ref 0.8–3)
LYMPHOCYTES NFR BLD AUTO: 22.5 %
MCH RBC QN AUTO: 34 PG (ref 26–34)
MCHC RBC AUTO-ENTMCNC: 35.2 G/DL (ref 32–36)
MCV RBC AUTO: 97 FL (ref 80–100)
MONOCYTES # BLD AUTO: 0.5 X10*3/UL (ref 0.05–0.8)
MONOCYTES NFR BLD AUTO: 10.1 %
NEUTROPHILS # BLD AUTO: 3.29 X10*3/UL (ref 1.6–5.5)
NEUTROPHILS NFR BLD AUTO: 66.2 %
NRBC BLD-RTO: ABNORMAL /100{WBCS}
PLATELET # BLD AUTO: 128 X10*3/UL (ref 150–450)
RBC # BLD AUTO: 3.73 X10*6/UL (ref 4.5–5.9)
WBC # BLD AUTO: 5 X10*3/UL (ref 4.4–11.3)

## 2024-10-07 PROCEDURE — 36415 COLL VENOUS BLD VENIPUNCTURE: CPT

## 2024-10-07 PROCEDURE — 80053 COMPREHEN METABOLIC PANEL: CPT

## 2024-10-07 PROCEDURE — 87635 SARS-COV-2 COVID-19 AMP PRB: CPT

## 2024-10-07 PROCEDURE — 85025 COMPLETE CBC W/AUTO DIFF WBC: CPT

## 2024-10-07 ASSESSMENT — PAIN SCALES - GENERAL: PAINLEVEL: 0-NO PAIN

## 2024-10-08 ENCOUNTER — TELEMEDICINE (OUTPATIENT)
Dept: HEMATOLOGY/ONCOLOGY | Facility: HOSPITAL | Age: 74
End: 2024-10-08
Payer: MEDICARE

## 2024-10-08 DIAGNOSIS — C90.00 MULTIPLE MYELOMA, REMISSION STATUS UNSPECIFIED (MULTI): Primary | ICD-10-CM

## 2024-10-08 LAB
ALBUMIN SERPL BCP-MCNC: 4.2 G/DL (ref 3.4–5)
ALP SERPL-CCNC: 56 U/L (ref 33–136)
ALT SERPL W P-5'-P-CCNC: 16 U/L (ref 10–52)
ANION GAP SERPL CALC-SCNC: 16 MMOL/L (ref 10–20)
AST SERPL W P-5'-P-CCNC: 20 U/L (ref 9–39)
BILIRUB SERPL-MCNC: 1.1 MG/DL (ref 0–1.2)
BUN SERPL-MCNC: 22 MG/DL (ref 6–23)
CALCIUM SERPL-MCNC: 9.3 MG/DL (ref 8.6–10.6)
CHLORIDE SERPL-SCNC: 104 MMOL/L (ref 98–107)
CO2 SERPL-SCNC: 26 MMOL/L (ref 21–32)
CREAT SERPL-MCNC: 1.34 MG/DL (ref 0.5–1.3)
EGFRCR SERPLBLD CKD-EPI 2021: 56 ML/MIN/1.73M*2
GLUCOSE SERPL-MCNC: 104 MG/DL (ref 74–99)
POTASSIUM SERPL-SCNC: 4.5 MMOL/L (ref 3.5–5.3)
PROT SERPL-MCNC: 5.9 G/DL (ref 6.4–8.2)
SARS-COV-2 RNA RESP QL NAA+PROBE: NOT DETECTED
SODIUM SERPL-SCNC: 141 MMOL/L (ref 136–145)

## 2024-10-08 PROCEDURE — 99212 OFFICE O/P EST SF 10 MIN: CPT | Performed by: INTERNAL MEDICINE

## 2024-10-08 PROCEDURE — 1123F ACP DISCUSS/DSCN MKR DOCD: CPT | Performed by: INTERNAL MEDICINE

## 2024-10-08 NOTE — PROGRESS NOTES
This was a phone visit      Interval hx:  Since last visit he has noted some occasional, mild bilateral rib pains and still w/right wrist pains. He denies having had fevers, chills, sweats, nausea, vomiting, diarrhea, skin rashes, nosebleeds, GI or  bleeding. Still w/occasional numbness of his hands.     PMHx:  1) Multiple myeloma (see below)  2) Squamous cell ca's skin  3) DVT, LLE ; now off AC  4) Neuropathy  5) S/p right radius fracture 2023  6) HTN  7) Afib (transient in  w/stem cell collection)     FamHx  Dad  of pan ca age 92  SocHx:   w/5 kids; smokes cigars; no cigs in 50 years; no EtOH or illicit drugs. 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; Bone marrow biopsy suggested multiple myeloma IgG kappa, ISS Stage II, bone survey revealed lytic lesions; Urine light chains present kappa restricted with 2gm proteinuria; s/p VRD x 3 cycles with CR; then s/p auto SCTx 12/23/15 complicated by orthostatic hypotension, electrolyte replacement, drug induced rash, culture negative fever, then s/p approximately 5 weeks of maintenance Revlimid 10 mg daily which was held for worsening neuropathy in 2016; then in 3/ 2018: IgG M Braxton alexei to 0.2gm/L and light chain alexei was; restarted on Revlimid maintenance at 5mg every other day, then revlimid stopped in 3/2021 due to stable disease; then in 2021 his M spike was on the rise, and was enrolled onto Vactosertib/Pom trial on 2021; then progressed in 2022, switched to Isatuximab/Carfilzomib on 22, then in 2022 progressed and was switched to Cytoxan/Carfilzomib; then had therapy placed on hold in  "11/2022 due to need for hip replacement surgery; myeloma markers were on the rise in 3/2023 with a new jaw lesion, s/p XRT 4/2023 and resume 2x/week carfilzomib; then s/p CAR T w/ABECMA (T=0, 10/25/23), hospital course complicated by grade 1 ICANS managed w/ Dex 10mg x3 days and 1 dose tociluzimab] here for follow-up.      As for relapsed myeloma:  Labs from 9/23/24 c/w progression-- free kappa was 14.86mg/dL (=148.6mg/L), ratio18, compared to free kappa of 4mg/dL in 7/22/24, and 1.6mg/dL on 6/24/24. Other labs from 9/23/24 included hgb 12, plts 116k, WBC 3900 and ANC 2540, Cr 1.31, normal calcium. PET/CT from 7/25/24 was w/o progression and the recent repeat BM Bx from 9/23/24 was negative (\"Normocellular bone marrow (30-40%) with maturing trilineage hematopoiesis and no increase in plasma cells...- Small kappa monotypic plasma cell population identified by flow cytometric analysis\"). However, unfortunately the repeat PET/CT from last week was c/w progression (\"New FDG avid lesions involving left posterior 5th rib, L4 vertebral body and left iliac bone and interval increase in right distal femoral lesion, consistent with disease progression\"). I explained that all of the above were c/w clinical relapse in need of treatment. I offered him either a BiTE (teclistamab or talquetamab) or a clinical trial (using a new CAR T product targeting BAFF). After some discussion and review of side effects, last week he and his wife said they would prefer a BiTE and preferred teclistamab (over concern about side effects; I also offered talquetamab, but he was concerned about different side effects). I reviewed the alternatives, risks, potential for severe toxicities/infections, and goal of therapy (disease control and modest prolongation of survival). He understood and agreed w/being started on teclistamab in the hospital and will be admitted for this tomorrow, on 10/9 and last week signed the consent form on the EMR.   He may " follow-up w/me tentatively on 10/17 after discharge.     DVT:  - 6/20/22 acute occlusive DVT demonstrated within the left common femoral, profunda, femoral and popliteal veins.  - Has been on Eliquis 5mg BID, inturrupted around time of CAR-T cell then was discontinued as VTE was thought to be malignancy related while on treatment, as he's in remission now off treatment, Eliquis was stopped     Cardiac:  - Echo (5/2/23) showed EF of 50-55%  - Reports some dizziness, encouraged increasing fluid intake, sCr 1.35 (11/28)  - off lisinopril, orthostatic positive - continue to monitor, anticipatory guidance provided.      Neurology  - LE neuropathy, started with Velcade years ago, stable, placed PT referral  - Mental decline and memory loss, more noticeable after CAR-T but overall mild     Derm:  - History of basal cell carcinomas

## 2024-10-09 ENCOUNTER — HOSPITAL ENCOUNTER (INPATIENT)
Facility: HOSPITAL | Age: 74
End: 2024-10-09
Attending: INTERNAL MEDICINE | Admitting: NURSE PRACTITIONER
Payer: COMMERCIAL

## 2024-10-09 ENCOUNTER — APPOINTMENT (OUTPATIENT)
Dept: RADIOLOGY | Facility: HOSPITAL | Age: 74
End: 2024-10-09
Payer: COMMERCIAL

## 2024-10-09 DIAGNOSIS — C90.01 MULTIPLE MYELOMA IN REMISSION (MULTI): Primary | ICD-10-CM

## 2024-10-09 DIAGNOSIS — C90.00 IGG MULTIPLE MYELOMA (MULTI): ICD-10-CM

## 2024-10-09 LAB
BASOPHILS # BLD AUTO: 0 X10*3/UL (ref 0–0.1)
BASOPHILS NFR BLD AUTO: 0 %
EOSINOPHIL # BLD AUTO: 0.04 X10*3/UL (ref 0–0.4)
EOSINOPHIL NFR BLD AUTO: 1 %
ERYTHROCYTE [DISTWIDTH] IN BLOOD BY AUTOMATED COUNT: 13.2 % (ref 11.5–14.5)
HCT VFR BLD AUTO: 33 % (ref 41–52)
HGB BLD-MCNC: 11.5 G/DL (ref 13.5–17.5)
IMM GRANULOCYTES # BLD AUTO: 0.01 X10*3/UL (ref 0–0.5)
IMM GRANULOCYTES NFR BLD AUTO: 0.3 % (ref 0–0.9)
LYMPHOCYTES # BLD AUTO: 0.76 X10*3/UL (ref 0.8–3)
LYMPHOCYTES NFR BLD AUTO: 19.8 %
MCH RBC QN AUTO: 34.4 PG (ref 26–34)
MCHC RBC AUTO-ENTMCNC: 34.8 G/DL (ref 32–36)
MCV RBC AUTO: 99 FL (ref 80–100)
MONOCYTES # BLD AUTO: 0.4 X10*3/UL (ref 0.05–0.8)
MONOCYTES NFR BLD AUTO: 10.4 %
NEUTROPHILS # BLD AUTO: 2.63 X10*3/UL (ref 1.6–5.5)
NEUTROPHILS NFR BLD AUTO: 68.5 %
NRBC BLD-RTO: 0 /100 WBCS (ref 0–0)
PLATELET # BLD AUTO: 195 X10*3/UL (ref 150–450)
RBC # BLD AUTO: 3.34 X10*6/UL (ref 4.5–5.9)
WBC # BLD AUTO: 3.8 X10*3/UL (ref 4.4–11.3)

## 2024-10-09 PROCEDURE — 2500000001 HC RX 250 WO HCPCS SELF ADMINISTERED DRUGS (ALT 637 FOR MEDICARE OP): Performed by: INTERNAL MEDICINE

## 2024-10-09 PROCEDURE — 73090 X-RAY EXAM OF FOREARM: CPT | Mod: RIGHT SIDE | Performed by: STUDENT IN AN ORGANIZED HEALTH CARE EDUCATION/TRAINING PROGRAM

## 2024-10-09 PROCEDURE — 2500000001 HC RX 250 WO HCPCS SELF ADMINISTERED DRUGS (ALT 637 FOR MEDICARE OP): Performed by: NURSE PRACTITIONER

## 2024-10-09 PROCEDURE — 99223 1ST HOSP IP/OBS HIGH 75: CPT | Performed by: INTERNAL MEDICINE

## 2024-10-09 PROCEDURE — 1170000001 HC PRIVATE ONCOLOGY ROOM DAILY

## 2024-10-09 PROCEDURE — 2500000004 HC RX 250 GENERAL PHARMACY W/ HCPCS (ALT 636 FOR OP/ED): Performed by: INTERNAL MEDICINE

## 2024-10-09 PROCEDURE — 85025 COMPLETE CBC W/AUTO DIFF WBC: CPT | Performed by: NURSE PRACTITIONER

## 2024-10-09 PROCEDURE — 36415 COLL VENOUS BLD VENIPUNCTURE: CPT | Performed by: NURSE PRACTITIONER

## 2024-10-09 PROCEDURE — 73090 X-RAY EXAM OF FOREARM: CPT | Mod: RT

## 2024-10-09 PROCEDURE — 2500000002 HC RX 250 W HCPCS SELF ADMINISTERED DRUGS (ALT 637 FOR MEDICARE OP, ALT 636 FOR OP/ED): Performed by: NURSE PRACTITIONER

## 2024-10-09 PROCEDURE — XW01348 INTRODUCTION OF TECLISTAMAB ANTINEOPLASTIC INTO SUBCUTANEOUS TISSUE, PERCUTANEOUS APPROACH, NEW TECHNOLOGY GROUP 8: ICD-10-PCS | Performed by: INTERNAL MEDICINE

## 2024-10-09 RX ORDER — EPINEPHRINE 1 MG/ML
0.3 INJECTION, SOLUTION, CONCENTRATE INTRAVENOUS EVERY 5 MIN PRN
Status: DISCONTINUED | OUTPATIENT
Start: 2024-10-09 | End: 2024-10-16 | Stop reason: HOSPADM

## 2024-10-09 RX ORDER — ACETAMINOPHEN 325 MG/1
650 TABLET ORAL ONCE
Status: COMPLETED | OUTPATIENT
Start: 2024-10-09 | End: 2024-10-09

## 2024-10-09 RX ORDER — DIPHENHYDRAMINE HYDROCHLORIDE 50 MG/ML
50 INJECTION INTRAMUSCULAR; INTRAVENOUS AS NEEDED
Status: DISCONTINUED | OUTPATIENT
Start: 2024-10-09 | End: 2024-10-16 | Stop reason: HOSPADM

## 2024-10-09 RX ORDER — PROCHLORPERAZINE MALEATE 10 MG
10 TABLET ORAL EVERY 6 HOURS PRN
Status: DISCONTINUED | OUTPATIENT
Start: 2024-10-09 | End: 2024-10-16 | Stop reason: HOSPADM

## 2024-10-09 RX ORDER — ALBUTEROL SULFATE 0.83 MG/ML
3 SOLUTION RESPIRATORY (INHALATION) AS NEEDED
Status: DISCONTINUED | OUTPATIENT
Start: 2024-10-09 | End: 2024-10-16 | Stop reason: HOSPADM

## 2024-10-09 RX ORDER — DEXAMETHASONE 4 MG/1
16 TABLET ORAL ONCE
Status: COMPLETED | OUTPATIENT
Start: 2024-10-09 | End: 2024-10-09

## 2024-10-09 RX ORDER — PROCHLORPERAZINE EDISYLATE 5 MG/ML
10 INJECTION INTRAMUSCULAR; INTRAVENOUS EVERY 6 HOURS PRN
Status: DISCONTINUED | OUTPATIENT
Start: 2024-10-09 | End: 2024-10-16 | Stop reason: HOSPADM

## 2024-10-09 RX ORDER — LANOLIN ALCOHOL/MO/W.PET/CERES
1000 CREAM (GRAM) TOPICAL DAILY
Status: DISCONTINUED | OUTPATIENT
Start: 2024-10-09 | End: 2024-10-16 | Stop reason: HOSPADM

## 2024-10-09 RX ORDER — POLYETHYLENE GLYCOL 3350 17 G/17G
17 POWDER, FOR SOLUTION ORAL DAILY PRN
Status: DISCONTINUED | OUTPATIENT
Start: 2024-10-09 | End: 2024-10-16 | Stop reason: HOSPADM

## 2024-10-09 RX ORDER — TRAZODONE HYDROCHLORIDE 150 MG/1
300 TABLET ORAL NIGHTLY
Status: DISCONTINUED | OUTPATIENT
Start: 2024-10-09 | End: 2024-10-16 | Stop reason: HOSPADM

## 2024-10-09 RX ORDER — ACYCLOVIR 400 MG/1
400 TABLET ORAL 2 TIMES DAILY
Status: DISCONTINUED | OUTPATIENT
Start: 2024-10-09 | End: 2024-10-16 | Stop reason: HOSPADM

## 2024-10-09 RX ORDER — DULOXETIN HYDROCHLORIDE 60 MG/1
60 CAPSULE, DELAYED RELEASE ORAL DAILY
Status: DISCONTINUED | OUTPATIENT
Start: 2024-10-09 | End: 2024-10-16 | Stop reason: HOSPADM

## 2024-10-09 RX ORDER — DIPHENHYDRAMINE HCL 50 MG
50 CAPSULE ORAL ONCE
Status: COMPLETED | OUTPATIENT
Start: 2024-10-09 | End: 2024-10-09

## 2024-10-09 RX ORDER — PROCHLORPERAZINE MALEATE 10 MG
10 TABLET ORAL EVERY 6 HOURS PRN
Status: DISCONTINUED | OUTPATIENT
Start: 2024-10-09 | End: 2024-10-09

## 2024-10-09 RX ORDER — FAMOTIDINE 10 MG/ML
20 INJECTION INTRAVENOUS AS NEEDED
Status: DISCONTINUED | OUTPATIENT
Start: 2024-10-09 | End: 2024-10-16 | Stop reason: HOSPADM

## 2024-10-09 RX ADMIN — ACETAMINOPHEN 650 MG: 325 TABLET ORAL at 13:46

## 2024-10-09 RX ADMIN — DULOXETINE HYDROCHLORIDE 60 MG: 60 CAPSULE, DELAYED RELEASE ORAL at 20:48

## 2024-10-09 RX ADMIN — TRAZODONE HYDROCHLORIDE 300 MG: 150 TABLET ORAL at 20:48

## 2024-10-09 RX ADMIN — ACYCLOVIR 400 MG: 400 TABLET ORAL at 20:48

## 2024-10-09 RX ADMIN — DIPHENHYDRAMINE HYDROCHLORIDE 50 MG: 50 CAPSULE ORAL at 13:46

## 2024-10-09 RX ADMIN — DEXAMETHASONE 16 MG: 4 TABLET ORAL at 13:46

## 2024-10-09 RX ADMIN — CYANOCOBALAMIN TAB 1000 MCG 1000 MCG: 1000 TAB at 15:48

## 2024-10-09 RX ADMIN — TECLISTAMAB 6.2 MG: 10 INJECTION SUBCUTANEOUS at 14:24

## 2024-10-09 SDOH — ECONOMIC STABILITY: FOOD INSECURITY: HOW HARD IS IT FOR YOU TO PAY FOR THE VERY BASICS LIKE FOOD, HOUSING, MEDICAL CARE, AND HEATING?: NOT HARD AT ALL

## 2024-10-09 SDOH — SOCIAL STABILITY: SOCIAL INSECURITY
WITHIN THE LAST YEAR, HAVE YOU BEEN KICKED, HIT, SLAPPED, OR OTHERWISE PHYSICALLY HURT BY YOUR PARTNER OR EX-PARTNER?: NO

## 2024-10-09 SDOH — ECONOMIC STABILITY: FOOD INSECURITY: WITHIN THE PAST 12 MONTHS, YOU WORRIED THAT YOUR FOOD WOULD RUN OUT BEFORE YOU GOT MONEY TO BUY MORE.: NEVER TRUE

## 2024-10-09 SDOH — ECONOMIC STABILITY: TRANSPORTATION INSECURITY
IN THE PAST 12 MONTHS, HAS THE LACK OF TRANSPORTATION KEPT YOU FROM MEDICAL APPOINTMENTS OR FROM GETTING MEDICATIONS?: NO

## 2024-10-09 SDOH — SOCIAL STABILITY: SOCIAL INSECURITY
WITHIN THE LAST YEAR, HAVE TO BEEN RAPED OR FORCED TO HAVE ANY KIND OF SEXUAL ACTIVITY BY YOUR PARTNER OR EX-PARTNER?: NO

## 2024-10-09 SDOH — ECONOMIC STABILITY: FOOD INSECURITY: WITHIN THE PAST 12 MONTHS, THE FOOD YOU BOUGHT JUST DIDN'T LAST AND YOU DIDN'T HAVE MONEY TO GET MORE.: NEVER TRUE

## 2024-10-09 SDOH — ECONOMIC STABILITY: INCOME INSECURITY: IN THE LAST 12 MONTHS, WAS THERE A TIME WHEN YOU WERE NOT ABLE TO PAY THE MORTGAGE OR RENT ON TIME?: NO

## 2024-10-09 SDOH — ECONOMIC STABILITY: HOUSING INSECURITY: AT ANY TIME IN THE PAST 12 MONTHS, WERE YOU HOMELESS OR LIVING IN A SHELTER (INCLUDING NOW)?: NO

## 2024-10-09 SDOH — ECONOMIC STABILITY: HOUSING INSECURITY: IN THE PAST 12 MONTHS, HOW MANY TIMES HAVE YOU MOVED WHERE YOU WERE LIVING?: 0

## 2024-10-09 SDOH — SOCIAL STABILITY: SOCIAL INSECURITY: WITHIN THE LAST YEAR, HAVE YOU BEEN AFRAID OF YOUR PARTNER OR EX-PARTNER?: NO

## 2024-10-09 SDOH — SOCIAL STABILITY: SOCIAL INSECURITY
WITHIN THE LAST YEAR, HAVE YOU BEEN RAPED OR FORCED TO HAVE ANY KIND OF SEXUAL ACTIVITY BY YOUR PARTNER OR EX-PARTNER?: NO

## 2024-10-09 SDOH — SOCIAL STABILITY: SOCIAL INSECURITY: WITHIN THE LAST YEAR, HAVE YOU BEEN HUMILIATED OR EMOTIONALLY ABUSED IN OTHER WAYS BY YOUR PARTNER OR EX-PARTNER?: NO

## 2024-10-09 SDOH — ECONOMIC STABILITY: HOUSING INSECURITY: IN THE LAST 12 MONTHS, WAS THERE A TIME WHEN YOU WERE NOT ABLE TO PAY THE MORTGAGE OR RENT ON TIME?: NO

## 2024-10-09 SDOH — ECONOMIC STABILITY: INCOME INSECURITY: IN THE PAST 12 MONTHS HAS THE ELECTRIC, GAS, OIL, OR WATER COMPANY THREATENED TO SHUT OFF SERVICES IN YOUR HOME?: NO

## 2024-10-09 SDOH — SOCIAL STABILITY: SOCIAL INSECURITY: ABUSE: ADULT

## 2024-10-09 SDOH — ECONOMIC STABILITY: TRANSPORTATION INSECURITY: IN THE PAST 12 MONTHS, HAS LACK OF TRANSPORTATION KEPT YOU FROM MEDICAL APPOINTMENTS OR FROM GETTING MEDICATIONS?: NO

## 2024-10-09 SDOH — ECONOMIC STABILITY: FOOD INSECURITY: WITHIN THE PAST 12 MONTHS, YOU WORRIED THAT YOUR FOOD WOULD RUN OUT BEFORE YOU GOT THE MONEY TO BUY MORE.: NEVER TRUE

## 2024-10-09 SDOH — ECONOMIC STABILITY: INCOME INSECURITY: IN THE PAST 12 MONTHS, HAS THE ELECTRIC, GAS, OIL, OR WATER COMPANY THREATENED TO SHUT OFF SERVICE IN YOUR HOME?: NO

## 2024-10-09 SDOH — SOCIAL STABILITY: SOCIAL INSECURITY: HAS ANYONE EVER THREATENED TO HURT YOUR FAMILY OR YOUR PETS?: NO

## 2024-10-09 SDOH — SOCIAL STABILITY: SOCIAL INSECURITY: HAVE YOU HAD THOUGHTS OF HARMING ANYONE ELSE?: NO

## 2024-10-09 SDOH — SOCIAL STABILITY: SOCIAL INSECURITY: ARE THERE ANY APPARENT SIGNS OF INJURIES/BEHAVIORS THAT COULD BE RELATED TO ABUSE/NEGLECT?: NO

## 2024-10-09 SDOH — SOCIAL STABILITY: SOCIAL INSECURITY: DOES ANYONE TRY TO KEEP YOU FROM HAVING/CONTACTING OTHER FRIENDS OR DOING THINGS OUTSIDE YOUR HOME?: NO

## 2024-10-09 SDOH — SOCIAL STABILITY: SOCIAL INSECURITY: HAVE YOU HAD ANY THOUGHTS OF HARMING ANYONE ELSE?: NO

## 2024-10-09 SDOH — ECONOMIC STABILITY: INCOME INSECURITY: HOW HARD IS IT FOR YOU TO PAY FOR THE VERY BASICS LIKE FOOD, HOUSING, MEDICAL CARE, AND HEATING?: NOT HARD AT ALL

## 2024-10-09 SDOH — SOCIAL STABILITY: SOCIAL INSECURITY: DO YOU FEEL UNSAFE GOING BACK TO THE PLACE WHERE YOU ARE LIVING?: NO

## 2024-10-09 SDOH — SOCIAL STABILITY: SOCIAL INSECURITY: ARE YOU OR HAVE YOU BEEN THREATENED OR ABUSED PHYSICALLY, EMOTIONALLY, OR SEXUALLY BY ANYONE?: NO

## 2024-10-09 SDOH — SOCIAL STABILITY: SOCIAL INSECURITY: DO YOU FEEL ANYONE HAS EXPLOITED OR TAKEN ADVANTAGE OF YOU FINANCIALLY OR OF YOUR PERSONAL PROPERTY?: NO

## 2024-10-09 SDOH — ECONOMIC STABILITY: TRANSPORTATION INSECURITY
IN THE PAST 12 MONTHS, HAS LACK OF TRANSPORTATION KEPT YOU FROM MEETINGS, WORK, OR FROM GETTING THINGS NEEDED FOR DAILY LIVING?: NO

## 2024-10-09 SDOH — SOCIAL STABILITY: SOCIAL INSECURITY: WERE YOU ABLE TO COMPLETE ALL THE BEHAVIORAL HEALTH SCREENINGS?: YES

## 2024-10-09 ASSESSMENT — COGNITIVE AND FUNCTIONAL STATUS - GENERAL
MOBILITY SCORE: 24
PATIENT BASELINE BEDBOUND: NO
DAILY ACTIVITIY SCORE: 24
DAILY ACTIVITIY SCORE: 24
MOBILITY SCORE: 24

## 2024-10-09 ASSESSMENT — ACTIVITIES OF DAILY LIVING (ADL)
ADEQUATE_TO_COMPLETE_ADL: YES
LACK_OF_TRANSPORTATION: NO
PATIENT'S MEMORY ADEQUATE TO SAFELY COMPLETE DAILY ACTIVITIES?: YES
GROOMING: INDEPENDENT
BATHING: INDEPENDENT
ASSISTIVE_DEVICE: CONTACTS;EYEGLASSES
HEARING - LEFT EAR: FUNCTIONAL
DRESSING YOURSELF: INDEPENDENT
LACK_OF_TRANSPORTATION: NO
HEARING - RIGHT EAR: FUNCTIONAL
JUDGMENT_ADEQUATE_SAFELY_COMPLETE_DAILY_ACTIVITIES: YES
TOILETING: INDEPENDENT
WALKS IN HOME: INDEPENDENT
FEEDING YOURSELF: INDEPENDENT

## 2024-10-09 ASSESSMENT — ENCOUNTER SYMPTOMS
GASTROINTESTINAL NEGATIVE: 1
JOINT SWELLING: 1
CONSTITUTIONAL NEGATIVE: 1
HEMATOLOGIC/LYMPHATIC NEGATIVE: 1
ENDOCRINE NEGATIVE: 1
PSYCHIATRIC NEGATIVE: 1
CARDIOVASCULAR NEGATIVE: 1
DIZZINESS: 1
RESPIRATORY NEGATIVE: 1

## 2024-10-09 ASSESSMENT — PAIN - FUNCTIONAL ASSESSMENT: PAIN_FUNCTIONAL_ASSESSMENT: 0-10

## 2024-10-09 ASSESSMENT — PAIN SCALES - GENERAL
PAINLEVEL_OUTOF10: 0 - NO PAIN
PAINLEVEL_OUTOF10: 6

## 2024-10-09 ASSESSMENT — LIFESTYLE VARIABLES
AUDIT-C TOTAL SCORE: 0
HOW MANY STANDARD DRINKS CONTAINING ALCOHOL DO YOU HAVE ON A TYPICAL DAY: PATIENT DOES NOT DRINK
HOW OFTEN DO YOU HAVE 6 OR MORE DRINKS ON ONE OCCASION: NEVER
SKIP TO QUESTIONS 9-10: 1
AUDIT-C TOTAL SCORE: 0
HOW OFTEN DO YOU HAVE A DRINK CONTAINING ALCOHOL: NEVER

## 2024-10-09 ASSESSMENT — PATIENT HEALTH QUESTIONNAIRE - PHQ9
1. LITTLE INTEREST OR PLEASURE IN DOING THINGS: NOT AT ALL
2. FEELING DOWN, DEPRESSED OR HOPELESS: NOT AT ALL
SUM OF ALL RESPONSES TO PHQ9 QUESTIONS 1 & 2: 0

## 2024-10-09 ASSESSMENT — COLUMBIA-SUICIDE SEVERITY RATING SCALE - C-SSRS
6. HAVE YOU EVER DONE ANYTHING, STARTED TO DO ANYTHING, OR PREPARED TO DO ANYTHING TO END YOUR LIFE?: NO
2. HAVE YOU ACTUALLY HAD ANY THOUGHTS OF KILLING YOURSELF?: NO
1. IN THE PAST MONTH, HAVE YOU WISHED YOU WERE DEAD OR WISHED YOU COULD GO TO SLEEP AND NOT WAKE UP?: NO

## 2024-10-09 NOTE — H&P
History Of Present Illness  Mr. Maldonado Mccloud is a 73 year old male with a PMH of multiple myeloma s/p Auto SCT in 2015 and multiple lines of treatment most recently on Cytoxan and s/p radiation treatments, and a DVT in the LLE  was on Eliquis, who had CAR T cell therapy  with ABECMA 10/25/2023, has concerns for disease progression is being admitted for Teclistamab.       Patient states he has been feeling pretty  well lately.   He denies any headaches, fevers, Chest pain , palpitations,   abdominal pain, N/V/D/C , abnormal bleeding  or bruising.        He does state he has been having some new over the last 2 weeks right FA pain.   No obvious deformity noted but he states he has been using his arm more  cautiously  and not doing any heavy lifting.   He also states some occasional dizziness with standing which he believes  happens when he doesn't drink much .      ONC history (from prior heme onc note):   73yr old male who  presented with right chest wall mass in July 2015 c/w plasmacytoma in resection. Bone marrow biopsy suggested multiple myeloma IgG kappa, ISS Stage II, bone survey revealed lytic lesions; Urine light chains present kappa restricted with 2gm proteinuria.  No adverse prognostic factors identified.     Treatment:     VRD  Initiated Aug 2015; currently s/p 3 cycles.     BMBx 10/2015 complete response.     Stem Cell Transplant  Underwent stem cell mobilization with Neupogen/Plerixafor and 2 day collection December 16-17, 2015, for 8.62 CD34 x 10^6/kg. During procedure for right IJ Trialysis placement  developed A. Fib with RVR which spontaneously resolved on December 14, 2015. He was admitted and placed on monitor for stem cell collection.      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  Hospitalization complicated by orthostatic hypotension, electrolyte replacement, drug induced rash, culture negative fever.        He completed approximately 5 weeks of maintenance Revlimid 10 mg daily which was held for worsening neuropathy August 2016.     3/ 2018: IgG M Braxton rising to 0.2gm/DL and light chain rising; restarted on Revlimid maintenance at 5mg EOD.      Revlimid stopped in 3/2021 due to stable disease.     Vacto/Pom Trial  7/2021 his M spike was on the rise. He was consented for Vacto/Pom trial which he began on 8/5/2021. He continued Pom  post finishing the trial.      Sarclisa/Kyprolis  Myeloma markers increasing in 6/2022, consented for Sarclisa /Kyprolis, which began 6/30/22. He continued this through September of 2022.         Cytoxan/Kyprolis  His therapy was subsequently changed in 9/2022 due to continued disease progression in the face of Sarclisa. His therapy was changed to CYYCLON regimen (Cytoxan/Kyprolis).     PET CT scan 9/14/2022 shows FDG avid lytic lesion within the left midshaft clavicle with pathologic fracture, FDG avid lytic lesions involving bilateral scapulas and FDG avidity within the T9 vertebral body, suggestive of active multiple myeloma.  There  are non FDG avid lytic lesions in the right iliac bone and right clavicle, likely representing nonactive multiple myeloma.  There is no PET evidence of extramedullary involvement.  There is an FDG avid right thyroid nodule.      MRI in 10/2022 negative for myelomatous involvement in thoracic vertebrae.      Therapy placed on hold in 11/2022 due to need for hip replacement surgery. His myeloma markers were on the rise in 3/2023 with a new jaw lesion. Plan is to begin radiation therapy 4/11 and resume 2x/week Kyprolis 4/10.      TCAR T cell therapy. Collected CAR T cells on 5/26/23.     X-ray of L elbow (6/20/23):  IMPRESSION:  1. Osteolytic lesions in the proximal radius and distal humerus  consistent with patient's reported history of multiple myeloma.  2. No acute fracture or malalignment.     Began radiation x8 fractions on 7/3/23.     Resumed Cytoxan as  of 8/7/23.     - CART (ABECMA) 10/25/2023 prepped with Fly/CY , complicated  by Grade 1 Icans  treated with Dex and Toci .      PMH: as above  PSH: Right hip replacement, left knee replacement  Family Hx: Father-pancreatic cancer  Social Hx: Denies ETOH and drug use. Smokes 1-2 cigars a day         Oncology History Overview Note   73 yr old male who  presented with right chest wall mass in July 2015 c/w plasmacytoma in resection. Bone marrow biopsy suggested multiple myeloma IgG kappa, ISS Stage II, bone survey revealed lytic lesions; Urine light chains present kappa restricted with 2gm proteinuria.  No adverse prognostic factors identified. Karyotype XY,   FISH neg for 1q abnormalities, trisomy 3,7,11, t(4;14), del 17p, del 13q    Treatment Hx:    VRD  Initiated Aug 2015; currently s/p 3 cycles.  BMBx 10/2015 complete response.    Stem Cell Transplant  Underwent stem cell mobilization with Neupogen/Plerixafor and 2 day collection December 16-17, 2015, for 8.62 CD34 x 10^6/kg. During procedure for right IJ Trialysis placement developed A. Fib with RVR which spontaneously resolved on December 14, 2015. He was admitted and placed on monitor for stem cell collection.     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  Hospitalization complicated by orthostatic hypotension, electrolyte replacement, drug induced rash, culture negative fever.      He completed approximately 5 weeks of maintenance Revlimid 10 mg daily which was held for worsening neuropathy August 2016.    3/ 2018: IgG M Braxton rising to 0.2gm/DL and light chain rising; restarted on Revlimid maintenance at 5mg EOD.     Revlimid stopped in 3/2021 due to stable disease.    Vacto/Pom Trial  7/2021 his M spike was on the rise. He was consented for Vacto/Pom trial which he began on 8/5/2021. He continued Pom post finishing the trial.     Sarclisa/Kyprolis  Myeloma markers  increasing in 6/2022, consented for Sarclisa/Kyprolis, which began 6/30/22. He continued this through September of 2022.       Cytoxan/Kyprolis  His therapy was subsequently changed in 9/2022 due to continued disease progression in the face of Sarclisa. His therapy was changed to CYYCLON regimen (Cytoxan/Kyprolis).    PET CT scan 9/14/2022 shows FDG avid lytic lesion within the left midshaft clavicle with pathologic fracture, FDG avid lytic lesions involving bilateral scapulas and FDG avidity within the T9 vertebral body, suggestive of active multiple myeloma.  There are non FDG avid lytic lesions in the right iliac bone and right clavicle, likely representing nonactive multiple myeloma.  There is no PET evidence of extramedullary involvement.  There is an FDG avid right thyroid nodule.     MRI in 10/2022 negative for myelomatous involvement in thoracic vertebrae.     Therapy placed on hold in 11/2022 due to need for hip replacement surgery. His myeloma markers were on the rise in 3/2023 with a new jaw lesion. Plan is to begin radiation therapy 4/11 and resume 2x/week Kyprolis 4/10.     Tracking to CAR T cell therapy. Collected CAR T cells on 5/26/23.     X-ray of L elbow (6/20/23):  IMPRESSION:  1. Osteolytic lesions in the proximal radius and distal humerus  consistent with patient's reported history of multiple myeloma.  2. No acute fracture or malalignment.    Began radiation x8 fractions on 7/3/23.    Resumed Cytoxan as of 8/7/23.       CAR T Cell Therapy   Admitted for CAR T cell therapy for his ISS stage 2 Kappa multiple myeloma with ABECMA T=0, 10/25/23     Hospital course complicated by Grade 1 CRS and Grade 1 ICANS, managed with dexamethasone 10mg x 3 days and one dose of tocilizumab.     Post CAR T restaging:    PET/CT (1/1/9/24):  IMPRESSION:  1. Multiple FDG avid lytic lesions as described above are stable in  size and FDG avidity when compared to prior study, consistent with  known multiple  myeloma.  2. No PET evidence of extramedullary disease.  3. Mild FDG avid focus in the right thyroid gland, further evaluation  with thyroid ultrasound is recommended.    BMBx (2/5/24): --No discrete plasma cell population identified.   --No immunophenotypic evidence of a lymphoproliferative disorder.   --No increased blast population.  Clonoseq = 0 (see media)    PET CT 7/25/24: 1. Overall stable multiple minimal or non FDG avid lytic lesions throughout the axial and appendicular skeleton as described above,  consistent with known multiple myeloma..  2. No new FDG avid multiple myeloma. No PET evidence of  extramedullary disease.     IgG multiple myeloma (Multi)   7/1/2015 Initial Diagnosis    IgG multiple myeloma (CMS/HCC)     12/23/2015 -  Bone Marrow Transplant    Autologous Stem Cell Transplant      11/4/2022 - 11/4/2022 Chemotherapy    Carfilzomib (Weekly) / Cyclophosphamide / Thalidomide / Dexamethasone, 28 Day Cycles     10/25/2023 -  Cellular Therapy    Abecma     10/9/2024 -  Chemotherapy    Teclistamab (Weekly), 28 Day Cycles          Allergies  Gammagard liquid [immun glob g(igg)-gly-iga ov50], Penicillins, and Piperacillin-tazobactam    Review of Systems   Constitutional: Negative.    HENT: Negative.     Respiratory: Negative.          With exertion intermittently   Cardiovascular: Negative.    Gastrointestinal: Negative.    Endocrine: Negative.    Genitourinary: Negative.    Musculoskeletal:  Positive for joint swelling.        Pain from myeloma lesions   Neurological:  Positive for dizziness.   Hematological: Negative.    Psychiatric/Behavioral: Negative.          Physical Exam  Constitutional:       Appearance: Normal appearance.   HENT:      Head: Normocephalic and atraumatic.      Nose: Nose normal.      Mouth/Throat:      Mouth: Mucous membranes are dry.   Eyes:      Extraocular Movements: Extraocular movements intact.      Pupils: Pupils are equal, round, and reactive to light.   Cardiovascular:  "     Rate and Rhythm: Normal rate and regular rhythm.   Pulmonary:      Effort: Pulmonary effort is normal.      Breath sounds: Normal breath sounds.   Abdominal:      General: Bowel sounds are normal.      Palpations: Abdomen is soft.   Musculoskeletal:         General: Normal range of motion.   Skin:     General: Skin is warm and dry.   Neurological:      General: No focal deficit present.      Mental Status: He is alert and oriented to person, place, and time.   Psychiatric:         Mood and Affect: Mood normal.         Behavior: Behavior normal.          Last Recorded Vitals  Blood pressure 119/80, pulse 64, temperature 36.6 °C (97.9 °F), temperature source Temporal, resp. rate 18, height 1.767 m (5' 9.57\"), weight 101 kg (223 lb 1.7 oz), SpO2 98%.    Relevant Results    Scheduled medications  acyclovir, 400 mg, oral, BID  cyanocobalamin, 1,000 mcg, oral, Daily  DULoxetine, 60 mg, oral, Daily  traZODone, 300 mg, oral, Nightly      Continuous medications     PRN medications  PRN medications: albuterol, alteplase, dextrose, diphenhydrAMINE, EPINEPHrine HCl, famotidine, methylPREDNISolone sodium succinate (PF), polyethylene glycol, prochlorperazine, prochlorperazine, sodium chloride      Scheduled medications  acyclovir, 400 mg, oral, BID  cyanocobalamin, 1,000 mcg, oral, Daily  DULoxetine, 60 mg, oral, Daily  traZODone, 300 mg, oral, Nightly      Continuous medications     PRN medications  PRN medications: albuterol, alteplase, dextrose, diphenhydrAMINE, EPINEPHrine HCl, famotidine, methylPREDNISolone sodium succinate (PF), polyethylene glycol, prochlorperazine, prochlorperazine, sodium chloride       Results for orders placed or performed during the hospital encounter of 10/09/24 (from the past 24 hour(s))   CBC and Auto Differential   Result Value Ref Range    WBC 3.8 (L) 4.4 - 11.3 x10*3/uL    nRBC 0.0 0.0 - 0.0 /100 WBCs    RBC 3.34 (L) 4.50 - 5.90 x10*6/uL    Hemoglobin 11.5 (L) 13.5 - 17.5 g/dL    Hematocrit " 33.0 (L) 41.0 - 52.0 %    MCV 99 80 - 100 fL    MCH 34.4 (H) 26.0 - 34.0 pg    MCHC 34.8 32.0 - 36.0 g/dL    RDW 13.2 11.5 - 14.5 %    Platelets 195 150 - 450 x10*3/uL    Neutrophils % 68.5 40.0 - 80.0 %    Immature Granulocytes %, Automated 0.3 0.0 - 0.9 %    Lymphocytes % 19.8 13.0 - 44.0 %    Monocytes % 10.4 2.0 - 10.0 %    Eosinophils % 1.0 0.0 - 6.0 %    Basophils % 0.0 0.0 - 2.0 %    Neutrophils Absolute 2.63 1.60 - 5.50 x10*3/uL    Immature Granulocytes Absolute, Automated 0.01 0.00 - 0.50 x10*3/uL    Lymphocytes Absolute 0.76 (L) 0.80 - 3.00 x10*3/uL    Monocytes Absolute 0.40 0.05 - 0.80 x10*3/uL    Eosinophils Absolute 0.04 0.00 - 0.40 x10*3/uL    Basophils Absolute 0.00 0.00 - 0.10 x10*3/uL       Assessment/Plan   Principal Problem:    Multiple myeloma in relapse (Multi)  Active Problems:    Multiple myeloma in remission (Multi)       Mr. Maldonado Mccloud is a 73 year old male with a PMH of multiple myeloma s/p Auto SCT on 12/13/15 and then multiple lines of treatment (Revlimid, Vacto/Pom trial, Sarclista/Kyprolis), most recently (8/2023) on Cytoxan/Kyprolis and s/p radiation treatments x 8 fractions on 7/3/23, LLE DVT 2022 (on Eliquis).  CAR T (ABECMA)  (T0 = 10/25/23,  recently concerned for disease progression is admitted for Teclistamab.         ONC:  # ISS stage 2 IgG Kappa Multiple Myeloma  -Presented with right chest wall mass in July 2015 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   - CAR-T cell therapy-ABECMA (Idecabtagene vicleucel) prep with Flu/Cy (T0 = 10/25/23), complicated by grade 1 ICANS  managed with Dex  x 3 days and Tociiluzimab   - Concern for progression (- ): increasing FK.86, 15.64,  4.0, 1.6   - BMBX( 24) Normocellular bone marrow (30-40%) with maturing trilineage hematopoiesis and no increase in plasma cell,  Small kappa monotypic plasma cell population identified by flow  Teclistamamab:  -Dose 1: 6.2mg (10/9) Dose 2: 31mg (10/11)  Dose 3 (treatment dose ) 153mg (10/13)      #CRS Assessment:   In last 24 hours: Fever; Any O2 supplement?      #Neurotoxicity Assessment:   ICANS Score:   Handwriting/Signature impaired:  no  Motor/Sensory deficit: No  Other abnormal Neuro symptom: N/A  Imaging/Fundoscopy finding: N/A     #CRS Grade:   Organ/System affected by CRS:   Date of CRS onset:   Date of highest CRS Grade:  Date of CRS resolution to Grade 1 or lower:     #ICANS Grade:  Date of Neurotoxicity onset:  Date of highest Neurotoxicity Grade  Date of Neurotoxicity resolution to Grade 1 or lower:   Neurotox management:   -Dexamethasone  -tocilizumab      #Management:   1. Tocilizumab 8mg/k. Anakinra 100mcg/day: N/A  3. Siltuximab 5mg/kg: date & time administered (not started)   4. Steroids:   5. Other agents/management:     HEME:  # Pancytopenia  - Tx for hgb < 7, plt < 10  # Hx/o LLE DVT , recently discontinued  Eliquis     ID:  Allergies to PCN (rash) & Pip-Tazo (rash)  -Plan to start PPx with Acyclovir and Bactrim      CARDIAC:  -Echo (23) EF 50%  # Hx/o A. 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     MUSCLOSKEL:  -Right FA pain   - Xray on admit : pending     PSYCH:  #Hx of Insomnia  -Continue home Trazadone 300mg HS        DISPO  -Full code-confirmed on admit  -Access:  -primary oncologist: Dr. Soto, APRN-CNP

## 2024-10-09 NOTE — PROGRESS NOTES
Pharmacy Medication History Review    Maldonado Mccloud is a 73 y.o. male admitted for Multiple myeloma in relapse (Multi). Pharmacy reviewed the patient's [id number] medications and allergies for accuracy.    Medications ADDED:  CALCIUM MAGNESIUM ZINC  Medications CHANGED:  NONE  Medications REMOVED:   CALCIUM CARBONATE     The list below reflects the updated PTA list.   Prior to Admission Medications   Prescriptions Last Dose Informant   CALCIUM-MAGNESIUM-ZINC ORAL  Self, Spouse/Significant Other   Sig: Take 1 tablet by mouth 2 times a day.   DULoxetine (Cymbalta) 30 mg DR capsule  Self, Spouse/Significant Other   Sig: Take 2 capsules (60 mg) by mouth once daily. Do not crush or chew.   acyclovir (Zovirax) 400 mg tablet  Self, Spouse/Significant Other   Sig: Take 1 tablet (400 mg) by mouth 2 times a day.    NOT STARTED YET   cyanocobalamin (Vitamin B-12) 1,000 mcg tablet  Self, Spouse/Significant Other   Sig: Take 1 tablet (1,000 mcg) by mouth once daily.   prochlorperazine (Compazine) 10 mg tablet  Self, Spouse/Significant Other   Sig: Take 1 tablet (10 mg) by mouth every 6 hours if needed for nausea or vomiting.    NOT STARTED YET   sulfamethoxazole-trimethoprim (Bactrim DS) 800-160 mg tablet  Self, Spouse/Significant Other   Sig: Take 1 tablet by mouth 3 times a week. Take once daily on Monday, Wednesday, and Friday.    NOT STARTED YET   traZODone (Desyrel) 100 mg tablet  Self, Spouse/Significant Other   Sig: TAKE 3 TABLETS BY MOUTH DAILY AT BEDTIME      Facility-Administered Medications: None        The list below reflects the updated allergy list. Please review each documented allergy for additional clarification and justification.  Allergies  Reviewed by Monica Johnston RN on 10/9/2024        Severity Reactions Comments    Gammagard Liquid [immun Glob G(igg)-gly-iga Ov50] Medium Other Severe back pain reported by patient    Penicillins Low Rash     Piperacillin-tazobactam Low Rash        "      Patient accepts M2B at discharge.     Sources:   out patient fill history, OARRS, and patient interview  Patient was a great historian who along with wife Farida were great historians who knew medications and dosages.     Additional Comments:  Patient post procedure will be using  , acyclovir (Zovirax) 400 mg tablet, sulfamethoxazole-trimethoprim (Bactrim DS) 800-160 mg tablet and prochlorperazine (Compazine) 10 mg tablet      Michele Thompson [facility]  Transitions of Care Pharmacist  10/09/24     Secure Chat preferred   If no response call [phone] or Vocera \"Med Rec\"   "

## 2024-10-10 LAB
ALBUMIN SERPL BCP-MCNC: 4.2 G/DL (ref 3.4–5)
ANION GAP SERPL CALC-SCNC: 11 MMOL/L (ref 10–20)
BASOPHILS # BLD AUTO: 0 X10*3/UL (ref 0–0.1)
BASOPHILS NFR BLD AUTO: 0 %
BUN SERPL-MCNC: 22 MG/DL (ref 6–23)
CALCIUM SERPL-MCNC: 9.2 MG/DL (ref 8.6–10.6)
CHLORIDE SERPL-SCNC: 108 MMOL/L (ref 98–107)
CO2 SERPL-SCNC: 25 MMOL/L (ref 21–32)
CREAT SERPL-MCNC: 1.29 MG/DL (ref 0.5–1.3)
CRP SERPL-MCNC: 0.24 MG/DL
EGFRCR SERPLBLD CKD-EPI 2021: 58 ML/MIN/1.73M*2
EOSINOPHIL # BLD AUTO: 0 X10*3/UL (ref 0–0.4)
EOSINOPHIL NFR BLD AUTO: 0 %
ERYTHROCYTE [DISTWIDTH] IN BLOOD BY AUTOMATED COUNT: 12.5 % (ref 11.5–14.5)
FERRITIN SERPL-MCNC: 120 NG/ML (ref 20–300)
GLUCOSE SERPL-MCNC: 122 MG/DL (ref 74–99)
HCT VFR BLD AUTO: 32.6 % (ref 41–52)
HGB BLD-MCNC: 11.5 G/DL (ref 13.5–17.5)
IMM GRANULOCYTES # BLD AUTO: 0.05 X10*3/UL (ref 0–0.5)
IMM GRANULOCYTES NFR BLD AUTO: 0.7 % (ref 0–0.9)
LDH SERPL L TO P-CCNC: 157 U/L (ref 84–246)
LYMPHOCYTES # BLD AUTO: 0.56 X10*3/UL (ref 0.8–3)
LYMPHOCYTES NFR BLD AUTO: 7.6 %
MAGNESIUM SERPL-MCNC: 1.94 MG/DL (ref 1.6–2.4)
MCH RBC QN AUTO: 33.8 PG (ref 26–34)
MCHC RBC AUTO-ENTMCNC: 35.3 G/DL (ref 32–36)
MCV RBC AUTO: 96 FL (ref 80–100)
MONOCYTES # BLD AUTO: 0.46 X10*3/UL (ref 0.05–0.8)
MONOCYTES NFR BLD AUTO: 6.3 %
NEUTROPHILS # BLD AUTO: 6.27 X10*3/UL (ref 1.6–5.5)
NEUTROPHILS NFR BLD AUTO: 85.4 %
NRBC BLD-RTO: 0 /100 WBCS (ref 0–0)
PHOSPHATE SERPL-MCNC: 2.6 MG/DL (ref 2.5–4.9)
PLATELET # BLD AUTO: 131 X10*3/UL (ref 150–450)
POTASSIUM SERPL-SCNC: 4.4 MMOL/L (ref 3.5–5.3)
RBC # BLD AUTO: 3.4 X10*6/UL (ref 4.5–5.9)
SODIUM SERPL-SCNC: 140 MMOL/L (ref 136–145)
URATE SERPL-MCNC: 5.5 MG/DL (ref 4–7.5)
WBC # BLD AUTO: 7.3 X10*3/UL (ref 4.4–11.3)

## 2024-10-10 PROCEDURE — 84100 ASSAY OF PHOSPHORUS: CPT | Performed by: NURSE PRACTITIONER

## 2024-10-10 PROCEDURE — 82728 ASSAY OF FERRITIN: CPT | Performed by: NURSE PRACTITIONER

## 2024-10-10 PROCEDURE — 2500000002 HC RX 250 W HCPCS SELF ADMINISTERED DRUGS (ALT 637 FOR MEDICARE OP, ALT 636 FOR OP/ED): Performed by: NURSE PRACTITIONER

## 2024-10-10 PROCEDURE — 2500000001 HC RX 250 WO HCPCS SELF ADMINISTERED DRUGS (ALT 637 FOR MEDICARE OP): Performed by: NURSE PRACTITIONER

## 2024-10-10 PROCEDURE — 36416 COLLJ CAPILLARY BLOOD SPEC: CPT | Performed by: NURSE PRACTITIONER

## 2024-10-10 PROCEDURE — 1170000001 HC PRIVATE ONCOLOGY ROOM DAILY

## 2024-10-10 PROCEDURE — 83615 LACTATE (LD) (LDH) ENZYME: CPT | Performed by: NURSE PRACTITIONER

## 2024-10-10 PROCEDURE — 84550 ASSAY OF BLOOD/URIC ACID: CPT | Performed by: NURSE PRACTITIONER

## 2024-10-10 PROCEDURE — 86140 C-REACTIVE PROTEIN: CPT | Performed by: NURSE PRACTITIONER

## 2024-10-10 PROCEDURE — 85025 COMPLETE CBC W/AUTO DIFF WBC: CPT | Performed by: NURSE PRACTITIONER

## 2024-10-10 PROCEDURE — 36415 COLL VENOUS BLD VENIPUNCTURE: CPT | Performed by: NURSE PRACTITIONER

## 2024-10-10 PROCEDURE — 99232 SBSQ HOSP IP/OBS MODERATE 35: CPT | Performed by: INTERNAL MEDICINE

## 2024-10-10 PROCEDURE — 83735 ASSAY OF MAGNESIUM: CPT | Performed by: NURSE PRACTITIONER

## 2024-10-10 RX ORDER — PANTOPRAZOLE SODIUM 40 MG/1
40 TABLET, DELAYED RELEASE ORAL DAILY
Status: DISCONTINUED | OUTPATIENT
Start: 2024-10-10 | End: 2024-10-16 | Stop reason: HOSPADM

## 2024-10-10 RX ADMIN — ACYCLOVIR 400 MG: 400 TABLET ORAL at 08:40

## 2024-10-10 RX ADMIN — DULOXETINE HYDROCHLORIDE 60 MG: 60 CAPSULE, DELAYED RELEASE ORAL at 21:15

## 2024-10-10 RX ADMIN — CYANOCOBALAMIN TAB 1000 MCG 1000 MCG: 1000 TAB at 08:40

## 2024-10-10 RX ADMIN — ACYCLOVIR 400 MG: 400 TABLET ORAL at 21:15

## 2024-10-10 RX ADMIN — TRAZODONE HYDROCHLORIDE 300 MG: 150 TABLET ORAL at 21:15

## 2024-10-10 RX ADMIN — PANTOPRAZOLE SODIUM 40 MG: 40 TABLET, DELAYED RELEASE ORAL at 12:48

## 2024-10-10 ASSESSMENT — COGNITIVE AND FUNCTIONAL STATUS - GENERAL
DAILY ACTIVITIY SCORE: 24
MOBILITY SCORE: 24
MOBILITY SCORE: 24
DAILY ACTIVITIY SCORE: 24

## 2024-10-10 ASSESSMENT — ACTIVITIES OF DAILY LIVING (ADL): LACK_OF_TRANSPORTATION: NO

## 2024-10-10 ASSESSMENT — PAIN SCALES - GENERAL
PAINLEVEL_OUTOF10: 0 - NO PAIN
PAINLEVEL_OUTOF10: 0 - NO PAIN

## 2024-10-10 NOTE — PROGRESS NOTES
"Maldonado Mccloud is a 73 y.o. male on day 1 of admission presenting with Multiple myeloma in relapse (Multi).    Subjective   Patient is up  and relaxing in room.  Thus far  he tolerated the first injection well . Icans 10/10.    He states today he was having some abd discomfort today, possible from premeds  dex .   Otherwise he is stable.       Objective     Physical Exam  HENT:      Nose: Nose normal.      Mouth/Throat:      Mouth: Mucous membranes are moist.      Pharynx: Oropharynx is clear.   Eyes:      Extraocular Movements: Extraocular movements intact.      Pupils: Pupils are equal, round, and reactive to light.   Cardiovascular:      Rate and Rhythm: Normal rate and regular rhythm.   Pulmonary:      Effort: Pulmonary effort is normal.      Breath sounds: Normal breath sounds.   Abdominal:      General: Bowel sounds are normal.      Palpations: Abdomen is soft.   Musculoskeletal:         General: Normal range of motion.      Cervical back: Normal range of motion.      Comments: Right FA pain     Skin:     General: Skin is warm and dry.      Capillary Refill: Capillary refill takes 2 to 3 seconds.   Neurological:      General: No focal deficit present.      Mental Status: He is alert and oriented to person, place, and time. Mental status is at baseline.   Psychiatric:         Attention and Perception: Attention and perception normal.         Last Recorded Vitals  Blood pressure 124/78, pulse 69, temperature 37.1 °C (98.8 °F), temperature source Temporal, resp. rate 18, height 1.767 m (5' 9.57\"), weight 101 kg (223 lb 1.7 oz), SpO2 99%.  Intake/Output last 3 Shifts:  No intake/output data recorded.    Relevant Results               Scheduled medications  acyclovir, 400 mg, oral, BID  cyanocobalamin, 1,000 mcg, oral, Daily  DULoxetine, 60 mg, oral, Daily  pantoprazole, 40 mg, oral, Daily  traZODone, 300 mg, oral, Nightly      Continuous medications     PRN medications  PRN medications: albuterol, alteplase, " dextrose, diphenhydrAMINE, EPINEPHrine HCl, famotidine, methylPREDNISolone sodium succinate (PF), polyethylene glycol, prochlorperazine, prochlorperazine, sodium chloride    Results for orders placed or performed during the hospital encounter of 10/09/24 (from the past 24 hour(s))   CBC and Auto Differential   Result Value Ref Range    WBC 7.3 4.4 - 11.3 x10*3/uL    nRBC 0.0 0.0 - 0.0 /100 WBCs    RBC 3.40 (L) 4.50 - 5.90 x10*6/uL    Hemoglobin 11.5 (L) 13.5 - 17.5 g/dL    Hematocrit 32.6 (L) 41.0 - 52.0 %    MCV 96 80 - 100 fL    MCH 33.8 26.0 - 34.0 pg    MCHC 35.3 32.0 - 36.0 g/dL    RDW 12.5 11.5 - 14.5 %    Platelets 131 (L) 150 - 450 x10*3/uL    Neutrophils % 85.4 40.0 - 80.0 %    Immature Granulocytes %, Automated 0.7 0.0 - 0.9 %    Lymphocytes % 7.6 13.0 - 44.0 %    Monocytes % 6.3 2.0 - 10.0 %    Eosinophils % 0.0 0.0 - 6.0 %    Basophils % 0.0 0.0 - 2.0 %    Neutrophils Absolute 6.27 (H) 1.60 - 5.50 x10*3/uL    Immature Granulocytes Absolute, Automated 0.05 0.00 - 0.50 x10*3/uL    Lymphocytes Absolute 0.56 (L) 0.80 - 3.00 x10*3/uL    Monocytes Absolute 0.46 0.05 - 0.80 x10*3/uL    Eosinophils Absolute 0.00 0.00 - 0.40 x10*3/uL    Basophils Absolute 0.00 0.00 - 0.10 x10*3/uL   Renal Function Panel   Result Value Ref Range    Glucose 122 (H) 74 - 99 mg/dL    Sodium 140 136 - 145 mmol/L    Potassium 4.4 3.5 - 5.3 mmol/L    Chloride 108 (H) 98 - 107 mmol/L    Bicarbonate 25 21 - 32 mmol/L    Anion Gap 11 10 - 20 mmol/L    Urea Nitrogen 22 6 - 23 mg/dL    Creatinine 1.29 0.50 - 1.30 mg/dL    eGFR 58 (L) >60 mL/min/1.73m*2    Calcium 9.2 8.6 - 10.6 mg/dL    Phosphorus 2.6 2.5 - 4.9 mg/dL    Albumin 4.2 3.4 - 5.0 g/dL   Magnesium   Result Value Ref Range    Magnesium 1.94 1.60 - 2.40 mg/dL   C-reactive protein   Result Value Ref Range    C-Reactive Protein 0.24 <1.00 mg/dL   Ferritin   Result Value Ref Range    Ferritin 120 20 - 300 ng/mL   Lactate Dehydrogenase   Result Value Ref Range     84 - 246 U/L    Uric Acid   Result Value Ref Range    Uric Acid 5.5 4.0 - 7.5 mg/dL                  Assessment/Plan   Assessment & Plan  Multiple myeloma in relapse (Multi)    Multiple myeloma in remission (Multi)       Mr. Maldonado Mccloud is a 73 year old male with a PMH of multiple myeloma s/p Auto SCT on 12/13/15 and then multiple lines of treatment (Revlimid, Vacto/Pom trial, Sarclista/Kyprolis), most recently (2023) on Cytoxan/Kyprolis and s/p radiation treatments x 8 fractions on 7/3/23, LLE DVT  (on Eliquis).  CAR T (ABECMA)  (T0 = 10/25/23,  recently concerned for disease progression is admitted for Teclistamab.         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   - CAR-T cell therapy-ABECMA (Idecabtagene vicleucel) prep with Flu/Cy (T0 = 10/25/23), complicated by grade 1 ICANS  managed with Dex x 3 days and Tociiluzimab   - Concern for progression (- ): increasing FK.86, 15.64,  4.0, 1.6   - BMBX( 24) Normocellular bone marrow (30-40%) with maturing trilineage hematopoiesis and no increase in plasma cell,  Small kappa monotypic plasma cell population identified by flow  - PET (10/3) new  FDG avid lesions involving  left posterior 5th rib , L$ vertebral body , left iliac bone , increase in right distal femur lesion  consistent with disease progression.  Stable lesion right proximal clavicle, right iliac crest and left femoral lesion.  Teclistamamab:  -Dose 1: 6.2mg (10/9) Dose 2: 31mg (10/11)  Dose 3 (treatment dose  ) 153mg (10/13)    -So far tolerating  first injection well     #CRS Assessment:   In last 24 hours: Fever; Any O2 supplement?      #Neurotoxicity Assessment:   ICANS Score: 10/10   Handwriting/Signature impaired:  no  Motor/Sensory deficit: No  Other abnormal Neuro symptom: N/A  Imaging/Fundoscopy finding: N/A     #CRS Grade:   Organ/System affected by CRS:   Date of CRS onset:   Date of highest CRS Grade:  Date of CRS resolution to Grade 1 or lower:     #ICANS Grade:  Date of Neurotoxicity onset:  Date of highest Neurotoxicity Grade  Date of Neurotoxicity resolution to Grade 1 or lower:   Neurotox management:   -Dexamethasone  -tocilizumab      #Management:   1. Tocilizumab 8mg/k. Anakinra 100mcg/day: N/A  3. Siltuximab 5mg/kg: date & time administered (not started)   4. Steroids:   5. Other agents/management:     HEME:  # Pancytopenia  - Tx for hgb < 7, plt < 10  # Hx/o LLE DVT , recently discontinued  Eliquis     ID:  Allergies to PCN (rash) & Pip-Tazo (rash)  -Plan to start PPx with Acyclovir and Bactrim      CARDIAC:  -Echo (23) EF 50%  # Hx/o A. 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     MUSCLOSKEL:  -Right FA pain   - Xray on admit : pending      PSYCH:  #Hx of Insomnia  -Continue home Trazadone 300mg HS        DISPO  -Full code-confirmed on admit  -Access: PIV   -primary oncologist: Dr. Resendez      Patient seen and discussed with Dr. Lawrence Setwart, APRN-CNP

## 2024-10-10 NOTE — PROGRESS NOTES
10/10/24 1500   Discharge Planning   Living Arrangements Spouse/significant other   Support Systems Spouse/significant other   Assistance Needed none   Type of Residence Private residence   Home or Post Acute Services None   Expected Discharge Disposition Home   Does the patient need discharge transport arranged? No   Patient expects to be discharged to: Home   Financial Resource Strain   How hard is it for you to pay for the very basics like food, housing, medical care, and heating? Not hard   Housing Stability   In the last 12 months, was there a time when you were not able to pay the mortgage or rent on time? N   In the past 12 months, how many times have you moved where you were living? 0   At any time in the past 12 months, were you homeless or living in a shelter (including now)? N   Transportation Needs   In the past 12 months, has lack of transportation kept you from medical appointments or from getting medications? no   In the past 12 months, has lack of transportation kept you from meetings, work, or from getting things needed for daily living? No     Pt with Multiple Myeloma was admitted for Teclistamab.  Met with the pt to discuss any home going needs.  He will return to home with his wife when ready for discharge.  He is still independent and doesn't require any home care or DME.  He doesn't have a central line.  MD is Dr. Wil Sun.  Will continue to follow for any home going needs that may arise.  Jacquie Leiva RN, TCC

## 2024-10-10 NOTE — CARE PLAN
The patient's goals for the shift include      The clinical goals for the shift include pt will be free from falls and injuries during shift        Problem: Fall/Injury  Goal: Not fall by end of shift  Outcome: Progressing  Goal: Be free from injury by end of the shift  Outcome: Progressing  Goal: Verbalize understanding of personal risk factors for fall in the hospital  Outcome: Progressing  Goal: Verbalize understanding of risk factor reduction measures to prevent injury from fall in the home  Outcome: Progressing  Goal: Use assistive devices by end of the shift  Outcome: Progressing  Goal: Pace activities to prevent fatigue by end of the shift  Outcome: Progressing     Problem: Pain - Adult  Goal: Verbalizes/displays adequate comfort level or baseline comfort level  Outcome: Progressing     Problem: Safety - Adult  Goal: Free from fall injury  Outcome: Progressing

## 2024-10-11 LAB
ALBUMIN SERPL BCP-MCNC: 3.8 G/DL (ref 3.4–5)
ANION GAP SERPL CALC-SCNC: 9 MMOL/L (ref 10–20)
BASOPHILS # BLD AUTO: 0 X10*3/UL (ref 0–0.1)
BASOPHILS NFR BLD AUTO: 0 %
BUN SERPL-MCNC: 26 MG/DL (ref 6–23)
CALCIUM SERPL-MCNC: 8.5 MG/DL (ref 8.6–10.6)
CHLORIDE SERPL-SCNC: 107 MMOL/L (ref 98–107)
CO2 SERPL-SCNC: 26 MMOL/L (ref 21–32)
CREAT SERPL-MCNC: 1.29 MG/DL (ref 0.5–1.3)
CRP SERPL-MCNC: 0.75 MG/DL
EGFRCR SERPLBLD CKD-EPI 2021: 58 ML/MIN/1.73M*2
EOSINOPHIL # BLD AUTO: 0.02 X10*3/UL (ref 0–0.4)
EOSINOPHIL NFR BLD AUTO: 0.6 %
ERYTHROCYTE [DISTWIDTH] IN BLOOD BY AUTOMATED COUNT: 13 % (ref 11.5–14.5)
FERRITIN SERPL-MCNC: 113 NG/ML (ref 20–300)
GLUCOSE SERPL-MCNC: 156 MG/DL (ref 74–99)
HCT VFR BLD AUTO: 32 % (ref 41–52)
HGB BLD-MCNC: 10.8 G/DL (ref 13.5–17.5)
IMM GRANULOCYTES # BLD AUTO: 0.02 X10*3/UL (ref 0–0.5)
IMM GRANULOCYTES NFR BLD AUTO: 0.6 % (ref 0–0.9)
LDH SERPL L TO P-CCNC: 121 U/L (ref 84–246)
LYMPHOCYTES # BLD AUTO: 0.16 X10*3/UL (ref 0.8–3)
LYMPHOCYTES NFR BLD AUTO: 4.7 %
MAGNESIUM SERPL-MCNC: 1.81 MG/DL (ref 1.6–2.4)
MCH RBC QN AUTO: 33.5 PG (ref 26–34)
MCHC RBC AUTO-ENTMCNC: 33.8 G/DL (ref 32–36)
MCV RBC AUTO: 99 FL (ref 80–100)
MONOCYTES # BLD AUTO: 0.19 X10*3/UL (ref 0.05–0.8)
MONOCYTES NFR BLD AUTO: 5.5 %
NEUTROPHILS # BLD AUTO: 3.04 X10*3/UL (ref 1.6–5.5)
NEUTROPHILS NFR BLD AUTO: 88.6 %
NRBC BLD-RTO: 0 /100 WBCS (ref 0–0)
PHOSPHATE SERPL-MCNC: 2.2 MG/DL (ref 2.5–4.9)
PLATELET # BLD AUTO: 114 X10*3/UL (ref 150–450)
POTASSIUM SERPL-SCNC: 4.2 MMOL/L (ref 3.5–5.3)
RBC # BLD AUTO: 3.22 X10*6/UL (ref 4.5–5.9)
SODIUM SERPL-SCNC: 138 MMOL/L (ref 136–145)
URATE SERPL-MCNC: 5.5 MG/DL (ref 4–7.5)
WBC # BLD AUTO: 3.4 X10*3/UL (ref 4.4–11.3)

## 2024-10-11 PROCEDURE — 99233 SBSQ HOSP IP/OBS HIGH 50: CPT | Performed by: INTERNAL MEDICINE

## 2024-10-11 PROCEDURE — 2500000001 HC RX 250 WO HCPCS SELF ADMINISTERED DRUGS (ALT 637 FOR MEDICARE OP): Performed by: NURSE PRACTITIONER

## 2024-10-11 PROCEDURE — 83735 ASSAY OF MAGNESIUM: CPT | Performed by: NURSE PRACTITIONER

## 2024-10-11 PROCEDURE — 36415 COLL VENOUS BLD VENIPUNCTURE: CPT | Performed by: NURSE PRACTITIONER

## 2024-10-11 PROCEDURE — 2500000004 HC RX 250 GENERAL PHARMACY W/ HCPCS (ALT 636 FOR OP/ED): Performed by: INTERNAL MEDICINE

## 2024-10-11 PROCEDURE — 85025 COMPLETE CBC W/AUTO DIFF WBC: CPT | Performed by: NURSE PRACTITIONER

## 2024-10-11 PROCEDURE — 1170000001 HC PRIVATE ONCOLOGY ROOM DAILY

## 2024-10-11 PROCEDURE — 86140 C-REACTIVE PROTEIN: CPT | Performed by: NURSE PRACTITIONER

## 2024-10-11 PROCEDURE — 83615 LACTATE (LD) (LDH) ENZYME: CPT | Performed by: NURSE PRACTITIONER

## 2024-10-11 PROCEDURE — 82728 ASSAY OF FERRITIN: CPT | Performed by: NURSE PRACTITIONER

## 2024-10-11 PROCEDURE — 80069 RENAL FUNCTION PANEL: CPT | Performed by: NURSE PRACTITIONER

## 2024-10-11 PROCEDURE — 2500000001 HC RX 250 WO HCPCS SELF ADMINISTERED DRUGS (ALT 637 FOR MEDICARE OP): Performed by: INTERNAL MEDICINE

## 2024-10-11 PROCEDURE — 2500000004 HC RX 250 GENERAL PHARMACY W/ HCPCS (ALT 636 FOR OP/ED): Performed by: NURSE PRACTITIONER

## 2024-10-11 PROCEDURE — 2500000001 HC RX 250 WO HCPCS SELF ADMINISTERED DRUGS (ALT 637 FOR MEDICARE OP): Performed by: STUDENT IN AN ORGANIZED HEALTH CARE EDUCATION/TRAINING PROGRAM

## 2024-10-11 PROCEDURE — 84550 ASSAY OF BLOOD/URIC ACID: CPT | Performed by: NURSE PRACTITIONER

## 2024-10-11 PROCEDURE — 2500000002 HC RX 250 W HCPCS SELF ADMINISTERED DRUGS (ALT 637 FOR MEDICARE OP, ALT 636 FOR OP/ED): Performed by: NURSE PRACTITIONER

## 2024-10-11 RX ORDER — DIPHENHYDRAMINE HCL 50 MG
50 CAPSULE ORAL ONCE
Status: COMPLETED | OUTPATIENT
Start: 2024-10-11 | End: 2024-10-11

## 2024-10-11 RX ORDER — ACETAMINOPHEN 325 MG/1
650 TABLET ORAL ONCE
Status: COMPLETED | OUTPATIENT
Start: 2024-10-11 | End: 2024-10-11

## 2024-10-11 RX ORDER — DEXAMETHASONE 4 MG/1
16 TABLET ORAL ONCE
Status: COMPLETED | OUTPATIENT
Start: 2024-10-11 | End: 2024-10-11

## 2024-10-11 RX ADMIN — CYANOCOBALAMIN TAB 1000 MCG 1000 MCG: 1000 TAB at 09:22

## 2024-10-11 RX ADMIN — DULOXETINE HYDROCHLORIDE 60 MG: 60 CAPSULE, DELAYED RELEASE ORAL at 21:27

## 2024-10-11 RX ADMIN — TECLISTAMAB 30 MG: 10 INJECTION SUBCUTANEOUS at 14:41

## 2024-10-11 RX ADMIN — DEXAMETHASONE 16 MG: 4 TABLET ORAL at 13:06

## 2024-10-11 RX ADMIN — ACETAMINOPHEN 650 MG: 325 TABLET ORAL at 11:36

## 2024-10-11 RX ADMIN — DIPHENHYDRAMINE HYDROCHLORIDE 50 MG: 50 CAPSULE ORAL at 13:07

## 2024-10-11 RX ADMIN — TRAZODONE HYDROCHLORIDE 300 MG: 150 TABLET ORAL at 21:27

## 2024-10-11 RX ADMIN — ACYCLOVIR 400 MG: 400 TABLET ORAL at 21:27

## 2024-10-11 RX ADMIN — POLYETHYLENE GLYCOL 3350 17 G: 17 POWDER, FOR SOLUTION ORAL at 21:30

## 2024-10-11 RX ADMIN — ACYCLOVIR 400 MG: 400 TABLET ORAL at 09:22

## 2024-10-11 RX ADMIN — ACETAMINOPHEN 650 MG: 325 TABLET ORAL at 13:06

## 2024-10-11 ASSESSMENT — COGNITIVE AND FUNCTIONAL STATUS - GENERAL: MOBILITY SCORE: 24

## 2024-10-11 ASSESSMENT — PAIN DESCRIPTION - LOCATION: LOCATION: ARM

## 2024-10-11 ASSESSMENT — PAIN DESCRIPTION - ORIENTATION: ORIENTATION: RIGHT

## 2024-10-11 ASSESSMENT — PAIN SCALES - GENERAL: PAINLEVEL_OUTOF10: 8

## 2024-10-11 NOTE — PROGRESS NOTES
Physical Therapy                 Therapy Communication Note    Patient Name: Maldonado Mccloud  MRN: [id number]  Department: [facility]  Room: [address] [place] [address]-A  Today's Date: 10/11/2024     Discipline: Physical Therapy    PT SCREEN: pt seated in chair upon arrival. independent at baseline. pt has been up independently without use of AD. lives in 2 story home + basement with wife, Yvette. pt is retired, has access to canes and walkers. no hx of falls. will continue to monitor while IP. No acute PT needs demonstrated at this time.         10/11/24 at 12:19 PM - Louisa Garcia, PT

## 2024-10-11 NOTE — PROGRESS NOTES
"Maldonado Mccloud is a 73 y.o. male on day 2 of admission presenting with Multiple myeloma in relapse (Multi).    Subjective   No acute events overnight. Doing well. ICANS score 10/10. Planned for 3rd dose today at 2PM. No nausea, diarrhea. Normal urination, no pain.        Objective     Physical Exam  HENT:      Nose: Nose normal.      Mouth/Throat:      Mouth: Mucous membranes are moist.      Pharynx: Oropharynx is clear.   Eyes:      Extraocular Movements: Extraocular movements intact.      Pupils: Pupils are equal, round, and reactive to light.   Cardiovascular:      Rate and Rhythm: Normal rate and regular rhythm.   Pulmonary:      Effort: Pulmonary effort is normal.      Breath sounds: Normal breath sounds.   Abdominal:      General: Bowel sounds are normal.      Palpations: Abdomen is soft.   Musculoskeletal:         General: Normal range of motion.      Cervical back: Normal range of motion.      Comments: Right FA pain     Skin:     General: Skin is warm and dry.      Capillary Refill: Capillary refill takes less than 2 seconds.   Neurological:      General: No focal deficit present.      Mental Status: He is alert and oriented to person, place, and time. Mental status is at baseline.   Psychiatric:         Attention and Perception: Attention and perception normal.         Last Recorded Vitals  Blood pressure 137/69, pulse 69, temperature 36.6 °C (97.9 °F), temperature source Temporal, resp. rate 18, height 1.767 m (5' 9.57\"), weight 101 kg (223 lb 1.7 oz), SpO2 94%.  Intake/Output last 3 Shifts:  No intake/output data recorded.    Relevant Results        Scheduled medications  acetaminophen, 650 mg, oral, Once  acyclovir, 400 mg, oral, BID  cyanocobalamin, 1,000 mcg, oral, Daily  dexAMETHasone, 16 mg, oral, Once  diphenhydrAMINE, 50 mg, oral, Once  DULoxetine, 60 mg, oral, Daily  pantoprazole, 40 mg, oral, Daily  teclistamab-cqyv, 30 mg, subcutaneous, Once  traZODone, 300 mg, oral, Nightly      Continuous " medications     PRN medications  PRN medications: albuterol, alteplase, dextrose, diphenhydrAMINE, EPINEPHrine HCl, famotidine, methylPREDNISolone sodium succinate (PF), polyethylene glycol, prochlorperazine, prochlorperazine, sodium chloride    Results for orders placed or performed during the hospital encounter of 10/09/24 (from the past 24 hour(s))   CBC and Auto Differential   Result Value Ref Range    WBC 3.4 (L) 4.4 - 11.3 x10*3/uL    nRBC 0.0 0.0 - 0.0 /100 WBCs    RBC 3.22 (L) 4.50 - 5.90 x10*6/uL    Hemoglobin 10.8 (L) 13.5 - 17.5 g/dL    Hematocrit 32.0 (L) 41.0 - 52.0 %    MCV 99 80 - 100 fL    MCH 33.5 26.0 - 34.0 pg    MCHC 33.8 32.0 - 36.0 g/dL    RDW 13.0 11.5 - 14.5 %    Platelets 114 (L) 150 - 450 x10*3/uL    Neutrophils % 88.6 40.0 - 80.0 %    Immature Granulocytes %, Automated 0.6 0.0 - 0.9 %    Lymphocytes % 4.7 13.0 - 44.0 %    Monocytes % 5.5 2.0 - 10.0 %    Eosinophils % 0.6 0.0 - 6.0 %    Basophils % 0.0 0.0 - 2.0 %    Neutrophils Absolute 3.04 1.60 - 5.50 x10*3/uL    Immature Granulocytes Absolute, Automated 0.02 0.00 - 0.50 x10*3/uL    Lymphocytes Absolute 0.16 (L) 0.80 - 3.00 x10*3/uL    Monocytes Absolute 0.19 0.05 - 0.80 x10*3/uL    Eosinophils Absolute 0.02 0.00 - 0.40 x10*3/uL    Basophils Absolute 0.00 0.00 - 0.10 x10*3/uL   Renal Function Panel   Result Value Ref Range    Glucose 156 (H) 74 - 99 mg/dL    Sodium 138 136 - 145 mmol/L    Potassium 4.2 3.5 - 5.3 mmol/L    Chloride 107 98 - 107 mmol/L    Bicarbonate 26 21 - 32 mmol/L    Anion Gap 9 (L) 10 - 20 mmol/L    Urea Nitrogen 26 (H) 6 - 23 mg/dL    Creatinine 1.29 0.50 - 1.30 mg/dL    eGFR 58 (L) >60 mL/min/1.73m*2    Calcium 8.5 (L) 8.6 - 10.6 mg/dL    Phosphorus 2.2 (L) 2.5 - 4.9 mg/dL    Albumin 3.8 3.4 - 5.0 g/dL   Magnesium   Result Value Ref Range    Magnesium 1.81 1.60 - 2.40 mg/dL   C-reactive protein   Result Value Ref Range    C-Reactive Protein 0.75 <1.00 mg/dL   Ferritin   Result Value Ref Range    Ferritin 113 20 -  300 ng/mL   Lactate Dehydrogenase   Result Value Ref Range     84 - 246 U/L   Uric Acid   Result Value Ref Range    Uric Acid 5.5 4.0 - 7.5 mg/dL            Assessment/Plan   Assessment & Plan  Multiple myeloma in relapse (Multi)    Multiple myeloma in remission (Multi)       Mr. Maldonado Mccloud is a 73 year old male with a PMH of multiple myeloma s/p Auto SCT on 12/13/15 and then multiple lines of treatment (Revlimid, Vacto/Pom trial, Sarclista/Kyprolis), most recently (2023) on Cytoxan/Kyprolis and s/p radiation treatments x 8 fractions on 7/3/23, LLE DVT  (on Eliquis).  CAR T (ABECMA)  (T0 = 10/25/23,  recently concerned for disease progression is admitted for Teclistamab.     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   - CAR-T cell therapy-ABECMA (Idecabtagene vicleucel) prep with Flu/Cy (T0 = 10/25/23), complicated by grade 1 ICANS  managed with Dex x 3 days and Tociiluzimab   - Concern for progression (- ): increasing FK.86, 15.64,  4.0, 1.6   - BMBX( 24) Normocellular bone marrow (30-40%) with maturing trilineage hematopoiesis and no increase in plasma cell,  Small kappa monotypic plasma cell population identified by flow  - PET (10/3) new  FDG avid lesions involving  left posterior 5th rib , L$ vertebral body , left iliac bone , increase in right distal femur lesion  consistent with disease progression.  Stable lesion right proximal clavicle, right iliac crest  and left femoral lesion.  Teclistamamab:  -Dose 1: 6.2mg (10/9) Dose 2: 31mg (10/11)  Dose 3 (treatment dose ) 153mg (10/13)    -So far tolerating  first injection well     #CRS Assessment:   In last 24 hours: Fever; Any O2 supplement? No     #Neurotoxicity Assessment:   ICANS Score: 10/10   Handwriting/Signature impaired:  no  Motor/Sensory deficit: No  Other abnormal Neuro symptom: N/A  Imaging/Fundoscopy finding: N/A     #CRS Grade:   Organ/System affected by CRS:   Date of CRS onset:   Date of highest CRS Grade:  Date of CRS resolution to Grade 1 or lower:     #ICANS Grade:  Date of Neurotoxicity onset:  Date of highest Neurotoxicity Grade  Date of Neurotoxicity resolution to Grade 1 or lower:   Neurotox management:   -Dexamethasone  -tocilizumab      #Management:   1. Tocilizumab 8mg/k. Anakinra 100mcg/day: N/A  3. Siltuximab 5mg/kg: date & time administered (not started)   4. Steroids:   5. Other agents/management:     HEME:  # Pancytopenia  - Tx for hgb < 7, plt < 10  # Hx/o LLE DVT , recently discontinued  Eliquis     ID:  Allergies to PCN (rash) & Pip-Tazo (rash)  -Plan to start PPx with Acyclovir and Bactrim      CARDIAC:  -Echo (23) EF 50%  # Hx/o A. 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     MUSCLOSKEL:  -Right FA pain   - Xray on admit : similar appearance of osteolytic lesion and associated pathologic comminuted and displaced fractures of the radial neck and proximal radial diaphysis with increased bony resorption along the appositional surfaces     PSYCH:  #Hx of Insomnia  -Continue home Trazadone 300mg HS        DISPO  -Full code-confirmed on admit  -Access: PIV   -primary oncologist: Dr. Resendez      Patient seen and discussed with Dr. Lawrence Mckeon MD

## 2024-10-11 NOTE — CARE PLAN
The patient's goals for the shift include      The clinical goals for the shift include the patient will be free from falls and injury during shift    Problem: Fall/Injury  Goal: Not fall by end of shift  Outcome: Progressing  Goal: Be free from injury by end of the shift  Outcome: Progressing  Goal: Verbalize understanding of personal risk factors for fall in the hospital  Outcome: Progressing  Goal: Verbalize understanding of risk factor reduction measures to prevent injury from fall in the home  Outcome: Progressing  Goal: Use assistive devices by end of the shift  Outcome: Progressing  Goal: Pace activities to prevent fatigue by end of the shift  Outcome: Progressing     Problem: Pain - Adult  Goal: Verbalizes/displays adequate comfort level or baseline comfort level  Outcome: Progressing     Problem: Safety - Adult  Goal: Free from fall injury  Outcome: Progressing     Problem: Pain - Adult  Goal: Verbalizes/displays adequate comfort level or baseline comfort level  Outcome: Progressing

## 2024-10-11 NOTE — CARE PLAN
The clinical goals for the shift include pt will be safe    Over the shift, the patient did make progress toward the following goals.     Problem: Fall/Injury  Goal: Not fall by end of shift  Outcome: Progressing  Goal: Be free from injury by end of the shift  Outcome: Progressing  Goal: Verbalize understanding of personal risk factors for fall in the hospital  Outcome: Progressing  Goal: Verbalize understanding of risk factor reduction measures to prevent injury from fall in the home  Outcome: Progressing  Goal: Use assistive devices by end of the shift  Outcome: Progressing  Goal: Pace activities to prevent fatigue by end of the shift  Outcome: Progressing     Problem: Pain - Adult  Goal: Verbalizes/displays adequate comfort level or baseline comfort level  Outcome: Progressing     Problem: Safety - Adult  Goal: Free from fall injury  Outcome: Progressing     Problem: Discharge Planning  Goal: Discharge to home or other facility with appropriate resources  Outcome: Progressing     Problem: Chronic Conditions and Co-morbidities  Goal: Patient's chronic conditions and co-morbidity symptoms are monitored and maintained or improved  Outcome: Progressing

## 2024-10-12 LAB
ALBUMIN SERPL BCP-MCNC: 4.1 G/DL (ref 3.4–5)
ANION GAP SERPL CALC-SCNC: 10 MMOL/L (ref 10–20)
BASOPHILS # BLD AUTO: 0 X10*3/UL (ref 0–0.1)
BASOPHILS NFR BLD AUTO: 0 %
BUN SERPL-MCNC: 21 MG/DL (ref 6–23)
CALCIUM SERPL-MCNC: 8.8 MG/DL (ref 8.6–10.6)
CHLORIDE SERPL-SCNC: 107 MMOL/L (ref 98–107)
CO2 SERPL-SCNC: 25 MMOL/L (ref 21–32)
CREAT SERPL-MCNC: 1.14 MG/DL (ref 0.5–1.3)
CRP SERPL-MCNC: 1.71 MG/DL
EGFRCR SERPLBLD CKD-EPI 2021: 67 ML/MIN/1.73M*2
EOSINOPHIL # BLD AUTO: 0 X10*3/UL (ref 0–0.4)
EOSINOPHIL NFR BLD AUTO: 0 %
ERYTHROCYTE [DISTWIDTH] IN BLOOD BY AUTOMATED COUNT: 12.6 % (ref 11.5–14.5)
FERRITIN SERPL-MCNC: 133 NG/ML (ref 20–300)
GLUCOSE SERPL-MCNC: 154 MG/DL (ref 74–99)
HCT VFR BLD AUTO: 34.3 % (ref 41–52)
HGB BLD-MCNC: 11.6 G/DL (ref 13.5–17.5)
IMM GRANULOCYTES # BLD AUTO: 0.02 X10*3/UL (ref 0–0.5)
IMM GRANULOCYTES NFR BLD AUTO: 0.3 % (ref 0–0.9)
LDH SERPL L TO P-CCNC: 152 U/L (ref 84–246)
LYMPHOCYTES # BLD AUTO: 0.44 X10*3/UL (ref 0.8–3)
LYMPHOCYTES NFR BLD AUTO: 7.6 %
MAGNESIUM SERPL-MCNC: 2.13 MG/DL (ref 1.6–2.4)
MCH RBC QN AUTO: 33.4 PG (ref 26–34)
MCHC RBC AUTO-ENTMCNC: 33.8 G/DL (ref 32–36)
MCV RBC AUTO: 99 FL (ref 80–100)
MONOCYTES # BLD AUTO: 0.35 X10*3/UL (ref 0.05–0.8)
MONOCYTES NFR BLD AUTO: 6 %
NEUTROPHILS # BLD AUTO: 5.01 X10*3/UL (ref 1.6–5.5)
NEUTROPHILS NFR BLD AUTO: 86.1 %
NRBC BLD-RTO: 0 /100 WBCS (ref 0–0)
PHOSPHATE SERPL-MCNC: 2.1 MG/DL (ref 2.5–4.9)
PLATELET # BLD AUTO: 128 X10*3/UL (ref 150–450)
POTASSIUM SERPL-SCNC: 4.4 MMOL/L (ref 3.5–5.3)
RBC # BLD AUTO: 3.47 X10*6/UL (ref 4.5–5.9)
SODIUM SERPL-SCNC: 138 MMOL/L (ref 136–145)
URATE SERPL-MCNC: 4.8 MG/DL (ref 4–7.5)
WBC # BLD AUTO: 5.8 X10*3/UL (ref 4.4–11.3)

## 2024-10-12 PROCEDURE — 2500000002 HC RX 250 W HCPCS SELF ADMINISTERED DRUGS (ALT 637 FOR MEDICARE OP, ALT 636 FOR OP/ED): Performed by: NURSE PRACTITIONER

## 2024-10-12 PROCEDURE — 1170000001 HC PRIVATE ONCOLOGY ROOM DAILY

## 2024-10-12 PROCEDURE — 2500000004 HC RX 250 GENERAL PHARMACY W/ HCPCS (ALT 636 FOR OP/ED): Performed by: NURSE PRACTITIONER

## 2024-10-12 PROCEDURE — 85025 COMPLETE CBC W/AUTO DIFF WBC: CPT | Performed by: NURSE PRACTITIONER

## 2024-10-12 PROCEDURE — 36415 COLL VENOUS BLD VENIPUNCTURE: CPT | Performed by: NURSE PRACTITIONER

## 2024-10-12 PROCEDURE — 86140 C-REACTIVE PROTEIN: CPT | Performed by: NURSE PRACTITIONER

## 2024-10-12 PROCEDURE — 80069 RENAL FUNCTION PANEL: CPT | Performed by: NURSE PRACTITIONER

## 2024-10-12 PROCEDURE — 99232 SBSQ HOSP IP/OBS MODERATE 35: CPT | Performed by: INTERNAL MEDICINE

## 2024-10-12 PROCEDURE — 2500000001 HC RX 250 WO HCPCS SELF ADMINISTERED DRUGS (ALT 637 FOR MEDICARE OP): Performed by: NURSE PRACTITIONER

## 2024-10-12 PROCEDURE — 83735 ASSAY OF MAGNESIUM: CPT | Performed by: NURSE PRACTITIONER

## 2024-10-12 PROCEDURE — 99233 SBSQ HOSP IP/OBS HIGH 50: CPT | Performed by: INTERNAL MEDICINE

## 2024-10-12 PROCEDURE — 84550 ASSAY OF BLOOD/URIC ACID: CPT | Performed by: NURSE PRACTITIONER

## 2024-10-12 PROCEDURE — 83615 LACTATE (LD) (LDH) ENZYME: CPT | Performed by: NURSE PRACTITIONER

## 2024-10-12 PROCEDURE — 82728 ASSAY OF FERRITIN: CPT | Performed by: NURSE PRACTITIONER

## 2024-10-12 RX ADMIN — POLYETHYLENE GLYCOL 3350 17 G: 17 POWDER, FOR SOLUTION ORAL at 21:22

## 2024-10-12 RX ADMIN — ACYCLOVIR 400 MG: 400 TABLET ORAL at 21:21

## 2024-10-12 RX ADMIN — DULOXETINE HYDROCHLORIDE 60 MG: 60 CAPSULE, DELAYED RELEASE ORAL at 21:21

## 2024-10-12 RX ADMIN — TRAZODONE HYDROCHLORIDE 300 MG: 150 TABLET ORAL at 21:21

## 2024-10-12 RX ADMIN — ACYCLOVIR 400 MG: 400 TABLET ORAL at 09:13

## 2024-10-12 RX ADMIN — PANTOPRAZOLE SODIUM 40 MG: 40 TABLET, DELAYED RELEASE ORAL at 09:13

## 2024-10-12 RX ADMIN — POLYETHYLENE GLYCOL 3350 17 G: 17 POWDER, FOR SOLUTION ORAL at 09:31

## 2024-10-12 RX ADMIN — CYANOCOBALAMIN TAB 1000 MCG 1000 MCG: 1000 TAB at 09:13

## 2024-10-12 ASSESSMENT — COGNITIVE AND FUNCTIONAL STATUS - GENERAL
DAILY ACTIVITIY SCORE: 24
MOBILITY SCORE: 24

## 2024-10-12 ASSESSMENT — PAIN SCALES - GENERAL
PAINLEVEL_OUTOF10: 0 - NO PAIN
PAINLEVEL_OUTOF10: 0 - NO PAIN

## 2024-10-12 NOTE — PROGRESS NOTES
"Maldonado Mccloud is a 74 y.o. male on day 3 of admission presenting with Multiple myeloma in relapse (Multi).    Subjective   No acute events overnight. Doing well. ICANS score 10/10. Plan for 3rd dose tomorrow 10/13. Plan discharge on Tue afternoon.       Objective     Physical Exam  HENT:      Nose: Nose normal.      Mouth/Throat:      Mouth: Mucous membranes are moist.      Pharynx: Oropharynx is clear.   Eyes:      Extraocular Movements: Extraocular movements intact.      Pupils: Pupils are equal, round, and reactive to light.   Cardiovascular:      Rate and Rhythm: Normal rate and regular rhythm.   Pulmonary:      Effort: Pulmonary effort is normal.      Breath sounds: Normal breath sounds.   Abdominal:      General: Bowel sounds are normal.      Palpations: Abdomen is soft.   Musculoskeletal:         General: Normal range of motion.      Cervical back: Normal range of motion.      Comments: Right FA pain     Skin:     General: Skin is warm and dry.      Capillary Refill: Capillary refill takes less than 2 seconds.   Neurological:      General: No focal deficit present.      Mental Status: He is alert and oriented to person, place, and time. Mental status is at baseline.   Psychiatric:         Attention and Perception: Attention and perception normal.         Last Recorded Vitals  Blood pressure 138/78, pulse 66, temperature 37 °C (98.6 °F), temperature source Temporal, resp. rate 16, height 1.767 m (5' 9.57\"), weight 101 kg (223 lb 1.7 oz), SpO2 96%.  Intake/Output last 3 Shifts:  No intake/output data recorded.    Relevant Results        Scheduled medications  acyclovir, 400 mg, oral, BID  cyanocobalamin, 1,000 mcg, oral, Daily  DULoxetine, 60 mg, oral, Daily  pantoprazole, 40 mg, oral, Daily  traZODone, 300 mg, oral, Nightly      Continuous medications     PRN medications  PRN medications: albuterol, alteplase, dextrose, diphenhydrAMINE, EPINEPHrine HCl, famotidine, methylPREDNISolone sodium succinate (PF), " polyethylene glycol, prochlorperazine, prochlorperazine, sodium chloride    Results for orders placed or performed during the hospital encounter of 10/09/24 (from the past 24 hour(s))   Renal Function Panel   Result Value Ref Range    Glucose 154 (H) 74 - 99 mg/dL    Sodium 138 136 - 145 mmol/L    Potassium 4.4 3.5 - 5.3 mmol/L    Chloride 107 98 - 107 mmol/L    Bicarbonate 25 21 - 32 mmol/L    Anion Gap 10 10 - 20 mmol/L    Urea Nitrogen 21 6 - 23 mg/dL    Creatinine 1.14 0.50 - 1.30 mg/dL    eGFR 67 >60 mL/min/1.73m*2    Calcium 8.8 8.6 - 10.6 mg/dL    Phosphorus 2.1 (L) 2.5 - 4.9 mg/dL    Albumin 4.1 3.4 - 5.0 g/dL   Magnesium   Result Value Ref Range    Magnesium 2.13 1.60 - 2.40 mg/dL   C-reactive protein   Result Value Ref Range    C-Reactive Protein 1.71 (H) <1.00 mg/dL   Ferritin   Result Value Ref Range    Ferritin 133 20 - 300 ng/mL   CBC and Auto Differential   Result Value Ref Range    WBC 5.8 4.4 - 11.3 x10*3/uL    nRBC 0.0 0.0 - 0.0 /100 WBCs    RBC 3.47 (L) 4.50 - 5.90 x10*6/uL    Hemoglobin 11.6 (L) 13.5 - 17.5 g/dL    Hematocrit 34.3 (L) 41.0 - 52.0 %    MCV 99 80 - 100 fL    MCH 33.4 26.0 - 34.0 pg    MCHC 33.8 32.0 - 36.0 g/dL    RDW 12.6 11.5 - 14.5 %    Platelets 128 (L) 150 - 450 x10*3/uL    Neutrophils % 86.1 40.0 - 80.0 %    Immature Granulocytes %, Automated 0.3 0.0 - 0.9 %    Lymphocytes % 7.6 13.0 - 44.0 %    Monocytes % 6.0 2.0 - 10.0 %    Eosinophils % 0.0 0.0 - 6.0 %    Basophils % 0.0 0.0 - 2.0 %    Neutrophils Absolute 5.01 1.60 - 5.50 x10*3/uL    Immature Granulocytes Absolute, Automated 0.02 0.00 - 0.50 x10*3/uL    Lymphocytes Absolute 0.44 (L) 0.80 - 3.00 x10*3/uL    Monocytes Absolute 0.35 0.05 - 0.80 x10*3/uL    Eosinophils Absolute 0.00 0.00 - 0.40 x10*3/uL    Basophils Absolute 0.00 0.00 - 0.10 x10*3/uL   Lactate Dehydrogenase   Result Value Ref Range     84 - 246 U/L   Uric Acid   Result Value Ref Range    Uric Acid 4.8 4.0 - 7.5 mg/dL            Assessment/Plan    Assessment & Plan  Multiple myeloma in relapse (Multi)    Multiple myeloma in remission (Multi)       Mr. Maldonado Mccloud is a 73 year old male with a PMH of multiple myeloma s/p Auto SCT on 12/13/15 and then multiple lines of treatment (Revlimid, Vacto/Pom trial, Sarclista/Kyprolis), most recently (2023) on Cytoxan/Kyprolis and s/p radiation treatments x 8 fractions on 7/3/23, LLE DVT  (on Eliquis).  CAR T (ABECMA)  (T0 = 10/25/23,  recently concerned for disease progression is admitted for Teclistamab.     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   - CAR-T cell therapy-ABECMA (Idecabtagene vicleucel) prep with Flu/Cy (T0 = 10/25/23), complicated by grade 1 ICANS  managed with Dex x 3 days and Tociiluzimab   - Concern for progression (- ): increasing FK.86, 15.64,  4.0, 1.6   - BMBX( 24) Normocellular bone marrow (30-40%) with maturing trilineage hematopoiesis and no increase in plasma cell,  Small kappa monotypic plasma cell population identified by flow  - PET (10/3) new  FDG avid lesions involving  left posterior 5th rib , L$ vertebral body , left iliac bone , increase in right distal femur lesion  consistent with disease progression. Stable lesion right proximal clavicle, right iliac crest and left femoral lesion.    CHEMO:  Teclistamamab  - Dose 1: 6.2mg (10/9)   - Dose 2: 31mg (10/11)    - Dose 3(treatment dose): 153mg (10/13)   - Tolerating well so  far    # CRS Assessment:   In last 24 hours: Fever; Any O2 supplement? No     # Neurotoxicity Assessment:   ICANS Score: 10/10 (10/12)  Handwriting/Signature impaired:  no  Motor/Sensory deficit: No  Other abnormal Neuro symptom: N/A  Imaging/Fundoscopy finding: N/A     # CRS Grade:   Organ/System affected by CRS:   Date of CRS onset:   Date of highest CRS Grade:  Date of CRS resolution to Grade 1 or lower:     # ICANS Grade:  Date of Neurotoxicity onset:  Date of highest Neurotoxicity Grade  Date of Neurotoxicity resolution to Grade 1 or lower:   Neurotox management:   -Dexamethasone  -tocilizumab      # Management:   1. Tocilizumab 8mg/k. Anakinra 100mcg/day: N/A  3. Siltuximab 5mg/kg: date & time administered (not started)   4. Steroids:   5. Other agents/management:     HEME:  # Pancytopenia secondary to myeloma  - Hgb<7, Plt<10  # Hx/o LLE DVT , recently discontinued Eliquis     ID:  Allergies to PCN (rash) & Pip-Tazo (rash)  # Prophylaxis:  - ACV  - Will need Bactrim     CARDIAC:  - Echo (23) EF 50%  # Hx/o A. 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     MUSCLOSKEL:  # Right FA pain   - Xray on admit : similar appearance of osteolytic lesion and associated pathologic comminuted and displaced fractures of the radial neck and proximal radial diaphysis with increased bony resorption along the appositional surfaces     PSYCH:  # Hx of Insomnia  -Continue home Trazadone 300mg HS     DISPO  - Full code-confirmed on admit  - Access: PIV   - primary oncologist: Dr. Resendez      Patient seen and discussed with Dr. Lawrence Magallanes, APRN-CNP

## 2024-10-12 NOTE — CARE PLAN
The clinical goals for the shift include Patient will remain safe and receive a restful night's sleep with minimal interruptions.    Over the shift, the patient made progress toward the following goals:    Problem: Fall/Injury  Goal: Not fall by end of shift  Outcome: Progressing  Goal: Be free from injury by end of the shift  Outcome: Progressing  Goal: Verbalize understanding of personal risk factors for fall in the hospital  Outcome: Progressing  Goal: Verbalize understanding of risk factor reduction measures to prevent injury from fall in the home  Outcome: Progressing  Goal: Use assistive devices by end of the shift  Outcome: Progressing  Goal: Pace activities to prevent fatigue by end of the shift  Outcome: Progressing     Problem: Pain - Adult  Goal: Verbalizes/displays adequate comfort level or baseline comfort level  Outcome: Progressing     Problem: Safety - Adult  Goal: Free from fall injury  Outcome: Progressing     Problem: Discharge Planning  Goal: Discharge to home or other facility with appropriate resources  Outcome: Progressing     Problem: Chronic Conditions and Co-morbidities  Goal: Patient's chronic conditions and co-morbidity symptoms are monitored and maintained or improved  Outcome: Progressing

## 2024-10-13 VITALS
OXYGEN SATURATION: 97 % | BODY MASS INDEX: 31.59 KG/M2 | WEIGHT: 220.68 LBS | SYSTOLIC BLOOD PRESSURE: 154 MMHG | HEART RATE: 56 BPM | HEIGHT: 70 IN | TEMPERATURE: 97.2 F | DIASTOLIC BLOOD PRESSURE: 88 MMHG | RESPIRATION RATE: 16 BRPM

## 2024-10-13 LAB
ALBUMIN SERPL BCP-MCNC: 3.7 G/DL (ref 3.4–5)
ANION GAP SERPL CALC-SCNC: 10 MMOL/L (ref 10–20)
BASOPHILS # BLD AUTO: 0 X10*3/UL (ref 0–0.1)
BASOPHILS NFR BLD AUTO: 0 %
BUN SERPL-MCNC: 23 MG/DL (ref 6–23)
CALCIUM SERPL-MCNC: 8.3 MG/DL (ref 8.6–10.6)
CHLORIDE SERPL-SCNC: 107 MMOL/L (ref 98–107)
CO2 SERPL-SCNC: 27 MMOL/L (ref 21–32)
CREAT SERPL-MCNC: 1.3 MG/DL (ref 0.5–1.3)
CRP SERPL-MCNC: 1.31 MG/DL
EGFRCR SERPLBLD CKD-EPI 2021: 57 ML/MIN/1.73M*2
EOSINOPHIL # BLD AUTO: 0.02 X10*3/UL (ref 0–0.4)
EOSINOPHIL NFR BLD AUTO: 0.5 %
ERYTHROCYTE [DISTWIDTH] IN BLOOD BY AUTOMATED COUNT: 13 % (ref 11.5–14.5)
FERRITIN SERPL-MCNC: 117 NG/ML (ref 20–300)
FIBRINOGEN PPP-MCNC: 340 MG/DL (ref 200–400)
GLUCOSE SERPL-MCNC: 108 MG/DL (ref 74–99)
HCT VFR BLD AUTO: 32.6 % (ref 41–52)
HGB BLD-MCNC: 11.1 G/DL (ref 13.5–17.5)
IMM GRANULOCYTES # BLD AUTO: 0.02 X10*3/UL (ref 0–0.5)
IMM GRANULOCYTES NFR BLD AUTO: 0.5 % (ref 0–0.9)
LDH SERPL L TO P-CCNC: 125 U/L (ref 84–246)
LYMPHOCYTES # BLD AUTO: 0.23 X10*3/UL (ref 0.8–3)
LYMPHOCYTES NFR BLD AUTO: 5.8 %
MAGNESIUM SERPL-MCNC: 1.88 MG/DL (ref 1.6–2.4)
MCH RBC QN AUTO: 33 PG (ref 26–34)
MCHC RBC AUTO-ENTMCNC: 34 G/DL (ref 32–36)
MCV RBC AUTO: 97 FL (ref 80–100)
MONOCYTES # BLD AUTO: 0.46 X10*3/UL (ref 0.05–0.8)
MONOCYTES NFR BLD AUTO: 11.5 %
NEUTROPHILS # BLD AUTO: 3.26 X10*3/UL (ref 1.6–5.5)
NEUTROPHILS NFR BLD AUTO: 81.7 %
NRBC BLD-RTO: 0 /100 WBCS (ref 0–0)
PHOSPHATE SERPL-MCNC: 2 MG/DL (ref 2.5–4.9)
PLATELET # BLD AUTO: 94 X10*3/UL (ref 150–450)
POTASSIUM SERPL-SCNC: 4.3 MMOL/L (ref 3.5–5.3)
RBC # BLD AUTO: 3.36 X10*6/UL (ref 4.5–5.9)
SODIUM SERPL-SCNC: 140 MMOL/L (ref 136–145)
URATE SERPL-MCNC: 4.6 MG/DL (ref 4–7.5)
WBC # BLD AUTO: 4 X10*3/UL (ref 4.4–11.3)

## 2024-10-13 PROCEDURE — 82728 ASSAY OF FERRITIN: CPT | Performed by: NURSE PRACTITIONER

## 2024-10-13 PROCEDURE — 2500000001 HC RX 250 WO HCPCS SELF ADMINISTERED DRUGS (ALT 637 FOR MEDICARE OP): Performed by: NURSE PRACTITIONER

## 2024-10-13 PROCEDURE — 84550 ASSAY OF BLOOD/URIC ACID: CPT | Performed by: NURSE PRACTITIONER

## 2024-10-13 PROCEDURE — 2500000001 HC RX 250 WO HCPCS SELF ADMINISTERED DRUGS (ALT 637 FOR MEDICARE OP): Performed by: INTERNAL MEDICINE

## 2024-10-13 PROCEDURE — 86140 C-REACTIVE PROTEIN: CPT | Performed by: NURSE PRACTITIONER

## 2024-10-13 PROCEDURE — 83615 LACTATE (LD) (LDH) ENZYME: CPT | Performed by: NURSE PRACTITIONER

## 2024-10-13 PROCEDURE — 2500000004 HC RX 250 GENERAL PHARMACY W/ HCPCS (ALT 636 FOR OP/ED): Mod: JZ | Performed by: INTERNAL MEDICINE

## 2024-10-13 PROCEDURE — 80069 RENAL FUNCTION PANEL: CPT | Performed by: NURSE PRACTITIONER

## 2024-10-13 PROCEDURE — 36415 COLL VENOUS BLD VENIPUNCTURE: CPT | Performed by: NURSE PRACTITIONER

## 2024-10-13 PROCEDURE — 1170000001 HC PRIVATE ONCOLOGY ROOM DAILY

## 2024-10-13 PROCEDURE — 85384 FIBRINOGEN ACTIVITY: CPT | Performed by: NURSE PRACTITIONER

## 2024-10-13 PROCEDURE — 83735 ASSAY OF MAGNESIUM: CPT | Performed by: NURSE PRACTITIONER

## 2024-10-13 PROCEDURE — 2500000001 HC RX 250 WO HCPCS SELF ADMINISTERED DRUGS (ALT 637 FOR MEDICARE OP): Performed by: PHYSICIAN ASSISTANT

## 2024-10-13 PROCEDURE — 85025 COMPLETE CBC W/AUTO DIFF WBC: CPT | Performed by: NURSE PRACTITIONER

## 2024-10-13 PROCEDURE — 2500000002 HC RX 250 W HCPCS SELF ADMINISTERED DRUGS (ALT 637 FOR MEDICARE OP, ALT 636 FOR OP/ED): Performed by: NURSE PRACTITIONER

## 2024-10-13 RX ORDER — DIPHENHYDRAMINE HCL 50 MG
50 CAPSULE ORAL ONCE
Status: COMPLETED | OUTPATIENT
Start: 2024-10-13 | End: 2024-10-13

## 2024-10-13 RX ORDER — ACETAMINOPHEN 325 MG/1
650 TABLET ORAL ONCE
Status: COMPLETED | OUTPATIENT
Start: 2024-10-13 | End: 2024-10-13

## 2024-10-13 RX ORDER — DEXAMETHASONE 4 MG/1
16 TABLET ORAL ONCE
Status: COMPLETED | OUTPATIENT
Start: 2024-10-13 | End: 2024-10-13

## 2024-10-13 RX ORDER — AMOXICILLIN 250 MG
1 CAPSULE ORAL 2 TIMES DAILY
Status: DISCONTINUED | OUTPATIENT
Start: 2024-10-13 | End: 2024-10-16 | Stop reason: HOSPADM

## 2024-10-13 RX ORDER — OXYCODONE HYDROCHLORIDE 5 MG/1
5 TABLET ORAL ONCE
Status: COMPLETED | OUTPATIENT
Start: 2024-10-13 | End: 2024-10-13

## 2024-10-13 RX ADMIN — CYANOCOBALAMIN TAB 1000 MCG 1000 MCG: 1000 TAB at 09:14

## 2024-10-13 RX ADMIN — DULOXETINE HYDROCHLORIDE 60 MG: 60 CAPSULE, DELAYED RELEASE ORAL at 20:34

## 2024-10-13 RX ADMIN — TRAZODONE HYDROCHLORIDE 300 MG: 150 TABLET ORAL at 20:34

## 2024-10-13 RX ADMIN — OXYCODONE HYDROCHLORIDE 5 MG: 5 TABLET ORAL at 10:17

## 2024-10-13 RX ADMIN — SENNOSIDES AND DOCUSATE SODIUM 1 TABLET: 50; 8.6 TABLET ORAL at 10:17

## 2024-10-13 RX ADMIN — ACETAMINOPHEN 650 MG: 325 TABLET ORAL at 13:08

## 2024-10-13 RX ADMIN — SENNOSIDES AND DOCUSATE SODIUM 1 TABLET: 50; 8.6 TABLET ORAL at 20:34

## 2024-10-13 RX ADMIN — ACYCLOVIR 400 MG: 400 TABLET ORAL at 09:14

## 2024-10-13 RX ADMIN — DIPHENHYDRAMINE HYDROCHLORIDE 50 MG: 50 CAPSULE ORAL at 13:08

## 2024-10-13 RX ADMIN — TECLISTAMAB 153 MG: 90 INJECTION SUBCUTANEOUS at 14:15

## 2024-10-13 RX ADMIN — DEXAMETHASONE 16 MG: 4 TABLET ORAL at 13:08

## 2024-10-13 RX ADMIN — ACYCLOVIR 400 MG: 400 TABLET ORAL at 20:34

## 2024-10-13 ASSESSMENT — COGNITIVE AND FUNCTIONAL STATUS - GENERAL
DAILY ACTIVITIY SCORE: 24
DAILY ACTIVITIY SCORE: 24
MOBILITY SCORE: 24
DAILY ACTIVITIY SCORE: 24

## 2024-10-13 ASSESSMENT — PAIN SCALES - GENERAL
PAINLEVEL_OUTOF10: 8
PAINLEVEL_OUTOF10: 5 - MODERATE PAIN
PAINLEVEL_OUTOF10: 0 - NO PAIN

## 2024-10-13 ASSESSMENT — PAIN DESCRIPTION - LOCATION: LOCATION: ARM

## 2024-10-13 ASSESSMENT — PAIN DESCRIPTION - ORIENTATION: ORIENTATION: RIGHT

## 2024-10-13 ASSESSMENT — PAIN - FUNCTIONAL ASSESSMENT: PAIN_FUNCTIONAL_ASSESSMENT: 0-10

## 2024-10-13 NOTE — PROGRESS NOTES
Maldonado Mccloud is a 74 y.o. male on day 4 of admission presenting with Multiple myeloma in relapse (Multi).    Subjective     Afebrile, no overnight events.     This morning (10/13/24), he reports that he has some pain in his R forearm; this is a place where he's previously had pain from his myeloma, not new pain. He is also a little worried about moving his bowels, especially given the oxycodone for pain.     ICE score 10/10. Will receive first treatment dose later today, plan discharge on Tue afternoon.       Objective     Physical Exam  HENT:      Nose: Nose normal.      Mouth/Throat:      Mouth: Mucous membranes are moist.      Pharynx: Oropharynx is clear.   Eyes:      Extraocular Movements: Extraocular movements intact.      Pupils: Pupils are equal, round, and reactive to light.   Cardiovascular:      Rate and Rhythm: Normal rate and regular rhythm.   Pulmonary:      Effort: Pulmonary effort is normal.      Breath sounds: Normal breath sounds.   Abdominal:      General: Bowel sounds are normal.      Palpations: Abdomen is soft.   Musculoskeletal:         General: Normal range of motion.      Cervical back: Normal range of motion.      Comments: Right FA pain     Skin:     General: Skin is warm and dry.      Capillary Refill: Capillary refill takes less than 2 seconds.   Neurological:      General: No focal deficit present.      Mental Status: He is alert and oriented to person, place, and time. Mental status is at baseline.   Psychiatric:         Attention and Perception: Attention and perception normal.         Assessment/Plan   Assessment & Plan  Multiple myeloma in relapse (Multi)       Mr. Maldonado Mccloud is a 73 year old male with a PMH of multiple myeloma s/p Auto SCT on 12/13/15 and then multiple lines of treatment (Revlimid, Vacto/Pom trial, Sarclista/Kyprolis), most recently (8/2023) on Cytoxan/Kyprolis and s/p radiation treatments x 8 fractions on 7/3/23, LLE DVT 2022 (on Eliquis).  CAR T (ABECMA)   (T0 = 10/25/23,  recently concerned for disease progression is admitted for Teclistamab.     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   - CAR-T cell therapy-ABECMA (Idecabtagene vicleucel) prep with Flu/Cy (T0 = 10/25/23), complicated by grade 1 ICANS  managed with Dex x 3 days and Tociiluzimab   - Concern for progression (- ): increasing FK.86, 15.64,  4.0, 1.6   - BMBX( 24) Normocellular bone marrow (30-40%) with maturing trilineage hematopoiesis and no increase in plasma cell,  Small kappa monotypic plasma cell population identified by flow  - PET (10/3) new  FDG avid lesions involving  left posterior 5th rib , L4 vertebral body , left iliac bone , increase in right distal femur lesion  consistent with disease progression. Stable lesion right proximal clavicle, right iliac crest and left femoral lesion.    CHEMO:  Teclistamamab  - Dose 1: 6.2mg (10/9)   - Dose 2: 31mg (10/11)    - Dose 3 (treatment dose): 153mg (10/13)   - Tolerating well so far    # CRS Assessment:   In last 24 hours: Fever; Any O2 supplement? No     # Neurotoxicity Assessment:   ICANS Score: 10/10 (10/13/24)  Handwriting/Signature impaired:  no  Motor/Sensory deficit: No  Other abnormal Neuro symptom: N/A  Imaging/Fundoscopy finding: N/A     # CRS Grade:   Organ/System affected by CRS:   Date of CRS onset:   Date of highest CRS Grade:  Date of CRS resolution to Grade 1 or lower:      # ICANS Grade:  Date of Neurotoxicity onset:  Date of highest Neurotoxicity Grade  Date of Neurotoxicity resolution to Grade 1 or lower:   Neurotox management:   -Dexamethasone  -tocilizumab      # Management:   1. Tocilizumab 8mg/k. Anakinra 100mcg/day: N/A  3. Siltuximab 5mg/kg: date & time administered (not started)   4. Steroids:   5. Other agents/management:     HEME:  # Pancytopenia secondary to myeloma  - Hgb<7, Plt<10  # Hx/o LLE DVT , recently discontinued Eliquis     ID:  Allergies to PCN (rash) & Pip-Tazo (rash)  # Prophylaxis:  - ACV  - Will need Bactrim     CARDIAC:  - Echo (23) EF 50%  # Hx/o A. 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     MUSCLOSKEL:  # Right FA pain   - Xray on admit : similar appearance of osteolytic lesion and associated pathologic comminuted and displaced fractures of the radial neck and proximal radial diaphysis with increased bony resorption along the appositional surfaces  - Oxycodone 5 mg PO x 1 (10/13)     PSYCH:  # Insomnia  - Continue home Trazadone 300mg HS     DISPO  - Full code-confirmed on admit  - Access: PIV   - Primary oncologist: Dr. Huff      Patient seen and discussed with KATHY SmythC  Malignant Hematology (Lymphoma/Myeloma/Autologous SCT) Team

## 2024-10-13 NOTE — CARE PLAN
The patient's goals for the shift include      The clinical goals for the shift include the pt will verbalize decreased pain during shift      Problem: Fall/Injury  Goal: Not fall by end of shift  Outcome: Progressing  Goal: Be free from injury by end of the shift  Outcome: Progressing  Goal: Verbalize understanding of personal risk factors for fall in the hospital  Outcome: Progressing  Goal: Verbalize understanding of risk factor reduction measures to prevent injury from fall in the home  Outcome: Progressing  Goal: Use assistive devices by end of the shift  Outcome: Progressing  Goal: Pace activities to prevent fatigue by end of the shift  Outcome: Progressing     Problem: Pain - Adult  Goal: Verbalizes/displays adequate comfort level or baseline comfort level  Outcome: Progressing     Problem: Safety - Adult  Goal: Free from fall injury  Outcome: Progressing

## 2024-10-13 NOTE — CARE PLAN
The clinical goals for the shift include Patient will remain safe and free from injury throughout the night.    Over the shift, the patient made progress toward the following goals:    Problem: Fall/Injury  Goal: Not fall by end of shift  Outcome: Progressing  Goal: Be free from injury by end of the shift  Outcome: Progressing  Goal: Verbalize understanding of personal risk factors for fall in the hospital  Outcome: Progressing  Goal: Verbalize understanding of risk factor reduction measures to prevent injury from fall in the home  Outcome: Progressing  Goal: Use assistive devices by end of the shift  Outcome: Progressing  Goal: Pace activities to prevent fatigue by end of the shift  Outcome: Progressing     Problem: Pain - Adult  Goal: Verbalizes/displays adequate comfort level or baseline comfort level  Outcome: Progressing     Problem: Safety - Adult  Goal: Free from fall injury  Outcome: Progressing     Problem: Discharge Planning  Goal: Discharge to home or other facility with appropriate resources  Outcome: Progressing     Problem: Chronic Conditions and Co-morbidities  Goal: Patient's chronic conditions and co-morbidity symptoms are monitored and maintained or improved  Outcome: Progressing

## 2024-10-14 ENCOUNTER — APPOINTMENT (OUTPATIENT)
Dept: HEMATOLOGY/ONCOLOGY | Facility: CLINIC | Age: 74
End: 2024-10-14
Payer: COMMERCIAL

## 2024-10-14 LAB
ALBUMIN SERPL BCP-MCNC: 3.9 G/DL (ref 3.4–5)
ANION GAP SERPL CALC-SCNC: 12 MMOL/L (ref 10–20)
BASOPHILS # BLD AUTO: 0 X10*3/UL (ref 0–0.1)
BASOPHILS NFR BLD AUTO: 0 %
BUN SERPL-MCNC: 23 MG/DL (ref 6–23)
CALCIUM SERPL-MCNC: 8.7 MG/DL (ref 8.6–10.6)
CHLORIDE SERPL-SCNC: 108 MMOL/L (ref 98–107)
CO2 SERPL-SCNC: 24 MMOL/L (ref 21–32)
CREAT SERPL-MCNC: 1.02 MG/DL (ref 0.5–1.3)
CRP SERPL-MCNC: 2.57 MG/DL
EGFRCR SERPLBLD CKD-EPI 2021: 77 ML/MIN/1.73M*2
EOSINOPHIL # BLD AUTO: 0 X10*3/UL (ref 0–0.4)
EOSINOPHIL NFR BLD AUTO: 0 %
ERYTHROCYTE [DISTWIDTH] IN BLOOD BY AUTOMATED COUNT: 12.4 % (ref 11.5–14.5)
FERRITIN SERPL-MCNC: 123 NG/ML (ref 20–300)
FIBRINOGEN PPP-MCNC: 288 MG/DL (ref 200–400)
GLUCOSE SERPL-MCNC: 168 MG/DL (ref 74–99)
HCT VFR BLD AUTO: 34.5 % (ref 41–52)
HGB BLD-MCNC: 12.2 G/DL (ref 13.5–17.5)
IMM GRANULOCYTES # BLD AUTO: 0.06 X10*3/UL (ref 0–0.5)
IMM GRANULOCYTES NFR BLD AUTO: 1 % (ref 0–0.9)
LDH SERPL L TO P-CCNC: 145 U/L (ref 84–246)
LYMPHOCYTES # BLD AUTO: 0.31 X10*3/UL (ref 0.8–3)
LYMPHOCYTES NFR BLD AUTO: 5.2 %
MAGNESIUM SERPL-MCNC: 2.13 MG/DL (ref 1.6–2.4)
MCH RBC QN AUTO: 34.8 PG (ref 26–34)
MCHC RBC AUTO-ENTMCNC: 35.4 G/DL (ref 32–36)
MCV RBC AUTO: 98 FL (ref 80–100)
MONOCYTES # BLD AUTO: 0.29 X10*3/UL (ref 0.05–0.8)
MONOCYTES NFR BLD AUTO: 4.9 %
NEUTROPHILS # BLD AUTO: 5.28 X10*3/UL (ref 1.6–5.5)
NEUTROPHILS NFR BLD AUTO: 88.9 %
NRBC BLD-RTO: 0 /100 WBCS (ref 0–0)
PHOSPHATE SERPL-MCNC: 2.4 MG/DL (ref 2.5–4.9)
PLATELET # BLD AUTO: 137 X10*3/UL (ref 150–450)
POTASSIUM SERPL-SCNC: 4.6 MMOL/L (ref 3.5–5.3)
RBC # BLD AUTO: 3.51 X10*6/UL (ref 4.5–5.9)
SODIUM SERPL-SCNC: 139 MMOL/L (ref 136–145)
URATE SERPL-MCNC: 4 MG/DL (ref 4–7.5)
WBC # BLD AUTO: 5.9 X10*3/UL (ref 4.4–11.3)

## 2024-10-14 PROCEDURE — 82728 ASSAY OF FERRITIN: CPT | Performed by: NURSE PRACTITIONER

## 2024-10-14 PROCEDURE — 2500000001 HC RX 250 WO HCPCS SELF ADMINISTERED DRUGS (ALT 637 FOR MEDICARE OP): Performed by: NURSE PRACTITIONER

## 2024-10-14 PROCEDURE — 85384 FIBRINOGEN ACTIVITY: CPT | Performed by: NURSE PRACTITIONER

## 2024-10-14 PROCEDURE — 84550 ASSAY OF BLOOD/URIC ACID: CPT | Performed by: NURSE PRACTITIONER

## 2024-10-14 PROCEDURE — 99233 SBSQ HOSP IP/OBS HIGH 50: CPT | Performed by: INTERNAL MEDICINE

## 2024-10-14 PROCEDURE — 2500000002 HC RX 250 W HCPCS SELF ADMINISTERED DRUGS (ALT 637 FOR MEDICARE OP, ALT 636 FOR OP/ED): Performed by: NURSE PRACTITIONER

## 2024-10-14 PROCEDURE — 80069 RENAL FUNCTION PANEL: CPT | Performed by: NURSE PRACTITIONER

## 2024-10-14 PROCEDURE — 86140 C-REACTIVE PROTEIN: CPT | Performed by: NURSE PRACTITIONER

## 2024-10-14 PROCEDURE — 36415 COLL VENOUS BLD VENIPUNCTURE: CPT | Performed by: NURSE PRACTITIONER

## 2024-10-14 PROCEDURE — 2500000001 HC RX 250 WO HCPCS SELF ADMINISTERED DRUGS (ALT 637 FOR MEDICARE OP): Performed by: PHYSICIAN ASSISTANT

## 2024-10-14 PROCEDURE — 85025 COMPLETE CBC W/AUTO DIFF WBC: CPT | Performed by: NURSE PRACTITIONER

## 2024-10-14 PROCEDURE — 1170000001 HC PRIVATE ONCOLOGY ROOM DAILY

## 2024-10-14 PROCEDURE — 83735 ASSAY OF MAGNESIUM: CPT | Performed by: NURSE PRACTITIONER

## 2024-10-14 PROCEDURE — 83615 LACTATE (LD) (LDH) ENZYME: CPT | Performed by: NURSE PRACTITIONER

## 2024-10-14 RX ADMIN — DULOXETINE HYDROCHLORIDE 60 MG: 60 CAPSULE, DELAYED RELEASE ORAL at 20:37

## 2024-10-14 RX ADMIN — SENNOSIDES AND DOCUSATE SODIUM 1 TABLET: 50; 8.6 TABLET ORAL at 20:37

## 2024-10-14 RX ADMIN — ACYCLOVIR 400 MG: 400 TABLET ORAL at 08:12

## 2024-10-14 RX ADMIN — TRAZODONE HYDROCHLORIDE 300 MG: 150 TABLET ORAL at 20:37

## 2024-10-14 RX ADMIN — ACYCLOVIR 400 MG: 400 TABLET ORAL at 20:37

## 2024-10-14 RX ADMIN — SENNOSIDES AND DOCUSATE SODIUM 1 TABLET: 50; 8.6 TABLET ORAL at 08:12

## 2024-10-14 RX ADMIN — CYANOCOBALAMIN TAB 1000 MCG 1000 MCG: 1000 TAB at 08:12

## 2024-10-14 ASSESSMENT — COGNITIVE AND FUNCTIONAL STATUS - GENERAL
MOBILITY SCORE: 24
MOBILITY SCORE: 24
DAILY ACTIVITIY SCORE: 24
DAILY ACTIVITIY SCORE: 24

## 2024-10-14 ASSESSMENT — PAIN - FUNCTIONAL ASSESSMENT: PAIN_FUNCTIONAL_ASSESSMENT: 0-10

## 2024-10-14 ASSESSMENT — PAIN SCALES - GENERAL
PAINLEVEL_OUTOF10: 0 - NO PAIN
PAINLEVEL_OUTOF10: 0 - NO PAIN

## 2024-10-14 NOTE — HOSPITAL COURSE
Mr. Maldonado Mccloud is a 73 year old male with a PMH of multiple myeloma s/p Auto SCT on 12/13/15 and then multiple lines of treatment (Revlimid, Vacto/Pom trial, Sarclista/Kyprolis), most recently (8/2023) on Cytoxan/Kyprolis and s/p radiation treatments x 8 fractions on 7/3/23, LLE DVT 2022 (on Eliquis).  CAR T (ABECMA)  (T0 = 10/25/23,  recently concerned for disease progression is admitted for Teclistamab.      His hospital course was uncomplicated, and he tolerated the treatment dose (10/13/24) well without CRS or ICANS.     Discharged on acyclovir (VZV) and Bactrim (PJP) prophylaxis (previously sent to home [facility]).    He will follow up with Dr. Huff on 10/17, and then have weekly treatments at [facility] starting on Monday, 10/21/24 (C2 D1 Teclistamab).

## 2024-10-14 NOTE — PROGRESS NOTES
Maldonado Mccolud is a 74 y.o. male on day 6 of admission presenting with Multiple myeloma in relapse (Multi).    Subjective     Afebrile, no overnight events.   Received his first Talquetamib treatment dose on 10/13 at 2 pm   This morning (10/14/24), he reports that that the pain in his R forearm pain has abated.  No BM x 4days, but passing gas & without abd discomfort & normal BS on auscultation.  Remains on Pericolace since 10/13/24.  He is eating & drinking well.      ICE score 10/10 today.     Objective     Vitals:    10/13/24 2149 10/13/24 2343 10/14/24 0349 10/14/24 0738   BP: 122/75 154/88 157/87 143/71   BP Location:    Right arm   Patient Position:    Lying   Pulse: 64 56 62 57   Resp: 16 16 16 18   Temp: 36.9 °C (98.4 °F) 36.2 °C (97.2 °F) 36.2 °C (97.2 °F) 36.8 °C (98.2 °F)   TempSrc: Temporal Temporal Temporal Temporal   SpO2: 95% 97% 97% 95%   Weight:    99.4 kg (219 lb 2.2 oz)   Height:            Physical Exam  HENT:      Nose: Nose normal.      Mouth/Throat:      Mouth: Mucous membranes are moist.      Pharynx: Oropharynx is clear.   Eyes:      Extraocular Movements: Extraocular movements intact.      Pupils: Pupils are equal, round, and reactive to light.   Cardiovascular:      Rate and Rhythm: Normal rate and regular rhythm.   Pulmonary:      Effort: Pulmonary effort is normal.      Breath sounds: Normal breath sounds.   Abdominal:      General: Bowel sounds are normal.      Palpations: Abdomen is soft.   Musculoskeletal:         General: Normal range of motion.      Cervical back: Normal range of motion.   Skin:     General: Skin is warm and dry.      Capillary Refill: Capillary refill takes less than 2 seconds.   Neurological:      General: No focal deficit present.      Mental Status: He is alert and oriented to person, place, and time. Mental status is at baseline.   Psychiatric:         Attention and Perception: Attention and perception normal.     Scheduled medications  acyclovir, 400 mg,  oral, BID  cyanocobalamin, 1,000 mcg, oral, Daily  DULoxetine, 60 mg, oral, Daily  pantoprazole, 40 mg, oral, Daily  sennosides-docusate sodium, 1 tablet, oral, BID  traZODone, 300 mg, oral, Nightly    Continuous medications     PRN medications  PRN medications: albuterol, alteplase, dextrose, diphenhydrAMINE, EPINEPHrine HCl, famotidine, methylPREDNISolone sodium succinate (PF), polyethylene glycol, prochlorperazine, prochlorperazine, sodium chloride    Results from last 7 days   Lab Units 10/14/24  0750 10/13/24  0748 10/12/24  0831   WBC AUTO x10*3/uL 5.9 4.0* 5.8   HEMOGLOBIN g/dL 12.2* 11.1* 11.6*   HEMATOCRIT % 34.5* 32.6* 34.3*   PLATELETS AUTO x10*3/uL 137* 94* 128*   NEUTROS PCT AUTO % 88.9 81.7 86.1   LYMPHS PCT AUTO % 5.2 5.8 7.6   MONOS PCT AUTO % 4.9 11.5 6.0   EOS PCT AUTO % 0.0 0.5 0.0     Results from last 7 days   Lab Units 10/14/24  0750 10/13/24  0748 10/12/24  0830   SODIUM mmol/L 139 140 138   POTASSIUM mmol/L 4.6 4.3 4.4   CHLORIDE mmol/L 108* 107 107   CO2 mmol/L 24 27 25   BUN mg/dL 23 23 21   CREATININE mg/dL 1.02 1.30 1.14   CALCIUM mg/dL 8.7 8.3* 8.8   GLUCOSE mg/dL 168* 108* 154*     Results from last 7 days   Lab Units 10/14/24  0750 10/13/24  0748 10/12/24  0830   MAGNESIUM mg/dL 2.13 1.88 2.13     Results from last 7 days   Lab Units 10/14/24  0750 10/13/24  0748 10/12/24  0830   CRP mg/dL 2.57* 1.31* 1.71*     Results from last 7 days   Lab Units 10/14/24  0750 10/13/24  0748 10/12/24  0830   FERRITIN ng/mL 123 117 133     Assessment/Plan   Assessment & Plan  Multiple myeloma in relapse (Multi)       Mr. Maldonado Mccloud is a 73 year old male with a PMH of multiple myeloma s/p Auto SCT on 12/13/15 and then multiple lines of treatment (Revlimid, Vacto/Pom trial, Sarclista/Kyprolis), most recently (8/2023) on Cytoxan/Kyprolis and s/p radiation treatments x 8 fractions on 7/3/23, LLE DVT 2022 (on Eliquis).  CAR T (ABECMA)  (T0 = 10/25/23,  recently concerned for disease progression is  admitted for Teclistamab.     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   - CAR-T cell therapy-ABECMA (Idecabtagene vicleucel) prep with Flu/Cy (T0 = 10/25/23), complicated by grade 1 ICANS  managed with Dex x 3 days and Tociiluzimab   - Concern for progression (- ): increasing FK.86, 15.64,  4.0, 1.6   - BMBX( 24) Normocellular bone marrow (30-40%) with maturing trilineage hematopoiesis and no increase in plasma cell,  Small kappa monotypic plasma cell population identified by flow  - PET (10/3) new  FDG avid lesions involving  left posterior 5th rib , L4 vertebral body , left iliac bone , increase in right distal femur lesion  consistent with disease progression. Stable lesion right proximal clavicle, right iliac crest and left femoral lesion.    CHEMO:  Teclistamab  - Dose 1: 6.2mg (10/9)   - Dose 2: 31mg (10/11)    - Dose 3 (treatment dose): 153mg (10/13 at 2 pm)   - Tolerating well so far    # CRS Assessment:   In last 24 hours: Fever; Any O2 supplement? No     # Neurotoxicity Assessment:   ICANS Score: 10/10 (10/14/24)  Handwriting/Signature impaired:  no  Motor/Sensory deficit: No  Other abnormal Neuro symptom: N/A  Imaging/Fundoscopy finding: N/A     # CRS Grade:   Organ/System affected by CRS:   Date of CRS onset:   Date of highest CRS Grade:  Date of CRS resolution to Grade 1 or lower:     # ICANS Grade:  Date of Neurotoxicity onset:  Date  of highest Neurotoxicity Grade  Date of Neurotoxicity resolution to Grade 1 or lower:   Neurotox management:   -Dexamethasone  -tocilizumab      # Management:   1. Tocilizumab 8mg/k. Anakinra 100mcg/day: N/A  3. Siltuximab 5mg/kg: date & time administered (not started)   4. Steroids:   5. Other agents/management:     HEME:  # Anemia, thrombocytopenia secondary to myeloma/chemo  - Hgb<7, Plt<10  # Hx/o LLE DVT , recently discontinued Eliquis     ID:  Allergies to PCN (rash) & Pip-Tazo (rash)  # Prophylaxis:  - ACV  - Will need Bactrim     CARDIAC:  - Echo (23) EF 50%  # Hx/o A. 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     MUSCLOSKEL:  # Right FA pain   - Xray on admit : similar appearance of osteolytic lesion and associated pathologic comminuted and displaced fractures of the radial neck and proximal radial diaphysis with increased bony resorption along the appositional surfaces  - Oxycodone 5 mg PO x 1 (10/13)     PSYCH:  # Insomnia  - Continue home Trazadone 300mg HS     DISPO  - Full code-confirmed on admit  - Access: PIV   - Primary oncologist: Dr. Huff  -Anticipate discharge to home on 10/15/24 in afternoon  -10/17 1:05 pm f/up appt w Dr. Lawrence Huff   -10/21 9:15 am treatment/infusion visit at [facility] for D1 C2 Teclistamab      Patient seen and discussed with Dr. YANELIS James PAFahadC  Malignant Hematology (Lymphoma/Myeloma/Autologous SCT) Team

## 2024-10-15 ENCOUNTER — APPOINTMENT (OUTPATIENT)
Dept: RADIOLOGY | Facility: HOSPITAL | Age: 74
End: 2024-10-15
Payer: COMMERCIAL

## 2024-10-15 LAB
ALBUMIN SERPL BCP-MCNC: 3.4 G/DL (ref 3.4–5)
ANION GAP SERPL CALC-SCNC: 12 MMOL/L (ref 10–20)
APPEARANCE UR: CLEAR
BASOPHILS # BLD AUTO: 0 X10*3/UL (ref 0–0.1)
BASOPHILS NFR BLD AUTO: 0 %
BILIRUB UR STRIP.AUTO-MCNC: NEGATIVE MG/DL
BUN SERPL-MCNC: 24 MG/DL (ref 6–23)
CALCIUM SERPL-MCNC: 8.2 MG/DL (ref 8.6–10.6)
CHLORIDE SERPL-SCNC: 106 MMOL/L (ref 98–107)
CO2 SERPL-SCNC: 25 MMOL/L (ref 21–32)
COLOR UR: YELLOW
CREAT SERPL-MCNC: 1.23 MG/DL (ref 0.5–1.3)
CRP SERPL-MCNC: 1.61 MG/DL
EGFRCR SERPLBLD CKD-EPI 2021: 61 ML/MIN/1.73M*2
EOSINOPHIL # BLD AUTO: 0.05 X10*3/UL (ref 0–0.4)
EOSINOPHIL NFR BLD AUTO: 1.3 %
ERYTHROCYTE [DISTWIDTH] IN BLOOD BY AUTOMATED COUNT: 12.5 % (ref 11.5–14.5)
FERRITIN SERPL-MCNC: 149 NG/ML (ref 20–300)
FIBRINOGEN PPP-MCNC: 309 MG/DL (ref 200–400)
GLUCOSE SERPL-MCNC: 111 MG/DL (ref 74–99)
GLUCOSE UR STRIP.AUTO-MCNC: NORMAL MG/DL
HCT VFR BLD AUTO: 33.8 % (ref 41–52)
HGB BLD-MCNC: 11.6 G/DL (ref 13.5–17.5)
HOLD SPECIMEN: NORMAL
IMM GRANULOCYTES # BLD AUTO: 0.06 X10*3/UL (ref 0–0.5)
IMM GRANULOCYTES NFR BLD AUTO: 1.6 % (ref 0–0.9)
KETONES UR STRIP.AUTO-MCNC: NEGATIVE MG/DL
LDH SERPL L TO P-CCNC: 123 U/L (ref 84–246)
LEUKOCYTE ESTERASE UR QL STRIP.AUTO: NEGATIVE
LYMPHOCYTES # BLD AUTO: 0.19 X10*3/UL (ref 0.8–3)
LYMPHOCYTES NFR BLD AUTO: 5 %
MAGNESIUM SERPL-MCNC: 1.79 MG/DL (ref 1.6–2.4)
MCH RBC QN AUTO: 33.4 PG (ref 26–34)
MCHC RBC AUTO-ENTMCNC: 34.3 G/DL (ref 32–36)
MCV RBC AUTO: 97 FL (ref 80–100)
MONOCYTES # BLD AUTO: 0.38 X10*3/UL (ref 0.05–0.8)
MONOCYTES NFR BLD AUTO: 10 %
MUCOUS THREADS #/AREA URNS AUTO: NORMAL /LPF
NEUTROPHILS # BLD AUTO: 3.11 X10*3/UL (ref 1.6–5.5)
NEUTROPHILS NFR BLD AUTO: 82.1 %
NITRITE UR QL STRIP.AUTO: NEGATIVE
NRBC BLD-RTO: 0 /100 WBCS (ref 0–0)
PH UR STRIP.AUTO: 6 [PH]
PHOSPHATE SERPL-MCNC: 2.7 MG/DL (ref 2.5–4.9)
PLATELET # BLD AUTO: 96 X10*3/UL (ref 150–450)
POTASSIUM SERPL-SCNC: 4 MMOL/L (ref 3.5–5.3)
PROT UR STRIP.AUTO-MCNC: ABNORMAL MG/DL
RBC # BLD AUTO: 3.47 X10*6/UL (ref 4.5–5.9)
RBC # UR STRIP.AUTO: NEGATIVE /UL
RBC #/AREA URNS AUTO: NORMAL /HPF
SODIUM SERPL-SCNC: 139 MMOL/L (ref 136–145)
SP GR UR STRIP.AUTO: 1.03
URATE SERPL-MCNC: 3.8 MG/DL (ref 4–7.5)
UROBILINOGEN UR STRIP.AUTO-MCNC: ABNORMAL MG/DL
WBC # BLD AUTO: 3.8 X10*3/UL (ref 4.4–11.3)
WBC #/AREA URNS AUTO: NORMAL /HPF

## 2024-10-15 PROCEDURE — 83615 LACTATE (LD) (LDH) ENZYME: CPT | Performed by: NURSE PRACTITIONER

## 2024-10-15 PROCEDURE — 71045 X-RAY EXAM CHEST 1 VIEW: CPT | Performed by: STUDENT IN AN ORGANIZED HEALTH CARE EDUCATION/TRAINING PROGRAM

## 2024-10-15 PROCEDURE — 84550 ASSAY OF BLOOD/URIC ACID: CPT | Performed by: NURSE PRACTITIONER

## 2024-10-15 PROCEDURE — 36415 COLL VENOUS BLD VENIPUNCTURE: CPT

## 2024-10-15 PROCEDURE — 99233 SBSQ HOSP IP/OBS HIGH 50: CPT | Performed by: STUDENT IN AN ORGANIZED HEALTH CARE EDUCATION/TRAINING PROGRAM

## 2024-10-15 PROCEDURE — 87040 BLOOD CULTURE FOR BACTERIA: CPT

## 2024-10-15 PROCEDURE — 80069 RENAL FUNCTION PANEL: CPT | Performed by: NURSE PRACTITIONER

## 2024-10-15 PROCEDURE — 71045 X-RAY EXAM CHEST 1 VIEW: CPT

## 2024-10-15 PROCEDURE — 87075 CULTR BACTERIA EXCEPT BLOOD: CPT

## 2024-10-15 PROCEDURE — 2500000001 HC RX 250 WO HCPCS SELF ADMINISTERED DRUGS (ALT 637 FOR MEDICARE OP): Performed by: NURSE PRACTITIONER

## 2024-10-15 PROCEDURE — 86140 C-REACTIVE PROTEIN: CPT | Performed by: NURSE PRACTITIONER

## 2024-10-15 PROCEDURE — 81001 URINALYSIS AUTO W/SCOPE: CPT

## 2024-10-15 PROCEDURE — 2500000002 HC RX 250 W HCPCS SELF ADMINISTERED DRUGS (ALT 637 FOR MEDICARE OP, ALT 636 FOR OP/ED): Performed by: NURSE PRACTITIONER

## 2024-10-15 PROCEDURE — 83735 ASSAY OF MAGNESIUM: CPT | Performed by: NURSE PRACTITIONER

## 2024-10-15 PROCEDURE — 2500000004 HC RX 250 GENERAL PHARMACY W/ HCPCS (ALT 636 FOR OP/ED)

## 2024-10-15 PROCEDURE — 82728 ASSAY OF FERRITIN: CPT | Performed by: NURSE PRACTITIONER

## 2024-10-15 PROCEDURE — 2500000001 HC RX 250 WO HCPCS SELF ADMINISTERED DRUGS (ALT 637 FOR MEDICARE OP)

## 2024-10-15 PROCEDURE — 2500000004 HC RX 250 GENERAL PHARMACY W/ HCPCS (ALT 636 FOR OP/ED): Mod: JZ | Performed by: PHYSICIAN ASSISTANT

## 2024-10-15 PROCEDURE — 85025 COMPLETE CBC W/AUTO DIFF WBC: CPT | Performed by: NURSE PRACTITIONER

## 2024-10-15 PROCEDURE — 36415 COLL VENOUS BLD VENIPUNCTURE: CPT | Performed by: NURSE PRACTITIONER

## 2024-10-15 PROCEDURE — 2500000001 HC RX 250 WO HCPCS SELF ADMINISTERED DRUGS (ALT 637 FOR MEDICARE OP): Performed by: PHYSICIAN ASSISTANT

## 2024-10-15 PROCEDURE — 1170000001 HC PRIVATE ONCOLOGY ROOM DAILY

## 2024-10-15 PROCEDURE — 85384 FIBRINOGEN ACTIVITY: CPT | Performed by: NURSE PRACTITIONER

## 2024-10-15 RX ORDER — CEFEPIME 1 G/50ML
2 INJECTION, SOLUTION INTRAVENOUS EVERY 8 HOURS
Status: DISCONTINUED | OUTPATIENT
Start: 2024-10-15 | End: 2024-10-16

## 2024-10-15 RX ORDER — ACETAMINOPHEN 325 MG/1
650 TABLET ORAL ONCE
Status: COMPLETED | OUTPATIENT
Start: 2024-10-15 | End: 2024-10-15

## 2024-10-15 RX ADMIN — CYANOCOBALAMIN TAB 1000 MCG 1000 MCG: 1000 TAB at 08:33

## 2024-10-15 RX ADMIN — DULOXETINE HYDROCHLORIDE 60 MG: 60 CAPSULE, DELAYED RELEASE ORAL at 20:14

## 2024-10-15 RX ADMIN — ACETAMINOPHEN 650 MG: 325 TABLET ORAL at 11:50

## 2024-10-15 RX ADMIN — PIPERACILLIN SODIUM AND TAZOBACTAM SODIUM 3.38 G: 3; .375 INJECTION, SOLUTION INTRAVENOUS at 01:15

## 2024-10-15 RX ADMIN — ACYCLOVIR 400 MG: 400 TABLET ORAL at 20:14

## 2024-10-15 RX ADMIN — SENNOSIDES AND DOCUSATE SODIUM 1 TABLET: 50; 8.6 TABLET ORAL at 20:14

## 2024-10-15 RX ADMIN — CEFEPIME 2 G: 1 INJECTION, SOLUTION INTRAVENOUS at 13:41

## 2024-10-15 RX ADMIN — SENNOSIDES AND DOCUSATE SODIUM 1 TABLET: 50; 8.6 TABLET ORAL at 08:33

## 2024-10-15 RX ADMIN — TRAZODONE HYDROCHLORIDE 300 MG: 150 TABLET ORAL at 20:14

## 2024-10-15 RX ADMIN — CEFEPIME 2 G: 1 INJECTION, SOLUTION INTRAVENOUS at 20:14

## 2024-10-15 RX ADMIN — PIPERACILLIN SODIUM AND TAZOBACTAM SODIUM 3.38 G: 3; .375 INJECTION, SOLUTION INTRAVENOUS at 06:12

## 2024-10-15 RX ADMIN — ACETAMINOPHEN 650 MG: 325 TABLET ORAL at 01:15

## 2024-10-15 RX ADMIN — ACYCLOVIR 400 MG: 400 TABLET ORAL at 08:33

## 2024-10-15 ASSESSMENT — COGNITIVE AND FUNCTIONAL STATUS - GENERAL
DAILY ACTIVITIY SCORE: 24
MOBILITY SCORE: 24
MOBILITY SCORE: 24
DAILY ACTIVITIY SCORE: 24

## 2024-10-15 ASSESSMENT — PAIN DESCRIPTION - LOCATION: LOCATION: HEAD

## 2024-10-15 ASSESSMENT — PAIN SCALES - GENERAL
PAINLEVEL_OUTOF10: 0 - NO PAIN
PAINLEVEL_OUTOF10: 1
PAINLEVEL_OUTOF10: 0 - NO PAIN

## 2024-10-15 ASSESSMENT — PAIN - FUNCTIONAL ASSESSMENT: PAIN_FUNCTIONAL_ASSESSMENT: 0-10

## 2024-10-15 NOTE — PROGRESS NOTES
"Maldonado Mccloud is a 74 y.o. male on day 7 of admission presenting with Multiple myeloma in relapse (Multi).    Subjective     Afebrile, no overnight events.   Received his first Talquetamib treatment dose on 10/13 at 2 pm   This morning (10/15/24), he reports that he \"felt cool\" at around midnight & contacted his nurse.  He had a temp of 38.4c w/o hypotension or resp distress.  Fever w/up was initiated & patient started on ATB therapy.  He has remained afebrile since & ICE score remains 10/10.  Mild parietal ha in am today.  Also reports no BM x 5 days, but passing gas & without abd discomfort.   Normal BS on auscultation.  Remains on Pericolace since 10/13/24.  He is eating only one meal a day (not hungry & doesn't like food). Drinking well.      ICE score 10/10 today.     Objective     Vitals:    10/15/24 0222 10/15/24 0330 10/15/24 0757 10/15/24 1230   BP:  134/75 145/80 133/84   BP Location:   Right arm Right arm   Patient Position:   Lying Lying   Pulse:  67 71 61   Resp:  16 18 18   Temp: 37.1 °C (98.8 °F) 37.3 °C (99.1 °F) 37.3 °C (99.1 °F) 37.7 °C (99.9 °F)   TempSrc:  Temporal Temporal Temporal   SpO2:  96% 94% 96%   Weight:   99.7 kg (219 lb 12.8 oz)    Height:             Physical Exam  HENT:      Nose: Nose normal.      Mouth/Throat:      Mouth: Mucous membranes are moist.      Pharynx: Oropharynx is clear.   Eyes:      Extraocular Movements: Extraocular movements intact.      Pupils: Pupils are equal, round, and reactive to light.   Cardiovascular:      Rate and Rhythm: Normal rate and regular rhythm.   Pulmonary:      Effort: Pulmonary effort is normal.      Breath sounds: Normal breath sounds.   Abdominal:      General: Bowel sounds are normal.      Palpations: Abdomen is soft.   Musculoskeletal:         General: Normal range of motion.      Cervical back: Normal range of motion.   Skin:     General: Skin is warm and dry.      Capillary Refill: Capillary refill takes less than 2 seconds. "   Neurological:      General: No focal deficit present.      Mental Status: He is alert and oriented to person, place, and time. Mental status is at baseline.   Psychiatric:         Attention and Perception: Attention and perception normal.     Scheduled medications  acyclovir, 400 mg, oral, BID  cefepime, 2 g, intravenous, q8h  cyanocobalamin, 1,000 mcg, oral, Daily  DULoxetine, 60 mg, oral, Daily  pantoprazole, 40 mg, oral, Daily  sennosides-docusate sodium, 1 tablet, oral, BID  traZODone, 300 mg, oral, Nightly      Continuous medications     PRN medications  PRN medications: albuterol, alteplase, dextrose, diphenhydrAMINE, EPINEPHrine HCl, famotidine, methylPREDNISolone sodium succinate (PF), polyethylene glycol, prochlorperazine, prochlorperazine, sodium chloride    Results from last 7 days   Lab Units 10/15/24  0725 10/14/24  0750 10/13/24  0748   WBC AUTO x10*3/uL 3.8* 5.9 4.0*   HEMOGLOBIN g/dL 11.6* 12.2* 11.1*   HEMATOCRIT % 33.8* 34.5* 32.6*   PLATELETS AUTO x10*3/uL 96* 137* 94*   NEUTROS PCT AUTO % 82.1 88.9 81.7   LYMPHS PCT AUTO % 5.0 5.2 5.8   MONOS PCT AUTO % 10.0 4.9 11.5   EOS PCT AUTO % 1.3 0.0 0.5     Results from last 7 days   Lab Units 10/15/24  0725 10/14/24  0750 10/13/24  0748   SODIUM mmol/L 139 139 140   POTASSIUM mmol/L 4.0 4.6 4.3   CHLORIDE mmol/L 106 108* 107   CO2 mmol/L 25 24 27   BUN mg/dL 24* 23 23   CREATININE mg/dL 1.23 1.02 1.30   CALCIUM mg/dL 8.2* 8.7 8.3*   GLUCOSE mg/dL 111* 168* 108*     Results from last 7 days   Lab Units 10/15/24  0725 10/14/24  0750 10/13/24  0748   MAGNESIUM mg/dL 1.79 2.13 1.88     Results from last 7 days   Lab Units 10/15/24  0725 10/14/24  0750 10/13/24  0748   CRP mg/dL 1.61* 2.57* 1.31*     Results from last 7 days   Lab Units 10/15/24  0725 10/14/24  0750 10/13/24  0748   FERRITIN ng/mL 149 123 117     Assessment/Plan   Assessment & Plan  Multiple myeloma in relapse (Multi)       Mr. Maldonado Mccloud is a 73 year old male with a PMH of multiple  myeloma s/p Auto SCT on 12/13/15 and then multiple lines of treatment (Revlimid, Vacto/Pom trial, Sarclista/Kyprolis), most recently (2023) on Cytoxan/Kyprolis and s/p radiation treatments x 8 fractions on 7/3/23, LLE DVT  (on Eliquis).  CAR T (ABECMA)  (T0 = 10/25/23,  recently concerned for disease progression is admitted for Teclistamab.     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   - CAR-T cell therapy-ABECMA (Idecabtagene vicleucel) prep with Flu/Cy (T0 = 10/25/23), complicated by grade 1 ICANS  managed with Dex x 3 days and Tociiluzimab   - Concern for progression (- ): increasing FK.86, 15.64,  4.0, 1.6   - BMBX( 24) Normocellular bone marrow (30-40%) with maturing trilineage hematopoiesis and no increase in plasma cell,  Small kappa monotypic plasma cell population identified by flow  - PET (10/3) new  FDG avid lesions involving  left posterior 5th rib , L4 vertebral body , left iliac bone , increase in right distal femur lesion  consistent with disease progression. Stable lesion right proximal clavicle, right iliac crest and left femoral lesion.    CHEMO:  Teclistamab  - Dose 1: 6.2mg (10/9)   - Dose 2: 31mg (10/11)    - Dose 3 (treatment dose): 153mg (10/13 at 2 pm)   - Tolerating well so far    # CRS Assessment:   In last 24 hours: Fever; Any O2 supplement?  Yes     # Neurotoxicity Assessment:   ICANS Score: 10/10  (10/15/24)  Handwriting/Signature impaired:  no  Motor/Sensory deficit: No  Other abnormal Neuro symptom: N/A  Imaging/Fundoscopy finding: N/A     # CRS ndGndrndanddndend:nd nd2nd Organ/System affected by CRS:  Fever 38.4c at 0058 hrs 10/15/24  Date of CRS onset: 10/15  Date of highest CRS Grade:  Date of CRS resolution to Grade 1 or lower:  10/15/24     # ICANS Grade:  Date of Neurotoxicity onset:  Date of highest Neurotoxicity Grade  Date of Neurotoxicity resolution to Grade 1 or lower:   Neurotox management:   -Dexamethasone  -tocilizumab      # Management:   1. Tocilizumab 8mg/k. Anakinra 100mcg/day: N/A  3. Siltuximab 5mg/kg: date & time administered (not started)   4. Steroids:   5. Other agents/management:     HEME:  # Pancytopenia secondary to myeloma/chemo  - Hgb<7, Plt<10  # Hx/o LLE DVT , recently discontinued Eliquis     ID:  Allergies to PCN (rash) & Pip-Tazo (rash)  Fever 38.4c at 0058 hrs 10/15/24.  No hypotension or resp distress.  Initiated fever w/up & ATB therapy.  Bld cx's - IP,  CXR - p.  Initially started on Zosyn but changed to Cefepime d/t allergy hx.  Patient is not neutropenic.  # Prophylaxis:  - ACV  - Will need Bactrim     CARDIAC:  - Echo (23) EF 50%  # Hx/o A. 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     MUSCLOSKEL:  # Right FA pain   - Xray on admit : similar appearance of osteolytic lesion and associated pathologic comminuted and displaced fractures of the radial neck and proximal radial diaphysis with increased bony resorption along the appositional surfaces  - Oxycodone 5 mg PO x 1 (10/13)     PSYCH:  # Insomnia  - Continue  home Trazadone 300mg HS     DISPO  - Full code-confirmed on admit  - Access: PIV   - Primary oncologist: Dr. Huff  -Anticipate discharge to home on 10/15/24 in afternoon  -10/17 1:05 pm f/up appt w Dr. Lawrence Huff   -10/21 9:15 am treatment/infusion visit at [facility] for D1 C2 Teclistamab      Patient seen and discussed with Dr. Michele James PAFahadC  Malignant Hematology (Lymphoma/Myeloma/Autologous SCT) Team

## 2024-10-16 VITALS
HEIGHT: 70 IN | TEMPERATURE: 97.3 F | DIASTOLIC BLOOD PRESSURE: 70 MMHG | HEART RATE: 77 BPM | RESPIRATION RATE: 18 BRPM | SYSTOLIC BLOOD PRESSURE: 113 MMHG | OXYGEN SATURATION: 96 % | BODY MASS INDEX: 31.28 KG/M2 | WEIGHT: 218.48 LBS

## 2024-10-16 LAB
ALBUMIN SERPL BCP-MCNC: 3.4 G/DL (ref 3.4–5)
ANION GAP SERPL CALC-SCNC: 13 MMOL/L (ref 10–20)
BASOPHILS # BLD AUTO: 0.01 X10*3/UL (ref 0–0.1)
BASOPHILS NFR BLD AUTO: 0.3 %
BUN SERPL-MCNC: 24 MG/DL (ref 6–23)
CALCIUM SERPL-MCNC: 8.7 MG/DL (ref 8.6–10.6)
CHLORIDE SERPL-SCNC: 108 MMOL/L (ref 98–107)
CO2 SERPL-SCNC: 23 MMOL/L (ref 21–32)
CREAT SERPL-MCNC: 1.22 MG/DL (ref 0.5–1.3)
CRP SERPL-MCNC: 3.7 MG/DL
EGFRCR SERPLBLD CKD-EPI 2021: 62 ML/MIN/1.73M*2
EOSINOPHIL # BLD AUTO: 0.11 X10*3/UL (ref 0–0.4)
EOSINOPHIL NFR BLD AUTO: 3.2 %
ERYTHROCYTE [DISTWIDTH] IN BLOOD BY AUTOMATED COUNT: 12.6 % (ref 11.5–14.5)
FERRITIN SERPL-MCNC: 183 NG/ML (ref 20–300)
GLUCOSE SERPL-MCNC: 142 MG/DL (ref 74–99)
HCT VFR BLD AUTO: 34.5 % (ref 41–52)
HGB BLD-MCNC: 11.8 G/DL (ref 13.5–17.5)
IMM GRANULOCYTES # BLD AUTO: 0.04 X10*3/UL (ref 0–0.5)
IMM GRANULOCYTES NFR BLD AUTO: 1.2 % (ref 0–0.9)
LDH SERPL L TO P-CCNC: 156 U/L (ref 84–246)
LYMPHOCYTES # BLD AUTO: 0.33 X10*3/UL (ref 0.8–3)
LYMPHOCYTES NFR BLD AUTO: 9.6 %
MAGNESIUM SERPL-MCNC: 1.92 MG/DL (ref 1.6–2.4)
MCH RBC QN AUTO: 33.1 PG (ref 26–34)
MCHC RBC AUTO-ENTMCNC: 34.2 G/DL (ref 32–36)
MCV RBC AUTO: 97 FL (ref 80–100)
MONOCYTES # BLD AUTO: 0.36 X10*3/UL (ref 0.05–0.8)
MONOCYTES NFR BLD AUTO: 10.5 %
NEUTROPHILS # BLD AUTO: 2.57 X10*3/UL (ref 1.6–5.5)
NEUTROPHILS NFR BLD AUTO: 75.2 %
NRBC BLD-RTO: 0 /100 WBCS (ref 0–0)
PHOSPHATE SERPL-MCNC: 3.8 MG/DL (ref 2.5–4.9)
PLATELET # BLD AUTO: 101 X10*3/UL (ref 150–450)
POTASSIUM SERPL-SCNC: 4.2 MMOL/L (ref 3.5–5.3)
RBC # BLD AUTO: 3.56 X10*6/UL (ref 4.5–5.9)
SODIUM SERPL-SCNC: 140 MMOL/L (ref 136–145)
URATE SERPL-MCNC: 3.7 MG/DL (ref 4–7.5)
WBC # BLD AUTO: 3.4 X10*3/UL (ref 4.4–11.3)

## 2024-10-16 PROCEDURE — 82728 ASSAY OF FERRITIN: CPT | Performed by: NURSE PRACTITIONER

## 2024-10-16 PROCEDURE — 2500000001 HC RX 250 WO HCPCS SELF ADMINISTERED DRUGS (ALT 637 FOR MEDICARE OP): Performed by: PHYSICIAN ASSISTANT

## 2024-10-16 PROCEDURE — 36415 COLL VENOUS BLD VENIPUNCTURE: CPT | Performed by: NURSE PRACTITIONER

## 2024-10-16 PROCEDURE — 99239 HOSP IP/OBS DSCHRG MGMT >30: CPT | Performed by: INTERNAL MEDICINE

## 2024-10-16 PROCEDURE — 86140 C-REACTIVE PROTEIN: CPT | Performed by: NURSE PRACTITIONER

## 2024-10-16 PROCEDURE — 83615 LACTATE (LD) (LDH) ENZYME: CPT | Performed by: NURSE PRACTITIONER

## 2024-10-16 PROCEDURE — 2500000004 HC RX 250 GENERAL PHARMACY W/ HCPCS (ALT 636 FOR OP/ED): Mod: JZ | Performed by: PHYSICIAN ASSISTANT

## 2024-10-16 PROCEDURE — 80069 RENAL FUNCTION PANEL: CPT | Performed by: NURSE PRACTITIONER

## 2024-10-16 PROCEDURE — 83735 ASSAY OF MAGNESIUM: CPT | Performed by: NURSE PRACTITIONER

## 2024-10-16 PROCEDURE — 2500000001 HC RX 250 WO HCPCS SELF ADMINISTERED DRUGS (ALT 637 FOR MEDICARE OP): Performed by: NURSE PRACTITIONER

## 2024-10-16 PROCEDURE — 85025 COMPLETE CBC W/AUTO DIFF WBC: CPT | Performed by: NURSE PRACTITIONER

## 2024-10-16 PROCEDURE — 84550 ASSAY OF BLOOD/URIC ACID: CPT | Performed by: NURSE PRACTITIONER

## 2024-10-16 RX ORDER — LACTULOSE 10 G/15ML
20 SOLUTION ORAL DAILY
Status: DISCONTINUED | OUTPATIENT
Start: 2024-10-16 | End: 2024-10-16 | Stop reason: HOSPADM

## 2024-10-16 RX ADMIN — PANTOPRAZOLE SODIUM 40 MG: 40 TABLET, DELAYED RELEASE ORAL at 09:06

## 2024-10-16 RX ADMIN — LACTULOSE 20 G: 20 SOLUTION ORAL at 09:57

## 2024-10-16 RX ADMIN — CYANOCOBALAMIN TAB 1000 MCG 1000 MCG: 1000 TAB at 09:06

## 2024-10-16 RX ADMIN — SENNOSIDES AND DOCUSATE SODIUM 1 TABLET: 50; 8.6 TABLET ORAL at 09:06

## 2024-10-16 RX ADMIN — CEFEPIME 2 G: 1 INJECTION, SOLUTION INTRAVENOUS at 03:39

## 2024-10-16 RX ADMIN — ACYCLOVIR 400 MG: 400 TABLET ORAL at 09:06

## 2024-10-16 ASSESSMENT — COGNITIVE AND FUNCTIONAL STATUS - GENERAL
DAILY ACTIVITIY SCORE: 24
MOBILITY SCORE: 24

## 2024-10-16 ASSESSMENT — PAIN SCALES - GENERAL: PAINLEVEL_OUTOF10: 0 - NO PAIN

## 2024-10-16 NOTE — PROGRESS NOTES
"Patient ID: Maldonado Mccloud is a 74 y.o. male.  Referring Physician: No referring provider defined for this encounter.  Primary Care Provider: Timothy Almodovar MD    Interval hx:  Since last visit he was admitted for ramp up dosing and initiation of teclistamab. Per discharge summary:  \"His hospital course was uncomplicated, and he tolerated the treatment dose (10/13/24) well without CRS or ICANS.      Discharged on acyclovir (VZV) and Bactrim (PJP) prophylaxis (previously sent to home [facility]).     He will follow up with Dr. Huff on 10/17, and then have weekly treatments at [facility] starting on Monday, 10/21/24 (C2 D1 Teclistamab).\"  In addition to the above, on day before discharge he developed a low grade fever; neg CXR and neg blood cx's at 48 hrs. He also developed constipation and was given lactulose w/good relief.    Since discharge he has had some minor right foreamr pains, which he had before the teclist-. He denies having had unexplained wt loss, more fevers, chills, sweats, palpable FELY, cough, nausea, vomiting, diarrhea, mouth sores, skin rashes, chest/abd/back pains, headaches, nosebleeds, GI or  bleeding, vision or hearing complaints. Still w/occasional numbness of his hands.    PMHx:  1) Multiple myeloma (see below)  2) Squamous cell ca's skin  3) DVT, LLE ; now off AC  4) Neuropathy  5) S/p right radius fracture 2023  6) HTN  7) Afib (transient in  w/stem cell collection)    FamHx  Dad  of pan ca age 92  SocHx:   w/5 kids; smokes cigars; no cigs in 50 years; no EtOH or illicit drugs. Was in [employer] in [place]    FamHx:    Meds:  Were reviewed w/the pt  All:  [employer]  PCN  Zosyn    Physical Exam  General: Ambulatory, looked comfortable, not requiring supplemental oxygen  Vital Signs   /89; P 74; afebrile; RR 16/min; Wt= 101.5 (lost 1.2 kg)  HEENT: no oral lesions, tonsillar or tongue enlargement; Neck: no masses; Lymph nodes: no palpable cervical, supraclavicular, " axillary, epitrochear or inguinal nodes; Heart: no murmurs; Lungs: clear; Abd: soft, +bowel sounds, non-tender, no palpable liver or spleen; Skin: no rashes; Ext's: No LE edema; Neuro: alert, answered questions appropriately, 5/5 motor strength upper and lower extremities    Performance Status:  Symptomatic; fully ambulatory      Assessment/recommendations:  73 year old man w/a hx of LLE DVT (2022, now off AC), HTN, neuropathy, multiple skin cancers and multiply relapsed multiple myeloma [presented with right chest 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; Bone marrow biopsy suggested multiple myeloma IgG kappa, ISS Stage II, bone survey revealed lytic lesions; Urine light chains present kappa restricted with 2gm proteinuria; s/p VRD x 3 cycles with CR; then s/p auto SCTx 12/23/15 complicated by orthostatic hypotension, electrolyte replacement, drug induced rash, culture negative fever, then s/p approximately 5 weeks of maintenance Revlimid 10 mg daily which was held for worsening neuropathy in August 2016; then in 3/ 2018: IgG M Braxton alexei to 0.2gm/L and light chain alexei was; restarted on Revlimid maintenance at 5mg every other day, then revlimid stopped in 3/2021 due to stable disease; then in 7/2021 his M spike was on the rise, and was enrolled onto Vactosertib/Pom trial on 8/5/2021; then progressed in 6/2022, switched to Isatuximab/Carfilzomib on 6/30/22, then in 9/2022 progressed and was switched to Cytoxan/Carfilzomib; then had therapy placed on hold in 11/2022 due to need for hip replacement surgery; myeloma markers were on the rise in 3/2023 with a new jaw lesion, s/p XRT 4/2023 and resume 2x/week carfilzomib; then s/p CAR T w/ABECMA (T=0, 10/25/23), hospital course complicated by grade 1 ICANS managed w/ Dex 10mg x3 days and 1 dose tociluzimab; then w/progression and started on teclistamab, full dosing given on 10/13/24] here for follow-up.     As for relapsed myeloma:  Last week he was admitted  for ramp up dosing of teclistamab and tolerating this well, w/grade I CRS (cx neg low grade fever x 1, not recurred)/ He has not had any other toxicities thus far.   Plan to have him follow-up w/Jacob Arechiga at [facility] on Mon, 10/21, then weekly, and w/me in 4 weeks, or sooner if new symptoms arise.    Infection ppx:  Cont acyclovir and bactrim ppx; monitor IgG levels and re-start ppx IVIG (NOTE: he should receive Gammunex -C, d/t pruior severe reaction to gammard; last dose of IVIG was on 6/24/24; restart on 10/21)    DVT:  - 6/20/22 acute occlusive DVT demonstrated within the left common femoral, profunda, femoral and popliteal veins.  - Has been on Eliquis 5mg BID, inturrupted around time of CAR-T cell then was discontinued as VTE was thought to be malignancy related while on treatment, as he's in remission now off treatment, Eliquis was stopped

## 2024-10-16 NOTE — CARE PLAN
The patient's goals for the shift include      The clinical goals for the shift include Patient will remain  afebrile through end of shift    Over the shift, the patient did make progress toward the following goals.   Patient discharged to home via private vehicle.  Discharge instructions reviewed with patient and wife.  All questions answered.  Follow up appointments reviewed.  Patient remained free from injury and falls through end of shift

## 2024-10-16 NOTE — DISCHARGE SUMMARY
Discharge Diagnosis  Multiple myeloma in relapse (Multi)    Hospital Course   Mr. Maldonado Mccloud ([id number]) is a 73 year old male with a PMH of multiple myeloma s/p Auto SCT on 12/13/15 and then multiple lines of treatment (Revlimid, Vacto/Pom trial, Sarclista/Kyprolis), most recently (8/2023) on Cytoxan/Kyprolis and s/p radiation treatments x 8 fractions on 7/3/23, LLE DVT 2022 (on Eliquis).  CAR T (ABECMA)  (T0 = 10/25/23,  recently concerned for disease progression is admitted for Teclistamab.  Admission (10/9 - 10/16/24)     CHEMO:  C 1 Teclistamab ramp up  - Dose 1: 6.2mg (10/9)   - Dose 2: 31mg (10/11)    - Dose 3 (treatment dose): 153mg (10/13 at 2 pm)     His hospital course complicated by grade 1 CRS fever (38.4c) on 10/15/24 at 1am w/o hypotension or resp distress. Fever w/up was initiated & patient started on ATB therapy. He has remained afebrile since & ICE score remains 10/10.  Blood cx's (10/15) neg to date.  CXR neg.    Constipation x 6-7 days d/t Teclistamab ??   Patient asymptomatic (passing gas, no abd pain, good bowel sounds)   - Recv'd MiraLax on 10/11 & 10/12, & Docusate-sennosides BID (10/13-10/16) w/o movement.    - Gave (10/16, 10 am) Lactulose 20 g with relief    Discharged on acyclovir (VZV) and Bactrim (PJP) prophylaxis (previously sent to home [facility]).    DISPO  - Full code-confirmed on admit  - Access: PIV   - Primary oncologist: Dr. Lawrence Huff  -Anticipate discharge to home on 10/15/24 in afternoon  -10/17 1:05 pm f/up appt w Dr. Lawrence Huff   -10/21 9:15 am treatment/infusion visit at [facility] for D1 C2 Teclistamab       Vitals:    10/15/24 2321 10/16/24 0339 10/16/24 0730 10/16/24 1250   BP: 132/81 130/74 149/84 113/70   BP Location:   Right arm Right arm   Patient Position:   Lying Lying   Pulse: 68 66 71 77   Resp: 18 18 18 18   Temp: 37.1 °C (98.8 °F) 37.3 °C (99.1 °F) 36.4 °C (97.5 °F) 36.3 °C (97.3 °F)   TempSrc: Temporal Temporal Temporal Temporal   SpO2: 95% 95% 94% 96%    Weight:   99.1 kg (218 lb 7.6 oz)    Height:              Physical Exam    HENT:      Nose: Nose normal.      Mouth/Throat:      Mouth: Mucous membranes are moist.      Pharynx: Oropharynx is clear.   Eyes:      Extraocular Movements: Extraocular movements intact.      Pupils: Pupils are equal, round, and reactive to light.   Cardiovascular:      Rate and Rhythm: Normal rate and regular rhythm.   Pulmonary:      Effort: Pulmonary effort is normal.      Breath sounds: Normal breath sounds.   Abdominal:      General: Bowel sounds are normal.      Palpations: Abdomen is soft.   Musculoskeletal:         General: Normal range of motion.      Cervical back: Normal range of motion.   Skin:     General: Skin is warm and dry.      Capillary Refill: Capillary refill takes less than 2 seconds.   Neurological:      General: No focal deficit present.      Mental Status: He is alert and oriented to person, place, and time. Mental status is at baseline.   Psychiatric:         Attention and Perception: Attention and perception normal.     Home Medications     Medication List      CONTINUE taking these medications     acyclovir 400 mg tablet; Commonly known as: Zovirax; Take 1 tablet (400   mg) by mouth 2 times a day.   CALCIUM-MAGNESIUM-ZINC ORAL   cyanocobalamin 1,000 mcg tablet; Commonly known as: Vitamin B-12   DULoxetine 30 mg DR capsule; Commonly known as: Cymbalta; Take 2   capsules (60 mg) by mouth once daily. Do not crush or chew.   prochlorperazine 10 mg tablet; Commonly known as: Compazine; Take 1   tablet (10 mg) by mouth every 6 hours if needed for nausea or vomiting.   sulfamethoxazole-trimethoprim 800-160 mg tablet; Commonly known as:   Bactrim DS; Take 1 tablet by mouth 3 times a week. Take once daily on   Monday, Wednesday, and Friday.   traZODone 100 mg tablet; Commonly known as: Desyrel; TAKE 3 TABLETS BY   MOUTH DAILY AT BEDTIME     Results from last 7 days   Lab Units 10/16/24  0773 10/15/24  1832  10/14/24  0750   WBC AUTO x10*3/uL 3.4* 3.8* 5.9   HEMOGLOBIN g/dL 11.8* 11.6* 12.2*   HEMATOCRIT % 34.5* 33.8* 34.5*   PLATELETS AUTO x10*3/uL 101* 96* 137*   NEUTROS PCT AUTO % 75.2 82.1 88.9   LYMPHS PCT AUTO % 9.6 5.0 5.2   MONOS PCT AUTO % 10.5 10.0 4.9   EOS PCT AUTO % 3.2 1.3 0.0     Results from last 7 days   Lab Units 10/16/24  0735 10/15/24  0725 10/14/24  0750   SODIUM mmol/L 140 139 139   POTASSIUM mmol/L 4.2 4.0 4.6   CHLORIDE mmol/L 108* 106 108*   CO2 mmol/L 23 25 24   BUN mg/dL 24* 24* 23   CREATININE mg/dL 1.22 1.23 1.02   CALCIUM mg/dL 8.7 8.2* 8.7   GLUCOSE mg/dL 142* 111* 168*     Results from last 7 days   Lab Units 10/16/24  0735 10/15/24  0725 10/14/24  0750   MAGNESIUM mg/dL 1.92 1.79 2.13     Outpatient Follow-Up  Future Appointments   Date Time Provider Department Center   10/17/2024  1:20 PM West Huff MD [id number] [facility]   10/21/2024  9:30 AM INF 06 ROJAS [id number] [facility]   10/28/2024 11:30 AM INF 03 RJOAS [id number] [facility]   11/4/2024 10:00 AM INF 04 ROJAS [id number] [facility]   11/11/2024  8:30 AM INF 03 ROJAS [id number] [facility]   11/14/2024  8:20 AM West Huff MD [id number] [facility]   11/18/2024  9:00 AM INF 07 ROJAS [id number] [facility]   12/20/2024  8:30 AM [id number] X-RAY 1 [id number] [facility]   12/20/2024  9:00 AM True Collins MD [id number] [facility]   3/6/2025  8:00 AM Sasha Torres, PhD [id number] [facility]   4/1/2025  8:00 AM Timothy Almodovar MD [id number] [facility]   4/10/2025  8:50 AM Libby Ellis DO [id number] [facility]       Janes James PA-C

## 2024-10-17 ENCOUNTER — OFFICE VISIT (OUTPATIENT)
Dept: HEMATOLOGY/ONCOLOGY | Facility: HOSPITAL | Age: 74
End: 2024-10-17
Payer: COMMERCIAL

## 2024-10-17 VITALS
HEART RATE: 74 BPM | WEIGHT: 223.7 LBS | TEMPERATURE: 97 F | SYSTOLIC BLOOD PRESSURE: 134 MMHG | OXYGEN SATURATION: 99 % | DIASTOLIC BLOOD PRESSURE: 89 MMHG | BODY MASS INDEX: 32.5 KG/M2 | RESPIRATION RATE: 16 BRPM

## 2024-10-17 DIAGNOSIS — C90.00 IGG MULTIPLE MYELOMA (MULTI): ICD-10-CM

## 2024-10-17 PROCEDURE — 3079F DIAST BP 80-89 MM HG: CPT | Performed by: INTERNAL MEDICINE

## 2024-10-17 PROCEDURE — 3075F SYST BP GE 130 - 139MM HG: CPT | Performed by: INTERNAL MEDICINE

## 2024-10-17 PROCEDURE — 1123F ACP DISCUSS/DSCN MKR DOCD: CPT | Performed by: INTERNAL MEDICINE

## 2024-10-17 PROCEDURE — 99215 OFFICE O/P EST HI 40 MIN: CPT | Performed by: INTERNAL MEDICINE

## 2024-10-17 PROCEDURE — 1159F MED LIST DOCD IN RCRD: CPT | Performed by: INTERNAL MEDICINE

## 2024-10-17 PROCEDURE — 1111F DSCHRG MED/CURRENT MED MERGE: CPT | Performed by: INTERNAL MEDICINE

## 2024-10-17 PROCEDURE — 1126F AMNT PAIN NOTED NONE PRSNT: CPT | Performed by: INTERNAL MEDICINE

## 2024-10-17 ASSESSMENT — PAIN SCALES - GENERAL: PAINLEVEL_OUTOF10: 0-NO PAIN

## 2024-10-18 ENCOUNTER — APPOINTMENT (OUTPATIENT)
Dept: HEMATOLOGY/ONCOLOGY | Facility: HOSPITAL | Age: 74
End: 2024-10-18
Payer: COMMERCIAL

## 2024-10-18 DIAGNOSIS — C90.00 IGG MULTIPLE MYELOMA (MULTI): Primary | ICD-10-CM

## 2024-10-18 RX ORDER — DIPHENHYDRAMINE HYDROCHLORIDE 50 MG/ML
50 INJECTION INTRAMUSCULAR; INTRAVENOUS AS NEEDED
OUTPATIENT
Start: 2024-11-04

## 2024-10-18 RX ORDER — ALBUTEROL SULFATE 0.83 MG/ML
3 SOLUTION RESPIRATORY (INHALATION) AS NEEDED
OUTPATIENT
Start: 2024-10-28

## 2024-10-18 RX ORDER — EPINEPHRINE 0.3 MG/.3ML
0.3 INJECTION SUBCUTANEOUS EVERY 5 MIN PRN
Status: CANCELLED | OUTPATIENT
Start: 2024-10-21

## 2024-10-18 RX ORDER — FAMOTIDINE 10 MG/ML
20 INJECTION INTRAVENOUS ONCE AS NEEDED
OUTPATIENT
Start: 2024-11-11

## 2024-10-18 RX ORDER — EPINEPHRINE 0.3 MG/.3ML
0.3 INJECTION SUBCUTANEOUS EVERY 5 MIN PRN
OUTPATIENT
Start: 2024-11-11

## 2024-10-18 RX ORDER — FAMOTIDINE 10 MG/ML
20 INJECTION INTRAVENOUS ONCE AS NEEDED
OUTPATIENT
Start: 2024-11-04

## 2024-10-18 RX ORDER — PROCHLORPERAZINE EDISYLATE 5 MG/ML
10 INJECTION INTRAMUSCULAR; INTRAVENOUS EVERY 6 HOURS PRN
OUTPATIENT
Start: 2024-11-11

## 2024-10-18 RX ORDER — DIPHENHYDRAMINE HYDROCHLORIDE 50 MG/ML
50 INJECTION INTRAMUSCULAR; INTRAVENOUS AS NEEDED
Status: CANCELLED | OUTPATIENT
Start: 2024-10-21

## 2024-10-18 RX ORDER — FAMOTIDINE 10 MG/ML
20 INJECTION INTRAVENOUS ONCE AS NEEDED
OUTPATIENT
Start: 2024-10-28

## 2024-10-18 RX ORDER — ALBUTEROL SULFATE 0.83 MG/ML
3 SOLUTION RESPIRATORY (INHALATION) AS NEEDED
OUTPATIENT
Start: 2024-11-04

## 2024-10-18 RX ORDER — PROCHLORPERAZINE MALEATE 10 MG
10 TABLET ORAL EVERY 6 HOURS PRN
Status: CANCELLED | OUTPATIENT
Start: 2024-10-21

## 2024-10-18 RX ORDER — PROCHLORPERAZINE EDISYLATE 5 MG/ML
10 INJECTION INTRAMUSCULAR; INTRAVENOUS EVERY 6 HOURS PRN
Status: CANCELLED | OUTPATIENT
Start: 2024-10-21

## 2024-10-18 RX ORDER — PROCHLORPERAZINE MALEATE 10 MG
10 TABLET ORAL EVERY 6 HOURS PRN
OUTPATIENT
Start: 2024-11-04

## 2024-10-18 RX ORDER — PROCHLORPERAZINE EDISYLATE 5 MG/ML
10 INJECTION INTRAMUSCULAR; INTRAVENOUS EVERY 6 HOURS PRN
OUTPATIENT
Start: 2024-10-28

## 2024-10-18 RX ORDER — PROCHLORPERAZINE MALEATE 10 MG
10 TABLET ORAL EVERY 6 HOURS PRN
OUTPATIENT
Start: 2024-11-11

## 2024-10-18 RX ORDER — EPINEPHRINE 0.3 MG/.3ML
0.3 INJECTION SUBCUTANEOUS EVERY 5 MIN PRN
OUTPATIENT
Start: 2024-11-04

## 2024-10-18 RX ORDER — EPINEPHRINE 0.3 MG/.3ML
0.3 INJECTION SUBCUTANEOUS EVERY 5 MIN PRN
OUTPATIENT
Start: 2024-10-28

## 2024-10-18 RX ORDER — ALBUTEROL SULFATE 0.83 MG/ML
3 SOLUTION RESPIRATORY (INHALATION) AS NEEDED
Status: CANCELLED | OUTPATIENT
Start: 2024-10-21

## 2024-10-18 RX ORDER — PROCHLORPERAZINE MALEATE 10 MG
10 TABLET ORAL EVERY 6 HOURS PRN
OUTPATIENT
Start: 2024-10-28

## 2024-10-18 RX ORDER — FAMOTIDINE 10 MG/ML
20 INJECTION INTRAVENOUS ONCE AS NEEDED
Status: CANCELLED | OUTPATIENT
Start: 2024-10-21

## 2024-10-18 RX ORDER — ALBUTEROL SULFATE 0.83 MG/ML
3 SOLUTION RESPIRATORY (INHALATION) AS NEEDED
OUTPATIENT
Start: 2024-11-11

## 2024-10-18 RX ORDER — PROCHLORPERAZINE EDISYLATE 5 MG/ML
10 INJECTION INTRAMUSCULAR; INTRAVENOUS EVERY 6 HOURS PRN
OUTPATIENT
Start: 2024-11-04

## 2024-10-18 RX ORDER — DIPHENHYDRAMINE HYDROCHLORIDE 50 MG/ML
50 INJECTION INTRAMUSCULAR; INTRAVENOUS AS NEEDED
OUTPATIENT
Start: 2024-10-28

## 2024-10-18 RX ORDER — DIPHENHYDRAMINE HYDROCHLORIDE 50 MG/ML
50 INJECTION INTRAMUSCULAR; INTRAVENOUS AS NEEDED
OUTPATIENT
Start: 2024-11-11

## 2024-10-19 LAB
BACTERIA BLD CULT: NORMAL
BACTERIA BLD CULT: NORMAL

## 2024-10-21 ENCOUNTER — INFUSION (OUTPATIENT)
Dept: HEMATOLOGY/ONCOLOGY | Facility: CLINIC | Age: 74
End: 2024-10-21
Payer: MEDICARE

## 2024-10-21 ENCOUNTER — LAB (OUTPATIENT)
Dept: LAB | Facility: CLINIC | Age: 74
End: 2024-10-21
Payer: MEDICARE

## 2024-10-21 VITALS
HEIGHT: 70 IN | TEMPERATURE: 97.7 F | HEART RATE: 66 BPM | BODY MASS INDEX: 31.88 KG/M2 | DIASTOLIC BLOOD PRESSURE: 85 MMHG | RESPIRATION RATE: 16 BRPM | SYSTOLIC BLOOD PRESSURE: 133 MMHG | WEIGHT: 222.66 LBS | OXYGEN SATURATION: 97 %

## 2024-10-21 DIAGNOSIS — D80.1 HYPOGAMMAGLOBULINEMIA DUE TO MULTIPLE MYELOMA (MULTI): ICD-10-CM

## 2024-10-21 DIAGNOSIS — C90.00 IGG MULTIPLE MYELOMA (MULTI): ICD-10-CM

## 2024-10-21 DIAGNOSIS — Z92.850 HISTORY OF CHIMERIC ANTIGEN RECEPTOR T-CELL THERAPY: ICD-10-CM

## 2024-10-21 DIAGNOSIS — C90.00 HYPOGAMMAGLOBULINEMIA DUE TO MULTIPLE MYELOMA (MULTI): ICD-10-CM

## 2024-10-21 DIAGNOSIS — C90.00 MULTIPLE MYELOMA WITHOUT REMISSION (MULTI): ICD-10-CM

## 2024-10-21 LAB
ALBUMIN SERPL BCP-MCNC: 4 G/DL (ref 3.4–5)
ALP SERPL-CCNC: 68 U/L (ref 33–136)
ALT SERPL W P-5'-P-CCNC: 22 U/L (ref 10–52)
ANION GAP SERPL CALC-SCNC: 13 MMOL/L (ref 10–20)
AST SERPL W P-5'-P-CCNC: 18 U/L (ref 9–39)
BASOPHILS # BLD AUTO: 0.01 X10*3/UL (ref 0–0.1)
BASOPHILS NFR BLD AUTO: 0.2 %
BILIRUB SERPL-MCNC: 0.8 MG/DL (ref 0–1.2)
BUN SERPL-MCNC: 20 MG/DL (ref 6–23)
CALCIUM SERPL-MCNC: 9.1 MG/DL (ref 8.6–10.6)
CHLORIDE SERPL-SCNC: 108 MMOL/L (ref 98–107)
CO2 SERPL-SCNC: 25 MMOL/L (ref 21–32)
CREAT SERPL-MCNC: 1.33 MG/DL (ref 0.5–1.3)
CRP SERPL-MCNC: 0.7 MG/DL
EGFRCR SERPLBLD CKD-EPI 2021: 56 ML/MIN/1.73M*2
EOSINOPHIL # BLD AUTO: 0.08 X10*3/UL (ref 0–0.4)
EOSINOPHIL NFR BLD AUTO: 1.5 %
ERYTHROCYTE [DISTWIDTH] IN BLOOD BY AUTOMATED COUNT: 12.3 % (ref 11.5–14.5)
FERRITIN SERPL-MCNC: 142 NG/ML (ref 20–300)
FIBRINOGEN PPP-MCNC: 314 MG/DL (ref 200–400)
GLUCOSE SERPL-MCNC: 114 MG/DL (ref 74–99)
HCT VFR BLD AUTO: 35.8 % (ref 41–52)
HGB BLD-MCNC: 12.6 G/DL (ref 13.5–17.5)
IMM GRANULOCYTES # BLD AUTO: 0.03 X10*3/UL (ref 0–0.5)
IMM GRANULOCYTES NFR BLD AUTO: 0.5 % (ref 0–0.9)
LDH SERPL L TO P-CCNC: 135 U/L (ref 84–246)
LYMPHOCYTES # BLD AUTO: 0.72 X10*3/UL (ref 0.8–3)
LYMPHOCYTES NFR BLD AUTO: 13.2 %
MAGNESIUM SERPL-MCNC: 2 MG/DL (ref 1.6–2.4)
MCH RBC QN AUTO: 33.5 PG (ref 26–34)
MCHC RBC AUTO-ENTMCNC: 35.2 G/DL (ref 32–36)
MCV RBC AUTO: 95 FL (ref 80–100)
MONOCYTES # BLD AUTO: 0.49 X10*3/UL (ref 0.05–0.8)
MONOCYTES NFR BLD AUTO: 9 %
NEUTROPHILS # BLD AUTO: 4.14 X10*3/UL (ref 1.6–5.5)
NEUTROPHILS NFR BLD AUTO: 75.6 %
NRBC BLD-RTO: ABNORMAL /100{WBCS}
PLATELET # BLD AUTO: 132 X10*3/UL (ref 150–450)
POTASSIUM SERPL-SCNC: 4.9 MMOL/L (ref 3.5–5.3)
PROT SERPL-MCNC: 5.8 G/DL (ref 6.4–8.2)
RBC # BLD AUTO: 3.76 X10*6/UL (ref 4.5–5.9)
SODIUM SERPL-SCNC: 141 MMOL/L (ref 136–145)
URATE SERPL-MCNC: 4.9 MG/DL (ref 4–7.5)
WBC # BLD AUTO: 5.5 X10*3/UL (ref 4.4–11.3)

## 2024-10-21 PROCEDURE — 2500000004 HC RX 250 GENERAL PHARMACY W/ HCPCS (ALT 636 FOR OP/ED): Mod: JZ,JG

## 2024-10-21 PROCEDURE — 85025 COMPLETE CBC W/AUTO DIFF WBC: CPT

## 2024-10-21 PROCEDURE — 2500000001 HC RX 250 WO HCPCS SELF ADMINISTERED DRUGS (ALT 637 FOR MEDICARE OP)

## 2024-10-21 PROCEDURE — 2500000004 HC RX 250 GENERAL PHARMACY W/ HCPCS (ALT 636 FOR OP/ED)

## 2024-10-21 PROCEDURE — 36415 COLL VENOUS BLD VENIPUNCTURE: CPT

## 2024-10-21 PROCEDURE — 96401 CHEMO ANTI-NEOPL SQ/IM: CPT

## 2024-10-21 PROCEDURE — 85384 FIBRINOGEN ACTIVITY: CPT

## 2024-10-21 PROCEDURE — 80053 COMPREHEN METABOLIC PANEL: CPT

## 2024-10-21 PROCEDURE — 82728 ASSAY OF FERRITIN: CPT

## 2024-10-21 PROCEDURE — 83735 ASSAY OF MAGNESIUM: CPT

## 2024-10-21 PROCEDURE — 86140 C-REACTIVE PROTEIN: CPT

## 2024-10-21 PROCEDURE — 96375 TX/PRO/DX INJ NEW DRUG ADDON: CPT | Mod: INF

## 2024-10-21 PROCEDURE — 96365 THER/PROPH/DIAG IV INF INIT: CPT | Mod: INF

## 2024-10-21 PROCEDURE — 84550 ASSAY OF BLOOD/URIC ACID: CPT

## 2024-10-21 PROCEDURE — 96366 THER/PROPH/DIAG IV INF ADDON: CPT | Mod: INF

## 2024-10-21 PROCEDURE — 83615 LACTATE (LD) (LDH) ENZYME: CPT

## 2024-10-21 RX ORDER — DIPHENHYDRAMINE HYDROCHLORIDE 50 MG/ML
50 INJECTION INTRAMUSCULAR; INTRAVENOUS AS NEEDED
Status: DISCONTINUED | OUTPATIENT
Start: 2024-10-21 | End: 2024-10-21 | Stop reason: HOSPADM

## 2024-10-21 RX ORDER — DIPHENHYDRAMINE HYDROCHLORIDE 50 MG/ML
25 INJECTION INTRAMUSCULAR; INTRAVENOUS ONCE
Status: COMPLETED | OUTPATIENT
Start: 2024-10-21 | End: 2024-10-21

## 2024-10-21 RX ORDER — EPINEPHRINE 0.3 MG/.3ML
0.3 INJECTION SUBCUTANEOUS EVERY 5 MIN PRN
OUTPATIENT
Start: 2024-11-11

## 2024-10-21 RX ORDER — MONTELUKAST SODIUM 10 MG/1
10 TABLET ORAL ONCE
Start: 2024-11-11 | End: 2024-11-11

## 2024-10-21 RX ORDER — EPINEPHRINE 0.3 MG/.3ML
0.3 INJECTION SUBCUTANEOUS EVERY 5 MIN PRN
Status: DISCONTINUED | OUTPATIENT
Start: 2024-10-21 | End: 2024-10-21 | Stop reason: HOSPADM

## 2024-10-21 RX ORDER — PROCHLORPERAZINE EDISYLATE 5 MG/ML
10 INJECTION INTRAMUSCULAR; INTRAVENOUS EVERY 6 HOURS PRN
Status: DISCONTINUED | OUTPATIENT
Start: 2024-10-21 | End: 2024-10-21 | Stop reason: HOSPADM

## 2024-10-21 RX ORDER — FAMOTIDINE 10 MG/ML
20 INJECTION INTRAVENOUS ONCE
Start: 2024-11-11 | End: 2024-11-11

## 2024-10-21 RX ORDER — ACETAMINOPHEN 325 MG/1
650 TABLET ORAL ONCE
OUTPATIENT
Start: 2024-11-11

## 2024-10-21 RX ORDER — ALBUTEROL SULFATE 0.83 MG/ML
3 SOLUTION RESPIRATORY (INHALATION) AS NEEDED
Status: DISCONTINUED | OUTPATIENT
Start: 2024-10-21 | End: 2024-10-21 | Stop reason: HOSPADM

## 2024-10-21 RX ORDER — HEPARIN SODIUM,PORCINE/PF 10 UNIT/ML
50 SYRINGE (ML) INTRAVENOUS AS NEEDED
OUTPATIENT
Start: 2024-10-21

## 2024-10-21 RX ORDER — FAMOTIDINE 10 MG/ML
20 INJECTION INTRAVENOUS ONCE
Status: COMPLETED | OUTPATIENT
Start: 2024-10-21 | End: 2024-10-21

## 2024-10-21 RX ORDER — ALBUTEROL SULFATE 0.83 MG/ML
3 SOLUTION RESPIRATORY (INHALATION) AS NEEDED
OUTPATIENT
Start: 2024-11-11

## 2024-10-21 RX ORDER — DIPHENHYDRAMINE HYDROCHLORIDE 50 MG/ML
25 INJECTION INTRAMUSCULAR; INTRAVENOUS ONCE
Start: 2024-11-11 | End: 2024-11-11

## 2024-10-21 RX ORDER — PROCHLORPERAZINE MALEATE 10 MG
10 TABLET ORAL EVERY 6 HOURS PRN
Status: DISCONTINUED | OUTPATIENT
Start: 2024-10-21 | End: 2024-10-21 | Stop reason: HOSPADM

## 2024-10-21 RX ORDER — MONTELUKAST SODIUM 10 MG/1
10 TABLET ORAL ONCE
Status: COMPLETED | OUTPATIENT
Start: 2024-10-21 | End: 2024-10-21

## 2024-10-21 RX ORDER — FAMOTIDINE 10 MG/ML
20 INJECTION INTRAVENOUS ONCE AS NEEDED
Status: DISCONTINUED | OUTPATIENT
Start: 2024-10-21 | End: 2024-10-21 | Stop reason: HOSPADM

## 2024-10-21 RX ORDER — HEPARIN 100 UNIT/ML
500 SYRINGE INTRAVENOUS AS NEEDED
OUTPATIENT
Start: 2024-10-21

## 2024-10-21 RX ORDER — DIPHENHYDRAMINE HYDROCHLORIDE 50 MG/ML
50 INJECTION INTRAMUSCULAR; INTRAVENOUS AS NEEDED
OUTPATIENT
Start: 2024-11-11

## 2024-10-21 RX ORDER — FAMOTIDINE 10 MG/ML
20 INJECTION INTRAVENOUS ONCE AS NEEDED
OUTPATIENT
Start: 2024-11-11

## 2024-10-21 RX ORDER — ACETAMINOPHEN 325 MG/1
650 TABLET ORAL ONCE
Status: COMPLETED | OUTPATIENT
Start: 2024-10-21 | End: 2024-10-21

## 2024-10-21 ASSESSMENT — PAIN SCALES - GENERAL
PAINLEVEL_OUTOF10: 0-NO PAIN

## 2024-10-21 NOTE — PROGRESS NOTES
[facility] Infusion Note     Maldonado Mccloud is a 74 y.o. year old male here today,10/21/24,  in the [facility] infusion center for his teclistamab subcutaneous injection and IVIG IV infusion.       Medications given:  Administrations This Visit       acetaminophen (Tylenol) tablet 650 mg       Admin Date  10/21/2024 Action  Given Dose  650 mg Route  oral Documented By  Maria Victoria Mcdonald RN              diphenhydrAMINE (BENADryl) injection 25 mg       Admin Date  10/21/2024 Action  Given Dose  25 mg Route  intravenous Documented By  Maria Victoria Mcdonald RN              famotidine (Pepcid) injection 20 mg       Admin Date  10/21/2024 Action  New Bag Dose  20 mg Route  intravenous Documented By  Maria Victoria Mcdonald RN              immune globulin G-gly-IgA avg 46 (GAMUNEX-C 10%) infusion 20 g       Admin Date  10/21/2024 Action  New Bag Dose  20 g Rate  51 mL/hr Route  intravenous Documented By  Maria Victoria Mcdonald RN               Admin Date  10/21/2024 Action  Rate/Dose Change Dose   Rate  102 mL/hr Route  intravenous Documented By  Maria Victoria Mcdonald RN               Admin Date  10/21/2024 Action  Rate/Dose Change Dose   Rate   Route  intravenous Documented By  Maria Victoria Mcdonald RN               Admin Date  10/21/2024 Action  Rate/Dose Change Dose   Rate  408 mL/hr Route  intravenous Documented By  Maria Victoria Mcdonald RN              montelukast (Singulair) tablet 10 mg       Admin Date  10/21/2024 Action  Given Dose  10 mg Route  oral Documented By  Maria Victoria Mcdonald RN              teclistamab-cqyv (Tecvayli) subcutaneous solution 153 mg       Admin Date  10/21/2024 Action  Given Dose  153 mg Route  subcutaneous Documented By  Maria Victoria Mcdonald RN                    Pt tolerated medications above well and was discharged to home with wife transporting pt in stable condition. Pt ambulated to exit independently with a slow and steady gait. Pt had no further questions or concerns at this time.

## 2024-10-28 ENCOUNTER — INFUSION (OUTPATIENT)
Dept: HEMATOLOGY/ONCOLOGY | Facility: CLINIC | Age: 74
End: 2024-10-28
Payer: MEDICARE

## 2024-10-28 ENCOUNTER — LAB (OUTPATIENT)
Dept: LAB | Facility: CLINIC | Age: 74
End: 2024-10-28
Payer: MEDICARE

## 2024-10-28 VITALS
OXYGEN SATURATION: 98 % | WEIGHT: 222.88 LBS | RESPIRATION RATE: 14 BRPM | HEART RATE: 72 BPM | TEMPERATURE: 97.5 F | SYSTOLIC BLOOD PRESSURE: 132 MMHG | DIASTOLIC BLOOD PRESSURE: 84 MMHG | BODY MASS INDEX: 31.98 KG/M2

## 2024-10-28 DIAGNOSIS — C90.00 MULTIPLE MYELOMA WITHOUT REMISSION (MULTI): ICD-10-CM

## 2024-10-28 DIAGNOSIS — C90.00 IGG MULTIPLE MYELOMA (MULTI): ICD-10-CM

## 2024-10-28 LAB
BASOPHILS # BLD AUTO: 0.02 X10*3/UL (ref 0–0.1)
BASOPHILS NFR BLD AUTO: 0.4 %
EOSINOPHIL # BLD AUTO: 0.05 X10*3/UL (ref 0–0.4)
EOSINOPHIL NFR BLD AUTO: 1 %
ERYTHROCYTE [DISTWIDTH] IN BLOOD BY AUTOMATED COUNT: 12.5 % (ref 11.5–14.5)
HCT VFR BLD AUTO: 34.8 % (ref 41–52)
HGB BLD-MCNC: 12.1 G/DL (ref 13.5–17.5)
IMM GRANULOCYTES # BLD AUTO: 0.01 X10*3/UL (ref 0–0.5)
IMM GRANULOCYTES NFR BLD AUTO: 0.2 % (ref 0–0.9)
LYMPHOCYTES # BLD AUTO: 0.82 X10*3/UL (ref 0.8–3)
LYMPHOCYTES NFR BLD AUTO: 15.9 %
MCH RBC QN AUTO: 33.9 PG (ref 26–34)
MCHC RBC AUTO-ENTMCNC: 34.8 G/DL (ref 32–36)
MCV RBC AUTO: 98 FL (ref 80–100)
MONOCYTES # BLD AUTO: 0.49 X10*3/UL (ref 0.05–0.8)
MONOCYTES NFR BLD AUTO: 9.5 %
NEUTROPHILS # BLD AUTO: 3.76 X10*3/UL (ref 1.6–5.5)
NEUTROPHILS NFR BLD AUTO: 73 %
NRBC BLD-RTO: ABNORMAL /100{WBCS}
PLATELET # BLD AUTO: 133 X10*3/UL (ref 150–450)
RBC # BLD AUTO: 3.57 X10*6/UL (ref 4.5–5.9)
WBC # BLD AUTO: 5.2 X10*3/UL (ref 4.4–11.3)

## 2024-10-28 PROCEDURE — 84165 PROTEIN E-PHORESIS SERUM: CPT

## 2024-10-28 PROCEDURE — 84155 ASSAY OF PROTEIN SERUM: CPT

## 2024-10-28 PROCEDURE — 96401 CHEMO ANTI-NEOPL SQ/IM: CPT

## 2024-10-28 PROCEDURE — 84075 ASSAY ALKALINE PHOSPHATASE: CPT

## 2024-10-28 PROCEDURE — 82728 ASSAY OF FERRITIN: CPT

## 2024-10-28 PROCEDURE — 2500000004 HC RX 250 GENERAL PHARMACY W/ HCPCS (ALT 636 FOR OP/ED): Mod: JZ,TB

## 2024-10-28 PROCEDURE — 85384 FIBRINOGEN ACTIVITY: CPT

## 2024-10-28 PROCEDURE — 84550 ASSAY OF BLOOD/URIC ACID: CPT

## 2024-10-28 PROCEDURE — 82784 ASSAY IGA/IGD/IGG/IGM EACH: CPT

## 2024-10-28 PROCEDURE — 83735 ASSAY OF MAGNESIUM: CPT

## 2024-10-28 PROCEDURE — 83521 IG LIGHT CHAINS FREE EACH: CPT

## 2024-10-28 PROCEDURE — 85025 COMPLETE CBC W/AUTO DIFF WBC: CPT

## 2024-10-28 PROCEDURE — 83615 LACTATE (LD) (LDH) ENZYME: CPT

## 2024-10-28 PROCEDURE — 36415 COLL VENOUS BLD VENIPUNCTURE: CPT

## 2024-10-28 PROCEDURE — 86140 C-REACTIVE PROTEIN: CPT

## 2024-10-28 RX ORDER — DIPHENHYDRAMINE HYDROCHLORIDE 50 MG/ML
50 INJECTION INTRAMUSCULAR; INTRAVENOUS AS NEEDED
Status: DISCONTINUED | OUTPATIENT
Start: 2024-10-28 | End: 2024-10-28 | Stop reason: HOSPADM

## 2024-10-28 RX ORDER — ALBUTEROL SULFATE 0.83 MG/ML
3 SOLUTION RESPIRATORY (INHALATION) AS NEEDED
Status: DISCONTINUED | OUTPATIENT
Start: 2024-10-28 | End: 2024-10-28 | Stop reason: HOSPADM

## 2024-10-28 RX ORDER — PROCHLORPERAZINE EDISYLATE 5 MG/ML
10 INJECTION INTRAMUSCULAR; INTRAVENOUS EVERY 6 HOURS PRN
Status: DISCONTINUED | OUTPATIENT
Start: 2024-10-28 | End: 2024-10-28 | Stop reason: HOSPADM

## 2024-10-28 RX ORDER — FAMOTIDINE 10 MG/ML
20 INJECTION INTRAVENOUS ONCE AS NEEDED
Status: DISCONTINUED | OUTPATIENT
Start: 2024-10-28 | End: 2024-10-28 | Stop reason: HOSPADM

## 2024-10-28 RX ORDER — EPINEPHRINE 0.3 MG/.3ML
0.3 INJECTION SUBCUTANEOUS EVERY 5 MIN PRN
Status: DISCONTINUED | OUTPATIENT
Start: 2024-10-28 | End: 2024-10-28 | Stop reason: HOSPADM

## 2024-10-28 RX ORDER — PROCHLORPERAZINE MALEATE 10 MG
10 TABLET ORAL EVERY 6 HOURS PRN
Status: DISCONTINUED | OUTPATIENT
Start: 2024-10-28 | End: 2024-10-28 | Stop reason: HOSPADM

## 2024-10-28 ASSESSMENT — PAIN SCALES - GENERAL: PAINLEVEL_OUTOF10: 2

## 2024-10-29 LAB
ALBUMIN SERPL BCP-MCNC: 4.1 G/DL (ref 3.4–5)
ALP SERPL-CCNC: 72 U/L (ref 33–136)
ALT SERPL W P-5'-P-CCNC: 14 U/L (ref 10–52)
ANION GAP SERPL CALC-SCNC: 13 MMOL/L (ref 10–20)
AST SERPL W P-5'-P-CCNC: 18 U/L (ref 9–39)
BILIRUB SERPL-MCNC: 0.7 MG/DL (ref 0–1.2)
BUN SERPL-MCNC: 29 MG/DL (ref 6–23)
CALCIUM SERPL-MCNC: 8.8 MG/DL (ref 8.6–10.6)
CHLORIDE SERPL-SCNC: 105 MMOL/L (ref 98–107)
CO2 SERPL-SCNC: 24 MMOL/L (ref 21–32)
CREAT SERPL-MCNC: 1.18 MG/DL (ref 0.5–1.3)
CRP SERPL-MCNC: 0.35 MG/DL
EGFRCR SERPLBLD CKD-EPI 2021: 64 ML/MIN/1.73M*2
FERRITIN SERPL-MCNC: 176 NG/ML (ref 20–300)
FIBRINOGEN PPP-MCNC: 346 MG/DL (ref 200–400)
GLUCOSE SERPL-MCNC: 109 MG/DL (ref 74–99)
IGA SERPL-MCNC: <7 MG/DL (ref 70–400)
IGG SERPL-MCNC: 536 MG/DL (ref 700–1600)
IGM SERPL-MCNC: 26 MG/DL (ref 40–230)
KAPPA LC SERPL-MCNC: 5.35 MG/DL (ref 0.33–1.94)
KAPPA LC/LAMBDA SER: ABNORMAL {RATIO}
LAMBDA LC SERPL-MCNC: <0.17 MG/DL (ref 0.57–2.63)
LDH SERPL L TO P-CCNC: 118 U/L (ref 84–246)
MAGNESIUM SERPL-MCNC: 1.93 MG/DL (ref 1.6–2.4)
POTASSIUM SERPL-SCNC: 4.3 MMOL/L (ref 3.5–5.3)
PROT SERPL-MCNC: 5.9 G/DL (ref 6.4–8.2)
PROT SERPL-MCNC: 5.9 G/DL (ref 6.4–8.2)
SODIUM SERPL-SCNC: 138 MMOL/L (ref 136–145)
URATE SERPL-MCNC: 4.5 MG/DL (ref 4–7.5)

## 2024-10-31 LAB
ALBUMIN: 3.8 G/DL (ref 3.4–5)
ALPHA 1 GLOBULIN: 0.3 G/DL (ref 0.2–0.6)
ALPHA 2 GLOBULIN: 0.7 G/DL (ref 0.4–1.1)
BETA GLOBULIN: 0.6 G/DL (ref 0.5–1.2)
GAMMA GLOBULIN: 0.5 G/DL (ref 0.5–1.4)
IMMUNOFIXATION COMMENT: NORMAL
PATH REVIEW - SERUM IMMUNOFIXATION: NORMAL
PATH REVIEW-SERUM PROTEIN ELECTROPHORESIS: NORMAL
PROTEIN ELECTROPHORESIS COMMENT: NORMAL

## 2024-11-04 ENCOUNTER — INFUSION (OUTPATIENT)
Dept: HEMATOLOGY/ONCOLOGY | Facility: CLINIC | Age: 74
End: 2024-11-04
Payer: MEDICARE

## 2024-11-04 ENCOUNTER — LAB (OUTPATIENT)
Dept: LAB | Facility: CLINIC | Age: 74
End: 2024-11-04
Payer: MEDICARE

## 2024-11-04 VITALS
WEIGHT: 219.8 LBS | DIASTOLIC BLOOD PRESSURE: 84 MMHG | HEART RATE: 80 BPM | OXYGEN SATURATION: 98 % | SYSTOLIC BLOOD PRESSURE: 132 MMHG | RESPIRATION RATE: 14 BRPM | TEMPERATURE: 97.9 F | BODY MASS INDEX: 31.54 KG/M2

## 2024-11-04 DIAGNOSIS — C90.00 MULTIPLE MYELOMA WITHOUT REMISSION (MULTI): ICD-10-CM

## 2024-11-04 DIAGNOSIS — C90.00 IGG MULTIPLE MYELOMA (MULTI): ICD-10-CM

## 2024-11-04 LAB
ALBUMIN SERPL BCP-MCNC: 4.1 G/DL (ref 3.4–5)
ALP SERPL-CCNC: 81 U/L (ref 33–136)
ALT SERPL W P-5'-P-CCNC: 14 U/L (ref 10–52)
ANION GAP SERPL CALC-SCNC: 14 MMOL/L (ref 10–20)
AST SERPL W P-5'-P-CCNC: 19 U/L (ref 9–39)
BASOPHILS # BLD AUTO: 0.01 X10*3/UL (ref 0–0.1)
BASOPHILS NFR BLD AUTO: 0.3 %
BILIRUB SERPL-MCNC: 0.8 MG/DL (ref 0–1.2)
BUN SERPL-MCNC: 15 MG/DL (ref 6–23)
CALCIUM SERPL-MCNC: 9.1 MG/DL (ref 8.6–10.6)
CHLORIDE SERPL-SCNC: 106 MMOL/L (ref 98–107)
CO2 SERPL-SCNC: 26 MMOL/L (ref 21–32)
CREAT SERPL-MCNC: 1.18 MG/DL (ref 0.5–1.3)
CRP SERPL-MCNC: 0.84 MG/DL
EGFRCR SERPLBLD CKD-EPI 2021: 64 ML/MIN/1.73M*2
EOSINOPHIL # BLD AUTO: 0.09 X10*3/UL (ref 0–0.4)
EOSINOPHIL NFR BLD AUTO: 2.9 %
ERYTHROCYTE [DISTWIDTH] IN BLOOD BY AUTOMATED COUNT: 12.9 % (ref 11.5–14.5)
FERRITIN SERPL-MCNC: 198 NG/ML (ref 20–300)
FIBRINOGEN PPP-MCNC: 477 MG/DL (ref 200–400)
GLUCOSE SERPL-MCNC: 113 MG/DL (ref 74–99)
HCT VFR BLD AUTO: 35.9 % (ref 41–52)
HGB BLD-MCNC: 12.4 G/DL (ref 13.5–17.5)
IGA SERPL-MCNC: <7 MG/DL (ref 70–400)
IGG SERPL-MCNC: 500 MG/DL (ref 700–1600)
IGM SERPL-MCNC: 20 MG/DL (ref 40–230)
IMM GRANULOCYTES # BLD AUTO: 0.02 X10*3/UL (ref 0–0.5)
IMM GRANULOCYTES NFR BLD AUTO: 0.7 % (ref 0–0.9)
LDH SERPL L TO P-CCNC: 134 U/L (ref 84–246)
LYMPHOCYTES # BLD AUTO: 0.86 X10*3/UL (ref 0.8–3)
LYMPHOCYTES NFR BLD AUTO: 28 %
MAGNESIUM SERPL-MCNC: 2.09 MG/DL (ref 1.6–2.4)
MCH RBC QN AUTO: 33.1 PG (ref 26–34)
MCHC RBC AUTO-ENTMCNC: 34.5 G/DL (ref 32–36)
MCV RBC AUTO: 96 FL (ref 80–100)
MONOCYTES # BLD AUTO: 0.44 X10*3/UL (ref 0.05–0.8)
MONOCYTES NFR BLD AUTO: 14.3 %
NEUTROPHILS # BLD AUTO: 1.65 X10*3/UL (ref 1.6–5.5)
NEUTROPHILS NFR BLD AUTO: 53.8 %
NRBC BLD-RTO: ABNORMAL /100{WBCS}
PLATELET # BLD AUTO: 143 X10*3/UL (ref 150–450)
POTASSIUM SERPL-SCNC: 4.6 MMOL/L (ref 3.5–5.3)
PROT SERPL-MCNC: 6 G/DL (ref 6.4–8.2)
PROT SERPL-MCNC: 6 G/DL (ref 6.4–8.2)
RBC # BLD AUTO: 3.75 X10*6/UL (ref 4.5–5.9)
SODIUM SERPL-SCNC: 141 MMOL/L (ref 136–145)
URATE SERPL-MCNC: 4.9 MG/DL (ref 4–7.5)
WBC # BLD AUTO: 3.1 X10*3/UL (ref 4.4–11.3)

## 2024-11-04 PROCEDURE — 36415 COLL VENOUS BLD VENIPUNCTURE: CPT

## 2024-11-04 PROCEDURE — 85384 FIBRINOGEN ACTIVITY: CPT

## 2024-11-04 PROCEDURE — 84550 ASSAY OF BLOOD/URIC ACID: CPT

## 2024-11-04 PROCEDURE — 85025 COMPLETE CBC W/AUTO DIFF WBC: CPT

## 2024-11-04 PROCEDURE — 84155 ASSAY OF PROTEIN SERUM: CPT | Mod: 59

## 2024-11-04 PROCEDURE — 83735 ASSAY OF MAGNESIUM: CPT

## 2024-11-04 PROCEDURE — 83521 IG LIGHT CHAINS FREE EACH: CPT

## 2024-11-04 PROCEDURE — 82728 ASSAY OF FERRITIN: CPT

## 2024-11-04 PROCEDURE — 83615 LACTATE (LD) (LDH) ENZYME: CPT

## 2024-11-04 PROCEDURE — 82784 ASSAY IGA/IGD/IGG/IGM EACH: CPT

## 2024-11-04 PROCEDURE — 86140 C-REACTIVE PROTEIN: CPT

## 2024-11-04 PROCEDURE — 96401 CHEMO ANTI-NEOPL SQ/IM: CPT

## 2024-11-04 PROCEDURE — 86334 IMMUNOFIX E-PHORESIS SERUM: CPT

## 2024-11-04 PROCEDURE — 2500000004 HC RX 250 GENERAL PHARMACY W/ HCPCS (ALT 636 FOR OP/ED): Mod: JZ,TB

## 2024-11-04 PROCEDURE — 84075 ASSAY ALKALINE PHOSPHATASE: CPT

## 2024-11-04 RX ORDER — ALBUTEROL SULFATE 0.83 MG/ML
3 SOLUTION RESPIRATORY (INHALATION) AS NEEDED
Status: DISCONTINUED | OUTPATIENT
Start: 2024-11-04 | End: 2024-11-04 | Stop reason: HOSPADM

## 2024-11-04 RX ORDER — PROCHLORPERAZINE MALEATE 10 MG
10 TABLET ORAL EVERY 6 HOURS PRN
Status: DISCONTINUED | OUTPATIENT
Start: 2024-11-04 | End: 2024-11-04 | Stop reason: HOSPADM

## 2024-11-04 RX ORDER — FAMOTIDINE 10 MG/ML
20 INJECTION INTRAVENOUS ONCE AS NEEDED
Status: DISCONTINUED | OUTPATIENT
Start: 2024-11-04 | End: 2024-11-04 | Stop reason: HOSPADM

## 2024-11-04 RX ORDER — EPINEPHRINE 0.3 MG/.3ML
0.3 INJECTION SUBCUTANEOUS EVERY 5 MIN PRN
Status: DISCONTINUED | OUTPATIENT
Start: 2024-11-04 | End: 2024-11-04 | Stop reason: HOSPADM

## 2024-11-04 RX ORDER — DIPHENHYDRAMINE HYDROCHLORIDE 50 MG/ML
50 INJECTION INTRAMUSCULAR; INTRAVENOUS AS NEEDED
Status: DISCONTINUED | OUTPATIENT
Start: 2024-11-04 | End: 2024-11-04 | Stop reason: HOSPADM

## 2024-11-04 RX ORDER — PROCHLORPERAZINE EDISYLATE 5 MG/ML
10 INJECTION INTRAMUSCULAR; INTRAVENOUS EVERY 6 HOURS PRN
Status: DISCONTINUED | OUTPATIENT
Start: 2024-11-04 | End: 2024-11-04 | Stop reason: HOSPADM

## 2024-11-04 ASSESSMENT — PAIN SCALES - GENERAL: PAINLEVEL_OUTOF10: 0-NO PAIN

## 2024-11-04 NOTE — PROGRESS NOTES
Patient is here today for weekly Bite tx inj - no complication since last being seen-  independant double check done prior to   b/h/ lung sounds not auscultated  patient verbalizes understanding of plan of care     Requesting Cymbalta refil    Ivig due next mon 11/11     Discussed blood counts - has had a loss of appetite-   Some dizziness with getting up to fast denies falls

## 2024-11-05 LAB
KAPPA LC SERPL-MCNC: 1.33 MG/DL (ref 0.33–1.94)
KAPPA LC/LAMBDA SER: ABNORMAL {RATIO}
LAMBDA LC SERPL-MCNC: <0.17 MG/DL (ref 0.57–2.63)

## 2024-11-06 LAB
ALBUMIN: 3.8 G/DL (ref 3.4–5)
ALPHA 1 GLOBULIN: 0.3 G/DL (ref 0.2–0.6)
ALPHA 2 GLOBULIN: 0.8 G/DL (ref 0.4–1.1)
BETA GLOBULIN: 0.6 G/DL (ref 0.5–1.2)
GAMMA GLOBULIN: 0.4 G/DL (ref 0.5–1.4)
IMMUNOFIXATION COMMENT: ABNORMAL
PATH REVIEW - SERUM IMMUNOFIXATION: ABNORMAL
PATH REVIEW-SERUM PROTEIN ELECTROPHORESIS: ABNORMAL
PROTEIN ELECTROPHORESIS COMMENT: ABNORMAL

## 2024-11-11 ENCOUNTER — APPOINTMENT (OUTPATIENT)
Dept: HEMATOLOGY/ONCOLOGY | Facility: CLINIC | Age: 74
End: 2024-11-11
Payer: COMMERCIAL

## 2024-11-11 ENCOUNTER — INFUSION (OUTPATIENT)
Dept: HEMATOLOGY/ONCOLOGY | Facility: CLINIC | Age: 74
End: 2024-11-11
Payer: MEDICARE

## 2024-11-11 ENCOUNTER — SOCIAL WORK (OUTPATIENT)
Dept: HEMATOLOGY/ONCOLOGY | Facility: CLINIC | Age: 74
End: 2024-11-11
Payer: MEDICARE

## 2024-11-11 ENCOUNTER — LAB (OUTPATIENT)
Dept: LAB | Facility: CLINIC | Age: 74
End: 2024-11-11
Payer: MEDICARE

## 2024-11-11 VITALS
HEART RATE: 88 BPM | SYSTOLIC BLOOD PRESSURE: 80 MMHG | WEIGHT: 216.49 LBS | TEMPERATURE: 98.4 F | BODY MASS INDEX: 32.07 KG/M2 | HEIGHT: 69 IN | OXYGEN SATURATION: 97 % | RESPIRATION RATE: 16 BRPM | DIASTOLIC BLOOD PRESSURE: 60 MMHG

## 2024-11-11 DIAGNOSIS — C90.00 MULTIPLE MYELOMA WITHOUT REMISSION (MULTI): ICD-10-CM

## 2024-11-11 DIAGNOSIS — C90.00 IGG MULTIPLE MYELOMA (MULTI): ICD-10-CM

## 2024-11-11 LAB
ALBUMIN SERPL BCP-MCNC: 4.1 G/DL (ref 3.4–5)
ALP SERPL-CCNC: 72 U/L (ref 33–136)
ALT SERPL W P-5'-P-CCNC: 16 U/L (ref 10–52)
ANION GAP SERPL CALC-SCNC: 13 MMOL/L (ref 10–20)
AST SERPL W P-5'-P-CCNC: 19 U/L (ref 9–39)
BASOPHILS # BLD AUTO: 0.02 X10*3/UL (ref 0–0.1)
BASOPHILS NFR BLD AUTO: 0.5 %
BILIRUB SERPL-MCNC: 0.6 MG/DL (ref 0–1.2)
BUN SERPL-MCNC: 23 MG/DL (ref 6–23)
CALCIUM SERPL-MCNC: 8.9 MG/DL (ref 8.6–10.6)
CHLORIDE SERPL-SCNC: 106 MMOL/L (ref 98–107)
CO2 SERPL-SCNC: 27 MMOL/L (ref 21–32)
CREAT SERPL-MCNC: 1.35 MG/DL (ref 0.5–1.3)
EGFRCR SERPLBLD CKD-EPI 2021: 55 ML/MIN/1.73M*2
EOSINOPHIL # BLD AUTO: 0.08 X10*3/UL (ref 0–0.4)
EOSINOPHIL NFR BLD AUTO: 2 %
ERYTHROCYTE [DISTWIDTH] IN BLOOD BY AUTOMATED COUNT: 13.2 % (ref 11.5–14.5)
GLUCOSE SERPL-MCNC: 132 MG/DL (ref 74–99)
HCT VFR BLD AUTO: 35.9 % (ref 41–52)
HGB BLD-MCNC: 12.2 G/DL (ref 13.5–17.5)
IGA SERPL-MCNC: <7 MG/DL (ref 70–400)
IGG SERPL-MCNC: 433 MG/DL (ref 700–1600)
IGM SERPL-MCNC: 14 MG/DL (ref 40–230)
IMM GRANULOCYTES # BLD AUTO: 0.02 X10*3/UL (ref 0–0.5)
IMM GRANULOCYTES NFR BLD AUTO: 0.5 % (ref 0–0.9)
LYMPHOCYTES # BLD AUTO: 0.69 X10*3/UL (ref 0.8–3)
LYMPHOCYTES NFR BLD AUTO: 17 %
MCH RBC QN AUTO: 33.5 PG (ref 26–34)
MCHC RBC AUTO-ENTMCNC: 34 G/DL (ref 32–36)
MCV RBC AUTO: 99 FL (ref 80–100)
MONOCYTES # BLD AUTO: 0.39 X10*3/UL (ref 0.05–0.8)
MONOCYTES NFR BLD AUTO: 9.6 %
NEUTROPHILS # BLD AUTO: 2.85 X10*3/UL (ref 1.6–5.5)
NEUTROPHILS NFR BLD AUTO: 70.4 %
NRBC BLD-RTO: ABNORMAL /100{WBCS}
PLATELET # BLD AUTO: 139 X10*3/UL (ref 150–450)
POTASSIUM SERPL-SCNC: 4.7 MMOL/L (ref 3.5–5.3)
PROT SERPL-MCNC: 5.9 G/DL (ref 6.4–8.2)
PROT SERPL-MCNC: 5.9 G/DL (ref 6.4–8.2)
RBC # BLD AUTO: 3.64 X10*6/UL (ref 4.5–5.9)
SODIUM SERPL-SCNC: 141 MMOL/L (ref 136–145)
WBC # BLD AUTO: 4.1 X10*3/UL (ref 4.4–11.3)

## 2024-11-11 PROCEDURE — 84155 ASSAY OF PROTEIN SERUM: CPT

## 2024-11-11 PROCEDURE — 36415 COLL VENOUS BLD VENIPUNCTURE: CPT

## 2024-11-11 PROCEDURE — 85025 COMPLETE CBC W/AUTO DIFF WBC: CPT

## 2024-11-11 PROCEDURE — 96401 CHEMO ANTI-NEOPL SQ/IM: CPT

## 2024-11-11 PROCEDURE — 84075 ASSAY ALKALINE PHOSPHATASE: CPT

## 2024-11-11 PROCEDURE — 82784 ASSAY IGA/IGD/IGG/IGM EACH: CPT

## 2024-11-11 PROCEDURE — 2500000004 HC RX 250 GENERAL PHARMACY W/ HCPCS (ALT 636 FOR OP/ED): Mod: JZ,TB

## 2024-11-11 PROCEDURE — 83521 IG LIGHT CHAINS FREE EACH: CPT

## 2024-11-11 PROCEDURE — 86334 IMMUNOFIX E-PHORESIS SERUM: CPT

## 2024-11-11 RX ORDER — DIPHENHYDRAMINE HYDROCHLORIDE 50 MG/ML
50 INJECTION INTRAMUSCULAR; INTRAVENOUS AS NEEDED
Status: DISCONTINUED | OUTPATIENT
Start: 2024-11-11 | End: 2024-11-11 | Stop reason: HOSPADM

## 2024-11-11 RX ORDER — ALBUTEROL SULFATE 0.83 MG/ML
3 SOLUTION RESPIRATORY (INHALATION) AS NEEDED
Status: DISCONTINUED | OUTPATIENT
Start: 2024-11-11 | End: 2024-11-11 | Stop reason: HOSPADM

## 2024-11-11 RX ORDER — FERROUS SULFATE, DRIED 160(50) MG
1 TABLET, EXTENDED RELEASE ORAL DAILY
COMMUNITY

## 2024-11-11 RX ORDER — PROCHLORPERAZINE MALEATE 10 MG
10 TABLET ORAL EVERY 6 HOURS PRN
Status: DISCONTINUED | OUTPATIENT
Start: 2024-11-11 | End: 2024-11-11 | Stop reason: HOSPADM

## 2024-11-11 RX ORDER — EPINEPHRINE 0.3 MG/.3ML
0.3 INJECTION SUBCUTANEOUS EVERY 5 MIN PRN
Status: DISCONTINUED | OUTPATIENT
Start: 2024-11-11 | End: 2024-11-11 | Stop reason: HOSPADM

## 2024-11-11 RX ORDER — FAMOTIDINE 10 MG/ML
20 INJECTION INTRAVENOUS ONCE AS NEEDED
Status: DISCONTINUED | OUTPATIENT
Start: 2024-11-11 | End: 2024-11-11 | Stop reason: HOSPADM

## 2024-11-11 RX ORDER — PROCHLORPERAZINE EDISYLATE 5 MG/ML
10 INJECTION INTRAMUSCULAR; INTRAVENOUS EVERY 6 HOURS PRN
Status: DISCONTINUED | OUTPATIENT
Start: 2024-11-11 | End: 2024-11-11 | Stop reason: HOSPADM

## 2024-11-11 ASSESSMENT — PAIN SCALES - GENERAL: PAINLEVEL_OUTOF10: 0-NO PAIN

## 2024-11-11 NOTE — PROGRESS NOTES
Pt presents today for C2D22 of teclistamab and IVIG. Last week his IgG level was 500. Per treatment plan hold if IgG level greater than 400. Patient also has positive orthostatics. Educated patient on rising slowly and drinking lots of fluids to help his symptoms of dizziness. Patient verbalized understanding.

## 2024-11-11 NOTE — PROGRESS NOTES
Patient ID: Maldonado Mccloud is a 74 y.o. male.  Referring Physician: West Huff MD  [id number]  [place] [address]  Primary Care Provider: Timothy Almodovar MD    Interval hx:  Since last visit he has been continued on teclistamab. He recently had a nasal infection, which has since resolved on its own. He also has had a little less of an appetite, eats 1 good meal/day (but has so for a long time); has noted some dizziness upon standing, no syncope or falls. Has had some GONZALEZ when walking up stairs. He denies having had unexplained wt loss,  fevers, chills, sweats, palpable FELY, cough, nausea, vomiting, diarrhea, mouth sores, skin rashes, chest/abd/back pains, headaches, nosebleeds, GI or  bleeding, vision or hearing complaints. Still w/occasional numbness of his hands, not relieved at all w/cymbalta and is not feeling depressed.    PMHx:  1) Multiple myeloma (see below)  2) Squamous cell ca's skin  3) DVT, LLE ; now off AC  4) Neuropathy  5) S/p right radius fracture 2023  6) HTN  7) Afib (transient in  w/stem cell collection)    FamHx  Dad  of pan ca age 92    SocHx:   w/5 kids; smokes cigars; no cigs in 50 years; no EtOH or illicit drugs. Was in [employer] in [place]    Meds:  Were reviewed w/the pt  All:  [employer]  PCN  Zosyn    Physical Exam  General: Ambulatory, looked comfortable, not requiring supplemental oxygen  Vital Signs   /81; P 90; afebrile; RR 16/min; Wt= 99.4 (lost 2 kg)  HEENT: no oral lesions, tonsillar or tongue enlargement; Neck: no masses; Lymph nodes: no palpable cervical, supraclavicular, axillary, epitrochear or inguinal nodes; Heart: no murmurs; Lungs: clear; Abd: soft, +bowel sounds, non-tender, no palpable liver or spleen; Skin: no rashes; Ext's: No LE edema; Neuro: alert, answered questions appropriately, 5/5 motor strength upper and lower extremities    Performance Status:  Symptomatic; fully  ambulatory      Assessment/recommendations:  73 year old man w/a hx of LLE DVT (2022, now off AC), HTN, neuropathy, multiple skin cancers and multiply relapsed multiple myeloma [presented with right chest 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; Bone marrow biopsy suggested multiple myeloma IgG kappa, ISS Stage II, bone survey revealed lytic lesions; Urine light chains present kappa restricted with 2gm proteinuria; s/p VRD x 3 cycles with CR; then s/p auto SCTx 12/23/15 complicated by orthostatic hypotension, electrolyte replacement, drug induced rash, culture negative fever, then s/p approximately 5 weeks of maintenance Revlimid 10 mg daily which was held for worsening neuropathy in August 2016; then in 3/ 2018: IgG M Braxton alexei to 0.2gm/L and light chain alxeei was; restarted on Revlimid maintenance at 5mg every other day, then revlimid stopped in 3/2021 due to stable disease; then in 7/2021 his M spike was on the rise, and was enrolled onto Vactosertib/Pom trial on 8/5/2021; then progressed in 6/2022, switched to Isatuximab/Carfilzomib on 6/30/22, then in 9/2022 progressed and was switched to Cytoxan/Carfilzomib; then had therapy placed on hold in 11/2022 due to need for hip replacement surgery; myeloma markers were on the rise in 3/2023 with a new jaw lesion, s/p XRT 4/2023 and resume 2x/week carfilzomib; then s/p CAR T w/ABECMA (T=0, 10/25/23), hospital course complicated by grade 1 ICANS managed w/ Dex 10mg x3 days and 1 dose tociluzimab; then w/progression and started on teclistamab, full dosing given on 10/13/24] here for follow-up.     As for relapsed myeloma:  He has been continued on weekly teclistamab and tolerating this well, so far only has had an early episode of grade I CRS (cx neg low grade fever x 1, not recurred). He has not had any other toxicities thus far. In addition, there has been a biochemical response already: serum free kappa decreasing from 15.6mg/dL (= 156mg/L) to 1.33mg/dL  (13.3mg/L), as of 11/4/24.   Plan to have him follow-up w/Jacob Arechiga at [place] and get next cycle (#3) of teclistamab start on Mon, 11/18, then weekly, and w/me in 4 weeks, or sooner if new symptoms arise.    Infection ppx:  Cont acyclovir and bactrim ppx; monitor IgG levels and  we re-started ppx IVIG (NOTE: he should receive Gammunex -C, d/t prior severe reaction to gammard; last dose of IVIG was on 6/24/24; and then was restarted on 10/21/24).    As for sens NP:  No effect w/cymbalta; will have him stop this medication (taper off over 2 weeks), as it may also be causing dizziness. I offered adding gabapentin but the pt and his wife declined this, as he is really is not having any associated pain.

## 2024-11-11 NOTE — PROGRESS NOTES
This  met with this pt and his wife for the first time. The pt and his wife report that the pt comes once a week for a shot, but he is doing well otherwise.   The couple spoke about past issues with billing and insurance coverage, but they report that our  financial navigator helped them with this difficult process and it has been resolved.     The couple was appreciative of assistance offered. The SW will continue to follow up.

## 2024-11-12 LAB
KAPPA LC SERPL-MCNC: 0.51 MG/DL (ref 0.33–1.94)
KAPPA LC/LAMBDA SER: ABNORMAL {RATIO}
LAMBDA LC SERPL-MCNC: <0.17 MG/DL (ref 0.57–2.63)

## 2024-11-14 ENCOUNTER — OFFICE VISIT (OUTPATIENT)
Dept: HEMATOLOGY/ONCOLOGY | Facility: HOSPITAL | Age: 74
End: 2024-11-14
Payer: MEDICARE

## 2024-11-14 VITALS
TEMPERATURE: 97.3 F | BODY MASS INDEX: 32.13 KG/M2 | WEIGHT: 219.14 LBS | SYSTOLIC BLOOD PRESSURE: 112 MMHG | RESPIRATION RATE: 16 BRPM | DIASTOLIC BLOOD PRESSURE: 81 MMHG | HEART RATE: 90 BPM | OXYGEN SATURATION: 99 %

## 2024-11-14 DIAGNOSIS — C90.00 IGG MULTIPLE MYELOMA (MULTI): ICD-10-CM

## 2024-11-14 DIAGNOSIS — G62.89 OTHER POLYNEUROPATHY: ICD-10-CM

## 2024-11-14 LAB
ALBUMIN: 3.9 G/DL (ref 3.4–5)
ALPHA 1 GLOBULIN: 0.3 G/DL (ref 0.2–0.6)
ALPHA 2 GLOBULIN: 0.7 G/DL (ref 0.4–1.1)
BETA GLOBULIN: 0.6 G/DL (ref 0.5–1.2)
GAMMA GLOBULIN: 0.4 G/DL (ref 0.5–1.4)
IMMUNOFIXATION COMMENT: ABNORMAL
PATH REVIEW - SERUM IMMUNOFIXATION: ABNORMAL
PATH REVIEW-SERUM PROTEIN ELECTROPHORESIS: ABNORMAL
PROTEIN ELECTROPHORESIS COMMENT: ABNORMAL

## 2024-11-14 PROCEDURE — 99215 OFFICE O/P EST HI 40 MIN: CPT | Performed by: INTERNAL MEDICINE

## 2024-11-14 PROCEDURE — 3079F DIAST BP 80-89 MM HG: CPT | Performed by: INTERNAL MEDICINE

## 2024-11-14 PROCEDURE — 1123F ACP DISCUSS/DSCN MKR DOCD: CPT | Performed by: INTERNAL MEDICINE

## 2024-11-14 PROCEDURE — 3074F SYST BP LT 130 MM HG: CPT | Performed by: INTERNAL MEDICINE

## 2024-11-14 PROCEDURE — 1126F AMNT PAIN NOTED NONE PRSNT: CPT | Performed by: INTERNAL MEDICINE

## 2024-11-14 PROCEDURE — 1111F DSCHRG MED/CURRENT MED MERGE: CPT | Performed by: INTERNAL MEDICINE

## 2024-11-14 ASSESSMENT — PAIN SCALES - GENERAL: PAINLEVEL_OUTOF10: 0-NO PAIN

## 2024-11-15 DIAGNOSIS — C90.00 IGG MULTIPLE MYELOMA (MULTI): Primary | ICD-10-CM

## 2024-11-15 RX ORDER — ALBUTEROL SULFATE 0.83 MG/ML
3 SOLUTION RESPIRATORY (INHALATION) AS NEEDED
OUTPATIENT
Start: 2024-11-18

## 2024-11-15 RX ORDER — EPINEPHRINE 0.3 MG/.3ML
0.3 INJECTION SUBCUTANEOUS EVERY 5 MIN PRN
OUTPATIENT
Start: 2024-11-18

## 2024-11-15 RX ORDER — FAMOTIDINE 10 MG/ML
20 INJECTION INTRAVENOUS ONCE AS NEEDED
OUTPATIENT
Start: 2024-11-18

## 2024-11-15 RX ORDER — PROCHLORPERAZINE EDISYLATE 5 MG/ML
10 INJECTION INTRAMUSCULAR; INTRAVENOUS EVERY 6 HOURS PRN
OUTPATIENT
Start: 2024-11-18

## 2024-11-15 RX ORDER — DIPHENHYDRAMINE HYDROCHLORIDE 50 MG/ML
50 INJECTION INTRAMUSCULAR; INTRAVENOUS AS NEEDED
OUTPATIENT
Start: 2024-11-18

## 2024-11-15 RX ORDER — PROCHLORPERAZINE MALEATE 10 MG
10 TABLET ORAL EVERY 6 HOURS PRN
OUTPATIENT
Start: 2024-11-18

## 2024-11-18 ENCOUNTER — LAB (OUTPATIENT)
Dept: LAB | Facility: CLINIC | Age: 74
End: 2024-11-18
Payer: MEDICARE

## 2024-11-18 ENCOUNTER — INFUSION (OUTPATIENT)
Dept: HEMATOLOGY/ONCOLOGY | Facility: CLINIC | Age: 74
End: 2024-11-18
Payer: MEDICARE

## 2024-11-18 VITALS
OXYGEN SATURATION: 97 % | HEIGHT: 69 IN | BODY MASS INDEX: 32.67 KG/M2 | DIASTOLIC BLOOD PRESSURE: 79 MMHG | TEMPERATURE: 98.1 F | SYSTOLIC BLOOD PRESSURE: 109 MMHG | HEART RATE: 81 BPM | WEIGHT: 220.57 LBS | RESPIRATION RATE: 18 BRPM

## 2024-11-18 DIAGNOSIS — C90.00 IGG MULTIPLE MYELOMA (MULTI): ICD-10-CM

## 2024-11-18 DIAGNOSIS — J06.9 VIRAL UPPER RESPIRATORY TRACT INFECTION: Primary | ICD-10-CM

## 2024-11-18 DIAGNOSIS — C90.00 MULTIPLE MYELOMA WITHOUT REMISSION (MULTI): ICD-10-CM

## 2024-11-18 DIAGNOSIS — J06.9 VIRAL UPPER RESPIRATORY TRACT INFECTION: ICD-10-CM

## 2024-11-18 LAB
ALBUMIN SERPL BCP-MCNC: 3.9 G/DL (ref 3.4–5)
ALP SERPL-CCNC: 71 U/L (ref 33–136)
ALT SERPL W P-5'-P-CCNC: 11 U/L (ref 10–52)
ANION GAP SERPL CALC-SCNC: 10 MMOL/L (ref 10–20)
AST SERPL W P-5'-P-CCNC: 15 U/L (ref 9–39)
BASOPHILS # BLD AUTO: 0.02 X10*3/UL (ref 0–0.1)
BASOPHILS NFR BLD AUTO: 0.5 %
BILIRUB SERPL-MCNC: 0.7 MG/DL (ref 0–1.2)
BUN SERPL-MCNC: 18 MG/DL (ref 6–23)
CALCIUM SERPL-MCNC: 8.9 MG/DL (ref 8.6–10.6)
CHLORIDE SERPL-SCNC: 108 MMOL/L (ref 98–107)
CO2 SERPL-SCNC: 28 MMOL/L (ref 21–32)
CREAT SERPL-MCNC: 1.23 MG/DL (ref 0.5–1.3)
EGFRCR SERPLBLD CKD-EPI 2021: 61 ML/MIN/1.73M*2
EOSINOPHIL # BLD AUTO: 0.1 X10*3/UL (ref 0–0.4)
EOSINOPHIL NFR BLD AUTO: 2.4 %
ERYTHROCYTE [DISTWIDTH] IN BLOOD BY AUTOMATED COUNT: 13.6 % (ref 11.5–14.5)
FLUAV RNA RESP QL NAA+PROBE: NOT DETECTED
FLUBV RNA RESP QL NAA+PROBE: NOT DETECTED
GLUCOSE SERPL-MCNC: 135 MG/DL (ref 74–99)
HCT VFR BLD AUTO: 35.4 % (ref 41–52)
HGB BLD-MCNC: 12.1 G/DL (ref 13.5–17.5)
IGA SERPL-MCNC: <7 MG/DL (ref 70–400)
IGG SERPL-MCNC: 402 MG/DL (ref 700–1600)
IGM SERPL-MCNC: 10 MG/DL (ref 40–230)
IMM GRANULOCYTES # BLD AUTO: 0.01 X10*3/UL (ref 0–0.5)
IMM GRANULOCYTES NFR BLD AUTO: 0.2 % (ref 0–0.9)
LYMPHOCYTES # BLD AUTO: 0.8 X10*3/UL (ref 0.8–3)
LYMPHOCYTES NFR BLD AUTO: 19.6 %
MCH RBC QN AUTO: 33.8 PG (ref 26–34)
MCHC RBC AUTO-ENTMCNC: 34.2 G/DL (ref 32–36)
MCV RBC AUTO: 99 FL (ref 80–100)
MONOCYTES # BLD AUTO: 0.43 X10*3/UL (ref 0.05–0.8)
MONOCYTES NFR BLD AUTO: 10.5 %
NEUTROPHILS # BLD AUTO: 2.73 X10*3/UL (ref 1.6–5.5)
NEUTROPHILS NFR BLD AUTO: 66.8 %
NRBC BLD-RTO: ABNORMAL /100{WBCS}
PLATELET # BLD AUTO: 121 X10*3/UL (ref 150–450)
POTASSIUM SERPL-SCNC: 4.3 MMOL/L (ref 3.5–5.3)
PROT SERPL-MCNC: 5.6 G/DL (ref 6.4–8.2)
PROT SERPL-MCNC: 5.6 G/DL (ref 6.4–8.2)
RBC # BLD AUTO: 3.58 X10*6/UL (ref 4.5–5.9)
RSV RNA RESP QL NAA+PROBE: NOT DETECTED
SARS-COV-2 RNA RESP QL NAA+PROBE: DETECTED
SODIUM SERPL-SCNC: 142 MMOL/L (ref 136–145)
WBC # BLD AUTO: 4.1 X10*3/UL (ref 4.4–11.3)

## 2024-11-18 PROCEDURE — 87631 RESP VIRUS 3-5 TARGETS: CPT

## 2024-11-18 PROCEDURE — 86334 IMMUNOFIX E-PHORESIS SERUM: CPT

## 2024-11-18 PROCEDURE — 2500000004 HC RX 250 GENERAL PHARMACY W/ HCPCS (ALT 636 FOR OP/ED): Mod: JZ,TB

## 2024-11-18 PROCEDURE — 96401 CHEMO ANTI-NEOPL SQ/IM: CPT

## 2024-11-18 PROCEDURE — 36415 COLL VENOUS BLD VENIPUNCTURE: CPT

## 2024-11-18 PROCEDURE — 84155 ASSAY OF PROTEIN SERUM: CPT

## 2024-11-18 PROCEDURE — 80053 COMPREHEN METABOLIC PANEL: CPT

## 2024-11-18 PROCEDURE — 85025 COMPLETE CBC W/AUTO DIFF WBC: CPT

## 2024-11-18 PROCEDURE — 87486 CHLMYD PNEUM DNA AMP PROBE: CPT

## 2024-11-18 PROCEDURE — 87634 RSV DNA/RNA AMP PROBE: CPT

## 2024-11-18 PROCEDURE — 87635 SARS-COV-2 COVID-19 AMP PRB: CPT

## 2024-11-18 PROCEDURE — 82784 ASSAY IGA/IGD/IGG/IGM EACH: CPT

## 2024-11-18 PROCEDURE — 83521 IG LIGHT CHAINS FREE EACH: CPT

## 2024-11-18 RX ORDER — PROCHLORPERAZINE EDISYLATE 5 MG/ML
10 INJECTION INTRAMUSCULAR; INTRAVENOUS EVERY 6 HOURS PRN
Status: DISCONTINUED | OUTPATIENT
Start: 2024-11-18 | End: 2024-11-18 | Stop reason: HOSPADM

## 2024-11-18 RX ORDER — DIPHENHYDRAMINE HYDROCHLORIDE 50 MG/ML
50 INJECTION INTRAMUSCULAR; INTRAVENOUS AS NEEDED
Status: DISCONTINUED | OUTPATIENT
Start: 2024-11-18 | End: 2024-11-18 | Stop reason: HOSPADM

## 2024-11-18 RX ORDER — AZITHROMYCIN 250 MG/1
TABLET, FILM COATED ORAL
Qty: 6 TABLET | Refills: 0 | Status: SHIPPED | OUTPATIENT
Start: 2024-11-18

## 2024-11-18 RX ORDER — EPINEPHRINE 0.3 MG/.3ML
0.3 INJECTION SUBCUTANEOUS EVERY 5 MIN PRN
Status: DISCONTINUED | OUTPATIENT
Start: 2024-11-18 | End: 2024-11-18 | Stop reason: HOSPADM

## 2024-11-18 RX ORDER — BENZONATATE 100 MG/1
100 CAPSULE ORAL 3 TIMES DAILY PRN
Qty: 20 CAPSULE | Refills: 0 | Status: SHIPPED | OUTPATIENT
Start: 2024-11-18 | End: 2024-11-25

## 2024-11-18 RX ORDER — ALBUTEROL SULFATE 0.83 MG/ML
3 SOLUTION RESPIRATORY (INHALATION) AS NEEDED
Status: DISCONTINUED | OUTPATIENT
Start: 2024-11-18 | End: 2024-11-18 | Stop reason: HOSPADM

## 2024-11-18 RX ORDER — FAMOTIDINE 10 MG/ML
20 INJECTION INTRAVENOUS ONCE AS NEEDED
Status: DISCONTINUED | OUTPATIENT
Start: 2024-11-18 | End: 2024-11-18 | Stop reason: HOSPADM

## 2024-11-18 RX ORDER — PROCHLORPERAZINE MALEATE 10 MG
10 TABLET ORAL EVERY 6 HOURS PRN
Status: DISCONTINUED | OUTPATIENT
Start: 2024-11-18 | End: 2024-11-18 | Stop reason: HOSPADM

## 2024-11-18 ASSESSMENT — PAIN SCALES - GENERAL: PAINLEVEL_OUTOF10: 0-NO PAIN

## 2024-11-18 NOTE — PROGRESS NOTES
[facility] Infusion Note     Maldonado Mccloud is a 74 y.o. year old male here today,11/18/24,  in the [facility] infusion center for cycle 3 day 1 of the following treatment regimen:    Teclistamab (Weekly), 28 Day Cycles        Medications given:  Administrations This Visit       teclistamab-cqyv (Tecvayli) subcutaneous solution 153 mg       Admin Date  11/18/2024 Action  Given Dose  153 mg Route  subcutaneous Documented By  Maria Victoria Mcdnoald, RN                    Pt tolerated subcutaneous injection into the left abdomen well and was discharged to home with wife in stable condition. Pt ambulated  off the unit independently with a slow and steady gait. Pt had no further questions or concerns at this time.

## 2024-11-19 ENCOUNTER — TELEPHONE (OUTPATIENT)
Dept: HEMATOLOGY/ONCOLOGY | Facility: CLINIC | Age: 74
End: 2024-11-19
Payer: MEDICARE

## 2024-11-19 DIAGNOSIS — U07.1 COVID: Primary | ICD-10-CM

## 2024-11-19 LAB
HADV DNA SPEC QL NAA+PROBE: NOT DETECTED
HMPV RNA SPEC QL NAA+PROBE: NOT DETECTED
HPIV1 RNA SPEC QL NAA+PROBE: NOT DETECTED
HPIV2 RNA SPEC QL NAA+PROBE: NOT DETECTED
HPIV3 RNA SPEC QL NAA+PROBE: NOT DETECTED
HPIV4 RNA SPEC QL NAA+PROBE: NOT DETECTED
KAPPA LC SERPL-MCNC: 0.12 MG/DL (ref 0.33–1.94)
KAPPA LC/LAMBDA SER: ABNORMAL {RATIO}
LAMBDA LC SERPL-MCNC: <0.17 MG/DL (ref 0.57–2.63)
RHINOVIRUS RNA UPPER RESP QL NAA+PROBE: NOT DETECTED

## 2024-11-20 LAB
ALBUMIN: 3.7 G/DL (ref 3.4–5)
ALPHA 1 GLOBULIN: 0.3 G/DL (ref 0.2–0.6)
ALPHA 2 GLOBULIN: 0.7 G/DL (ref 0.4–1.1)
BETA GLOBULIN: 0.6 G/DL (ref 0.5–1.2)
CHLAMYDIA.PNEUMONIAE PCR, VIRC: NOT DETECTED
GAMMA GLOBULIN: 0.4 G/DL (ref 0.5–1.4)
IMMUNOFIXATION COMMENT: ABNORMAL
MYCOPLASMA.PNEUMONIAE PCR, VIRC: NOT DETECTED
PATH REVIEW - SERUM IMMUNOFIXATION: ABNORMAL
PATH REVIEW-SERUM PROTEIN ELECTROPHORESIS: ABNORMAL
PROTEIN ELECTROPHORESIS COMMENT: ABNORMAL

## 2024-11-25 ENCOUNTER — INFUSION (OUTPATIENT)
Dept: HEMATOLOGY/ONCOLOGY | Facility: CLINIC | Age: 74
End: 2024-11-25
Payer: COMMERCIAL

## 2024-11-25 ENCOUNTER — LAB (OUTPATIENT)
Dept: LAB | Facility: CLINIC | Age: 74
End: 2024-11-25
Payer: COMMERCIAL

## 2024-11-25 VITALS
HEART RATE: 77 BPM | HEIGHT: 69 IN | TEMPERATURE: 98.1 F | RESPIRATION RATE: 16 BRPM | SYSTOLIC BLOOD PRESSURE: 121 MMHG | DIASTOLIC BLOOD PRESSURE: 87 MMHG | WEIGHT: 217.81 LBS | OXYGEN SATURATION: 97 % | BODY MASS INDEX: 32.26 KG/M2

## 2024-11-25 DIAGNOSIS — C90.00 IGG MULTIPLE MYELOMA (MULTI): ICD-10-CM

## 2024-11-25 DIAGNOSIS — C90.00 MULTIPLE MYELOMA WITHOUT REMISSION (MULTI): ICD-10-CM

## 2024-11-25 LAB
BASOPHILS # BLD AUTO: 0.01 X10*3/UL (ref 0–0.1)
BASOPHILS NFR BLD AUTO: 0.2 %
EOSINOPHIL # BLD AUTO: 0.06 X10*3/UL (ref 0–0.4)
EOSINOPHIL NFR BLD AUTO: 1.5 %
ERYTHROCYTE [DISTWIDTH] IN BLOOD BY AUTOMATED COUNT: 13.7 % (ref 11.5–14.5)
HCT VFR BLD AUTO: 37.8 % (ref 41–52)
HGB BLD-MCNC: 13 G/DL (ref 13.5–17.5)
IMM GRANULOCYTES # BLD AUTO: 0.01 X10*3/UL (ref 0–0.5)
IMM GRANULOCYTES NFR BLD AUTO: 0.2 % (ref 0–0.9)
LYMPHOCYTES # BLD AUTO: 0.75 X10*3/UL (ref 0.8–3)
LYMPHOCYTES NFR BLD AUTO: 18.6 %
MCH RBC QN AUTO: 34.1 PG (ref 26–34)
MCHC RBC AUTO-ENTMCNC: 34.4 G/DL (ref 32–36)
MCV RBC AUTO: 99 FL (ref 80–100)
MONOCYTES # BLD AUTO: 0.56 X10*3/UL (ref 0.05–0.8)
MONOCYTES NFR BLD AUTO: 13.9 %
NEUTROPHILS # BLD AUTO: 2.65 X10*3/UL (ref 1.6–5.5)
NEUTROPHILS NFR BLD AUTO: 65.6 %
NRBC BLD-RTO: ABNORMAL /100{WBCS}
PLATELET # BLD AUTO: 144 X10*3/UL (ref 150–450)
RBC # BLD AUTO: 3.81 X10*6/UL (ref 4.5–5.9)
WBC # BLD AUTO: 4 X10*3/UL (ref 4.4–11.3)

## 2024-11-25 PROCEDURE — 84155 ASSAY OF PROTEIN SERUM: CPT | Mod: 59

## 2024-11-25 PROCEDURE — 36415 COLL VENOUS BLD VENIPUNCTURE: CPT

## 2024-11-25 PROCEDURE — 86334 IMMUNOFIX E-PHORESIS SERUM: CPT

## 2024-11-25 PROCEDURE — 80053 COMPREHEN METABOLIC PANEL: CPT

## 2024-11-25 PROCEDURE — 83521 IG LIGHT CHAINS FREE EACH: CPT

## 2024-11-25 PROCEDURE — 96401 CHEMO ANTI-NEOPL SQ/IM: CPT

## 2024-11-25 PROCEDURE — 82784 ASSAY IGA/IGD/IGG/IGM EACH: CPT

## 2024-11-25 PROCEDURE — 2500000004 HC RX 250 GENERAL PHARMACY W/ HCPCS (ALT 636 FOR OP/ED): Mod: JZ,TB | Performed by: INTERNAL MEDICINE

## 2024-11-25 PROCEDURE — 85025 COMPLETE CBC W/AUTO DIFF WBC: CPT

## 2024-11-25 RX ORDER — ALBUTEROL SULFATE 0.83 MG/ML
3 SOLUTION RESPIRATORY (INHALATION) AS NEEDED
Status: DISCONTINUED | OUTPATIENT
Start: 2024-11-25 | End: 2024-11-25 | Stop reason: HOSPADM

## 2024-11-25 RX ORDER — DIPHENHYDRAMINE HYDROCHLORIDE 50 MG/ML
50 INJECTION INTRAMUSCULAR; INTRAVENOUS AS NEEDED
Status: DISCONTINUED | OUTPATIENT
Start: 2024-11-25 | End: 2024-11-25 | Stop reason: HOSPADM

## 2024-11-25 RX ORDER — FAMOTIDINE 10 MG/ML
20 INJECTION INTRAVENOUS ONCE AS NEEDED
Status: DISCONTINUED | OUTPATIENT
Start: 2024-11-25 | End: 2024-11-25 | Stop reason: HOSPADM

## 2024-11-25 RX ORDER — EPINEPHRINE 0.3 MG/.3ML
0.3 INJECTION SUBCUTANEOUS EVERY 5 MIN PRN
Status: DISCONTINUED | OUTPATIENT
Start: 2024-11-25 | End: 2024-11-25 | Stop reason: HOSPADM

## 2024-11-25 RX ORDER — PROCHLORPERAZINE EDISYLATE 5 MG/ML
10 INJECTION INTRAMUSCULAR; INTRAVENOUS EVERY 6 HOURS PRN
Status: DISCONTINUED | OUTPATIENT
Start: 2024-11-25 | End: 2024-11-25 | Stop reason: HOSPADM

## 2024-11-25 RX ORDER — PROCHLORPERAZINE MALEATE 10 MG
10 TABLET ORAL EVERY 6 HOURS PRN
Status: DISCONTINUED | OUTPATIENT
Start: 2024-11-25 | End: 2024-11-25 | Stop reason: HOSPADM

## 2024-11-25 ASSESSMENT — PAIN SCALES - GENERAL: PAINLEVEL_OUTOF10: 0-NO PAIN

## 2024-11-25 NOTE — PROGRESS NOTES
Teclistamab     - discussed ivig- has opted to post pone infusion - would like next week pending this weeks ivig level-    Pt feels much better and has stopped paxlovid on Saturday- there was interaction with his trazodone, he has been self limiting dose- ( has had trouble sleeping) discussed with pharmacy and then discussed with  patient advise he reached out to to provider ( PCP to check if he is ok to increase to regular dose as half life is longer on paxlovid and still may cause interaction

## 2024-11-26 LAB
ALBUMIN SERPL BCP-MCNC: 4.4 G/DL (ref 3.4–5)
ALP SERPL-CCNC: 64 U/L (ref 33–136)
ALT SERPL W P-5'-P-CCNC: 16 U/L (ref 10–52)
ANION GAP SERPL CALC-SCNC: 15 MMOL/L (ref 10–20)
AST SERPL W P-5'-P-CCNC: 17 U/L (ref 9–39)
BILIRUB SERPL-MCNC: 0.6 MG/DL (ref 0–1.2)
BUN SERPL-MCNC: 19 MG/DL (ref 6–23)
CALCIUM SERPL-MCNC: 9.3 MG/DL (ref 8.6–10.6)
CHLORIDE SERPL-SCNC: 108 MMOL/L (ref 98–107)
CO2 SERPL-SCNC: 25 MMOL/L (ref 21–32)
CREAT SERPL-MCNC: 1.18 MG/DL (ref 0.5–1.3)
EGFRCR SERPLBLD CKD-EPI 2021: 64 ML/MIN/1.73M*2
GLUCOSE SERPL-MCNC: 107 MG/DL (ref 74–99)
IGA SERPL-MCNC: <7 MG/DL (ref 70–400)
IGG SERPL-MCNC: 427 MG/DL (ref 700–1600)
IGM SERPL-MCNC: 8 MG/DL (ref 40–230)
KAPPA LC SERPL-MCNC: 0.09 MG/DL (ref 0.33–1.94)
KAPPA LC/LAMBDA SER: ABNORMAL {RATIO}
LAMBDA LC SERPL-MCNC: <0.17 MG/DL (ref 0.57–2.63)
POTASSIUM SERPL-SCNC: 4.6 MMOL/L (ref 3.5–5.3)
PROT SERPL-MCNC: 5.9 G/DL (ref 6.4–8.2)
PROT SERPL-MCNC: 5.9 G/DL (ref 6.4–8.2)
SODIUM SERPL-SCNC: 143 MMOL/L (ref 136–145)

## 2024-12-02 ENCOUNTER — HOSPITAL ENCOUNTER (INPATIENT)
Facility: HOSPITAL | Age: 74
LOS: 1 days | Discharge: HOME | DRG: 194 | End: 2024-12-03
Attending: STUDENT IN AN ORGANIZED HEALTH CARE EDUCATION/TRAINING PROGRAM
Payer: MEDICARE

## 2024-12-02 ENCOUNTER — LAB (OUTPATIENT)
Dept: LAB | Facility: CLINIC | Age: 74
DRG: 194 | End: 2024-12-02
Payer: MEDICARE

## 2024-12-02 ENCOUNTER — HOSPITAL ENCOUNTER (OUTPATIENT)
Dept: RADIOLOGY | Facility: CLINIC | Age: 74
Discharge: HOME | End: 2024-12-02
Payer: MEDICARE

## 2024-12-02 ENCOUNTER — OFFICE VISIT (OUTPATIENT)
Dept: HEMATOLOGY/ONCOLOGY | Facility: CLINIC | Age: 74
End: 2024-12-02
Payer: MEDICARE

## 2024-12-02 ENCOUNTER — TELEPHONE (OUTPATIENT)
Dept: HEMATOLOGY/ONCOLOGY | Facility: HOSPITAL | Age: 74
End: 2024-12-02

## 2024-12-02 ENCOUNTER — INFUSION (OUTPATIENT)
Dept: HEMATOLOGY/ONCOLOGY | Facility: CLINIC | Age: 74
End: 2024-12-02
Payer: MEDICARE

## 2024-12-02 VITALS
DIASTOLIC BLOOD PRESSURE: 74 MMHG | BODY MASS INDEX: 33 KG/M2 | HEART RATE: 82 BPM | TEMPERATURE: 99 F | RESPIRATION RATE: 20 BRPM | HEIGHT: 69 IN | OXYGEN SATURATION: 96 % | SYSTOLIC BLOOD PRESSURE: 119 MMHG | WEIGHT: 222.78 LBS

## 2024-12-02 DIAGNOSIS — D84.9 IMMUNOCOMPROMISED: ICD-10-CM

## 2024-12-02 DIAGNOSIS — D80.1 HYPOGAMMAGLOBULINEMIA DUE TO MULTIPLE MYELOMA (MULTI): ICD-10-CM

## 2024-12-02 DIAGNOSIS — C90.00 IGG MULTIPLE MYELOMA (MULTI): Primary | ICD-10-CM

## 2024-12-02 DIAGNOSIS — C90.00 HYPOGAMMAGLOBULINEMIA DUE TO MULTIPLE MYELOMA (MULTI): ICD-10-CM

## 2024-12-02 DIAGNOSIS — R50.9 FEVER, UNSPECIFIED FEVER CAUSE: ICD-10-CM

## 2024-12-02 DIAGNOSIS — J10.1 INFLUENZA A: ICD-10-CM

## 2024-12-02 DIAGNOSIS — C90.00 MULTIPLE MYELOMA WITHOUT REMISSION (MULTI): ICD-10-CM

## 2024-12-02 DIAGNOSIS — C90.00 IGG MULTIPLE MYELOMA (MULTI): ICD-10-CM

## 2024-12-02 DIAGNOSIS — J18.9 ACUTE PNEUMONIA: Primary | ICD-10-CM

## 2024-12-02 DIAGNOSIS — Z92.850 HISTORY OF CHIMERIC ANTIGEN RECEPTOR T-CELL THERAPY: ICD-10-CM

## 2024-12-02 DIAGNOSIS — Z23 NEED FOR PROPHYLACTIC VACCINATION AND INOCULATION AGAINST INFLUENZA: ICD-10-CM

## 2024-12-02 DIAGNOSIS — R05.9 COUGH, UNSPECIFIED TYPE: ICD-10-CM

## 2024-12-02 DIAGNOSIS — Z94.84 STEM CELLS TRANSPLANT STATUS (MULTI): ICD-10-CM

## 2024-12-02 PROBLEM — L03.90 CELLULITIS: Status: RESOLVED | Noted: 2024-02-20 | Resolved: 2024-12-02

## 2024-12-02 PROBLEM — R42 DIZZINESS: Status: RESOLVED | Noted: 2023-12-18 | Resolved: 2024-12-02

## 2024-12-02 PROBLEM — M16.9 OSTEOARTHRITIS OF HIP: Status: ACTIVE | Noted: 2023-03-14

## 2024-12-02 PROBLEM — T40.2X5A THERAPEUTIC OPIOID INDUCED CONSTIPATION: Status: RESOLVED | Noted: 2023-11-21 | Resolved: 2024-12-02

## 2024-12-02 PROBLEM — T40.2X5A CONSTIPATION DUE TO OPIOID THERAPY: Status: RESOLVED | Noted: 2023-11-21 | Resolved: 2024-12-02

## 2024-12-02 PROBLEM — I82.402 ACUTE THROMBOEMBOLISM OF DEEP VEINS OF LEFT LOWER EXTREMITY (MULTI): Status: RESOLVED | Noted: 2023-03-14 | Resolved: 2024-12-02

## 2024-12-02 PROBLEM — K59.03 THERAPEUTIC OPIOID INDUCED CONSTIPATION: Status: RESOLVED | Noted: 2023-11-21 | Resolved: 2024-12-02

## 2024-12-02 PROBLEM — M84.433A: Status: RESOLVED | Noted: 2023-11-12 | Resolved: 2024-12-02

## 2024-12-02 PROBLEM — K59.03 CONSTIPATION DUE TO OPIOID THERAPY: Status: RESOLVED | Noted: 2023-11-21 | Resolved: 2024-12-02

## 2024-12-02 PROBLEM — M84.353A STRESS FRACTURE OF NECK OF FEMUR: Status: RESOLVED | Noted: 2023-03-14 | Resolved: 2024-12-02

## 2024-12-02 LAB
ALBUMIN SERPL BCP-MCNC: 4.1 G/DL (ref 3.4–5)
ALP SERPL-CCNC: 69 U/L (ref 33–136)
ALT SERPL W P-5'-P-CCNC: 25 U/L (ref 10–52)
ANION GAP SERPL CALC-SCNC: 15 MMOL/L (ref 10–20)
AST SERPL W P-5'-P-CCNC: 24 U/L (ref 9–39)
BASOPHILS # BLD AUTO: 0.01 X10*3/UL (ref 0–0.1)
BASOPHILS NFR BLD AUTO: 0.2 %
BILIRUB SERPL-MCNC: 0.7 MG/DL (ref 0–1.2)
BUN SERPL-MCNC: 21 MG/DL (ref 6–23)
CALCIUM SERPL-MCNC: 8.7 MG/DL (ref 8.6–10.6)
CHLORIDE SERPL-SCNC: 108 MMOL/L (ref 98–107)
CO2 SERPL-SCNC: 24 MMOL/L (ref 21–32)
CREAT SERPL-MCNC: 1.25 MG/DL (ref 0.5–1.3)
CRP SERPL-MCNC: 0.66 MG/DL
EGFRCR SERPLBLD CKD-EPI 2021: 60 ML/MIN/1.73M*2
EOSINOPHIL # BLD AUTO: 0.07 X10*3/UL (ref 0–0.4)
EOSINOPHIL NFR BLD AUTO: 1.3 %
ERYTHROCYTE [DISTWIDTH] IN BLOOD BY AUTOMATED COUNT: 13.9 % (ref 11.5–14.5)
FERRITIN SERPL-MCNC: 145 NG/ML (ref 20–300)
GLUCOSE SERPL-MCNC: 118 MG/DL (ref 74–99)
HCT VFR BLD AUTO: 37 % (ref 41–52)
HGB BLD-MCNC: 12.6 G/DL (ref 13.5–17.5)
HOLD SPECIMEN: NORMAL
IGA SERPL-MCNC: <7 MG/DL (ref 70–400)
IGG SERPL-MCNC: 359 MG/DL (ref 700–1600)
IGM SERPL-MCNC: 6 MG/DL (ref 40–230)
IMM GRANULOCYTES # BLD AUTO: 0.02 X10*3/UL (ref 0–0.5)
IMM GRANULOCYTES NFR BLD AUTO: 0.4 % (ref 0–0.9)
LDH SERPL L TO P-CCNC: 151 U/L (ref 84–246)
LYMPHOCYTES # BLD AUTO: 0.59 X10*3/UL (ref 0.8–3)
LYMPHOCYTES NFR BLD AUTO: 10.5 %
MCH RBC QN AUTO: 33.8 PG (ref 26–34)
MCHC RBC AUTO-ENTMCNC: 34.1 G/DL (ref 32–36)
MCV RBC AUTO: 99 FL (ref 80–100)
MONOCYTES # BLD AUTO: 0.62 X10*3/UL (ref 0.05–0.8)
MONOCYTES NFR BLD AUTO: 11.1 %
NEUTROPHILS # BLD AUTO: 4.29 X10*3/UL (ref 1.6–5.5)
NEUTROPHILS NFR BLD AUTO: 76.5 %
NRBC BLD-RTO: ABNORMAL /100{WBCS}
PLATELET # BLD AUTO: 114 X10*3/UL (ref 150–450)
POTASSIUM SERPL-SCNC: 4.5 MMOL/L (ref 3.5–5.3)
PROT SERPL-MCNC: 5.7 G/DL (ref 6.4–8.2)
PROT SERPL-MCNC: 5.7 G/DL (ref 6.4–8.2)
RBC # BLD AUTO: 3.73 X10*6/UL (ref 4.5–5.9)
SODIUM SERPL-SCNC: 142 MMOL/L (ref 136–145)
WBC # BLD AUTO: 5.6 X10*3/UL (ref 4.4–11.3)

## 2024-12-02 PROCEDURE — 87075 CULTR BACTERIA EXCEPT BLOOD: CPT | Mod: 59

## 2024-12-02 PROCEDURE — 85025 COMPLETE CBC W/AUTO DIFF WBC: CPT

## 2024-12-02 PROCEDURE — 96365 THER/PROPH/DIAG IV INF INIT: CPT | Mod: INF

## 2024-12-02 PROCEDURE — 36415 COLL VENOUS BLD VENIPUNCTURE: CPT

## 2024-12-02 PROCEDURE — 96366 THER/PROPH/DIAG IV INF ADDON: CPT | Mod: INF

## 2024-12-02 PROCEDURE — 1123F ACP DISCUSS/DSCN MKR DOCD: CPT

## 2024-12-02 PROCEDURE — 96367 TX/PROPH/DG ADDL SEQ IV INF: CPT

## 2024-12-02 PROCEDURE — 84165 PROTEIN E-PHORESIS SERUM: CPT

## 2024-12-02 PROCEDURE — 2500000004 HC RX 250 GENERAL PHARMACY W/ HCPCS (ALT 636 FOR OP/ED)

## 2024-12-02 PROCEDURE — 99223 1ST HOSP IP/OBS HIGH 75: CPT | Performed by: STUDENT IN AN ORGANIZED HEALTH CARE EDUCATION/TRAINING PROGRAM

## 2024-12-02 PROCEDURE — 87040 BLOOD CULTURE FOR BACTERIA: CPT

## 2024-12-02 PROCEDURE — 2500000001 HC RX 250 WO HCPCS SELF ADMINISTERED DRUGS (ALT 637 FOR MEDICARE OP)

## 2024-12-02 PROCEDURE — 87631 RESP VIRUS 3-5 TARGETS: CPT

## 2024-12-02 PROCEDURE — 86140 C-REACTIVE PROTEIN: CPT

## 2024-12-02 PROCEDURE — 82784 ASSAY IGA/IGD/IGG/IGM EACH: CPT

## 2024-12-02 PROCEDURE — G0378 HOSPITAL OBSERVATION PER HR: HCPCS

## 2024-12-02 PROCEDURE — 99215 OFFICE O/P EST HI 40 MIN: CPT | Mod: 25

## 2024-12-02 PROCEDURE — 87634 RSV DNA/RNA AMP PROBE: CPT

## 2024-12-02 PROCEDURE — 2500000001 HC RX 250 WO HCPCS SELF ADMINISTERED DRUGS (ALT 637 FOR MEDICARE OP): Performed by: PHYSICIAN ASSISTANT

## 2024-12-02 PROCEDURE — 86334 IMMUNOFIX E-PHORESIS SERUM: CPT

## 2024-12-02 PROCEDURE — 83615 LACTATE (LD) (LDH) ENZYME: CPT

## 2024-12-02 PROCEDURE — G2211 COMPLEX E/M VISIT ADD ON: HCPCS

## 2024-12-02 PROCEDURE — 83521 IG LIGHT CHAINS FREE EACH: CPT

## 2024-12-02 PROCEDURE — 71046 X-RAY EXAM CHEST 2 VIEWS: CPT

## 2024-12-02 PROCEDURE — 71046 X-RAY EXAM CHEST 2 VIEWS: CPT | Performed by: RADIOLOGY

## 2024-12-02 PROCEDURE — 82728 ASSAY OF FERRITIN: CPT

## 2024-12-02 PROCEDURE — 99215 OFFICE O/P EST HI 40 MIN: CPT

## 2024-12-02 PROCEDURE — 84155 ASSAY OF PROTEIN SERUM: CPT

## 2024-12-02 PROCEDURE — 1170000001 HC PRIVATE ONCOLOGY ROOM DAILY

## 2024-12-02 PROCEDURE — 87486 CHLMYD PNEUM DNA AMP PROBE: CPT

## 2024-12-02 PROCEDURE — 80053 COMPREHEN METABOLIC PANEL: CPT

## 2024-12-02 PROCEDURE — 96375 TX/PRO/DX INJ NEW DRUG ADDON: CPT | Mod: INF

## 2024-12-02 RX ORDER — ACETAMINOPHEN 325 MG/1
650 TABLET ORAL ONCE
Status: COMPLETED | OUTPATIENT
Start: 2024-12-02 | End: 2024-12-02

## 2024-12-02 RX ORDER — ALBUTEROL SULFATE 0.83 MG/ML
3 SOLUTION RESPIRATORY (INHALATION) AS NEEDED
OUTPATIENT
Start: 2024-12-09

## 2024-12-02 RX ORDER — ACYCLOVIR 400 MG/1
400 TABLET ORAL 2 TIMES DAILY
Status: DISCONTINUED | OUTPATIENT
Start: 2024-12-02 | End: 2024-12-03 | Stop reason: HOSPADM

## 2024-12-02 RX ORDER — TRAZODONE HYDROCHLORIDE 50 MG/1
300 TABLET ORAL NIGHTLY
Status: DISCONTINUED | OUTPATIENT
Start: 2024-12-02 | End: 2024-12-03 | Stop reason: HOSPADM

## 2024-12-02 RX ORDER — FAMOTIDINE 10 MG/ML
20 INJECTION INTRAVENOUS ONCE AS NEEDED
Status: DISCONTINUED | OUTPATIENT
Start: 2024-12-02 | End: 2024-12-02 | Stop reason: HOSPADM

## 2024-12-02 RX ORDER — ENOXAPARIN SODIUM 100 MG/ML
40 INJECTION SUBCUTANEOUS EVERY 24 HOURS
Status: DISCONTINUED | OUTPATIENT
Start: 2024-12-02 | End: 2024-12-03 | Stop reason: HOSPADM

## 2024-12-02 RX ORDER — FAMOTIDINE 10 MG/ML
20 INJECTION INTRAVENOUS ONCE AS NEEDED
OUTPATIENT
Start: 2024-12-09

## 2024-12-02 RX ORDER — SULFAMETHOXAZOLE AND TRIMETHOPRIM 800; 160 MG/1; MG/1
1 TABLET ORAL 3 TIMES WEEKLY
Status: DISCONTINUED | OUTPATIENT
Start: 2024-12-04 | End: 2024-12-03 | Stop reason: HOSPADM

## 2024-12-02 RX ORDER — DIPHENHYDRAMINE HYDROCHLORIDE 50 MG/ML
25 INJECTION INTRAMUSCULAR; INTRAVENOUS ONCE
Status: COMPLETED | OUTPATIENT
Start: 2024-12-02 | End: 2024-12-02

## 2024-12-02 RX ORDER — EPINEPHRINE 0.3 MG/.3ML
0.3 INJECTION SUBCUTANEOUS EVERY 5 MIN PRN
OUTPATIENT
Start: 2024-12-09

## 2024-12-02 RX ORDER — FERROUS SULFATE, DRIED 160(50) MG
1 TABLET, EXTENDED RELEASE ORAL DAILY
Status: DISCONTINUED | OUTPATIENT
Start: 2024-12-03 | End: 2024-12-03 | Stop reason: HOSPADM

## 2024-12-02 RX ORDER — EPINEPHRINE 0.3 MG/.3ML
0.3 INJECTION SUBCUTANEOUS EVERY 5 MIN PRN
Status: DISCONTINUED | OUTPATIENT
Start: 2024-12-02 | End: 2024-12-02 | Stop reason: HOSPADM

## 2024-12-02 RX ORDER — HEPARIN 100 UNIT/ML
500 SYRINGE INTRAVENOUS AS NEEDED
OUTPATIENT
Start: 2024-12-02

## 2024-12-02 RX ORDER — FAMOTIDINE 10 MG/ML
20 INJECTION INTRAVENOUS ONCE
Start: 2024-12-09 | End: 2024-12-09

## 2024-12-02 RX ORDER — LEVOFLOXACIN 5 MG/ML
750 INJECTION, SOLUTION INTRAVENOUS EVERY 24 HOURS
Status: DISCONTINUED | OUTPATIENT
Start: 2024-12-02 | End: 2024-12-02

## 2024-12-02 RX ORDER — FAMOTIDINE 10 MG/ML
20 INJECTION INTRAVENOUS ONCE
Status: COMPLETED | OUTPATIENT
Start: 2024-12-02 | End: 2024-12-02

## 2024-12-02 RX ORDER — MONTELUKAST SODIUM 10 MG/1
10 TABLET ORAL ONCE
Status: COMPLETED | OUTPATIENT
Start: 2024-12-02 | End: 2024-12-02

## 2024-12-02 RX ORDER — HEPARIN SODIUM,PORCINE/PF 10 UNIT/ML
50 SYRINGE (ML) INTRAVENOUS AS NEEDED
OUTPATIENT
Start: 2024-12-02

## 2024-12-02 RX ORDER — DIPHENHYDRAMINE HYDROCHLORIDE 50 MG/ML
25 INJECTION INTRAMUSCULAR; INTRAVENOUS ONCE
Start: 2024-12-09 | End: 2024-12-09

## 2024-12-02 RX ORDER — LANOLIN ALCOHOL/MO/W.PET/CERES
1000 CREAM (GRAM) TOPICAL DAILY
Status: DISCONTINUED | OUTPATIENT
Start: 2024-12-03 | End: 2024-12-03 | Stop reason: HOSPADM

## 2024-12-02 RX ORDER — DIPHENHYDRAMINE HYDROCHLORIDE 50 MG/ML
50 INJECTION INTRAMUSCULAR; INTRAVENOUS AS NEEDED
OUTPATIENT
Start: 2024-12-09

## 2024-12-02 RX ORDER — MONTELUKAST SODIUM 10 MG/1
10 TABLET ORAL ONCE
Start: 2024-12-09 | End: 2024-12-09

## 2024-12-02 RX ORDER — ACETAMINOPHEN 325 MG/1
650 TABLET ORAL ONCE
OUTPATIENT
Start: 2024-12-09

## 2024-12-02 RX ORDER — LEVOFLOXACIN 750 MG/1
750 TABLET ORAL EVERY 24 HOURS
Status: DISCONTINUED | OUTPATIENT
Start: 2024-12-02 | End: 2024-12-03 | Stop reason: HOSPADM

## 2024-12-02 RX ORDER — HYDROCODONE BITARTRATE AND HOMATROPINE METHYLBROMIDE ORAL SOLUTION 5; 1.5 MG/5ML; MG/5ML
5 LIQUID ORAL EVERY 6 HOURS PRN
Status: DISCONTINUED | OUTPATIENT
Start: 2024-12-02 | End: 2024-12-03 | Stop reason: HOSPADM

## 2024-12-02 RX ORDER — IPRATROPIUM BROMIDE AND ALBUTEROL SULFATE 2.5; .5 MG/3ML; MG/3ML
3 SOLUTION RESPIRATORY (INHALATION) EVERY 6 HOURS PRN
Status: DISCONTINUED | OUTPATIENT
Start: 2024-12-02 | End: 2024-12-03 | Stop reason: HOSPADM

## 2024-12-02 RX ORDER — ALBUTEROL SULFATE 0.83 MG/ML
3 SOLUTION RESPIRATORY (INHALATION) AS NEEDED
Status: DISCONTINUED | OUTPATIENT
Start: 2024-12-02 | End: 2024-12-02 | Stop reason: HOSPADM

## 2024-12-02 RX ORDER — DIPHENHYDRAMINE HYDROCHLORIDE 50 MG/ML
50 INJECTION INTRAMUSCULAR; INTRAVENOUS AS NEEDED
Status: DISCONTINUED | OUTPATIENT
Start: 2024-12-02 | End: 2024-12-02 | Stop reason: HOSPADM

## 2024-12-02 RX ADMIN — LEVOFLOXACIN 750 MG: 750 TABLET, FILM COATED ORAL at 20:18

## 2024-12-02 RX ADMIN — TRAZODONE HYDROCHLORIDE 300 MG: 50 TABLET ORAL at 20:18

## 2024-12-02 RX ADMIN — ACYCLOVIR 400 MG: 400 TABLET ORAL at 20:18

## 2024-12-02 RX ADMIN — HYDROCODONE BITARTRATE AND HOMATROPINE METHYLBROMIDE 5 ML: 5; 1.5 SOLUTION ORAL at 20:18

## 2024-12-02 RX ADMIN — ACETAMINOPHEN 650 MG: 325 TABLET ORAL at 20:18

## 2024-12-02 SDOH — SOCIAL STABILITY: SOCIAL INSECURITY: DO YOU FEEL ANYONE HAS EXPLOITED OR TAKEN ADVANTAGE OF YOU FINANCIALLY OR OF YOUR PERSONAL PROPERTY?: NO

## 2024-12-02 SDOH — ECONOMIC STABILITY: FOOD INSECURITY: WITHIN THE PAST 12 MONTHS, THE FOOD YOU BOUGHT JUST DIDN'T LAST AND YOU DIDN'T HAVE MONEY TO GET MORE.: NEVER TRUE

## 2024-12-02 SDOH — SOCIAL STABILITY: SOCIAL INSECURITY: WITHIN THE LAST YEAR, HAVE YOU BEEN AFRAID OF YOUR PARTNER OR EX-PARTNER?: NO

## 2024-12-02 SDOH — SOCIAL STABILITY: SOCIAL INSECURITY: ABUSE: ADULT

## 2024-12-02 SDOH — SOCIAL STABILITY: SOCIAL INSECURITY: WITHIN THE LAST YEAR, HAVE YOU BEEN HUMILIATED OR EMOTIONALLY ABUSED IN OTHER WAYS BY YOUR PARTNER OR EX-PARTNER?: NO

## 2024-12-02 SDOH — SOCIAL STABILITY: SOCIAL INSECURITY: ARE THERE ANY APPARENT SIGNS OF INJURIES/BEHAVIORS THAT COULD BE RELATED TO ABUSE/NEGLECT?: NO

## 2024-12-02 SDOH — ECONOMIC STABILITY: INCOME INSECURITY: IN THE PAST 12 MONTHS HAS THE ELECTRIC, GAS, OIL, OR WATER COMPANY THREATENED TO SHUT OFF SERVICES IN YOUR HOME?: NO

## 2024-12-02 SDOH — SOCIAL STABILITY: SOCIAL INSECURITY: WERE YOU ABLE TO COMPLETE ALL THE BEHAVIORAL HEALTH SCREENINGS?: YES

## 2024-12-02 SDOH — HEALTH STABILITY: MENTAL HEALTH: HOW MANY DRINKS CONTAINING ALCOHOL DO YOU HAVE ON A TYPICAL DAY WHEN YOU ARE DRINKING?: PATIENT DOES NOT DRINK

## 2024-12-02 SDOH — HEALTH STABILITY: MENTAL HEALTH: HOW OFTEN DO YOU HAVE A DRINK CONTAINING ALCOHOL?: NEVER

## 2024-12-02 SDOH — SOCIAL STABILITY: SOCIAL INSECURITY: DOES ANYONE TRY TO KEEP YOU FROM HAVING/CONTACTING OTHER FRIENDS OR DOING THINGS OUTSIDE YOUR HOME?: NO

## 2024-12-02 SDOH — SOCIAL STABILITY: SOCIAL INSECURITY: HAVE YOU HAD THOUGHTS OF HARMING ANYONE ELSE?: NO

## 2024-12-02 SDOH — ECONOMIC STABILITY: FOOD INSECURITY: WITHIN THE PAST 12 MONTHS, YOU WORRIED THAT YOUR FOOD WOULD RUN OUT BEFORE YOU GOT THE MONEY TO BUY MORE.: NEVER TRUE

## 2024-12-02 SDOH — HEALTH STABILITY: MENTAL HEALTH: HOW OFTEN DO YOU HAVE SIX OR MORE DRINKS ON ONE OCCASION?: NEVER

## 2024-12-02 SDOH — SOCIAL STABILITY: SOCIAL INSECURITY: DO YOU FEEL UNSAFE GOING BACK TO THE PLACE WHERE YOU ARE LIVING?: NO

## 2024-12-02 SDOH — SOCIAL STABILITY: SOCIAL INSECURITY: HAS ANYONE EVER THREATENED TO HURT YOUR FAMILY OR YOUR PETS?: NO

## 2024-12-02 SDOH — SOCIAL STABILITY: SOCIAL INSECURITY: ARE YOU OR HAVE YOU BEEN THREATENED OR ABUSED PHYSICALLY, EMOTIONALLY, OR SEXUALLY BY ANYONE?: NO

## 2024-12-02 ASSESSMENT — COGNITIVE AND FUNCTIONAL STATUS - GENERAL
DAILY ACTIVITIY SCORE: 23
MOBILITY SCORE: 24
PATIENT BASELINE BEDBOUND: NO
MOBILITY SCORE: 24
DAILY ACTIVITIY SCORE: 24
HELP NEEDED FOR BATHING: A LITTLE

## 2024-12-02 ASSESSMENT — ENCOUNTER SYMPTOMS
MUSCULOSKELETAL NEGATIVE: 1
COUGH: 1
DIFFICULTY URINATING: 0
COUGH: 1
EYE PROBLEMS: 0
WHEEZING: 1
CHILLS: 0
DYSURIA: 0
HEADACHES: 0
DIAPHORESIS: 0
FREQUENCY: 0
ABDOMINAL PAIN: 0
DIZZINESS: 1
WHEEZING: 1
SHORTNESS OF BREATH: 1
CARDIOVASCULAR NEGATIVE: 1
SORE THROAT: 0
GASTROINTESTINAL NEGATIVE: 1
VOMITING: 0
CHEST TIGHTNESS: 1
FEVER: 1
LEG SWELLING: 0
LIGHT-HEADEDNESS: 1
HEMATOLOGIC/LYMPHATIC NEGATIVE: 1
NAUSEA: 0
PALPITATIONS: 0
TROUBLE SWALLOWING: 0
DIARRHEA: 0
ENDOCRINE NEGATIVE: 1
CONSTIPATION: 0
SHORTNESS OF BREATH: 1
LIGHT-HEADEDNESS: 1
PSYCHIATRIC NEGATIVE: 1
FATIGUE: 1

## 2024-12-02 ASSESSMENT — PAIN SCALES - GENERAL
PAINLEVEL_OUTOF10: 0 - NO PAIN
PAINLEVEL_OUTOF10: 0 - NO PAIN
PAINLEVEL_OUTOF10: 0-NO PAIN

## 2024-12-02 ASSESSMENT — COLUMBIA-SUICIDE SEVERITY RATING SCALE - C-SSRS
1. IN THE PAST MONTH, HAVE YOU WISHED YOU WERE DEAD OR WISHED YOU COULD GO TO SLEEP AND NOT WAKE UP?: NO
2. HAVE YOU ACTUALLY HAD ANY THOUGHTS OF KILLING YOURSELF?: NO
6. HAVE YOU EVER DONE ANYTHING, STARTED TO DO ANYTHING, OR PREPARED TO DO ANYTHING TO END YOUR LIFE?: NO

## 2024-12-02 ASSESSMENT — LIFESTYLE VARIABLES
SKIP TO QUESTIONS 9-10: 1
AUDIT-C TOTAL SCORE: 0

## 2024-12-02 ASSESSMENT — PATIENT HEALTH QUESTIONNAIRE - PHQ9
1. LITTLE INTEREST OR PLEASURE IN DOING THINGS: NOT AT ALL
SUM OF ALL RESPONSES TO PHQ9 QUESTIONS 1 & 2: 0
2. FEELING DOWN, DEPRESSED OR HOPELESS: NOT AT ALL

## 2024-12-02 ASSESSMENT — ACTIVITIES OF DAILY LIVING (ADL)
JUDGMENT_ADEQUATE_SAFELY_COMPLETE_DAILY_ACTIVITIES: YES
PATIENT'S MEMORY ADEQUATE TO SAFELY COMPLETE DAILY ACTIVITIES?: YES
DRESSING YOURSELF: INDEPENDENT
GROOMING: INDEPENDENT
HEARING - LEFT EAR: FUNCTIONAL
ASSISTIVE_DEVICE: CONTACTS
WALKS IN HOME: INDEPENDENT
BATHING: INDEPENDENT
LACK_OF_TRANSPORTATION: NO
TOILETING: INDEPENDENT
ADEQUATE_TO_COMPLETE_ADL: YES
HEARING - RIGHT EAR: FUNCTIONAL
FEEDING YOURSELF: INDEPENDENT

## 2024-12-02 ASSESSMENT — PAIN - FUNCTIONAL ASSESSMENT: PAIN_FUNCTIONAL_ASSESSMENT: 0-10

## 2024-12-02 NOTE — PROGRESS NOTES
Patient ID: Maldonado Mccloud is a 74 y.o. male.  Referring Physician: No referring provider defined for this encounter.  Primary Care Provider: Timothy Almodovar MD    Interval hx:  Cuong presents to clinic 24 for a sick visit with his wife Farida.    Since his last visit he completed treatment for COVID with Paxlovid. He says that his grandson was diagnosed with the flu on Friday whom he was around on .     He had a fever overnight and is febrile upon presentation to the infusion center. Endorses worsening cough. Some dyspnea on exertion. Will work up for infection and direct admit.     Review of Systems   Constitutional:  Positive for fatigue.   HENT:  Negative.     Respiratory:  Positive for cough, shortness of breath and wheezing.    Cardiovascular: Negative.    Gastrointestinal: Negative.    Endocrine: Negative.    Genitourinary: Negative.     Musculoskeletal: Negative.    Skin: Negative.    Neurological:  Positive for dizziness and light-headedness.   Hematological: Negative.    Psychiatric/Behavioral: Negative.       PMHx:  1) Multiple myeloma (see below)  2) Squamous cell ca's skin  3) DVT, LLE ; now off AC  4) Neuropathy  5) S/p right radius fracture 2023  6) HTN  7) Afib (transient in  w/stem cell collection)    FamHx  Dad  of pan ca age 92    SocHx:   w/5 kids; smokes cigars; no cigs in 50 years; no EtOH or illicit drugs. Was in [employer] in [place]    Meds:  Were reviewed w/the pt  All:  [employer]  PCN  Zosyn    Physical Exam  Physical Exam  Constitutional:       Appearance: Normal appearance. He is normal weight. He is ill-appearing.   HENT:      Head: Normocephalic and atraumatic.      Nose: Congestion present.      Mouth/Throat:      Mouth: Mucous membranes are moist.      Pharynx: Oropharynx is clear.   Eyes:      Conjunctiva/sclera: Conjunctivae normal.      Pupils: Pupils are equal, round, and reactive to light.   Cardiovascular:      Rate and Rhythm: Normal rate and  "regular rhythm.      Pulses: Normal pulses.      Heart sounds: Normal heart sounds.   Pulmonary:      Effort: Pulmonary effort is normal.      Breath sounds: Wheezing present.   Abdominal:      General: Abdomen is flat. Bowel sounds are normal.      Palpations: Abdomen is soft.   Musculoskeletal:         General: Normal range of motion.      Cervical back: Normal range of motion and neck supple.   Skin:     General: Skin is warm and dry.   Neurological:      General: No focal deficit present.      Mental Status: He is alert and oriented to person, place, and time. Mental status is at baseline.   Psychiatric:         Mood and Affect: Mood normal.         Behavior: Behavior normal.         Thought Content: Thought content normal.         Judgment: Judgment normal.            11/11/2024     8:35 AM 11/11/2024     9:05 AM 11/11/2024     9:09 AM 11/14/2024     8:35 AM 11/18/2024     8:50 AM 11/25/2024    10:10 AM 12/2/2024     9:42 AM   Vitals   Systolic 114 113 80 112 109 121 103   Diastolic 77 74 60 81 79 87 77   BP Location    Left arm      Heart Rate 90 92 88 90 81 77 107   Temp 36.9 °C (98.4 °F)   36.3 °C (97.3 °F) 36.7 °C (98.1 °F) 36.7 °C (98.1 °F) 38 °C (100.4 °F)   Resp 16   16 18 16 22   Height 1.759 m (5' 9.25\")     1.759 m (5' 9.25\") 1.759 m (5' 9.25\") 1.759 m (5' 9.25\")   Weight (lb) 216.49   219.14 220.57 217.81 222.78   BMI 31.74 kg/m2   32.13 kg/m2 32.34 kg/m2 31.93 kg/m2 32.66 kg/m2   BSA (m2) 2.19 m2   2.2 m2 2.21 m2 2.2 m2 2.22 m2   Visit Report    Report          Significant value     Performance Status:  Symptomatic; fully ambulatory    Assessment/recommendations:  74 year old man w/a hx of LLE DVT (2022, now off AC), HTN, neuropathy, multiple skin cancers and multiply relapsed multiple myeloma [presented with right chest 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; Bone marrow biopsy suggested multiple myeloma IgG kappa, ISS Stage II, bone survey revealed lytic lesions; Urine light chains " present kappa restricted with 2gm proteinuria; s/p VRD x 3 cycles with CR; then s/p auto SCTx 12/23/15 complicated by orthostatic hypotension, electrolyte replacement, drug induced rash, culture negative fever, then s/p approximately 5 weeks of maintenance Revlimid 10 mg daily which was held for worsening neuropathy in August 2016; then in 3/ 2018: IgG M Braxton alexei to 0.2gm/L and light chain alexei was; restarted on Revlimid maintenance at 5mg every other day, then revlimid stopped in 3/2021 due to stable disease; then in 7/2021 his M spike was on the rise, and was enrolled onto Vactosertib/Pom trial on 8/5/2021; then progressed in 6/2022, switched to Isatuximab/Carfilzomib on 6/30/22, then in 9/2022 progressed and was switched to Cytoxan/Carfilzomib; then had therapy placed on hold in 11/2022 due to need for hip replacement surgery; myeloma markers were on the rise in 3/2023 with a new jaw lesion, s/p XRT 4/2023 and resume 2x/week carfilzomib; then s/p CAR T w/ABECMA (T=0, 10/25/23), hospital course complicated by grade 1 ICANS managed w/ Dex 10mg x3 days and 1 dose tociluzimab; then w/progression and started on teclistamab, full dosing given on 10/13/24] here for sick visit. Will hold dose 12/2.     As for relapsed myeloma:  He has been continued on weekly teclistamab and tolerating this well, so far only has had an early episode of grade I CRS (cx neg low grade fever x 1, not recurred). He has not had any other toxicities thus far. In addition, there has been a biochemical response already: serum free kappa decreasing from 15.6mg/dL (= 156mg/L) to 1.33mg/dL (13.3mg/L), as of 11/4/24.     Dose held 12/2 due to concern for infection..    Infection ppx:  Cont acyclovir and bactrim ppx; monitor IgG levels and  we re-started ppx IVIG (NOTE: he should receive Gammunex -C, d/t prior severe reaction to gammard; last dose of IVIG was on 6/24/24; and then was restarted on 10/21/24).    As for sens NP:  No effect w/cymbalta;  will have him stop this medication (taper off over 2 weeks), as it may also be causing dizziness. I offered adding gabapentin but the pt and his wife declined this, as he is really is not having any associated pain.     As for fever:  Lance blood cultures x2, UA/UC, will have a CXR. Given 1x dose of Cefepime 2g. Also lance CRP, LDH, ferritin to rule out CRS r/t Teclistamab. Given IVIG 12/2. Direct admission.    RTC:  Post discharge

## 2024-12-02 NOTE — H&P
"History Of Present Illness    Mr. Maldonado Mccloud is a 74 year-old man with a past medical history of LLE DVT (2022, now off AC), HTN, neuropathy, multiple skin cancers, and multiply relapsed IgG kappa, ISS Stage II multiple myeloma (07/2015), most recently on teclistamab (since 10/13/2024), who is admitted with fever and cough. Of note, he was positive for COVID on 11/18/24 and was treated with nirm?trelvir/rit??avir.    He says that his symptoms resolved completely from his COVID, but he has had fever and cough for about the last 24 hours after he saw his grandson on Thanksgiving who was later found to be positive for influenza.     He did have a temperature at home, about 101.3 F, but denies racing or pounding heart. He does have some cough -- not very productive. No sore throat. He does have a rhinorrhea. No itchy eyes. No chills or rigors.     He does get some dyspnea with his coughing fits. Otherwise, however, no dyspnea with mild exertion unless coughing. Denies  history of ashtha or COPD.     He's been eating okay. Denies nausea/vomiting/diarrhea. Denies constipation. No rashes.     Hem does have some dizziness with standing -- this happens pretty frequently and can be pretty severe. His face goes \"white as a sheet.\"  He says that he makes \"a valiant effort\" with his fluids but every now and then he'll \"flop.\" He was not feeling the best today, so he thinks his total fluid intake was down.     Review of Systems   Constitutional:  Positive for fever. Negative for chills and diaphoresis.   HENT:   Negative for sore throat and trouble swallowing.    Eyes:  Negative for eye problems.   Respiratory:  Positive for chest tightness, cough, shortness of breath (on exertion) and wheezing.    Cardiovascular:  Negative for chest pain, leg swelling and palpitations.   Gastrointestinal:  Negative for abdominal pain, constipation, diarrhea, nausea and vomiting.   Genitourinary:  Negative for difficulty urinating, dysuria and " frequency.    Skin:  Negative for rash.   Neurological:  Positive for light-headedness (on standing). Negative for headaches.   All other systems reviewed and are negative.      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 Bone marrow biopsy suggested multiple myeloma IgG kappa, ISS Stage II, bone survey revealed lytic lesions; Urine light chains present kappa restricted with 2gm proteinuria.  No adverse prognostic factors identified. Karyotype XY,   FISH neg for 1q abnormalities, trisomy 3,7,11, t(4;14), del 17p, del 13q    Treatment Hx:    VRD  Initiated Aug 2015; currently s/p 3 cycles.  BMBx 10/2015 complete response.    Stem Cell Transplant  Underwent stem cell mobilization with Neupogen/Plerixafor and 2 day collection December 16-17, 2015, for 8.62 CD34 x 10^6/kg. During procedure for right IJ Trialysis placement developed A. Fib with RVR which spontaneously resolved on December 14, 2015. He was admitted and placed on monitor for stem cell collection.     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  Hospitalization complicated by orthostatic hypotension, electrolyte replacement, drug induced rash, culture negative fever.      He completed approximately 5 weeks of maintenance Revlimid 10 mg daily which was held for worsening neuropathy August 2016.    3/ 2018: IgG M Braxton rising to 0.2gm/DL and light chain rising; restarted on Revlimid maintenance at 5mg EOD.     Revlimid stopped in 3/2021 due to stable disease.    Vacto/Pom Trial  7/2021 his M spike was on the rise. He was consented for Vacto/Pom trial which he began on 8/5/2021. He continued Pom post finishing the trial.     Sarclisa/Kyprolis  Myeloma markers increasing in 6/2022, consented for Sarclisa/Kyprolis, which began 6/30/22. He continued this through September of 2022.        Cytoxan/Kyprolis  His therapy was subsequently changed in 9/2022 due to continued disease progression in the face of Sarclisa. His therapy was changed to CYYCLON regimen (Cytoxan/Kyprolis).    PET CT scan 9/14/2022 shows FDG avid lytic lesion within the left midshaft clavicle with pathologic fracture, FDG avid lytic lesions involving bilateral scapulas and FDG avidity within the T9 vertebral body, suggestive of active multiple myeloma.  There are non FDG avid lytic lesions in the right iliac bone and right clavicle, likely representing nonactive multiple myeloma.  There is no PET evidence of extramedullary involvement.  There is an FDG avid right thyroid nodule.     MRI in 10/2022 negative for myelomatous involvement in thoracic vertebrae.     Therapy placed on hold in 11/2022 due to need for hip replacement surgery. His myeloma markers were on the rise in 3/2023 with a new jaw lesion. Plan is to begin radiation therapy 4/11 and resume 2x/week Kyprolis 4/10.     Tracking to CAR T cell therapy. Collected CAR T cells on 5/26/23.     X-ray of L elbow (6/20/23):  IMPRESSION:  1. Osteolytic lesions in the proximal radius and distal humerus  consistent with patient's reported history of multiple myeloma.  2. No acute fracture or malalignment.    Began radiation x8 fractions on 7/3/23.    Resumed Cytoxan as of 8/7/23.       CAR T Cell Therapy   Admitted for CAR T cell therapy for his ISS stage 2 Kappa multiple myeloma with ABECMA T=0, 10/25/23     Hospital course complicated by Grade 1 CRS and Grade 1 ICANS, managed with dexamethasone 10mg x 3 days and one dose of tocilizumab.     Post CAR T restaging:    PET/CT (1/1/9/24):  IMPRESSION:  1. Multiple FDG avid lytic lesions as described above are stable in  size and FDG avidity when compared to prior study, consistent with  known multiple myeloma.  2. No PET evidence of extramedullary disease.  3. Mild FDG avid focus in the right thyroid gland, further evaluation  with  thyroid ultrasound is recommended.    BMBx (2/5/24): --No discrete plasma cell population identified.   --No immunophenotypic evidence of a lymphoproliferative disorder.   --No increased blast population.  Clonoseq = 0 (see media)    PET CT 7/25/24: 1. Overall stable multiple minimal or non FDG avid lytic lesions throughout the axial and appendicular skeleton as described above,  consistent with known multiple myeloma..  2. No new FDG avid multiple myeloma. No PET evidence of  extramedullary disease.     IgG multiple myeloma (Multi)   7/1/2015 Initial Diagnosis    IgG multiple myeloma (CMS/HCC)     12/23/2015 -  Bone Marrow Transplant    Autologous Stem Cell Transplant      11/4/2022 - 11/4/2022 Chemotherapy    Carfilzomib (Weekly) / Cyclophosphamide / Thalidomide / Dexamethasone, 28 Day Cycles     10/25/2023 -  Cellular Therapy    Abecma     10/9/2024 -  Chemotherapy    Teclistamab (Weekly), 28 Day Cycles          Past Medical History  Past Medical History:   Diagnosis Date    Acute deep vein thrombosis (DVT) of left lower extremity (Multi) 06/20/2022    Autonomic orthostatic hypotension 03/14/2023    Bursitis of left shoulder 08/22/2023    Cervical stenosis of spine 03/14/2023    Decreased GFR 03/14/2023    Erectile dysfunction 03/14/2023    Fracture of clavicle, left, closed 03/14/2023    Pathological fracture of right radius 11/12/2023    Personal history of malignant melanoma of skin 11/20/2015    History of malignant melanoma of skin       Surgical History  Past Surgical History:   Procedure Laterality Date    IR CVC TUNNELED  12/14/2015    IR CVC TUNNELED 12/14/2015 [facility]    IR CVC TUNNELED  5/26/2023    IR CVC TUNNELED 5/26/2023 [facility]    OTHER SURGICAL HISTORY  10/23/2015    Thorax Partial Excision Of A Rib    TOTAL KNEE ARTHROPLASTY  04/17/2015    Knee Replacement        Social History  He reports that he has been smoking cigars. He has never used smokeless tobacco. He reports that he does not  currently use alcohol. He reports that he does not use drugs.    Family History  Family History   Problem Relation Name Age of Onset    Osteoporosis Mother      Diabetes Father      Heart disease Father      Pancreatic cancer Father      Squamous cell carcinoma Father          Allergies  Gammagard liquid [immun glob g(igg)-gly-iga ov50], Penicillins, and Piperacillin-tazobactam     Physical Exam  HENT:      Mouth/Throat:      Mouth: Mucous membranes are moist.      Pharynx: No posterior oropharyngeal erythema.   Eyes:      General: No scleral icterus.     Extraocular Movements: Extraocular movements intact.      Pupils: Pupils are equal, round, and reactive to light.   Cardiovascular:      Rate and Rhythm: Normal rate and regular rhythm.      Heart sounds: No murmur heard.     No friction rub. No gallop.   Pulmonary:      Effort: Pulmonary effort is normal. No respiratory distress.      Breath sounds: No stridor. Wheezing present. No rhonchi or rales.   Abdominal:      General: There is no distension.      Palpations: There is no mass.      Tenderness: There is no abdominal tenderness. There is no guarding or rebound.   Musculoskeletal:         General: No swelling or deformity.      Right lower leg: No edema.      Left lower leg: No edema.   Lymphadenopathy:      Cervical: No cervical adenopathy.   Skin:     Findings: No lesion or rash.   Neurological:      Mental Status: He is alert.      Cranial Nerves: No cranial nerve deficit.      Motor: No weakness.   Psychiatric:         Mood and Affect: Mood normal.         Behavior: Behavior normal.         Last Recorded Vitals  There were no vitals taken for this visit.     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 Maldonado Mccloud is a 74 year-old man with a past medical history of LLE DVT (2022, now off AC), HTN, neuropathy,  multiple skin cancers, and multiply relapsed IgG kappa, ISS Stage II multiple myeloma (07/2015), most recently on teclistamab (since 10/13/2024), who is admitted with fever and cough. Of note, he was positive for COVID on 11/18/24 and was treated with nirm?trelvir/rit??avir.    Active issues (12/02/24):  #Fever #Cough :: likely viral URI vs atypical pneumonia; PCR testing pending, switch antibiotics to levofloxacin, supportive care.   #Dizziness with standing; has known history of orthostasis, will check orthostatic vital signs    ONC  #IgG Kappa Myeloma  +Workup and treatment as above (OncHx)  +Most recently treated with teclistamab (since 10/13/24)  +Most recent SPEP (11/25/24): no M-spike  +Most recent light chains (11/24/25): FKLC 0.09 mg/dL, FLLC 0.17 mg/dL  +Repeat myeloma labs today (12/02/24): pending  -Hold teclistamab today (12/02/24)   - See also #Fever    HEME  #Anemia  #Thrombocytopenia  ::Due to disease and chemotherapy   -Transfuse for Hgb < 7 g/dL, platelets < 10 k/uL  #VTE Prophylaxis  - Enoxaparin 40 mg daily    ID/PULM  #Fever (12/2/24)  #Cough  :: Viral URI vs much less likely CRS  +Vital signs: SpO2 96%, RR 20 on room air  +Viral URI workup (12/2/24): pending  +Blood cultures (12/2/24): pending  +UA (12/2/24): pending  +CXR (12/2/24): pending  +Received cefepime x 1 @ [place] (12/2/24)  -Treat for Community-Acquired Pneumonia with levofloxacin 750 mg daily (12/2- )  -Hycodan 5 mL Q6H PRN for cough  -Duonebs Q6H pRN for wheezing  #Recent COVID-19 (11/18/24)  ::Treated with ?irm?trelvir/rit??avir  #Prophylaxis  -VZV: Continue acyclovir 400 mg PO BID  -PJP: Trimethoprim-sulfamethoxazole 800-160 mg PO 3x weekly     FEN/RENAL  ::Admission weight 99.8 kg (12/02/24)  F None  E As below  N Regular   - Nutrition consult    CARDIO  #Dizziness with standing  #History of autonomic orthostatic hypotension  ::Most likely due to bortezomib +/- volume depletion, acute illness  +Check orthostatic vital  signs -- pending  - Will give 500 mL NS bolus is positive  #Functional measures  +Most recent TTE (9/22/23): low-normal LVEF @ 50%, impaired L diastolic filling    MISC  #Insomnia  - Continue home trazodone 300 mg PO QHS    ACCESS/SUPPORT/DISPO  - FULL CODE  - MD: Refugio  - ACCESS: PIV  - PT/SW    Patient seen and examined with Dr. Michele Mejia PAFahadC  Malignant Hematology (Lymphoma/Myeloma/Autologous SCT) Team

## 2024-12-02 NOTE — PROGRESS NOTES
Patient is here today for IVIG infusion.  Upon arrival, patient had complaints of cough, cold, flu exposure and fever (started yesterday).  Recent COVID infection.  NICK Arechiga CNP at bedside to see patient.  Blood cultures x2, urine culture and viral swab completed.  Will hold weekly Teclistamab today.  Plan for patient to receive 1 dose of IV antibiotics (Cefepime) and IVIG today.  Direct admit requested for SCC4 d/t fever and cough.  Patient and wife verbalized understanding and agreeable with plan.  B/L lung sounds not auscultated, but complaints of SOB with exertion.  Cough is non productive per patient. Denies current chest pain. No acute distress noted.  Patient verbalizes understanding of plan of care. Patient tolerated infusion well.  Ambulated off unit - gait steady.  no complaints  Plan for patient to have Chest Xray on SCC2 and then direct admit to SCC4.  IV in Right AC intact with brisk blood return.  Patient discharged with wife for direct admit.

## 2024-12-03 ENCOUNTER — PHARMACY VISIT (OUTPATIENT)
Dept: PHARMACY | Facility: CLINIC | Age: 74
End: 2024-12-03
Payer: COMMERCIAL

## 2024-12-03 VITALS
TEMPERATURE: 98.2 F | RESPIRATION RATE: 18 BRPM | HEART RATE: 82 BPM | OXYGEN SATURATION: 93 % | SYSTOLIC BLOOD PRESSURE: 144 MMHG | WEIGHT: 220 LBS | HEIGHT: 70 IN | BODY MASS INDEX: 31.5 KG/M2 | DIASTOLIC BLOOD PRESSURE: 83 MMHG

## 2024-12-03 PROBLEM — J10.1 INFLUENZA A: Status: ACTIVE | Noted: 2024-12-03

## 2024-12-03 PROBLEM — Z23 NEED FOR PROPHYLACTIC VACCINATION AND INOCULATION AGAINST INFLUENZA: Status: ACTIVE | Noted: 2024-12-03

## 2024-12-03 LAB
ALBUMIN SERPL BCP-MCNC: 3.6 G/DL (ref 3.4–5)
ANION GAP SERPL CALC-SCNC: 11 MMOL/L (ref 10–20)
BASOPHILS # BLD AUTO: 0.01 X10*3/UL (ref 0–0.1)
BASOPHILS NFR BLD AUTO: 0.2 %
BUN SERPL-MCNC: 20 MG/DL (ref 6–23)
CALCIUM SERPL-MCNC: 8.6 MG/DL (ref 8.6–10.6)
CHLORIDE SERPL-SCNC: 108 MMOL/L (ref 98–107)
CO2 SERPL-SCNC: 23 MMOL/L (ref 21–32)
CREAT SERPL-MCNC: 1.25 MG/DL (ref 0.5–1.3)
EGFRCR SERPLBLD CKD-EPI 2021: 60 ML/MIN/1.73M*2
EOSINOPHIL # BLD AUTO: 0.03 X10*3/UL (ref 0–0.4)
EOSINOPHIL NFR BLD AUTO: 0.7 %
ERYTHROCYTE [DISTWIDTH] IN BLOOD BY AUTOMATED COUNT: 13.8 % (ref 11.5–14.5)
FLUAV RNA RESP QL NAA+PROBE: DETECTED
FLUBV RNA RESP QL NAA+PROBE: NOT DETECTED
GLUCOSE SERPL-MCNC: 110 MG/DL (ref 74–99)
HADV DNA SPEC QL NAA+PROBE: NOT DETECTED
HCT VFR BLD AUTO: 31.4 % (ref 41–52)
HGB BLD-MCNC: 11.1 G/DL (ref 13.5–17.5)
HMPV RNA SPEC QL NAA+PROBE: NOT DETECTED
HPIV1 RNA SPEC QL NAA+PROBE: NOT DETECTED
HPIV2 RNA SPEC QL NAA+PROBE: NOT DETECTED
HPIV3 RNA SPEC QL NAA+PROBE: NOT DETECTED
HPIV4 RNA SPEC QL NAA+PROBE: NOT DETECTED
IMM GRANULOCYTES # BLD AUTO: 0.05 X10*3/UL (ref 0–0.5)
IMM GRANULOCYTES NFR BLD AUTO: 1.2 % (ref 0–0.9)
KAPPA LC SERPL-MCNC: 0.08 MG/DL (ref 0.33–1.94)
KAPPA LC/LAMBDA SER: ABNORMAL {RATIO}
LAMBDA LC SERPL-MCNC: <0.17 MG/DL (ref 0.57–2.63)
LYMPHOCYTES # BLD AUTO: 0.55 X10*3/UL (ref 0.8–3)
LYMPHOCYTES NFR BLD AUTO: 13.7 %
MCH RBC QN AUTO: 34.8 PG (ref 26–34)
MCHC RBC AUTO-ENTMCNC: 35.4 G/DL (ref 32–36)
MCV RBC AUTO: 98 FL (ref 80–100)
MONOCYTES # BLD AUTO: 0.55 X10*3/UL (ref 0.05–0.8)
MONOCYTES NFR BLD AUTO: 13.7 %
NEUTROPHILS # BLD AUTO: 2.83 X10*3/UL (ref 1.6–5.5)
NEUTROPHILS NFR BLD AUTO: 70.5 %
NRBC BLD-RTO: 0 /100 WBCS (ref 0–0)
PHOSPHATE SERPL-MCNC: 2.8 MG/DL (ref 2.5–4.9)
PLATELET # BLD AUTO: 104 X10*3/UL (ref 150–450)
POTASSIUM SERPL-SCNC: 4.3 MMOL/L (ref 3.5–5.3)
RBC # BLD AUTO: 3.19 X10*6/UL (ref 4.5–5.9)
RHINOVIRUS RNA UPPER RESP QL NAA+PROBE: NOT DETECTED
RSV RNA RESP QL NAA+PROBE: NOT DETECTED
SODIUM SERPL-SCNC: 138 MMOL/L (ref 136–145)
WBC # BLD AUTO: 4 X10*3/UL (ref 4.4–11.3)

## 2024-12-03 PROCEDURE — 36415 COLL VENOUS BLD VENIPUNCTURE: CPT

## 2024-12-03 PROCEDURE — 80069 RENAL FUNCTION PANEL: CPT

## 2024-12-03 PROCEDURE — 99238 HOSP IP/OBS DSCHRG MGMT 30/<: CPT | Performed by: STUDENT IN AN ORGANIZED HEALTH CARE EDUCATION/TRAINING PROGRAM

## 2024-12-03 PROCEDURE — 2500000001 HC RX 250 WO HCPCS SELF ADMINISTERED DRUGS (ALT 637 FOR MEDICARE OP): Performed by: PHYSICIAN ASSISTANT

## 2024-12-03 PROCEDURE — 2500000002 HC RX 250 W HCPCS SELF ADMINISTERED DRUGS (ALT 637 FOR MEDICARE OP, ALT 636 FOR OP/ED): Performed by: PHYSICIAN ASSISTANT

## 2024-12-03 PROCEDURE — RXMED WILLOW AMBULATORY MEDICATION CHARGE

## 2024-12-03 PROCEDURE — G0378 HOSPITAL OBSERVATION PER HR: HCPCS

## 2024-12-03 PROCEDURE — 85025 COMPLETE CBC W/AUTO DIFF WBC: CPT

## 2024-12-03 RX ORDER — ACETAMINOPHEN 325 MG/1
650 TABLET ORAL ONCE
Status: COMPLETED | OUTPATIENT
Start: 2024-12-03 | End: 2024-12-03

## 2024-12-03 RX ORDER — ACETAMINOPHEN 325 MG/1
650 TABLET ORAL EVERY 6 HOURS PRN
Start: 2024-12-03

## 2024-12-03 RX ORDER — OSELTAMIVIR PHOSPHATE 75 MG/1
75 CAPSULE ORAL EVERY 12 HOURS
Qty: 9 CAPSULE | Refills: 0 | Status: SHIPPED | OUTPATIENT
Start: 2024-12-03 | End: 2024-12-08

## 2024-12-03 RX ORDER — OSELTAMIVIR PHOSPHATE 75 MG/1
75 CAPSULE ORAL ONCE
Status: COMPLETED | OUTPATIENT
Start: 2024-12-03 | End: 2024-12-03

## 2024-12-03 RX ORDER — HYDROCODONE BITARTRATE AND HOMATROPINE METHYLBROMIDE ORAL SOLUTION 5; 1.5 MG/5ML; MG/5ML
5 LIQUID ORAL EVERY 6 HOURS PRN
Qty: 63 ML | Refills: 0 | Status: SHIPPED | OUTPATIENT
Start: 2024-12-03 | End: 2024-12-08

## 2024-12-03 RX ADMIN — IPRATROPIUM BROMIDE AND ALBUTEROL SULFATE 3 ML: .5; 3 SOLUTION RESPIRATORY (INHALATION) at 10:14

## 2024-12-03 RX ADMIN — CYANOCOBALAMIN TAB 1000 MCG 1000 MCG: 1000 TAB at 10:14

## 2024-12-03 RX ADMIN — ACETAMINOPHEN 650 MG: 325 TABLET ORAL at 13:23

## 2024-12-03 RX ADMIN — OSELTAMIVIR PHOSPHATE 75 MG: 75 CAPSULE ORAL at 10:14

## 2024-12-03 RX ADMIN — CALCIUM CARBONATE-VITAMIN D TAB 500 MG-200 UNIT 1 TABLET: 500-200 TAB at 10:14

## 2024-12-03 RX ADMIN — HYDROCODONE BITARTRATE AND HOMATROPINE METHYLBROMIDE 5 ML: 5; 1.5 SOLUTION ORAL at 13:23

## 2024-12-03 RX ADMIN — ACYCLOVIR 400 MG: 400 TABLET ORAL at 10:14

## 2024-12-03 RX ADMIN — HYDROCODONE BITARTRATE AND HOMATROPINE METHYLBROMIDE 5 ML: 5; 1.5 SOLUTION ORAL at 06:54

## 2024-12-03 ASSESSMENT — PAIN SCALES - GENERAL
PAINLEVEL_OUTOF10: 6
PAINLEVEL_OUTOF10: 0 - NO PAIN

## 2024-12-03 ASSESSMENT — COGNITIVE AND FUNCTIONAL STATUS - GENERAL
MOBILITY SCORE: 24
DAILY ACTIVITIY SCORE: 24

## 2024-12-03 ASSESSMENT — PAIN - FUNCTIONAL ASSESSMENT: PAIN_FUNCTIONAL_ASSESSMENT: 0-10

## 2024-12-03 NOTE — CARE PLAN
Problem: Pain - Adult  Goal: Verbalizes/displays adequate comfort level or baseline comfort level  Outcome: Adequate for Discharge     Problem: Safety - Adult  Goal: Free from fall injury  Outcome: Adequate for Discharge     Problem: Discharge Planning  Goal: Discharge to home or other facility with appropriate resources  Outcome: Adequate for Discharge     Problem: Chronic Conditions and Co-morbidities  Goal: Patient's chronic conditions and co-morbidity symptoms are monitored and maintained or improved  Outcome: Adequate for Discharge   The patient's goals for the shift include      The clinical goals for the shift include Pt will reman HDS and VSS throughout shift 12/3/24 by 1900    Pt VSS upon discharge, discharge to home. Pt received and reviewed discharge paperwork including new medications, follow up appointments, and discharge instructions. Pt verbalized understandings and denied any questions at the time. Pt received medication from meds to beds. RN educated patient on medications. IV removed prior to discharge. Pt collected belongings and took with him. Discharge complete.

## 2024-12-03 NOTE — PROGRESS NOTES
12/03/24 1200   Discharge Planning   Living Arrangements Spouse/significant other   Support Systems Spouse/significant other   Type of Residence Private residence   Home or Post Acute Services None   Expected Discharge Disposition Home     Care Transitions Note  12/3/24  LSW met with pt at bedside. He is admitted for the flu, he says he is supposed to be discharged today. He lives in a 3 story home with his wife. He feels well supported by his wife and family. He is completely independent with ADLs, no DME, no O2 needs, no SDOH concerns. Please advise as dc needs arise.   Toma Sinclair, MSW, LSW

## 2024-12-03 NOTE — DISCHARGE SUMMARY
Discharge Diagnosis  Influenza A    Hospital Course  Mr. Maldonado Mccloud is a 74 year-old man with a past medical history of LLE DVT (2022, now off AC), HTN, neuropathy, multiple skin cancers, and multiply relapsed IgG kappa, ISS Stage II multiple myeloma (07/2015), most recently on teclistamab (since 10/13/2024), who was admitted (12/2-12/3/24) with fever and cough and found to have Influenza A.     He presented on 12/2/24 with fever and cough. He was initially treated empirically for community-acquired pneumonia with cefepime and later levofloxacin (12/2-12/3). However, a workup on 12/3/24 confirmed influenza A, with a chest X-ray on 12/2/24 showing no pulmonary process, and blood cultures and urinalysis remaining negative. He was started on oseltamivir 75 mg BID for five days and symptomatic management with Hycodan for cough, which provided relief, and Duonebs for wheezing, which did not improve symptoms.     He was stable for discharge home on 12/3/24.     He will follow up on Monday 12/9 at [place] for labs, Jacob Arechiga CNP, and scheduled teclistamab if appropriate.     ACCESS:  None  PROPHYLAXIS: Acyclovir (VZV), TMP-SMX (PJP)  MD:  Refugio    FOLLOW-UP:  - 12/9/24: LabsJacob CNP, possible teclistamab @ [facility]    Pertinent Physical Exam At Time of Discharge  Physical Exam  HENT:      Mouth/Throat:      Mouth: Mucous membranes are moist.      Pharynx: No posterior oropharyngeal erythema.   Eyes:      General: No scleral icterus.     Extraocular Movements: Extraocular movements intact.      Pupils: Pupils are equal, round, and reactive to light.   Cardiovascular:      Rate and Rhythm: Normal rate and regular rhythm.      Heart sounds: No murmur heard.     No friction rub. No gallop.   Pulmonary:      Effort: No respiratory distress.      Breath sounds: No stridor. Wheezing and rales present. No rhonchi.   Abdominal:      General: There is no distension.      Palpations: There is no mass.       Tenderness: There is no abdominal tenderness. There is no guarding or rebound.   Musculoskeletal:         General: No swelling or deformity.      Right lower leg: No edema.      Left lower leg: No edema.   Lymphadenopathy:      Cervical: No cervical adenopathy.   Skin:     Findings: No lesion or rash.   Neurological:      Mental Status: He is alert.      Cranial Nerves: No cranial nerve deficit.      Motor: No weakness.   Psychiatric:         Mood and Affect: Mood normal.         Behavior: Behavior normal.       Home Medications     Medication List      START taking these medications     acetaminophen 325 mg tablet; Commonly known as: Tylenol; Take 2 tablets   (650 mg) by mouth every 6 hours if needed for mild pain (1 - 3), headaches   or fever (temp greater than 38.0 C). Can take over the counter.; Notes to   patient: OK to take over-the-counter   hydrocodone-homatropine 5-1.5 mg/5 mL syrup; Commonly known as: Hycodan;   Take 5 mL by mouth every 6 hours if needed for cough for up to 5 days.;   Notes to patient: Pharmacy was only to fill 63 mL due to supply -- please   call Dr. Huff and Jacob if you need a refill.   oseltamivir 75 mg capsule; Commonly known as: Tamiflu; Take 1 capsule   (75 mg) by mouth every 12 hours for 9 doses.     CONTINUE taking these medications     acyclovir 400 mg tablet; Commonly known as: Zovirax; Take 1 tablet (400   mg) by mouth 2 times a day.   calcium carbonate-vitamin D3 500 mg-5 mcg (200 unit) tablet   CALCIUM-MAGNESIUM-ZINC ORAL   cyanocobalamin 1,000 mcg tablet; Commonly known as: Vitamin B-12   prochlorperazine 10 mg tablet; Commonly known as: Compazine; Take 1   tablet (10 mg) by mouth every 6 hours if needed for nausea or vomiting.   sulfamethoxazole-trimethoprim 800-160 mg tablet; Commonly known as:   Bactrim DS; Take 1 tablet by mouth 3 times a week. Take once daily on   Monday, Wednesday, and Friday.   traZODone 100 mg tablet; Commonly known as: Desyrel; TAKE 3 TABLETS BY    MOUTH DAILY AT BEDTIME       Outpatient Follow-Up  Future Appointments   Date Time Provider Department [place]   12/9/2024  8:30 AM INF 03 ROJAS [id number] [facility]   12/9/2024  9:00 AM Wiley Arechiga, APRN-CNP [id number] [facility]   12/16/2024  9:00 AM Wiley Arechiga APRN-CNP [id number] [facility]   12/16/2024  9:00 AM INF 11 ROJAS [id number] [facility]   12/20/2024  8:30 AM [facility] [id number] X-RAY 1 [id number] [facility]    12/20/2024  9:00 AM True Collins MD [id number] [facility]   12/23/2024  8:30 AM INF 03 ROJAS [id number] [facility]   12/30/2024  8:30 AM INF 03 ROJAS [id number] [facility]   1/6/2025  8:30 AM INF 03 ROJAS [id number] [facility]   1/9/2025 10:00 AM West Huff MD [id number] [facility]   1/13/2025  9:00 AM INF 11 ROJAS [id number] [facility]   3/6/2025  8:00 AM Sasha Torres, PhD [id number] [facility]   4/1/2025  8:00 AM Timothy Almodovar MD [id number] [facility]   4/10/2025  8:50 AM Libby Ellis DO [id number] [facility]       Patient seen and examined with Dr. Michele Mejia PA-C  Malignant Hematology (Lymphoma/Myeloma/Autologous SCT) Team

## 2024-12-03 NOTE — HOSPITAL COURSE
Mr. Maldonado Mccloud is a 74 year-old man with a past medical history of LLE DVT (2022, now off AC), HTN, neuropathy, multiple skin cancers, and multiply relapsed IgG kappa, ISS Stage II multiple myeloma (07/2015), most recently on teclistamab (since 10/13/2024), who was admitted (12/2-12/3/24) with fever and cough and found to have Influenza A.     He presented on 12/2/24 with fever and cough. He was initially treated empirically for community-acquired pneumonia with cefepime and later levofloxacin (12/2-12/3). However, a workup on 12/3/24 confirmed influenza A, with a chest X-ray on 12/2/24 showing no pulmonary process, and blood cultures and urinalysis remaining negative. He was started on oseltamivir 75 mg BID for five days and symptomatic management with Hycodan for cough, which provided relief, and Duonebs for wheezing, which did not improve symptoms.     He was stable for discharge home on 12/3/24.     He will follow up on Monday 12/9 at [facility] for Jacob malone CNP, and scheduled teclistamab if appropriate.     ACCESS:  None  PROPHYLAXIS: Acyclovir (VZV), TMP-SMX (PJP)  MD:  Refugio    FOLLOW-UP:  - 12/9/24: Jacob Malone CNP, possible teclistamab @ [facility]

## 2024-12-03 NOTE — CARE PLAN
The clinical goals for the shift include pt will remain HDS and VSS throughout shift 12/3/24 by 0700.      Problem: Pain - Adult  Goal: Verbalizes/displays adequate comfort level or baseline comfort level  Outcome: Progressing     Problem: Safety - Adult  Goal: Free from fall injury  Outcome: Progressing     Problem: Discharge Planning  Goal: Discharge to home or other facility with appropriate resources  Outcome: Progressing     Problem: Chronic Conditions and Co-morbidities  Goal: Patient's chronic conditions and co-morbidity symptoms are monitored and maintained or improved  Outcome: Progressing

## 2024-12-03 NOTE — PROGRESS NOTES
Physical Therapy                 Therapy Communication Note    Patient Name: Maldonado Mccloud  MRN: [id number]  Department: [facility]  Room: [address] [place] [address]  Today's Date: 12/3/2024     Discipline: Physical Therapy    Missed Visit Reason: Missed Visit Reason: Other (Comment) (per RN Lida, pt pending DC & is indep mobilizing in room. Chart reviewed, spoke to patient who reports he is having no mobility issues (prior to admit nor now), ambulating independ w/o device. Pt screened out, no PT needs. DC'd PT orders; MD/RN informed.) Pts wife is retired and can assist as needed; they live in a [place] house and he has been dong stairs fine with no recent history of falls.    Missed Time: Attempt    Comment: 11:45am

## 2024-12-04 ENCOUNTER — PATIENT OUTREACH (OUTPATIENT)
Dept: PRIMARY CARE | Facility: CLINIC | Age: 74
End: 2024-12-04
Payer: MEDICARE

## 2024-12-04 LAB
ALBUMIN: 3.8 G/DL (ref 3.4–5)
ALPHA 1 GLOBULIN: 0.3 G/DL (ref 0.2–0.6)
ALPHA 2 GLOBULIN: 0.7 G/DL (ref 0.4–1.1)
BETA GLOBULIN: 0.6 G/DL (ref 0.5–1.2)
GAMMA GLOBULIN: 0.3 G/DL (ref 0.5–1.4)
IMMUNOFIXATION COMMENT: ABNORMAL
PATH REVIEW - SERUM IMMUNOFIXATION: ABNORMAL
PATH REVIEW-SERUM PROTEIN ELECTROPHORESIS: ABNORMAL
PROTEIN ELECTROPHORESIS COMMENT: ABNORMAL

## 2024-12-04 NOTE — PROGRESS NOTES
Discharge Facility: [facility]  Discharge Diagnosis: flu positive, pneumonia  Admission Date: 12/2/24  Discharge Date: 12/3/24    PCP Appointment Date: Declined  Specialist Appointment Date: 12/9 [facility] onc  Hospital Encounter and Summary Linked: Yes  See discharge assessment below for further details    Engagement  Call Start Time: 0940 (12/4/2024  9:48 AM)    Medications  Medications reviewed with patient/caregiver?: Yes (12/4/2024  9:48 AM)  Is the patient having any side effects they believe may be caused by any medication additions or changes?: No (12/4/2024  9:48 AM)  Does the patient have all medications ordered at discharge?: Yes (12/4/2024  9:48 AM)  Care Management Interventions: No intervention needed (12/4/2024  9:48 AM)  Prescription Comments: Tamiflu and hycodan syrup (12/4/2024  9:48 AM)  Is the patient taking all medications as directed (includes completed medication regime)?: Yes (12/4/2024  9:48 AM)  Care Management Interventions: Provided patient education (12/4/2024  9:48 AM)  Medication Comments: No issues obtaining medications (12/4/2024  9:48 AM)    Appointments  Does the patient have a primary care provider?: Yes (12/4/2024  9:48 AM)  Has the patient kept scheduled appointments due by today?: Yes (12/4/2024  9:48 AM)  Care Management Interventions: Advised patient to keep appointment (12/4/2024  9:48 AM)    Patient Teaching  Does the patient have access to their discharge instructions?: Yes (12/4/2024  9:48 AM)  Care Management Interventions: Reviewed instructions with patient (12/4/2024  9:48 AM)  What is the patient's perception of their health status since discharge?: Improving (12/4/2024  9:48 AM)  Is the patient/caregiver able to teach back the hierarchy of who to call/visit for symptoms/problems? PCP, Specialist, Home Health nurse, Urgent Care, ED, 911: Yes (12/4/2024  9:48 AM)  Patient/Caregiver Education Comments: Advised to rest and seek care if fevers return (12/4/2024  9:48  AM)    Wrap Up  Wrap Up Additional Comments: Spoke with spouse regarding [facility] hospital stay and transition home. She says Maldonado is doing better right now, no further fevers noted. Fever is what prompted ER visit. Will follow up with hematology oncology as scheduled next week and call with any concerns if needed. (12/4/2024  9:48 AM)  Call End Time: 0945 (12/4/2024  9:48 AM)

## 2024-12-05 LAB
CHLAMYDIA.PNEUMONIAE PCR, VIRC: NOT DETECTED
MYCOPLASMA.PNEUMONIAE PCR, VIRC: NOT DETECTED

## 2024-12-06 LAB
BACTERIA BLD CULT: NORMAL
BACTERIA BLD CULT: NORMAL

## 2024-12-09 ENCOUNTER — INFUSION (OUTPATIENT)
Dept: HEMATOLOGY/ONCOLOGY | Facility: CLINIC | Age: 74
End: 2024-12-09
Payer: MEDICARE

## 2024-12-09 ENCOUNTER — OFFICE VISIT (OUTPATIENT)
Dept: HEMATOLOGY/ONCOLOGY | Facility: CLINIC | Age: 74
End: 2024-12-09
Payer: MEDICARE

## 2024-12-09 ENCOUNTER — LAB (OUTPATIENT)
Dept: LAB | Facility: CLINIC | Age: 74
End: 2024-12-09
Payer: MEDICARE

## 2024-12-09 VITALS
TEMPERATURE: 97.3 F | OXYGEN SATURATION: 92 % | BODY MASS INDEX: 31.49 KG/M2 | SYSTOLIC BLOOD PRESSURE: 109 MMHG | DIASTOLIC BLOOD PRESSURE: 74 MMHG | WEIGHT: 219.47 LBS | RESPIRATION RATE: 16 BRPM

## 2024-12-09 DIAGNOSIS — R05.9 COUGH, UNSPECIFIED TYPE: ICD-10-CM

## 2024-12-09 DIAGNOSIS — C90.00 HYPOGAMMAGLOBULINEMIA DUE TO MULTIPLE MYELOMA (MULTI): ICD-10-CM

## 2024-12-09 DIAGNOSIS — D84.9 IMMUNOCOMPROMISED: ICD-10-CM

## 2024-12-09 DIAGNOSIS — C90.00 IGG MULTIPLE MYELOMA (MULTI): Primary | ICD-10-CM

## 2024-12-09 DIAGNOSIS — J10.1 INFLUENZA A: ICD-10-CM

## 2024-12-09 DIAGNOSIS — C90.00 MULTIPLE MYELOMA WITHOUT REMISSION (MULTI): ICD-10-CM

## 2024-12-09 DIAGNOSIS — D80.1 HYPOGAMMAGLOBULINEMIA DUE TO MULTIPLE MYELOMA (MULTI): ICD-10-CM

## 2024-12-09 DIAGNOSIS — C90.00 IGG MULTIPLE MYELOMA (MULTI): ICD-10-CM

## 2024-12-09 DIAGNOSIS — Z51.12 ENCOUNTER FOR ANTINEOPLASTIC IMMUNOTHERAPY: ICD-10-CM

## 2024-12-09 DIAGNOSIS — Z92.850 HISTORY OF CHIMERIC ANTIGEN RECEPTOR T-CELL THERAPY: ICD-10-CM

## 2024-12-09 DIAGNOSIS — Z94.84 STEM CELLS TRANSPLANT STATUS (MULTI): ICD-10-CM

## 2024-12-09 LAB
BASOPHILS # BLD AUTO: 0.01 X10*3/UL (ref 0–0.1)
BASOPHILS NFR BLD AUTO: 0.3 %
EOSINOPHIL # BLD AUTO: 0.08 X10*3/UL (ref 0–0.4)
EOSINOPHIL NFR BLD AUTO: 2.4 %
ERYTHROCYTE [DISTWIDTH] IN BLOOD BY AUTOMATED COUNT: 13.5 % (ref 11.5–14.5)
HCT VFR BLD AUTO: 36.6 % (ref 41–52)
HGB BLD-MCNC: 12.5 G/DL (ref 13.5–17.5)
IMM GRANULOCYTES # BLD AUTO: 0.01 X10*3/UL (ref 0–0.5)
IMM GRANULOCYTES NFR BLD AUTO: 0.3 % (ref 0–0.9)
LYMPHOCYTES # BLD AUTO: 1.63 X10*3/UL (ref 0.8–3)
LYMPHOCYTES NFR BLD AUTO: 48.7 %
MCH RBC QN AUTO: 33.4 PG (ref 26–34)
MCHC RBC AUTO-ENTMCNC: 34.2 G/DL (ref 32–36)
MCV RBC AUTO: 98 FL (ref 80–100)
MONOCYTES # BLD AUTO: 0.42 X10*3/UL (ref 0.05–0.8)
MONOCYTES NFR BLD AUTO: 12.5 %
NEUTROPHILS # BLD AUTO: 1.2 X10*3/UL (ref 1.6–5.5)
NEUTROPHILS NFR BLD AUTO: 35.8 %
NRBC BLD-RTO: ABNORMAL /100{WBCS}
PLATELET # BLD AUTO: 117 X10*3/UL (ref 150–450)
RBC # BLD AUTO: 3.74 X10*6/UL (ref 4.5–5.9)
WBC # BLD AUTO: 3.4 X10*3/UL (ref 4.4–11.3)

## 2024-12-09 PROCEDURE — 1159F MED LIST DOCD IN RCRD: CPT

## 2024-12-09 PROCEDURE — 3078F DIAST BP <80 MM HG: CPT

## 2024-12-09 PROCEDURE — G2211 COMPLEX E/M VISIT ADD ON: HCPCS

## 2024-12-09 PROCEDURE — 82784 ASSAY IGA/IGD/IGG/IGM EACH: CPT

## 2024-12-09 PROCEDURE — 99215 OFFICE O/P EST HI 40 MIN: CPT

## 2024-12-09 PROCEDURE — 83521 IG LIGHT CHAINS FREE EACH: CPT

## 2024-12-09 PROCEDURE — 3074F SYST BP LT 130 MM HG: CPT

## 2024-12-09 PROCEDURE — 96401 CHEMO ANTI-NEOPL SQ/IM: CPT

## 2024-12-09 PROCEDURE — 1126F AMNT PAIN NOTED NONE PRSNT: CPT

## 2024-12-09 PROCEDURE — 1111F DSCHRG MED/CURRENT MED MERGE: CPT

## 2024-12-09 PROCEDURE — 2500000004 HC RX 250 GENERAL PHARMACY W/ HCPCS (ALT 636 FOR OP/ED): Mod: JZ,TB | Performed by: INTERNAL MEDICINE

## 2024-12-09 PROCEDURE — 36415 COLL VENOUS BLD VENIPUNCTURE: CPT

## 2024-12-09 PROCEDURE — 84165 PROTEIN E-PHORESIS SERUM: CPT

## 2024-12-09 PROCEDURE — 1123F ACP DISCUSS/DSCN MKR DOCD: CPT

## 2024-12-09 PROCEDURE — 84075 ASSAY ALKALINE PHOSPHATASE: CPT

## 2024-12-09 PROCEDURE — 84155 ASSAY OF PROTEIN SERUM: CPT

## 2024-12-09 PROCEDURE — 85025 COMPLETE CBC W/AUTO DIFF WBC: CPT

## 2024-12-09 RX ORDER — FAMOTIDINE 10 MG/ML
20 INJECTION INTRAVENOUS ONCE AS NEEDED
OUTPATIENT
Start: 2025-01-13

## 2024-12-09 RX ORDER — DIPHENHYDRAMINE HYDROCHLORIDE 50 MG/ML
50 INJECTION INTRAMUSCULAR; INTRAVENOUS AS NEEDED
OUTPATIENT
Start: 2024-12-16

## 2024-12-09 RX ORDER — PROCHLORPERAZINE EDISYLATE 5 MG/ML
10 INJECTION INTRAMUSCULAR; INTRAVENOUS EVERY 6 HOURS PRN
OUTPATIENT
Start: 2025-01-13

## 2024-12-09 RX ORDER — PROCHLORPERAZINE MALEATE 10 MG
10 TABLET ORAL EVERY 6 HOURS PRN
OUTPATIENT
Start: 2025-01-13

## 2024-12-09 RX ORDER — FAMOTIDINE 10 MG/ML
20 INJECTION INTRAVENOUS ONCE AS NEEDED
OUTPATIENT
Start: 2024-12-30

## 2024-12-09 RX ORDER — PROCHLORPERAZINE EDISYLATE 5 MG/ML
10 INJECTION INTRAMUSCULAR; INTRAVENOUS EVERY 6 HOURS PRN
OUTPATIENT
Start: 2025-01-20

## 2024-12-09 RX ORDER — FAMOTIDINE 10 MG/ML
20 INJECTION INTRAVENOUS ONCE AS NEEDED
OUTPATIENT
Start: 2025-01-06

## 2024-12-09 RX ORDER — EPINEPHRINE 0.3 MG/.3ML
0.3 INJECTION SUBCUTANEOUS EVERY 5 MIN PRN
OUTPATIENT
Start: 2024-12-23

## 2024-12-09 RX ORDER — PROCHLORPERAZINE MALEATE 10 MG
10 TABLET ORAL EVERY 6 HOURS PRN
OUTPATIENT
Start: 2025-01-27

## 2024-12-09 RX ORDER — FAMOTIDINE 10 MG/ML
20 INJECTION INTRAVENOUS ONCE AS NEEDED
OUTPATIENT
Start: 2024-12-16

## 2024-12-09 RX ORDER — EPINEPHRINE 0.3 MG/.3ML
0.3 INJECTION SUBCUTANEOUS EVERY 5 MIN PRN
OUTPATIENT
Start: 2025-01-06

## 2024-12-09 RX ORDER — PROCHLORPERAZINE MALEATE 10 MG
10 TABLET ORAL EVERY 6 HOURS PRN
OUTPATIENT
Start: 2024-12-16

## 2024-12-09 RX ORDER — ALBUTEROL SULFATE 0.83 MG/ML
3 SOLUTION RESPIRATORY (INHALATION) AS NEEDED
OUTPATIENT
Start: 2025-01-20

## 2024-12-09 RX ORDER — PROCHLORPERAZINE MALEATE 10 MG
10 TABLET ORAL EVERY 6 HOURS PRN
OUTPATIENT
Start: 2025-02-03

## 2024-12-09 RX ORDER — PROCHLORPERAZINE EDISYLATE 5 MG/ML
10 INJECTION INTRAMUSCULAR; INTRAVENOUS EVERY 6 HOURS PRN
OUTPATIENT
Start: 2025-01-06

## 2024-12-09 RX ORDER — ALBUTEROL SULFATE 0.83 MG/ML
3 SOLUTION RESPIRATORY (INHALATION) AS NEEDED
OUTPATIENT
Start: 2024-12-23

## 2024-12-09 RX ORDER — DIPHENHYDRAMINE HYDROCHLORIDE 50 MG/ML
50 INJECTION INTRAMUSCULAR; INTRAVENOUS AS NEEDED
OUTPATIENT
Start: 2024-12-23

## 2024-12-09 RX ORDER — FAMOTIDINE 10 MG/ML
20 INJECTION INTRAVENOUS ONCE AS NEEDED
Status: DISCONTINUED | OUTPATIENT
Start: 2024-12-09 | End: 2024-12-09 | Stop reason: HOSPADM

## 2024-12-09 RX ORDER — PROCHLORPERAZINE EDISYLATE 5 MG/ML
10 INJECTION INTRAMUSCULAR; INTRAVENOUS EVERY 6 HOURS PRN
OUTPATIENT
Start: 2025-02-03

## 2024-12-09 RX ORDER — PROCHLORPERAZINE EDISYLATE 5 MG/ML
10 INJECTION INTRAMUSCULAR; INTRAVENOUS EVERY 6 HOURS PRN
OUTPATIENT
Start: 2025-01-27

## 2024-12-09 RX ORDER — EPINEPHRINE 0.3 MG/.3ML
0.3 INJECTION SUBCUTANEOUS EVERY 5 MIN PRN
OUTPATIENT
Start: 2024-12-16

## 2024-12-09 RX ORDER — PROCHLORPERAZINE EDISYLATE 5 MG/ML
10 INJECTION INTRAMUSCULAR; INTRAVENOUS EVERY 6 HOURS PRN
OUTPATIENT
Start: 2024-12-30

## 2024-12-09 RX ORDER — DIPHENHYDRAMINE HYDROCHLORIDE 50 MG/ML
50 INJECTION INTRAMUSCULAR; INTRAVENOUS AS NEEDED
OUTPATIENT
Start: 2024-12-30

## 2024-12-09 RX ORDER — DIPHENHYDRAMINE HYDROCHLORIDE 50 MG/ML
50 INJECTION INTRAMUSCULAR; INTRAVENOUS AS NEEDED
OUTPATIENT
Start: 2025-01-06

## 2024-12-09 RX ORDER — PROCHLORPERAZINE MALEATE 10 MG
10 TABLET ORAL EVERY 6 HOURS PRN
Status: DISCONTINUED | OUTPATIENT
Start: 2024-12-09 | End: 2024-12-09 | Stop reason: HOSPADM

## 2024-12-09 RX ORDER — PROCHLORPERAZINE MALEATE 10 MG
10 TABLET ORAL EVERY 6 HOURS PRN
OUTPATIENT
Start: 2025-01-06

## 2024-12-09 RX ORDER — DIPHENHYDRAMINE HYDROCHLORIDE 50 MG/ML
50 INJECTION INTRAMUSCULAR; INTRAVENOUS AS NEEDED
OUTPATIENT
Start: 2025-01-27

## 2024-12-09 RX ORDER — EPINEPHRINE 0.3 MG/.3ML
0.3 INJECTION SUBCUTANEOUS EVERY 5 MIN PRN
OUTPATIENT
Start: 2024-12-30

## 2024-12-09 RX ORDER — EPINEPHRINE 0.3 MG/.3ML
0.3 INJECTION SUBCUTANEOUS EVERY 5 MIN PRN
Status: DISCONTINUED | OUTPATIENT
Start: 2024-12-09 | End: 2024-12-09 | Stop reason: HOSPADM

## 2024-12-09 RX ORDER — FAMOTIDINE 10 MG/ML
20 INJECTION INTRAVENOUS ONCE AS NEEDED
OUTPATIENT
Start: 2025-01-27

## 2024-12-09 RX ORDER — DIPHENHYDRAMINE HYDROCHLORIDE 50 MG/ML
50 INJECTION INTRAMUSCULAR; INTRAVENOUS AS NEEDED
OUTPATIENT
Start: 2025-01-20

## 2024-12-09 RX ORDER — ALBUTEROL SULFATE 0.83 MG/ML
3 SOLUTION RESPIRATORY (INHALATION) AS NEEDED
OUTPATIENT
Start: 2025-01-27

## 2024-12-09 RX ORDER — ALBUTEROL SULFATE 0.83 MG/ML
3 SOLUTION RESPIRATORY (INHALATION) AS NEEDED
OUTPATIENT
Start: 2025-01-06

## 2024-12-09 RX ORDER — EPINEPHRINE 0.3 MG/.3ML
0.3 INJECTION SUBCUTANEOUS EVERY 5 MIN PRN
OUTPATIENT
Start: 2025-01-13

## 2024-12-09 RX ORDER — FAMOTIDINE 10 MG/ML
20 INJECTION INTRAVENOUS ONCE AS NEEDED
OUTPATIENT
Start: 2025-02-03

## 2024-12-09 RX ORDER — FAMOTIDINE 10 MG/ML
20 INJECTION INTRAVENOUS ONCE AS NEEDED
OUTPATIENT
Start: 2025-01-20

## 2024-12-09 RX ORDER — EPINEPHRINE 0.3 MG/.3ML
0.3 INJECTION SUBCUTANEOUS EVERY 5 MIN PRN
OUTPATIENT
Start: 2025-01-27

## 2024-12-09 RX ORDER — ALBUTEROL SULFATE 0.83 MG/ML
3 SOLUTION RESPIRATORY (INHALATION) AS NEEDED
OUTPATIENT
Start: 2025-02-03

## 2024-12-09 RX ORDER — ALBUTEROL SULFATE 0.83 MG/ML
3 SOLUTION RESPIRATORY (INHALATION) AS NEEDED
OUTPATIENT
Start: 2024-12-30

## 2024-12-09 RX ORDER — ALBUTEROL SULFATE 0.83 MG/ML
3 SOLUTION RESPIRATORY (INHALATION) AS NEEDED
OUTPATIENT
Start: 2025-01-13

## 2024-12-09 RX ORDER — DIPHENHYDRAMINE HYDROCHLORIDE 50 MG/ML
50 INJECTION INTRAMUSCULAR; INTRAVENOUS AS NEEDED
OUTPATIENT
Start: 2025-01-13

## 2024-12-09 RX ORDER — ALBUTEROL SULFATE 0.83 MG/ML
3 SOLUTION RESPIRATORY (INHALATION) AS NEEDED
OUTPATIENT
Start: 2024-12-16

## 2024-12-09 RX ORDER — PROCHLORPERAZINE EDISYLATE 5 MG/ML
10 INJECTION INTRAMUSCULAR; INTRAVENOUS EVERY 6 HOURS PRN
OUTPATIENT
Start: 2024-12-23

## 2024-12-09 RX ORDER — PROCHLORPERAZINE MALEATE 10 MG
10 TABLET ORAL EVERY 6 HOURS PRN
OUTPATIENT
Start: 2024-12-30

## 2024-12-09 RX ORDER — EPINEPHRINE 0.3 MG/.3ML
0.3 INJECTION SUBCUTANEOUS EVERY 5 MIN PRN
OUTPATIENT
Start: 2025-01-20

## 2024-12-09 RX ORDER — PROCHLORPERAZINE MALEATE 10 MG
10 TABLET ORAL EVERY 6 HOURS PRN
OUTPATIENT
Start: 2025-01-20

## 2024-12-09 RX ORDER — EPINEPHRINE 0.3 MG/.3ML
0.3 INJECTION SUBCUTANEOUS EVERY 5 MIN PRN
OUTPATIENT
Start: 2025-02-03

## 2024-12-09 RX ORDER — ALBUTEROL SULFATE 0.83 MG/ML
3 SOLUTION RESPIRATORY (INHALATION) AS NEEDED
Status: DISCONTINUED | OUTPATIENT
Start: 2024-12-09 | End: 2024-12-09 | Stop reason: HOSPADM

## 2024-12-09 RX ORDER — PROCHLORPERAZINE MALEATE 10 MG
10 TABLET ORAL EVERY 6 HOURS PRN
OUTPATIENT
Start: 2024-12-23

## 2024-12-09 RX ORDER — DIPHENHYDRAMINE HYDROCHLORIDE 50 MG/ML
50 INJECTION INTRAMUSCULAR; INTRAVENOUS AS NEEDED
Status: DISCONTINUED | OUTPATIENT
Start: 2024-12-09 | End: 2024-12-09 | Stop reason: HOSPADM

## 2024-12-09 RX ORDER — DIPHENHYDRAMINE HYDROCHLORIDE 50 MG/ML
50 INJECTION INTRAMUSCULAR; INTRAVENOUS AS NEEDED
OUTPATIENT
Start: 2025-02-03

## 2024-12-09 RX ORDER — PROCHLORPERAZINE EDISYLATE 5 MG/ML
10 INJECTION INTRAMUSCULAR; INTRAVENOUS EVERY 6 HOURS PRN
OUTPATIENT
Start: 2024-12-16

## 2024-12-09 RX ORDER — PROCHLORPERAZINE EDISYLATE 5 MG/ML
10 INJECTION INTRAMUSCULAR; INTRAVENOUS EVERY 6 HOURS PRN
Status: DISCONTINUED | OUTPATIENT
Start: 2024-12-09 | End: 2024-12-09 | Stop reason: HOSPADM

## 2024-12-09 RX ORDER — FAMOTIDINE 10 MG/ML
20 INJECTION INTRAVENOUS ONCE AS NEEDED
OUTPATIENT
Start: 2024-12-23

## 2024-12-09 ASSESSMENT — PAIN SCALES - GENERAL: PAINLEVEL_OUTOF10: 0-NO PAIN

## 2024-12-09 ASSESSMENT — ENCOUNTER SYMPTOMS
LIGHT-HEADEDNESS: 1
ENDOCRINE NEGATIVE: 1
FATIGUE: 1
GASTROINTESTINAL NEGATIVE: 1
COUGH: 1
HEMATOLOGIC/LYMPHATIC NEGATIVE: 1
CARDIOVASCULAR NEGATIVE: 1
MUSCULOSKELETAL NEGATIVE: 1
PSYCHIATRIC NEGATIVE: 1
DIZZINESS: 1

## 2024-12-09 NOTE — PROGRESS NOTES
Patient ID: Maldonado Mccloud is a 74 y.o. male.  Referring Physician: No referring provider defined for this encounter.  Primary Care Provider: Timothy Almodovar MD    Interval hx:    Cuong presents to clinic 24 for a follow up visit.    Overall since discharge he is doing much better. He completed Tamiflu and is using PRN OTC meds for congestion and cough with relief. His breathing feels much less wheezy and his cough is clearing up.      Review of Systems   Constitutional:  Positive for fatigue.   HENT:  Negative.     Respiratory:  Positive for cough.    Cardiovascular: Negative.    Gastrointestinal: Negative.    Endocrine: Negative.    Genitourinary: Negative.     Musculoskeletal: Negative.    Skin: Negative.    Neurological:  Positive for dizziness and light-headedness.   Hematological: Negative.    Psychiatric/Behavioral: Negative.       PMHx:  1) Multiple myeloma (see below)  2) Squamous cell ca's skin  3) DVT, LLE ; now off AC  4) Neuropathy  5) S/p right radius fracture 2023  6) HTN  7) Afib (transient in  w/stem cell collection)    FamHx  Dad  of pan ca age 92    SocHx:   w/5 kids; smokes cigars; no cigs in 50 years; no EtOH or illicit drugs. Was in [employer] in [place]    Meds:  Were reviewed w/the pt  All:  [employer]  PCN  Zosyn    Physical Exam  Physical Exam  Constitutional:       Appearance: Normal appearance. He is normal weight.   HENT:      Head: Normocephalic and atraumatic.      Nose: Congestion present.      Mouth/Throat:      Mouth: Mucous membranes are moist.      Pharynx: Oropharynx is clear.   Eyes:      Conjunctiva/sclera: Conjunctivae normal.      Pupils: Pupils are equal, round, and reactive to light.   Cardiovascular:      Rate and Rhythm: Normal rate and regular rhythm.      Pulses: Normal pulses.      Heart sounds: Normal heart sounds.   Pulmonary:      Effort: Pulmonary effort is normal.      Breath sounds: Examination of the left-upper field reveals wheezing.  Examination of the left-middle field reveals wheezing. Examination of the left-lower field reveals wheezing. Wheezing present.   Abdominal:      General: Abdomen is flat. Bowel sounds are normal.      Palpations: Abdomen is soft.   Musculoskeletal:         General: Normal range of motion.      Cervical back: Normal range of motion and neck supple.   Skin:     General: Skin is warm and dry.   Neurological:      General: No focal deficit present.      Mental Status: He is alert and oriented to person, place, and time. Mental status is at baseline.   Psychiatric:         Mood and Affect: Mood normal.         Behavior: Behavior normal.         Thought Content: Thought content normal.         Judgment: Judgment normal.            12/2/2024     9:20 PM 12/2/2024     9:50 PM 12/3/2024    12:06 AM 12/3/2024     4:53 AM 12/3/2024     9:08 AM 12/3/2024     2:05 PM 12/9/2024     8:39 AM   Vitals   Systolic 128  122 113 110 144 109   Diastolic 73  73 69 74 83 74   BP Location Left arm  Left arm Left arm Left arm Left arm    Heart Rate 85  93 92 84 82    Temp 38.2 °C (100.8 °F) 37.3 °C (99.1 °F) 37.3 °C (99.1 °F) 37.7 °C (99.9 °F) 36.3 °C (97.3 °F) 36.8 °C (98.2 °F) 36.3 °C (97.3 °F)   Resp 20  20 19 18 18 16   Weight (lb)       219.47   BMI       31.49 kg/m2   BSA (m2)       2.22 m2   Visit Report Report Report     Report     Performance Status:  Symptomatic; fully ambulatory    Assessment/recommendations:  74 year old man w/a hx of LLE DVT (2022, now off AC), HTN, neuropathy, multiple skin cancers and multiply relapsed multiple myeloma [presented with right chest 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; Bone marrow biopsy suggested multiple myeloma IgG kappa, ISS Stage II, bone survey revealed lytic lesions; Urine light chains present kappa restricted with 2gm proteinuria; s/p VRD x 3 cycles with CR; then s/p auto SCTx 12/23/15 complicated by orthostatic hypotension, electrolyte replacement, drug induced rash,  culture negative fever, then s/p approximately 5 weeks of maintenance Revlimid 10 mg daily which was held for worsening neuropathy in August 2016; then in 3/ 2018: IgG M Braxton alexei to 0.2gm/L and light chain alexei was; restarted on Revlimid maintenance at 5mg every other day, then revlimid stopped in 3/2021 due to stable disease; then in 7/2021 his M spike was on the rise, and was enrolled onto Vactosertib/Pom trial on 8/5/2021; then progressed in 6/2022, switched to Isatuximab/Carfilzomib on 6/30/22, then in 9/2022 progressed and was switched to Cytoxan/Carfilzomib; then had therapy placed on hold in 11/2022 due to need for hip replacement surgery; myeloma markers were on the rise in 3/2023 with a new jaw lesion, s/p XRT 4/2023 and resume 2x/week carfilzomib; then s/p CAR T w/ABECMA (T=0, 10/25/23), hospital course complicated by grade 1 ICANS managed w/ Dex 10mg x3 days and 1 dose tociluzimab; then w/progression and started on teclistamab, full dosing given on 10/13/24] here for routine FUV. Skipped dose 12/2, resumed 12/9.     As for relapsed myeloma:  He has been continued on weekly teclistamab and tolerating this well, so far only has had an early episode of grade I CRS (cx neg low grade fever x 1, not recurred). He has not had any other toxicities thus far. In addition, there has been a biochemical response already: serum free kappa decreasing from 15.6mg/dL (= 156mg/L) to 1.33mg/dL (13.3mg/L), as of 11/4/24.     Dose held 12/2 due to concern for infection, resumed 12/9.     Infection ppx:  Cont acyclovir and bactrim ppx; monitor IgG levels and  we re-started ppx IVIG (NOTE: he should receive Gammunex -C, d/t prior severe reaction to gammard; last dose of IVIG was on 6/24/24; and then was restarted on 10/21/24).    As for sens NP:  No effect w/cymbalta; will have him stop this medication (taper off over 2 weeks), as it may also be causing dizziness. I offered adding gabapentin but the pt and his wife declined  this, as he is really is not having any associated pain.     As for flu:  Admitted 12/2-12/3 for fever. Found to have influenza. Completed course of Tamiflu. Taking OTC decongestant and cough suppressant.     RTC:  1 month with Dr. uHff

## 2024-12-10 LAB
ALBUMIN SERPL BCP-MCNC: 4.1 G/DL (ref 3.4–5)
ALP SERPL-CCNC: 68 U/L (ref 33–136)
ALT SERPL W P-5'-P-CCNC: 15 U/L (ref 10–52)
ANION GAP SERPL CALC-SCNC: 15 MMOL/L (ref 10–20)
AST SERPL W P-5'-P-CCNC: 20 U/L (ref 9–39)
BILIRUB SERPL-MCNC: 0.7 MG/DL (ref 0–1.2)
BUN SERPL-MCNC: 17 MG/DL (ref 6–23)
CALCIUM SERPL-MCNC: 8.9 MG/DL (ref 8.6–10.6)
CHLORIDE SERPL-SCNC: 108 MMOL/L (ref 98–107)
CO2 SERPL-SCNC: 25 MMOL/L (ref 21–32)
CREAT SERPL-MCNC: 1.25 MG/DL (ref 0.5–1.3)
EGFRCR SERPLBLD CKD-EPI 2021: 60 ML/MIN/1.73M*2
GLUCOSE SERPL-MCNC: 136 MG/DL (ref 74–99)
IGA SERPL-MCNC: <7 MG/DL (ref 70–400)
IGG SERPL-MCNC: 607 MG/DL (ref 700–1600)
IGM SERPL-MCNC: 6 MG/DL (ref 40–230)
KAPPA LC SERPL-MCNC: <0.08 MG/DL (ref 0.33–1.94)
KAPPA LC/LAMBDA SER: ABNORMAL {RATIO}
LAMBDA LC SERPL-MCNC: <0.17 MG/DL (ref 0.57–2.63)
POTASSIUM SERPL-SCNC: 4.2 MMOL/L (ref 3.5–5.3)
PROT SERPL-MCNC: 6 G/DL (ref 6.4–8.2)
PROT SERPL-MCNC: 6 G/DL (ref 6.4–8.2)
SODIUM SERPL-SCNC: 144 MMOL/L (ref 136–145)

## 2024-12-12 LAB
ALBUMIN: 3.7 G/DL (ref 3.4–5)
ALPHA 1 GLOBULIN: 0.3 G/DL (ref 0.2–0.6)
ALPHA 2 GLOBULIN: 0.8 G/DL (ref 0.4–1.1)
BETA GLOBULIN: 0.7 G/DL (ref 0.5–1.2)
GAMMA GLOBULIN: 0.5 G/DL (ref 0.5–1.4)
IMMUNOFIXATION COMMENT: NORMAL
PATH REVIEW - SERUM IMMUNOFIXATION: NORMAL
PATH REVIEW-SERUM PROTEIN ELECTROPHORESIS: NORMAL
PROTEIN ELECTROPHORESIS COMMENT: NORMAL

## 2024-12-16 ENCOUNTER — APPOINTMENT (OUTPATIENT)
Dept: HEMATOLOGY/ONCOLOGY | Facility: CLINIC | Age: 74
End: 2024-12-16
Payer: MEDICARE

## 2024-12-16 ENCOUNTER — LAB (OUTPATIENT)
Dept: LAB | Facility: CLINIC | Age: 74
End: 2024-12-16
Payer: MEDICARE

## 2024-12-16 ENCOUNTER — INFUSION (OUTPATIENT)
Dept: HEMATOLOGY/ONCOLOGY | Facility: CLINIC | Age: 74
End: 2024-12-16
Payer: MEDICARE

## 2024-12-16 ENCOUNTER — HOSPITAL ENCOUNTER (OUTPATIENT)
Dept: RADIOLOGY | Facility: CLINIC | Age: 74
Discharge: HOME | End: 2024-12-16
Payer: MEDICARE

## 2024-12-16 VITALS
OXYGEN SATURATION: 100 % | BODY MASS INDEX: 32.88 KG/M2 | SYSTOLIC BLOOD PRESSURE: 133 MMHG | DIASTOLIC BLOOD PRESSURE: 86 MMHG | TEMPERATURE: 97.5 F | HEIGHT: 69 IN | WEIGHT: 222 LBS | HEART RATE: 73 BPM | RESPIRATION RATE: 16 BRPM

## 2024-12-16 DIAGNOSIS — M84.534S: ICD-10-CM

## 2024-12-16 DIAGNOSIS — C90.00 IGG MULTIPLE MYELOMA (MULTI): ICD-10-CM

## 2024-12-16 DIAGNOSIS — C90.00 MULTIPLE MYELOMA WITHOUT REMISSION (MULTI): ICD-10-CM

## 2024-12-16 DIAGNOSIS — M84.433K: ICD-10-CM

## 2024-12-16 LAB
BASOPHILS # BLD AUTO: 0.01 X10*3/UL (ref 0–0.1)
BASOPHILS NFR BLD AUTO: 0.3 %
EOSINOPHIL # BLD AUTO: 0.11 X10*3/UL (ref 0–0.4)
EOSINOPHIL NFR BLD AUTO: 3.2 %
ERYTHROCYTE [DISTWIDTH] IN BLOOD BY AUTOMATED COUNT: 13.6 % (ref 11.5–14.5)
HCT VFR BLD AUTO: 38.4 % (ref 41–52)
HGB BLD-MCNC: 13.2 G/DL (ref 13.5–17.5)
IMM GRANULOCYTES # BLD AUTO: 0.01 X10*3/UL (ref 0–0.5)
IMM GRANULOCYTES NFR BLD AUTO: 0.3 % (ref 0–0.9)
LYMPHOCYTES # BLD AUTO: 1.2 X10*3/UL (ref 0.8–3)
LYMPHOCYTES NFR BLD AUTO: 35.4 %
MCH RBC QN AUTO: 33.8 PG (ref 26–34)
MCHC RBC AUTO-ENTMCNC: 34.4 G/DL (ref 32–36)
MCV RBC AUTO: 98 FL (ref 80–100)
MONOCYTES # BLD AUTO: 0.59 X10*3/UL (ref 0.05–0.8)
MONOCYTES NFR BLD AUTO: 17.4 %
NEUTROPHILS # BLD AUTO: 1.47 X10*3/UL (ref 1.6–5.5)
NEUTROPHILS NFR BLD AUTO: 43.4 %
NRBC BLD-RTO: ABNORMAL /100{WBCS}
PLATELET # BLD AUTO: 143 X10*3/UL (ref 150–450)
RBC # BLD AUTO: 3.91 X10*6/UL (ref 4.5–5.9)
WBC # BLD AUTO: 3.4 X10*3/UL (ref 4.4–11.3)

## 2024-12-16 PROCEDURE — 82784 ASSAY IGA/IGD/IGG/IGM EACH: CPT

## 2024-12-16 PROCEDURE — 73080 X-RAY EXAM OF ELBOW: CPT | Mod: 50

## 2024-12-16 PROCEDURE — 2500000004 HC RX 250 GENERAL PHARMACY W/ HCPCS (ALT 636 FOR OP/ED): Mod: JZ,TB | Performed by: INTERNAL MEDICINE

## 2024-12-16 PROCEDURE — 83521 IG LIGHT CHAINS FREE EACH: CPT

## 2024-12-16 PROCEDURE — 96401 CHEMO ANTI-NEOPL SQ/IM: CPT

## 2024-12-16 PROCEDURE — 80053 COMPREHEN METABOLIC PANEL: CPT

## 2024-12-16 PROCEDURE — 84165 PROTEIN E-PHORESIS SERUM: CPT

## 2024-12-16 PROCEDURE — 84155 ASSAY OF PROTEIN SERUM: CPT | Mod: 59

## 2024-12-16 PROCEDURE — 85025 COMPLETE CBC W/AUTO DIFF WBC: CPT

## 2024-12-16 PROCEDURE — 36415 COLL VENOUS BLD VENIPUNCTURE: CPT

## 2024-12-16 RX ORDER — FAMOTIDINE 10 MG/ML
20 INJECTION INTRAVENOUS ONCE AS NEEDED
Status: DISCONTINUED | OUTPATIENT
Start: 2024-12-16 | End: 2024-12-16 | Stop reason: HOSPADM

## 2024-12-16 RX ORDER — PROCHLORPERAZINE MALEATE 10 MG
10 TABLET ORAL EVERY 6 HOURS PRN
Status: DISCONTINUED | OUTPATIENT
Start: 2024-12-16 | End: 2024-12-16 | Stop reason: HOSPADM

## 2024-12-16 RX ORDER — DIPHENHYDRAMINE HYDROCHLORIDE 50 MG/ML
50 INJECTION INTRAMUSCULAR; INTRAVENOUS AS NEEDED
Status: DISCONTINUED | OUTPATIENT
Start: 2024-12-16 | End: 2024-12-16 | Stop reason: HOSPADM

## 2024-12-16 RX ORDER — EPINEPHRINE 0.3 MG/.3ML
0.3 INJECTION SUBCUTANEOUS EVERY 5 MIN PRN
Status: DISCONTINUED | OUTPATIENT
Start: 2024-12-16 | End: 2024-12-16 | Stop reason: HOSPADM

## 2024-12-16 RX ORDER — PROCHLORPERAZINE EDISYLATE 5 MG/ML
10 INJECTION INTRAMUSCULAR; INTRAVENOUS EVERY 6 HOURS PRN
Status: DISCONTINUED | OUTPATIENT
Start: 2024-12-16 | End: 2024-12-16 | Stop reason: HOSPADM

## 2024-12-16 RX ORDER — ALBUTEROL SULFATE 0.83 MG/ML
3 SOLUTION RESPIRATORY (INHALATION) AS NEEDED
Status: DISCONTINUED | OUTPATIENT
Start: 2024-12-16 | End: 2024-12-16 | Stop reason: HOSPADM

## 2024-12-16 ASSESSMENT — PAIN SCALES - GENERAL: PAINLEVEL_OUTOF10: 0-NO PAIN

## 2024-12-16 NOTE — PROGRESS NOTES
[facility] Infusion Note     Maldonado Mccloud is a 74 y.o. year old male here today,12/16/24,  in the [facility] infusion center for cycle 3 day 22 of the following treatment regimen:    Teclistamab (Weekly), 28 Day Cycles        Medications given:  Administrations This Visit       teclistamab-cqyv (Tecvayli) subcutaneous solution 153 mg       Admin Date  12/16/2024 Action  Given Dose  153 mg Route  subcutaneous Documented By  Maria Victoria Mcdonald, RN                    Pt tolerated subcutaneous injection to the left abdomen well and was discharged to home with wife in stable condition. Pt ambulated  off the unit independently with a slow and steady gait. Pt had no further questions or concerns at this time.

## 2024-12-17 ENCOUNTER — APPOINTMENT (OUTPATIENT)
Dept: HEMATOLOGY/ONCOLOGY | Facility: CLINIC | Age: 74
End: 2024-12-17
Payer: COMMERCIAL

## 2024-12-17 LAB
ALBUMIN SERPL BCP-MCNC: 4.1 G/DL (ref 3.4–5)
ALP SERPL-CCNC: 70 U/L (ref 33–136)
ALT SERPL W P-5'-P-CCNC: 18 U/L (ref 10–52)
ANION GAP SERPL CALC-SCNC: 15 MMOL/L (ref 10–20)
AST SERPL W P-5'-P-CCNC: 20 U/L (ref 9–39)
BILIRUB SERPL-MCNC: 0.8 MG/DL (ref 0–1.2)
BUN SERPL-MCNC: 19 MG/DL (ref 6–23)
CALCIUM SERPL-MCNC: 9.4 MG/DL (ref 8.6–10.6)
CHLORIDE SERPL-SCNC: 106 MMOL/L (ref 98–107)
CO2 SERPL-SCNC: 26 MMOL/L (ref 21–32)
CREAT SERPL-MCNC: 1.27 MG/DL (ref 0.5–1.3)
EGFRCR SERPLBLD CKD-EPI 2021: 59 ML/MIN/1.73M*2
GLUCOSE SERPL-MCNC: 105 MG/DL (ref 74–99)
IGA SERPL-MCNC: <7 MG/DL (ref 70–400)
IGG SERPL-MCNC: 540 MG/DL (ref 700–1600)
IGM SERPL-MCNC: 6 MG/DL (ref 40–230)
KAPPA LC SERPL-MCNC: 0.08 MG/DL (ref 0.33–1.94)
KAPPA LC/LAMBDA SER: ABNORMAL {RATIO}
LAMBDA LC SERPL-MCNC: <0.17 MG/DL (ref 0.57–2.63)
POTASSIUM SERPL-SCNC: 4.6 MMOL/L (ref 3.5–5.3)
PROT SERPL-MCNC: 6.2 G/DL (ref 6.4–8.2)
PROT SERPL-MCNC: 6.2 G/DL (ref 6.4–8.2)
SODIUM SERPL-SCNC: 142 MMOL/L (ref 136–145)

## 2024-12-18 LAB
ALBUMIN: 4 G/DL (ref 3.4–5)
ALPHA 1 GLOBULIN: 0.3 G/DL (ref 0.2–0.6)
ALPHA 2 GLOBULIN: 0.7 G/DL (ref 0.4–1.1)
BETA GLOBULIN: 0.7 G/DL (ref 0.5–1.2)
GAMMA GLOBULIN: 0.5 G/DL (ref 0.5–1.4)
IMMUNOFIXATION COMMENT: NORMAL
PATH REVIEW - SERUM IMMUNOFIXATION: NORMAL
PATH REVIEW-SERUM PROTEIN ELECTROPHORESIS: NORMAL
PROTEIN ELECTROPHORESIS COMMENT: NORMAL

## 2024-12-20 ENCOUNTER — APPOINTMENT (OUTPATIENT)
Dept: RADIOLOGY | Facility: CLINIC | Age: 74
End: 2024-12-20
Payer: COMMERCIAL

## 2024-12-20 ENCOUNTER — APPOINTMENT (OUTPATIENT)
Dept: ORTHOPEDIC SURGERY | Facility: HOSPITAL | Age: 74
End: 2024-12-20
Payer: COMMERCIAL

## 2024-12-20 VITALS — HEIGHT: 69 IN | BODY MASS INDEX: 32.88 KG/M2 | WEIGHT: 222 LBS

## 2024-12-20 DIAGNOSIS — M84.534S: ICD-10-CM

## 2024-12-20 DIAGNOSIS — M84.433K: ICD-10-CM

## 2024-12-20 PROCEDURE — 1159F MED LIST DOCD IN RCRD: CPT | Performed by: STUDENT IN AN ORGANIZED HEALTH CARE EDUCATION/TRAINING PROGRAM

## 2024-12-20 PROCEDURE — 1123F ACP DISCUSS/DSCN MKR DOCD: CPT | Performed by: STUDENT IN AN ORGANIZED HEALTH CARE EDUCATION/TRAINING PROGRAM

## 2024-12-20 PROCEDURE — 1160F RVW MEDS BY RX/DR IN RCRD: CPT | Performed by: STUDENT IN AN ORGANIZED HEALTH CARE EDUCATION/TRAINING PROGRAM

## 2024-12-20 PROCEDURE — 99213 OFFICE O/P EST LOW 20 MIN: CPT | Performed by: STUDENT IN AN ORGANIZED HEALTH CARE EDUCATION/TRAINING PROGRAM

## 2024-12-20 ASSESSMENT — ENCOUNTER SYMPTOMS
DEPRESSION: 0
LOSS OF SENSATION IN FEET: 0
OCCASIONAL FEELINGS OF UNSTEADINESS: 0

## 2024-12-20 ASSESSMENT — PATIENT HEALTH QUESTIONNAIRE - PHQ9
SUM OF ALL RESPONSES TO PHQ9 QUESTIONS 1 AND 2: 0
1. LITTLE INTEREST OR PLEASURE IN DOING THINGS: NOT AT ALL
2. FEELING DOWN, DEPRESSED OR HOPELESS: NOT AT ALL

## 2024-12-20 ASSESSMENT — PAIN - FUNCTIONAL ASSESSMENT: PAIN_FUNCTIONAL_ASSESSMENT: NO/DENIES PAIN

## 2024-12-20 NOTE — PROGRESS NOTES
[facility] Progress Note:    CC: Follow-up elbow x-rays    S: 74 y.o. male with a history of multiple myeloma with bilateral proximal radius fractures, the right of which was significantly displaced however he did not progressing with range of motion very well.  He decided to get his x-rays outpatient and do a virtual visit because his symptoms have been well-controlled.  States that his pain is very well-controlled.  He is happy with his activity and can do all activities of daily living.  Every once a while he gets some soreness in that right elbow but it does not stop him from doing anything he wants to.  He is not having any symptoms in the left elbow.  Overall he is happy with the progress made thus far and has no major issues.    Objective:    Exam:  Gen: alert, appropriately conversational, no apparent distress    Physical exam is unable to be performed due to the virtual nature of the visit the patient is overall well-appearing with apparent distress    Imaging:  XR of the bilateral elbows, obtained and personally reviewed today, shows pathologic fracture of the right proximal radius through the biceps tuberosity with signs of sclerosis and actually a little bit of bone formation.  There is also an old fibrous union at the base of the radial neck.  Radiographs of the left elbow demonstrate previously known lesions consistent with multiple myeloma with nondisplaced pathologic fracture demonstrating sclerotic changes but overall unchanged alignment.  Both proximal radius fractures appear to be going on to fibrous nonunions.    A/P:    We discussed the findings on his radiographs and ultimately the left elbow is not giving any problems in the right elbow is not stopping him from doing his activities of daily living despite the fact that every once a while gives him a little bit of discomfort.  He is doing everything he wants to do right now very happy.  From my standpoint if he began to  experience more symptoms we discussed that we could perform resection arthroplasty given that I feel that the likelihood of actually healing either of these fractures would be very low.  Right now he is not willing to entertain that because he is happy with his function but if he becomes unhappy we certainly have options for him in the future.  From my standpoint he can follow-up on an as-needed basis and does not need any additional radiographs unless he begins to develop pain.

## 2024-12-23 ENCOUNTER — INFUSION (OUTPATIENT)
Dept: HEMATOLOGY/ONCOLOGY | Facility: CLINIC | Age: 74
End: 2024-12-23
Payer: MEDICARE

## 2024-12-23 ENCOUNTER — LAB (OUTPATIENT)
Dept: LAB | Facility: CLINIC | Age: 74
End: 2024-12-23
Payer: MEDICARE

## 2024-12-23 VITALS
SYSTOLIC BLOOD PRESSURE: 132 MMHG | RESPIRATION RATE: 14 BRPM | HEIGHT: 69 IN | BODY MASS INDEX: 32.39 KG/M2 | TEMPERATURE: 98.6 F | WEIGHT: 218.7 LBS | HEART RATE: 85 BPM | OXYGEN SATURATION: 99 % | DIASTOLIC BLOOD PRESSURE: 86 MMHG

## 2024-12-23 DIAGNOSIS — C90.00 MULTIPLE MYELOMA WITHOUT REMISSION (MULTI): ICD-10-CM

## 2024-12-23 DIAGNOSIS — C90.00 IGG MULTIPLE MYELOMA (MULTI): ICD-10-CM

## 2024-12-23 LAB
BASOPHILS # BLD AUTO: 0.01 X10*3/UL (ref 0–0.1)
BASOPHILS NFR BLD AUTO: 0.3 %
EOSINOPHIL # BLD AUTO: 0.06 X10*3/UL (ref 0–0.4)
EOSINOPHIL NFR BLD AUTO: 2 %
ERYTHROCYTE [DISTWIDTH] IN BLOOD BY AUTOMATED COUNT: 13.6 % (ref 11.5–14.5)
HCT VFR BLD AUTO: 38.6 % (ref 41–52)
HGB BLD-MCNC: 13.1 G/DL (ref 13.5–17.5)
IMM GRANULOCYTES # BLD AUTO: 0 X10*3/UL (ref 0–0.5)
IMM GRANULOCYTES NFR BLD AUTO: 0 % (ref 0–0.9)
LYMPHOCYTES # BLD AUTO: 1.34 X10*3/UL (ref 0.8–3)
LYMPHOCYTES NFR BLD AUTO: 44.1 %
MCH RBC QN AUTO: 33.8 PG (ref 26–34)
MCHC RBC AUTO-ENTMCNC: 33.9 G/DL (ref 32–36)
MCV RBC AUTO: 100 FL (ref 80–100)
MONOCYTES # BLD AUTO: 0.44 X10*3/UL (ref 0.05–0.8)
MONOCYTES NFR BLD AUTO: 14.5 %
NEUTROPHILS # BLD AUTO: 1.19 X10*3/UL (ref 1.6–5.5)
NEUTROPHILS NFR BLD AUTO: 39.1 %
NRBC BLD-RTO: ABNORMAL /100{WBCS}
PLATELET # BLD AUTO: 131 X10*3/UL (ref 150–450)
RBC # BLD AUTO: 3.88 X10*6/UL (ref 4.5–5.9)
WBC # BLD AUTO: 3 X10*3/UL (ref 4.4–11.3)

## 2024-12-23 PROCEDURE — 96365 THER/PROPH/DIAG IV INF INIT: CPT | Mod: INF

## 2024-12-23 PROCEDURE — 82784 ASSAY IGA/IGD/IGG/IGM EACH: CPT

## 2024-12-23 PROCEDURE — 36415 COLL VENOUS BLD VENIPUNCTURE: CPT

## 2024-12-23 PROCEDURE — 96401 CHEMO ANTI-NEOPL SQ/IM: CPT

## 2024-12-23 PROCEDURE — 2500000004 HC RX 250 GENERAL PHARMACY W/ HCPCS (ALT 636 FOR OP/ED)

## 2024-12-23 PROCEDURE — 85025 COMPLETE CBC W/AUTO DIFF WBC: CPT

## 2024-12-23 PROCEDURE — 83521 IG LIGHT CHAINS FREE EACH: CPT

## 2024-12-23 RX ORDER — DIPHENHYDRAMINE HYDROCHLORIDE 50 MG/ML
50 INJECTION INTRAMUSCULAR; INTRAVENOUS AS NEEDED
Status: DISCONTINUED | OUTPATIENT
Start: 2024-12-23 | End: 2024-12-23 | Stop reason: HOSPADM

## 2024-12-23 RX ORDER — ALBUTEROL SULFATE 0.83 MG/ML
3 SOLUTION RESPIRATORY (INHALATION) AS NEEDED
OUTPATIENT
Start: 2025-01-01

## 2024-12-23 RX ORDER — DIPHENHYDRAMINE HYDROCHLORIDE 50 MG/ML
50 INJECTION INTRAMUSCULAR; INTRAVENOUS AS NEEDED
OUTPATIENT
Start: 2025-01-01

## 2024-12-23 RX ORDER — PROCHLORPERAZINE EDISYLATE 5 MG/ML
10 INJECTION INTRAMUSCULAR; INTRAVENOUS EVERY 6 HOURS PRN
Status: DISCONTINUED | OUTPATIENT
Start: 2024-12-23 | End: 2024-12-23 | Stop reason: HOSPADM

## 2024-12-23 RX ORDER — FAMOTIDINE 10 MG/ML
20 INJECTION INTRAVENOUS ONCE AS NEEDED
OUTPATIENT
Start: 2025-01-01

## 2024-12-23 RX ORDER — FAMOTIDINE 10 MG/ML
20 INJECTION INTRAVENOUS ONCE AS NEEDED
Status: DISCONTINUED | OUTPATIENT
Start: 2024-12-23 | End: 2024-12-23 | Stop reason: HOSPADM

## 2024-12-23 RX ORDER — ZOLEDRONIC ACID 0.04 MG/ML
4 INJECTION, SOLUTION INTRAVENOUS ONCE AS NEEDED
Status: COMPLETED | OUTPATIENT
Start: 2024-12-23 | End: 2024-12-23

## 2024-12-23 RX ORDER — EPINEPHRINE 0.3 MG/.3ML
0.3 INJECTION SUBCUTANEOUS ONCE AS NEEDED
OUTPATIENT
Start: 2025-01-01

## 2024-12-23 RX ORDER — HEPARIN 100 UNIT/ML
500 SYRINGE INTRAVENOUS AS NEEDED
OUTPATIENT
Start: 2024-12-23

## 2024-12-23 RX ORDER — PROCHLORPERAZINE MALEATE 10 MG
10 TABLET ORAL EVERY 6 HOURS PRN
Status: DISCONTINUED | OUTPATIENT
Start: 2024-12-23 | End: 2024-12-23 | Stop reason: HOSPADM

## 2024-12-23 RX ORDER — ALBUTEROL SULFATE 0.83 MG/ML
3 SOLUTION RESPIRATORY (INHALATION) AS NEEDED
Status: DISCONTINUED | OUTPATIENT
Start: 2024-12-23 | End: 2024-12-23 | Stop reason: HOSPADM

## 2024-12-23 RX ORDER — HEPARIN SODIUM,PORCINE/PF 10 UNIT/ML
50 SYRINGE (ML) INTRAVENOUS AS NEEDED
OUTPATIENT
Start: 2024-12-23

## 2024-12-23 RX ORDER — EPINEPHRINE 0.3 MG/.3ML
0.3 INJECTION SUBCUTANEOUS EVERY 5 MIN PRN
Status: DISCONTINUED | OUTPATIENT
Start: 2024-12-23 | End: 2024-12-23 | Stop reason: HOSPADM

## 2024-12-23 RX ORDER — ZOLEDRONIC ACID 0.04 MG/ML
4 INJECTION, SOLUTION INTRAVENOUS ONCE AS NEEDED
OUTPATIENT
Start: 2025-01-01

## 2024-12-23 ASSESSMENT — PAIN SCALES - GENERAL: PAINLEVEL_OUTOF10: 0-NO PAIN

## 2024-12-23 NOTE — PROGRESS NOTES
Patient here for teclistamab and zometa.  Per wife Dr. Moreno may have stopped zometa. Unable to determine by clinic notes.  Message sent to Alonso NP for clarification.   Patient wife states patient has been complaining of dizziness for quite a while and nothing really done about it. After a long discussion, patient to contact PCP for any possible referrals for management. Patient and wife agreeable.  Per NICK rAechiga Okay for zometa today.  Patient and wife agreeable.  1045 Patient tolerated infusion and injection without complaints.  IV site benign. No redness, swelling or bleeding noted.  2x2 and coban applied.  Patient aware of next appt date. Patient independently ambulatory off unit in NAD and without complaints.  Gait steady.  Call back instructions reviewed.  Patient verbalized understanding.

## 2024-12-24 LAB
IGA SERPL-MCNC: <7 MG/DL (ref 70–400)
IGG SERPL-MCNC: 486 MG/DL (ref 700–1600)
IGM SERPL-MCNC: 6 MG/DL (ref 40–230)
KAPPA LC SERPL-MCNC: <0.08 MG/DL (ref 0.33–1.94)
KAPPA LC/LAMBDA SER: ABNORMAL {RATIO}
LAMBDA LC SERPL-MCNC: <0.17 MG/DL (ref 0.57–2.63)

## 2024-12-30 ENCOUNTER — LAB (OUTPATIENT)
Dept: LAB | Facility: CLINIC | Age: 74
End: 2024-12-30
Payer: MEDICARE

## 2024-12-30 ENCOUNTER — INFUSION (OUTPATIENT)
Dept: HEMATOLOGY/ONCOLOGY | Facility: CLINIC | Age: 74
End: 2024-12-30
Payer: MEDICARE

## 2024-12-30 VITALS
HEART RATE: 72 BPM | DIASTOLIC BLOOD PRESSURE: 88 MMHG | TEMPERATURE: 98.2 F | WEIGHT: 223.33 LBS | RESPIRATION RATE: 16 BRPM | BODY MASS INDEX: 33.08 KG/M2 | SYSTOLIC BLOOD PRESSURE: 129 MMHG | OXYGEN SATURATION: 97 % | HEIGHT: 69 IN

## 2024-12-30 DIAGNOSIS — C90.00 IGG MULTIPLE MYELOMA (MULTI): ICD-10-CM

## 2024-12-30 PROBLEM — D70.9 NEUTROPENIA: Status: ACTIVE | Noted: 2024-12-30

## 2024-12-30 LAB
BASOPHILS # BLD AUTO: 0.01 X10*3/UL (ref 0–0.1)
BASOPHILS NFR BLD AUTO: 0.4 %
EOSINOPHIL # BLD AUTO: 0.1 X10*3/UL (ref 0–0.4)
EOSINOPHIL NFR BLD AUTO: 4.4 %
ERYTHROCYTE [DISTWIDTH] IN BLOOD BY AUTOMATED COUNT: 13.4 % (ref 11.5–14.5)
HCT VFR BLD AUTO: 36.3 % (ref 41–52)
HGB BLD-MCNC: 12.4 G/DL (ref 13.5–17.5)
IMM GRANULOCYTES # BLD AUTO: 0.01 X10*3/UL (ref 0–0.5)
IMM GRANULOCYTES NFR BLD AUTO: 0.4 % (ref 0–0.9)
LYMPHOCYTES # BLD AUTO: 1.07 X10*3/UL (ref 0.8–3)
LYMPHOCYTES NFR BLD AUTO: 47.3 %
MCH RBC QN AUTO: 33.9 PG (ref 26–34)
MCHC RBC AUTO-ENTMCNC: 34.2 G/DL (ref 32–36)
MCV RBC AUTO: 99 FL (ref 80–100)
MONOCYTES # BLD AUTO: 0.49 X10*3/UL (ref 0.05–0.8)
MONOCYTES NFR BLD AUTO: 21.7 %
NEUTROPHILS # BLD AUTO: 0.58 X10*3/UL (ref 1.6–5.5)
NEUTROPHILS NFR BLD AUTO: 25.8 %
NRBC BLD-RTO: ABNORMAL /100{WBCS}
PLATELET # BLD AUTO: 130 X10*3/UL (ref 150–450)
RBC # BLD AUTO: 3.66 X10*6/UL (ref 4.5–5.9)
WBC # BLD AUTO: 2.3 X10*3/UL (ref 4.4–11.3)

## 2024-12-30 PROCEDURE — 96401 CHEMO ANTI-NEOPL SQ/IM: CPT

## 2024-12-30 PROCEDURE — 85025 COMPLETE CBC W/AUTO DIFF WBC: CPT

## 2024-12-30 PROCEDURE — 36415 COLL VENOUS BLD VENIPUNCTURE: CPT

## 2024-12-30 PROCEDURE — 2500000004 HC RX 250 GENERAL PHARMACY W/ HCPCS (ALT 636 FOR OP/ED): Mod: JZ,TB

## 2024-12-30 RX ORDER — FAMOTIDINE 10 MG/ML
20 INJECTION INTRAVENOUS ONCE AS NEEDED
Status: DISCONTINUED | OUTPATIENT
Start: 2024-12-30 | End: 2024-12-30 | Stop reason: HOSPADM

## 2024-12-30 RX ORDER — PROCHLORPERAZINE MALEATE 10 MG
10 TABLET ORAL EVERY 6 HOURS PRN
Status: DISCONTINUED | OUTPATIENT
Start: 2024-12-30 | End: 2024-12-30 | Stop reason: HOSPADM

## 2024-12-30 RX ORDER — EPINEPHRINE 0.3 MG/.3ML
0.3 INJECTION SUBCUTANEOUS EVERY 5 MIN PRN
Status: DISCONTINUED | OUTPATIENT
Start: 2024-12-30 | End: 2024-12-30 | Stop reason: HOSPADM

## 2024-12-30 RX ORDER — DIPHENHYDRAMINE HYDROCHLORIDE 50 MG/ML
50 INJECTION INTRAMUSCULAR; INTRAVENOUS AS NEEDED
Status: DISCONTINUED | OUTPATIENT
Start: 2024-12-30 | End: 2024-12-30 | Stop reason: HOSPADM

## 2024-12-30 RX ORDER — ALBUTEROL SULFATE 0.83 MG/ML
3 SOLUTION RESPIRATORY (INHALATION) AS NEEDED
Status: DISCONTINUED | OUTPATIENT
Start: 2024-12-30 | End: 2024-12-30 | Stop reason: HOSPADM

## 2024-12-30 RX ORDER — PROCHLORPERAZINE EDISYLATE 5 MG/ML
10 INJECTION INTRAMUSCULAR; INTRAVENOUS EVERY 6 HOURS PRN
Status: DISCONTINUED | OUTPATIENT
Start: 2024-12-30 | End: 2024-12-30 | Stop reason: HOSPADM

## 2024-12-30 RX ADMIN — TECLISTAMAB 153 MG: 90 INJECTION SUBCUTANEOUS at 09:42

## 2024-12-30 ASSESSMENT — PAIN SCALES - GENERAL: PAINLEVEL_OUTOF10: 0-NO PAIN

## 2025-01-06 ENCOUNTER — LAB (OUTPATIENT)
Dept: LAB | Facility: CLINIC | Age: 75
End: 2025-01-06
Payer: MEDICARE

## 2025-01-06 ENCOUNTER — INFUSION (OUTPATIENT)
Dept: HEMATOLOGY/ONCOLOGY | Facility: CLINIC | Age: 75
End: 2025-01-06
Payer: MEDICARE

## 2025-01-06 VITALS
DIASTOLIC BLOOD PRESSURE: 88 MMHG | HEIGHT: 69 IN | BODY MASS INDEX: 33.16 KG/M2 | OXYGEN SATURATION: 97 % | HEART RATE: 70 BPM | SYSTOLIC BLOOD PRESSURE: 132 MMHG | WEIGHT: 223.88 LBS | TEMPERATURE: 97.5 F | RESPIRATION RATE: 14 BRPM

## 2025-01-06 DIAGNOSIS — C90.00 IGG MULTIPLE MYELOMA (MULTI): ICD-10-CM

## 2025-01-06 DIAGNOSIS — Z92.850 HISTORY OF CHIMERIC ANTIGEN RECEPTOR T-CELL THERAPY: ICD-10-CM

## 2025-01-06 DIAGNOSIS — C90.00 MULTIPLE MYELOMA WITHOUT REMISSION (MULTI): ICD-10-CM

## 2025-01-06 DIAGNOSIS — D70.1 CHEMOTHERAPY-INDUCED NEUTROPENIA (CMS-HCC): ICD-10-CM

## 2025-01-06 DIAGNOSIS — C90.00 IGG MULTIPLE MYELOMA (MULTI): Primary | ICD-10-CM

## 2025-01-06 DIAGNOSIS — T45.1X5A CHEMOTHERAPY-INDUCED NEUTROPENIA (CMS-HCC): ICD-10-CM

## 2025-01-06 LAB
BASOPHILS # BLD AUTO: 0.01 X10*3/UL (ref 0–0.1)
BASOPHILS NFR BLD AUTO: 0.5 %
EOSINOPHIL # BLD AUTO: 0.06 X10*3/UL (ref 0–0.4)
EOSINOPHIL NFR BLD AUTO: 3 %
ERYTHROCYTE [DISTWIDTH] IN BLOOD BY AUTOMATED COUNT: 13.1 % (ref 11.5–14.5)
HCT VFR BLD AUTO: 36 % (ref 41–52)
HGB BLD-MCNC: 12.2 G/DL (ref 13.5–17.5)
IMM GRANULOCYTES # BLD AUTO: 0.02 X10*3/UL (ref 0–0.5)
IMM GRANULOCYTES NFR BLD AUTO: 1 % (ref 0–0.9)
LYMPHOCYTES # BLD AUTO: 0.88 X10*3/UL (ref 0.8–3)
LYMPHOCYTES NFR BLD AUTO: 43.3 %
MCH RBC QN AUTO: 33.2 PG (ref 26–34)
MCHC RBC AUTO-ENTMCNC: 33.9 G/DL (ref 32–36)
MCV RBC AUTO: 98 FL (ref 80–100)
MONOCYTES # BLD AUTO: 0.44 X10*3/UL (ref 0.05–0.8)
MONOCYTES NFR BLD AUTO: 21.7 %
NEUTROPHILS # BLD AUTO: 0.62 X10*3/UL (ref 1.6–5.5)
NEUTROPHILS NFR BLD AUTO: 30.5 %
NRBC BLD-RTO: ABNORMAL /100{WBCS}
PLATELET # BLD AUTO: 122 X10*3/UL (ref 150–450)
RBC # BLD AUTO: 3.67 X10*6/UL (ref 4.5–5.9)
WBC # BLD AUTO: 2 X10*3/UL (ref 4.4–11.3)

## 2025-01-06 PROCEDURE — 83521 IG LIGHT CHAINS FREE EACH: CPT

## 2025-01-06 PROCEDURE — 82784 ASSAY IGA/IGD/IGG/IGM EACH: CPT

## 2025-01-06 PROCEDURE — 96401 CHEMO ANTI-NEOPL SQ/IM: CPT

## 2025-01-06 PROCEDURE — 36415 COLL VENOUS BLD VENIPUNCTURE: CPT

## 2025-01-06 PROCEDURE — 2500000004 HC RX 250 GENERAL PHARMACY W/ HCPCS (ALT 636 FOR OP/ED): Mod: JZ,JG,TB

## 2025-01-06 PROCEDURE — 85025 COMPLETE CBC W/AUTO DIFF WBC: CPT

## 2025-01-06 PROCEDURE — 80053 COMPREHEN METABOLIC PANEL: CPT

## 2025-01-06 PROCEDURE — 86334 IMMUNOFIX E-PHORESIS SERUM: CPT

## 2025-01-06 PROCEDURE — 96372 THER/PROPH/DIAG INJ SC/IM: CPT | Mod: 59

## 2025-01-06 RX ORDER — EPINEPHRINE 0.3 MG/.3ML
0.3 INJECTION SUBCUTANEOUS EVERY 5 MIN PRN
Status: DISCONTINUED | OUTPATIENT
Start: 2025-01-06 | End: 2025-01-06 | Stop reason: HOSPADM

## 2025-01-06 RX ORDER — FAMOTIDINE 10 MG/ML
20 INJECTION INTRAVENOUS ONCE AS NEEDED
Status: DISCONTINUED | OUTPATIENT
Start: 2025-01-06 | End: 2025-01-06 | Stop reason: HOSPADM

## 2025-01-06 RX ORDER — PROCHLORPERAZINE EDISYLATE 5 MG/ML
10 INJECTION INTRAMUSCULAR; INTRAVENOUS EVERY 6 HOURS PRN
Status: DISCONTINUED | OUTPATIENT
Start: 2025-01-06 | End: 2025-01-06 | Stop reason: HOSPADM

## 2025-01-06 RX ORDER — DIPHENHYDRAMINE HYDROCHLORIDE 50 MG/ML
50 INJECTION INTRAMUSCULAR; INTRAVENOUS AS NEEDED
Status: DISCONTINUED | OUTPATIENT
Start: 2025-01-06 | End: 2025-01-06 | Stop reason: HOSPADM

## 2025-01-06 RX ORDER — PROCHLORPERAZINE MALEATE 10 MG
10 TABLET ORAL EVERY 6 HOURS PRN
Status: DISCONTINUED | OUTPATIENT
Start: 2025-01-06 | End: 2025-01-06 | Stop reason: HOSPADM

## 2025-01-06 RX ORDER — ALBUTEROL SULFATE 0.83 MG/ML
3 SOLUTION RESPIRATORY (INHALATION) AS NEEDED
Status: DISCONTINUED | OUTPATIENT
Start: 2025-01-06 | End: 2025-01-06 | Stop reason: HOSPADM

## 2025-01-06 RX ADMIN — TECLISTAMAB 153 MG: 90 INJECTION SUBCUTANEOUS at 09:32

## 2025-01-06 RX ADMIN — FILGRASTIM-SNDZ 480 MCG: 480 INJECTION, SOLUTION INTRAVENOUS; SUBCUTANEOUS at 09:29

## 2025-01-06 ASSESSMENT — PAIN SCALES - GENERAL: PAINLEVEL_OUTOF10: 0-NO PAIN

## 2025-01-06 NOTE — PROGRESS NOTES
Patient here for teclistimab.  CBC/D noted.  Need neupogen today.  Patient and wife instructed on neupogen specifically bone pain and treatment for it.  Patient tolerated both injections without incident.  Schedule adjusted from Tuesdays back to Mondays per patient/wife request.  Patient awre of next treatment date/time.  NICK Arechiga NP notified that no further IgG labs ordered.   Labs entered.  Patient independently ambulatory off unit in NAD and without complaints.  Gait steady.  Call back instructions reviewed.  Patient verbalized understanding.

## 2025-01-07 LAB
ALBUMIN SERPL BCP-MCNC: 4.1 G/DL (ref 3.4–5)
ALP SERPL-CCNC: 55 U/L (ref 33–136)
ALT SERPL W P-5'-P-CCNC: 17 U/L (ref 10–52)
ANION GAP SERPL CALC-SCNC: 17 MMOL/L (ref 10–20)
AST SERPL W P-5'-P-CCNC: 19 U/L (ref 9–39)
BILIRUB SERPL-MCNC: 0.7 MG/DL (ref 0–1.2)
BUN SERPL-MCNC: 20 MG/DL (ref 6–23)
CALCIUM SERPL-MCNC: 9.1 MG/DL (ref 8.6–10.6)
CHLORIDE SERPL-SCNC: 107 MMOL/L (ref 98–107)
CO2 SERPL-SCNC: 23 MMOL/L (ref 21–32)
CREAT SERPL-MCNC: 1.21 MG/DL (ref 0.5–1.3)
EGFRCR SERPLBLD CKD-EPI 2021: 62 ML/MIN/1.73M*2
GLUCOSE SERPL-MCNC: 111 MG/DL (ref 74–99)
IGA SERPL-MCNC: <7 MG/DL (ref 70–400)
IGG SERPL-MCNC: 425 MG/DL (ref 700–1600)
IGM SERPL-MCNC: 6 MG/DL (ref 40–230)
KAPPA LC SERPL-MCNC: <0.08 MG/DL (ref 0.33–1.94)
KAPPA LC/LAMBDA SER: ABNORMAL {RATIO}
LAMBDA LC SERPL-MCNC: <0.17 MG/DL (ref 0.57–2.63)
POTASSIUM SERPL-SCNC: 4.4 MMOL/L (ref 3.5–5.3)
PROT SERPL-MCNC: 6 G/DL (ref 6.4–8.2)
PROT SERPL-MCNC: 6 G/DL (ref 6.4–8.2)
SODIUM SERPL-SCNC: 143 MMOL/L (ref 136–145)

## 2025-01-09 ENCOUNTER — OFFICE VISIT (OUTPATIENT)
Dept: HEMATOLOGY/ONCOLOGY | Facility: HOSPITAL | Age: 75
End: 2025-01-09
Payer: MEDICARE

## 2025-01-09 VITALS
DIASTOLIC BLOOD PRESSURE: 82 MMHG | RESPIRATION RATE: 16 BRPM | TEMPERATURE: 97.2 F | OXYGEN SATURATION: 100 % | HEART RATE: 77 BPM | WEIGHT: 224.7 LBS | BODY MASS INDEX: 32.94 KG/M2 | SYSTOLIC BLOOD PRESSURE: 127 MMHG

## 2025-01-09 DIAGNOSIS — C90.00 IGG MULTIPLE MYELOMA (MULTI): ICD-10-CM

## 2025-01-09 LAB
ALBUMIN: 4 G/DL (ref 3.4–5)
ALPHA 1 GLOBULIN: 0.3 G/DL (ref 0.2–0.6)
ALPHA 2 GLOBULIN: 0.7 G/DL (ref 0.4–1.1)
BETA GLOBULIN: 0.6 G/DL (ref 0.5–1.2)
GAMMA GLOBULIN: 0.4 G/DL (ref 0.5–1.4)
IMMUNOFIXATION COMMENT: ABNORMAL
PATH REVIEW - SERUM IMMUNOFIXATION: ABNORMAL
PATH REVIEW-SERUM PROTEIN ELECTROPHORESIS: ABNORMAL
PROTEIN ELECTROPHORESIS COMMENT: ABNORMAL

## 2025-01-09 PROCEDURE — 99215 OFFICE O/P EST HI 40 MIN: CPT | Performed by: INTERNAL MEDICINE

## 2025-01-09 PROCEDURE — 3074F SYST BP LT 130 MM HG: CPT | Performed by: INTERNAL MEDICINE

## 2025-01-09 PROCEDURE — 3079F DIAST BP 80-89 MM HG: CPT | Performed by: INTERNAL MEDICINE

## 2025-01-09 PROCEDURE — 1126F AMNT PAIN NOTED NONE PRSNT: CPT | Performed by: INTERNAL MEDICINE

## 2025-01-09 PROCEDURE — 1159F MED LIST DOCD IN RCRD: CPT | Performed by: INTERNAL MEDICINE

## 2025-01-09 PROCEDURE — 1123F ACP DISCUSS/DSCN MKR DOCD: CPT | Performed by: INTERNAL MEDICINE

## 2025-01-09 ASSESSMENT — PAIN SCALES - GENERAL: PAINLEVEL_OUTOF10: 0-NO PAIN

## 2025-01-09 NOTE — PROGRESS NOTES
"Patient ID: Maldonado Mccloud is a 74 y.o. male.  Referring Physician: West Huff MD  [id number]  [place] [address]  Primary Care Provider: Timothy Almodovar MD    Interval hx:  Since last visit he was admitted in early Dec. Per discharge summary:  \"presented on 24 with fever and cough. He was initially treated empirically for community-acquired pneumonia with cefepime and later levofloxacin (-12/3). However, a workup on 12/3/24 confirmed influenza A, with a chest X-ray on 24 showing no pulmonary process, and blood cultures and urinalysis remaining negative. He was started on oseltamivir 75 mg BID for five days and symptomatic management with Hycodan for cough, which provided relief, and Duonebs for wheezing, which did not improve symptoms.      He was stable for discharge home on 12/3/24\".     The  dose of teclistamab was held then restarted on 24. He has had a small rash at site of teclistamab injection on the right side of his abdomen. Has had some GONZALEZ when walking up stairs. He denies having had unexplained wt loss,  fevers, chills, sweats, palpable FELY, cough, nausea, vomiting, diarrhea, mouth sores, other skin rashes, chest/abd/back pains, headaches, nosebleeds, GI or  bleeding, vision or hearing complaints. Still w/occasional numbness of his hands, not relieved at all w/cymbalta and is not feeling depressed.    PMHx:  1) Multiple myeloma (see below)  2) Squamous cell ca's skin  3) DVT, LLE ; now off AC  4) Neuropathy  5) S/p right radius fracture 2023  6) HTN  7) Afib (transient in  w/stem cell collection)    FamHx  Dad  of pan ca age 92    SocHx:   w/5 kids; smokes cigars; no cigs in 50 years; no EtOH or illicit drugs. Was in [employer] in [place]    Meds:  Were reviewed w/the pt  All:  [employer]  PCN  Zosyn    Physical Exam  General: Ambulatory, looked comfortable, not requiring supplemental oxygen  Vital Signs   BP " 127/82; P 90; afebrile; RR 16/min; Wt= 102 (gained 3kg)  HEENT: no oral lesions, tonsillar or tongue enlargement; Neck: no masses; Lymph nodes: no palpable cervical, supraclavicular, axillary, epitrochear or inguinal nodes; Heart: no murmurs; Lungs: clear; Abd: soft, +bowel sounds, non-tender, no palpable liver or spleen; Skin: faint, ellipitical rash on right abdominal wall, otherwise no rashes; Ext's: No LE edema; Neuro: alert, answered questions appropriately, 5/5 motor strength upper and lower extremities    Performance Status:  Symptomatic; fully ambulatory      Assessment/recommendations:  74 year old man w/a hx of LLE DVT (2022, now off AC), HTN, neuropathy, multiple skin cancers and multiply relapsed multiple myeloma [presented with right chest 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; Bone marrow biopsy suggested multiple myeloma IgG kappa, ISS Stage II, bone survey revealed lytic lesions; Urine light chains present kappa restricted with 2gm proteinuria; s/p VRD x 3 cycles with CR; then s/p auto SCTx 12/23/15 complicated by orthostatic hypotension, electrolyte replacement, drug induced rash, culture negative fever, then s/p approximately 5 weeks of maintenance Revlimid 10 mg daily which was held for worsening neuropathy in August 2016; then in 3/ 2018: IgG M Braxton alexei to 0.2gm/L and light chain alexei was; restarted on Revlimid maintenance at 5mg every other day, then revlimid stopped in 3/2021 due to stable disease; then in 7/2021 his M spike was on the rise, and was enrolled onto Vactosertib/Pom trial on 8/5/2021; then progressed in 6/2022, switched to Isatuximab/Carfilzomib on 6/30/22, then in 9/2022 progressed and was switched to Cytoxan/Carfilzomib; then had therapy placed on hold in 11/2022 due to need for hip replacement surgery; myeloma markers were on the rise in 3/2023 with a new jaw lesion, s/p XRT 4/2023 and resume 2x/week carfilzomib; then s/p CAR T w/ABECMA (T=0, 10/25/23), hospital  course complicated by grade 1 ICANS managed w/ Dex 10mg x3 days and 1 dose tociluzimab; then w/progression and started on teclistamab, full dosing given on 10/13/24] here for follow-up.     As for relapsed myeloma:  He has been continued on weekly teclistamab and tolerating this well, so far only has had an early episode of grade I CRS (cx neg low grade fever x 1, not recurred). He has not had any other toxicities thus far. In addition, there has been a biochemical response already: serum free kappa decreasing from 15.6mg/dL (= 156mg/L) to <0.08, as of 1/6/25.   Plan to have him follow-up w/Jacob Arechiga at [facility] and get next cycle (#5) of teclistamab start on 1/12, then weekly, and w/me in 4 weeks, or sooner if new symptoms arise.    As for neutropenia:  Probably from teclistamab; cont prn weekly neupogen to keep ANC >500    Infection ppx:  Cont acyclovir and bactrim ppx; monitor IgG levels and cont monthly ppx IVIG (NOTE: he should receive Gammunex -C, d/t prior severe reaction to gammard).    As for sens NP:  No effect w/cymbalta; will have him stop this medication (taper off over 2 weeks), as it may also be causing dizziness. I offered adding gabapentin but the pt and his wife declined this, as he is really is not having any associated pain.

## 2025-01-13 ENCOUNTER — LAB (OUTPATIENT)
Dept: LAB | Facility: CLINIC | Age: 75
End: 2025-01-13
Payer: MEDICARE

## 2025-01-13 ENCOUNTER — INFUSION (OUTPATIENT)
Dept: HEMATOLOGY/ONCOLOGY | Facility: CLINIC | Age: 75
End: 2025-01-13
Payer: MEDICARE

## 2025-01-13 VITALS
BODY MASS INDEX: 33.13 KG/M2 | SYSTOLIC BLOOD PRESSURE: 132 MMHG | OXYGEN SATURATION: 98 % | DIASTOLIC BLOOD PRESSURE: 85 MMHG | HEART RATE: 61 BPM | WEIGHT: 225.97 LBS | TEMPERATURE: 97.2 F | RESPIRATION RATE: 14 BRPM

## 2025-01-13 DIAGNOSIS — C90.00 IGG MULTIPLE MYELOMA (MULTI): ICD-10-CM

## 2025-01-13 DIAGNOSIS — Z92.850 HISTORY OF CHIMERIC ANTIGEN RECEPTOR T-CELL THERAPY: ICD-10-CM

## 2025-01-13 DIAGNOSIS — C90.00 HYPOGAMMAGLOBULINEMIA DUE TO MULTIPLE MYELOMA (MULTI): ICD-10-CM

## 2025-01-13 DIAGNOSIS — D80.1 HYPOGAMMAGLOBULINEMIA DUE TO MULTIPLE MYELOMA (MULTI): ICD-10-CM

## 2025-01-13 DIAGNOSIS — G62.89 OTHER POLYNEUROPATHY: ICD-10-CM

## 2025-01-13 DIAGNOSIS — C90.00 MULTIPLE MYELOMA WITHOUT REMISSION (MULTI): ICD-10-CM

## 2025-01-13 LAB
BASOPHILS # BLD AUTO: 0.02 X10*3/UL (ref 0–0.1)
BASOPHILS NFR BLD AUTO: 0.9 %
EOSINOPHIL # BLD AUTO: 0.08 X10*3/UL (ref 0–0.4)
EOSINOPHIL NFR BLD AUTO: 3.4 %
ERYTHROCYTE [DISTWIDTH] IN BLOOD BY AUTOMATED COUNT: 13.3 % (ref 11.5–14.5)
HCT VFR BLD AUTO: 35.8 % (ref 41–52)
HGB BLD-MCNC: 12.1 G/DL (ref 13.5–17.5)
IMM GRANULOCYTES # BLD AUTO: 0.04 X10*3/UL (ref 0–0.5)
IMM GRANULOCYTES NFR BLD AUTO: 1.7 % (ref 0–0.9)
LYMPHOCYTES # BLD AUTO: 0.75 X10*3/UL (ref 0.8–3)
LYMPHOCYTES NFR BLD AUTO: 32.3 %
MCH RBC QN AUTO: 33.6 PG (ref 26–34)
MCHC RBC AUTO-ENTMCNC: 33.8 G/DL (ref 32–36)
MCV RBC AUTO: 99 FL (ref 80–100)
MONOCYTES # BLD AUTO: 0.61 X10*3/UL (ref 0.05–0.8)
MONOCYTES NFR BLD AUTO: 26.3 %
NEUTROPHILS # BLD AUTO: 0.82 X10*3/UL (ref 1.6–5.5)
NEUTROPHILS NFR BLD AUTO: 35.4 %
NRBC BLD-RTO: ABNORMAL /100{WBCS}
PLATELET # BLD AUTO: 116 X10*3/UL (ref 150–450)
RBC # BLD AUTO: 3.6 X10*6/UL (ref 4.5–5.9)
WBC # BLD AUTO: 2.3 X10*3/UL (ref 4.4–11.3)

## 2025-01-13 PROCEDURE — 96366 THER/PROPH/DIAG IV INF ADDON: CPT | Mod: INF

## 2025-01-13 PROCEDURE — 96401 CHEMO ANTI-NEOPL SQ/IM: CPT

## 2025-01-13 PROCEDURE — 85025 COMPLETE CBC W/AUTO DIFF WBC: CPT

## 2025-01-13 PROCEDURE — 2500000004 HC RX 250 GENERAL PHARMACY W/ HCPCS (ALT 636 FOR OP/ED)

## 2025-01-13 PROCEDURE — 2500000001 HC RX 250 WO HCPCS SELF ADMINISTERED DRUGS (ALT 637 FOR MEDICARE OP)

## 2025-01-13 PROCEDURE — 96365 THER/PROPH/DIAG IV INF INIT: CPT | Mod: INF

## 2025-01-13 PROCEDURE — 2500000004 HC RX 250 GENERAL PHARMACY W/ HCPCS (ALT 636 FOR OP/ED): Mod: JZ,JG,TB

## 2025-01-13 PROCEDURE — 96375 TX/PRO/DX INJ NEW DRUG ADDON: CPT | Mod: INF

## 2025-01-13 PROCEDURE — 36415 COLL VENOUS BLD VENIPUNCTURE: CPT

## 2025-01-13 RX ORDER — DIPHENHYDRAMINE HYDROCHLORIDE 50 MG/ML
25 INJECTION INTRAMUSCULAR; INTRAVENOUS ONCE
Start: 2025-01-27 | End: 2025-01-27

## 2025-01-13 RX ORDER — ACETAMINOPHEN 325 MG/1
650 TABLET ORAL ONCE
Status: COMPLETED | OUTPATIENT
Start: 2025-01-13 | End: 2025-01-13

## 2025-01-13 RX ORDER — MONTELUKAST SODIUM 10 MG/1
10 TABLET ORAL ONCE
Status: COMPLETED | OUTPATIENT
Start: 2025-01-13 | End: 2025-01-13

## 2025-01-13 RX ORDER — FAMOTIDINE 10 MG/ML
20 INJECTION INTRAVENOUS ONCE AS NEEDED
Status: DISCONTINUED | OUTPATIENT
Start: 2025-01-13 | End: 2025-01-13 | Stop reason: HOSPADM

## 2025-01-13 RX ORDER — DIPHENHYDRAMINE HYDROCHLORIDE 50 MG/ML
50 INJECTION INTRAMUSCULAR; INTRAVENOUS AS NEEDED
OUTPATIENT
Start: 2025-01-27

## 2025-01-13 RX ORDER — DIPHENHYDRAMINE HYDROCHLORIDE 50 MG/ML
50 INJECTION INTRAMUSCULAR; INTRAVENOUS AS NEEDED
Status: DISCONTINUED | OUTPATIENT
Start: 2025-01-13 | End: 2025-01-13 | Stop reason: HOSPADM

## 2025-01-13 RX ORDER — FAMOTIDINE 10 MG/ML
20 INJECTION INTRAVENOUS ONCE
Start: 2025-01-27 | End: 2025-01-27

## 2025-01-13 RX ORDER — ALBUTEROL SULFATE 0.83 MG/ML
3 SOLUTION RESPIRATORY (INHALATION) AS NEEDED
Status: DISCONTINUED | OUTPATIENT
Start: 2025-01-13 | End: 2025-01-13 | Stop reason: HOSPADM

## 2025-01-13 RX ORDER — FAMOTIDINE 10 MG/ML
20 INJECTION INTRAVENOUS ONCE
Status: COMPLETED | OUTPATIENT
Start: 2025-01-13 | End: 2025-01-13

## 2025-01-13 RX ORDER — FAMOTIDINE 10 MG/ML
20 INJECTION INTRAVENOUS ONCE AS NEEDED
OUTPATIENT
Start: 2025-01-27

## 2025-01-13 RX ORDER — ACETAMINOPHEN 325 MG/1
650 TABLET ORAL ONCE
OUTPATIENT
Start: 2025-01-27

## 2025-01-13 RX ORDER — PROCHLORPERAZINE EDISYLATE 5 MG/ML
10 INJECTION INTRAMUSCULAR; INTRAVENOUS EVERY 6 HOURS PRN
Status: DISCONTINUED | OUTPATIENT
Start: 2025-01-13 | End: 2025-01-13 | Stop reason: HOSPADM

## 2025-01-13 RX ORDER — DIPHENHYDRAMINE HYDROCHLORIDE 50 MG/ML
25 INJECTION INTRAMUSCULAR; INTRAVENOUS ONCE
Status: COMPLETED | OUTPATIENT
Start: 2025-01-13 | End: 2025-01-13

## 2025-01-13 RX ORDER — EPINEPHRINE 0.3 MG/.3ML
0.3 INJECTION SUBCUTANEOUS EVERY 5 MIN PRN
Status: DISCONTINUED | OUTPATIENT
Start: 2025-01-13 | End: 2025-01-13 | Stop reason: HOSPADM

## 2025-01-13 RX ORDER — PROCHLORPERAZINE MALEATE 10 MG
10 TABLET ORAL EVERY 6 HOURS PRN
Status: DISCONTINUED | OUTPATIENT
Start: 2025-01-13 | End: 2025-01-13 | Stop reason: HOSPADM

## 2025-01-13 RX ORDER — EPINEPHRINE 0.3 MG/.3ML
0.3 INJECTION SUBCUTANEOUS EVERY 5 MIN PRN
OUTPATIENT
Start: 2025-01-27

## 2025-01-13 RX ORDER — ALBUTEROL SULFATE 0.83 MG/ML
3 SOLUTION RESPIRATORY (INHALATION) AS NEEDED
OUTPATIENT
Start: 2025-01-27

## 2025-01-13 RX ORDER — MONTELUKAST SODIUM 10 MG/1
10 TABLET ORAL ONCE
Start: 2025-01-27 | End: 2025-01-27

## 2025-01-13 RX ADMIN — MONTELUKAST 10 MG: 10 TABLET, FILM COATED ORAL at 09:50

## 2025-01-13 RX ADMIN — TECLISTAMAB 153 MG: 90 INJECTION SUBCUTANEOUS at 10:21

## 2025-01-13 RX ADMIN — FAMOTIDINE 20 MG: 10 INJECTION, SOLUTION INTRAVENOUS at 11:27

## 2025-01-13 RX ADMIN — ACETAMINOPHEN 650 MG: 325 TABLET ORAL at 09:50

## 2025-01-13 RX ADMIN — DIPHENHYDRAMINE HYDROCHLORIDE 25 MG: 50 INJECTION INTRAMUSCULAR; INTRAVENOUS at 09:50

## 2025-01-13 RX ADMIN — IMMUNE GLOBULIN (HUMAN) 20 G: 10 INJECTION INTRAVENOUS; SUBCUTANEOUS at 10:17

## 2025-01-13 ASSESSMENT — PAIN SCALES - GENERAL: PAINLEVEL_OUTOF10: 0-NO PAIN

## 2025-01-14 ENCOUNTER — LAB (OUTPATIENT)
Dept: LAB | Facility: CLINIC | Age: 75
End: 2025-01-14
Payer: MEDICARE

## 2025-01-20 ENCOUNTER — INFUSION (OUTPATIENT)
Dept: HEMATOLOGY/ONCOLOGY | Facility: CLINIC | Age: 75
End: 2025-01-20
Payer: MEDICARE

## 2025-01-20 ENCOUNTER — LAB (OUTPATIENT)
Dept: LAB | Facility: CLINIC | Age: 75
End: 2025-01-20
Payer: MEDICARE

## 2025-01-20 VITALS
SYSTOLIC BLOOD PRESSURE: 152 MMHG | DIASTOLIC BLOOD PRESSURE: 90 MMHG | WEIGHT: 224.87 LBS | BODY MASS INDEX: 33.31 KG/M2 | OXYGEN SATURATION: 100 % | RESPIRATION RATE: 14 BRPM | HEART RATE: 72 BPM | TEMPERATURE: 97.7 F | HEIGHT: 69 IN

## 2025-01-20 DIAGNOSIS — D80.1 HYPOGAMMAGLOBULINEMIA DUE TO MULTIPLE MYELOMA (MULTI): ICD-10-CM

## 2025-01-20 DIAGNOSIS — C90.00 MULTIPLE MYELOMA WITHOUT REMISSION (MULTI): ICD-10-CM

## 2025-01-20 DIAGNOSIS — C90.00 IGG MULTIPLE MYELOMA (MULTI): ICD-10-CM

## 2025-01-20 DIAGNOSIS — C90.00 HYPOGAMMAGLOBULINEMIA DUE TO MULTIPLE MYELOMA (MULTI): ICD-10-CM

## 2025-01-20 DIAGNOSIS — Z92.850 HISTORY OF CHIMERIC ANTIGEN RECEPTOR T-CELL THERAPY: ICD-10-CM

## 2025-01-20 LAB
BASOPHILS # BLD AUTO: 0.01 X10*3/UL (ref 0–0.1)
BASOPHILS NFR BLD AUTO: 0.4 %
EOSINOPHIL # BLD AUTO: 0.04 X10*3/UL (ref 0–0.4)
EOSINOPHIL NFR BLD AUTO: 1.6 %
ERYTHROCYTE [DISTWIDTH] IN BLOOD BY AUTOMATED COUNT: 13.4 % (ref 11.5–14.5)
HCT VFR BLD AUTO: 37.1 % (ref 41–52)
HGB BLD-MCNC: 12.4 G/DL (ref 13.5–17.5)
IMM GRANULOCYTES # BLD AUTO: 0.01 X10*3/UL (ref 0–0.5)
IMM GRANULOCYTES NFR BLD AUTO: 0.4 % (ref 0–0.9)
LYMPHOCYTES # BLD AUTO: 0.71 X10*3/UL (ref 0.8–3)
LYMPHOCYTES NFR BLD AUTO: 28.2 %
MCH RBC QN AUTO: 33.4 PG (ref 26–34)
MCHC RBC AUTO-ENTMCNC: 33.4 G/DL (ref 32–36)
MCV RBC AUTO: 100 FL (ref 80–100)
MONOCYTES # BLD AUTO: 0.34 X10*3/UL (ref 0.05–0.8)
MONOCYTES NFR BLD AUTO: 13.5 %
NEUTROPHILS # BLD AUTO: 1.41 X10*3/UL (ref 1.6–5.5)
NEUTROPHILS NFR BLD AUTO: 55.9 %
NRBC BLD-RTO: ABNORMAL /100{WBCS}
PLATELET # BLD AUTO: 113 X10*3/UL (ref 150–450)
RBC # BLD AUTO: 3.71 X10*6/UL (ref 4.5–5.9)
WBC # BLD AUTO: 2.5 X10*3/UL (ref 4.4–11.3)

## 2025-01-20 PROCEDURE — 84155 ASSAY OF PROTEIN SERUM: CPT

## 2025-01-20 PROCEDURE — 85025 COMPLETE CBC W/AUTO DIFF WBC: CPT

## 2025-01-20 PROCEDURE — 80053 COMPREHEN METABOLIC PANEL: CPT

## 2025-01-20 PROCEDURE — 82784 ASSAY IGA/IGD/IGG/IGM EACH: CPT

## 2025-01-20 PROCEDURE — 96401 CHEMO ANTI-NEOPL SQ/IM: CPT

## 2025-01-20 PROCEDURE — 83521 IG LIGHT CHAINS FREE EACH: CPT

## 2025-01-20 PROCEDURE — 2500000004 HC RX 250 GENERAL PHARMACY W/ HCPCS (ALT 636 FOR OP/ED): Mod: JZ,TB

## 2025-01-20 PROCEDURE — 36415 COLL VENOUS BLD VENIPUNCTURE: CPT

## 2025-01-20 PROCEDURE — 86334 IMMUNOFIX E-PHORESIS SERUM: CPT

## 2025-01-20 RX ORDER — PROCHLORPERAZINE MALEATE 10 MG
10 TABLET ORAL EVERY 6 HOURS PRN
Status: DISCONTINUED | OUTPATIENT
Start: 2025-01-20 | End: 2025-01-20 | Stop reason: HOSPADM

## 2025-01-20 RX ORDER — PROCHLORPERAZINE EDISYLATE 5 MG/ML
10 INJECTION INTRAMUSCULAR; INTRAVENOUS EVERY 6 HOURS PRN
Status: DISCONTINUED | OUTPATIENT
Start: 2025-01-20 | End: 2025-01-20 | Stop reason: HOSPADM

## 2025-01-20 RX ORDER — DIPHENHYDRAMINE HYDROCHLORIDE 50 MG/ML
50 INJECTION INTRAMUSCULAR; INTRAVENOUS AS NEEDED
Status: DISCONTINUED | OUTPATIENT
Start: 2025-01-20 | End: 2025-01-20 | Stop reason: HOSPADM

## 2025-01-20 RX ORDER — ALBUTEROL SULFATE 0.83 MG/ML
3 SOLUTION RESPIRATORY (INHALATION) AS NEEDED
Status: DISCONTINUED | OUTPATIENT
Start: 2025-01-20 | End: 2025-01-20 | Stop reason: HOSPADM

## 2025-01-20 RX ORDER — FAMOTIDINE 10 MG/ML
20 INJECTION INTRAVENOUS ONCE AS NEEDED
Status: DISCONTINUED | OUTPATIENT
Start: 2025-01-20 | End: 2025-01-20 | Stop reason: HOSPADM

## 2025-01-20 RX ORDER — EPINEPHRINE 0.3 MG/.3ML
0.3 INJECTION SUBCUTANEOUS EVERY 5 MIN PRN
Status: DISCONTINUED | OUTPATIENT
Start: 2025-01-20 | End: 2025-01-20 | Stop reason: HOSPADM

## 2025-01-20 RX ADMIN — TECLISTAMAB 153 MG: 90 INJECTION SUBCUTANEOUS at 11:11

## 2025-01-20 ASSESSMENT — PAIN SCALES - GENERAL: PAINLEVEL_OUTOF10: 0-NO PAIN

## 2025-01-20 NOTE — PROGRESS NOTES
Patient is here today for infusion - no complication since last being seen-  independant double check done prior to chemotherapy today-   b/h/ lung sounds not auscultated  patient verbalizes understanding of plan of care      ANC today - 1.41 discussed increase today and plan of care- pt verbalized understanding-

## 2025-01-21 ENCOUNTER — APPOINTMENT (OUTPATIENT)
Dept: HEMATOLOGY/ONCOLOGY | Facility: CLINIC | Age: 75
End: 2025-01-21
Payer: MEDICARE

## 2025-01-21 LAB
ALBUMIN SERPL BCP-MCNC: 4.3 G/DL (ref 3.4–5)
ALP SERPL-CCNC: 56 U/L (ref 33–136)
ALT SERPL W P-5'-P-CCNC: 28 U/L (ref 10–52)
ANION GAP SERPL CALC-SCNC: 14 MMOL/L (ref 10–20)
AST SERPL W P-5'-P-CCNC: 31 U/L (ref 9–39)
BILIRUB SERPL-MCNC: 0.6 MG/DL (ref 0–1.2)
BUN SERPL-MCNC: 25 MG/DL (ref 6–23)
CALCIUM SERPL-MCNC: 9.3 MG/DL (ref 8.6–10.6)
CHLORIDE SERPL-SCNC: 109 MMOL/L (ref 98–107)
CO2 SERPL-SCNC: 23 MMOL/L (ref 21–32)
CREAT SERPL-MCNC: 1.01 MG/DL (ref 0.5–1.3)
EGFRCR SERPLBLD CKD-EPI 2021: 78 ML/MIN/1.73M*2
GLUCOSE SERPL-MCNC: 108 MG/DL (ref 74–99)
IGA SERPL-MCNC: <7 MG/DL (ref 70–400)
IGG SERPL-MCNC: 646 MG/DL (ref 700–1600)
IGM SERPL-MCNC: 6 MG/DL (ref 40–230)
KAPPA LC SERPL-MCNC: 0.08 MG/DL (ref 0.33–1.94)
KAPPA LC/LAMBDA SER: ABNORMAL {RATIO}
LAMBDA LC SERPL-MCNC: <0.17 MG/DL (ref 0.57–2.63)
POTASSIUM SERPL-SCNC: 4.4 MMOL/L (ref 3.5–5.3)
PROT SERPL-MCNC: 6.3 G/DL (ref 6.4–8.2)
PROT SERPL-MCNC: 6.3 G/DL (ref 6.4–8.2)
SODIUM SERPL-SCNC: 142 MMOL/L (ref 136–145)

## 2025-01-22 LAB
ALBUMIN: 4.2 G/DL (ref 3.4–5)
ALPHA 1 GLOBULIN: 0.3 G/DL (ref 0.2–0.6)
ALPHA 2 GLOBULIN: 0.7 G/DL (ref 0.4–1.1)
BETA GLOBULIN: 0.6 G/DL (ref 0.5–1.2)
GAMMA GLOBULIN: 0.5 G/DL (ref 0.5–1.4)
IMMUNOFIXATION COMMENT: NORMAL
PATH REVIEW - SERUM IMMUNOFIXATION: NORMAL
PATH REVIEW-SERUM PROTEIN ELECTROPHORESIS: NORMAL
PROTEIN ELECTROPHORESIS COMMENT: NORMAL

## 2025-01-27 ENCOUNTER — APPOINTMENT (OUTPATIENT)
Dept: LAB | Facility: CLINIC | Age: 75
End: 2025-01-27
Payer: MEDICARE

## 2025-01-27 ENCOUNTER — LAB (OUTPATIENT)
Dept: LAB | Facility: CLINIC | Age: 75
End: 2025-01-27
Payer: MEDICARE

## 2025-01-27 ENCOUNTER — HOSPITAL ENCOUNTER (OUTPATIENT)
Dept: HEMATOLOGY/ONCOLOGY | Facility: CLINIC | Age: 75
Discharge: HOME | End: 2025-01-27
Payer: MEDICARE

## 2025-01-27 VITALS
DIASTOLIC BLOOD PRESSURE: 90 MMHG | RESPIRATION RATE: 16 BRPM | WEIGHT: 224.43 LBS | TEMPERATURE: 98.2 F | HEART RATE: 67 BPM | OXYGEN SATURATION: 97 % | SYSTOLIC BLOOD PRESSURE: 148 MMHG | BODY MASS INDEX: 32.9 KG/M2

## 2025-01-27 DIAGNOSIS — T45.1X5A CHEMOTHERAPY-INDUCED NEUTROPENIA (CMS-HCC): ICD-10-CM

## 2025-01-27 DIAGNOSIS — C90.00 MULTIPLE MYELOMA WITHOUT REMISSION (MULTI): ICD-10-CM

## 2025-01-27 DIAGNOSIS — D70.1 CHEMOTHERAPY-INDUCED NEUTROPENIA (CMS-HCC): ICD-10-CM

## 2025-01-27 DIAGNOSIS — C90.00 IGG MULTIPLE MYELOMA (MULTI): ICD-10-CM

## 2025-01-27 DIAGNOSIS — C90.00 IGG MULTIPLE MYELOMA (MULTI): Primary | ICD-10-CM

## 2025-01-27 DIAGNOSIS — Z92.850 HISTORY OF CHIMERIC ANTIGEN RECEPTOR T-CELL THERAPY: ICD-10-CM

## 2025-01-27 LAB
BASOPHILS # BLD AUTO: 0.02 X10*3/UL (ref 0–0.1)
BASOPHILS NFR BLD AUTO: 1.2 %
EOSINOPHIL # BLD AUTO: 0.03 X10*3/UL (ref 0–0.4)
EOSINOPHIL NFR BLD AUTO: 1.8 %
ERYTHROCYTE [DISTWIDTH] IN BLOOD BY AUTOMATED COUNT: 12.9 % (ref 11.5–14.5)
HCT VFR BLD AUTO: 34.8 % (ref 41–52)
HGB BLD-MCNC: 12 G/DL (ref 13.5–17.5)
IMM GRANULOCYTES # BLD AUTO: 0.03 X10*3/UL (ref 0–0.5)
IMM GRANULOCYTES NFR BLD AUTO: 1.8 % (ref 0–0.9)
LYMPHOCYTES # BLD AUTO: 0.7 X10*3/UL (ref 0.8–3)
LYMPHOCYTES NFR BLD AUTO: 41.2 %
MCH RBC QN AUTO: 33.9 PG (ref 26–34)
MCHC RBC AUTO-ENTMCNC: 34.5 G/DL (ref 32–36)
MCV RBC AUTO: 98 FL (ref 80–100)
MONOCYTES # BLD AUTO: 0.41 X10*3/UL (ref 0.05–0.8)
MONOCYTES NFR BLD AUTO: 24.1 %
NEUTROPHILS # BLD AUTO: 0.51 X10*3/UL (ref 1.6–5.5)
NEUTROPHILS NFR BLD AUTO: 29.9 %
NRBC BLD-RTO: ABNORMAL /100{WBCS}
PLATELET # BLD AUTO: 111 X10*3/UL (ref 150–450)
RBC # BLD AUTO: 3.54 X10*6/UL (ref 4.5–5.9)
WBC # BLD AUTO: 1.7 X10*3/UL (ref 4.4–11.3)

## 2025-01-27 PROCEDURE — 36415 COLL VENOUS BLD VENIPUNCTURE: CPT

## 2025-01-27 PROCEDURE — 96372 THER/PROPH/DIAG INJ SC/IM: CPT | Mod: 59

## 2025-01-27 PROCEDURE — 85025 COMPLETE CBC W/AUTO DIFF WBC: CPT

## 2025-01-27 PROCEDURE — 96401 CHEMO ANTI-NEOPL SQ/IM: CPT

## 2025-01-27 PROCEDURE — 2500000004 HC RX 250 GENERAL PHARMACY W/ HCPCS (ALT 636 FOR OP/ED): Mod: JZ,TB

## 2025-01-27 RX ORDER — EPINEPHRINE 0.3 MG/.3ML
0.3 INJECTION SUBCUTANEOUS EVERY 5 MIN PRN
Status: DISCONTINUED | OUTPATIENT
Start: 2025-01-27 | End: 2025-01-28 | Stop reason: HOSPADM

## 2025-01-27 RX ORDER — ALBUTEROL SULFATE 0.83 MG/ML
3 SOLUTION RESPIRATORY (INHALATION) AS NEEDED
Status: DISCONTINUED | OUTPATIENT
Start: 2025-01-27 | End: 2025-01-28 | Stop reason: HOSPADM

## 2025-01-27 RX ORDER — DIPHENHYDRAMINE HYDROCHLORIDE 50 MG/ML
50 INJECTION INTRAMUSCULAR; INTRAVENOUS AS NEEDED
Status: DISCONTINUED | OUTPATIENT
Start: 2025-01-27 | End: 2025-01-28 | Stop reason: HOSPADM

## 2025-01-27 RX ORDER — PROCHLORPERAZINE MALEATE 10 MG
10 TABLET ORAL EVERY 6 HOURS PRN
Status: DISCONTINUED | OUTPATIENT
Start: 2025-01-27 | End: 2025-01-28 | Stop reason: HOSPADM

## 2025-01-27 RX ORDER — FAMOTIDINE 10 MG/ML
20 INJECTION INTRAVENOUS ONCE AS NEEDED
Status: DISCONTINUED | OUTPATIENT
Start: 2025-01-27 | End: 2025-01-28 | Stop reason: HOSPADM

## 2025-01-27 RX ORDER — PROCHLORPERAZINE EDISYLATE 5 MG/ML
10 INJECTION INTRAMUSCULAR; INTRAVENOUS EVERY 6 HOURS PRN
Status: DISCONTINUED | OUTPATIENT
Start: 2025-01-27 | End: 2025-01-28 | Stop reason: HOSPADM

## 2025-01-27 RX ADMIN — FILGRASTIM-SNDZ 480 MCG: 480 INJECTION, SOLUTION INTRAVENOUS; SUBCUTANEOUS at 14:40

## 2025-01-27 RX ADMIN — TECLISTAMAB 153 MG: 90 INJECTION SUBCUTANEOUS at 15:06

## 2025-01-27 ASSESSMENT — PAIN SCALES - GENERAL: PAINLEVEL_OUTOF10: 0-NO PAIN

## 2025-01-27 NOTE — PROGRESS NOTES
Patient is here today for infusion - no complication since last being seen-  independant double check done prior to chemotherapy today-   b/h/ lung sounds not auscultated  patient verbalizes understanding of plan of care    Anc 0.51 today discussed Teclistamab + Neupogen  - Discussed with Hawk SLATER as close to thresh hold ok to give. No signs of infection discussed neutropenia precautions given handout on lab.

## 2025-01-28 ENCOUNTER — APPOINTMENT (OUTPATIENT)
Dept: HEMATOLOGY/ONCOLOGY | Facility: CLINIC | Age: 75
End: 2025-01-28
Payer: MEDICARE

## 2025-02-03 ENCOUNTER — LAB (OUTPATIENT)
Dept: LAB | Facility: CLINIC | Age: 75
End: 2025-02-03
Payer: MEDICARE

## 2025-02-03 ENCOUNTER — HOSPITAL ENCOUNTER (OUTPATIENT)
Dept: HEMATOLOGY/ONCOLOGY | Facility: CLINIC | Age: 75
Discharge: HOME | End: 2025-02-03
Payer: MEDICARE

## 2025-02-03 VITALS
HEART RATE: 75 BPM | RESPIRATION RATE: 14 BRPM | SYSTOLIC BLOOD PRESSURE: 154 MMHG | TEMPERATURE: 97.7 F | DIASTOLIC BLOOD PRESSURE: 89 MMHG | OXYGEN SATURATION: 98 % | HEIGHT: 69 IN | BODY MASS INDEX: 33.42 KG/M2 | WEIGHT: 225.64 LBS

## 2025-02-03 DIAGNOSIS — D70.1 CHEMOTHERAPY-INDUCED NEUTROPENIA (CMS-HCC): ICD-10-CM

## 2025-02-03 DIAGNOSIS — C90.00 MULTIPLE MYELOMA WITHOUT REMISSION (MULTI): ICD-10-CM

## 2025-02-03 DIAGNOSIS — Z92.850 HISTORY OF CHIMERIC ANTIGEN RECEPTOR T-CELL THERAPY: ICD-10-CM

## 2025-02-03 DIAGNOSIS — C90.00 IGG MULTIPLE MYELOMA (MULTI): ICD-10-CM

## 2025-02-03 DIAGNOSIS — T45.1X5A CHEMOTHERAPY-INDUCED NEUTROPENIA (CMS-HCC): ICD-10-CM

## 2025-02-03 LAB
BASOPHILS # BLD AUTO: 0.02 X10*3/UL (ref 0–0.1)
BASOPHILS NFR BLD AUTO: 0.8 %
EOSINOPHIL # BLD AUTO: 0.1 X10*3/UL (ref 0–0.4)
EOSINOPHIL NFR BLD AUTO: 4.2 %
ERYTHROCYTE [DISTWIDTH] IN BLOOD BY AUTOMATED COUNT: 12.8 % (ref 11.5–14.5)
HCT VFR BLD AUTO: 38.9 % (ref 41–52)
HGB BLD-MCNC: 13.3 G/DL (ref 13.5–17.5)
IMM GRANULOCYTES # BLD AUTO: 0.05 X10*3/UL (ref 0–0.5)
IMM GRANULOCYTES NFR BLD AUTO: 2.1 % (ref 0–0.9)
LYMPHOCYTES # BLD AUTO: 1 X10*3/UL (ref 0.8–3)
LYMPHOCYTES NFR BLD AUTO: 41.7 %
MCH RBC QN AUTO: 33.5 PG (ref 26–34)
MCHC RBC AUTO-ENTMCNC: 34.2 G/DL (ref 32–36)
MCV RBC AUTO: 98 FL (ref 80–100)
MONOCYTES # BLD AUTO: 0.71 X10*3/UL (ref 0.05–0.8)
MONOCYTES NFR BLD AUTO: 29.6 %
NEUTROPHILS # BLD AUTO: 0.52 X10*3/UL (ref 1.6–5.5)
NEUTROPHILS NFR BLD AUTO: 21.6 %
NRBC BLD-RTO: ABNORMAL /100{WBCS}
PLATELET # BLD AUTO: 115 X10*3/UL (ref 150–450)
RBC # BLD AUTO: 3.97 X10*6/UL (ref 4.5–5.9)
WBC # BLD AUTO: 2.4 X10*3/UL (ref 4.4–11.3)

## 2025-02-03 PROCEDURE — 85025 COMPLETE CBC W/AUTO DIFF WBC: CPT

## 2025-02-03 PROCEDURE — 36415 COLL VENOUS BLD VENIPUNCTURE: CPT

## 2025-02-03 PROCEDURE — 96401 CHEMO ANTI-NEOPL SQ/IM: CPT

## 2025-02-03 PROCEDURE — 2500000004 HC RX 250 GENERAL PHARMACY W/ HCPCS (ALT 636 FOR OP/ED): Mod: JZ,TB

## 2025-02-03 PROCEDURE — 96372 THER/PROPH/DIAG INJ SC/IM: CPT | Mod: 59

## 2025-02-03 RX ORDER — PROCHLORPERAZINE MALEATE 10 MG
10 TABLET ORAL EVERY 6 HOURS PRN
Status: DISCONTINUED | OUTPATIENT
Start: 2025-02-03 | End: 2025-02-04 | Stop reason: HOSPADM

## 2025-02-03 RX ORDER — DIPHENHYDRAMINE HYDROCHLORIDE 50 MG/ML
50 INJECTION INTRAMUSCULAR; INTRAVENOUS AS NEEDED
Status: DISCONTINUED | OUTPATIENT
Start: 2025-02-03 | End: 2025-02-04 | Stop reason: HOSPADM

## 2025-02-03 RX ORDER — EPINEPHRINE 0.3 MG/.3ML
0.3 INJECTION SUBCUTANEOUS EVERY 5 MIN PRN
Status: DISCONTINUED | OUTPATIENT
Start: 2025-02-03 | End: 2025-02-04 | Stop reason: HOSPADM

## 2025-02-03 RX ORDER — FAMOTIDINE 10 MG/ML
20 INJECTION INTRAVENOUS ONCE AS NEEDED
Status: DISCONTINUED | OUTPATIENT
Start: 2025-02-03 | End: 2025-02-04 | Stop reason: HOSPADM

## 2025-02-03 RX ORDER — PROCHLORPERAZINE EDISYLATE 5 MG/ML
10 INJECTION INTRAMUSCULAR; INTRAVENOUS EVERY 6 HOURS PRN
Status: DISCONTINUED | OUTPATIENT
Start: 2025-02-03 | End: 2025-02-04 | Stop reason: HOSPADM

## 2025-02-03 RX ORDER — ALBUTEROL SULFATE 0.83 MG/ML
3 SOLUTION RESPIRATORY (INHALATION) AS NEEDED
Status: DISCONTINUED | OUTPATIENT
Start: 2025-02-03 | End: 2025-02-04 | Stop reason: HOSPADM

## 2025-02-03 RX ADMIN — FILGRASTIM-SNDZ 480 MCG: 480 INJECTION, SOLUTION INTRAVENOUS; SUBCUTANEOUS at 12:02

## 2025-02-03 RX ADMIN — TECLISTAMAB 153 MG: 90 INJECTION SUBCUTANEOUS at 12:02

## 2025-02-03 ASSESSMENT — PAIN SCALES - GENERAL: PAINLEVEL_OUTOF10: 0-NO PAIN

## 2025-02-03 NOTE — PROGRESS NOTES
[facility] Infusion Note     Maldonado Mccloud is a 74 y.o. year old male here today,02/03/25,  in the [facility] infusion center for  following treatment regimen:    Medications given:        Vitals:    02/03/25 1139   BP: 154/89   Pulse: 75   Resp: 14   Temp: 36.5 °C (97.7 °F)   SpO2: 98%    on intake      Pt tolerated and was discharged to home in stable condition. Pt ambulated  off the unit with a slow and steady gait. Pt had no further questions or concerns at this time.

## 2025-02-04 ENCOUNTER — APPOINTMENT (OUTPATIENT)
Dept: HEMATOLOGY/ONCOLOGY | Facility: CLINIC | Age: 75
End: 2025-02-04
Payer: MEDICARE

## 2025-02-10 ENCOUNTER — INFUSION (OUTPATIENT)
Dept: HEMATOLOGY/ONCOLOGY | Facility: CLINIC | Age: 75
End: 2025-02-10
Payer: MEDICARE

## 2025-02-10 ENCOUNTER — OFFICE VISIT (OUTPATIENT)
Dept: HEMATOLOGY/ONCOLOGY | Facility: CLINIC | Age: 75
End: 2025-02-10
Payer: MEDICARE

## 2025-02-10 ENCOUNTER — LAB (OUTPATIENT)
Dept: LAB | Facility: CLINIC | Age: 75
End: 2025-02-10
Payer: MEDICARE

## 2025-02-10 VITALS
HEART RATE: 57 BPM | RESPIRATION RATE: 16 BRPM | DIASTOLIC BLOOD PRESSURE: 84 MMHG | TEMPERATURE: 96.6 F | SYSTOLIC BLOOD PRESSURE: 137 MMHG

## 2025-02-10 VITALS
HEART RATE: 76 BPM | OXYGEN SATURATION: 97 % | SYSTOLIC BLOOD PRESSURE: 133 MMHG | WEIGHT: 224.21 LBS | RESPIRATION RATE: 16 BRPM | DIASTOLIC BLOOD PRESSURE: 89 MMHG | BODY MASS INDEX: 32.87 KG/M2 | TEMPERATURE: 97.5 F

## 2025-02-10 DIAGNOSIS — C90.00 MULTIPLE MYELOMA WITHOUT REMISSION (MULTI): ICD-10-CM

## 2025-02-10 DIAGNOSIS — Z94.84 STEM CELLS TRANSPLANT STATUS (MULTI): ICD-10-CM

## 2025-02-10 DIAGNOSIS — C90.00 IGG MULTIPLE MYELOMA (MULTI): Primary | ICD-10-CM

## 2025-02-10 DIAGNOSIS — C90.00 HYPOGAMMAGLOBULINEMIA DUE TO MULTIPLE MYELOMA (MULTI): ICD-10-CM

## 2025-02-10 DIAGNOSIS — Z92.850 HISTORY OF CHIMERIC ANTIGEN RECEPTOR T-CELL THERAPY: ICD-10-CM

## 2025-02-10 DIAGNOSIS — T45.1X5A CHEMOTHERAPY-INDUCED NEUTROPENIA (CMS-HCC): ICD-10-CM

## 2025-02-10 DIAGNOSIS — D80.1 HYPOGAMMAGLOBULINEMIA DUE TO MULTIPLE MYELOMA (MULTI): ICD-10-CM

## 2025-02-10 DIAGNOSIS — D84.9 IMMUNOCOMPROMISED: ICD-10-CM

## 2025-02-10 DIAGNOSIS — G62.89 OTHER POLYNEUROPATHY: ICD-10-CM

## 2025-02-10 DIAGNOSIS — D70.1 CHEMOTHERAPY-INDUCED NEUTROPENIA (CMS-HCC): ICD-10-CM

## 2025-02-10 DIAGNOSIS — C90.00 IGG MULTIPLE MYELOMA (MULTI): ICD-10-CM

## 2025-02-10 DIAGNOSIS — Z51.12 ENCOUNTER FOR ANTINEOPLASTIC IMMUNOTHERAPY: ICD-10-CM

## 2025-02-10 LAB
BASOPHILS # BLD AUTO: 0.02 X10*3/UL (ref 0–0.1)
BASOPHILS NFR BLD AUTO: 0.9 %
EOSINOPHIL # BLD AUTO: 0.06 X10*3/UL (ref 0–0.4)
EOSINOPHIL NFR BLD AUTO: 2.8 %
ERYTHROCYTE [DISTWIDTH] IN BLOOD BY AUTOMATED COUNT: 12.6 % (ref 11.5–14.5)
HCT VFR BLD AUTO: 36.9 % (ref 41–52)
HGB BLD-MCNC: 12.8 G/DL (ref 13.5–17.5)
IMM GRANULOCYTES # BLD AUTO: 0.09 X10*3/UL (ref 0–0.5)
IMM GRANULOCYTES NFR BLD AUTO: 4.2 % (ref 0–0.9)
LYMPHOCYTES # BLD AUTO: 0.92 X10*3/UL (ref 0.8–3)
LYMPHOCYTES NFR BLD AUTO: 42.6 %
MCH RBC QN AUTO: 33.8 PG (ref 26–34)
MCHC RBC AUTO-ENTMCNC: 34.7 G/DL (ref 32–36)
MCV RBC AUTO: 97 FL (ref 80–100)
MONOCYTES # BLD AUTO: 0.63 X10*3/UL (ref 0.05–0.8)
MONOCYTES NFR BLD AUTO: 29.2 %
NEUTROPHILS # BLD AUTO: 0.44 X10*3/UL (ref 1.6–5.5)
NEUTROPHILS NFR BLD AUTO: 20.3 %
NRBC BLD-RTO: ABNORMAL /100{WBCS}
PLATELET # BLD AUTO: 103 X10*3/UL (ref 150–450)
RBC # BLD AUTO: 3.79 X10*6/UL (ref 4.5–5.9)
WBC # BLD AUTO: 2.2 X10*3/UL (ref 4.4–11.3)

## 2025-02-10 PROCEDURE — 96372 THER/PROPH/DIAG INJ SC/IM: CPT | Mod: 59

## 2025-02-10 PROCEDURE — 2500000004 HC RX 250 GENERAL PHARMACY W/ HCPCS (ALT 636 FOR OP/ED): Mod: JZ,TB

## 2025-02-10 PROCEDURE — 36415 COLL VENOUS BLD VENIPUNCTURE: CPT

## 2025-02-10 PROCEDURE — 85025 COMPLETE CBC W/AUTO DIFF WBC: CPT

## 2025-02-10 PROCEDURE — 1123F ACP DISCUSS/DSCN MKR DOCD: CPT

## 2025-02-10 PROCEDURE — 3075F SYST BP GE 130 - 139MM HG: CPT

## 2025-02-10 PROCEDURE — 3079F DIAST BP 80-89 MM HG: CPT

## 2025-02-10 PROCEDURE — 2500000001 HC RX 250 WO HCPCS SELF ADMINISTERED DRUGS (ALT 637 FOR MEDICARE OP)

## 2025-02-10 PROCEDURE — G2211 COMPLEX E/M VISIT ADD ON: HCPCS

## 2025-02-10 PROCEDURE — 96375 TX/PRO/DX INJ NEW DRUG ADDON: CPT | Mod: INF

## 2025-02-10 PROCEDURE — 99215 OFFICE O/P EST HI 40 MIN: CPT

## 2025-02-10 PROCEDURE — 86334 IMMUNOFIX E-PHORESIS SERUM: CPT

## 2025-02-10 PROCEDURE — 96365 THER/PROPH/DIAG IV INF INIT: CPT | Mod: INF

## 2025-02-10 PROCEDURE — 80053 COMPREHEN METABOLIC PANEL: CPT

## 2025-02-10 PROCEDURE — 96366 THER/PROPH/DIAG IV INF ADDON: CPT | Mod: INF

## 2025-02-10 PROCEDURE — 82784 ASSAY IGA/IGD/IGG/IGM EACH: CPT

## 2025-02-10 PROCEDURE — 83521 IG LIGHT CHAINS FREE EACH: CPT

## 2025-02-10 PROCEDURE — 99215 OFFICE O/P EST HI 40 MIN: CPT | Mod: 25

## 2025-02-10 PROCEDURE — 1159F MED LIST DOCD IN RCRD: CPT

## 2025-02-10 PROCEDURE — 84155 ASSAY OF PROTEIN SERUM: CPT | Mod: 59

## 2025-02-10 PROCEDURE — 1126F AMNT PAIN NOTED NONE PRSNT: CPT

## 2025-02-10 PROCEDURE — 2500000004 HC RX 250 GENERAL PHARMACY W/ HCPCS (ALT 636 FOR OP/ED)

## 2025-02-10 RX ORDER — ALBUTEROL SULFATE 0.83 MG/ML
3 SOLUTION RESPIRATORY (INHALATION) AS NEEDED
OUTPATIENT
Start: 2025-03-10

## 2025-02-10 RX ORDER — DIPHENHYDRAMINE HYDROCHLORIDE 50 MG/ML
25 INJECTION INTRAMUSCULAR; INTRAVENOUS ONCE
Start: 2025-03-10 | End: 2025-03-10

## 2025-02-10 RX ORDER — ALBUTEROL SULFATE 0.83 MG/ML
3 SOLUTION RESPIRATORY (INHALATION) AS NEEDED
OUTPATIENT
Start: 2025-02-17

## 2025-02-10 RX ORDER — HEPARIN 100 UNIT/ML
500 SYRINGE INTRAVENOUS AS NEEDED
Status: DISCONTINUED | OUTPATIENT
Start: 2025-02-10 | End: 2025-02-10 | Stop reason: HOSPADM

## 2025-02-10 RX ORDER — FAMOTIDINE 10 MG/ML
20 INJECTION INTRAVENOUS ONCE
Status: COMPLETED | OUTPATIENT
Start: 2025-02-10 | End: 2025-02-10

## 2025-02-10 RX ORDER — FAMOTIDINE 10 MG/ML
20 INJECTION, SOLUTION INTRAVENOUS ONCE AS NEEDED
OUTPATIENT
Start: 2025-02-17

## 2025-02-10 RX ORDER — HEPARIN SODIUM,PORCINE/PF 10 UNIT/ML
50 SYRINGE (ML) INTRAVENOUS AS NEEDED
OUTPATIENT
Start: 2025-02-10

## 2025-02-10 RX ORDER — EPINEPHRINE 0.3 MG/.3ML
0.3 INJECTION SUBCUTANEOUS EVERY 5 MIN PRN
OUTPATIENT
Start: 2025-02-17

## 2025-02-10 RX ORDER — ACETAMINOPHEN 325 MG/1
650 TABLET ORAL ONCE
OUTPATIENT
Start: 2025-03-10

## 2025-02-10 RX ORDER — DIPHENHYDRAMINE HYDROCHLORIDE 50 MG/ML
50 INJECTION INTRAMUSCULAR; INTRAVENOUS AS NEEDED
OUTPATIENT
Start: 2025-02-17

## 2025-02-10 RX ORDER — MONTELUKAST SODIUM 10 MG/1
10 TABLET ORAL ONCE
Start: 2025-03-10 | End: 2025-03-10

## 2025-02-10 RX ORDER — FAMOTIDINE 10 MG/ML
20 INJECTION INTRAVENOUS ONCE AS NEEDED
OUTPATIENT
Start: 2025-03-10

## 2025-02-10 RX ORDER — HEPARIN SODIUM,PORCINE/PF 10 UNIT/ML
50 SYRINGE (ML) INTRAVENOUS AS NEEDED
Status: DISCONTINUED | OUTPATIENT
Start: 2025-02-10 | End: 2025-02-10 | Stop reason: HOSPADM

## 2025-02-10 RX ORDER — DIPHENHYDRAMINE HYDROCHLORIDE 50 MG/ML
50 INJECTION INTRAMUSCULAR; INTRAVENOUS AS NEEDED
OUTPATIENT
Start: 2025-03-10

## 2025-02-10 RX ORDER — EPINEPHRINE 0.3 MG/.3ML
0.3 INJECTION SUBCUTANEOUS EVERY 5 MIN PRN
OUTPATIENT
Start: 2025-03-10

## 2025-02-10 RX ORDER — HEPARIN 100 UNIT/ML
500 SYRINGE INTRAVENOUS AS NEEDED
OUTPATIENT
Start: 2025-02-10

## 2025-02-10 RX ORDER — DIPHENHYDRAMINE HYDROCHLORIDE 50 MG/ML
25 INJECTION INTRAMUSCULAR; INTRAVENOUS ONCE
Status: COMPLETED | OUTPATIENT
Start: 2025-02-10 | End: 2025-02-10

## 2025-02-10 RX ORDER — ACETAMINOPHEN 325 MG/1
650 TABLET ORAL ONCE
Status: COMPLETED | OUTPATIENT
Start: 2025-02-10 | End: 2025-02-10

## 2025-02-10 RX ORDER — FAMOTIDINE 10 MG/ML
20 INJECTION INTRAVENOUS ONCE
Start: 2025-03-10 | End: 2025-03-10

## 2025-02-10 RX ORDER — PROCHLORPERAZINE MALEATE 10 MG
10 TABLET ORAL EVERY 6 HOURS PRN
OUTPATIENT
Start: 2025-02-17

## 2025-02-10 RX ORDER — PROCHLORPERAZINE EDISYLATE 5 MG/ML
10 INJECTION INTRAMUSCULAR; INTRAVENOUS EVERY 6 HOURS PRN
OUTPATIENT
Start: 2025-02-17

## 2025-02-10 RX ORDER — MONTELUKAST SODIUM 10 MG/1
10 TABLET ORAL ONCE
Status: COMPLETED | OUTPATIENT
Start: 2025-02-10 | End: 2025-02-10

## 2025-02-10 RX ADMIN — ACETAMINOPHEN 650 MG: 325 TABLET ORAL at 09:46

## 2025-02-10 RX ADMIN — DIPHENHYDRAMINE HYDROCHLORIDE 25 MG: 50 INJECTION INTRAMUSCULAR; INTRAVENOUS at 09:51

## 2025-02-10 RX ADMIN — IMMUNE GLOBULIN (HUMAN) 20 G: 10 INJECTION INTRAVENOUS; SUBCUTANEOUS at 10:08

## 2025-02-10 RX ADMIN — FAMOTIDINE 20 MG: 10 INJECTION, SOLUTION INTRAVENOUS at 09:53

## 2025-02-10 RX ADMIN — MONTELUKAST 10 MG: 10 TABLET, FILM COATED ORAL at 09:46

## 2025-02-10 RX ADMIN — FILGRASTIM-SNDZ 480 MCG: 480 INJECTION, SOLUTION INTRAVENOUS; SUBCUTANEOUS at 09:56

## 2025-02-10 ASSESSMENT — ENCOUNTER SYMPTOMS
ENDOCRINE NEGATIVE: 1
HEMATOLOGIC/LYMPHATIC NEGATIVE: 1
ALLERGIC/IMMUNOLOGIC NEGATIVE: 1
CARDIOVASCULAR NEGATIVE: 1
CONSTITUTIONAL NEGATIVE: 1
EYES NEGATIVE: 1
GASTROINTESTINAL NEGATIVE: 1
MUSCULOSKELETAL NEGATIVE: 1
PSYCHIATRIC NEGATIVE: 1
RESPIRATORY NEGATIVE: 1
NEUROLOGICAL NEGATIVE: 1

## 2025-02-10 ASSESSMENT — PAIN SCALES - GENERAL: PAINLEVEL_OUTOF10: 0-NO PAIN

## 2025-02-10 NOTE — PROGRESS NOTES
Patient ID: Maldonado Mccloud is a 74 y.o. male.  Referring Physician: West Huff MD  [id number]  Department [place] [address]  Primary Care Provider: Timothy Almodovar MD    Interval hx:    Cuong presents to clinic 2/10/25 for a follow up visit with his wife Farida.     Overall he reports feeling well.    Notes some pain in his bilateral shoulders last week which has subsided.    Ongoing memory changes.       Review of Systems   Constitutional: Negative.    HENT: Negative.     Eyes: Negative.    Respiratory: Negative.     Cardiovascular: Negative.    Gastrointestinal: Negative.    Endocrine: Negative.    Genitourinary: Negative.    Musculoskeletal: Negative.    Skin: Negative.    Allergic/Immunologic: Negative.    Neurological: Negative.    Hematological: Negative.    Psychiatric/Behavioral: Negative.         PMHx:  1) Multiple myeloma (see below)  2) Squamous cell ca's skin  3) DVT, LLE ; now off AC  4) Neuropathy  5) S/p right radius fracture 2023  6) HTN  7) Afib (transient in  w/stem cell collection)    FamHx  Dad  of pan ca age 92    SocHx:   w/5 kids; smokes cigars; no cigs in 50 years; no EtOH or illicit drugs. Was in [employer] in [place]    Meds:  Were reviewed w/the pt  All:  [employer]  PCN  Zosyn    Physical Exam    Physical Exam  Constitutional:       Appearance: Normal appearance. He is normal weight.   HENT:      Head: Normocephalic and atraumatic.      Nose: Nose normal.      Mouth/Throat:      Mouth: Mucous membranes are moist.      Pharynx: Oropharynx is clear.   Eyes:      Conjunctiva/sclera: Conjunctivae normal.      Pupils: Pupils are equal, round, and reactive to light.   Cardiovascular:      Rate and Rhythm: Normal rate and regular rhythm.      Pulses: Normal pulses.      Heart sounds: Normal heart sounds.   Pulmonary:      Effort: Pulmonary effort is normal.      Breath sounds: Normal breath sounds.   Abdominal:      General: Abdomen is  flat. Bowel sounds are normal.      Palpations: Abdomen is soft.   Musculoskeletal:         General: Normal range of motion.      Cervical back: Normal range of motion and neck supple.   Skin:     General: Skin is warm and dry.   Neurological:      General: No focal deficit present.      Mental Status: He is alert and oriented to person, place, and time. Mental status is at baseline.      Comments: Forgetful   Psychiatric:         Mood and Affect: Mood normal.         Behavior: Behavior normal.         Thought Content: Thought content normal.         Judgment: Judgment normal.       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; Bone marrow biopsy suggested multiple myeloma IgG kappa, ISS Stage II, bone survey revealed lytic lesions; Urine light chains present kappa restricted with 2gm proteinuria; s/p VRD x 3 cycles with CR; then s/p auto SCTx 12/23/15 complicated by orthostatic hypotension, electrolyte replacement, drug induced rash, culture negative fever, then s/p approximately 5 weeks of maintenance Revlimid 10 mg daily which was held for worsening neuropathy in August 2016; then in 3/ 2018: IgG M Braxton alexei to 0.2gm/L and light chain alexei was; restarted on Revlimid maintenance at 5mg every other day, then revlimid stopped in 3/2021 due to stable disease; then in 7/2021 his M spike was on the rise, and was enrolled onto Vactosertib/Pom trial on 8/5/2021; then progressed in 6/2022, switched to Isatuximab/Carfilzomib on 6/30/22, then in 9/2022 progressed and was switched to Cytoxan/Carfilzomib; then had therapy placed on hold in 11/2022 due to need  for hip replacement surgery; myeloma markers were on the rise in 3/2023 with a new jaw lesion, s/p XRT 4/2023 and resume 2x/week carfilzomib; then s/p CAR T w/ABECMA (T=0, 10/25/23), hospital course complicated by grade 1 ICANS managed w/ Dex 10mg x3 days and 1 dose tociluzimab; then w/progression and started on teclistamab, full dosing given on 10/13/24] here for follow-up.     As for relapsed myeloma:  He has been continued on weekly teclistamab and tolerating this well, so far only has had an early episode of grade I CRS (cx neg low grade fever x 1, not recurred). He has not had any other toxicities thus far. In addition, there has been a biochemical response already: serum free kappa decreasing from 15.6mg/dL (= 156mg/L) to 0.08, as of 1/20/25. Dose held on 2/10 due to neutropenia. Plan for restaging PET/CT this month prior to Dr. Huff follow up.     As for neutropenia:  Probably from teclistamab; cont prn weekly neupogen to keep ANC >500 (received 2/10), franc held    Infection ppx:  Cont acyclovir and bactrim ppx; monitor IgG levels and cont monthly ppx IVIG (NOTE: he should receive Gammunex -C, d/t prior severe reaction to gammard).    As for sens NP:  No effect w/cymbalta; will have him stop this medication (taper off over 2 weeks), as it may also be causing dizziness. I offered adding gabapentin but the pt and his wife declined this, as he is really is not having any associated pain.     As for brain fog:  Has neuro testing on 3/6.     RTC:  PET/CT  Dr. Huff 3/6

## 2025-02-10 NOTE — PROGRESS NOTES
IVIG Rate   ADMINISTRATION:   Begin infusion at 51 mL/hr x30 minutes, if no ADR increase to:  102 mL/hr x30 minutes, if no ADR increase to:  204 mL/hr x30 minutes, if no ADR increase to:  408 mL/hr until infusion is complete     [facility] Infusion Note     Maldonado Mccloud is a 74 y.o. year old male here today,02/10/25,  in the [facility] infusion center for  following treatment regimen: IVIG -     Medications given:  Administrations This Visit       acetaminophen (Tylenol) tablet 650 mg       Admin Date  02/10/2025 Action  Given Dose  650 mg Route  oral Documented By  Chapin Duncan RN              diphenhydrAMINE (BENADryl) injection 25 mg       Admin Date  02/10/2025 Action  Given Dose  25 mg Route  intravenous Documented By  Chapin Duncan RN              famotidine (Pepcid) injection 20 mg       Admin Date  02/10/2025 Action  New Bag Dose  20 mg Route  intravenous Documented By  Chapin Duncan RN              filgrastim-sndz (Zarxio) injection 480 mcg       Admin Date  02/10/2025 Action  Given Dose  480 mcg Route  subcutaneous Documented By  Chapin Duncan RN              montelukast (Singulair) tablet 10 mg       Admin Date  02/10/2025 Action  Given Dose  10 mg Route  oral Documented By  Chapin Duncan RN

## 2025-02-11 LAB
ALBUMIN SERPL BCP-MCNC: 4 G/DL (ref 3.4–5)
ALP SERPL-CCNC: 50 U/L (ref 33–136)
ALT SERPL W P-5'-P-CCNC: 15 U/L (ref 10–52)
ANION GAP SERPL CALC-SCNC: 13 MMOL/L (ref 10–20)
AST SERPL W P-5'-P-CCNC: 21 U/L (ref 9–39)
BILIRUB SERPL-MCNC: 0.7 MG/DL (ref 0–1.2)
BUN SERPL-MCNC: 22 MG/DL (ref 6–23)
CALCIUM SERPL-MCNC: 9 MG/DL (ref 8.6–10.6)
CHLORIDE SERPL-SCNC: 109 MMOL/L (ref 98–107)
CO2 SERPL-SCNC: 23 MMOL/L (ref 21–32)
CREAT SERPL-MCNC: 0.99 MG/DL (ref 0.5–1.3)
EGFRCR SERPLBLD CKD-EPI 2021: 79 ML/MIN/1.73M*2
GLUCOSE SERPL-MCNC: 104 MG/DL (ref 74–99)
IGA SERPL-MCNC: <7 MG/DL (ref 70–400)
IGG SERPL-MCNC: 445 MG/DL (ref 700–1600)
IGM SERPL-MCNC: 6 MG/DL (ref 40–230)
KAPPA LC SERPL-MCNC: <0.08 MG/DL (ref 0.33–1.94)
KAPPA LC/LAMBDA SER: ABNORMAL {RATIO}
LAMBDA LC SERPL-MCNC: <0.17 MG/DL (ref 0.57–2.63)
POTASSIUM SERPL-SCNC: 4.3 MMOL/L (ref 3.5–5.3)
PROT SERPL-MCNC: 5.7 G/DL (ref 6.4–8.2)
PROT SERPL-MCNC: 5.7 G/DL (ref 6.4–8.2)
SODIUM SERPL-SCNC: 141 MMOL/L (ref 136–145)

## 2025-02-17 ENCOUNTER — INFUSION (OUTPATIENT)
Dept: HEMATOLOGY/ONCOLOGY | Facility: CLINIC | Age: 75
End: 2025-02-17
Payer: MEDICARE

## 2025-02-17 ENCOUNTER — APPOINTMENT (OUTPATIENT)
Dept: HEMATOLOGY/ONCOLOGY | Facility: CLINIC | Age: 75
End: 2025-02-17
Payer: MEDICARE

## 2025-02-17 ENCOUNTER — LAB (OUTPATIENT)
Dept: LAB | Facility: CLINIC | Age: 75
End: 2025-02-17
Payer: MEDICARE

## 2025-02-17 VITALS
TEMPERATURE: 97.3 F | OXYGEN SATURATION: 97 % | SYSTOLIC BLOOD PRESSURE: 144 MMHG | BODY MASS INDEX: 33.03 KG/M2 | RESPIRATION RATE: 16 BRPM | DIASTOLIC BLOOD PRESSURE: 88 MMHG | HEART RATE: 65 BPM | WEIGHT: 225.31 LBS

## 2025-02-17 DIAGNOSIS — C90.00 HYPOGAMMAGLOBULINEMIA DUE TO MULTIPLE MYELOMA (MULTI): ICD-10-CM

## 2025-02-17 DIAGNOSIS — T45.1X5A CHEMOTHERAPY-INDUCED NEUTROPENIA (CMS-HCC): ICD-10-CM

## 2025-02-17 DIAGNOSIS — D80.1 HYPOGAMMAGLOBULINEMIA DUE TO MULTIPLE MYELOMA (MULTI): ICD-10-CM

## 2025-02-17 DIAGNOSIS — C90.00 MULTIPLE MYELOMA WITHOUT REMISSION (MULTI): ICD-10-CM

## 2025-02-17 DIAGNOSIS — Z92.850 HISTORY OF CHIMERIC ANTIGEN RECEPTOR T-CELL THERAPY: ICD-10-CM

## 2025-02-17 DIAGNOSIS — C90.00 IGG MULTIPLE MYELOMA (MULTI): ICD-10-CM

## 2025-02-17 DIAGNOSIS — D70.1 CHEMOTHERAPY-INDUCED NEUTROPENIA (CMS-HCC): ICD-10-CM

## 2025-02-17 LAB
BASOPHILS # BLD AUTO: 0.02 X10*3/UL (ref 0–0.1)
BASOPHILS NFR BLD AUTO: 1.5 %
EOSINOPHIL # BLD AUTO: 0.05 X10*3/UL (ref 0–0.4)
EOSINOPHIL NFR BLD AUTO: 3.7 %
ERYTHROCYTE [DISTWIDTH] IN BLOOD BY AUTOMATED COUNT: 12.8 % (ref 11.5–14.5)
HCT VFR BLD AUTO: 35 % (ref 41–52)
HGB BLD-MCNC: 12 G/DL (ref 13.5–17.5)
IMM GRANULOCYTES # BLD AUTO: 0.01 X10*3/UL (ref 0–0.5)
IMM GRANULOCYTES NFR BLD AUTO: 0.7 % (ref 0–0.9)
LYMPHOCYTES # BLD AUTO: 0.61 X10*3/UL (ref 0.8–3)
LYMPHOCYTES NFR BLD AUTO: 45.5 %
MCH RBC QN AUTO: 33.7 PG (ref 26–34)
MCHC RBC AUTO-ENTMCNC: 34.3 G/DL (ref 32–36)
MCV RBC AUTO: 98 FL (ref 80–100)
MONOCYTES # BLD AUTO: 0.45 X10*3/UL (ref 0.05–0.8)
MONOCYTES NFR BLD AUTO: 33.6 %
NEUTROPHILS # BLD AUTO: 0.2 X10*3/UL (ref 1.6–5.5)
NEUTROPHILS NFR BLD AUTO: 15 %
NRBC BLD-RTO: ABNORMAL /100{WBCS}
PLATELET # BLD AUTO: 100 X10*3/UL (ref 150–450)
RBC # BLD AUTO: 3.56 X10*6/UL (ref 4.5–5.9)
WBC # BLD AUTO: 1.3 X10*3/UL (ref 4.4–11.3)

## 2025-02-17 PROCEDURE — 96372 THER/PROPH/DIAG INJ SC/IM: CPT

## 2025-02-17 PROCEDURE — 80053 COMPREHEN METABOLIC PANEL: CPT

## 2025-02-17 PROCEDURE — 85025 COMPLETE CBC W/AUTO DIFF WBC: CPT

## 2025-02-17 PROCEDURE — 2500000004 HC RX 250 GENERAL PHARMACY W/ HCPCS (ALT 636 FOR OP/ED): Mod: JZ,TB

## 2025-02-17 PROCEDURE — 36415 COLL VENOUS BLD VENIPUNCTURE: CPT

## 2025-02-17 RX ADMIN — FILGRASTIM-SNDZ 480 MCG: 480 INJECTION, SOLUTION INTRAVENOUS; SUBCUTANEOUS at 09:42

## 2025-02-17 ASSESSMENT — PAIN SCALES - GENERAL: PAINLEVEL_OUTOF10: 0-NO PAIN

## 2025-02-17 NOTE — PROGRESS NOTES
Patient is here today for teclistamab, does not meet parameters today with ANC 0.2 - Zarxio given per orders.  Notified Dr Huff via secure chat.  no complications since last being seen-  b/h/ lung sounds not auscultated  Patient tolerated injection well.  No complaints. Call back instructions reviewed.    Patient verbalizes understanding of plan of care.  Ambulated off unit without difficulty, steady gait. Accompanied by wife.

## 2025-02-18 LAB
ALBUMIN SERPL BCP-MCNC: 4.1 G/DL (ref 3.4–5)
ALP SERPL-CCNC: 61 U/L (ref 33–136)
ALT SERPL W P-5'-P-CCNC: 16 U/L (ref 10–52)
ANION GAP SERPL CALC-SCNC: 12 MMOL/L (ref 10–20)
AST SERPL W P-5'-P-CCNC: 22 U/L (ref 9–39)
BILIRUB SERPL-MCNC: 0.5 MG/DL (ref 0–1.2)
BUN SERPL-MCNC: 18 MG/DL (ref 6–23)
CALCIUM SERPL-MCNC: 8.9 MG/DL (ref 8.6–10.6)
CHLORIDE SERPL-SCNC: 109 MMOL/L (ref 98–107)
CO2 SERPL-SCNC: 25 MMOL/L (ref 21–32)
CREAT SERPL-MCNC: 1.16 MG/DL (ref 0.5–1.3)
EGFRCR SERPLBLD CKD-EPI 2021: 66 ML/MIN/1.73M*2
GLUCOSE SERPL-MCNC: 152 MG/DL (ref 74–99)
POTASSIUM SERPL-SCNC: 4.6 MMOL/L (ref 3.5–5.3)
PROT SERPL-MCNC: 6 G/DL (ref 6.4–8.2)
SODIUM SERPL-SCNC: 141 MMOL/L (ref 136–145)

## 2025-02-24 ENCOUNTER — INFUSION (OUTPATIENT)
Dept: HEMATOLOGY/ONCOLOGY | Facility: CLINIC | Age: 75
End: 2025-02-24
Payer: MEDICARE

## 2025-02-24 ENCOUNTER — LAB (OUTPATIENT)
Dept: LAB | Facility: CLINIC | Age: 75
End: 2025-02-24
Payer: MEDICARE

## 2025-02-24 VITALS
SYSTOLIC BLOOD PRESSURE: 129 MMHG | OXYGEN SATURATION: 97 % | RESPIRATION RATE: 16 BRPM | TEMPERATURE: 97.9 F | DIASTOLIC BLOOD PRESSURE: 85 MMHG | HEART RATE: 73 BPM | WEIGHT: 223.99 LBS | BODY MASS INDEX: 32.84 KG/M2

## 2025-02-24 DIAGNOSIS — C90.00 IGG MULTIPLE MYELOMA (MULTI): ICD-10-CM

## 2025-02-24 LAB
BASOPHILS # BLD AUTO: 0.02 X10*3/UL (ref 0–0.1)
BASOPHILS NFR BLD AUTO: 0.8 %
EOSINOPHIL # BLD AUTO: 0.07 X10*3/UL (ref 0–0.4)
EOSINOPHIL NFR BLD AUTO: 2.9 %
ERYTHROCYTE [DISTWIDTH] IN BLOOD BY AUTOMATED COUNT: 12.9 % (ref 11.5–14.5)
HCT VFR BLD AUTO: 38.6 % (ref 41–52)
HGB BLD-MCNC: 13.1 G/DL (ref 13.5–17.5)
IMM GRANULOCYTES # BLD AUTO: 0.01 X10*3/UL (ref 0–0.5)
IMM GRANULOCYTES NFR BLD AUTO: 0.4 % (ref 0–0.9)
LYMPHOCYTES # BLD AUTO: 0.88 X10*3/UL (ref 0.8–3)
LYMPHOCYTES NFR BLD AUTO: 36.2 %
MCH RBC QN AUTO: 33.2 PG (ref 26–34)
MCHC RBC AUTO-ENTMCNC: 33.9 G/DL (ref 32–36)
MCV RBC AUTO: 98 FL (ref 80–100)
MONOCYTES # BLD AUTO: 0.79 X10*3/UL (ref 0.05–0.8)
MONOCYTES NFR BLD AUTO: 32.5 %
NEUTROPHILS # BLD AUTO: 0.66 X10*3/UL (ref 1.6–5.5)
NEUTROPHILS NFR BLD AUTO: 27.2 %
NRBC BLD-RTO: ABNORMAL /100{WBCS}
PLATELET # BLD AUTO: 122 X10*3/UL (ref 150–450)
RBC # BLD AUTO: 3.95 X10*6/UL (ref 4.5–5.9)
WBC # BLD AUTO: 2.4 X10*3/UL (ref 4.4–11.3)

## 2025-02-24 PROCEDURE — 36415 COLL VENOUS BLD VENIPUNCTURE: CPT

## 2025-02-24 PROCEDURE — 2500000004 HC RX 250 GENERAL PHARMACY W/ HCPCS (ALT 636 FOR OP/ED): Mod: JZ,TB

## 2025-02-24 PROCEDURE — 85025 COMPLETE CBC W/AUTO DIFF WBC: CPT

## 2025-02-24 PROCEDURE — 96401 CHEMO ANTI-NEOPL SQ/IM: CPT

## 2025-02-24 RX ORDER — EPINEPHRINE 0.3 MG/.3ML
0.3 INJECTION SUBCUTANEOUS EVERY 5 MIN PRN
Status: DISCONTINUED | OUTPATIENT
Start: 2025-02-24 | End: 2025-02-24 | Stop reason: HOSPADM

## 2025-02-24 RX ORDER — PROCHLORPERAZINE MALEATE 10 MG
10 TABLET ORAL EVERY 6 HOURS PRN
Status: DISCONTINUED | OUTPATIENT
Start: 2025-02-24 | End: 2025-02-24 | Stop reason: HOSPADM

## 2025-02-24 RX ORDER — ALBUTEROL SULFATE 0.83 MG/ML
3 SOLUTION RESPIRATORY (INHALATION) AS NEEDED
Status: DISCONTINUED | OUTPATIENT
Start: 2025-02-24 | End: 2025-02-24 | Stop reason: HOSPADM

## 2025-02-24 RX ORDER — DIPHENHYDRAMINE HYDROCHLORIDE 50 MG/ML
50 INJECTION INTRAMUSCULAR; INTRAVENOUS AS NEEDED
Status: DISCONTINUED | OUTPATIENT
Start: 2025-02-24 | End: 2025-02-24 | Stop reason: HOSPADM

## 2025-02-24 RX ORDER — PROCHLORPERAZINE EDISYLATE 5 MG/ML
10 INJECTION INTRAMUSCULAR; INTRAVENOUS EVERY 6 HOURS PRN
Status: DISCONTINUED | OUTPATIENT
Start: 2025-02-24 | End: 2025-02-24 | Stop reason: HOSPADM

## 2025-02-24 RX ORDER — FAMOTIDINE 10 MG/ML
20 INJECTION, SOLUTION INTRAVENOUS ONCE AS NEEDED
Status: DISCONTINUED | OUTPATIENT
Start: 2025-02-24 | End: 2025-02-24 | Stop reason: HOSPADM

## 2025-02-24 RX ADMIN — TECLISTAMAB 153 MG: 90 INJECTION SUBCUTANEOUS at 09:45

## 2025-02-24 ASSESSMENT — PAIN SCALES - GENERAL: PAINLEVEL_OUTOF10: 0-NO PAIN

## 2025-02-24 NOTE — PROGRESS NOTES
[facility] Infusion Note     Maldonado Mccloud is a 74 y.o. year old male here today,02/24/25,  in the [facility] infusion center for cycle 6 day 1 of the following treatment regimen:    Teclistamab (Weekly), 28 Day Cycles        Medications given:  Administrations This Visit       teclistamab-cqyv (Tecvayli) subcutaneous solution 153 mg       Admin Date  02/24/2025 Action  Given Dose  153 mg Route  subcutaneous Documented By  Maria Victoria Mcdonald, RN                    Pt tolerated subcutaneous injection in the left upper abdomen well and was discharged to home with wife, Farida, in stable condition. Pt ambulated  off the unit independently with a slow and steady gait. Pt had no further questions or concerns at this time.

## 2025-02-28 DIAGNOSIS — C90.00 IGG MULTIPLE MYELOMA (MULTI): Primary | ICD-10-CM

## 2025-02-28 RX ORDER — PROCHLORPERAZINE MALEATE 10 MG
10 TABLET ORAL EVERY 6 HOURS PRN
OUTPATIENT
Start: 2025-02-28

## 2025-02-28 RX ORDER — EPINEPHRINE 0.3 MG/.3ML
0.3 INJECTION SUBCUTANEOUS EVERY 5 MIN PRN
OUTPATIENT
Start: 2025-02-28

## 2025-02-28 RX ORDER — PROCHLORPERAZINE EDISYLATE 5 MG/ML
10 INJECTION INTRAMUSCULAR; INTRAVENOUS EVERY 6 HOURS PRN
OUTPATIENT
Start: 2025-02-28

## 2025-02-28 RX ORDER — ALBUTEROL SULFATE 0.83 MG/ML
3 SOLUTION RESPIRATORY (INHALATION) AS NEEDED
OUTPATIENT
Start: 2025-02-28

## 2025-02-28 RX ORDER — FAMOTIDINE 10 MG/ML
20 INJECTION, SOLUTION INTRAVENOUS ONCE AS NEEDED
OUTPATIENT
Start: 2025-02-28

## 2025-02-28 RX ORDER — DIPHENHYDRAMINE HYDROCHLORIDE 50 MG/ML
50 INJECTION INTRAMUSCULAR; INTRAVENOUS AS NEEDED
OUTPATIENT
Start: 2025-02-28

## 2025-03-03 ENCOUNTER — HOSPITAL ENCOUNTER (OUTPATIENT)
Dept: RADIOLOGY | Facility: CLINIC | Age: 75
Discharge: HOME | End: 2025-03-03
Payer: MEDICARE

## 2025-03-03 ENCOUNTER — INFUSION (OUTPATIENT)
Dept: HEMATOLOGY/ONCOLOGY | Facility: CLINIC | Age: 75
End: 2025-03-03
Payer: MEDICARE

## 2025-03-03 ENCOUNTER — LAB (OUTPATIENT)
Dept: LAB | Facility: CLINIC | Age: 75
End: 2025-03-03
Payer: MEDICARE

## 2025-03-03 VITALS
HEART RATE: 92 BPM | WEIGHT: 224.54 LBS | BODY MASS INDEX: 32.92 KG/M2 | SYSTOLIC BLOOD PRESSURE: 133 MMHG | OXYGEN SATURATION: 97 % | DIASTOLIC BLOOD PRESSURE: 81 MMHG | TEMPERATURE: 97.3 F | RESPIRATION RATE: 16 BRPM

## 2025-03-03 DIAGNOSIS — C90.00 IGG MULTIPLE MYELOMA (MULTI): ICD-10-CM

## 2025-03-03 DIAGNOSIS — C90.00 IGG MULTIPLE MYELOMA (MULTI): Primary | ICD-10-CM

## 2025-03-03 LAB
BASOPHILS # BLD AUTO: 0.02 X10*3/UL (ref 0–0.1)
BASOPHILS NFR BLD AUTO: 1 %
EOSINOPHIL # BLD AUTO: 0.06 X10*3/UL (ref 0–0.4)
EOSINOPHIL NFR BLD AUTO: 3 %
ERYTHROCYTE [DISTWIDTH] IN BLOOD BY AUTOMATED COUNT: 12.9 % (ref 11.5–14.5)
HCT VFR BLD AUTO: 36.2 % (ref 41–52)
HGB BLD-MCNC: 12.2 G/DL (ref 13.5–17.5)
IMM GRANULOCYTES # BLD AUTO: 0 X10*3/UL (ref 0–0.5)
IMM GRANULOCYTES NFR BLD AUTO: 0 % (ref 0–0.9)
LYMPHOCYTES # BLD AUTO: 0.69 X10*3/UL (ref 0.8–3)
LYMPHOCYTES NFR BLD AUTO: 34.3 %
MCH RBC QN AUTO: 33 PG (ref 26–34)
MCHC RBC AUTO-ENTMCNC: 33.7 G/DL (ref 32–36)
MCV RBC AUTO: 98 FL (ref 80–100)
MONOCYTES # BLD AUTO: 0.41 X10*3/UL (ref 0.05–0.8)
MONOCYTES NFR BLD AUTO: 20.4 %
NEUTROPHILS # BLD AUTO: 0.83 X10*3/UL (ref 1.6–5.5)
NEUTROPHILS NFR BLD AUTO: 41.3 %
NRBC BLD-RTO: ABNORMAL /100{WBCS}
PLATELET # BLD AUTO: 114 X10*3/UL (ref 150–450)
RBC # BLD AUTO: 3.7 X10*6/UL (ref 4.5–5.9)
WBC # BLD AUTO: 2 X10*3/UL (ref 4.4–11.3)

## 2025-03-03 PROCEDURE — 3430000001 HC RX 343 DIAGNOSTIC RADIOPHARMACEUTICALS

## 2025-03-03 PROCEDURE — 96401 CHEMO ANTI-NEOPL SQ/IM: CPT

## 2025-03-03 PROCEDURE — 36415 COLL VENOUS BLD VENIPUNCTURE: CPT

## 2025-03-03 PROCEDURE — 78816 PET IMAGE W/CT FULL BODY: CPT | Mod: PET TUMOR SUBSQ TX STRATEGY | Performed by: RADIOLOGY

## 2025-03-03 PROCEDURE — 2500000004 HC RX 250 GENERAL PHARMACY W/ HCPCS (ALT 636 FOR OP/ED): Mod: JZ,TB

## 2025-03-03 PROCEDURE — 85025 COMPLETE CBC W/AUTO DIFF WBC: CPT

## 2025-03-03 PROCEDURE — 78816 PET IMAGE W/CT FULL BODY: CPT | Mod: PS

## 2025-03-03 PROCEDURE — A9552 F18 FDG: HCPCS

## 2025-03-03 RX ORDER — ALBUTEROL SULFATE 0.83 MG/ML
3 SOLUTION RESPIRATORY (INHALATION) AS NEEDED
Status: DISCONTINUED | OUTPATIENT
Start: 2025-03-03 | End: 2025-03-03 | Stop reason: HOSPADM

## 2025-03-03 RX ORDER — DIPHENHYDRAMINE HYDROCHLORIDE 50 MG/ML
50 INJECTION INTRAMUSCULAR; INTRAVENOUS AS NEEDED
Status: DISCONTINUED | OUTPATIENT
Start: 2025-03-03 | End: 2025-03-03 | Stop reason: HOSPADM

## 2025-03-03 RX ORDER — DIPHENHYDRAMINE HYDROCHLORIDE 50 MG/ML
50 INJECTION INTRAMUSCULAR; INTRAVENOUS AS NEEDED
Status: CANCELLED | OUTPATIENT
Start: 2025-03-10

## 2025-03-03 RX ORDER — FAMOTIDINE 10 MG/ML
20 INJECTION, SOLUTION INTRAVENOUS ONCE AS NEEDED
Status: DISCONTINUED | OUTPATIENT
Start: 2025-03-03 | End: 2025-03-03 | Stop reason: HOSPADM

## 2025-03-03 RX ORDER — PROCHLORPERAZINE MALEATE 10 MG
10 TABLET ORAL EVERY 6 HOURS PRN
Status: DISCONTINUED | OUTPATIENT
Start: 2025-03-03 | End: 2025-03-03 | Stop reason: HOSPADM

## 2025-03-03 RX ORDER — EPINEPHRINE 0.3 MG/.3ML
0.3 INJECTION SUBCUTANEOUS EVERY 5 MIN PRN
Status: CANCELLED | OUTPATIENT
Start: 2025-03-10

## 2025-03-03 RX ORDER — PROCHLORPERAZINE EDISYLATE 5 MG/ML
10 INJECTION INTRAMUSCULAR; INTRAVENOUS EVERY 6 HOURS PRN
Status: CANCELLED | OUTPATIENT
Start: 2025-03-10

## 2025-03-03 RX ORDER — PROCHLORPERAZINE MALEATE 10 MG
10 TABLET ORAL EVERY 6 HOURS PRN
Status: CANCELLED | OUTPATIENT
Start: 2025-03-10

## 2025-03-03 RX ORDER — PROCHLORPERAZINE EDISYLATE 5 MG/ML
10 INJECTION INTRAMUSCULAR; INTRAVENOUS EVERY 6 HOURS PRN
OUTPATIENT
Start: 2025-03-17

## 2025-03-03 RX ORDER — FLUDEOXYGLUCOSE F 18 200 MCI/ML
12 INJECTION, SOLUTION INTRAVENOUS
Status: COMPLETED | OUTPATIENT
Start: 2025-03-03 | End: 2025-03-03

## 2025-03-03 RX ORDER — PROCHLORPERAZINE EDISYLATE 5 MG/ML
10 INJECTION INTRAMUSCULAR; INTRAVENOUS EVERY 6 HOURS PRN
Status: DISCONTINUED | OUTPATIENT
Start: 2025-03-03 | End: 2025-03-03 | Stop reason: HOSPADM

## 2025-03-03 RX ORDER — FAMOTIDINE 10 MG/ML
20 INJECTION, SOLUTION INTRAVENOUS ONCE AS NEEDED
Status: CANCELLED | OUTPATIENT
Start: 2025-03-10

## 2025-03-03 RX ORDER — FAMOTIDINE 10 MG/ML
20 INJECTION, SOLUTION INTRAVENOUS ONCE AS NEEDED
OUTPATIENT
Start: 2025-03-17

## 2025-03-03 RX ORDER — DIPHENHYDRAMINE HYDROCHLORIDE 50 MG/ML
50 INJECTION INTRAMUSCULAR; INTRAVENOUS AS NEEDED
OUTPATIENT
Start: 2025-03-17

## 2025-03-03 RX ORDER — EPINEPHRINE 0.3 MG/.3ML
0.3 INJECTION SUBCUTANEOUS EVERY 5 MIN PRN
OUTPATIENT
Start: 2025-03-17

## 2025-03-03 RX ORDER — ALBUTEROL SULFATE 0.83 MG/ML
3 SOLUTION RESPIRATORY (INHALATION) AS NEEDED
Status: CANCELLED | OUTPATIENT
Start: 2025-03-10

## 2025-03-03 RX ORDER — PROCHLORPERAZINE MALEATE 10 MG
10 TABLET ORAL EVERY 6 HOURS PRN
OUTPATIENT
Start: 2025-03-17

## 2025-03-03 RX ORDER — EPINEPHRINE 0.3 MG/.3ML
0.3 INJECTION SUBCUTANEOUS EVERY 5 MIN PRN
Status: DISCONTINUED | OUTPATIENT
Start: 2025-03-03 | End: 2025-03-03 | Stop reason: HOSPADM

## 2025-03-03 RX ORDER — ALBUTEROL SULFATE 0.83 MG/ML
3 SOLUTION RESPIRATORY (INHALATION) AS NEEDED
OUTPATIENT
Start: 2025-03-17

## 2025-03-03 RX ADMIN — TECLISTAMAB 153 MG: 90 INJECTION SUBCUTANEOUS at 09:29

## 2025-03-03 RX ADMIN — FLUDEOXYGLUCOSE F 18 12 MILLICURIE: 200 INJECTION, SOLUTION INTRAVENOUS at 11:07

## 2025-03-03 NOTE — SIGNIFICANT EVENT
03/03/25 0853   Prechemo Checklist   Has the patient been in the hospital, ED, or urgent care since last date of service No   Chemo/Immuno Consent Completed and Signed Yes   Confirmed to previous date/time of medication Yes   Compared to previous dose Yes   All medications are dated accurately Yes   Pregnancy Test Negative Not applicable   Parameters Met Yes   BSA/Weight-Height Verified Yes   Dose Calculations Verified (current, total, cumulative) Yes

## 2025-03-05 NOTE — PROGRESS NOTES
Patient ID: Maldonado Mccloud is a 74 y.o. male.  Referring Physician: West Huff MD  [id number]  [place] [address]  Primary Care Provider: Allison Wang,     Interval hx:  Since last visit he has had a small rash at site of teclistamab injection on the right side of his abdomen. Has had some GONZALEZ when walking up stairs. He also has noted some mild right (not left) hip pains. He has chronically denies having had unexplained wt loss,  fevers, chills, sweats, palpable FELY, cough, nausea, vomiting, diarrhea, mouth sores, other skin rashes, chest/abd/back pains, headaches, nosebleeds, GI or  bleeding, vision or hearing complaints. Still w/occasional numbness of his feet, geena of hands,  not relieved at all w/cymbalta and is not feeling depressed.    PMHx:  1) Multiple myeloma (see below)  2) Squamous cell ca's skin  3) DVT, LLE ; now off AC  4) Neuropathy  5) S/p right radius fracture 2023  6) HTN  7) Afib (transient in  w/stem cell collection)    FamHx  Dad  of pan ca age 92    SocHx:   w/5 kids; smokes cigars; no cigs in 50 years; no EtOH or illicit drugs. Was in [employer] in [place]    Meds:  Were reviewed w/the pt  All:  [employer]  PCN  Zosyn    Physical Exam  General: Ambulatory, looked comfortable, not requiring supplemental oxygen  Vital Signs   /82; P 69; afebrile; RR 16/min; Wt= 100.7 (lost 1.3 kg)  HEENT: no oral lesions, tonsillar or tongue enlargement; Neck: no masses; Lymph nodes: no palpable cervical, supraclavicular, axillary, epitrochear or inguinal nodes; Heart: no murmurs; Lungs: clear; Abd: soft, +bowel sounds, non-tender, no palpable liver or spleen; Skin: faint, ellipitical rash on right abdominal wall, otherwise no rashes; Ext's: No LE edema; Neuro: alert, answered questions appropriately, 5/5 motor strength upper and lower extremities    Performance Status:  Symptomatic; fully  ambulatory      Assessment/recommendations:  74 year old man w/a hx of LLE DVT (2022, now off AC), HTN, neuropathy, multiple skin cancers and multiply relapsed multiple myeloma [presented with right chest 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; Bone marrow biopsy suggested multiple myeloma IgG kappa, ISS Stage II, bone survey revealed lytic lesions; Urine light chains present kappa restricted with 2gm proteinuria; s/p VRD x 3 cycles with CR; then s/p auto SCTx 12/23/15 complicated by orthostatic hypotension, electrolyte replacement, drug induced rash, culture negative fever, then s/p approximately 5 weeks of maintenance Revlimid 10 mg daily which was held for worsening neuropathy in August 2016; then in 3/ 2018: IgG M Braxton alexei to 0.2gm/L and light chain alexei was; restarted on Revlimid maintenance at 5mg every other day, then revlimid stopped in 3/2021 due to stable disease; then in 7/2021 his M spike was on the rise, and was enrolled onto Vactosertib/Pom trial on 8/5/2021; then progressed in 6/2022, switched to Isatuximab/Carfilzomib on 6/30/22, then in 9/2022 progressed and was switched to Cytoxan/Carfilzomib; then had therapy placed on hold in 11/2022 due to need for hip replacement surgery; myeloma markers were on the rise in 3/2023 with a new jaw lesion, s/p XRT 4/2023 and resume 2x/week carfilzomib; then s/p CAR T w/ABECMA (T=0, 10/25/23), hospital course complicated by grade 1 ICANS managed w/ Dex 10mg x3 days and 1 dose tociluzimab; then w/progression and started on teclistamab, full dosing given on 10/13/24] here for follow-up.     As for relapsed myeloma:  He has been continued on weekly teclistamab and tolerating this well, so far only has had an early episode of grade I CRS (cx neg low grade fever x 1, not recurred). He has not had any other toxicities thus far. In addition, there has been a continued biochemical response: serum free kappa decreasing from 15.6mg/dL (= 156mg/L) to <0.08, as  "of 2/10/25. However, the recent repeat PET/CT from 3/2025 revealed the following:  \"Interval development of new FDG uptake within left acetabulum with increase in FDG uptake within left iliac lesion, consistent with active myeloma\" but also \"interval decrease to stable FDG uptake within multiple lytic lesions in left femoral diaphysis, right iliac crest, L4 vertebral body and right proximal clavicle\" and \"non FDG avid lesions within left posterior 5th rib, sternum and right radius, consistent with treated disease\". Of note, the new area of FDG uptake in the left acetabulum had a max SUV of only 0.9. The significance of this finding is not clear; although it may represent a very early recurrence, it could also represent tumor flare (which has been reported w/BiTEs) or be a non-specific finding. Given the lack of other findings to suggest progression (and the excellent biochemical response), I recommended to not stop the teclistamab at this time.    Plan to have him follow-up w/Jacob Arechiga at [facility] and get day 15 of his next cycle (#6) of teclistamab on 3/10, then weekly, and w/Jacob in 4 weeks, and w/me in 8 weeks, or sooner if new symptoms arise.     As for neutropenia:  Probably from teclistamab; cont prn weekly neupogen to keep ANC >500    Infection ppx:  Cont acyclovir and bactrim ppx; monitor IgG levels and cont monthly ppx IVIG (NOTE: he should receive Gammunex -C, d/t prior severe reaction to gammard).    As for sens NP:  No effect w/cymbalta, so last visit we stopped this medication. I again offered adding gabapentin but the pt and his wife declined this, as he is really is not having any associated pain.   "

## 2025-03-06 ENCOUNTER — APPOINTMENT (OUTPATIENT)
Facility: CLINIC | Age: 75
End: 2025-03-06
Payer: COMMERCIAL

## 2025-03-06 ENCOUNTER — OFFICE VISIT (OUTPATIENT)
Dept: HEMATOLOGY/ONCOLOGY | Facility: HOSPITAL | Age: 75
End: 2025-03-06
Payer: MEDICARE

## 2025-03-06 VITALS
TEMPERATURE: 96.3 F | HEART RATE: 69 BPM | SYSTOLIC BLOOD PRESSURE: 126 MMHG | BODY MASS INDEX: 32.55 KG/M2 | OXYGEN SATURATION: 100 % | WEIGHT: 222 LBS | RESPIRATION RATE: 16 BRPM | DIASTOLIC BLOOD PRESSURE: 82 MMHG

## 2025-03-06 DIAGNOSIS — F43.29 ADJUSTMENT DISORDER WITH OTHER SYMPTOM: ICD-10-CM

## 2025-03-06 DIAGNOSIS — R41.89 COGNITIVE DECLINE: ICD-10-CM

## 2025-03-06 DIAGNOSIS — C90.00 IGG MULTIPLE MYELOMA (MULTI): ICD-10-CM

## 2025-03-06 DIAGNOSIS — R41.3 MEMORY DIFFICULTY: Primary | ICD-10-CM

## 2025-03-06 PROCEDURE — 99215 OFFICE O/P EST HI 40 MIN: CPT | Performed by: INTERNAL MEDICINE

## 2025-03-06 PROCEDURE — 96132 NRPSYC TST EVAL PHYS/QHP 1ST: CPT | Mod: AH | Performed by: PSYCHOLOGIST

## 2025-03-06 PROCEDURE — 3074F SYST BP LT 130 MM HG: CPT | Performed by: INTERNAL MEDICINE

## 2025-03-06 PROCEDURE — 96133 NRPSYC TST EVAL PHYS/QHP EA: CPT | Mod: AH | Performed by: PSYCHOLOGIST

## 2025-03-06 PROCEDURE — 1126F AMNT PAIN NOTED NONE PRSNT: CPT | Performed by: INTERNAL MEDICINE

## 2025-03-06 PROCEDURE — 96139 PSYCL/NRPSYC TST TECH EA: CPT | Mod: AH | Performed by: PSYCHOLOGIST

## 2025-03-06 PROCEDURE — 96138 PSYCL/NRPSYC TECH 1ST: CPT | Mod: AH | Performed by: PSYCHOLOGIST

## 2025-03-06 PROCEDURE — 3079F DIAST BP 80-89 MM HG: CPT | Performed by: INTERNAL MEDICINE

## 2025-03-06 PROCEDURE — 96116 NUBHVL XM PHYS/QHP 1ST HR: CPT | Mod: AH | Performed by: PSYCHOLOGIST

## 2025-03-06 PROCEDURE — 1123F ACP DISCUSS/DSCN MKR DOCD: CPT | Performed by: INTERNAL MEDICINE

## 2025-03-06 ASSESSMENT — PAIN SCALES - GENERAL: PAINLEVEL_OUTOF10: 0-NO PAIN

## 2025-03-06 NOTE — PROGRESS NOTES
Neuropsychology Evaluation    Name: Maldonado Mccloud  : 1950  MRN: [id number]  Referring Provider: Libby Ellis DO    Date of Service: 2025      Reason for Visit: Neuropsychological evaluation related to memory difficulty and concern regarding cognitive decline.    Summary/Impressions: In the context of presumed average/high-average premorbid intellectual functioning, present results demonstrated variable performance on speeded measures (e.g., psychomotor speed ranged from deficient to average; verbal/oral speed ranged from deficient to above average) in the context of otherwise intact cognitive abilities in all domains tested (i.e., simple auditory attention, working memory, visuospatial abilities, language, learning and memory, executive functioning), with relative weakness in aspects of executive functioning (given comparatively worse performance on some tasks with greater executive demand) . Fine motor speed was average, without significant lateralization.     DIAGNOSTIC IMPRESSIONS:  Altogether, Mr. Mccloud presents a mixed clinical picture. Though results of objective neuropsychological evaluation do not indicate significant cognitive impairment (when compared to same-aged peers), performance patterns indicate personal relative weaknesses/declines in processing speed and executive functioning.     In his daily life, stress/fluctuating mood (even diminished self-confidence), non-restorative sleep, and daytime fatigue are likely contributing to his experience of cognitive difficulty. Even minimally, these factors may intermittently reduce cognitive effectiveness, typically by depleting cognitive/mental resources and interfering with normal attention and efficiency of information processing. With attention disturbance, memories are poor due to a failure to encode information versus a loss of learned material. Stress has also been shown to impair the retrieval of stored information. In particular,  Mr. Mccloud's description of cognitive difficulties in daily life seem like lapses in attention and diminished cognitive effectiveness. Psychological/situational factors can also compromise other abilities (e.g., performing complex detail-oriented tasks, attending to multiple tasks at once, acquiring new information, and organizing thoughts/speech) and hinder the ability to adapt to, or develop compensatory strategies for, any cognitive changes that may be present.     RECOMMENDATIONS & CONSIDERATIONS:    1.   Mr. Mccloud can be assured that his cognitive functioning is largely normal compared to same-aged peers. Importantly, learning/memory performances were consistently within normal limits, indicating that he does not currently have a fundamental memory retention problem.     2.   Nevertheless, given Mr. Mccloud's cognitive concerns and other neurological symptoms in the context of his medical history, ongoing monitoring and follow-up care with Dr. Ellis would be prudent.     3.   In general, information is more likely to be reliably remembered when efforts are made to explicitly enhance initial learning, such as by rehearsing, paraphrasing, and restating information; and by identifying links between to-be learned information and prior knowledge.     Structural and organizational reinforcements are often helpful for individuals with attention/memory difficulties. Mr. Mccloud is encouraged to continue implementing organizational strategies he finds useful, such as using a calendar/planner and lists to help keep dates/events and tasks organized. Other general tips include:            a.  Following a routine and consistent daily schedule.             b.  Working on one task at a time until it is completed.            c.  Keeping a single list of tasks, listed by priority, and marking them when complete.            d.  Placing reminders in places where they are easily noticed (e.g., a note on door).            e.  Setting  "reminder alarms for important tasks and events (e.g., medications, appointments) using a mobile device (e.g., smartphone, iPad), computer calendar, or  (e.g., Marlin Viera). Refrain from silencing an alarm before completing the task  OR  set multiple/repeating alarms.            f.   Writing down important information (e.g., tasks, conversational details, list items) immediately, to strengthen encoding and for later reference. This will also help to free up cognitive resources to focus on the task at hand.            g.  Associating new information with older, previously stored information (e.g., familiar categories, anecdotes, references, reminders).            h.  Using recognition cue cards in everyday memory situations that come up with frequency, such as lists of routine household tasks, errands, or grocery items to prompt recall.            i.   Having specific locations for frequently used items (e.g., keys, wallet, phone).    3.   Mr. Mccloud may benefit from consultation with Dr. Tati Morris or Dr. Pushpa Thomson in our Neuropsychological Rehabilitation program ([phone]), to facilitate the development/ utilization of compensatory strategies and assist with psychological and functional adjustment to cognitive changes (e.g., in communication abilities).      Another option would be individual psychotherapy / adjustment counseling, particularly brief structured cognitive behavioral therapy (CBT) or acceptance and commitment therapy (ACT), to learn effective coping skills and strategies for managing stress, mood, and cognitive/physical symptoms that interfere with his daily functioning. To this end, he may benefit from the following resources:  A cursory list of potential therapists near Mr. Mccloud can be found on the \"Psychology Today\" website ([email or web address].[email or web address]; \"Find a Therapist\" in the top navigation bar), which can be further filtered by provider speciality, accepted insurances, " therapy modality, etc.  The [facility] ([phone]) on the /[facility] in [place] offers comprehensive services on a sliding scale fee.   Providers within the [facility] mental health system that offer public-supported services near Mr. Mccloud's home can be found on the [facility] website:  [email or web address]    4.   Restorative sleep (7-9 hours/night recommended for adults) is critical for daytime alertness/safety, cognitive effectiveness, and mood, as well as physical, neurological, and mental health more broadly. Mr. Mccloud described a degree of sleep disturbance and daytime fatigue; as such, he might benefit from consultation with a sleep specialist and/or formal sleep study to investigate and address potential variables that may be interfering with sleep.    In general, the following behavioral strategies and environmental modifications can help optimize sleep duration and quality:            a.  Set a fixed bedtime and waking time every day.            b.  Avoid napping during the day.             c.  Avoid alcohol, caffeine and heavy, spicy, or sugary foods 4-6 hours before bedtime.             d.  Exercise regularly, but not right before going to bed.            e.  Block out all distractions by turning off - or even removing from the room - electronic devices such as TV, computer, phone, video player, audio player, noe device, etc.            f.  Use comfortable bedding, set a comfortable temperature, and keep the room well ventilated.            g.  Do not use the bed as an office, workroom, or recreation room.             h.  Establish a pre-sleep ritual, such as a warm bath or a few minutes of reading.            i.  If prone to anxiety, relaxation techniques such as yoga and deep breathing can be helpful.     5.   There was a degree of hearing difficulty noted during the evaluation, particularly with regard to certain sounds/pitches.  Given that hearing impairment can undermine the acquisition of information and has been associated with cognitive decline, Mr. Mccloud may benefit from audiological consultation and follow-up care.     Audiological services are available at [facility] ([phone]) and [facility] ([phone]). In the community, [facility] ([phone]) comes highly recommended, with locations in [place] ([address], [address]) and [place] ([facility] - [address], [address]).    6.   Neuropsychological evaluation does not directly assess driving ability; however, fluctuations in processing speed documented on this evaluation support may impact Mr. Mccloud's ability to drive safely and are consistent with wife's reported driving concerns. As such, a formal (simulation and/or on-the-road)  evaluation is recommended to confirm that deficits identified here are not interfering with his driving safety. For more information, contact the Rehabilitation Department at [facility] ([phone]). The Association for  Rehabilitation may have additional options for  rehabilitation specialists (this does not constitute endorsement of their resources), which can be accessed by visiting https://ADED.net, calling [phone], or emailing [email or web address].    7.   Particularly given his history of DVT, Mr. Mccloud is encouraged to remain in close contact with medical professionals regarding his cerebrovascular risk factors (e.g., hypertension, hyperlipidemia, atrial fibrillation, obesity), as these conditions may affect current and future brain function.     The following activities and interventions are recommended for optimizing brain health and preserving cognitive function:            a.  “heart-healthy”/cardiovascular diet;            b.  good stress management (e.g., relaxation techniques);            c.  management of any vascular  risks (e.g., hypertension, cholesterol);            d.  30-60 minutes of daily aerobic exercise (e.g., walking, swimming);            e.  social engagement (e.g., getting together with other people);             f.  adequate sleep; and             g.  cognitively stimulating activities (e.g., classes to learn new things, challenging puzzles).      Leading a physically, socially, and cognitively active lifestyle is important for healthy brain aging. Within the bounds of safety, good judgment, and medical advice, this includes engaging in regular exercise (e.g., walking, swimming, yoga); Mr. Mccloud would likely benefit from involvement in supervised physical activity (e.g., going to the gym with a friend/family member). As much as possible, given health/safety considerations, he is also encouraged to maintain a socially active lifestyle, as a means of promoting cognitive stimulation and emotional benefits that will optimize quality of life. He is likely to benefit from continued engagement in activities he enjoys, as well as exploring/pursuing new meaningful hobbies.     8.   If concerns persist or worsen, repeat neuropsychological assessment (as clinically indicated; no sooner than 1-2 years), using present data as baseline, may be helpful in determining if there is cognitive change over time.       Thank you for the opportunity to participate in Mr. Mccloud's evaluation and care. If I can be of further assistance, please do not hesitate to contact me at [phone].      -- EXTENDED REPORT --      History of Presenting Illness: Mr. Mccloud is a 74 year-old, right-handed,  male, seen by Dr. 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 SLUMS score was 28/30. Though cognitive symptoms  "are common with chemotherapy (\"chemo brain\"), given reported symptom progression after discontinuation of therapy, neurological workup was initiated to investigate other etiological considerations.    RELEVANT LABS/EXAMS (with radiologist impressions of neuroimaging):      7/29/2022 - Head CT w/o IV contrast ([facility]; related to \"encephalitis\"): Scattered patchy foci of low attenuation within supratentorial white matter, nonspecific, but thought likely representing mild microvascular ischemia. No acute findings.    9/20/2024 -  Brain MRI w/o IV contrast: Moderate burden of small nonspecific T2/FLAIR white matter hyperintensities within the bilateral cerebral hemispheres; may represent chronic small vessel ischemic disease and/or element of posttreatment change, given reported history. Moderate generalized brain atrophy. No acute infarct, recent hemorrhage, or intracranial mass effect. No suspicious osseous marrow signal identified within the calvarium on this noncontrast evaluation.    CLINICAL INTERVIEW: Mr. Mccloud reported progressive cognitive difficulty over the past 5-6 years, which was initially attributed to \"chemo brain.\" He struggles primarily with aspects of verbal communication (e.g., word finding, articulation, organizing thoughts/speech), which is disconcerting due to his social nature; speech has become less fluent (more halting), and he feels \"conversationally inept.\" He also identified increased difficulty with learning new names, as well as retrieval of familiar names. He often does not remember recently watched movies. Attention/concentration has diminished, and he is more easily distracted, with a tendency to leave tasks unfinished or inadvertently skip procedural steps. He has been treated with chemotherapy rather consistently for much of the past 10 years, though he denied noticing significant improvement in cognitive status during limited periods without treatment (e.g., 11/2022 to 3/2023).    Mr." Rogers's wife largely echoed the above and added increased forgetfulness for conversational details. There was also an instance (~6-7 months ago) in which he needed assistance to operate the television remote, though this was apparently a relatively isolated occurrence. Cognitive status seems to fluctuate somewhat, with good days/bad days, though there has not been an identifiable pattern.    Others have reportedly not mentioned/expressed concern regarding the abovementioned cognitive symptoms. There have not been any dramatic personality changes or concerns regarding vulnerability to deception.    Instrumental activities of daily living (IADLs):    Largely unchanged   - Medication management: Wife manages primarily / sets out each dose (this is longstanding/unchanged).    - Finances / bill paying: Independent, without reported problems/difficulty.   - Scheduling / appointments: Routinely coordinates with wife, but generally independent (with regular use of an electronic/phone calendar); no reported problems/difficulty.   - Driving: Mr. Mccloud acknowledged occasional inattention/distractibility (e.g., somewhat prone to missing exits) but no significant problems/difficulty; no recent accidents/near misses, serious traffic violations, navigation difficulty (occasionally uses GPS), or instances of getting lost. Wife expressed some concern regarding his distractibility and other driving habits, which have changed (e.g., prone to drifting out of the designated rosmery; braking late).    - Cooking: Minimal; wife prepares most meals (longstanding/unchanged). No reported problems/difficulty or safety incidents/concerns (e.g., forgetting to turn off the stove/oven).   - Shopping: Wife manages primarily (longstanding/unchanged).   - Household tasks: No reported problems/difficulty.    Wife has healthcare power of ; relevant documentation not found in the patient's electronic record.     Relevant Medical Status & History:     - Sleep: Mr. Mccloud acknowledged nightly use of trazodone for >10 years. He is generally asleep on the couch between 7:30-8:30PM and in bed around 10:30PM; he denied particular difficulty falling asleep. He typically wakes 2x/night (nocturia) and may not return to sleep after second instance (usually around 5-5:30AM). He feels well-rested in the morning (rises for the day around 6AM) and denied significant daytime fatigue, though wife indicated that he tends to doze after dinner. He snores on occasion. Within the past year, wife has also observed sporadic involuntary leg movements while he is asleep (more common when dozing on a chair/couch than in bed; described as kicking/shaking, which tends to startle her but does not wake the patient). He has not undergone formal sleep study.   - Appetite: Largely unchanged; weight has been relatively stable on a normal diet.    - Physical activity/exercise: Minimal.    - Alcohol use: None recently; no reported history of particularly heavy or problematic alcohol use.   - Tobacco: Smokes 1-2 cigars/day (for ~10 years).   - Recreational drugs: Denied past and present use.   - Caffeine: 2-3 cups of coffee (mostly in the AM; occasionally in the afternoon).    Mr. Mccloud reported progressive balance/gait disturbance (attributed, in part, to neuropathy), as well as occasional dizziness (e.g., when standing), without any recent unprovoked falls. He noted shakiness/tremor in his hands at times, but no consistent involuntary/uncontrolled movements. No recent urinary changes. Hearing is reportedly diminished but has not been evaluated recently. He acknowledged chronic mild back pain (rated a 1-2 out of 10 as most severe) that is not particularly bothersome. No known history of concussion/head injury, seizures, clinical cerebrovascular events (e.g., stroke), or other neurological disorder.     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)    Osteoarthritis of hip    Multiple myeloma without remission (Multi)    History of chimeric antigen receptor T-cell therapy    Immunocompromised    Viral infection    Pathological fracture of left radius due to neoplastic disease    Pain due to neoplasm    Obesity    Mass of shoulder region    Impingement syndrome of shoulder region    History of malignant melanoma of skin    Visual impairment    Cognitive decline    Acute pneumonia    Need for prophylactic vaccination and inoculation against influenza    Influenza A    Encounter for antineoplastic immunotherapy    Neutropenia     Past Medical History:   Diagnosis Date    Acute deep vein thrombosis (DVT) of left lower extremity (Multi) 06/20/2022    Autonomic orthostatic hypotension 03/14/2023    Bursitis of left shoulder 08/22/2023    Cervical stenosis of spine 03/14/2023    Decreased GFR 03/14/2023    Erectile dysfunction 03/14/2023    Fracture of clavicle, left, closed 03/14/2023    Pathological fracture of right radius 11/12/2023    Personal history of malignant melanoma of skin 11/20/2015    History of malignant melanoma of skin     Current Outpatient Medications:     acetaminophen (Tylenol) 325 mg tablet, Take 2 tablets (650 mg) by mouth every 6 hours if needed for mild pain (1 - 3), headaches or fever (temp greater than 38.0 C).     acyclovir (Zovirax) 400 mg tablet, Take 1 tablet (400 mg) by mouth 2 times a day.    calcium carbonate-vitamin D3 500 mg-5 mcg (200 unit) tablet, Take 1 tablet by mouth once daily.    CALCIUM-MAGNESIUM-ZINC ORAL, Take 1 tablet by mouth 2 times a day.    cyanocobalamin (Vitamin B-12) 1,000 mcg tablet, Take 1 tablet (1,000 mcg) by  "mouth once daily.    sulfamethoxazole-trimethoprim (Bactrim DS) 800-160 mg tablet, Take 1 tablet by mouth 3 times a week (once a day on //).    traZODone (Desyrel) 100 mg tablet, Take 3 tablets by mouth daily at bedtime.     Psychiatric Status & History: Mr. Mccloud described his recent mood as \"good,\" without notable feelings of depression or anxiety. His wife noted some apprehension regarding public speaking (e.g., in Gnosticist) due to diminished confidence in his conversational ability/oratory skills and fear of losing his train of thought. He denied any history of suicidal/homicidal ideation, auditory/visual hallucinations, or mental health diagnosis/treatment.    Developmental/Educational/Psychosocial History:     - Early development: Reportedly normal; no known birth complications or developmental delays.   - Academic history: No history of attention difficulties, learning or behavioral problems, special services/academic accommodations, or repeated/skipped grades. Mr. Mccloud was generally a B-average student.   - Educational attainment: Bachelor's degree in Piki Studies from [employer] in [place] (+15 credit-hours of a master's degree program).   - Employment history: [employer];  (retired 15-16 years ago);  (retired 5 years ago).    - Social history: This is Mr. Mccloud's second marriage. He has 3 biological adult children (ages 39, 44, and 46) and 2 stepsons (ages 43 and 49).   - Currently lives with: Wife of 19.5 years.    Relevant Family History:   - Maternal grandmother ( in her late 90s): Alzheimer's disease (details largely unknown).   - Mother (age 97): Memory/cognitive difficulty for the past couple years (not formally evaluated/diagnosed).     Otherwise, no known family history of neurological issues (e.g., father,  at age 92, was cognitively intact).     Procedures/Tests:  Review of available records; Adult Neuropsychology History Form; Clinical interview with " Mr. Mccloud and his wife conjointly;  Blandon Anxiety Inventory (MEHRAN)  Blandon Depression Inventory (BDI-2)  Hoffman Naming Test-Second Edition (BNT-2)  Brief Visuospatial Memory Test-Revised (BVMT-R) - Form 1   Dorothy-Cardoza Executive Function System (D-KEFS) Verbal Fluency  Dot Counting Test (DCT)  Executive Clock Drawing Task (CLOX)  Finger Tapping Test  Johnson Verbal Learning Test-Revised (HVLT-R) - Form 1  Redfield Sleep Quality Index (PSQI)  Nick Complex Figure Test (RCFT) - Copy Trial   Stroop Color and Word Test (Stroop)  Trail Making Test (TMT)  Wechsler Adult Intelligence Scale-Fourth Edition (WAIS-IV) Digit Span, Coding, and Matrix Reasoning subtests  Wechsler Memory Scale-Fourth Edition (WMS-IV) Logical Memory  Wechsler Test of Adult Reading (WTAR)  Wisconsin Card Sorting Test (WCST)    The purpose of this evaluation was reviewed, as were procedural details, issues related to confidentiality, and options for receiving feedback regarding results. All questions were answered; Mr. Mccloud verbalized understanding and agreed to proceed with this evaluation.    All testing was administered by a psychometrist; results were interpreted in the context of the appropriate normative data.     Behavioral Observations/Neurobehavioral Status Exam: Mr. Mccloud arrived for the appointment on time and accompanied by his wife. He was casually dressed and appropriately groomed. He was somewhat slow to stand, though gait was generally steady. There were no apparent involuntary motor movements. He was alert and fully oriented to self, situation, place, and time. Vision (corrected with contact lenses) was reportedly adequate for testing. There was a degree of hearing difficulty, which required increased conversational volume and occasional repetition but did not appear to interfere significantly with formal testing. Receptive language appeared largely intact on informal observation. Conversational speech was generally fluent and normal  in rate, prosody, and volume, with occasional mild word-finding difficulty and a couple semantic paraphasic errors. Mr. Mccloud was open and responsive to questions, with no apparent difficulty providing details of autobiographical and clinical history. Organizing his thoughts appeared effortful at times. Expressed thoughts were logical and goal-directed. He demonstrated good insight into cognitive and functional abilities. Affect was full, congruent with mood/context/thought content, and somewhat dysphoric, with occasional appropriate brightening. Overall, he was cooperative in completing evaluation procedures and appeared to give his best effort on tasks.     Results/Data:       Descriptor T-Score Standard Score Z-Score Scaled Score %ile Rank   Very Superior > 70 > 130 > 2.00  > 16 > 98   Superior 63-69 120-129 1.34 to 1.99 14-15 91-97   High Average 57-62 110-119 0.67 to 1.33 12-13 75-90   Average 43-56  -0.66 to 0.66 8-11 25-74   Low Average 37-42 80-89 -1.33 to -0.67 6-7 9-24   Borderline 33-36 75-79 -1.67 to -1.34 5 5-8   Mildly Deficient 30-32 70-74 -2.00 to -1.68 4 2-4   Moderately Deficient 25-29 62-69  -2.53 to -2.01 3 1   Severely Deficient <24 <61 <-2.54 1-2 <1      Performance Validity: Results of stand-alone and embedded measures of performance validity were within valid ranges.       Premorbid Functioning: Based on a measure of sight-reading ability, premorbid intellectual functioning was estimated to be in the high-average range (estimated FSIQ = 114), commensurate with Mr. Mccloud's educational and vocational history. Performance was in the average range on another (non-verbal) measure shown to be a good indicator of premorbid ability.    Attention & Working Memory: Span of auditory attention/working memory, measured by repetition of digits, was average overall, with average digits forward, backward, and in sequence (i.e., numerical order).    Processing Speed & Executive Abilities: Efficiency in  matching and reproducing symbols associated with numbers was average. Speeded word reading was average, and color naming was mildly deficient; color naming of incongruent color words (e.g., blue ink spelling the word “red”) was in the low-average range, with overall performance pattern suggesting average selective attention.     Performance on a timed task that requires simultaneous visual scanning, number sequencing, and fine-motor coordination was moderately deficient, with 0 errors. When the task became more complex, requiring alternating attention to sequencing numbers and letters, performance was in the borderline range, with 0 errors.     On a computerized concept-formation and problem-solving task that involves matching cards by shared fundamental attributes and using hypothesis testing (i.e., trial and error) to inform responses, overall performance was average/above average. Mr. Mccloud quickly identified successful response patterns and effectively utilized performance feedback to guide/adapt his approach, ultimately completing all 6 target solutions with relatively few errors (superior-range performance). There were instances in which he discontinued a successful strategy after 5 and 6 consecutive correct responses (respectively), suggesting a degree of inattention/distractibility.    Learning & Memory: Immediate recall of logically organized semantic information in the form of two stories was high average. After a 20-minute delay, recall was average, without needing semantic reminder cues to help prompt recall. Delayed recognition of story detail was in the average range.     Learning and recall of a 12-item word list over three trials was average, with limited benefit from repetition (trial scores: 6, 9, 8). After a 20-minute delay, recall was average, with 9 list words recalled. Mr. Mccloud accurately recognized 11 of the list words, with 0 false-positive errors, indicating average recognition  discriminability.    Regarding visuospatial memory, Mr. Terrys ability to learn and recall an array of six geometric designs over three trials was in the average range, with a generally flat learning curve. Later recall was average (150% of best learning trial), and recognition of the designs was within normal limits (5 correct; 0 false-positive errors).     Language Functioning: Confrontation naming was average (57/60). Letter fluency (i.e., rapid generation of words beginning with a given letter over three one-minute trials) was average when compared to same-aged peers (and in the low-average range when also accounting for his level of education). Category fluency (i.e., rapid generation of words in a specific category) was in the superior range when compared to same-aged peers; animal fluency (which additionally accounts for level of education) was high average. Category fluency while switching back and forth between two categories was average. Across verbal fluency tasks, there was an average number of repetitions and no set-loss errors.     Visuospatial Functioning: Ability to copy an array of two-dimensional geometric designs was intact. Spontaneous generation of a clock drawing was high average. Copy of a clock drawing was average. Mr. Mccloud was observed to employ a somewhat piecemeal but systematic approach to copying a complex figure, with good construction accuracy.       Motor Functioning: Fine motor speed, as measured by finger tapping, was average bilaterally.    Self-Report Measures: On a self-report inventory of sleep patterns, responses were indicative of poor sleep quality.    On face-valid self-report inventories assessing mood and emotional status, Mr. Mccloud's responses suggested minimal depression and mild anxiety (with endorsement of primarily physical/somatic symptoms).        Time-based service: 96036 (45 minutes); 88600 (60 minutes); 37458 (127 minutes); 88536 (30 minutes); 35800 (178  minutes)

## 2025-03-10 ENCOUNTER — LAB (OUTPATIENT)
Dept: LAB | Facility: CLINIC | Age: 75
End: 2025-03-10
Payer: MEDICARE

## 2025-03-10 ENCOUNTER — INFUSION (OUTPATIENT)
Dept: HEMATOLOGY/ONCOLOGY | Facility: CLINIC | Age: 75
End: 2025-03-10
Payer: MEDICARE

## 2025-03-10 VITALS
WEIGHT: 226.85 LBS | DIASTOLIC BLOOD PRESSURE: 85 MMHG | SYSTOLIC BLOOD PRESSURE: 134 MMHG | OXYGEN SATURATION: 96 % | RESPIRATION RATE: 16 BRPM | TEMPERATURE: 97.9 F | BODY MASS INDEX: 33.26 KG/M2 | HEART RATE: 59 BPM

## 2025-03-10 DIAGNOSIS — D80.1 HYPOGAMMAGLOBULINEMIA DUE TO MULTIPLE MYELOMA (MULTI): ICD-10-CM

## 2025-03-10 DIAGNOSIS — C90.00 IGG MULTIPLE MYELOMA (MULTI): ICD-10-CM

## 2025-03-10 DIAGNOSIS — C90.00 MULTIPLE MYELOMA WITHOUT REMISSION (MULTI): ICD-10-CM

## 2025-03-10 DIAGNOSIS — Z92.850 HISTORY OF CHIMERIC ANTIGEN RECEPTOR T-CELL THERAPY: ICD-10-CM

## 2025-03-10 DIAGNOSIS — C90.00 HYPOGAMMAGLOBULINEMIA DUE TO MULTIPLE MYELOMA (MULTI): ICD-10-CM

## 2025-03-10 LAB
BASOPHILS # BLD AUTO: 0.02 X10*3/UL (ref 0–0.1)
BASOPHILS NFR BLD AUTO: 0.8 %
EOSINOPHIL # BLD AUTO: 0.05 X10*3/UL (ref 0–0.4)
EOSINOPHIL NFR BLD AUTO: 2 %
ERYTHROCYTE [DISTWIDTH] IN BLOOD BY AUTOMATED COUNT: 12.7 % (ref 11.5–14.5)
HCT VFR BLD AUTO: 37.4 % (ref 41–52)
HGB BLD-MCNC: 12.8 G/DL (ref 13.5–17.5)
IMM GRANULOCYTES # BLD AUTO: 0.01 X10*3/UL (ref 0–0.5)
IMM GRANULOCYTES NFR BLD AUTO: 0.4 % (ref 0–0.9)
LYMPHOCYTES # BLD AUTO: 1.04 X10*3/UL (ref 0.8–3)
LYMPHOCYTES NFR BLD AUTO: 40.9 %
MCH RBC QN AUTO: 33.5 PG (ref 26–34)
MCHC RBC AUTO-ENTMCNC: 34.2 G/DL (ref 32–36)
MCV RBC AUTO: 98 FL (ref 80–100)
MONOCYTES # BLD AUTO: 0.56 X10*3/UL (ref 0.05–0.8)
MONOCYTES NFR BLD AUTO: 22 %
NEUTROPHILS # BLD AUTO: 0.86 X10*3/UL (ref 1.6–5.5)
NEUTROPHILS NFR BLD AUTO: 33.9 %
NRBC BLD-RTO: ABNORMAL /100{WBCS}
PLATELET # BLD AUTO: 116 X10*3/UL (ref 150–450)
RBC # BLD AUTO: 3.82 X10*6/UL (ref 4.5–5.9)
WBC # BLD AUTO: 2.5 X10*3/UL (ref 4.4–11.3)

## 2025-03-10 PROCEDURE — 96401 CHEMO ANTI-NEOPL SQ/IM: CPT

## 2025-03-10 PROCEDURE — 83521 IG LIGHT CHAINS FREE EACH: CPT

## 2025-03-10 PROCEDURE — 80053 COMPREHEN METABOLIC PANEL: CPT

## 2025-03-10 PROCEDURE — 36415 COLL VENOUS BLD VENIPUNCTURE: CPT

## 2025-03-10 PROCEDURE — 84155 ASSAY OF PROTEIN SERUM: CPT | Mod: 59

## 2025-03-10 PROCEDURE — 96375 TX/PRO/DX INJ NEW DRUG ADDON: CPT | Mod: INF

## 2025-03-10 PROCEDURE — 96366 THER/PROPH/DIAG IV INF ADDON: CPT | Mod: INF

## 2025-03-10 PROCEDURE — 86334 IMMUNOFIX E-PHORESIS SERUM: CPT

## 2025-03-10 PROCEDURE — 82784 ASSAY IGA/IGD/IGG/IGM EACH: CPT

## 2025-03-10 PROCEDURE — 96365 THER/PROPH/DIAG IV INF INIT: CPT | Mod: INF

## 2025-03-10 PROCEDURE — 2500000004 HC RX 250 GENERAL PHARMACY W/ HCPCS (ALT 636 FOR OP/ED)

## 2025-03-10 PROCEDURE — 85025 COMPLETE CBC W/AUTO DIFF WBC: CPT

## 2025-03-10 PROCEDURE — 2500000001 HC RX 250 WO HCPCS SELF ADMINISTERED DRUGS (ALT 637 FOR MEDICARE OP)

## 2025-03-10 RX ORDER — EPINEPHRINE 0.3 MG/.3ML
0.3 INJECTION SUBCUTANEOUS EVERY 5 MIN PRN
Status: DISCONTINUED | OUTPATIENT
Start: 2025-03-10 | End: 2025-03-10 | Stop reason: HOSPADM

## 2025-03-10 RX ORDER — DIPHENHYDRAMINE HYDROCHLORIDE 50 MG/ML
50 INJECTION INTRAMUSCULAR; INTRAVENOUS AS NEEDED
Status: DISCONTINUED | OUTPATIENT
Start: 2025-03-10 | End: 2025-03-10 | Stop reason: HOSPADM

## 2025-03-10 RX ORDER — FAMOTIDINE 10 MG/ML
20 INJECTION INTRAVENOUS ONCE
Start: 2025-04-07 | End: 2025-04-07

## 2025-03-10 RX ORDER — ACETAMINOPHEN 325 MG/1
650 TABLET ORAL ONCE
Status: DISCONTINUED | OUTPATIENT
Start: 2025-03-10 | End: 2025-03-10 | Stop reason: HOSPADM

## 2025-03-10 RX ORDER — ALBUTEROL SULFATE 0.83 MG/ML
3 SOLUTION RESPIRATORY (INHALATION) AS NEEDED
OUTPATIENT
Start: 2025-04-07

## 2025-03-10 RX ORDER — ALBUTEROL SULFATE 0.83 MG/ML
3 SOLUTION RESPIRATORY (INHALATION) AS NEEDED
Status: DISCONTINUED | OUTPATIENT
Start: 2025-03-10 | End: 2025-03-10 | Stop reason: HOSPADM

## 2025-03-10 RX ORDER — MONTELUKAST SODIUM 10 MG/1
10 TABLET ORAL ONCE
Start: 2025-04-07 | End: 2025-04-07

## 2025-03-10 RX ORDER — DIPHENHYDRAMINE HYDROCHLORIDE 50 MG/ML
25 INJECTION INTRAMUSCULAR; INTRAVENOUS ONCE
Start: 2025-04-07 | End: 2025-04-07

## 2025-03-10 RX ORDER — FAMOTIDINE 10 MG/ML
20 INJECTION, SOLUTION INTRAVENOUS ONCE AS NEEDED
OUTPATIENT
Start: 2025-04-07

## 2025-03-10 RX ORDER — EPINEPHRINE 0.3 MG/.3ML
0.3 INJECTION SUBCUTANEOUS EVERY 5 MIN PRN
OUTPATIENT
Start: 2025-04-07

## 2025-03-10 RX ORDER — FAMOTIDINE 10 MG/ML
20 INJECTION INTRAVENOUS ONCE
Status: DISCONTINUED | OUTPATIENT
Start: 2025-03-10 | End: 2025-03-10 | Stop reason: HOSPADM

## 2025-03-10 RX ORDER — FAMOTIDINE 10 MG/ML
20 INJECTION, SOLUTION INTRAVENOUS ONCE AS NEEDED
Status: COMPLETED | OUTPATIENT
Start: 2025-03-10 | End: 2025-03-10

## 2025-03-10 RX ORDER — FAMOTIDINE 10 MG/ML
20 INJECTION, SOLUTION INTRAVENOUS ONCE AS NEEDED
Status: DISCONTINUED | OUTPATIENT
Start: 2025-03-10 | End: 2025-03-10 | Stop reason: HOSPADM

## 2025-03-10 RX ORDER — DIPHENHYDRAMINE HYDROCHLORIDE 50 MG/ML
50 INJECTION INTRAMUSCULAR; INTRAVENOUS AS NEEDED
OUTPATIENT
Start: 2025-04-07

## 2025-03-10 RX ORDER — PROCHLORPERAZINE EDISYLATE 5 MG/ML
10 INJECTION INTRAMUSCULAR; INTRAVENOUS EVERY 6 HOURS PRN
Status: DISCONTINUED | OUTPATIENT
Start: 2025-03-10 | End: 2025-03-10 | Stop reason: HOSPADM

## 2025-03-10 RX ORDER — DIPHENHYDRAMINE HYDROCHLORIDE 50 MG/ML
25 INJECTION INTRAMUSCULAR; INTRAVENOUS ONCE
Status: COMPLETED | OUTPATIENT
Start: 2025-03-10 | End: 2025-03-10

## 2025-03-10 RX ORDER — MONTELUKAST SODIUM 10 MG/1
10 TABLET ORAL ONCE
Status: COMPLETED | OUTPATIENT
Start: 2025-03-10 | End: 2025-03-10

## 2025-03-10 RX ORDER — PROCHLORPERAZINE MALEATE 10 MG
10 TABLET ORAL EVERY 6 HOURS PRN
Status: DISCONTINUED | OUTPATIENT
Start: 2025-03-10 | End: 2025-03-10 | Stop reason: HOSPADM

## 2025-03-10 RX ORDER — ACETAMINOPHEN 325 MG/1
650 TABLET ORAL ONCE
OUTPATIENT
Start: 2025-04-07

## 2025-03-10 RX ADMIN — MONTELUKAST 10 MG: 10 TABLET, FILM COATED ORAL at 09:40

## 2025-03-10 RX ADMIN — FAMOTIDINE 20 MG: 10 INJECTION, SOLUTION INTRAVENOUS at 09:39

## 2025-03-10 RX ADMIN — TECLISTAMAB 153 MG: 90 INJECTION SUBCUTANEOUS at 10:12

## 2025-03-10 RX ADMIN — IMMUNE GLOBULIN (HUMAN) 20 G: 10 INJECTION INTRAVENOUS; SUBCUTANEOUS at 10:11

## 2025-03-10 RX ADMIN — DIPHENHYDRAMINE HYDROCHLORIDE 25 MG: 50 INJECTION INTRAMUSCULAR; INTRAVENOUS at 09:41

## 2025-03-10 ASSESSMENT — PAIN SCALES - GENERAL: PAINLEVEL_OUTOF10: 0-NO PAIN

## 2025-03-10 NOTE — PROGRESS NOTES
Please call patient  Labs are stable cholesterol got slightly worse  Healthy lifestyle changes  Will discuss more fully at next visit  Pt arrived ambulatory to infusion for treatment of IVIG and D15 C6 of teclistamab.  Denies any new or worsening symptoms. Tolerated infusion and injection without issue. Discharged in stable condition.

## 2025-03-11 LAB
ALBUMIN SERPL BCP-MCNC: 4 G/DL (ref 3.4–5)
ALP SERPL-CCNC: 54 U/L (ref 33–136)
ALT SERPL W P-5'-P-CCNC: 19 U/L (ref 10–52)
ANION GAP SERPL CALC-SCNC: 13 MMOL/L (ref 10–20)
AST SERPL W P-5'-P-CCNC: 26 U/L (ref 9–39)
BILIRUB SERPL-MCNC: 0.6 MG/DL (ref 0–1.2)
BUN SERPL-MCNC: 24 MG/DL (ref 6–23)
CALCIUM SERPL-MCNC: 8.9 MG/DL (ref 8.6–10.6)
CHLORIDE SERPL-SCNC: 109 MMOL/L (ref 98–107)
CO2 SERPL-SCNC: 24 MMOL/L (ref 21–32)
CREAT SERPL-MCNC: 1.12 MG/DL (ref 0.5–1.3)
EGFRCR SERPLBLD CKD-EPI 2021: 69 ML/MIN/1.73M*2
GLUCOSE SERPL-MCNC: 106 MG/DL (ref 74–99)
IGA SERPL-MCNC: <7 MG/DL (ref 70–400)
IGG SERPL-MCNC: 484 MG/DL (ref 700–1600)
IGM SERPL-MCNC: 6 MG/DL (ref 40–230)
KAPPA LC SERPL-MCNC: 0.14 MG/DL (ref 0.33–1.94)
KAPPA LC/LAMBDA SER: ABNORMAL {RATIO}
LAMBDA LC SERPL-MCNC: <0.17 MG/DL (ref 0.57–2.63)
POTASSIUM SERPL-SCNC: 4.4 MMOL/L (ref 3.5–5.3)
PROT SERPL-MCNC: 5.6 G/DL (ref 6.4–8.2)
PROT SERPL-MCNC: 5.6 G/DL (ref 6.4–8.2)
SODIUM SERPL-SCNC: 142 MMOL/L (ref 136–145)

## 2025-03-13 ENCOUNTER — APPOINTMENT (OUTPATIENT)
Dept: RADIOLOGY | Facility: CLINIC | Age: 75
End: 2025-03-13
Payer: MEDICARE

## 2025-03-13 LAB
ALBUMIN: 3.7 G/DL (ref 3.4–5)
ALPHA 1 GLOBULIN: 0.2 G/DL (ref 0.2–0.6)
ALPHA 2 GLOBULIN: 0.6 G/DL (ref 0.4–1.1)
BETA GLOBULIN: 0.6 G/DL (ref 0.5–1.2)
GAMMA GLOBULIN: 0.4 G/DL (ref 0.5–1.4)
IMMUNOFIXATION COMMENT: ABNORMAL
PATH REVIEW - SERUM IMMUNOFIXATION: ABNORMAL
PATH REVIEW-SERUM PROTEIN ELECTROPHORESIS: ABNORMAL
PROTEIN ELECTROPHORESIS COMMENT: ABNORMAL

## 2025-03-17 ENCOUNTER — LAB (OUTPATIENT)
Dept: LAB | Facility: CLINIC | Age: 75
End: 2025-03-17
Payer: MEDICARE

## 2025-03-17 ENCOUNTER — INFUSION (OUTPATIENT)
Dept: HEMATOLOGY/ONCOLOGY | Facility: CLINIC | Age: 75
End: 2025-03-17
Payer: MEDICARE

## 2025-03-17 ENCOUNTER — APPOINTMENT (OUTPATIENT)
Dept: HEMATOLOGY/ONCOLOGY | Facility: CLINIC | Age: 75
End: 2025-03-17
Payer: MEDICARE

## 2025-03-17 VITALS
DIASTOLIC BLOOD PRESSURE: 85 MMHG | RESPIRATION RATE: 14 BRPM | BODY MASS INDEX: 33.03 KG/M2 | TEMPERATURE: 97.2 F | HEART RATE: 74 BPM | SYSTOLIC BLOOD PRESSURE: 131 MMHG | WEIGHT: 225.31 LBS | OXYGEN SATURATION: 99 %

## 2025-03-17 DIAGNOSIS — Z92.850 HISTORY OF CHIMERIC ANTIGEN RECEPTOR T-CELL THERAPY: ICD-10-CM

## 2025-03-17 DIAGNOSIS — C90.00 MULTIPLE MYELOMA WITHOUT REMISSION (MULTI): ICD-10-CM

## 2025-03-17 DIAGNOSIS — C90.00 HYPOGAMMAGLOBULINEMIA DUE TO MULTIPLE MYELOMA (MULTI): ICD-10-CM

## 2025-03-17 DIAGNOSIS — D80.1 HYPOGAMMAGLOBULINEMIA DUE TO MULTIPLE MYELOMA (MULTI): ICD-10-CM

## 2025-03-17 DIAGNOSIS — C90.00 IGG MULTIPLE MYELOMA (MULTI): ICD-10-CM

## 2025-03-17 LAB
BASOPHILS # BLD AUTO: 0.02 X10*3/UL (ref 0–0.1)
BASOPHILS NFR BLD AUTO: 0.6 %
EOSINOPHIL # BLD AUTO: 0.09 X10*3/UL (ref 0–0.4)
EOSINOPHIL NFR BLD AUTO: 2.5 %
ERYTHROCYTE [DISTWIDTH] IN BLOOD BY AUTOMATED COUNT: 12.7 % (ref 11.5–14.5)
HCT VFR BLD AUTO: 38.1 % (ref 41–52)
HGB BLD-MCNC: 13.3 G/DL (ref 13.5–17.5)
IMM GRANULOCYTES # BLD AUTO: 0.01 X10*3/UL (ref 0–0.5)
IMM GRANULOCYTES NFR BLD AUTO: 0.3 % (ref 0–0.9)
LYMPHOCYTES # BLD AUTO: 1.26 X10*3/UL (ref 0.8–3)
LYMPHOCYTES NFR BLD AUTO: 35 %
MCH RBC QN AUTO: 33.6 PG (ref 26–34)
MCHC RBC AUTO-ENTMCNC: 34.9 G/DL (ref 32–36)
MCV RBC AUTO: 96 FL (ref 80–100)
MONOCYTES # BLD AUTO: 0.46 X10*3/UL (ref 0.05–0.8)
MONOCYTES NFR BLD AUTO: 12.8 %
NEUTROPHILS # BLD AUTO: 1.76 X10*3/UL (ref 1.6–5.5)
NEUTROPHILS NFR BLD AUTO: 48.8 %
NRBC BLD-RTO: ABNORMAL /100{WBCS}
PLATELET # BLD AUTO: 92 X10*3/UL (ref 150–450)
RBC # BLD AUTO: 3.96 X10*6/UL (ref 4.5–5.9)
WBC # BLD AUTO: 3.6 X10*3/UL (ref 4.4–11.3)

## 2025-03-17 PROCEDURE — 2500000004 HC RX 250 GENERAL PHARMACY W/ HCPCS (ALT 636 FOR OP/ED): Mod: JZ,TB

## 2025-03-17 PROCEDURE — 36415 COLL VENOUS BLD VENIPUNCTURE: CPT

## 2025-03-17 PROCEDURE — 96401 CHEMO ANTI-NEOPL SQ/IM: CPT

## 2025-03-17 PROCEDURE — 85025 COMPLETE CBC W/AUTO DIFF WBC: CPT

## 2025-03-17 RX ORDER — DIPHENHYDRAMINE HYDROCHLORIDE 50 MG/ML
50 INJECTION, SOLUTION INTRAMUSCULAR; INTRAVENOUS AS NEEDED
Status: DISCONTINUED | OUTPATIENT
Start: 2025-03-17 | End: 2025-03-17 | Stop reason: HOSPADM

## 2025-03-17 RX ORDER — EPINEPHRINE 0.3 MG/.3ML
0.3 INJECTION SUBCUTANEOUS EVERY 5 MIN PRN
Status: DISCONTINUED | OUTPATIENT
Start: 2025-03-17 | End: 2025-03-17 | Stop reason: HOSPADM

## 2025-03-17 RX ORDER — PROCHLORPERAZINE EDISYLATE 5 MG/ML
10 INJECTION INTRAMUSCULAR; INTRAVENOUS EVERY 6 HOURS PRN
Status: DISCONTINUED | OUTPATIENT
Start: 2025-03-17 | End: 2025-03-17 | Stop reason: HOSPADM

## 2025-03-17 RX ORDER — ALBUTEROL SULFATE 0.83 MG/ML
3 SOLUTION RESPIRATORY (INHALATION) AS NEEDED
Status: DISCONTINUED | OUTPATIENT
Start: 2025-03-17 | End: 2025-03-17 | Stop reason: HOSPADM

## 2025-03-17 RX ORDER — FAMOTIDINE 10 MG/ML
20 INJECTION, SOLUTION INTRAVENOUS ONCE AS NEEDED
Status: DISCONTINUED | OUTPATIENT
Start: 2025-03-17 | End: 2025-03-17 | Stop reason: HOSPADM

## 2025-03-17 RX ORDER — PROCHLORPERAZINE MALEATE 10 MG
10 TABLET ORAL EVERY 6 HOURS PRN
Status: DISCONTINUED | OUTPATIENT
Start: 2025-03-17 | End: 2025-03-17 | Stop reason: HOSPADM

## 2025-03-17 RX ADMIN — TECLISTAMAB 153 MG: 90 INJECTION SUBCUTANEOUS at 09:39

## 2025-03-17 ASSESSMENT — PAIN SCALES - GENERAL: PAINLEVEL_OUTOF10: 0-NO PAIN

## 2025-03-17 NOTE — SIGNIFICANT EVENT

## 2025-03-24 ENCOUNTER — INFUSION (OUTPATIENT)
Dept: HEMATOLOGY/ONCOLOGY | Facility: CLINIC | Age: 75
End: 2025-03-24
Payer: MEDICARE

## 2025-03-24 ENCOUNTER — APPOINTMENT (OUTPATIENT)
Dept: HEMATOLOGY/ONCOLOGY | Facility: CLINIC | Age: 75
End: 2025-03-24
Payer: MEDICARE

## 2025-03-24 ENCOUNTER — LAB (OUTPATIENT)
Dept: LAB | Facility: CLINIC | Age: 75
End: 2025-03-24
Payer: MEDICARE

## 2025-03-24 VITALS
BODY MASS INDEX: 33.36 KG/M2 | RESPIRATION RATE: 14 BRPM | WEIGHT: 225.2 LBS | OXYGEN SATURATION: 97 % | HEART RATE: 66 BPM | SYSTOLIC BLOOD PRESSURE: 116 MMHG | HEIGHT: 69 IN | DIASTOLIC BLOOD PRESSURE: 86 MMHG | TEMPERATURE: 97.7 F

## 2025-03-24 DIAGNOSIS — C90.00 IGG MULTIPLE MYELOMA (MULTI): Primary | ICD-10-CM

## 2025-03-24 DIAGNOSIS — C90.00 IGG MULTIPLE MYELOMA (MULTI): ICD-10-CM

## 2025-03-24 LAB
BASOPHILS # BLD AUTO: 0.01 X10*3/UL (ref 0–0.1)
BASOPHILS NFR BLD AUTO: 0.3 %
EOSINOPHIL # BLD AUTO: 0.07 X10*3/UL (ref 0–0.4)
EOSINOPHIL NFR BLD AUTO: 2.1 %
ERYTHROCYTE [DISTWIDTH] IN BLOOD BY AUTOMATED COUNT: 12.7 % (ref 11.5–14.5)
HCT VFR BLD AUTO: 38.7 % (ref 41–52)
HGB BLD-MCNC: 13.2 G/DL (ref 13.5–17.5)
IMM GRANULOCYTES # BLD AUTO: 0 X10*3/UL (ref 0–0.5)
IMM GRANULOCYTES NFR BLD AUTO: 0 % (ref 0–0.9)
LYMPHOCYTES # BLD AUTO: 1.08 X10*3/UL (ref 0.8–3)
LYMPHOCYTES NFR BLD AUTO: 32.6 %
MCH RBC QN AUTO: 33.1 PG (ref 26–34)
MCHC RBC AUTO-ENTMCNC: 34.1 G/DL (ref 32–36)
MCV RBC AUTO: 97 FL (ref 80–100)
MONOCYTES # BLD AUTO: 0.44 X10*3/UL (ref 0.05–0.8)
MONOCYTES NFR BLD AUTO: 13.3 %
NEUTROPHILS # BLD AUTO: 1.71 X10*3/UL (ref 1.6–5.5)
NEUTROPHILS NFR BLD AUTO: 51.7 %
NRBC BLD-RTO: ABNORMAL /100{WBCS}
PLATELET # BLD AUTO: 105 X10*3/UL (ref 150–450)
RBC # BLD AUTO: 3.99 X10*6/UL (ref 4.5–5.9)
WBC # BLD AUTO: 3.3 X10*3/UL (ref 4.4–11.3)

## 2025-03-24 PROCEDURE — 36415 COLL VENOUS BLD VENIPUNCTURE: CPT

## 2025-03-24 PROCEDURE — 2500000004 HC RX 250 GENERAL PHARMACY W/ HCPCS (ALT 636 FOR OP/ED): Mod: JZ,TB | Performed by: STUDENT IN AN ORGANIZED HEALTH CARE EDUCATION/TRAINING PROGRAM

## 2025-03-24 PROCEDURE — 85025 COMPLETE CBC W/AUTO DIFF WBC: CPT

## 2025-03-24 PROCEDURE — 96401 CHEMO ANTI-NEOPL SQ/IM: CPT

## 2025-03-24 RX ORDER — FAMOTIDINE 10 MG/ML
20 INJECTION, SOLUTION INTRAVENOUS ONCE AS NEEDED
OUTPATIENT
Start: 2025-04-07

## 2025-03-24 RX ORDER — ALBUTEROL SULFATE 0.83 MG/ML
3 SOLUTION RESPIRATORY (INHALATION) AS NEEDED
OUTPATIENT
Start: 2025-04-14

## 2025-03-24 RX ORDER — PROCHLORPERAZINE EDISYLATE 5 MG/ML
10 INJECTION INTRAMUSCULAR; INTRAVENOUS EVERY 6 HOURS PRN
Status: DISCONTINUED | OUTPATIENT
Start: 2025-03-24 | End: 2025-03-24 | Stop reason: HOSPADM

## 2025-03-24 RX ORDER — ALBUTEROL SULFATE 0.83 MG/ML
3 SOLUTION RESPIRATORY (INHALATION) AS NEEDED
Status: CANCELLED | OUTPATIENT
Start: 2025-03-24

## 2025-03-24 RX ORDER — DIPHENHYDRAMINE HYDROCHLORIDE 50 MG/ML
50 INJECTION, SOLUTION INTRAMUSCULAR; INTRAVENOUS AS NEEDED
OUTPATIENT
Start: 2025-04-07

## 2025-03-24 RX ORDER — EPINEPHRINE 0.3 MG/.3ML
0.3 INJECTION SUBCUTANEOUS EVERY 5 MIN PRN
Status: CANCELLED | OUTPATIENT
Start: 2025-03-31

## 2025-03-24 RX ORDER — PROCHLORPERAZINE EDISYLATE 5 MG/ML
10 INJECTION INTRAMUSCULAR; INTRAVENOUS EVERY 6 HOURS PRN
Status: CANCELLED | OUTPATIENT
Start: 2025-03-31

## 2025-03-24 RX ORDER — DIPHENHYDRAMINE HYDROCHLORIDE 50 MG/ML
50 INJECTION, SOLUTION INTRAMUSCULAR; INTRAVENOUS AS NEEDED
Status: CANCELLED | OUTPATIENT
Start: 2025-03-31

## 2025-03-24 RX ORDER — PROCHLORPERAZINE MALEATE 10 MG
10 TABLET ORAL EVERY 6 HOURS PRN
Status: CANCELLED | OUTPATIENT
Start: 2025-03-24

## 2025-03-24 RX ORDER — DIPHENHYDRAMINE HYDROCHLORIDE 50 MG/ML
50 INJECTION, SOLUTION INTRAMUSCULAR; INTRAVENOUS AS NEEDED
OUTPATIENT
Start: 2025-04-14

## 2025-03-24 RX ORDER — ALBUTEROL SULFATE 0.83 MG/ML
3 SOLUTION RESPIRATORY (INHALATION) AS NEEDED
OUTPATIENT
Start: 2025-04-07

## 2025-03-24 RX ORDER — EPINEPHRINE 0.3 MG/.3ML
0.3 INJECTION SUBCUTANEOUS EVERY 5 MIN PRN
Status: DISCONTINUED | OUTPATIENT
Start: 2025-03-24 | End: 2025-03-24 | Stop reason: HOSPADM

## 2025-03-24 RX ORDER — PROCHLORPERAZINE MALEATE 10 MG
10 TABLET ORAL EVERY 6 HOURS PRN
OUTPATIENT
Start: 2025-04-14

## 2025-03-24 RX ORDER — FAMOTIDINE 10 MG/ML
20 INJECTION, SOLUTION INTRAVENOUS ONCE AS NEEDED
Status: CANCELLED | OUTPATIENT
Start: 2025-03-31

## 2025-03-24 RX ORDER — PROCHLORPERAZINE EDISYLATE 5 MG/ML
10 INJECTION INTRAMUSCULAR; INTRAVENOUS EVERY 6 HOURS PRN
OUTPATIENT
Start: 2025-04-07

## 2025-03-24 RX ORDER — PROCHLORPERAZINE EDISYLATE 5 MG/ML
10 INJECTION INTRAMUSCULAR; INTRAVENOUS EVERY 6 HOURS PRN
OUTPATIENT
Start: 2025-04-14

## 2025-03-24 RX ORDER — FAMOTIDINE 10 MG/ML
20 INJECTION, SOLUTION INTRAVENOUS ONCE AS NEEDED
OUTPATIENT
Start: 2025-04-14

## 2025-03-24 RX ORDER — DIPHENHYDRAMINE HYDROCHLORIDE 50 MG/ML
50 INJECTION, SOLUTION INTRAMUSCULAR; INTRAVENOUS AS NEEDED
Status: DISCONTINUED | OUTPATIENT
Start: 2025-03-24 | End: 2025-03-24 | Stop reason: HOSPADM

## 2025-03-24 RX ORDER — ALBUTEROL SULFATE 0.83 MG/ML
3 SOLUTION RESPIRATORY (INHALATION) AS NEEDED
Status: CANCELLED | OUTPATIENT
Start: 2025-03-31

## 2025-03-24 RX ORDER — PROCHLORPERAZINE EDISYLATE 5 MG/ML
10 INJECTION INTRAMUSCULAR; INTRAVENOUS EVERY 6 HOURS PRN
Status: CANCELLED | OUTPATIENT
Start: 2025-03-24

## 2025-03-24 RX ORDER — DIPHENHYDRAMINE HYDROCHLORIDE 50 MG/ML
50 INJECTION, SOLUTION INTRAMUSCULAR; INTRAVENOUS AS NEEDED
Status: CANCELLED | OUTPATIENT
Start: 2025-03-24

## 2025-03-24 RX ORDER — PROCHLORPERAZINE MALEATE 10 MG
10 TABLET ORAL EVERY 6 HOURS PRN
Status: DISCONTINUED | OUTPATIENT
Start: 2025-03-24 | End: 2025-03-24 | Stop reason: HOSPADM

## 2025-03-24 RX ORDER — EPINEPHRINE 0.3 MG/.3ML
0.3 INJECTION SUBCUTANEOUS EVERY 5 MIN PRN
OUTPATIENT
Start: 2025-04-07

## 2025-03-24 RX ORDER — FAMOTIDINE 10 MG/ML
20 INJECTION, SOLUTION INTRAVENOUS ONCE AS NEEDED
Status: DISCONTINUED | OUTPATIENT
Start: 2025-03-24 | End: 2025-03-24 | Stop reason: HOSPADM

## 2025-03-24 RX ORDER — EPINEPHRINE 0.3 MG/.3ML
0.3 INJECTION SUBCUTANEOUS EVERY 5 MIN PRN
Status: CANCELLED | OUTPATIENT
Start: 2025-03-24

## 2025-03-24 RX ORDER — PROCHLORPERAZINE MALEATE 10 MG
10 TABLET ORAL EVERY 6 HOURS PRN
OUTPATIENT
Start: 2025-04-07

## 2025-03-24 RX ORDER — PROCHLORPERAZINE MALEATE 10 MG
10 TABLET ORAL EVERY 6 HOURS PRN
Status: CANCELLED | OUTPATIENT
Start: 2025-03-31

## 2025-03-24 RX ORDER — EPINEPHRINE 0.3 MG/.3ML
0.3 INJECTION SUBCUTANEOUS EVERY 5 MIN PRN
OUTPATIENT
Start: 2025-04-14

## 2025-03-24 RX ORDER — ALBUTEROL SULFATE 0.83 MG/ML
3 SOLUTION RESPIRATORY (INHALATION) AS NEEDED
Status: DISCONTINUED | OUTPATIENT
Start: 2025-03-24 | End: 2025-03-24 | Stop reason: HOSPADM

## 2025-03-24 RX ORDER — FAMOTIDINE 10 MG/ML
20 INJECTION, SOLUTION INTRAVENOUS ONCE AS NEEDED
Status: CANCELLED | OUTPATIENT
Start: 2025-03-24

## 2025-03-24 RX ADMIN — TECLISTAMAB 153 MG: 90 INJECTION SUBCUTANEOUS at 09:59

## 2025-03-24 ASSESSMENT — PAIN SCALES - GENERAL: PAINLEVEL_OUTOF10: 0-NO PAIN

## 2025-03-24 NOTE — PROGRESS NOTES
[facility] Infusion Note     Maldonado Mccloud is a 74 y.o. year old male here today,03/24/25,  in the [facility] infusion center for cycle 7 day 1 of the following treatment regimen:          Medications given:  Administrations This Visit       teclistamab-cqyv (Tecvayli) subcutaneous solution 153 mg       Admin Date  03/24/2025 Action  Given Dose  153 mg Route  subcutaneous Documented By  Teresa Ugalde, RN                    Pt tolerated injection well and was discharged to home in stable condition. Pt ambulated  off the unit independently with a slow and steady gait. Pt had no further questions or concerns at this time.

## 2025-03-29 DIAGNOSIS — G47.09 OTHER INSOMNIA: ICD-10-CM

## 2025-03-31 ENCOUNTER — LAB (OUTPATIENT)
Dept: LAB | Facility: CLINIC | Age: 75
End: 2025-03-31
Payer: MEDICARE

## 2025-03-31 ENCOUNTER — INFUSION (OUTPATIENT)
Dept: HEMATOLOGY/ONCOLOGY | Facility: CLINIC | Age: 75
End: 2025-03-31
Payer: MEDICARE

## 2025-03-31 ENCOUNTER — OFFICE VISIT (OUTPATIENT)
Dept: HEMATOLOGY/ONCOLOGY | Facility: CLINIC | Age: 75
End: 2025-03-31
Payer: MEDICARE

## 2025-03-31 VITALS
BODY MASS INDEX: 33.06 KG/M2 | OXYGEN SATURATION: 98 % | SYSTOLIC BLOOD PRESSURE: 128 MMHG | WEIGHT: 225.53 LBS | RESPIRATION RATE: 16 BRPM | HEART RATE: 74 BPM | DIASTOLIC BLOOD PRESSURE: 85 MMHG | TEMPERATURE: 97.9 F

## 2025-03-31 DIAGNOSIS — G62.89 OTHER POLYNEUROPATHY: ICD-10-CM

## 2025-03-31 DIAGNOSIS — C90.00 HYPOGAMMAGLOBULINEMIA DUE TO MULTIPLE MYELOMA (MULTI): ICD-10-CM

## 2025-03-31 DIAGNOSIS — Z51.12 ENCOUNTER FOR ANTINEOPLASTIC IMMUNOTHERAPY: ICD-10-CM

## 2025-03-31 DIAGNOSIS — C90.00 IGG MULTIPLE MYELOMA (MULTI): ICD-10-CM

## 2025-03-31 DIAGNOSIS — D80.1 HYPOGAMMAGLOBULINEMIA DUE TO MULTIPLE MYELOMA (MULTI): ICD-10-CM

## 2025-03-31 DIAGNOSIS — T45.1X5A CHEMOTHERAPY-INDUCED NEUTROPENIA: ICD-10-CM

## 2025-03-31 DIAGNOSIS — Z92.850 HISTORY OF CHIMERIC ANTIGEN RECEPTOR T-CELL THERAPY: ICD-10-CM

## 2025-03-31 DIAGNOSIS — C90.00 IGG MULTIPLE MYELOMA (MULTI): Primary | ICD-10-CM

## 2025-03-31 DIAGNOSIS — D70.1 CHEMOTHERAPY-INDUCED NEUTROPENIA: ICD-10-CM

## 2025-03-31 DIAGNOSIS — D84.9 IMMUNOCOMPROMISED: ICD-10-CM

## 2025-03-31 DIAGNOSIS — Z94.84 STEM CELLS TRANSPLANT STATUS (MULTI): ICD-10-CM

## 2025-03-31 LAB
BASOPHILS # BLD AUTO: 0.01 X10*3/UL (ref 0–0.1)
BASOPHILS NFR BLD AUTO: 0.3 %
EOSINOPHIL # BLD AUTO: 0.08 X10*3/UL (ref 0–0.4)
EOSINOPHIL NFR BLD AUTO: 2.3 %
ERYTHROCYTE [DISTWIDTH] IN BLOOD BY AUTOMATED COUNT: 12.8 % (ref 11.5–14.5)
HCT VFR BLD AUTO: 38.8 % (ref 41–52)
HGB BLD-MCNC: 13 G/DL (ref 13.5–17.5)
IMM GRANULOCYTES # BLD AUTO: 0 X10*3/UL (ref 0–0.5)
IMM GRANULOCYTES NFR BLD AUTO: 0 % (ref 0–0.9)
LYMPHOCYTES # BLD AUTO: 0.9 X10*3/UL (ref 0.8–3)
LYMPHOCYTES NFR BLD AUTO: 26.2 %
MCH RBC QN AUTO: 33 PG (ref 26–34)
MCHC RBC AUTO-ENTMCNC: 33.5 G/DL (ref 32–36)
MCV RBC AUTO: 99 FL (ref 80–100)
MONOCYTES # BLD AUTO: 0.44 X10*3/UL (ref 0.05–0.8)
MONOCYTES NFR BLD AUTO: 12.8 %
NEUTROPHILS # BLD AUTO: 2 X10*3/UL (ref 1.6–5.5)
NEUTROPHILS NFR BLD AUTO: 58.4 %
NRBC BLD-RTO: ABNORMAL /100{WBCS}
PLATELET # BLD AUTO: 115 X10*3/UL (ref 150–450)
RBC # BLD AUTO: 3.94 X10*6/UL (ref 4.5–5.9)
WBC # BLD AUTO: 3.4 X10*3/UL (ref 4.4–11.3)

## 2025-03-31 PROCEDURE — 96401 CHEMO ANTI-NEOPL SQ/IM: CPT

## 2025-03-31 PROCEDURE — 1123F ACP DISCUSS/DSCN MKR DOCD: CPT

## 2025-03-31 PROCEDURE — 99215 OFFICE O/P EST HI 40 MIN: CPT

## 2025-03-31 PROCEDURE — 85025 COMPLETE CBC W/AUTO DIFF WBC: CPT

## 2025-03-31 PROCEDURE — 1126F AMNT PAIN NOTED NONE PRSNT: CPT

## 2025-03-31 PROCEDURE — 1159F MED LIST DOCD IN RCRD: CPT

## 2025-03-31 PROCEDURE — 2500000004 HC RX 250 GENERAL PHARMACY W/ HCPCS (ALT 636 FOR OP/ED): Mod: JZ,TB | Performed by: STUDENT IN AN ORGANIZED HEALTH CARE EDUCATION/TRAINING PROGRAM

## 2025-03-31 PROCEDURE — 3079F DIAST BP 80-89 MM HG: CPT

## 2025-03-31 PROCEDURE — 3074F SYST BP LT 130 MM HG: CPT

## 2025-03-31 PROCEDURE — 36415 COLL VENOUS BLD VENIPUNCTURE: CPT

## 2025-03-31 PROCEDURE — G2211 COMPLEX E/M VISIT ADD ON: HCPCS

## 2025-03-31 PROCEDURE — 99215 OFFICE O/P EST HI 40 MIN: CPT | Mod: 25

## 2025-03-31 RX ORDER — PROCHLORPERAZINE EDISYLATE 5 MG/ML
10 INJECTION INTRAMUSCULAR; INTRAVENOUS EVERY 6 HOURS PRN
OUTPATIENT
Start: 2025-05-05

## 2025-03-31 RX ORDER — FAMOTIDINE 10 MG/ML
20 INJECTION, SOLUTION INTRAVENOUS ONCE AS NEEDED
OUTPATIENT
Start: 2025-05-05

## 2025-03-31 RX ORDER — PROCHLORPERAZINE MALEATE 10 MG
10 TABLET ORAL EVERY 6 HOURS PRN
OUTPATIENT
Start: 2025-04-21

## 2025-03-31 RX ORDER — DIPHENHYDRAMINE HYDROCHLORIDE 50 MG/ML
50 INJECTION, SOLUTION INTRAMUSCULAR; INTRAVENOUS AS NEEDED
Status: DISCONTINUED | OUTPATIENT
Start: 2025-03-31 | End: 2025-03-31 | Stop reason: HOSPADM

## 2025-03-31 RX ORDER — EPINEPHRINE 0.3 MG/.3ML
0.3 INJECTION SUBCUTANEOUS EVERY 5 MIN PRN
OUTPATIENT
Start: 2025-05-05

## 2025-03-31 RX ORDER — FAMOTIDINE 10 MG/ML
20 INJECTION, SOLUTION INTRAVENOUS ONCE AS NEEDED
OUTPATIENT
Start: 2025-05-19

## 2025-03-31 RX ORDER — EPINEPHRINE 0.3 MG/.3ML
0.3 INJECTION SUBCUTANEOUS EVERY 5 MIN PRN
Status: DISCONTINUED | OUTPATIENT
Start: 2025-03-31 | End: 2025-03-31 | Stop reason: HOSPADM

## 2025-03-31 RX ORDER — PROCHLORPERAZINE EDISYLATE 5 MG/ML
10 INJECTION INTRAMUSCULAR; INTRAVENOUS EVERY 6 HOURS PRN
OUTPATIENT
Start: 2025-05-19

## 2025-03-31 RX ORDER — EPINEPHRINE 0.3 MG/.3ML
0.3 INJECTION SUBCUTANEOUS EVERY 5 MIN PRN
OUTPATIENT
Start: 2025-05-12

## 2025-03-31 RX ORDER — PROCHLORPERAZINE EDISYLATE 5 MG/ML
10 INJECTION INTRAMUSCULAR; INTRAVENOUS EVERY 6 HOURS PRN
Status: DISCONTINUED | OUTPATIENT
Start: 2025-03-31 | End: 2025-03-31 | Stop reason: HOSPADM

## 2025-03-31 RX ORDER — ALBUTEROL SULFATE 0.83 MG/ML
3 SOLUTION RESPIRATORY (INHALATION) AS NEEDED
OUTPATIENT
Start: 2025-05-12

## 2025-03-31 RX ORDER — PROCHLORPERAZINE MALEATE 10 MG
10 TABLET ORAL EVERY 6 HOURS PRN
Status: DISCONTINUED | OUTPATIENT
Start: 2025-03-31 | End: 2025-03-31 | Stop reason: HOSPADM

## 2025-03-31 RX ORDER — DIPHENHYDRAMINE HYDROCHLORIDE 50 MG/ML
50 INJECTION, SOLUTION INTRAMUSCULAR; INTRAVENOUS AS NEEDED
OUTPATIENT
Start: 2025-05-19

## 2025-03-31 RX ORDER — PROCHLORPERAZINE EDISYLATE 5 MG/ML
10 INJECTION INTRAMUSCULAR; INTRAVENOUS EVERY 6 HOURS PRN
OUTPATIENT
Start: 2025-05-12

## 2025-03-31 RX ORDER — ALBUTEROL SULFATE 0.83 MG/ML
3 SOLUTION RESPIRATORY (INHALATION) AS NEEDED
OUTPATIENT
Start: 2025-05-19

## 2025-03-31 RX ORDER — ALBUTEROL SULFATE 0.83 MG/ML
3 SOLUTION RESPIRATORY (INHALATION) AS NEEDED
OUTPATIENT
Start: 2025-04-21

## 2025-03-31 RX ORDER — ALBUTEROL SULFATE 0.83 MG/ML
3 SOLUTION RESPIRATORY (INHALATION) AS NEEDED
Status: DISCONTINUED | OUTPATIENT
Start: 2025-03-31 | End: 2025-03-31 | Stop reason: HOSPADM

## 2025-03-31 RX ORDER — FAMOTIDINE 10 MG/ML
20 INJECTION, SOLUTION INTRAVENOUS ONCE AS NEEDED
OUTPATIENT
Start: 2025-04-21

## 2025-03-31 RX ORDER — DIPHENHYDRAMINE HYDROCHLORIDE 50 MG/ML
50 INJECTION, SOLUTION INTRAMUSCULAR; INTRAVENOUS AS NEEDED
OUTPATIENT
Start: 2025-05-12

## 2025-03-31 RX ORDER — TRAZODONE HYDROCHLORIDE 100 MG/1
300 TABLET ORAL NIGHTLY
Qty: 270 TABLET | Refills: 1 | Status: SHIPPED | OUTPATIENT
Start: 2025-03-31

## 2025-03-31 RX ORDER — EPINEPHRINE 0.3 MG/.3ML
0.3 INJECTION SUBCUTANEOUS EVERY 5 MIN PRN
OUTPATIENT
Start: 2025-04-21

## 2025-03-31 RX ORDER — EPINEPHRINE 0.3 MG/.3ML
0.3 INJECTION SUBCUTANEOUS EVERY 5 MIN PRN
OUTPATIENT
Start: 2025-05-19

## 2025-03-31 RX ORDER — PROCHLORPERAZINE MALEATE 10 MG
10 TABLET ORAL EVERY 6 HOURS PRN
OUTPATIENT
Start: 2025-05-19

## 2025-03-31 RX ORDER — PROCHLORPERAZINE EDISYLATE 5 MG/ML
10 INJECTION INTRAMUSCULAR; INTRAVENOUS EVERY 6 HOURS PRN
OUTPATIENT
Start: 2025-04-21

## 2025-03-31 RX ORDER — DIPHENHYDRAMINE HYDROCHLORIDE 50 MG/ML
50 INJECTION, SOLUTION INTRAMUSCULAR; INTRAVENOUS AS NEEDED
OUTPATIENT
Start: 2025-04-21

## 2025-03-31 RX ORDER — PROCHLORPERAZINE MALEATE 10 MG
10 TABLET ORAL EVERY 6 HOURS PRN
OUTPATIENT
Start: 2025-05-12

## 2025-03-31 RX ORDER — FAMOTIDINE 10 MG/ML
20 INJECTION, SOLUTION INTRAVENOUS ONCE AS NEEDED
OUTPATIENT
Start: 2025-05-12

## 2025-03-31 RX ORDER — FAMOTIDINE 10 MG/ML
20 INJECTION, SOLUTION INTRAVENOUS ONCE AS NEEDED
Status: DISCONTINUED | OUTPATIENT
Start: 2025-03-31 | End: 2025-03-31 | Stop reason: HOSPADM

## 2025-03-31 RX ORDER — DIPHENHYDRAMINE HYDROCHLORIDE 50 MG/ML
50 INJECTION, SOLUTION INTRAMUSCULAR; INTRAVENOUS AS NEEDED
OUTPATIENT
Start: 2025-05-05

## 2025-03-31 RX ORDER — ALBUTEROL SULFATE 0.83 MG/ML
3 SOLUTION RESPIRATORY (INHALATION) AS NEEDED
OUTPATIENT
Start: 2025-05-05

## 2025-03-31 RX ORDER — PROCHLORPERAZINE MALEATE 10 MG
10 TABLET ORAL EVERY 6 HOURS PRN
OUTPATIENT
Start: 2025-05-05

## 2025-03-31 RX ADMIN — TECLISTAMAB 153 MG: 90 INJECTION SUBCUTANEOUS at 09:25

## 2025-03-31 ASSESSMENT — ENCOUNTER SYMPTOMS
ALLERGIC/IMMUNOLOGIC NEGATIVE: 1
ENDOCRINE NEGATIVE: 1
CARDIOVASCULAR NEGATIVE: 1
GASTROINTESTINAL NEGATIVE: 1
NUMBNESS: 1
HEMATOLOGIC/LYMPHATIC NEGATIVE: 1
FATIGUE: 1
MUSCULOSKELETAL NEGATIVE: 1
PSYCHIATRIC NEGATIVE: 1
RESPIRATORY NEGATIVE: 1
EYES NEGATIVE: 1

## 2025-03-31 ASSESSMENT — PAIN SCALES - GENERAL: PAINLEVEL_OUTOF10: 0-NO PAIN

## 2025-03-31 NOTE — PROGRESS NOTES
Patient ID: Maldonado Mccloud is a 74 y.o. male.  Referring Physician: West Huff MD  [id number]  Department of [place] [address]  Primary Care Provider: DO Kyle Jay hx:  Cuong presents to clinic 3/31/25 with his wife Farida for his weekly Teclistamab.    Overall he is feeling well and without any acute complaints today.    Neuropathy remains unchanged.     Still awaiting results from neuro evaluation.       Review of Systems   Constitutional:  Positive for fatigue.   HENT: Negative.     Eyes: Negative.    Respiratory: Negative.     Cardiovascular: Negative.    Gastrointestinal: Negative.    Endocrine: Negative.    Genitourinary: Negative.    Musculoskeletal: Negative.    Skin: Negative.    Allergic/Immunologic: Negative.    Neurological:  Positive for numbness.   Hematological: Negative.    Psychiatric/Behavioral: Negative.        PMHx:  1) Multiple myeloma (see below)  2) Squamous cell ca's skin  3) DVT, LLE ; now off AC  4) Neuropathy  5) S/p right radius fracture 2023  6) HTN  7) Afib (transient in  w/stem cell collection)    FamHx  Dad  of pan ca age 92    SocHx:   w/5 kids; smokes cigars; no cigs in 50 years; no EtOH or illicit drugs. Was in [employer] in [place]    Meds:  Were reviewed w/the pt  All:  [employer]  PCN  Zosyn    Physical Exam  Physical Exam  Constitutional:       Appearance: Normal appearance. He is normal weight.   HENT:      Head: Normocephalic and atraumatic.      Nose: Nose normal.      Mouth/Throat:      Mouth: Mucous membranes are moist.      Pharynx: Oropharynx is clear.   Eyes:      Conjunctiva/sclera: Conjunctivae normal.      Pupils: Pupils are equal, round, and reactive to light.   Cardiovascular:      Rate and Rhythm: Normal rate and regular rhythm.      Pulses: Normal pulses.      Heart sounds: Normal heart sounds.   Pulmonary:      Effort: Pulmonary effort is normal.      Breath sounds: Normal breath sounds.    Abdominal:      General: Abdomen is flat. Bowel sounds are normal.      Palpations: Abdomen is soft.   Musculoskeletal:         General: Normal range of motion.      Cervical back: Normal range of motion and neck supple.   Skin:     General: Skin is warm and dry.   Neurological:      General: No focal deficit present.      Mental Status: He is alert and oriented to person, place, and time. Mental status is at baseline.   Psychiatric:         Mood and Affect: Mood normal.         Behavior: Behavior normal.         Thought Content: Thought content normal.         Judgment: Judgment normal.       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; Bone marrow biopsy suggested multiple myeloma IgG kappa, ISS Stage II, bone survey revealed lytic lesions; Urine light chains present kappa restricted with 2gm proteinuria; s/p VRD x 3 cycles with CR; then s/p auto SCTx 12/23/15 complicated by orthostatic hypotension, electrolyte replacement, drug induced rash, culture negative fever, then s/p approximately 5 weeks of maintenance Revlimid 10 mg daily which was held for worsening neuropathy in August 2016; then in 3/ 2018: IgG M Braxton alexei to 0.2gm/L and light chain alexei was; restarted on Revlimid maintenance at 5mg every other day, then revlimid stopped in 3/2021 due to stable disease; then in 7/2021 his M spike was on the rise, and was enrolled onto Vactosertib/Pom trial on 8/5/2021; then progressed in 6/2022, switched to Isatuximab/Carfilzomib on 6/30/22, then in 9/2022 progressed and was switched to Cytoxan/Carfilzomib; then had therapy placed on hold in 11/2022 due  "to need for hip replacement surgery; myeloma markers were on the rise in 3/2023 with a new jaw lesion, s/p XRT 4/2023 and resume 2x/week carfilzomib; then s/p CAR T w/ABECMA (T=0, 10/25/23), hospital course complicated by grade 1 ICANS managed w/ Dex 10mg x3 days and 1 dose tociluzimab; then w/progression and started on teclistamab, full dosing given on 10/13/24] here for follow-up.     As for relapsed myeloma:  He has been continued on weekly teclistamab and tolerating this well, so far only has had an early episode of grade I CRS (cx neg low grade fever x 1, not recurred). He has not had any other toxicities thus far. In addition, there has been a continued biochemical response: serum free kappa decreasing from 15.6mg/dL (= 156mg/L) to <0.08, as of 2/10/25. However, the recent repeat PET/CT from 3/2025 revealed the following:  \"Interval development of new FDG uptake within left acetabulum with increase in FDG uptake within left iliac lesion, consistent with active myeloma\" but also \"interval decrease to stable FDG uptake within multiple lytic lesions in left femoral diaphysis, right iliac crest, L4 vertebral body and right proximal clavicle\" and \"non FDG avid lesions within left posterior 5th rib, sternum and right radius, consistent with treated disease\". Of note, the new area of FDG uptake in the left acetabulum had a max SUV of only 0.9. The significance of this finding is not clear; although it may represent a very early recurrence, it could also represent tumor flare (which has been reported w/BiTEs) or be a non-specific finding. Given the lack of other findings to suggest progression (and the excellent biochemical response), I recommended to not stop the teclistamab at this time.    Plan to follow up with Dr. Huff in 2 weeks, will discuss if he can skip 4/28 dose. Follow up with me on 5/12.    As for neutropenia:  Probably from teclistamab; cont prn weekly neupogen to keep ANC >500    Infection ppx:  Cont " acyclovir and bactrim ppx; monitor IgG levels and cont monthly ppx IVIG (NOTE: he should receive Gammunex -C, d/t prior severe reaction to gammard).    As for sens NP:  No effect w/cymbalta, so last visit we stopped this medication. I again offered adding gabapentin but the pt and his wife declined this, as he is really is not having any associated pain.     RTC:  Sebas/IVIG 4/7 4/17 w/ Dr. Huff

## 2025-03-31 NOTE — PROGRESS NOTES
[facility] Infusion Note      Maldonado Mccloud is a 74 y.o. year old male here today,03/31/25,  in the [facility] infusion center for  following treatment regimen:  Medications given today : Teclistamab weekly- to see provider prior-    [facility]- We discussed labs has had intermittent shoulder pain denies today-  Verbalized understanding of bloodcount today    No/ s/s of infection today          Pt tolerated and was discharged to home in stable condition. Pt ambulated  off the unit with a slow and steady gait. Pt had no further questions or concerns at this time.

## 2025-04-01 ENCOUNTER — APPOINTMENT (OUTPATIENT)
Dept: PRIMARY CARE | Facility: CLINIC | Age: 75
End: 2025-04-01
Payer: MEDICARE

## 2025-04-01 ENCOUNTER — OFFICE VISIT (OUTPATIENT)
Dept: PRIMARY CARE | Facility: CLINIC | Age: 75
End: 2025-04-01
Payer: MEDICARE

## 2025-04-01 VITALS
OXYGEN SATURATION: 98 % | BODY MASS INDEX: 30.56 KG/M2 | HEART RATE: 81 BPM | DIASTOLIC BLOOD PRESSURE: 88 MMHG | WEIGHT: 225.6 LBS | RESPIRATION RATE: 14 BRPM | SYSTOLIC BLOOD PRESSURE: 135 MMHG | HEIGHT: 72 IN | TEMPERATURE: 97.6 F

## 2025-04-01 DIAGNOSIS — R73.9 HYPERGLYCEMIA: Primary | ICD-10-CM

## 2025-04-01 DIAGNOSIS — C90.00 MULTIPLE MYELOMA WITHOUT REMISSION (MULTI): ICD-10-CM

## 2025-04-01 DIAGNOSIS — R29.898 WEAKNESS OF BOTH HANDS: ICD-10-CM

## 2025-04-01 PROCEDURE — 1160F RVW MEDS BY RX/DR IN RCRD: CPT | Performed by: STUDENT IN AN ORGANIZED HEALTH CARE EDUCATION/TRAINING PROGRAM

## 2025-04-01 PROCEDURE — 3079F DIAST BP 80-89 MM HG: CPT | Performed by: STUDENT IN AN ORGANIZED HEALTH CARE EDUCATION/TRAINING PROGRAM

## 2025-04-01 PROCEDURE — 3075F SYST BP GE 130 - 139MM HG: CPT | Performed by: STUDENT IN AN ORGANIZED HEALTH CARE EDUCATION/TRAINING PROGRAM

## 2025-04-01 PROCEDURE — 1159F MED LIST DOCD IN RCRD: CPT | Performed by: STUDENT IN AN ORGANIZED HEALTH CARE EDUCATION/TRAINING PROGRAM

## 2025-04-01 PROCEDURE — 99214 OFFICE O/P EST MOD 30 MIN: CPT | Performed by: STUDENT IN AN ORGANIZED HEALTH CARE EDUCATION/TRAINING PROGRAM

## 2025-04-01 PROCEDURE — 1123F ACP DISCUSS/DSCN MKR DOCD: CPT | Performed by: STUDENT IN AN ORGANIZED HEALTH CARE EDUCATION/TRAINING PROGRAM

## 2025-04-01 ASSESSMENT — PATIENT HEALTH QUESTIONNAIRE - PHQ9
1. LITTLE INTEREST OR PLEASURE IN DOING THINGS: NOT AT ALL
SUM OF ALL RESPONSES TO PHQ9 QUESTIONS 1 AND 2: 0
2. FEELING DOWN, DEPRESSED OR HOPELESS: NOT AT ALL

## 2025-04-01 NOTE — PROGRESS NOTES
"FAMILY MEDICINE  OFFICE VISIT   Maldonado Mccloud  [id number]  1950    PCP: Allison Wang DO     Chief Complaint:   Chief Complaint   Patient presents with    Follow-up     SUBJECTIVE     Maldonado Mccloud is a 74 y.o. English-speaking male with pertinent PMHx of multiple myeloma, who presents to the clinic with complaints of follow-up.    Patient is new to me as PCP, as Dr. Almodovar left the practice in January.     Multiple Myeloma   - Back in Remission   - Got T cell transplant, got remission for 11 months.   - Gets IVIG   - On the Bactrim and Acyclovir     Gets bronchitis every year usually 1-2x per year.   Usually okay with Azitrho. dDicussed RMSA coverage with doxyc    Numbness   - Weekly drops something.   - Not getting OT  - is s/p MM treatment     Retired 5 years ago.  for [employer].         HPI      The following portions of the patient's chart were reviewed in this encounter and updated as appropriate:  Tobacco  Allergies  Meds  Problems  Med Hx  Surg Hx  Fam Hx         Home Medication List:  Current Outpatient Medications   Medication Instructions    acetaminophen (TYLENOL) 650 mg, oral, Every 6 hours PRN, Can take over the counter.    acyclovir (ZOVIRAX) 400 mg, oral, 2 times daily    calcium carbonate-vitamin D3 500 mg-5 mcg (200 unit) tablet 1 tablet, Daily    CALCIUM-MAGNESIUM-ZINC ORAL 1 tablet, 2 times daily    cyanocobalamin (VITAMIN B-12) 1,000 mcg, Daily    sulfamethoxazole-trimethoprim (Bactrim DS) 800-160 mg tablet 1 tablet, oral, 3 times weekly, Take once daily on Monday, Wednesday, and Friday.    traZODone (DESYREL) 300 mg, oral, Nightly         OBJECTIVE   /88 (BP Location: Right arm, Patient Position: Sitting, BP Cuff Size: Large adult)   Pulse 81   Temp 36.4 °C (97.6 °F) (Temporal)   Resp 14   Ht 1.822 m (5' 11.75\")   Wt 102 kg (225 lb 9.6 oz)   SpO2 98%   BMI 30.81 kg/m²   Vital signs and pulse oximetry reviewed.     Physical Exam  Vitals and " nursing note reviewed.   Constitutional:       Appearance: Normal appearance.   HENT:      Head: Normocephalic and atraumatic.      Right Ear: Tympanic membrane, ear canal and external ear normal.      Left Ear: Tympanic membrane, ear canal and external ear normal.      Nose: Nose normal. No congestion or rhinorrhea.   Eyes:      General: No scleral icterus.     Conjunctiva/sclera: Conjunctivae normal.   Cardiovascular:      Rate and Rhythm: Normal rate and regular rhythm.      Heart sounds: No murmur heard.  Pulmonary:      Effort: Pulmonary effort is normal. No respiratory distress.      Breath sounds: Normal breath sounds. No wheezing, rhonchi or rales.   Musculoskeletal:      Cervical back: No rigidity or tenderness.      Right lower leg: No edema.      Left lower leg: No edema.      Comments: Bilateral hand weakness, sensation overall in tact.    Lymphadenopathy:      Cervical: No cervical adenopathy.   Skin:     General: Skin is warm.      Coloration: Skin is not jaundiced.   Neurological:      Mental Status: He is alert. Mental status is at baseline.   Psychiatric:         Mood and Affect: Mood normal.         Behavior: Behavior normal.         ASSESSMENT & PLAN     Problem List Items Addressed This Visit       Multiple myeloma without remission (Multi)    Relevant Orders    Follow Up In [facility] - PCP - Health Maintenance     Other Visit Diagnoses         Hyperglycemia    -  Primary    Relevant Orders    Hemoglobin A1c (Completed)      Weakness of both hands                PLAN   - A1c   - Declined Hand therapy   - 6mo for WELL     Level 4    Follow-Up Recommendations: 6mo WELL/Medicare    Please excuse any typos or grammatical errors, part of this note was constructed with Dragon dictation software.    Allison Wang DO, MSEd  [facility]   Office: [phone]  4/1/2025 1:12 PM

## 2025-04-01 NOTE — TELEPHONE ENCOUNTER
BRIEF NOTE    Subjective/Objective:  Pharmacy refill request for Trazodone. Appt tomorrow.     Assessment/Plan:  1. Other insomnia  - traZODone (Desyrel) 100 mg tablet; Take 3 tablets (300 mg) by mouth once daily at bedtime.  Dispense: 270 tablet; Refill: 1      No problem-specific Assessment & Plan notes found for this encounter.        Allison Wang DO, KRISTAd  [facility]  Office: [phone]  3/31/2025 8:32 PM

## 2025-04-07 ENCOUNTER — INFUSION (OUTPATIENT)
Dept: HEMATOLOGY/ONCOLOGY | Facility: CLINIC | Age: 75
End: 2025-04-07
Payer: MEDICARE

## 2025-04-07 VITALS
OXYGEN SATURATION: 97 % | BODY MASS INDEX: 30.98 KG/M2 | DIASTOLIC BLOOD PRESSURE: 83 MMHG | RESPIRATION RATE: 16 BRPM | TEMPERATURE: 97.7 F | WEIGHT: 226.85 LBS | SYSTOLIC BLOOD PRESSURE: 132 MMHG | HEART RATE: 73 BPM

## 2025-04-07 DIAGNOSIS — R73.9 HYPERGLYCEMIA: ICD-10-CM

## 2025-04-07 DIAGNOSIS — C90.00 IGG MULTIPLE MYELOMA (MULTI): ICD-10-CM

## 2025-04-07 DIAGNOSIS — C90.00 MULTIPLE MYELOMA WITHOUT REMISSION (MULTI): ICD-10-CM

## 2025-04-07 DIAGNOSIS — Z92.850 HISTORY OF CHIMERIC ANTIGEN RECEPTOR T-CELL THERAPY: ICD-10-CM

## 2025-04-07 DIAGNOSIS — C90.00 HYPOGAMMAGLOBULINEMIA DUE TO MULTIPLE MYELOMA (MULTI): ICD-10-CM

## 2025-04-07 DIAGNOSIS — D80.1 HYPOGAMMAGLOBULINEMIA DUE TO MULTIPLE MYELOMA (MULTI): ICD-10-CM

## 2025-04-07 LAB
ALBUMIN SERPL BCP-MCNC: 4.3 G/DL (ref 3.4–5)
ALP SERPL-CCNC: 60 U/L (ref 33–136)
ALT SERPL W P-5'-P-CCNC: 22 U/L (ref 10–52)
ANION GAP SERPL CALC-SCNC: 14 MMOL/L (ref 10–20)
AST SERPL W P-5'-P-CCNC: 23 U/L (ref 9–39)
BASOPHILS # BLD AUTO: 0.01 X10*3/UL (ref 0–0.1)
BASOPHILS NFR BLD AUTO: 0.2 %
BILIRUB SERPL-MCNC: 0.6 MG/DL (ref 0–1.2)
BUN SERPL-MCNC: 27 MG/DL (ref 6–23)
CALCIUM SERPL-MCNC: 9.4 MG/DL (ref 8.6–10.6)
CHLORIDE SERPL-SCNC: 107 MMOL/L (ref 98–107)
CO2 SERPL-SCNC: 25 MMOL/L (ref 21–32)
CREAT SERPL-MCNC: 1.14 MG/DL (ref 0.5–1.3)
EGFRCR SERPLBLD CKD-EPI 2021: 67 ML/MIN/1.73M*2
EOSINOPHIL # BLD AUTO: 0.04 X10*3/UL (ref 0–0.4)
EOSINOPHIL NFR BLD AUTO: 1 %
ERYTHROCYTE [DISTWIDTH] IN BLOOD BY AUTOMATED COUNT: 12.7 % (ref 11.5–14.5)
EST. AVERAGE GLUCOSE BLD GHB EST-MCNC: 100 MG/DL
GLUCOSE SERPL-MCNC: 110 MG/DL (ref 74–99)
HBA1C MFR BLD: 5.1 %
HCT VFR BLD AUTO: 34.4 % (ref 41–52)
HGB BLD-MCNC: 11.9 G/DL (ref 13.5–17.5)
IGA SERPL-MCNC: <7 MG/DL (ref 70–400)
IGG SERPL-MCNC: 474 MG/DL (ref 700–1600)
IGM SERPL-MCNC: 5 MG/DL (ref 40–230)
IMM GRANULOCYTES # BLD AUTO: 0 X10*3/UL (ref 0–0.5)
IMM GRANULOCYTES NFR BLD AUTO: 0 % (ref 0–0.9)
LYMPHOCYTES # BLD AUTO: 0.67 X10*3/UL (ref 0.8–3)
LYMPHOCYTES NFR BLD AUTO: 16.5 %
MCH RBC QN AUTO: 33.8 PG (ref 26–34)
MCHC RBC AUTO-ENTMCNC: 34.6 G/DL (ref 32–36)
MCV RBC AUTO: 98 FL (ref 80–100)
MONOCYTES # BLD AUTO: 0.49 X10*3/UL (ref 0.05–0.8)
MONOCYTES NFR BLD AUTO: 12 %
NEUTROPHILS # BLD AUTO: 2.86 X10*3/UL (ref 1.6–5.5)
NEUTROPHILS NFR BLD AUTO: 70.3 %
NRBC BLD-RTO: ABNORMAL /100{WBCS}
PLATELET # BLD AUTO: 90 X10*3/UL (ref 150–450)
POTASSIUM SERPL-SCNC: 4.6 MMOL/L (ref 3.5–5.3)
PROT SERPL-MCNC: 5.9 G/DL (ref 6.4–8.2)
RBC # BLD AUTO: 3.52 X10*6/UL (ref 4.5–5.9)
SODIUM SERPL-SCNC: 141 MMOL/L (ref 136–145)
WBC # BLD AUTO: 4.1 X10*3/UL (ref 4.4–11.3)

## 2025-04-07 PROCEDURE — 85025 COMPLETE CBC W/AUTO DIFF WBC: CPT

## 2025-04-07 PROCEDURE — 84165 PROTEIN E-PHORESIS SERUM: CPT

## 2025-04-07 PROCEDURE — 82784 ASSAY IGA/IGD/IGG/IGM EACH: CPT

## 2025-04-07 PROCEDURE — 96366 THER/PROPH/DIAG IV INF ADDON: CPT | Mod: INF

## 2025-04-07 PROCEDURE — 96375 TX/PRO/DX INJ NEW DRUG ADDON: CPT | Mod: INF

## 2025-04-07 PROCEDURE — 83036 HEMOGLOBIN GLYCOSYLATED A1C: CPT

## 2025-04-07 PROCEDURE — 96367 TX/PROPH/DG ADDL SEQ IV INF: CPT

## 2025-04-07 PROCEDURE — 2500000001 HC RX 250 WO HCPCS SELF ADMINISTERED DRUGS (ALT 637 FOR MEDICARE OP)

## 2025-04-07 PROCEDURE — 84155 ASSAY OF PROTEIN SERUM: CPT

## 2025-04-07 PROCEDURE — 83521 IG LIGHT CHAINS FREE EACH: CPT

## 2025-04-07 PROCEDURE — 2500000004 HC RX 250 GENERAL PHARMACY W/ HCPCS (ALT 636 FOR OP/ED): Mod: JZ,TB | Performed by: STUDENT IN AN ORGANIZED HEALTH CARE EDUCATION/TRAINING PROGRAM

## 2025-04-07 PROCEDURE — 96365 THER/PROPH/DIAG IV INF INIT: CPT | Mod: INF

## 2025-04-07 PROCEDURE — 2500000004 HC RX 250 GENERAL PHARMACY W/ HCPCS (ALT 636 FOR OP/ED): Mod: JZ,TB

## 2025-04-07 PROCEDURE — 2500000004 HC RX 250 GENERAL PHARMACY W/ HCPCS (ALT 636 FOR OP/ED)

## 2025-04-07 PROCEDURE — 96401 CHEMO ANTI-NEOPL SQ/IM: CPT

## 2025-04-07 PROCEDURE — 80053 COMPREHEN METABOLIC PANEL: CPT

## 2025-04-07 RX ORDER — PROCHLORPERAZINE MALEATE 10 MG
10 TABLET ORAL EVERY 6 HOURS PRN
Status: DISCONTINUED | OUTPATIENT
Start: 2025-04-07 | End: 2025-04-07 | Stop reason: HOSPADM

## 2025-04-07 RX ORDER — ACETAMINOPHEN 325 MG/1
650 TABLET ORAL ONCE
Status: DISCONTINUED | OUTPATIENT
Start: 2025-04-07 | End: 2025-04-07 | Stop reason: HOSPADM

## 2025-04-07 RX ORDER — DIPHENHYDRAMINE HYDROCHLORIDE 50 MG/ML
50 INJECTION, SOLUTION INTRAMUSCULAR; INTRAVENOUS AS NEEDED
Status: DISCONTINUED | OUTPATIENT
Start: 2025-04-07 | End: 2025-04-07 | Stop reason: HOSPADM

## 2025-04-07 RX ORDER — ALBUTEROL SULFATE 0.83 MG/ML
3 SOLUTION RESPIRATORY (INHALATION) AS NEEDED
Status: DISCONTINUED | OUTPATIENT
Start: 2025-04-07 | End: 2025-04-07 | Stop reason: HOSPADM

## 2025-04-07 RX ORDER — HEPARIN 100 UNIT/ML
500 SYRINGE INTRAVENOUS AS NEEDED
OUTPATIENT
Start: 2025-04-07

## 2025-04-07 RX ORDER — EPINEPHRINE 0.3 MG/.3ML
0.3 INJECTION SUBCUTANEOUS EVERY 5 MIN PRN
OUTPATIENT
Start: 2025-04-14

## 2025-04-07 RX ORDER — PROCHLORPERAZINE EDISYLATE 5 MG/ML
10 INJECTION INTRAMUSCULAR; INTRAVENOUS EVERY 6 HOURS PRN
Status: DISCONTINUED | OUTPATIENT
Start: 2025-04-07 | End: 2025-04-07 | Stop reason: HOSPADM

## 2025-04-07 RX ORDER — FAMOTIDINE 10 MG/ML
20 INJECTION, SOLUTION INTRAVENOUS ONCE AS NEEDED
Status: DISCONTINUED | OUTPATIENT
Start: 2025-04-07 | End: 2025-04-07 | Stop reason: HOSPADM

## 2025-04-07 RX ORDER — EPINEPHRINE 0.3 MG/.3ML
0.3 INJECTION SUBCUTANEOUS EVERY 5 MIN PRN
Status: DISCONTINUED | OUTPATIENT
Start: 2025-04-07 | End: 2025-04-07 | Stop reason: HOSPADM

## 2025-04-07 RX ORDER — FAMOTIDINE 10 MG/ML
20 INJECTION, SOLUTION INTRAVENOUS ONCE AS NEEDED
OUTPATIENT
Start: 2025-04-14

## 2025-04-07 RX ORDER — HEPARIN SODIUM,PORCINE/PF 10 UNIT/ML
50 SYRINGE (ML) INTRAVENOUS AS NEEDED
Status: DISCONTINUED | OUTPATIENT
Start: 2025-04-07 | End: 2025-04-07 | Stop reason: HOSPADM

## 2025-04-07 RX ORDER — FAMOTIDINE 10 MG/ML
20 INJECTION INTRAVENOUS ONCE
Start: 2025-04-14 | End: 2025-04-14

## 2025-04-07 RX ORDER — MONTELUKAST SODIUM 10 MG/1
10 TABLET ORAL ONCE
Status: COMPLETED | OUTPATIENT
Start: 2025-04-07 | End: 2025-04-07

## 2025-04-07 RX ORDER — HEPARIN SODIUM,PORCINE/PF 10 UNIT/ML
50 SYRINGE (ML) INTRAVENOUS AS NEEDED
OUTPATIENT
Start: 2025-04-07

## 2025-04-07 RX ORDER — EPINEPHRINE 0.3 MG/.3ML
0.3 INJECTION SUBCUTANEOUS ONCE AS NEEDED
OUTPATIENT
Start: 2025-07-01

## 2025-04-07 RX ORDER — DIPHENHYDRAMINE HYDROCHLORIDE 50 MG/ML
25 INJECTION, SOLUTION INTRAMUSCULAR; INTRAVENOUS ONCE
Start: 2025-04-14 | End: 2025-04-14

## 2025-04-07 RX ORDER — ZOLEDRONIC ACID 0.04 MG/ML
4 INJECTION, SOLUTION INTRAVENOUS ONCE AS NEEDED
OUTPATIENT
Start: 2025-07-01

## 2025-04-07 RX ORDER — DIPHENHYDRAMINE HYDROCHLORIDE 50 MG/ML
50 INJECTION, SOLUTION INTRAMUSCULAR; INTRAVENOUS AS NEEDED
OUTPATIENT
Start: 2025-07-01

## 2025-04-07 RX ORDER — DIPHENHYDRAMINE HYDROCHLORIDE 50 MG/ML
50 INJECTION, SOLUTION INTRAMUSCULAR; INTRAVENOUS AS NEEDED
OUTPATIENT
Start: 2025-04-14

## 2025-04-07 RX ORDER — ZOLEDRONIC ACID 0.04 MG/ML
4 INJECTION, SOLUTION INTRAVENOUS ONCE AS NEEDED
Status: COMPLETED | OUTPATIENT
Start: 2025-04-07 | End: 2025-04-07

## 2025-04-07 RX ORDER — ACETAMINOPHEN 325 MG/1
650 TABLET ORAL ONCE
OUTPATIENT
Start: 2025-04-14

## 2025-04-07 RX ORDER — ALBUTEROL SULFATE 0.83 MG/ML
3 SOLUTION RESPIRATORY (INHALATION) AS NEEDED
OUTPATIENT
Start: 2025-04-14

## 2025-04-07 RX ORDER — MONTELUKAST SODIUM 10 MG/1
10 TABLET ORAL ONCE
Start: 2025-04-14 | End: 2025-04-14

## 2025-04-07 RX ORDER — FAMOTIDINE 10 MG/ML
20 INJECTION INTRAVENOUS ONCE
Status: COMPLETED | OUTPATIENT
Start: 2025-04-07 | End: 2025-04-07

## 2025-04-07 RX ORDER — FAMOTIDINE 10 MG/ML
20 INJECTION, SOLUTION INTRAVENOUS ONCE AS NEEDED
OUTPATIENT
Start: 2025-07-01

## 2025-04-07 RX ORDER — HEPARIN 100 UNIT/ML
500 SYRINGE INTRAVENOUS AS NEEDED
Status: DISCONTINUED | OUTPATIENT
Start: 2025-04-07 | End: 2025-04-07 | Stop reason: HOSPADM

## 2025-04-07 RX ORDER — EPINEPHRINE 0.3 MG/.3ML
0.3 INJECTION SUBCUTANEOUS ONCE AS NEEDED
Status: DISCONTINUED | OUTPATIENT
Start: 2025-04-07 | End: 2025-04-07 | Stop reason: HOSPADM

## 2025-04-07 RX ORDER — ALBUTEROL SULFATE 0.83 MG/ML
3 SOLUTION RESPIRATORY (INHALATION) AS NEEDED
OUTPATIENT
Start: 2025-07-01

## 2025-04-07 RX ORDER — DIPHENHYDRAMINE HYDROCHLORIDE 50 MG/ML
25 INJECTION, SOLUTION INTRAMUSCULAR; INTRAVENOUS ONCE
Status: COMPLETED | OUTPATIENT
Start: 2025-04-07 | End: 2025-04-07

## 2025-04-07 RX ADMIN — DIPHENHYDRAMINE HYDROCHLORIDE 25 MG: 50 INJECTION INTRAMUSCULAR; INTRAVENOUS at 09:56

## 2025-04-07 RX ADMIN — IMMUNE GLOBULIN (HUMAN) 20 G: 10 INJECTION INTRAVENOUS; SUBCUTANEOUS at 10:35

## 2025-04-07 RX ADMIN — TECLISTAMAB 153 MG: 90 INJECTION SUBCUTANEOUS at 12:11

## 2025-04-07 RX ADMIN — MONTELUKAST 10 MG: 10 TABLET, FILM COATED ORAL at 09:56

## 2025-04-07 RX ADMIN — ZOLEDRONIC ACID 4 MG: 0.04 INJECTION, SOLUTION INTRAVENOUS at 12:30

## 2025-04-07 RX ADMIN — FAMOTIDINE 20 MG: 10 INJECTION, SOLUTION INTRAVENOUS at 09:55

## 2025-04-07 ASSESSMENT — PAIN SCALES - GENERAL: PAINLEVEL_OUTOF10: 0-NO PAIN

## 2025-04-07 NOTE — PROGRESS NOTES
103kg 20g/200ml ivig  Begin infusion at 51 mL/hr x30 minutes, if no ADR increase to:  102 mL/hr x30 minutes, if no ADR increase to:  204 mL/hr x30 minutes, if no ADR increase to:  408 mL/hr until infusion is complete    [facility] Infusion Note      Maldonado Mccloud is a 74 y.o. year old male here today,04/07/25,  in the [facility] infusion center for  following treatment regimen:  Medications given today :    Administrations This Visit       diphenhydrAMINE (BENADryl) injection 25 mg       Admin Date  04/07/2025 Action  Given Dose  25 mg Route  intravenous Documented By  Chapin Duncan RN              famotidine (Pepcid) injection 20 mg       Admin Date  04/07/2025 Action  New Bag Dose  20 mg Route  intravenous Documented By  Chapin Duncan RN              immune globulin G-gly-IgA avg 46 (GAMUNEX-C 10%) infusion 20 g       Admin Date  04/07/2025 Action  New Bag Dose  20 g Rate  51 mL/hr Route  intravenous Documented By  Chapin Duncan RN               Admin Date  04/07/2025 Action  Rate/Dose Change Dose   Rate  102 mL/hr Route  intravenous Documented By  Hanna Murray RN               Admin Date  04/07/2025 Action  Rate/Dose Change Dose   Rate  204 mL/hr Route  intravenous Documented By  Teresa Ugalde RN              montelukast (Singulair) tablet 10 mg       Admin Date  04/07/2025 Action  Given Dose  10 mg Route  oral Documented By  Chapin Duncan RN              teclistamab-cqyv (Tecvayli) subcutaneous solution 153 mg       Admin Date  04/07/2025 Action  Given Dose  153 mg Route  subcutaneous Documented By  Chapin Duncan RN              zoledronic acid (Zometa) 4 mg  mL       Admin Date  04/07/2025 Action  New Bag Dose  4 mg Route  intravenous Documented By  Chapin Duncan RN                  Vitals:    04/07/25 0907   BP: 132/83   Pulse: 73   Resp: 16   Temp: 36.5 °C (97.7 °F)   SpO2: 97%    on intake  Vitals:    04/07/25 0907   Weight: 103 kg (226 lb 13.7 oz)       Body surface area is 2.28 meters  squared.    Pt tolerated and was discharged to home in stable condition. Pt ambulated  off the unit with a slow and steady gait. Pt had no further questions or concerns at this time.

## 2025-04-08 LAB
KAPPA LC SERPL-MCNC: 0.38 MG/DL (ref 0.33–1.94)
KAPPA LC/LAMBDA SER: ABNORMAL {RATIO}
LAMBDA LC SERPL-MCNC: <0.17 MG/DL (ref 0.57–2.63)
PROT SERPL-MCNC: 5.5 G/DL (ref 6.4–8.2)

## 2025-04-09 LAB
ALBUMIN: 3.6 G/DL (ref 3.4–5)
ALPHA 1 GLOBULIN: 0.2 G/DL (ref 0.2–0.6)
ALPHA 2 GLOBULIN: 0.6 G/DL (ref 0.4–1.1)
BETA GLOBULIN: 0.6 G/DL (ref 0.5–1.2)
GAMMA GLOBULIN: 0.4 G/DL (ref 0.5–1.4)
IMMUNOFIXATION COMMENT: ABNORMAL
PATH REVIEW - SERUM IMMUNOFIXATION: ABNORMAL
PATH REVIEW-SERUM PROTEIN ELECTROPHORESIS: ABNORMAL
PROTEIN ELECTROPHORESIS COMMENT: ABNORMAL

## 2025-04-10 ENCOUNTER — APPOINTMENT (OUTPATIENT)
Dept: NEUROLOGY | Facility: CLINIC | Age: 75
End: 2025-04-10
Payer: MEDICARE

## 2025-04-10 VITALS
HEIGHT: 70 IN | SYSTOLIC BLOOD PRESSURE: 138 MMHG | WEIGHT: 224 LBS | BODY MASS INDEX: 32.07 KG/M2 | DIASTOLIC BLOOD PRESSURE: 84 MMHG | HEART RATE: 91 BPM

## 2025-04-10 DIAGNOSIS — R06.83 SNORING: Primary | ICD-10-CM

## 2025-04-10 PROCEDURE — 1123F ACP DISCUSS/DSCN MKR DOCD: CPT | Performed by: PSYCHIATRY & NEUROLOGY

## 2025-04-10 PROCEDURE — 3075F SYST BP GE 130 - 139MM HG: CPT | Performed by: PSYCHIATRY & NEUROLOGY

## 2025-04-10 PROCEDURE — 99214 OFFICE O/P EST MOD 30 MIN: CPT | Performed by: PSYCHIATRY & NEUROLOGY

## 2025-04-10 PROCEDURE — 3079F DIAST BP 80-89 MM HG: CPT | Performed by: PSYCHIATRY & NEUROLOGY

## 2025-04-10 PROCEDURE — 1159F MED LIST DOCD IN RCRD: CPT | Performed by: PSYCHIATRY & NEUROLOGY

## 2025-04-10 NOTE — PATIENT INSTRUCTIONS
"Recommendations for brain fog:  -Perform aerobic exercise. You may need to start slow, perhaps just two to three minutes a few times a day. While there is no established \"dose\" of exercise to improve brain health, it's generally recommended you work toward 30 minutes a day, five days a week.  -Eat healthy diet such as the Mediterranean diet. A healthy diet including olive oil, fruits and vegetables, nuts and beans, and whole grains has been proven to improve thinking, memory and brain health.  -Avoid alcohol and drugs. Give your brain the best chance to heal by avoiding substances which can adversely affect it.  -Sleep well. Sleep is a time when the brain and body can clear out toxins and work toward healing. Make sure you give your body the sleep it needs.  -Participate in social activities. We are social animals. Not only do social activities benefit our moods, but they help our thinking and memory as well.  -Pursue other beneficial activities, including engaging in novel, cognitively stimulating activities; listening to music; practicing mindfulness; and keeping a positive mental attitude.    Let me know if you want to do the speech and occupational therapy cognitive therapy. Will get sleep study and call with results.   "

## 2025-04-10 NOTE — PROGRESS NOTES
"Subjective   Maldonado Mccloud is a 74 y.o. male who comes in for follow up of cognitive changes.     Reviewed the results of his Neuro-psych testing and MRI brain results.     His wife reports that she continues to notice changes in his cognitive function and feels that he is not at his  normal.  She notices problems with language and word finding that are atypical for him.     Review of Systems    Objective   /84 (BP Location: Right arm, Patient Position: Sitting, BP Cuff Size: Large adult long)   Pulse 91   Ht 1.778 m (5' 10\")   Wt 102 kg (224 lb)   BMI 32.14 kg/m²   Neurological Exam  Physical Exam        MR brain wo IV contrast    Result Date: 9/21/2024  Interpreted By:  Kvng Mcintyre, STUDY: MR BRAIN WO IV CONTRAST;  9/20/2024 3:53 pm   INDICATION: Signs/Symptoms:cognitived delay after chemo.   ,R41.89 Other symptoms and signs involving cognitive functions and awareness   COMPARISON: None.   ACCESSION NUMBER(S): [id number]   ORDERING CLINICIAN: RIKI HURT   TECHNIQUE: Standard multiplanar multisequence MR imaging was performed through the brain without intravenous contrast. Axial T2, FLAIR, DWI, gradient echo T2 and sagittal and coronal T1 weighted images of brain were acquired.   FINDINGS: Parenchyma: There is no diffusion restriction abnormality to suggest acute infarct.  No evidence of recent hemorrhage. There is no mass effect or midline shift. There are a few focal areas of blooming on GRE sequence within the left pericallosal/cingulate region and within the right temporal lobe periatrial white matter that may represent focal mineralization or remote microhemorrhages. There is a moderate burden of small nonspecific scattered T2/FLAIR hyperintensities within the bilateral cerebral hemispheres.   CSF Spaces: The ventricles, sulci and basal cisterns are within normal limits for age with moderate generalized brain atrophy. Basilar cisterns are patent.   Extra-axial spaces: No extra-axial fluid " "collection.   Paranasal Sinuses: Mild scattered mucosal thickening without paranasal sinus opacification.   Mastoids: Clear.   Orbits: Normal.   Calvarium: No suspicious osseous marrow signal.       No acute infarct, recent hemorrhage, or intracranial mass effect. No suspicious osseous marrow signal identified within the calvarium on this noncontrast evaluation.   Moderate burden of small nonspecific T2/FLAIR white matter hyperintensities within the bilateral cerebral hemispheres that may represent chronic small vessel ischemic disease and/or element of posttreatment change given reported history.   Moderate generalized brain atrophy.   MACRO: None   Signed by: Kvng Mcintyre 9/21/2024 3:26 PM Dictation workstation:   [id number]      Personally reviewed the images of his MRI brain. He has moderate white matter changes likely in part due to prior history of smoking cigars and HTN (currently off treatment now).     Assessment/Plan   Mr. Mccloud is a 73 year old man presenting to the neurology clinic today for follow up of cognitive changes. Initially noted with chemotherapy however changes have continued to decline in the last year since stopping therapy.  He scored a 28/30 on the SLUMs evaluations.      Changes reported are common with \"chemo-brain\".  Neuro-psych testing shows findings are normal for age with evidence of neuro-degenerative process.  Cognitive therapy could be considered. Patient does not feel he is interested at this time.     Given white matter burden and reported fatigue and snoring will obtain a sleep study to evaluate for sleep apnea.              Libby Ellis, DO  "

## 2025-04-14 ENCOUNTER — LAB (OUTPATIENT)
Dept: LAB | Facility: CLINIC | Age: 75
End: 2025-04-14
Payer: MEDICARE

## 2025-04-14 ENCOUNTER — INFUSION (OUTPATIENT)
Dept: HEMATOLOGY/ONCOLOGY | Facility: CLINIC | Age: 75
End: 2025-04-14
Payer: MEDICARE

## 2025-04-14 VITALS
WEIGHT: 229.06 LBS | RESPIRATION RATE: 16 BRPM | SYSTOLIC BLOOD PRESSURE: 153 MMHG | TEMPERATURE: 98.1 F | OXYGEN SATURATION: 98 % | DIASTOLIC BLOOD PRESSURE: 88 MMHG | HEART RATE: 61 BPM | BODY MASS INDEX: 32.87 KG/M2

## 2025-04-14 DIAGNOSIS — C90.00 MULTIPLE MYELOMA WITHOUT REMISSION (MULTI): ICD-10-CM

## 2025-04-14 DIAGNOSIS — D80.1 HYPOGAMMAGLOBULINEMIA DUE TO MULTIPLE MYELOMA (MULTI): ICD-10-CM

## 2025-04-14 DIAGNOSIS — Z92.850 HISTORY OF CHIMERIC ANTIGEN RECEPTOR T-CELL THERAPY: ICD-10-CM

## 2025-04-14 DIAGNOSIS — C90.00 IGG MULTIPLE MYELOMA (MULTI): ICD-10-CM

## 2025-04-14 DIAGNOSIS — C90.00 HYPOGAMMAGLOBULINEMIA DUE TO MULTIPLE MYELOMA (MULTI): ICD-10-CM

## 2025-04-14 LAB
ALBUMIN SERPL BCP-MCNC: 4 G/DL (ref 3.4–5)
ALP SERPL-CCNC: 64 U/L (ref 33–136)
ALT SERPL W P-5'-P-CCNC: 23 U/L (ref 10–52)
ANION GAP SERPL CALC-SCNC: 10 MMOL/L (ref 10–20)
AST SERPL W P-5'-P-CCNC: 26 U/L (ref 9–39)
BASOPHILS # BLD AUTO: 0.01 X10*3/UL (ref 0–0.1)
BASOPHILS NFR BLD AUTO: 0.4 %
BILIRUB SERPL-MCNC: 0.5 MG/DL (ref 0–1.2)
BUN SERPL-MCNC: 26 MG/DL (ref 6–23)
CALCIUM SERPL-MCNC: 9 MG/DL (ref 8.6–10.6)
CHLORIDE SERPL-SCNC: 108 MMOL/L (ref 98–107)
CO2 SERPL-SCNC: 27 MMOL/L (ref 21–32)
CREAT SERPL-MCNC: 1.09 MG/DL (ref 0.5–1.3)
EGFRCR SERPLBLD CKD-EPI 2021: 71 ML/MIN/1.73M*2
EOSINOPHIL # BLD AUTO: 0.04 X10*3/UL (ref 0–0.4)
EOSINOPHIL NFR BLD AUTO: 1.5 %
ERYTHROCYTE [DISTWIDTH] IN BLOOD BY AUTOMATED COUNT: 12.8 % (ref 11.5–14.5)
GLUCOSE SERPL-MCNC: 101 MG/DL (ref 74–99)
HCT VFR BLD AUTO: 35.2 % (ref 41–52)
HGB BLD-MCNC: 12.1 G/DL (ref 13.5–17.5)
IMM GRANULOCYTES # BLD AUTO: 0.01 X10*3/UL (ref 0–0.5)
IMM GRANULOCYTES NFR BLD AUTO: 0.4 % (ref 0–0.9)
LYMPHOCYTES # BLD AUTO: 0.74 X10*3/UL (ref 0.8–3)
LYMPHOCYTES NFR BLD AUTO: 28 %
MCH RBC QN AUTO: 33.2 PG (ref 26–34)
MCHC RBC AUTO-ENTMCNC: 34.4 G/DL (ref 32–36)
MCV RBC AUTO: 96 FL (ref 80–100)
MONOCYTES # BLD AUTO: 0.47 X10*3/UL (ref 0.05–0.8)
MONOCYTES NFR BLD AUTO: 17.8 %
NEUTROPHILS # BLD AUTO: 1.37 X10*3/UL (ref 1.6–5.5)
NEUTROPHILS NFR BLD AUTO: 51.9 %
NRBC BLD-RTO: ABNORMAL /100{WBCS}
PLATELET # BLD AUTO: 91 X10*3/UL (ref 150–450)
POTASSIUM SERPL-SCNC: 4.3 MMOL/L (ref 3.5–5.3)
PROT SERPL-MCNC: 6 G/DL (ref 6.4–8.2)
RBC # BLD AUTO: 3.65 X10*6/UL (ref 4.5–5.9)
SODIUM SERPL-SCNC: 141 MMOL/L (ref 136–145)
WBC # BLD AUTO: 2.6 X10*3/UL (ref 4.4–11.3)

## 2025-04-14 PROCEDURE — 96401 CHEMO ANTI-NEOPL SQ/IM: CPT

## 2025-04-14 PROCEDURE — 85025 COMPLETE CBC W/AUTO DIFF WBC: CPT

## 2025-04-14 PROCEDURE — 36415 COLL VENOUS BLD VENIPUNCTURE: CPT

## 2025-04-14 PROCEDURE — 80053 COMPREHEN METABOLIC PANEL: CPT

## 2025-04-14 PROCEDURE — 2500000004 HC RX 250 GENERAL PHARMACY W/ HCPCS (ALT 636 FOR OP/ED): Mod: JZ,TB | Performed by: STUDENT IN AN ORGANIZED HEALTH CARE EDUCATION/TRAINING PROGRAM

## 2025-04-14 RX ORDER — PROCHLORPERAZINE EDISYLATE 5 MG/ML
10 INJECTION INTRAMUSCULAR; INTRAVENOUS EVERY 6 HOURS PRN
Status: DISCONTINUED | OUTPATIENT
Start: 2025-04-14 | End: 2025-04-14 | Stop reason: HOSPADM

## 2025-04-14 RX ORDER — DIPHENHYDRAMINE HYDROCHLORIDE 50 MG/ML
50 INJECTION, SOLUTION INTRAMUSCULAR; INTRAVENOUS AS NEEDED
Status: DISCONTINUED | OUTPATIENT
Start: 2025-04-14 | End: 2025-04-14 | Stop reason: HOSPADM

## 2025-04-14 RX ORDER — FAMOTIDINE 10 MG/ML
20 INJECTION, SOLUTION INTRAVENOUS ONCE AS NEEDED
Status: DISCONTINUED | OUTPATIENT
Start: 2025-04-14 | End: 2025-04-14 | Stop reason: HOSPADM

## 2025-04-14 RX ORDER — PROCHLORPERAZINE MALEATE 10 MG
10 TABLET ORAL EVERY 6 HOURS PRN
Status: DISCONTINUED | OUTPATIENT
Start: 2025-04-14 | End: 2025-04-14 | Stop reason: HOSPADM

## 2025-04-14 RX ORDER — ALBUTEROL SULFATE 0.83 MG/ML
3 SOLUTION RESPIRATORY (INHALATION) AS NEEDED
Status: DISCONTINUED | OUTPATIENT
Start: 2025-04-14 | End: 2025-04-14 | Stop reason: HOSPADM

## 2025-04-14 RX ORDER — EPINEPHRINE 0.3 MG/.3ML
0.3 INJECTION SUBCUTANEOUS EVERY 5 MIN PRN
Status: DISCONTINUED | OUTPATIENT
Start: 2025-04-14 | End: 2025-04-14 | Stop reason: HOSPADM

## 2025-04-14 RX ADMIN — TECLISTAMAB 153 MG: 90 INJECTION SUBCUTANEOUS at 10:31

## 2025-04-14 ASSESSMENT — PAIN SCALES - GENERAL: PAINLEVEL_OUTOF10: 0-NO PAIN

## 2025-04-14 NOTE — PROGRESS NOTES
Maldonado Mccloud is a 74 y.o. year old male here today,04/14/25,  in the [facility] infusion center for  following treatment regimen:  Medications given today :  Denies infection symptoms - we went over blood count labs from today together- no pain today      Vitals:    04/14/25 0939   BP: 153/88   Pulse: 61   Resp: 16   Temp: 36.7 °C (98.1 °F)   SpO2: 98%    on intake  Vitals:    04/14/25 0939   Weight: 104 kg (229 lb 0.9 oz)       Body surface area is 2.27 meters squared.    Pt tolerated and was discharged to home in stable condition. Pt ambulated off the unit with a slow and steady gait. Pt had no further questions or concerns at this time.

## 2025-04-16 NOTE — PROGRESS NOTES
Patient ID: Maldonado Mccloud is a 74 y.o. male.  Referring Physician: West Huff [place]  [id number]  Department [place] [address]  Primary Care Provider: Allison Wang DO    Interval hx:  Since last visit he has had a small rash at site of teclistamab injection on the right side of his abdomen. He also has noted some mild left lower abdominal pains. He has chronic mild lower bk pains w/o change and chronic left shoulder pains. He has had periodic queasiness w/eating, no emesis. Has also had a mild cough, which he attributes to smoking cigars. He denies having had unexplained wt loss, fevers, chills, sweats, palpable FELY,  diarrhea, mouth sores, other skin rashes, chest pains, headaches, nosebleeds, GI or  bleeding, vision complaints. Still w/occasional numbness of his feet, geena of hands,  not relieved at all w/cymbalta and is not having vision complaints.    PMHx:  1) Multiple myeloma (see below)  2) Squamous cell ca's skin  3) DVT, LLE ; now off AC  4) Neuropathy  5) S/p right radius fracture 2023  6) HTN  7) Afib (transient in  w/stem cell collection)    FamHx  Dad  of pan ca age 92    SocHx:   w/5 kids; smokes cigars; no cigs in 50 years; no EtOH or illicit drugs. Was in [employer] in [place]    Meds:  Were reviewed w/the pt  All:  [employer]  PCN  Zosyn    Physical Exam  General: Ambulatory, looked comfortable, not requiring supplemental oxygen  Vital Signs   /84; P 82; afebrile; RR 16/min; Wt= 102.5 (gained 1.8 kg)  HEENT: no oral lesions, tonsillar or tongue enlargement; Neck: no masses; Lymph nodes: no palpable cervical, supraclavicular, axillary, epitrochear or inguinal nodes; Heart: no murmurs; Lungs: clear; Abd: soft, +bowel sounds, non-tender, no palpable liver or spleen; Skin: faint, ellipitical rash on right abdominal wall, otherwise no rashes; Ext's: No LE edema; Neuro: alert, answered questions appropriately, 5/5 motor strength upper  and lower extremities    Performance Status:  Symptomatic; fully ambulatory      Assessment/recommendations:  74 year old man w/a hx of LLE DVT (2022, now off AC), HTN, neuropathy, multiple skin cancers and multiply relapsed multiple myeloma [presented with right chest 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; Bone marrow biopsy suggested multiple myeloma IgG kappa, ISS Stage II, bone survey revealed lytic lesions; Urine light chains present kappa restricted with 2gm proteinuria; s/p VRD x 3 cycles with CR; then s/p auto SCTx 12/23/15 complicated by orthostatic hypotension, electrolyte replacement, drug induced rash, culture negative fever, then s/p approximately 5 weeks of maintenance Revlimid 10 mg daily which was held for worsening neuropathy in August 2016; then in 3/ 2018: IgG M Braxton alexei to 0.2gm/L and light chain alexei was; restarted on Revlimid maintenance at 5mg every other day, then revlimid stopped in 3/2021 due to stable disease; then in 7/2021 his M spike was on the rise, and was enrolled onto Vactosertib/Pom trial on 8/5/2021; then progressed in 6/2022, switched to Isatuximab/Carfilzomib on 6/30/22, then in 9/2022 progressed and was switched to Cytoxan/Carfilzomib; then had therapy placed on hold in 11/2022 due to need for hip replacement surgery; myeloma markers were on the rise in 3/2023 with a new jaw lesion, s/p XRT 4/2023 and resume 2x/week carfilzomib; then s/p CAR T w/ABECMA (T=0, 10/25/23), hospital course complicated by grade 1 ICANS managed w/ Dex 10mg x3 days and 1 dose tociluzimab; then w/progression and started on teclistamab, full dosing given on 10/13/24] here for follow-up.     As for relapsed myeloma:  He has been continued on weekly teclistamab and tolerating this well, so far only has had an early episode of grade I CRS (cx neg low grade fever x 1, not recurred). He has not had any other toxicities thus far. In addition, there had been a continued biochemical response:  "serum free kappa decreasing from 15.6mg/dL (= 156mg/L) to <0.08, as of 2/10/25, but was 0.38 on 4/7/25. The recent repeat PET/CT from 3/2025 revealed the following:  \"Interval development of new FDG uptake within left acetabulum with increase in FDG uptake within left iliac lesion, consistent with active myeloma\" but also \"interval decrease to stable FDG uptake within multiple lytic lesions in left femoral diaphysis, right iliac crest, L4 vertebral body and right proximal clavicle\" and \"non FDG avid lesions within left posterior 5th rib, sternum and right radius, consistent with treated disease\". Of note, the new area of FDG uptake in the left acetabulum had a max SUV of only 0.9. The significance of this finding is not clear; although it may represent a very early recurrence, it could also represent tumor flare (which has been reported w/BiTEs) or be a non-specific finding. Given the lack of definitive findings to suggest progression (although his free kappa light chains are rising, though still within normal range), I recommended to not stop the teclistamab at this time but expressed concern that he may progress soon.    Plan to have him follow-up at [place] on 4/21 and get day 1 of his next cycle (#8) of teclistamab then skip following week(per pt request, as he and his wife will be visiting kids/grandchildren in [place]) and then restart weekly teclistamab and follow-up w/me in 5 weeks, or sooner if new symptoms arise. I told the pt and his wife that I was concerned about the about findings but that he did not meet criteria yet for change in therapy but that, when he does, we could offer talquetamab.    As for neutropenia:  Probably from teclistamab; cont prn weekly neupogen to keep ANC >500    Infection ppx:  Cont acyclovir and bactrim ppx; monitor IgG levels and cont monthly ppx IVIG (NOTE: he should receive Gammunex -C, d/t prior severe reaction to gammard).    As for sens NP:  No effect w/cymbalta, so " last visit we stopped this medication. I again offered adding gabapentin but the pt and his wife declined this, as he is really is not having any associated pain.

## 2025-04-17 ENCOUNTER — OFFICE VISIT (OUTPATIENT)
Dept: HEMATOLOGY/ONCOLOGY | Facility: HOSPITAL | Age: 75
End: 2025-04-17
Payer: MEDICARE

## 2025-04-17 VITALS
RESPIRATION RATE: 16 BRPM | HEART RATE: 82 BPM | SYSTOLIC BLOOD PRESSURE: 136 MMHG | TEMPERATURE: 98.2 F | BODY MASS INDEX: 32.3 KG/M2 | WEIGHT: 225.1 LBS | OXYGEN SATURATION: 97 % | DIASTOLIC BLOOD PRESSURE: 84 MMHG

## 2025-04-17 DIAGNOSIS — C90.00 IGG MULTIPLE MYELOMA (MULTI): ICD-10-CM

## 2025-04-17 PROCEDURE — 99215 OFFICE O/P EST HI 40 MIN: CPT | Performed by: INTERNAL MEDICINE

## 2025-04-17 PROCEDURE — 3079F DIAST BP 80-89 MM HG: CPT | Performed by: INTERNAL MEDICINE

## 2025-04-17 PROCEDURE — 1123F ACP DISCUSS/DSCN MKR DOCD: CPT | Performed by: INTERNAL MEDICINE

## 2025-04-17 PROCEDURE — 1126F AMNT PAIN NOTED NONE PRSNT: CPT | Performed by: INTERNAL MEDICINE

## 2025-04-17 PROCEDURE — 3075F SYST BP GE 130 - 139MM HG: CPT | Performed by: INTERNAL MEDICINE

## 2025-04-17 ASSESSMENT — PAIN SCALES - GENERAL: PAINLEVEL_OUTOF10: 0-NO PAIN

## 2025-04-21 ENCOUNTER — LAB (OUTPATIENT)
Dept: LAB | Facility: CLINIC | Age: 75
End: 2025-04-21
Payer: MEDICARE

## 2025-04-21 ENCOUNTER — INFUSION (OUTPATIENT)
Dept: HEMATOLOGY/ONCOLOGY | Facility: CLINIC | Age: 75
End: 2025-04-21
Payer: MEDICARE

## 2025-04-21 VITALS
BODY MASS INDEX: 32.59 KG/M2 | RESPIRATION RATE: 16 BRPM | HEIGHT: 70 IN | WEIGHT: 227.63 LBS | OXYGEN SATURATION: 98 % | TEMPERATURE: 96.8 F | DIASTOLIC BLOOD PRESSURE: 86 MMHG | SYSTOLIC BLOOD PRESSURE: 140 MMHG | HEART RATE: 69 BPM

## 2025-04-21 DIAGNOSIS — C90.00 IGG MULTIPLE MYELOMA (MULTI): ICD-10-CM

## 2025-04-21 LAB
BASOPHILS # BLD AUTO: 0.01 X10*3/UL (ref 0–0.1)
BASOPHILS NFR BLD AUTO: 0.3 %
EOSINOPHIL # BLD AUTO: 0.06 X10*3/UL (ref 0–0.4)
EOSINOPHIL NFR BLD AUTO: 1.9 %
ERYTHROCYTE [DISTWIDTH] IN BLOOD BY AUTOMATED COUNT: 13 % (ref 11.5–14.5)
HCT VFR BLD AUTO: 37 % (ref 41–52)
HGB BLD-MCNC: 12.6 G/DL (ref 13.5–17.5)
IMM GRANULOCYTES # BLD AUTO: 0.01 X10*3/UL (ref 0–0.5)
IMM GRANULOCYTES NFR BLD AUTO: 0.3 % (ref 0–0.9)
LYMPHOCYTES # BLD AUTO: 0.97 X10*3/UL (ref 0.8–3)
LYMPHOCYTES NFR BLD AUTO: 30.8 %
MCH RBC QN AUTO: 33.3 PG (ref 26–34)
MCHC RBC AUTO-ENTMCNC: 34.1 G/DL (ref 32–36)
MCV RBC AUTO: 98 FL (ref 80–100)
MONOCYTES # BLD AUTO: 0.4 X10*3/UL (ref 0.05–0.8)
MONOCYTES NFR BLD AUTO: 12.7 %
NEUTROPHILS # BLD AUTO: 1.7 X10*3/UL (ref 1.6–5.5)
NEUTROPHILS NFR BLD AUTO: 54 %
NRBC BLD-RTO: ABNORMAL /100{WBCS}
PLATELET # BLD AUTO: 101 X10*3/UL (ref 150–450)
RBC # BLD AUTO: 3.78 X10*6/UL (ref 4.5–5.9)
WBC # BLD AUTO: 3.2 X10*3/UL (ref 4.4–11.3)

## 2025-04-21 PROCEDURE — 85025 COMPLETE CBC W/AUTO DIFF WBC: CPT

## 2025-04-21 PROCEDURE — 96401 CHEMO ANTI-NEOPL SQ/IM: CPT

## 2025-04-21 PROCEDURE — 2500000004 HC RX 250 GENERAL PHARMACY W/ HCPCS (ALT 636 FOR OP/ED): Mod: JZ,TB

## 2025-04-21 PROCEDURE — 36415 COLL VENOUS BLD VENIPUNCTURE: CPT

## 2025-04-21 RX ORDER — FAMOTIDINE 10 MG/ML
20 INJECTION, SOLUTION INTRAVENOUS ONCE AS NEEDED
Status: DISCONTINUED | OUTPATIENT
Start: 2025-04-21 | End: 2025-04-21 | Stop reason: HOSPADM

## 2025-04-21 RX ORDER — EPINEPHRINE 0.3 MG/.3ML
0.3 INJECTION SUBCUTANEOUS EVERY 5 MIN PRN
Status: DISCONTINUED | OUTPATIENT
Start: 2025-04-21 | End: 2025-04-21 | Stop reason: HOSPADM

## 2025-04-21 RX ORDER — PROCHLORPERAZINE EDISYLATE 5 MG/ML
10 INJECTION INTRAMUSCULAR; INTRAVENOUS EVERY 6 HOURS PRN
Status: DISCONTINUED | OUTPATIENT
Start: 2025-04-21 | End: 2025-04-21 | Stop reason: HOSPADM

## 2025-04-21 RX ORDER — PROCHLORPERAZINE MALEATE 10 MG
10 TABLET ORAL EVERY 6 HOURS PRN
Status: DISCONTINUED | OUTPATIENT
Start: 2025-04-21 | End: 2025-04-21 | Stop reason: HOSPADM

## 2025-04-21 RX ORDER — ALBUTEROL SULFATE 0.83 MG/ML
3 SOLUTION RESPIRATORY (INHALATION) AS NEEDED
Status: DISCONTINUED | OUTPATIENT
Start: 2025-04-21 | End: 2025-04-21 | Stop reason: HOSPADM

## 2025-04-21 RX ORDER — DIPHENHYDRAMINE HYDROCHLORIDE 50 MG/ML
50 INJECTION, SOLUTION INTRAMUSCULAR; INTRAVENOUS AS NEEDED
Status: DISCONTINUED | OUTPATIENT
Start: 2025-04-21 | End: 2025-04-21 | Stop reason: HOSPADM

## 2025-04-21 RX ADMIN — TECLISTAMAB 153 MG: 90 INJECTION SUBCUTANEOUS at 09:26

## 2025-04-21 ASSESSMENT — PAIN SCALES - GENERAL: PAINLEVEL_OUTOF10: 0-NO PAIN

## 2025-04-21 NOTE — PROGRESS NOTES
Patient is here today for C8D1 Teclistamab injection - no complications since last being seen-  Independent double check done prior to injection today-   b/h/ lung sounds not auscultated  Patient tolerated injection well.  No complaints. Call back instructions reviewed.    Patient verbalizes understanding of plan of care.  Ambulated off unit without difficulty, steady gait.

## 2025-05-05 ENCOUNTER — LAB (OUTPATIENT)
Dept: LAB | Facility: CLINIC | Age: 75
End: 2025-05-05
Payer: MEDICARE

## 2025-05-05 ENCOUNTER — INFUSION (OUTPATIENT)
Dept: HEMATOLOGY/ONCOLOGY | Facility: CLINIC | Age: 75
End: 2025-05-05
Payer: MEDICARE

## 2025-05-05 VITALS
RESPIRATION RATE: 16 BRPM | TEMPERATURE: 98.1 F | DIASTOLIC BLOOD PRESSURE: 81 MMHG | OXYGEN SATURATION: 100 % | WEIGHT: 225.09 LBS | HEART RATE: 66 BPM | BODY MASS INDEX: 32.04 KG/M2 | SYSTOLIC BLOOD PRESSURE: 137 MMHG

## 2025-05-05 DIAGNOSIS — C90.00 IGG MULTIPLE MYELOMA (MULTI): ICD-10-CM

## 2025-05-05 DIAGNOSIS — D80.1 HYPOGAMMAGLOBULINEMIA DUE TO MULTIPLE MYELOMA (MULTI): ICD-10-CM

## 2025-05-05 DIAGNOSIS — C90.00 MULTIPLE MYELOMA WITHOUT REMISSION (MULTI): ICD-10-CM

## 2025-05-05 DIAGNOSIS — C90.00 HYPOGAMMAGLOBULINEMIA DUE TO MULTIPLE MYELOMA (MULTI): ICD-10-CM

## 2025-05-05 DIAGNOSIS — Z92.850 HISTORY OF CHIMERIC ANTIGEN RECEPTOR T-CELL THERAPY: ICD-10-CM

## 2025-05-05 LAB
BASOPHILS # BLD AUTO: 0.01 X10*3/UL (ref 0–0.1)
BASOPHILS NFR BLD AUTO: 0.4 %
EOSINOPHIL # BLD AUTO: 0.06 X10*3/UL (ref 0–0.4)
EOSINOPHIL NFR BLD AUTO: 2.1 %
ERYTHROCYTE [DISTWIDTH] IN BLOOD BY AUTOMATED COUNT: 13.3 % (ref 11.5–14.5)
HCT VFR BLD AUTO: 37.4 % (ref 41–52)
HGB BLD-MCNC: 12.7 G/DL (ref 13.5–17.5)
IMM GRANULOCYTES # BLD AUTO: 0 X10*3/UL (ref 0–0.5)
IMM GRANULOCYTES NFR BLD AUTO: 0 % (ref 0–0.9)
LYMPHOCYTES # BLD AUTO: 1.2 X10*3/UL (ref 0.8–3)
LYMPHOCYTES NFR BLD AUTO: 42.1 %
MCH RBC QN AUTO: 33.2 PG (ref 26–34)
MCHC RBC AUTO-ENTMCNC: 34 G/DL (ref 32–36)
MCV RBC AUTO: 98 FL (ref 80–100)
MONOCYTES # BLD AUTO: 0.42 X10*3/UL (ref 0.05–0.8)
MONOCYTES NFR BLD AUTO: 14.7 %
NEUTROPHILS # BLD AUTO: 1.16 X10*3/UL (ref 1.6–5.5)
NEUTROPHILS NFR BLD AUTO: 40.7 %
NRBC BLD-RTO: ABNORMAL /100{WBCS}
PLATELET # BLD AUTO: 106 X10*3/UL (ref 150–450)
RBC # BLD AUTO: 3.82 X10*6/UL (ref 4.5–5.9)
WBC # BLD AUTO: 2.9 X10*3/UL (ref 4.4–11.3)

## 2025-05-05 PROCEDURE — 36415 COLL VENOUS BLD VENIPUNCTURE: CPT

## 2025-05-05 PROCEDURE — 96365 THER/PROPH/DIAG IV INF INIT: CPT | Mod: INF

## 2025-05-05 PROCEDURE — 96366 THER/PROPH/DIAG IV INF ADDON: CPT | Mod: INF

## 2025-05-05 PROCEDURE — 96375 TX/PRO/DX INJ NEW DRUG ADDON: CPT | Mod: INF

## 2025-05-05 PROCEDURE — 84165 PROTEIN E-PHORESIS SERUM: CPT

## 2025-05-05 PROCEDURE — 2500000004 HC RX 250 GENERAL PHARMACY W/ HCPCS (ALT 636 FOR OP/ED): Mod: JZ,TB

## 2025-05-05 PROCEDURE — 84155 ASSAY OF PROTEIN SERUM: CPT | Mod: 59

## 2025-05-05 PROCEDURE — 80053 COMPREHEN METABOLIC PANEL: CPT

## 2025-05-05 PROCEDURE — 82784 ASSAY IGA/IGD/IGG/IGM EACH: CPT

## 2025-05-05 PROCEDURE — 2500000004 HC RX 250 GENERAL PHARMACY W/ HCPCS (ALT 636 FOR OP/ED): Mod: JZ

## 2025-05-05 PROCEDURE — 2500000001 HC RX 250 WO HCPCS SELF ADMINISTERED DRUGS (ALT 637 FOR MEDICARE OP)

## 2025-05-05 PROCEDURE — 83521 IG LIGHT CHAINS FREE EACH: CPT

## 2025-05-05 PROCEDURE — 85025 COMPLETE CBC W/AUTO DIFF WBC: CPT

## 2025-05-05 PROCEDURE — 96401 CHEMO ANTI-NEOPL SQ/IM: CPT

## 2025-05-05 RX ORDER — FAMOTIDINE 10 MG/ML
20 INJECTION, SOLUTION INTRAVENOUS ONCE AS NEEDED
OUTPATIENT
Start: 2025-05-12

## 2025-05-05 RX ORDER — FAMOTIDINE 10 MG/ML
20 INJECTION INTRAVENOUS ONCE
Start: 2025-05-12 | End: 2025-05-12

## 2025-05-05 RX ORDER — ACETAMINOPHEN 325 MG/1
650 TABLET ORAL ONCE
OUTPATIENT
Start: 2025-05-12

## 2025-05-05 RX ORDER — DIPHENHYDRAMINE HYDROCHLORIDE 50 MG/ML
50 INJECTION, SOLUTION INTRAMUSCULAR; INTRAVENOUS AS NEEDED
Status: DISCONTINUED | OUTPATIENT
Start: 2025-05-05 | End: 2025-05-05 | Stop reason: HOSPADM

## 2025-05-05 RX ORDER — PROCHLORPERAZINE MALEATE 10 MG
10 TABLET ORAL EVERY 6 HOURS PRN
Status: DISCONTINUED | OUTPATIENT
Start: 2025-05-05 | End: 2025-05-05 | Stop reason: HOSPADM

## 2025-05-05 RX ORDER — ALBUTEROL SULFATE 0.83 MG/ML
3 SOLUTION RESPIRATORY (INHALATION) AS NEEDED
Status: DISCONTINUED | OUTPATIENT
Start: 2025-05-05 | End: 2025-05-05 | Stop reason: HOSPADM

## 2025-05-05 RX ORDER — MONTELUKAST SODIUM 10 MG/1
10 TABLET ORAL ONCE
Status: COMPLETED | OUTPATIENT
Start: 2025-05-05 | End: 2025-05-05

## 2025-05-05 RX ORDER — DIPHENHYDRAMINE HYDROCHLORIDE 50 MG/ML
50 INJECTION, SOLUTION INTRAMUSCULAR; INTRAVENOUS AS NEEDED
OUTPATIENT
Start: 2025-05-12

## 2025-05-05 RX ORDER — FAMOTIDINE 10 MG/ML
20 INJECTION, SOLUTION INTRAVENOUS ONCE AS NEEDED
Status: DISCONTINUED | OUTPATIENT
Start: 2025-05-05 | End: 2025-05-05 | Stop reason: HOSPADM

## 2025-05-05 RX ORDER — ACETAMINOPHEN 325 MG/1
650 TABLET ORAL ONCE
Status: COMPLETED | OUTPATIENT
Start: 2025-05-05 | End: 2025-05-05

## 2025-05-05 RX ORDER — EPINEPHRINE 0.3 MG/.3ML
0.3 INJECTION SUBCUTANEOUS EVERY 5 MIN PRN
Status: DISCONTINUED | OUTPATIENT
Start: 2025-05-05 | End: 2025-05-05 | Stop reason: HOSPADM

## 2025-05-05 RX ORDER — DIPHENHYDRAMINE HYDROCHLORIDE 50 MG/ML
25 INJECTION, SOLUTION INTRAMUSCULAR; INTRAVENOUS ONCE
Start: 2025-05-12 | End: 2025-05-12

## 2025-05-05 RX ORDER — EPINEPHRINE 0.3 MG/.3ML
0.3 INJECTION SUBCUTANEOUS EVERY 5 MIN PRN
OUTPATIENT
Start: 2025-05-12

## 2025-05-05 RX ORDER — MONTELUKAST SODIUM 10 MG/1
10 TABLET ORAL ONCE
Start: 2025-05-12 | End: 2025-05-12

## 2025-05-05 RX ORDER — FAMOTIDINE 10 MG/ML
20 INJECTION INTRAVENOUS ONCE
Status: COMPLETED | OUTPATIENT
Start: 2025-05-05 | End: 2025-05-05

## 2025-05-05 RX ORDER — ALBUTEROL SULFATE 0.83 MG/ML
3 SOLUTION RESPIRATORY (INHALATION) AS NEEDED
OUTPATIENT
Start: 2025-05-12

## 2025-05-05 RX ORDER — DIPHENHYDRAMINE HYDROCHLORIDE 50 MG/ML
25 INJECTION, SOLUTION INTRAMUSCULAR; INTRAVENOUS ONCE
Status: COMPLETED | OUTPATIENT
Start: 2025-05-05 | End: 2025-05-05

## 2025-05-05 RX ORDER — PROCHLORPERAZINE EDISYLATE 5 MG/ML
10 INJECTION INTRAMUSCULAR; INTRAVENOUS EVERY 6 HOURS PRN
Status: DISCONTINUED | OUTPATIENT
Start: 2025-05-05 | End: 2025-05-05 | Stop reason: HOSPADM

## 2025-05-05 RX ADMIN — IMMUNE GLOBULIN (HUMAN) 20 G: 10 INJECTION INTRAVENOUS; SUBCUTANEOUS at 10:00

## 2025-05-05 RX ADMIN — ACETAMINOPHEN 650 MG: 325 TABLET ORAL at 09:33

## 2025-05-05 RX ADMIN — FAMOTIDINE 20 MG: 10 INJECTION, SOLUTION INTRAVENOUS at 09:33

## 2025-05-05 RX ADMIN — TECLISTAMAB 153 MG: 90 INJECTION SUBCUTANEOUS at 10:28

## 2025-05-05 RX ADMIN — DIPHENHYDRAMINE HYDROCHLORIDE 25 MG: 50 INJECTION INTRAMUSCULAR; INTRAVENOUS at 09:34

## 2025-05-05 RX ADMIN — MONTELUKAST 10 MG: 10 TABLET, FILM COATED ORAL at 09:33

## 2025-05-05 ASSESSMENT — PAIN SCALES - GENERAL: PAINLEVEL_OUTOF10: 0-NO PAIN

## 2025-05-06 LAB
ALBUMIN SERPL BCP-MCNC: 4.2 G/DL (ref 3.4–5)
ALP SERPL-CCNC: 62 U/L (ref 33–136)
ALT SERPL W P-5'-P-CCNC: 35 U/L (ref 10–52)
ANION GAP SERPL CALC-SCNC: 18 MMOL/L (ref 10–20)
AST SERPL W P-5'-P-CCNC: 32 U/L (ref 9–39)
BILIRUB SERPL-MCNC: 0.5 MG/DL (ref 0–1.2)
BUN SERPL-MCNC: 23 MG/DL (ref 6–23)
CALCIUM SERPL-MCNC: 9.2 MG/DL (ref 8.6–10.6)
CHLORIDE SERPL-SCNC: 107 MMOL/L (ref 98–107)
CO2 SERPL-SCNC: 21 MMOL/L (ref 21–32)
CREAT SERPL-MCNC: 1.12 MG/DL (ref 0.5–1.3)
EGFRCR SERPLBLD CKD-EPI 2021: 69 ML/MIN/1.73M*2
GLUCOSE SERPL-MCNC: 102 MG/DL (ref 74–99)
IGA SERPL-MCNC: <7 MG/DL (ref 70–400)
IGG SERPL-MCNC: 575 MG/DL (ref 700–1600)
IGM SERPL-MCNC: 5 MG/DL (ref 40–230)
KAPPA LC SERPL-MCNC: 0.96 MG/DL (ref 0.33–1.94)
KAPPA LC/LAMBDA SER: ABNORMAL {RATIO}
LAMBDA LC SERPL-MCNC: <0.17 MG/DL (ref 0.57–2.63)
POTASSIUM SERPL-SCNC: 4.6 MMOL/L (ref 3.5–5.3)
PROT SERPL-MCNC: 5.9 G/DL (ref 6.4–8.2)
PROT SERPL-MCNC: 5.9 G/DL (ref 6.4–8.2)
SODIUM SERPL-SCNC: 141 MMOL/L (ref 136–145)

## 2025-05-12 ENCOUNTER — PROCEDURE VISIT (OUTPATIENT)
Dept: SLEEP MEDICINE | Facility: HOSPITAL | Age: 75
End: 2025-05-12
Payer: MEDICARE

## 2025-05-12 ENCOUNTER — LAB (OUTPATIENT)
Dept: LAB | Facility: CLINIC | Age: 75
End: 2025-05-12
Payer: MEDICARE

## 2025-05-12 ENCOUNTER — INFUSION (OUTPATIENT)
Dept: HEMATOLOGY/ONCOLOGY | Facility: CLINIC | Age: 75
End: 2025-05-12
Payer: MEDICARE

## 2025-05-12 VITALS
WEIGHT: 223.77 LBS | TEMPERATURE: 97.9 F | HEART RATE: 75 BPM | DIASTOLIC BLOOD PRESSURE: 77 MMHG | OXYGEN SATURATION: 96 % | SYSTOLIC BLOOD PRESSURE: 130 MMHG | BODY MASS INDEX: 31.86 KG/M2 | RESPIRATION RATE: 16 BRPM

## 2025-05-12 DIAGNOSIS — R06.83 SNORING: ICD-10-CM

## 2025-05-12 DIAGNOSIS — C90.00 IGG MULTIPLE MYELOMA (MULTI): ICD-10-CM

## 2025-05-12 LAB
BASOPHILS # BLD AUTO: 0 X10*3/UL (ref 0–0.1)
BASOPHILS NFR BLD AUTO: 0 %
EOSINOPHIL # BLD AUTO: 0.05 X10*3/UL (ref 0–0.4)
EOSINOPHIL NFR BLD AUTO: 1.7 %
ERYTHROCYTE [DISTWIDTH] IN BLOOD BY AUTOMATED COUNT: 13.2 % (ref 11.5–14.5)
HCT VFR BLD AUTO: 37.8 % (ref 41–52)
HGB BLD-MCNC: 13 G/DL (ref 13.5–17.5)
IMM GRANULOCYTES # BLD AUTO: 0 X10*3/UL (ref 0–0.5)
IMM GRANULOCYTES NFR BLD AUTO: 0 % (ref 0–0.9)
LYMPHOCYTES # BLD AUTO: 0.77 X10*3/UL (ref 0.8–3)
LYMPHOCYTES NFR BLD AUTO: 26.1 %
MCH RBC QN AUTO: 33.8 PG (ref 26–34)
MCHC RBC AUTO-ENTMCNC: 34.4 G/DL (ref 32–36)
MCV RBC AUTO: 98 FL (ref 80–100)
MONOCYTES # BLD AUTO: 0.4 X10*3/UL (ref 0.05–0.8)
MONOCYTES NFR BLD AUTO: 13.6 %
NEUTROPHILS # BLD AUTO: 1.73 X10*3/UL (ref 1.6–5.5)
NEUTROPHILS NFR BLD AUTO: 58.6 %
NRBC BLD-RTO: ABNORMAL /100{WBCS}
PLATELET # BLD AUTO: 98 X10*3/UL (ref 150–450)
RBC # BLD AUTO: 3.85 X10*6/UL (ref 4.5–5.9)
WBC # BLD AUTO: 3 X10*3/UL (ref 4.4–11.3)

## 2025-05-12 PROCEDURE — 95806 SLEEP STUDY UNATT&RESP EFFT: CPT | Performed by: PSYCHIATRY & NEUROLOGY

## 2025-05-12 PROCEDURE — 36415 COLL VENOUS BLD VENIPUNCTURE: CPT

## 2025-05-12 PROCEDURE — 2500000004 HC RX 250 GENERAL PHARMACY W/ HCPCS (ALT 636 FOR OP/ED): Mod: JZ,TB

## 2025-05-12 PROCEDURE — 85025 COMPLETE CBC W/AUTO DIFF WBC: CPT

## 2025-05-12 PROCEDURE — 96401 CHEMO ANTI-NEOPL SQ/IM: CPT

## 2025-05-12 RX ORDER — ALBUTEROL SULFATE 0.83 MG/ML
3 SOLUTION RESPIRATORY (INHALATION) AS NEEDED
Status: DISCONTINUED | OUTPATIENT
Start: 2025-05-12 | End: 2025-05-12 | Stop reason: HOSPADM

## 2025-05-12 RX ORDER — DIPHENHYDRAMINE HYDROCHLORIDE 50 MG/ML
50 INJECTION, SOLUTION INTRAMUSCULAR; INTRAVENOUS AS NEEDED
Status: DISCONTINUED | OUTPATIENT
Start: 2025-05-12 | End: 2025-05-12 | Stop reason: HOSPADM

## 2025-05-12 RX ORDER — PROCHLORPERAZINE EDISYLATE 5 MG/ML
10 INJECTION INTRAMUSCULAR; INTRAVENOUS EVERY 6 HOURS PRN
Status: DISCONTINUED | OUTPATIENT
Start: 2025-05-12 | End: 2025-05-12 | Stop reason: HOSPADM

## 2025-05-12 RX ORDER — PROCHLORPERAZINE MALEATE 10 MG
10 TABLET ORAL EVERY 6 HOURS PRN
Status: DISCONTINUED | OUTPATIENT
Start: 2025-05-12 | End: 2025-05-12 | Stop reason: HOSPADM

## 2025-05-12 RX ORDER — EPINEPHRINE 0.3 MG/.3ML
0.3 INJECTION SUBCUTANEOUS EVERY 5 MIN PRN
Status: DISCONTINUED | OUTPATIENT
Start: 2025-05-12 | End: 2025-05-12 | Stop reason: HOSPADM

## 2025-05-12 RX ORDER — FAMOTIDINE 10 MG/ML
20 INJECTION, SOLUTION INTRAVENOUS ONCE AS NEEDED
Status: DISCONTINUED | OUTPATIENT
Start: 2025-05-12 | End: 2025-05-12 | Stop reason: HOSPADM

## 2025-05-12 RX ADMIN — TECLISTAMAB 153 MG: 90 INJECTION SUBCUTANEOUS at 09:03

## 2025-05-12 ASSESSMENT — PAIN SCALES - GENERAL: PAINLEVEL_OUTOF10: 6

## 2025-05-12 NOTE — PROGRESS NOTES
[facility] Infusion Note      Maldonado Mccloud is a 74 y.o. year old male here today,05/12/25,  in the [facility] infusion center for  following treatment regimen:  Medications given today :    Recent Labs     05/12/25  0819   NEUTROABS 1.73   PLT 98*      Estimated Creatinine Clearance: 68.5 mL/min (by C-G formula based on SCr of 1.12 mg/dL).  BP Readings from Last 1 Encounters:   05/12/25 130/77       Hold treatment and notify provider if: ,  ANC LESS THAN 0.5 x 10E9/L,  Platelets LESS THAN 50 x 10E9/L,  Hemoglobin LESS THAN 8 g/dL    Teclistamab weekly- pain is controlled with tylenol - meets with provider next week - we scheduled next IVIG today for 6/2/25 0900) but needs scheduled for his weekly injections after the 19th .  ( Discussed needing orders) meets with Hawk next week as scheduled.  Vitals:    05/12/25 0826   BP: 130/77   Pulse: 75   Resp: 16   Temp: 36.6 °C (97.9 °F)   SpO2: 96%    on intake  Vitals:    05/12/25 0826   Weight: 101 kg (223 lb 12.3 oz)       Body surface area is 2.24 meters squared.    Pt tolerated and was discharged to home in stable condition. Pt ambulated  off the unit with a slow and steady gait. Pt had no further questions or concerns at this time. Has follow up appointment appropriately scheduled. Verbalized understanding of follow up. Made aware that appointments and times are always available online on my chart.

## 2025-05-19 ENCOUNTER — INFUSION (OUTPATIENT)
Dept: HEMATOLOGY/ONCOLOGY | Facility: CLINIC | Age: 75
End: 2025-05-19
Payer: MEDICARE

## 2025-05-19 ENCOUNTER — OFFICE VISIT (OUTPATIENT)
Dept: HEMATOLOGY/ONCOLOGY | Facility: CLINIC | Age: 75
End: 2025-05-19
Payer: MEDICARE

## 2025-05-19 ENCOUNTER — HOSPITAL ENCOUNTER (OUTPATIENT)
Dept: RADIOLOGY | Facility: CLINIC | Age: 75
Discharge: HOME | End: 2025-05-19
Payer: MEDICARE

## 2025-05-19 ENCOUNTER — LAB (OUTPATIENT)
Dept: LAB | Facility: CLINIC | Age: 75
End: 2025-05-19
Payer: MEDICARE

## 2025-05-19 VITALS
RESPIRATION RATE: 16 BRPM | WEIGHT: 223.99 LBS | DIASTOLIC BLOOD PRESSURE: 88 MMHG | SYSTOLIC BLOOD PRESSURE: 142 MMHG | TEMPERATURE: 97.7 F | BODY MASS INDEX: 31.89 KG/M2 | HEART RATE: 69 BPM | OXYGEN SATURATION: 96 %

## 2025-05-19 DIAGNOSIS — D84.9 IMMUNOCOMPROMISED: ICD-10-CM

## 2025-05-19 DIAGNOSIS — G47.33 OSA (OBSTRUCTIVE SLEEP APNEA): Primary | ICD-10-CM

## 2025-05-19 DIAGNOSIS — C90.00 IGG MULTIPLE MYELOMA (MULTI): ICD-10-CM

## 2025-05-19 DIAGNOSIS — C90.00 HYPOGAMMAGLOBULINEMIA DUE TO MULTIPLE MYELOMA (MULTI): ICD-10-CM

## 2025-05-19 DIAGNOSIS — J06.9 ACUTE UPPER RESPIRATORY INFECTION, UNSPECIFIED: ICD-10-CM

## 2025-05-19 DIAGNOSIS — Z94.84 STEM CELLS TRANSPLANT STATUS (MULTI): ICD-10-CM

## 2025-05-19 DIAGNOSIS — C90.00 IGG MULTIPLE MYELOMA (MULTI): Primary | ICD-10-CM

## 2025-05-19 DIAGNOSIS — R05.9 COUGH, UNSPECIFIED TYPE: ICD-10-CM

## 2025-05-19 DIAGNOSIS — Z51.12 ENCOUNTER FOR ANTINEOPLASTIC IMMUNOTHERAPY: ICD-10-CM

## 2025-05-19 DIAGNOSIS — Z92.850 HISTORY OF CHIMERIC ANTIGEN RECEPTOR T-CELL THERAPY: ICD-10-CM

## 2025-05-19 DIAGNOSIS — D80.1 HYPOGAMMAGLOBULINEMIA DUE TO MULTIPLE MYELOMA (MULTI): ICD-10-CM

## 2025-05-19 DIAGNOSIS — G89.3 CANCER ASSOCIATED PAIN: ICD-10-CM

## 2025-05-19 DIAGNOSIS — D70.1 CHEMOTHERAPY-INDUCED NEUTROPENIA: ICD-10-CM

## 2025-05-19 DIAGNOSIS — T45.1X5A CHEMOTHERAPY-INDUCED NEUTROPENIA: ICD-10-CM

## 2025-05-19 LAB
BASOPHILS # BLD AUTO: 0.01 X10*3/UL (ref 0–0.1)
BASOPHILS NFR BLD AUTO: 0.3 %
EOSINOPHIL # BLD AUTO: 0.07 X10*3/UL (ref 0–0.4)
EOSINOPHIL NFR BLD AUTO: 2 %
ERYTHROCYTE [DISTWIDTH] IN BLOOD BY AUTOMATED COUNT: 13.1 % (ref 11.5–14.5)
FLUAV RNA RESP QL NAA+PROBE: NOT DETECTED
FLUBV RNA RESP QL NAA+PROBE: NOT DETECTED
HADV DNA SPEC QL NAA+PROBE: NOT DETECTED
HCT VFR BLD AUTO: 37.7 % (ref 41–52)
HGB BLD-MCNC: 13 G/DL (ref 13.5–17.5)
HMPV RNA SPEC QL NAA+PROBE: NOT DETECTED
HPIV1 RNA SPEC QL NAA+PROBE: NOT DETECTED
HPIV2 RNA SPEC QL NAA+PROBE: NOT DETECTED
HPIV3 RNA SPEC QL NAA+PROBE: NOT DETECTED
HPIV4 RNA SPEC QL NAA+PROBE: NOT DETECTED
IMM GRANULOCYTES # BLD AUTO: 0.01 X10*3/UL (ref 0–0.5)
IMM GRANULOCYTES NFR BLD AUTO: 0.3 % (ref 0–0.9)
LYMPHOCYTES # BLD AUTO: 1.03 X10*3/UL (ref 0.8–3)
LYMPHOCYTES NFR BLD AUTO: 30.1 %
MCH RBC QN AUTO: 33.1 PG (ref 26–34)
MCHC RBC AUTO-ENTMCNC: 34.5 G/DL (ref 32–36)
MCV RBC AUTO: 96 FL (ref 80–100)
MONOCYTES # BLD AUTO: 0.56 X10*3/UL (ref 0.05–0.8)
MONOCYTES NFR BLD AUTO: 16.4 %
NEUTROPHILS # BLD AUTO: 1.74 X10*3/UL (ref 1.6–5.5)
NEUTROPHILS NFR BLD AUTO: 50.9 %
NRBC BLD-RTO: ABNORMAL /100{WBCS}
PLATELET # BLD AUTO: 94 X10*3/UL (ref 150–450)
RBC # BLD AUTO: 3.93 X10*6/UL (ref 4.5–5.9)
RHINOVIRUS RNA UPPER RESP QL NAA+PROBE: DETECTED
RSV RNA RESP QL NAA+PROBE: NOT DETECTED
WBC # BLD AUTO: 3.4 X10*3/UL (ref 4.4–11.3)

## 2025-05-19 PROCEDURE — 87486 CHLMYD PNEUM DNA AMP PROBE: CPT

## 2025-05-19 PROCEDURE — 87798 DETECT AGENT NOS DNA AMP: CPT

## 2025-05-19 PROCEDURE — 36415 COLL VENOUS BLD VENIPUNCTURE: CPT

## 2025-05-19 PROCEDURE — 87634 RSV DNA/RNA AMP PROBE: CPT

## 2025-05-19 PROCEDURE — 96401 CHEMO ANTI-NEOPL SQ/IM: CPT

## 2025-05-19 PROCEDURE — 99215 OFFICE O/P EST HI 40 MIN: CPT

## 2025-05-19 PROCEDURE — G2211 COMPLEX E/M VISIT ADD ON: HCPCS

## 2025-05-19 PROCEDURE — 72050 X-RAY EXAM NECK SPINE 4/5VWS: CPT | Performed by: RADIOLOGY

## 2025-05-19 PROCEDURE — 87631 RESP VIRUS 3-5 TARGETS: CPT

## 2025-05-19 PROCEDURE — 1159F MED LIST DOCD IN RCRD: CPT

## 2025-05-19 PROCEDURE — 2500000004 HC RX 250 GENERAL PHARMACY W/ HCPCS (ALT 636 FOR OP/ED): Mod: JZ,TB

## 2025-05-19 PROCEDURE — 95806 SLEEP STUDY UNATT&RESP EFFT: CPT | Performed by: PSYCHIATRY & NEUROLOGY

## 2025-05-19 PROCEDURE — 1126F AMNT PAIN NOTED NONE PRSNT: CPT

## 2025-05-19 PROCEDURE — 3077F SYST BP >= 140 MM HG: CPT

## 2025-05-19 PROCEDURE — 73030 X-RAY EXAM OF SHOULDER: CPT | Mod: LT

## 2025-05-19 PROCEDURE — 72050 X-RAY EXAM NECK SPINE 4/5VWS: CPT

## 2025-05-19 PROCEDURE — 73030 X-RAY EXAM OF SHOULDER: CPT | Mod: LEFT SIDE | Performed by: RADIOLOGY

## 2025-05-19 PROCEDURE — 3079F DIAST BP 80-89 MM HG: CPT

## 2025-05-19 PROCEDURE — 85025 COMPLETE CBC W/AUTO DIFF WBC: CPT

## 2025-05-19 RX ORDER — ALBUTEROL SULFATE 0.83 MG/ML
3 SOLUTION RESPIRATORY (INHALATION) AS NEEDED
OUTPATIENT
Start: 2025-06-30

## 2025-05-19 RX ORDER — DIPHENHYDRAMINE HYDROCHLORIDE 50 MG/ML
50 INJECTION, SOLUTION INTRAMUSCULAR; INTRAVENOUS AS NEEDED
OUTPATIENT
Start: 2025-06-30

## 2025-05-19 RX ORDER — DIPHENHYDRAMINE HYDROCHLORIDE 50 MG/ML
50 INJECTION, SOLUTION INTRAMUSCULAR; INTRAVENOUS AS NEEDED
OUTPATIENT
Start: 2025-06-23

## 2025-05-19 RX ORDER — ALBUTEROL SULFATE 0.83 MG/ML
3 SOLUTION RESPIRATORY (INHALATION) AS NEEDED
OUTPATIENT
Start: 2025-07-14

## 2025-05-19 RX ORDER — HYDROCODONE BITARTRATE AND HOMATROPINE METHYLBROMIDE ORAL SOLUTION 5; 1.5 MG/5ML; MG/5ML
5 LIQUID ORAL EVERY 6 HOURS PRN
Qty: 200 ML | Refills: 0 | Status: SHIPPED | OUTPATIENT
Start: 2025-05-19 | End: 2025-05-29

## 2025-05-19 RX ORDER — PROCHLORPERAZINE EDISYLATE 5 MG/ML
10 INJECTION INTRAMUSCULAR; INTRAVENOUS EVERY 6 HOURS PRN
OUTPATIENT
Start: 2025-06-16

## 2025-05-19 RX ORDER — DIPHENHYDRAMINE HYDROCHLORIDE 50 MG/ML
50 INJECTION, SOLUTION INTRAMUSCULAR; INTRAVENOUS AS NEEDED
OUTPATIENT
Start: 2025-07-07

## 2025-05-19 RX ORDER — ALBUTEROL SULFATE 0.83 MG/ML
3 SOLUTION RESPIRATORY (INHALATION) AS NEEDED
OUTPATIENT
Start: 2025-07-07

## 2025-05-19 RX ORDER — EPINEPHRINE 0.3 MG/.3ML
0.3 INJECTION SUBCUTANEOUS EVERY 5 MIN PRN
Status: DISCONTINUED | OUTPATIENT
Start: 2025-05-19 | End: 2025-05-19 | Stop reason: HOSPADM

## 2025-05-19 RX ORDER — PROCHLORPERAZINE MALEATE 10 MG
10 TABLET ORAL EVERY 6 HOURS PRN
OUTPATIENT
Start: 2025-06-30

## 2025-05-19 RX ORDER — FAMOTIDINE 10 MG/ML
20 INJECTION, SOLUTION INTRAVENOUS ONCE AS NEEDED
OUTPATIENT
Start: 2025-06-16

## 2025-05-19 RX ORDER — OXYCODONE HYDROCHLORIDE 5 MG/1
5 TABLET ORAL EVERY 6 HOURS PRN
Qty: 120 TABLET | Refills: 0 | Status: SHIPPED | OUTPATIENT
Start: 2025-05-19 | End: 2025-06-18

## 2025-05-19 RX ORDER — PROCHLORPERAZINE EDISYLATE 5 MG/ML
10 INJECTION INTRAMUSCULAR; INTRAVENOUS EVERY 6 HOURS PRN
OUTPATIENT
Start: 2025-06-09

## 2025-05-19 RX ORDER — PROCHLORPERAZINE MALEATE 10 MG
10 TABLET ORAL EVERY 6 HOURS PRN
Status: DISCONTINUED | OUTPATIENT
Start: 2025-05-19 | End: 2025-05-19 | Stop reason: HOSPADM

## 2025-05-19 RX ORDER — EPINEPHRINE 0.3 MG/.3ML
0.3 INJECTION SUBCUTANEOUS EVERY 5 MIN PRN
OUTPATIENT
Start: 2025-07-07

## 2025-05-19 RX ORDER — EPINEPHRINE 0.3 MG/.3ML
0.3 INJECTION SUBCUTANEOUS EVERY 5 MIN PRN
OUTPATIENT
Start: 2025-06-02

## 2025-05-19 RX ORDER — DIPHENHYDRAMINE HYDROCHLORIDE 50 MG/ML
50 INJECTION, SOLUTION INTRAMUSCULAR; INTRAVENOUS AS NEEDED
OUTPATIENT
Start: 2025-07-21

## 2025-05-19 RX ORDER — PROCHLORPERAZINE EDISYLATE 5 MG/ML
10 INJECTION INTRAMUSCULAR; INTRAVENOUS EVERY 6 HOURS PRN
Status: DISCONTINUED | OUTPATIENT
Start: 2025-05-19 | End: 2025-05-19 | Stop reason: HOSPADM

## 2025-05-19 RX ORDER — ALBUTEROL SULFATE 0.83 MG/ML
3 SOLUTION RESPIRATORY (INHALATION) AS NEEDED
OUTPATIENT
Start: 2025-06-02

## 2025-05-19 RX ORDER — PROCHLORPERAZINE MALEATE 10 MG
10 TABLET ORAL EVERY 6 HOURS PRN
OUTPATIENT
Start: 2025-07-14

## 2025-05-19 RX ORDER — ALBUTEROL SULFATE 0.83 MG/ML
3 SOLUTION RESPIRATORY (INHALATION) AS NEEDED
OUTPATIENT
Start: 2025-06-09

## 2025-05-19 RX ORDER — ALBUTEROL SULFATE 0.83 MG/ML
3 SOLUTION RESPIRATORY (INHALATION) AS NEEDED
OUTPATIENT
Start: 2025-06-16

## 2025-05-19 RX ORDER — FAMOTIDINE 10 MG/ML
20 INJECTION, SOLUTION INTRAVENOUS ONCE AS NEEDED
OUTPATIENT
Start: 2025-06-30

## 2025-05-19 RX ORDER — EPINEPHRINE 0.3 MG/.3ML
0.3 INJECTION SUBCUTANEOUS EVERY 5 MIN PRN
OUTPATIENT
Start: 2025-07-14

## 2025-05-19 RX ORDER — PROCHLORPERAZINE MALEATE 10 MG
10 TABLET ORAL EVERY 6 HOURS PRN
OUTPATIENT
Start: 2025-06-16

## 2025-05-19 RX ORDER — FAMOTIDINE 10 MG/ML
20 INJECTION, SOLUTION INTRAVENOUS ONCE AS NEEDED
OUTPATIENT
Start: 2025-06-23

## 2025-05-19 RX ORDER — PROCHLORPERAZINE EDISYLATE 5 MG/ML
10 INJECTION INTRAMUSCULAR; INTRAVENOUS EVERY 6 HOURS PRN
OUTPATIENT
Start: 2025-06-30

## 2025-05-19 RX ORDER — FAMOTIDINE 10 MG/ML
20 INJECTION, SOLUTION INTRAVENOUS ONCE AS NEEDED
OUTPATIENT
Start: 2025-07-21

## 2025-05-19 RX ORDER — PROCHLORPERAZINE EDISYLATE 5 MG/ML
10 INJECTION INTRAMUSCULAR; INTRAVENOUS EVERY 6 HOURS PRN
OUTPATIENT
Start: 2025-07-21

## 2025-05-19 RX ORDER — PROCHLORPERAZINE MALEATE 10 MG
10 TABLET ORAL EVERY 6 HOURS PRN
OUTPATIENT
Start: 2025-06-02

## 2025-05-19 RX ORDER — DIPHENHYDRAMINE HYDROCHLORIDE 50 MG/ML
50 INJECTION, SOLUTION INTRAMUSCULAR; INTRAVENOUS AS NEEDED
OUTPATIENT
Start: 2025-07-14

## 2025-05-19 RX ORDER — PROCHLORPERAZINE EDISYLATE 5 MG/ML
10 INJECTION INTRAMUSCULAR; INTRAVENOUS EVERY 6 HOURS PRN
OUTPATIENT
Start: 2025-06-02

## 2025-05-19 RX ORDER — FAMOTIDINE 10 MG/ML
20 INJECTION, SOLUTION INTRAVENOUS ONCE AS NEEDED
OUTPATIENT
Start: 2025-07-14

## 2025-05-19 RX ORDER — PROCHLORPERAZINE MALEATE 10 MG
10 TABLET ORAL EVERY 6 HOURS PRN
OUTPATIENT
Start: 2025-06-23

## 2025-05-19 RX ORDER — EPINEPHRINE 0.3 MG/.3ML
0.3 INJECTION SUBCUTANEOUS EVERY 5 MIN PRN
OUTPATIENT
Start: 2025-07-21

## 2025-05-19 RX ORDER — ALBUTEROL SULFATE 0.83 MG/ML
3 SOLUTION RESPIRATORY (INHALATION) AS NEEDED
OUTPATIENT
Start: 2025-07-21

## 2025-05-19 RX ORDER — FAMOTIDINE 10 MG/ML
20 INJECTION, SOLUTION INTRAVENOUS ONCE AS NEEDED
OUTPATIENT
Start: 2025-06-02

## 2025-05-19 RX ORDER — PROCHLORPERAZINE MALEATE 10 MG
10 TABLET ORAL EVERY 6 HOURS PRN
OUTPATIENT
Start: 2025-06-09

## 2025-05-19 RX ORDER — DIPHENHYDRAMINE HYDROCHLORIDE 50 MG/ML
50 INJECTION, SOLUTION INTRAMUSCULAR; INTRAVENOUS AS NEEDED
OUTPATIENT
Start: 2025-06-09

## 2025-05-19 RX ORDER — FAMOTIDINE 10 MG/ML
20 INJECTION, SOLUTION INTRAVENOUS ONCE AS NEEDED
OUTPATIENT
Start: 2025-07-07

## 2025-05-19 RX ORDER — DIPHENHYDRAMINE HYDROCHLORIDE 50 MG/ML
50 INJECTION, SOLUTION INTRAMUSCULAR; INTRAVENOUS AS NEEDED
OUTPATIENT
Start: 2025-06-02

## 2025-05-19 RX ORDER — FAMOTIDINE 10 MG/ML
20 INJECTION, SOLUTION INTRAVENOUS ONCE AS NEEDED
Status: DISCONTINUED | OUTPATIENT
Start: 2025-05-19 | End: 2025-05-19 | Stop reason: HOSPADM

## 2025-05-19 RX ORDER — FAMOTIDINE 10 MG/ML
20 INJECTION, SOLUTION INTRAVENOUS ONCE AS NEEDED
OUTPATIENT
Start: 2025-06-09

## 2025-05-19 RX ORDER — DIPHENHYDRAMINE HYDROCHLORIDE 50 MG/ML
50 INJECTION, SOLUTION INTRAMUSCULAR; INTRAVENOUS AS NEEDED
Status: DISCONTINUED | OUTPATIENT
Start: 2025-05-19 | End: 2025-05-19 | Stop reason: HOSPADM

## 2025-05-19 RX ORDER — DIPHENHYDRAMINE HYDROCHLORIDE 50 MG/ML
50 INJECTION, SOLUTION INTRAMUSCULAR; INTRAVENOUS AS NEEDED
OUTPATIENT
Start: 2025-06-16

## 2025-05-19 RX ORDER — PROCHLORPERAZINE EDISYLATE 5 MG/ML
10 INJECTION INTRAMUSCULAR; INTRAVENOUS EVERY 6 HOURS PRN
OUTPATIENT
Start: 2025-06-23

## 2025-05-19 RX ORDER — PROCHLORPERAZINE MALEATE 10 MG
10 TABLET ORAL EVERY 6 HOURS PRN
OUTPATIENT
Start: 2025-07-21

## 2025-05-19 RX ORDER — EPINEPHRINE 0.3 MG/.3ML
0.3 INJECTION SUBCUTANEOUS EVERY 5 MIN PRN
OUTPATIENT
Start: 2025-06-30

## 2025-05-19 RX ORDER — EPINEPHRINE 0.3 MG/.3ML
0.3 INJECTION SUBCUTANEOUS EVERY 5 MIN PRN
OUTPATIENT
Start: 2025-06-23

## 2025-05-19 RX ORDER — PROCHLORPERAZINE EDISYLATE 5 MG/ML
10 INJECTION INTRAMUSCULAR; INTRAVENOUS EVERY 6 HOURS PRN
OUTPATIENT
Start: 2025-07-14

## 2025-05-19 RX ORDER — PROCHLORPERAZINE EDISYLATE 5 MG/ML
10 INJECTION INTRAMUSCULAR; INTRAVENOUS EVERY 6 HOURS PRN
OUTPATIENT
Start: 2025-07-07

## 2025-05-19 RX ORDER — EPINEPHRINE 0.3 MG/.3ML
0.3 INJECTION SUBCUTANEOUS EVERY 5 MIN PRN
OUTPATIENT
Start: 2025-06-16

## 2025-05-19 RX ORDER — ALBUTEROL SULFATE 0.83 MG/ML
3 SOLUTION RESPIRATORY (INHALATION) AS NEEDED
Status: DISCONTINUED | OUTPATIENT
Start: 2025-05-19 | End: 2025-05-19 | Stop reason: HOSPADM

## 2025-05-19 RX ORDER — EPINEPHRINE 0.3 MG/.3ML
0.3 INJECTION SUBCUTANEOUS EVERY 5 MIN PRN
OUTPATIENT
Start: 2025-06-09

## 2025-05-19 RX ORDER — ALBUTEROL SULFATE 0.83 MG/ML
3 SOLUTION RESPIRATORY (INHALATION) AS NEEDED
OUTPATIENT
Start: 2025-06-23

## 2025-05-19 RX ORDER — PROCHLORPERAZINE MALEATE 10 MG
10 TABLET ORAL EVERY 6 HOURS PRN
OUTPATIENT
Start: 2025-07-07

## 2025-05-19 RX ADMIN — TECLISTAMAB 153 MG: 90 INJECTION SUBCUTANEOUS at 09:55

## 2025-05-19 ASSESSMENT — ENCOUNTER SYMPTOMS
CARDIOVASCULAR NEGATIVE: 1
HEMATOLOGIC/LYMPHATIC NEGATIVE: 1
ARTHRALGIAS: 1
NECK PAIN: 1
COUGH: 1
ENDOCRINE NEGATIVE: 1
PSYCHIATRIC NEGATIVE: 1
NUMBNESS: 1
FATIGUE: 1
GASTROINTESTINAL NEGATIVE: 1
ALLERGIC/IMMUNOLOGIC NEGATIVE: 1
EYES NEGATIVE: 1

## 2025-05-19 ASSESSMENT — PAIN SCALES - GENERAL: PAINLEVEL_OUTOF10: 0-NO PAIN

## 2025-05-19 NOTE — PROGRESS NOTES
[facility] Infusion Note      Maldonado Mccloud is a 74 y.o. year old male here today,05/19/25,  in the [facility] infusion center for  following treatment regimen:  Medications given today :    Recent Labs     05/19/25  0822   NEUTROABS 1.74   PLT 94*   HGB 13.0*      CrCl cannot be calculated (Patient's most recent lab result is older than the maximum 7 days allowed.).  BP Readings from Last 2 Encounters:   05/19/25 142/88   05/12/25 130/77       Hold treatment and notify provider if: ,  ANC LESS THAN 0.5 x 10E9/L,  Platelets LESS THAN 50 x 10E9/L,  Hemoglobin LESS THAN 8 g/dL      Administrations This Visit       teclistamab-cqyv (Tecvayli) subcutaneous solution 153 mg       Admin Date  05/19/2025 Action  Given Dose  153 mg Route  subcutaneous Documented By  Chapin Duncan, RN                    Sputum/ nasal swab as ordered  Pt tolerated and was discharged to home in stable condition. Pt ambulated  off the unit with a slow and steady gait. Pt had no further questions or concerns at this time. Has follow up appointment appropriately scheduled. Verbalized understanding of follow up. Made aware that appointments and times are always available online on my chart.

## 2025-05-19 NOTE — PROGRESS NOTES
Patient ID: Maldonado Mccloud is a 74 y.o. male.  Referring Physician: No referring provider defined for this encounter.  Primary Care Provider: Allison Wang DO    Interval hx:  Cuong presents to clinic 25 with his wife Farida for a sick visit.     He is doing okay.    Has been having pain in his L neck/shoulder area. He has been taking Tylenol PRN. Has also had to resort to taking oxycodone at times.    Cough and congestion over the past week or so. Using Mucinex with some relief.       Review of Systems   Constitutional:  Positive for fatigue.   HENT:  Positive for congestion and postnasal drip.    Eyes: Negative.    Respiratory:  Positive for cough.    Cardiovascular: Negative.    Gastrointestinal: Negative.    Endocrine: Negative.    Genitourinary: Negative.    Musculoskeletal:  Positive for arthralgias and neck pain.   Skin: Negative.    Allergic/Immunologic: Negative.    Neurological:  Positive for numbness.   Hematological: Negative.    Psychiatric/Behavioral: Negative.        PMHx:  1) Multiple myeloma (see below)  2) Squamous cell ca's skin  3) DVT, LLE ; now off AC  4) Neuropathy  5) S/p right radius fracture 2023  6) HTN  7) Afib (transient in  w/stem cell collection)    FamHx  Dad  of pan ca age 92    SocHx:   w/5 kids; smokes cigars; no cigs in 50 years; no EtOH or illicit drugs. Was in [employer] in [place]    Meds:  Were reviewed w/the pt  All:  [employer]  PCN  Zosyn    Physical Exam  Physical Exam  Constitutional:       Appearance: Normal appearance. He is normal weight.   HENT:      Head: Normocephalic and atraumatic.      Nose: Nose normal.      Mouth/Throat:      Mouth: Mucous membranes are moist.      Pharynx: Oropharynx is clear.   Eyes:      Conjunctiva/sclera: Conjunctivae normal.      Pupils: Pupils are equal, round, and reactive to light.   Cardiovascular:      Rate and Rhythm: Normal rate and regular rhythm.      Pulses: Normal pulses.      Heart sounds: Normal  heart sounds.   Pulmonary:      Effort: Pulmonary effort is normal.      Breath sounds: Normal breath sounds.   Abdominal:      General: Abdomen is flat. Bowel sounds are normal.      Palpations: Abdomen is soft.   Musculoskeletal:         General: Normal range of motion.      Cervical back: Normal range of motion and neck supple. Tenderness present.   Skin:     General: Skin is warm and dry.   Neurological:      General: No focal deficit present.      Mental Status: He is alert and oriented to person, place, and time. Mental status is at baseline.   Psychiatric:         Mood and Affect: Mood normal.         Behavior: Behavior normal.         Thought Content: Thought content normal.         Judgment: Judgment normal.       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; Bone marrow biopsy suggested multiple myeloma IgG kappa, ISS Stage II, bone survey revealed lytic lesions; Urine light chains present kappa restricted with 2gm proteinuria; s/p VRD x 3 cycles with CR; then s/p auto SCTx 12/23/15 complicated by orthostatic hypotension, electrolyte replacement, drug induced rash, culture negative fever, then s/p approximately 5 weeks of maintenance Revlimid 10 mg daily which was held for worsening neuropathy in August 2016; then in 3/ 2018: IgG M Braxton alexei to 0.2gm/L and light chain alexei was; restarted on Revlimid maintenance at 5mg every other day, then revlimid stopped in 3/2021 due to stable disease; then in 7/2021 his M spike was on the rise, and was enrolled onto Vactosertib/Pom trial on 8/5/2021; then progressed in 6/2022, switched to  "Isatuximab/Carfilzomib on 6/30/22, then in 9/2022 progressed and was switched to Cytoxan/Carfilzomib; then had therapy placed on hold in 11/2022 due to need for hip replacement surgery; myeloma markers were on the rise in 3/2023 with a new jaw lesion, s/p XRT 4/2023 and resume 2x/week carfilzomib; then s/p CAR T w/ABECMA (T=0, 10/25/23), hospital course complicated by grade 1 ICANS managed w/ Dex 10mg x3 days and 1 dose tociluzimab; then w/progression and started on teclistamab, full dosing given on 10/13/24] here for follow-up.     As for relapsed myeloma:  He has been continued on weekly teclistamab and tolerating this well, so far only has had an early episode of grade I CRS (cx neg low grade fever x 1, not recurred). He has not had any other toxicities thus far. In addition, there has been a continued biochemical response: serum free kappa decreasing from 15.6mg/dL (= 156mg/L) to <0.08, as of 2/10/25. However, the recent repeat PET/CT from 3/2025 revealed the following:  \"Interval development of new FDG uptake within left acetabulum with increase in FDG uptake within left iliac lesion, consistent with active myeloma\" but also \"interval decrease to stable FDG uptake within multiple lytic lesions in left femoral diaphysis, right iliac crest, L4 vertebral body and right proximal clavicle\" and \"non FDG avid lesions within left posterior 5th rib, sternum and right radius, consistent with treated disease\". Of note, the new area of FDG uptake in the left acetabulum had a max SUV of only 0.9. The significance of this finding is not clear; although it may represent a very early recurrence, it could also represent tumor flare (which has been reported w/BiTEs) or be a non-specific finding. Given the lack of other findings to suggest progression (and the excellent biochemical response), I recommended to not stop the teclistamab at this time.    Plan for follow up with Dr. Huff 5/22.     As for neutropenia:  Probably from " teclistamab; cont prn weekly neupogen to keep ANC >500    Infection ppx:  Cont acyclovir and bactrim ppx; monitor IgG levels and cont monthly ppx IVIG (NOTE: he should receive Gammunex -C, d/t prior severe reaction to gammard).    As for sens NP:  No effect w/cymbalta, so last visit we stopped this medication. Previously offered adding gabapentin but the pt and his wife declined this, as he is really is not having any associated pain.     As for new L shoulder/neck pain:   Worsening over past month. Ordered x-ray of L shoulder/neck. Will plan to follow up. Using PRN Tylenol and Oxy.     As for URI:   Cough and congestion. Mucinex providing some relief. Sent Hycodan for cough.     RTC:  Dr. Huff 5/22

## 2025-05-20 ENCOUNTER — TELEPHONE (OUTPATIENT)
Dept: NEUROSURGERY | Facility: CLINIC | Age: 75
End: 2025-05-20
Payer: MEDICARE

## 2025-05-20 DIAGNOSIS — G47.33 OBSTRUCTIVE SLEEP APNEA SYNDROME: Primary | ICD-10-CM

## 2025-05-20 NOTE — TELEPHONE ENCOUNTER
----- Message from Libby Ellis sent at 5/20/2025  2:31 PM EDT -----  Please let the patient know that his sleep study is showing moderate sleep apnea.  The recommendation is for it to be treated. I have placed a referral to sleep medicine and would like him to see   them for treatment. He should also discuss the results with his PCP as sometimes they will treat it.   ----- Message -----  From: Toya Odonnell  Sent: 5/19/2025  10:51 AM EDT  To: Libby Ellis, DO

## 2025-05-22 ENCOUNTER — HOSPITAL ENCOUNTER (OUTPATIENT)
Dept: RADIOLOGY | Facility: HOSPITAL | Age: 75
Discharge: HOME | End: 2025-05-22
Payer: MEDICARE

## 2025-05-22 ENCOUNTER — OFFICE VISIT (OUTPATIENT)
Dept: HEMATOLOGY/ONCOLOGY | Facility: HOSPITAL | Age: 75
End: 2025-05-22
Payer: MEDICARE

## 2025-05-22 VITALS
SYSTOLIC BLOOD PRESSURE: 128 MMHG | RESPIRATION RATE: 16 BRPM | TEMPERATURE: 97 F | OXYGEN SATURATION: 100 % | BODY MASS INDEX: 30.82 KG/M2 | DIASTOLIC BLOOD PRESSURE: 84 MMHG | HEART RATE: 67 BPM | WEIGHT: 216.5 LBS

## 2025-05-22 DIAGNOSIS — C90.00 IGG MULTIPLE MYELOMA (MULTI): ICD-10-CM

## 2025-05-22 DIAGNOSIS — C90.00 IGG MULTIPLE MYELOMA (MULTI): Primary | ICD-10-CM

## 2025-05-22 PROCEDURE — 2550000001 HC RX 255 CONTRASTS: Performed by: INTERNAL MEDICINE

## 2025-05-22 PROCEDURE — 99214 OFFICE O/P EST MOD 30 MIN: CPT | Performed by: INTERNAL MEDICINE

## 2025-05-22 PROCEDURE — 3074F SYST BP LT 130 MM HG: CPT | Performed by: INTERNAL MEDICINE

## 2025-05-22 PROCEDURE — 73223 MRI JOINT UPR EXTR W/O&W/DYE: CPT | Mod: LT

## 2025-05-22 PROCEDURE — 3079F DIAST BP 80-89 MM HG: CPT | Performed by: INTERNAL MEDICINE

## 2025-05-22 PROCEDURE — A9575 INJ GADOTERATE MEGLUMI 0.1ML: HCPCS | Performed by: INTERNAL MEDICINE

## 2025-05-22 PROCEDURE — 72156 MRI NECK SPINE W/O & W/DYE: CPT

## 2025-05-22 PROCEDURE — 1126F AMNT PAIN NOTED NONE PRSNT: CPT | Performed by: INTERNAL MEDICINE

## 2025-05-22 RX ORDER — GADOTERATE MEGLUMINE 376.9 MG/ML
0.2 INJECTION INTRAVENOUS
Status: COMPLETED | OUTPATIENT
Start: 2025-05-22 | End: 2025-05-22

## 2025-05-22 RX ADMIN — GADOTERATE MEGLUMINE 19.5 ML: 376.9 INJECTION INTRAVENOUS at 15:15

## 2025-05-22 ASSESSMENT — PAIN SCALES - GENERAL: PAINLEVEL_OUTOF10: 0-NO PAIN

## 2025-05-23 NOTE — PROGRESS NOTES
Patient ID: Maldonado Mccloud is a 74 y.o. male.  Referring Physician: No referring provider defined for this encounter.  Primary Care Provider: Timothy Almodovar MD    Interval hx:  Since last visit he has had worsening left shoulder and neck pains, for which he takes around 8-12 tylenols/day and an occasional oxycodone, often w/minimal relief. He has chronic mild lower bk pains w/o change and does have chronic left shoulder pains, worse x 2-3 months. No left arm weakness or radiation of pain down left arm.      PMHx:  1) Multiple myeloma (see below)  2) Squamous cell ca's skin  3) DVT, LLE ; now off AC  4) Neuropathy  5) S/p right radius fracture 2023  6) HTN  7) Afib (transient in  w/stem cell collection)    FamHx  Dad  of pan ca age 92    SocHx:   w/5 kids; smokes cigars; no cigs in 50 years; no EtOH or illicit drugs. Was in [employer] in [place]    Meds:  Were reviewed w/the pt  All:  [employer]  PCN  Zosyn    Physical Exam  General: Ambulatory, looked comfortable, not requiring supplemental oxygen  Vital Signs   /84; P 82; afebrile; RR 16/min; Wt= 102.5 (gained 1.8 kg)  HEENT: no oral lesions, tonsillar or tongue enlargement; Neck: no masses; Lymph nodes: no palpable cervical, supraclavicular, axillary, epitrochear or inguinal nodes; Heart: no murmurs; Lungs: clear; Abd: soft, +bowel sounds, non-tender, no palpable liver or spleen; Skin: faint, ellipitical rash on right abdominal wall, otherwise no rashes; Ext's: No LE edema; Neuro: alert, answered questions appropriately, 5/5 motor strength upper and lower extremities    Performance Status:  Symptomatic; fully ambulatory      Assessment/recommendations:  74 year old man w/a hx of LLE DVT (, now off AC), HTN, neuropathy, multiple skin cancers and multiply relapsed multiple myeloma [presented with 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; Bone marrow biopsy suggested multiple myeloma IgG kappa, ISS Stage II, bone  "survey revealed lytic lesions; Urine light chains present kappa restricted with 2gm proteinuria; s/p VRD x 3 cycles with CR; then s/p auto SCTx 12/23/15 complicated by orthostatic hypotension, electrolyte replacement, drug induced rash, culture negative fever, then s/p approximately 5 weeks of maintenance Revlimid 10 mg daily which was held for worsening neuropathy in August 2016; then in 3/ 2018: IgG M Braxton alexei to 0.2gm/L and light chain alexei was; restarted on Revlimid maintenance at 5mg every other day, then revlimid stopped in 3/2021 due to stable disease; then in 7/2021 his M spike was on the rise, and was enrolled onto Vactosertib/Pom trial on 8/5/2021; then progressed in 6/2022, switched to Isatuximab/Carfilzomib on 6/30/22, then in 9/2022 progressed and was switched to Cytoxan/Carfilzomib; then had therapy placed on hold in 11/2022 due to need for hip replacement surgery; myeloma markers were on the rise in 3/2023 with a new jaw lesion, s/p XRT 4/2023 and resume 2x/week carfilzomib; then s/p CAR T w/ABECMA (T=0, 10/25/23), hospital course complicated by grade 1 ICANS managed w/ Dex 10mg x3 days and 1 dose tociluzimab; then w/progression and started on teclistamab, full dosing given on 10/13/24] here for follow-up c/o worsening left shoulder and neck pains.       Plain films of c-spine and left shoulder from 2d ago revealed the following:  \"Severe facet disease worse in the mid cervical spine and on the left. Moderate severe multilevel spondylosis worse at C5-C6 and C6-7. Mild foraminal narrowing bilaterally worse at C5-C6 and C6-7 on the right at C3-C4 and C4-C5 on the left\" and \"moderate osteoarthritis in the AC joint. Deformity and sclerosis in the acromion concerning for underlying subacute to remote fracture  versus sequelae of degenerative changes\". His free kappa light chains still are normal but rising w/in the normal range. Need to assess for myeloma progression on imaging; check C-spine and left " shoulder MRI and will call him w/the results. He was also instructed to try ibuprofens (400-600mg) tid as needed and use prn oxycodones for severe pains.    ADDENDUM:  I called the pt w/results of today's MRI of the left shoulder and c-spine; I said we should check a PET/CT for further clarification to determine if the findings were related to myeloma or not.

## 2025-05-27 ENCOUNTER — TELEPHONE (OUTPATIENT)
Dept: HEMATOLOGY/ONCOLOGY | Facility: HOSPITAL | Age: 75
End: 2025-05-27

## 2025-05-29 ENCOUNTER — HOSPITAL ENCOUNTER (OUTPATIENT)
Dept: RADIOLOGY | Facility: CLINIC | Age: 75
Discharge: HOME | End: 2025-05-29
Payer: MEDICARE

## 2025-05-29 ENCOUNTER — TELEPHONE (OUTPATIENT)
Dept: HEMATOLOGY/ONCOLOGY | Facility: HOSPITAL | Age: 75
End: 2025-05-29
Payer: MEDICARE

## 2025-05-29 DIAGNOSIS — C90.00 IGG MULTIPLE MYELOMA (MULTI): ICD-10-CM

## 2025-05-29 PROCEDURE — A9552 F18 FDG: HCPCS | Performed by: INTERNAL MEDICINE

## 2025-05-29 PROCEDURE — 78816 PET IMAGE W/CT FULL BODY: CPT | Mod: PS

## 2025-05-29 PROCEDURE — 3430000001 HC RX 343 DIAGNOSTIC RADIOPHARMACEUTICALS: Performed by: INTERNAL MEDICINE

## 2025-05-29 RX ORDER — FLUDEOXYGLUCOSE F 18 200 MCI/ML
12.4 INJECTION, SOLUTION INTRAVENOUS
Status: COMPLETED | OUTPATIENT
Start: 2025-05-29 | End: 2025-05-29

## 2025-05-29 RX ADMIN — FLUDEOXYGLUCOSE F 18 12.4 MILLICURIE: 200 INJECTION, SOLUTION INTRAVENOUS at 09:31

## 2025-05-29 NOTE — TELEPHONE ENCOUNTER
I called the pt w/the recent PET/CT result, which I said was, unfortunately, likely c/w progression. I told him that I will have his findings reviewed at next week's Tumor Board conference and he may follow-up w/me on 6/5 to go over the plan.

## 2025-05-30 DIAGNOSIS — C90.00 IGG MULTIPLE MYELOMA (MULTI): ICD-10-CM

## 2025-05-30 RX ORDER — HYDROCODONE BITARTRATE AND HOMATROPINE METHYLBROMIDE ORAL SOLUTION 5; 1.5 MG/5ML; MG/5ML
5 LIQUID ORAL EVERY 6 HOURS PRN
Qty: 200 ML | Refills: 0 | Status: SHIPPED | OUTPATIENT
Start: 2025-05-30 | End: 2025-06-09

## 2025-06-02 ENCOUNTER — INFUSION (OUTPATIENT)
Dept: HEMATOLOGY/ONCOLOGY | Facility: CLINIC | Age: 75
End: 2025-06-02
Payer: MEDICARE

## 2025-06-02 VITALS
HEART RATE: 70 BPM | WEIGHT: 222.66 LBS | TEMPERATURE: 96.8 F | OXYGEN SATURATION: 96 % | RESPIRATION RATE: 16 BRPM | DIASTOLIC BLOOD PRESSURE: 89 MMHG | BODY MASS INDEX: 32.98 KG/M2 | SYSTOLIC BLOOD PRESSURE: 139 MMHG | HEIGHT: 69 IN

## 2025-06-02 DIAGNOSIS — C90.00 MULTIPLE MYELOMA WITHOUT REMISSION (MULTI): ICD-10-CM

## 2025-06-02 DIAGNOSIS — D80.1 HYPOGAMMAGLOBULINEMIA DUE TO MULTIPLE MYELOMA (MULTI): ICD-10-CM

## 2025-06-02 DIAGNOSIS — C90.00 HYPOGAMMAGLOBULINEMIA DUE TO MULTIPLE MYELOMA (MULTI): ICD-10-CM

## 2025-06-02 DIAGNOSIS — Z92.850 HISTORY OF CHIMERIC ANTIGEN RECEPTOR T-CELL THERAPY: ICD-10-CM

## 2025-06-02 DIAGNOSIS — C90.00 IGG MULTIPLE MYELOMA (MULTI): ICD-10-CM

## 2025-06-02 LAB
BASOPHILS # BLD AUTO: 0.01 X10*3/UL (ref 0–0.1)
BASOPHILS NFR BLD AUTO: 0.4 %
EOSINOPHIL # BLD AUTO: 0.06 X10*3/UL (ref 0–0.4)
EOSINOPHIL NFR BLD AUTO: 2.5 %
ERYTHROCYTE [DISTWIDTH] IN BLOOD BY AUTOMATED COUNT: 12.9 % (ref 11.5–14.5)
HCT VFR BLD AUTO: 36.5 % (ref 41–52)
HGB BLD-MCNC: 12.7 G/DL (ref 13.5–17.5)
IMM GRANULOCYTES # BLD AUTO: 0 X10*3/UL (ref 0–0.5)
IMM GRANULOCYTES NFR BLD AUTO: 0 % (ref 0–0.9)
LYMPHOCYTES # BLD AUTO: 0.78 X10*3/UL (ref 0.8–3)
LYMPHOCYTES NFR BLD AUTO: 32 %
MCH RBC QN AUTO: 33.2 PG (ref 26–34)
MCHC RBC AUTO-ENTMCNC: 34.8 G/DL (ref 32–36)
MCV RBC AUTO: 95 FL (ref 80–100)
MONOCYTES # BLD AUTO: 0.46 X10*3/UL (ref 0.05–0.8)
MONOCYTES NFR BLD AUTO: 18.9 %
NEUTROPHILS # BLD AUTO: 1.13 X10*3/UL (ref 1.6–5.5)
NEUTROPHILS NFR BLD AUTO: 46.2 %
NRBC BLD-RTO: ABNORMAL /100{WBCS}
PLATELET # BLD AUTO: 115 X10*3/UL (ref 150–450)
RBC # BLD AUTO: 3.83 X10*6/UL (ref 4.5–5.9)
WBC # BLD AUTO: 2.4 X10*3/UL (ref 4.4–11.3)

## 2025-06-02 PROCEDURE — 96365 THER/PROPH/DIAG IV INF INIT: CPT | Mod: INF

## 2025-06-02 PROCEDURE — 2500000004 HC RX 250 GENERAL PHARMACY W/ HCPCS (ALT 636 FOR OP/ED)

## 2025-06-02 PROCEDURE — 2500000001 HC RX 250 WO HCPCS SELF ADMINISTERED DRUGS (ALT 637 FOR MEDICARE OP)

## 2025-06-02 PROCEDURE — 2500000004 HC RX 250 GENERAL PHARMACY W/ HCPCS (ALT 636 FOR OP/ED): Mod: JZ,TB

## 2025-06-02 PROCEDURE — 96375 TX/PRO/DX INJ NEW DRUG ADDON: CPT | Mod: INF

## 2025-06-02 PROCEDURE — 96401 CHEMO ANTI-NEOPL SQ/IM: CPT

## 2025-06-02 PROCEDURE — 83521 IG LIGHT CHAINS FREE EACH: CPT

## 2025-06-02 PROCEDURE — 85025 COMPLETE CBC W/AUTO DIFF WBC: CPT

## 2025-06-02 PROCEDURE — 82784 ASSAY IGA/IGD/IGG/IGM EACH: CPT

## 2025-06-02 PROCEDURE — 80053 COMPREHEN METABOLIC PANEL: CPT

## 2025-06-02 PROCEDURE — 96366 THER/PROPH/DIAG IV INF ADDON: CPT | Mod: INF

## 2025-06-02 RX ORDER — ALBUTEROL SULFATE 0.83 MG/ML
3 SOLUTION RESPIRATORY (INHALATION) AS NEEDED
OUTPATIENT
Start: 2025-06-09

## 2025-06-02 RX ORDER — PROCHLORPERAZINE MALEATE 10 MG
10 TABLET ORAL EVERY 6 HOURS PRN
Status: DISCONTINUED | OUTPATIENT
Start: 2025-06-02 | End: 2025-06-02 | Stop reason: HOSPADM

## 2025-06-02 RX ORDER — FAMOTIDINE 10 MG/ML
20 INJECTION, SOLUTION INTRAVENOUS ONCE AS NEEDED
OUTPATIENT
Start: 2025-06-09

## 2025-06-02 RX ORDER — DIPHENHYDRAMINE HYDROCHLORIDE 50 MG/ML
50 INJECTION, SOLUTION INTRAMUSCULAR; INTRAVENOUS AS NEEDED
OUTPATIENT
Start: 2025-06-09

## 2025-06-02 RX ORDER — EPINEPHRINE 0.3 MG/.3ML
0.3 INJECTION SUBCUTANEOUS EVERY 5 MIN PRN
OUTPATIENT
Start: 2025-06-09

## 2025-06-02 RX ORDER — MONTELUKAST SODIUM 10 MG/1
10 TABLET ORAL ONCE
Start: 2025-06-09 | End: 2025-06-09

## 2025-06-02 RX ORDER — DIPHENHYDRAMINE HYDROCHLORIDE 50 MG/ML
50 INJECTION, SOLUTION INTRAMUSCULAR; INTRAVENOUS AS NEEDED
Status: DISCONTINUED | OUTPATIENT
Start: 2025-06-02 | End: 2025-06-02 | Stop reason: HOSPADM

## 2025-06-02 RX ORDER — EPINEPHRINE 0.3 MG/.3ML
0.3 INJECTION SUBCUTANEOUS EVERY 5 MIN PRN
Status: DISCONTINUED | OUTPATIENT
Start: 2025-06-02 | End: 2025-06-02 | Stop reason: HOSPADM

## 2025-06-02 RX ORDER — FAMOTIDINE 10 MG/ML
20 INJECTION INTRAVENOUS ONCE
Start: 2025-06-09 | End: 2025-06-09

## 2025-06-02 RX ORDER — FAMOTIDINE 10 MG/ML
20 INJECTION, SOLUTION INTRAVENOUS ONCE AS NEEDED
Status: DISCONTINUED | OUTPATIENT
Start: 2025-06-02 | End: 2025-06-02 | Stop reason: HOSPADM

## 2025-06-02 RX ORDER — DIPHENHYDRAMINE HYDROCHLORIDE 50 MG/ML
25 INJECTION, SOLUTION INTRAMUSCULAR; INTRAVENOUS ONCE
Start: 2025-06-09 | End: 2025-06-09

## 2025-06-02 RX ORDER — FAMOTIDINE 10 MG/ML
20 INJECTION INTRAVENOUS ONCE
Status: COMPLETED | OUTPATIENT
Start: 2025-06-02 | End: 2025-06-02

## 2025-06-02 RX ORDER — PROCHLORPERAZINE EDISYLATE 5 MG/ML
10 INJECTION INTRAMUSCULAR; INTRAVENOUS EVERY 6 HOURS PRN
Status: DISCONTINUED | OUTPATIENT
Start: 2025-06-02 | End: 2025-06-02 | Stop reason: HOSPADM

## 2025-06-02 RX ORDER — DIPHENHYDRAMINE HYDROCHLORIDE 50 MG/ML
25 INJECTION, SOLUTION INTRAMUSCULAR; INTRAVENOUS ONCE
Status: COMPLETED | OUTPATIENT
Start: 2025-06-02 | End: 2025-06-02

## 2025-06-02 RX ORDER — ACETAMINOPHEN 325 MG/1
650 TABLET ORAL ONCE
OUTPATIENT
Start: 2025-06-09

## 2025-06-02 RX ORDER — ALBUTEROL SULFATE 0.83 MG/ML
3 SOLUTION RESPIRATORY (INHALATION) AS NEEDED
Status: DISCONTINUED | OUTPATIENT
Start: 2025-06-02 | End: 2025-06-02 | Stop reason: HOSPADM

## 2025-06-02 RX ORDER — ACETAMINOPHEN 325 MG/1
650 TABLET ORAL ONCE
Status: DISCONTINUED | OUTPATIENT
Start: 2025-06-02 | End: 2025-06-02 | Stop reason: HOSPADM

## 2025-06-02 RX ORDER — MONTELUKAST SODIUM 10 MG/1
10 TABLET ORAL ONCE
Status: COMPLETED | OUTPATIENT
Start: 2025-06-02 | End: 2025-06-02

## 2025-06-02 RX ADMIN — FAMOTIDINE 20 MG: 10 INJECTION, SOLUTION INTRAVENOUS at 09:46

## 2025-06-02 RX ADMIN — DIPHENHYDRAMINE HYDROCHLORIDE 25 MG: 50 INJECTION INTRAMUSCULAR; INTRAVENOUS at 09:43

## 2025-06-02 RX ADMIN — MONTELUKAST 10 MG: 10 TABLET, FILM COATED ORAL at 09:45

## 2025-06-02 RX ADMIN — TECLISTAMAB 153 MG: 90 INJECTION SUBCUTANEOUS at 10:11

## 2025-06-02 RX ADMIN — IMMUNE GLOBULIN (HUMAN) 20 G: 10 INJECTION INTRAVENOUS; SUBCUTANEOUS at 10:06

## 2025-06-02 ASSESSMENT — PAIN SCALES - GENERAL: PAINLEVEL_OUTOF10: 0-NO PAIN

## 2025-06-02 NOTE — PROGRESS NOTES
Begin infusion at 51 mL/hr x30 minutes, if no ADR increase to:  102 mL/hr x30 minutes, if no ADR increase to:  204 mL/hr x30 minutes, if no ADR increase to:  408 mL/hr until infusion is complete.     [facility] Infusion Note      Maldonado Mccloud is a 74 y.o. year old male here today,06/02/25,  [facility] for  following treatment regimen:  Medications given today :    Recent Labs     06/02/25  0910   NEUTROABS 1.13*   *   HGB 12.7*      CrCl cannot be calculated (Patient's most recent lab result is older than the maximum 7 days allowed.).  BP Readings from Last 2 Encounters:   06/02/25 105/75   05/22/25 128/84       Hold treatment and notify provider if: ,  ANC LESS THAN 0.5 x 10E9/L,  Platelets LESS THAN 50 x 10E9/L,  Hemoglobin LESS THAN 8 g/dL  Administer IVIG every 28 days      Administrations This Visit       diphenhydrAMINE (BENADryl) injection 25 mg       Admin Date  06/02/2025 Action  Given Dose  25 mg Route  intravenous Documented By  Chapin Duncan RN              famotidine (Pepcid) injection 20 mg       Admin Date  06/02/2025 Action  New Bag Dose  20 mg Route  intravenous Documented By  Chapin Duncan RN              immune globulin G-gly-IgA avg 46 (GAMUNEX-C 10%) infusion 20 g       Admin Date  06/02/2025 Action  New Bag Dose  20 g Rate  51 mL/hr Route  intravenous Documented By  Chapin Duncan RN              montelukast (Singulair) tablet 10 mg       Admin Date  06/02/2025 Action  Given Dose  10 mg Route  oral Documented By  Chapin Duncan RN                  Meets with MD Moreno 6/5  Vitals:    06/02/25 0859   BP: 105/75   Pulse: 72   Resp: 16   Temp: 36.7 °C (98.1 °F)   SpO2: 96%    on intake  Vitals:    06/02/25 0859   Weight: 101 kg (222 lb 10.6 oz)       Body surface area is 2.22 meters squared.    Pt tolerated and was discharged to home in stable condition. Pt ambulated  off the unit with a slow and steady gait. Pt had no further questions or concerns at this  time. Has follow up appointment appropriately scheduled. Verbalized understanding of follow up. Made aware that appointments and times are always available online on my chart.

## 2025-06-03 LAB
ALBUMIN SERPL BCP-MCNC: 4.1 G/DL (ref 3.4–5)
ALP SERPL-CCNC: 61 U/L (ref 33–136)
ALT SERPL W P-5'-P-CCNC: 21 U/L (ref 10–52)
ANION GAP SERPL CALC-SCNC: 12 MMOL/L (ref 10–20)
AST SERPL W P-5'-P-CCNC: 22 U/L (ref 9–39)
BILIRUB SERPL-MCNC: 0.7 MG/DL (ref 0–1.2)
BUN SERPL-MCNC: 21 MG/DL (ref 6–23)
CALCIUM SERPL-MCNC: 9.1 MG/DL (ref 8.6–10.6)
CHLORIDE SERPL-SCNC: 106 MMOL/L (ref 98–107)
CO2 SERPL-SCNC: 24 MMOL/L (ref 21–32)
CREAT SERPL-MCNC: 1.33 MG/DL (ref 0.5–1.3)
EGFRCR SERPLBLD CKD-EPI 2021: 56 ML/MIN/1.73M*2
GLUCOSE SERPL-MCNC: 95 MG/DL (ref 74–99)
POTASSIUM SERPL-SCNC: 4.4 MMOL/L (ref 3.5–5.3)
PROT SERPL-MCNC: 5.9 G/DL (ref 6.4–8.2)
SODIUM SERPL-SCNC: 138 MMOL/L (ref 136–145)

## 2025-06-04 NOTE — TUMOR BOARD NOTE
TUMOR BOARD DISCUSSION SUMMARY    PRESENTER:  ***    DIAGNOSIS: ***    SUMMARY/PRESENTATION: Maldonado Mccloud is a 74 y.o. male patient who presents with a history of pre-treated myeloma     History: ***    Previous treatment: s/p auto transplant       Information reviewed: Radiology Review and Pathology Review    Radiology:     ***    Pathology:     ***      RECOMMENDATIONS: ***           Disclaimer     S[facility] tumor board recommendations represent the consensus opinion of physicians present at a weekly patient care conference. The treating [facility] physician is not always present, and many of the physicians formulating the recommendation have not personally seen or examined the patient under discussion. It is understood that the treating [facility] physician considers the expertise of the Tumor Board Recommendation in formulating his/her plan for the patient. However, in many situations, based on individualized patient considerations, a different plan is determined by the treating physician to be the optimal medical management.     Scribe Attestation  By signing my name below, Maricruz LAZAR Scribe   attest that this documentation has been prepared under the direction and in the presence of MALIGNANT HEME TUMOR BOARD.

## 2025-06-04 NOTE — PROGRESS NOTES
Patient ID: Maldonado Mccloud is a 74 y.o. male.  Referring Physician: No referring provider defined for this encounter.  Primary Care Provider: Timothy Almodovar MD    Interval hx:  Since last visit he has had a lot less left shoulder and neck pains. Is still c/o a hacking cough, now x 4 weeks.      PMHx:  1) Multiple myeloma (see below)  2) Squamous cell ca's skin  3) DVT, LLE ; now off AC  4) Neuropathy  5) S/p right radius fracture 2023  6) HTN  7) Afib (transient in  w/stem cell collection)    FamHx  Dad  of pan ca age 92    SocHx:   w/5 kids; smokes cigars; no cigs in 50 years; no EtOH or illicit drugs. 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; Bone marrow biopsy suggested multiple myeloma IgG kappa, ISS Stage II, bone survey revealed lytic lesions; Urine light chains present kappa restricted with 2gm proteinuria; s/p VRD x 3 cycles with CR; then s/p auto SCTx 12/23/15 complicated by orthostatic hypotension, electrolyte replacement, drug induced rash, culture negative fever, then s/p approximately 5 weeks of maintenance Revlimid 10 mg daily which was held for worsening neuropathy in 2016; then in 3/ 2018: IgG M Braxton alexei to 0.2gm/L and light chain alexei was; restarted on Revlimid maintenance at 5mg every other day, then revlimid stopped in 3/2021 due to stable disease; then in 2021 his M spike was on the rise, and was enrolled onto Vactosertib/Pom trial on 2021; then  "progressed in 6/2022, switched to Isatuximab/Carfilzomib on 6/30/22, then in 9/2022 progressed and was switched to Cytoxan/Carfilzomib; then had therapy placed on hold in 11/2022 due to need for hip replacement surgery; myeloma markers were on the rise in 3/2023 with a new jaw lesion, s/p XRT 4/2023 and resume 2x/week carfilzomib; then s/p CAR T w/ABECMA (T=0, 10/25/23), hospital course complicated by grade 1 ICANS managed w/ Dex 10mg x3 days and 1 dose tociluzimab; then w/progression and started on teclistamab, full dosing given on 10/13/24] here for follow-up.     As for myeloma:  Last visit he was c/o worsening left shoulder and neck pains.  Plain films of c-spine and left shoulder revealed the following:  \"Severe facet disease worse in the mid cervical spine and on the left. Moderate severe multilevel spondylosis worse at C5-C6 and C6-7. Mild foraminal narrowing bilaterally worse at C5-C6 and C6-7 on the right at C3-C4 and C4-C5 on the left\" and \"moderate osteoarthritis in the AC joint. Deformity and sclerosis in the acromion concerning for underlying subacute to remote fracture  versus sequelae of degenerative changes\". His free kappa light chains still are normal but rising w/in the normal range. Subsequent MRI of c-spine anc left shoulder were inconclusive (\"somewhat of a micronodular appearance throughout the bones of the visualized cervical spine, which can indicate diffuse myelomatous involvement\" and left shoulder images revealed a \"heterogeneous marrow signal in the acromion and to a lesser extent in the distal clavicle and in the scapular body with imaging characteristics suggestive of treated myelomatous lesions given patient's history and recent PET. Active disease is difficult to exclude with MRI however...Repeat PET can be performed for further evaluation\"). The subsequent PET/CT was read as being c/w progression of myeloma (\"findings consistent with disease progression with new right iliac bone " "lesion and increased size and avidity of left iliac bone lesion....similar appearance of hypermetabolic left acetabular lesion...lytic focus in the L3 vertebral body with associated FDG uptake appears to represent a Schmorl's node, although disease involvement is not excluded in the setting of multiple myeloma...scattered lytic lesions throughout much of the axial and appendicular skeleton without significant FDG avidity consistent with treated myelomatous lesions\"). However, the images were reviewed at this morning's [facility] Tumor Bd Conf and the consensus was that the ?lesion in right iliac bone and left were NOT c/w progression of myeloma and it was recommended to just monitor for changes. Await today's lab results but, unless there are findings suggestive for progression (in which case I would then check a repeat BM BX) will plan to cont weekly teclistamab.    As for persistent cough (x 4 weeks):  Resp viral panel on 5/19 was all negative, except for rhinovirus. Given his persistence of cough, will add azithromycin (in PCN allergic) and he was instructed to call me next week if not improving.     May tentatively follow-up w/me in 2 months, and w/Jacob Arechiga in 4 weeks, if today's lab are not concerning for progression.      "

## 2025-06-05 ENCOUNTER — APPOINTMENT (OUTPATIENT)
Dept: HEMATOLOGY/ONCOLOGY | Facility: HOSPITAL | Age: 75
End: 2025-06-05
Payer: MEDICARE

## 2025-06-05 ENCOUNTER — OFFICE VISIT (OUTPATIENT)
Dept: HEMATOLOGY/ONCOLOGY | Facility: HOSPITAL | Age: 75
End: 2025-06-05
Payer: MEDICARE

## 2025-06-05 VITALS
HEART RATE: 88 BPM | BODY MASS INDEX: 31.67 KG/M2 | TEMPERATURE: 97 F | RESPIRATION RATE: 16 BRPM | SYSTOLIC BLOOD PRESSURE: 112 MMHG | DIASTOLIC BLOOD PRESSURE: 77 MMHG | OXYGEN SATURATION: 96 % | WEIGHT: 216 LBS

## 2025-06-05 DIAGNOSIS — C90.00 IGG MULTIPLE MYELOMA (MULTI): Primary | ICD-10-CM

## 2025-06-05 LAB
IGA SERPL-MCNC: <7 MG/DL (ref 70–400)
IGG SERPL-MCNC: 573 MG/DL (ref 700–1600)
IGM SERPL-MCNC: 5 MG/DL (ref 40–230)
PROT SERPL-MCNC: 6.3 G/DL (ref 6.4–8.2)

## 2025-06-05 PROCEDURE — 99214 OFFICE O/P EST MOD 30 MIN: CPT | Performed by: INTERNAL MEDICINE

## 2025-06-05 PROCEDURE — 3078F DIAST BP <80 MM HG: CPT | Performed by: INTERNAL MEDICINE

## 2025-06-05 PROCEDURE — 1126F AMNT PAIN NOTED NONE PRSNT: CPT | Performed by: INTERNAL MEDICINE

## 2025-06-05 PROCEDURE — 3074F SYST BP LT 130 MM HG: CPT | Performed by: INTERNAL MEDICINE

## 2025-06-05 RX ORDER — BENZONATATE 200 MG/1
200 CAPSULE ORAL 3 TIMES DAILY PRN
Qty: 20 CAPSULE | Refills: 0 | Status: SHIPPED | OUTPATIENT
Start: 2025-06-05 | End: 2025-06-12

## 2025-06-05 RX ORDER — AZITHROMYCIN 250 MG/1
500 TABLET, FILM COATED ORAL DAILY
Qty: 6 TABLET | Refills: 0 | Status: SHIPPED | OUTPATIENT
Start: 2025-06-05 | End: 2025-06-10

## 2025-06-05 ASSESSMENT — PAIN SCALES - GENERAL: PAINLEVEL_OUTOF10: 0-NO PAIN

## 2025-06-06 LAB
KAPPA LC SERPL-MCNC: 2.18 MG/DL (ref 0.33–1.94)
KAPPA LC/LAMBDA SER: ABNORMAL {RATIO}
LAMBDA LC SERPL-MCNC: <0.17 MG/DL (ref 0.57–2.63)

## 2025-06-09 ENCOUNTER — LAB (OUTPATIENT)
Dept: LAB | Facility: CLINIC | Age: 75
End: 2025-06-09
Payer: MEDICARE

## 2025-06-09 ENCOUNTER — INFUSION (OUTPATIENT)
Dept: HEMATOLOGY/ONCOLOGY | Facility: CLINIC | Age: 75
End: 2025-06-09
Payer: MEDICARE

## 2025-06-09 VITALS
WEIGHT: 221.34 LBS | TEMPERATURE: 97.3 F | HEART RATE: 73 BPM | OXYGEN SATURATION: 97 % | BODY MASS INDEX: 32.45 KG/M2 | DIASTOLIC BLOOD PRESSURE: 86 MMHG | RESPIRATION RATE: 16 BRPM | SYSTOLIC BLOOD PRESSURE: 143 MMHG

## 2025-06-09 DIAGNOSIS — C90.00 IGG MULTIPLE MYELOMA (MULTI): ICD-10-CM

## 2025-06-09 DIAGNOSIS — Z92.850 HISTORY OF CHIMERIC ANTIGEN RECEPTOR T-CELL THERAPY: ICD-10-CM

## 2025-06-09 DIAGNOSIS — C90.00 MULTIPLE MYELOMA WITHOUT REMISSION (MULTI): ICD-10-CM

## 2025-06-09 DIAGNOSIS — C90.00 HYPOGAMMAGLOBULINEMIA DUE TO MULTIPLE MYELOMA (MULTI): ICD-10-CM

## 2025-06-09 DIAGNOSIS — D80.1 HYPOGAMMAGLOBULINEMIA DUE TO MULTIPLE MYELOMA (MULTI): ICD-10-CM

## 2025-06-09 LAB
BASOPHILS # BLD AUTO: 0.01 X10*3/UL (ref 0–0.1)
BASOPHILS NFR BLD AUTO: 0.4 %
EOSINOPHIL # BLD AUTO: 0.06 X10*3/UL (ref 0–0.4)
EOSINOPHIL NFR BLD AUTO: 2.2 %
ERYTHROCYTE [DISTWIDTH] IN BLOOD BY AUTOMATED COUNT: 12.7 % (ref 11.5–14.5)
HCT VFR BLD AUTO: 37.1 % (ref 41–52)
HGB BLD-MCNC: 12.8 G/DL (ref 13.5–17.5)
IGA SERPL-MCNC: <7 MG/DL (ref 70–400)
IGG SERPL-MCNC: 725 MG/DL (ref 700–1600)
IGM SERPL-MCNC: 5 MG/DL (ref 40–230)
IMM GRANULOCYTES # BLD AUTO: 0.01 X10*3/UL (ref 0–0.5)
IMM GRANULOCYTES NFR BLD AUTO: 0.4 % (ref 0–0.9)
LYMPHOCYTES # BLD AUTO: 1.12 X10*3/UL (ref 0.8–3)
LYMPHOCYTES NFR BLD AUTO: 41.3 %
MCH RBC QN AUTO: 33.3 PG (ref 26–34)
MCHC RBC AUTO-ENTMCNC: 34.5 G/DL (ref 32–36)
MCV RBC AUTO: 97 FL (ref 80–100)
MONOCYTES # BLD AUTO: 0.45 X10*3/UL (ref 0.05–0.8)
MONOCYTES NFR BLD AUTO: 16.6 %
NEUTROPHILS # BLD AUTO: 1.06 X10*3/UL (ref 1.6–5.5)
NEUTROPHILS NFR BLD AUTO: 39.1 %
NRBC BLD-RTO: ABNORMAL /100{WBCS}
PLATELET # BLD AUTO: 106 X10*3/UL (ref 150–450)
PROT SERPL-MCNC: 6.2 G/DL (ref 6.4–8.2)
RBC # BLD AUTO: 3.84 X10*6/UL (ref 4.5–5.9)
WBC # BLD AUTO: 2.7 X10*3/UL (ref 4.4–11.3)

## 2025-06-09 PROCEDURE — 85025 COMPLETE CBC W/AUTO DIFF WBC: CPT

## 2025-06-09 PROCEDURE — 82784 ASSAY IGA/IGD/IGG/IGM EACH: CPT

## 2025-06-09 PROCEDURE — 96401 CHEMO ANTI-NEOPL SQ/IM: CPT

## 2025-06-09 PROCEDURE — 36415 COLL VENOUS BLD VENIPUNCTURE: CPT

## 2025-06-09 PROCEDURE — 84155 ASSAY OF PROTEIN SERUM: CPT

## 2025-06-09 PROCEDURE — 2500000004 HC RX 250 GENERAL PHARMACY W/ HCPCS (ALT 636 FOR OP/ED): Mod: JZ,TB

## 2025-06-09 PROCEDURE — 86334 IMMUNOFIX E-PHORESIS SERUM: CPT

## 2025-06-09 PROCEDURE — 83521 IG LIGHT CHAINS FREE EACH: CPT

## 2025-06-09 RX ORDER — PROCHLORPERAZINE MALEATE 10 MG
10 TABLET ORAL EVERY 6 HOURS PRN
Status: DISCONTINUED | OUTPATIENT
Start: 2025-06-09 | End: 2025-06-09 | Stop reason: HOSPADM

## 2025-06-09 RX ORDER — EPINEPHRINE 0.3 MG/.3ML
0.3 INJECTION SUBCUTANEOUS EVERY 5 MIN PRN
Status: DISCONTINUED | OUTPATIENT
Start: 2025-06-09 | End: 2025-06-09 | Stop reason: HOSPADM

## 2025-06-09 RX ORDER — DIPHENHYDRAMINE HYDROCHLORIDE 50 MG/ML
50 INJECTION, SOLUTION INTRAMUSCULAR; INTRAVENOUS AS NEEDED
Status: DISCONTINUED | OUTPATIENT
Start: 2025-06-09 | End: 2025-06-09 | Stop reason: HOSPADM

## 2025-06-09 RX ORDER — ALBUTEROL SULFATE 0.83 MG/ML
3 SOLUTION RESPIRATORY (INHALATION) AS NEEDED
Status: DISCONTINUED | OUTPATIENT
Start: 2025-06-09 | End: 2025-06-09 | Stop reason: HOSPADM

## 2025-06-09 RX ORDER — PROCHLORPERAZINE EDISYLATE 5 MG/ML
10 INJECTION INTRAMUSCULAR; INTRAVENOUS EVERY 6 HOURS PRN
Status: DISCONTINUED | OUTPATIENT
Start: 2025-06-09 | End: 2025-06-09 | Stop reason: HOSPADM

## 2025-06-09 RX ORDER — FAMOTIDINE 10 MG/ML
20 INJECTION, SOLUTION INTRAVENOUS ONCE AS NEEDED
Status: DISCONTINUED | OUTPATIENT
Start: 2025-06-09 | End: 2025-06-09 | Stop reason: HOSPADM

## 2025-06-09 RX ADMIN — TECLISTAMAB 153 MG: 90 INJECTION SUBCUTANEOUS at 09:16

## 2025-06-09 ASSESSMENT — PAIN SCALES - GENERAL: PAINLEVEL_OUTOF10: 0-NO PAIN

## 2025-06-09 NOTE — SIGNIFICANT EVENT

## 2025-06-10 LAB
ALBUMIN: 4 G/DL (ref 3.4–5)
ALPHA 1 GLOBULIN: 0.3 G/DL (ref 0.2–0.6)
ALPHA 2 GLOBULIN: 0.8 G/DL (ref 0.4–1.1)
BETA GLOBULIN: 0.7 G/DL (ref 0.5–1.2)
GAMMA GLOBULIN: 0.5 G/DL (ref 0.5–1.4)
IMMUNOFIXATION COMMENT: NORMAL
KAPPA LC SERPL-MCNC: 3.25 MG/DL (ref 0.33–1.94)
KAPPA LC/LAMBDA SER: ABNORMAL {RATIO}
LAMBDA LC SERPL-MCNC: <0.17 MG/DL (ref 0.57–2.63)
PATH REVIEW - SERUM IMMUNOFIXATION: NORMAL
PATH REVIEW-SERUM PROTEIN ELECTROPHORESIS: NORMAL
PROTEIN ELECTROPHORESIS COMMENT: NORMAL

## 2025-06-11 LAB
ALBUMIN: 3.9 G/DL (ref 3.4–5)
ALPHA 1 GLOBULIN: 0.3 G/DL (ref 0.2–0.6)
ALPHA 2 GLOBULIN: 0.7 G/DL (ref 0.4–1.1)
BETA GLOBULIN: 0.6 G/DL (ref 0.5–1.2)
GAMMA GLOBULIN: 0.7 G/DL (ref 0.5–1.4)
IMMUNOFIXATION COMMENT: NORMAL
PATH REVIEW - SERUM IMMUNOFIXATION: NORMAL
PATH REVIEW-SERUM PROTEIN ELECTROPHORESIS: NORMAL
PROTEIN ELECTROPHORESIS COMMENT: NORMAL

## 2025-06-12 ENCOUNTER — TELEPHONE (OUTPATIENT)
Dept: HEMATOLOGY/ONCOLOGY | Facility: HOSPITAL | Age: 75
End: 2025-06-12
Payer: MEDICARE

## 2025-06-12 DIAGNOSIS — C90.00 IGG MULTIPLE MYELOMA (MULTI): ICD-10-CM

## 2025-06-12 DIAGNOSIS — C90.00 MULTIPLE MYELOMA WITHOUT REMISSION (MULTI): Primary | ICD-10-CM

## 2025-06-12 RX ORDER — HYDROCODONE BITARTRATE AND HOMATROPINE METHYLBROMIDE ORAL SOLUTION 5; 1.5 MG/5ML; MG/5ML
5 LIQUID ORAL EVERY 6 HOURS PRN
Qty: 200 ML | Refills: 0 | Status: SHIPPED | OUTPATIENT
Start: 2025-06-12 | End: 2025-06-22

## 2025-06-12 RX ORDER — DOXYCYCLINE 100 MG/1
100 TABLET ORAL 2 TIMES DAILY
Qty: 20 TABLET | Refills: 0 | Status: SHIPPED | OUTPATIENT
Start: 2025-06-12 | End: 2025-06-22

## 2025-06-12 NOTE — TELEPHONE ENCOUNTER
Pt's wife left me a text message saying his cough has worsened and he feels tired. I called him back; there was no answer so I left a message saying that I will change to doxycycline (he is allergic to PCN) and check a CXR. Will also call him back later and recommend a BM BX re: rising free kappa light chains.

## 2025-06-13 ENCOUNTER — TELEPHONE (OUTPATIENT)
Dept: HEMATOLOGY/ONCOLOGY | Facility: HOSPITAL | Age: 75
End: 2025-06-13
Payer: MEDICARE

## 2025-06-13 NOTE — TELEPHONE ENCOUNTER
I called the pt w/the recent serum free light chain results, which are continuing to increase. There was no answer so I left a message on his answering machine explaining the above and that I recommended a repeat BM Bx and instructed him to call me w/any questions.

## 2025-06-16 ENCOUNTER — LAB (OUTPATIENT)
Dept: LAB | Facility: CLINIC | Age: 75
End: 2025-06-16
Payer: MEDICARE

## 2025-06-16 ENCOUNTER — INFUSION (OUTPATIENT)
Dept: HEMATOLOGY/ONCOLOGY | Facility: CLINIC | Age: 75
End: 2025-06-16
Payer: MEDICARE

## 2025-06-16 VITALS
TEMPERATURE: 97.5 F | HEART RATE: 68 BPM | OXYGEN SATURATION: 97 % | WEIGHT: 223.33 LBS | SYSTOLIC BLOOD PRESSURE: 139 MMHG | RESPIRATION RATE: 16 BRPM | DIASTOLIC BLOOD PRESSURE: 85 MMHG | BODY MASS INDEX: 32.74 KG/M2

## 2025-06-16 DIAGNOSIS — C90.00 IGG MULTIPLE MYELOMA (MULTI): ICD-10-CM

## 2025-06-16 LAB
BASOPHILS # BLD AUTO: 0.01 X10*3/UL (ref 0–0.1)
BASOPHILS NFR BLD AUTO: 0.4 %
EOSINOPHIL # BLD AUTO: 0.07 X10*3/UL (ref 0–0.4)
EOSINOPHIL NFR BLD AUTO: 2.5 %
ERYTHROCYTE [DISTWIDTH] IN BLOOD BY AUTOMATED COUNT: 12.7 % (ref 11.5–14.5)
HCT VFR BLD AUTO: 36.8 % (ref 41–52)
HGB BLD-MCNC: 12.6 G/DL (ref 13.5–17.5)
IMM GRANULOCYTES # BLD AUTO: 0.01 X10*3/UL (ref 0–0.5)
IMM GRANULOCYTES NFR BLD AUTO: 0.4 % (ref 0–0.9)
LYMPHOCYTES # BLD AUTO: 0.96 X10*3/UL (ref 0.8–3)
LYMPHOCYTES NFR BLD AUTO: 34.8 %
MCH RBC QN AUTO: 33.2 PG (ref 26–34)
MCHC RBC AUTO-ENTMCNC: 34.2 G/DL (ref 32–36)
MCV RBC AUTO: 97 FL (ref 80–100)
MONOCYTES # BLD AUTO: 0.42 X10*3/UL (ref 0.05–0.8)
MONOCYTES NFR BLD AUTO: 15.2 %
NEUTROPHILS # BLD AUTO: 1.29 X10*3/UL (ref 1.6–5.5)
NEUTROPHILS NFR BLD AUTO: 46.7 %
NRBC BLD-RTO: ABNORMAL /100{WBCS}
PLATELET # BLD AUTO: 95 X10*3/UL (ref 150–450)
RBC # BLD AUTO: 3.79 X10*6/UL (ref 4.5–5.9)
WBC # BLD AUTO: 2.8 X10*3/UL (ref 4.4–11.3)

## 2025-06-16 PROCEDURE — 36415 COLL VENOUS BLD VENIPUNCTURE: CPT

## 2025-06-16 PROCEDURE — 96401 CHEMO ANTI-NEOPL SQ/IM: CPT

## 2025-06-16 PROCEDURE — 2500000004 HC RX 250 GENERAL PHARMACY W/ HCPCS (ALT 636 FOR OP/ED): Mod: JZ,TB

## 2025-06-16 PROCEDURE — 85025 COMPLETE CBC W/AUTO DIFF WBC: CPT

## 2025-06-16 RX ORDER — EPINEPHRINE 0.3 MG/.3ML
0.3 INJECTION SUBCUTANEOUS EVERY 5 MIN PRN
Status: DISCONTINUED | OUTPATIENT
Start: 2025-06-16 | End: 2025-06-16 | Stop reason: HOSPADM

## 2025-06-16 RX ORDER — PROCHLORPERAZINE MALEATE 10 MG
10 TABLET ORAL EVERY 6 HOURS PRN
Status: DISCONTINUED | OUTPATIENT
Start: 2025-06-16 | End: 2025-06-16 | Stop reason: HOSPADM

## 2025-06-16 RX ORDER — ALBUTEROL SULFATE 0.83 MG/ML
3 SOLUTION RESPIRATORY (INHALATION) AS NEEDED
Status: DISCONTINUED | OUTPATIENT
Start: 2025-06-16 | End: 2025-06-16 | Stop reason: HOSPADM

## 2025-06-16 RX ORDER — DIPHENHYDRAMINE HYDROCHLORIDE 50 MG/ML
50 INJECTION, SOLUTION INTRAMUSCULAR; INTRAVENOUS AS NEEDED
Status: DISCONTINUED | OUTPATIENT
Start: 2025-06-16 | End: 2025-06-16 | Stop reason: HOSPADM

## 2025-06-16 RX ORDER — FAMOTIDINE 10 MG/ML
20 INJECTION, SOLUTION INTRAVENOUS ONCE AS NEEDED
Status: DISCONTINUED | OUTPATIENT
Start: 2025-06-16 | End: 2025-06-16 | Stop reason: HOSPADM

## 2025-06-16 RX ORDER — AMOXICILLIN 500 MG/1
TABLET, FILM COATED ORAL
COMMUNITY
Start: 2025-06-07

## 2025-06-16 RX ORDER — PROCHLORPERAZINE EDISYLATE 5 MG/ML
10 INJECTION INTRAMUSCULAR; INTRAVENOUS EVERY 6 HOURS PRN
Status: DISCONTINUED | OUTPATIENT
Start: 2025-06-16 | End: 2025-06-16 | Stop reason: HOSPADM

## 2025-06-16 RX ADMIN — TECLISTAMAB 153 MG: 90 INJECTION SUBCUTANEOUS at 09:20

## 2025-06-16 ASSESSMENT — PAIN SCALES - GENERAL: PAINLEVEL_OUTOF10: 0-NO PAIN

## 2025-06-16 NOTE — PROGRESS NOTES
Pt here for teclistamab injection. Tolerated well. Discharged in stable condition, reviewed lab results. Pt aware of next appts.

## 2025-06-18 ENCOUNTER — HOSPITAL ENCOUNTER (OUTPATIENT)
Dept: RADIOLOGY | Facility: CLINIC | Age: 75
Discharge: HOME | End: 2025-06-18
Payer: MEDICARE

## 2025-06-18 DIAGNOSIS — C90.00 IGG MULTIPLE MYELOMA (MULTI): ICD-10-CM

## 2025-06-18 PROCEDURE — 71046 X-RAY EXAM CHEST 2 VIEWS: CPT | Performed by: RADIOLOGY

## 2025-06-18 PROCEDURE — 71046 X-RAY EXAM CHEST 2 VIEWS: CPT

## 2025-06-19 ENCOUNTER — APPOINTMENT (OUTPATIENT)
Dept: HEMATOLOGY/ONCOLOGY | Facility: HOSPITAL | Age: 75
End: 2025-06-19
Payer: MEDICARE

## 2025-06-23 ENCOUNTER — INFUSION (OUTPATIENT)
Dept: HEMATOLOGY/ONCOLOGY | Facility: CLINIC | Age: 75
End: 2025-06-23
Payer: MEDICARE

## 2025-06-23 ENCOUNTER — LAB (OUTPATIENT)
Dept: LAB | Facility: CLINIC | Age: 75
End: 2025-06-23
Payer: MEDICARE

## 2025-06-23 VITALS
HEART RATE: 72 BPM | WEIGHT: 219.8 LBS | DIASTOLIC BLOOD PRESSURE: 87 MMHG | TEMPERATURE: 97.3 F | RESPIRATION RATE: 16 BRPM | SYSTOLIC BLOOD PRESSURE: 135 MMHG | BODY MASS INDEX: 32.22 KG/M2

## 2025-06-23 DIAGNOSIS — C90.00 IGG MULTIPLE MYELOMA (MULTI): ICD-10-CM

## 2025-06-23 LAB
ALBUMIN SERPL BCP-MCNC: 4.2 G/DL (ref 3.4–5)
ALP SERPL-CCNC: 57 U/L (ref 33–136)
ALT SERPL W P-5'-P-CCNC: 23 U/L (ref 10–52)
ANION GAP SERPL CALC-SCNC: 12 MMOL/L (ref 10–20)
AST SERPL W P-5'-P-CCNC: 26 U/L (ref 9–39)
BASOPHILS # BLD AUTO: 0.01 X10*3/UL (ref 0–0.1)
BASOPHILS NFR BLD AUTO: 0.4 %
BILIRUB SERPL-MCNC: 0.7 MG/DL (ref 0–1.2)
BUN SERPL-MCNC: 19 MG/DL (ref 6–23)
CALCIUM SERPL-MCNC: 9.3 MG/DL (ref 8.6–10.6)
CHLORIDE SERPL-SCNC: 106 MMOL/L (ref 98–107)
CO2 SERPL-SCNC: 26 MMOL/L (ref 21–32)
CREAT SERPL-MCNC: 1.16 MG/DL (ref 0.5–1.3)
EGFRCR SERPLBLD CKD-EPI 2021: 66 ML/MIN/1.73M*2
EOSINOPHIL # BLD AUTO: 0.07 X10*3/UL (ref 0–0.4)
EOSINOPHIL NFR BLD AUTO: 2.8 %
ERYTHROCYTE [DISTWIDTH] IN BLOOD BY AUTOMATED COUNT: 12.7 % (ref 11.5–14.5)
GLUCOSE SERPL-MCNC: 118 MG/DL (ref 74–99)
HCT VFR BLD AUTO: 35.1 % (ref 41–52)
HGB BLD-MCNC: 12.5 G/DL (ref 13.5–17.5)
IMM GRANULOCYTES # BLD AUTO: 0 X10*3/UL (ref 0–0.5)
IMM GRANULOCYTES NFR BLD AUTO: 0 % (ref 0–0.9)
LYMPHOCYTES # BLD AUTO: 1.26 X10*3/UL (ref 0.8–3)
LYMPHOCYTES NFR BLD AUTO: 50.2 %
MCH RBC QN AUTO: 34 PG (ref 26–34)
MCHC RBC AUTO-ENTMCNC: 35.6 G/DL (ref 32–36)
MCV RBC AUTO: 95 FL (ref 80–100)
MONOCYTES # BLD AUTO: 0.4 X10*3/UL (ref 0.05–0.8)
MONOCYTES NFR BLD AUTO: 15.9 %
NEUTROPHILS # BLD AUTO: 0.77 X10*3/UL (ref 1.6–5.5)
NEUTROPHILS NFR BLD AUTO: 30.7 %
NRBC BLD-RTO: ABNORMAL /100{WBCS}
PLATELET # BLD AUTO: 97 X10*3/UL (ref 150–450)
POTASSIUM SERPL-SCNC: 4.1 MMOL/L (ref 3.5–5.3)
PROT SERPL-MCNC: 5.9 G/DL (ref 6.4–8.2)
RBC # BLD AUTO: 3.68 X10*6/UL (ref 4.5–5.9)
SODIUM SERPL-SCNC: 140 MMOL/L (ref 136–145)
WBC # BLD AUTO: 2.5 X10*3/UL (ref 4.4–11.3)

## 2025-06-23 PROCEDURE — 85025 COMPLETE CBC W/AUTO DIFF WBC: CPT

## 2025-06-23 PROCEDURE — 36415 COLL VENOUS BLD VENIPUNCTURE: CPT

## 2025-06-23 PROCEDURE — 96401 CHEMO ANTI-NEOPL SQ/IM: CPT

## 2025-06-23 PROCEDURE — 80053 COMPREHEN METABOLIC PANEL: CPT

## 2025-06-23 PROCEDURE — 2500000004 HC RX 250 GENERAL PHARMACY W/ HCPCS (ALT 636 FOR OP/ED): Mod: JZ,TB

## 2025-06-23 RX ORDER — FAMOTIDINE 10 MG/ML
20 INJECTION, SOLUTION INTRAVENOUS ONCE AS NEEDED
Status: DISCONTINUED | OUTPATIENT
Start: 2025-06-23 | End: 2025-06-23 | Stop reason: HOSPADM

## 2025-06-23 RX ORDER — DIPHENHYDRAMINE HYDROCHLORIDE 50 MG/ML
50 INJECTION, SOLUTION INTRAMUSCULAR; INTRAVENOUS AS NEEDED
Status: DISCONTINUED | OUTPATIENT
Start: 2025-06-23 | End: 2025-06-23 | Stop reason: HOSPADM

## 2025-06-23 RX ORDER — PROCHLORPERAZINE EDISYLATE 5 MG/ML
10 INJECTION INTRAMUSCULAR; INTRAVENOUS EVERY 6 HOURS PRN
Status: DISCONTINUED | OUTPATIENT
Start: 2025-06-23 | End: 2025-06-23 | Stop reason: HOSPADM

## 2025-06-23 RX ORDER — EPINEPHRINE 0.3 MG/.3ML
0.3 INJECTION SUBCUTANEOUS EVERY 5 MIN PRN
Status: DISCONTINUED | OUTPATIENT
Start: 2025-06-23 | End: 2025-06-23 | Stop reason: HOSPADM

## 2025-06-23 RX ORDER — PROCHLORPERAZINE MALEATE 10 MG
10 TABLET ORAL EVERY 6 HOURS PRN
Status: DISCONTINUED | OUTPATIENT
Start: 2025-06-23 | End: 2025-06-23 | Stop reason: HOSPADM

## 2025-06-23 RX ORDER — ALBUTEROL SULFATE 0.83 MG/ML
3 SOLUTION RESPIRATORY (INHALATION) AS NEEDED
Status: DISCONTINUED | OUTPATIENT
Start: 2025-06-23 | End: 2025-06-23 | Stop reason: HOSPADM

## 2025-06-23 RX ADMIN — TECLISTAMAB 153 MG: 90 INJECTION SUBCUTANEOUS at 09:05

## 2025-06-23 ASSESSMENT — PAIN SCALES - GENERAL: PAINLEVEL_OUTOF10: 0-NO PAIN

## 2025-06-23 NOTE — PROGRESS NOTES
[facility] Infusion Note      Maldonado Mccloud is a 74 y.o. year old male here today,06/23/25,  in the [facility] infusion center for  following treatment regimen:  Medications given today :    Recent Labs     06/23/25  0827   NEUTROABS 0.77*   PLT 97*   HGB 12.5*      CrCl cannot be calculated (Patient's most recent lab result is older than the maximum 7 days allowed.).  BP Readings from Last 2 Encounters:   06/23/25 135/87   06/16/25 139/85     Tecvayyli - discussed chest xray impression- pt finished with course of antibiotic- almost full recovery from cough- denies pain today  Discussed ANC decreased - would need neupogen < 500  pt verbalized understanding of results)     Hold treatment and notify provider if: ,  ANC LESS THAN 0.5 x 10E9/L,  Platelets LESS THAN 50 x 10E9/L,  Hemoglobin LESS THAN 8 g/dL          Vitals:    06/23/25 0833   BP: 135/87   Pulse: 72   Resp: 16   Temp: 36.3 °C (97.3 °F)    on intake  Vitals:    06/23/25 0833   Weight: 99.7 kg (219 lb 12.8 oz)       Body surface area is 2.21 meters squared.  Rtc 6/30 due for IVIG - stats bmbx also -        Pt tolerated and was discharged to home in stable condition. Pt ambulated  off the unit with a slow and steady gait. Pt had no further questions or concerns at this time. Has follow up appointment appropriately scheduled. Verbalized understanding of follow up. Made aware that appointments and times are always available online on my chart.

## 2025-06-30 ENCOUNTER — INFUSION (OUTPATIENT)
Dept: HEMATOLOGY/ONCOLOGY | Facility: CLINIC | Age: 75
End: 2025-06-30
Payer: MEDICARE

## 2025-06-30 ENCOUNTER — OFFICE VISIT (OUTPATIENT)
Dept: HEMATOLOGY/ONCOLOGY | Facility: CLINIC | Age: 75
End: 2025-06-30
Payer: MEDICARE

## 2025-06-30 ENCOUNTER — APPOINTMENT (OUTPATIENT)
Dept: HEMATOLOGY/ONCOLOGY | Facility: CLINIC | Age: 75
End: 2025-06-30
Payer: MEDICARE

## 2025-06-30 VITALS
TEMPERATURE: 98.4 F | OXYGEN SATURATION: 96 % | BODY MASS INDEX: 32.19 KG/M2 | WEIGHT: 219.58 LBS | SYSTOLIC BLOOD PRESSURE: 124 MMHG | DIASTOLIC BLOOD PRESSURE: 82 MMHG | HEART RATE: 85 BPM | RESPIRATION RATE: 16 BRPM

## 2025-06-30 DIAGNOSIS — D70.1 CHEMOTHERAPY-INDUCED NEUTROPENIA: ICD-10-CM

## 2025-06-30 DIAGNOSIS — D80.1 HYPOGAMMAGLOBULINEMIA DUE TO MULTIPLE MYELOMA (MULTI): ICD-10-CM

## 2025-06-30 DIAGNOSIS — Z92.850 HISTORY OF CHIMERIC ANTIGEN RECEPTOR T-CELL THERAPY: ICD-10-CM

## 2025-06-30 DIAGNOSIS — C90.00 HYPOGAMMAGLOBULINEMIA DUE TO MULTIPLE MYELOMA (MULTI): ICD-10-CM

## 2025-06-30 DIAGNOSIS — C90.00 IGG MULTIPLE MYELOMA (MULTI): ICD-10-CM

## 2025-06-30 DIAGNOSIS — C90.00 IGG MULTIPLE MYELOMA (MULTI): Primary | ICD-10-CM

## 2025-06-30 DIAGNOSIS — D84.9 IMMUNOCOMPROMISED: ICD-10-CM

## 2025-06-30 DIAGNOSIS — J06.9 ACUTE UPPER RESPIRATORY INFECTION, UNSPECIFIED: ICD-10-CM

## 2025-06-30 DIAGNOSIS — Z51.12 ENCOUNTER FOR ANTINEOPLASTIC IMMUNOTHERAPY: ICD-10-CM

## 2025-06-30 DIAGNOSIS — T45.1X5A CHEMOTHERAPY-INDUCED NEUTROPENIA: ICD-10-CM

## 2025-06-30 DIAGNOSIS — C90.00 MULTIPLE MYELOMA WITHOUT REMISSION (MULTI): ICD-10-CM

## 2025-06-30 DIAGNOSIS — Z94.84 STEM CELLS TRANSPLANT STATUS (MULTI): ICD-10-CM

## 2025-06-30 LAB
BASOPHILS # BLD AUTO: 0.02 X10*3/UL (ref 0–0.1)
BASOPHILS NFR BLD AUTO: 0.6 %
EOSINOPHIL # BLD AUTO: 0.03 X10*3/UL (ref 0–0.4)
EOSINOPHIL NFR BLD AUTO: 0.9 %
ERYTHROCYTE [DISTWIDTH] IN BLOOD BY AUTOMATED COUNT: 12.7 % (ref 11.5–14.5)
HCT VFR BLD AUTO: 34.2 % (ref 41–52)
HGB BLD-MCNC: 12.1 G/DL (ref 13.5–17.5)
IGA SERPL-MCNC: <7 MG/DL (ref 70–400)
IGG SERPL-MCNC: 607 MG/DL (ref 700–1600)
IGM SERPL-MCNC: 5 MG/DL (ref 40–230)
IMM GRANULOCYTES # BLD AUTO: 0 X10*3/UL (ref 0–0.5)
IMM GRANULOCYTES NFR BLD AUTO: 0 % (ref 0–0.9)
LYMPHOCYTES # BLD AUTO: 0.84 X10*3/UL (ref 0.8–3)
LYMPHOCYTES NFR BLD AUTO: 26.1 %
MCH RBC QN AUTO: 34.1 PG (ref 26–34)
MCHC RBC AUTO-ENTMCNC: 35.4 G/DL (ref 32–36)
MCV RBC AUTO: 96 FL (ref 80–100)
MONOCYTES # BLD AUTO: 0.53 X10*3/UL (ref 0.05–0.8)
MONOCYTES NFR BLD AUTO: 16.5 %
NEUTROPHILS # BLD AUTO: 1.8 X10*3/UL (ref 1.6–5.5)
NEUTROPHILS NFR BLD AUTO: 55.9 %
NRBC BLD-RTO: ABNORMAL /100{WBCS}
PLATELET # BLD AUTO: 102 X10*3/UL (ref 150–450)
RBC # BLD AUTO: 3.55 X10*6/UL (ref 4.5–5.9)
WBC # BLD AUTO: 3.2 X10*3/UL (ref 4.4–11.3)

## 2025-06-30 PROCEDURE — 82784 ASSAY IGA/IGD/IGG/IGM EACH: CPT

## 2025-06-30 PROCEDURE — 1159F MED LIST DOCD IN RCRD: CPT

## 2025-06-30 PROCEDURE — 96375 TX/PRO/DX INJ NEW DRUG ADDON: CPT | Mod: INF

## 2025-06-30 PROCEDURE — 84155 ASSAY OF PROTEIN SERUM: CPT | Mod: 59

## 2025-06-30 PROCEDURE — 96366 THER/PROPH/DIAG IV INF ADDON: CPT | Mod: INF

## 2025-06-30 PROCEDURE — 96365 THER/PROPH/DIAG IV INF INIT: CPT | Mod: INF

## 2025-06-30 PROCEDURE — 80053 COMPREHEN METABOLIC PANEL: CPT

## 2025-06-30 PROCEDURE — 84165 PROTEIN E-PHORESIS SERUM: CPT

## 2025-06-30 PROCEDURE — 1126F AMNT PAIN NOTED NONE PRSNT: CPT

## 2025-06-30 PROCEDURE — 38222 DX BONE MARROW BX & ASPIR: CPT

## 2025-06-30 PROCEDURE — 3074F SYST BP LT 130 MM HG: CPT

## 2025-06-30 PROCEDURE — 2500000004 HC RX 250 GENERAL PHARMACY W/ HCPCS (ALT 636 FOR OP/ED): Mod: JZ,TB

## 2025-06-30 PROCEDURE — 85025 COMPLETE CBC W/AUTO DIFF WBC: CPT

## 2025-06-30 PROCEDURE — 38222 DX BONE MARROW BX & ASPIR: CPT | Mod: LT

## 2025-06-30 PROCEDURE — 99215 OFFICE O/P EST HI 40 MIN: CPT

## 2025-06-30 PROCEDURE — 99215 OFFICE O/P EST HI 40 MIN: CPT | Mod: 25

## 2025-06-30 PROCEDURE — 2500000001 HC RX 250 WO HCPCS SELF ADMINISTERED DRUGS (ALT 637 FOR MEDICARE OP)

## 2025-06-30 PROCEDURE — 36415 COLL VENOUS BLD VENIPUNCTURE: CPT

## 2025-06-30 PROCEDURE — 83521 IG LIGHT CHAINS FREE EACH: CPT

## 2025-06-30 PROCEDURE — 2500000004 HC RX 250 GENERAL PHARMACY W/ HCPCS (ALT 636 FOR OP/ED)

## 2025-06-30 PROCEDURE — 96401 CHEMO ANTI-NEOPL SQ/IM: CPT

## 2025-06-30 PROCEDURE — 3079F DIAST BP 80-89 MM HG: CPT

## 2025-06-30 RX ORDER — DIPHENHYDRAMINE HYDROCHLORIDE 50 MG/ML
25 INJECTION, SOLUTION INTRAMUSCULAR; INTRAVENOUS ONCE
Start: 2025-07-14 | End: 2025-07-14

## 2025-06-30 RX ORDER — ACETAMINOPHEN 325 MG/1
650 TABLET ORAL ONCE
OUTPATIENT
Start: 2025-07-14

## 2025-06-30 RX ORDER — EPINEPHRINE 0.3 MG/.3ML
0.3 INJECTION SUBCUTANEOUS EVERY 5 MIN PRN
Status: DISCONTINUED | OUTPATIENT
Start: 2025-06-30 | End: 2025-06-30 | Stop reason: HOSPADM

## 2025-06-30 RX ORDER — DIPHENHYDRAMINE HYDROCHLORIDE 50 MG/ML
50 INJECTION, SOLUTION INTRAMUSCULAR; INTRAVENOUS AS NEEDED
Status: DISCONTINUED | OUTPATIENT
Start: 2025-06-30 | End: 2025-06-30 | Stop reason: HOSPADM

## 2025-06-30 RX ORDER — ACETAMINOPHEN 325 MG/1
650 TABLET ORAL ONCE
Status: DISCONTINUED | OUTPATIENT
Start: 2025-06-30 | End: 2025-06-30 | Stop reason: HOSPADM

## 2025-06-30 RX ORDER — DIPHENHYDRAMINE HYDROCHLORIDE 50 MG/ML
25 INJECTION, SOLUTION INTRAMUSCULAR; INTRAVENOUS ONCE
Status: COMPLETED | OUTPATIENT
Start: 2025-06-30 | End: 2025-06-30

## 2025-06-30 RX ORDER — MONTELUKAST SODIUM 10 MG/1
10 TABLET ORAL ONCE
Status: COMPLETED | OUTPATIENT
Start: 2025-06-30 | End: 2025-06-30

## 2025-06-30 RX ORDER — ALBUTEROL SULFATE 0.83 MG/ML
3 SOLUTION RESPIRATORY (INHALATION) AS NEEDED
Status: DISCONTINUED | OUTPATIENT
Start: 2025-06-30 | End: 2025-06-30 | Stop reason: HOSPADM

## 2025-06-30 RX ORDER — FAMOTIDINE 10 MG/ML
20 INJECTION, SOLUTION INTRAVENOUS ONCE AS NEEDED
Status: DISCONTINUED | OUTPATIENT
Start: 2025-06-30 | End: 2025-06-30 | Stop reason: HOSPADM

## 2025-06-30 RX ORDER — PROCHLORPERAZINE MALEATE 10 MG
10 TABLET ORAL EVERY 6 HOURS PRN
Status: DISCONTINUED | OUTPATIENT
Start: 2025-06-30 | End: 2025-06-30 | Stop reason: HOSPADM

## 2025-06-30 RX ORDER — DIPHENHYDRAMINE HYDROCHLORIDE 50 MG/ML
50 INJECTION, SOLUTION INTRAMUSCULAR; INTRAVENOUS AS NEEDED
OUTPATIENT
Start: 2025-07-14

## 2025-06-30 RX ORDER — PROCHLORPERAZINE EDISYLATE 5 MG/ML
10 INJECTION INTRAMUSCULAR; INTRAVENOUS EVERY 6 HOURS PRN
Status: DISCONTINUED | OUTPATIENT
Start: 2025-06-30 | End: 2025-06-30 | Stop reason: HOSPADM

## 2025-06-30 RX ORDER — ALBUTEROL SULFATE 0.83 MG/ML
3 SOLUTION RESPIRATORY (INHALATION) AS NEEDED
OUTPATIENT
Start: 2025-07-14

## 2025-06-30 RX ORDER — EPINEPHRINE 0.3 MG/.3ML
0.3 INJECTION SUBCUTANEOUS EVERY 5 MIN PRN
OUTPATIENT
Start: 2025-07-14

## 2025-06-30 RX ORDER — FAMOTIDINE 10 MG/ML
20 INJECTION, SOLUTION INTRAVENOUS ONCE AS NEEDED
OUTPATIENT
Start: 2025-07-14

## 2025-06-30 RX ORDER — FAMOTIDINE 10 MG/ML
20 INJECTION INTRAVENOUS ONCE
Status: COMPLETED | OUTPATIENT
Start: 2025-06-30 | End: 2025-06-30

## 2025-06-30 RX ORDER — MONTELUKAST SODIUM 10 MG/1
10 TABLET ORAL ONCE
Start: 2025-07-14 | End: 2025-07-14

## 2025-06-30 RX ORDER — FAMOTIDINE 10 MG/ML
20 INJECTION INTRAVENOUS ONCE
Start: 2025-07-14 | End: 2025-07-14

## 2025-06-30 RX ADMIN — DIPHENHYDRAMINE HYDROCHLORIDE 25 MG: 50 INJECTION INTRAMUSCULAR; INTRAVENOUS at 09:27

## 2025-06-30 RX ADMIN — FAMOTIDINE 20 MG: 10 INJECTION, SOLUTION INTRAVENOUS at 09:35

## 2025-06-30 RX ADMIN — MONTELUKAST 10 MG: 10 TABLET, FILM COATED ORAL at 09:27

## 2025-06-30 RX ADMIN — IMMUNE GLOBULIN (HUMAN) 20 G: 10 INJECTION INTRAVENOUS; SUBCUTANEOUS at 09:39

## 2025-06-30 RX ADMIN — TECLISTAMAB 153 MG: 90 INJECTION SUBCUTANEOUS at 11:23

## 2025-06-30 ASSESSMENT — ENCOUNTER SYMPTOMS
NECK PAIN: 1
HEMATOLOGIC/LYMPHATIC NEGATIVE: 1
FATIGUE: 1
ARTHRALGIAS: 1
COUGH: 1
ENDOCRINE NEGATIVE: 1
GASTROINTESTINAL NEGATIVE: 1
CARDIOVASCULAR NEGATIVE: 1
PSYCHIATRIC NEGATIVE: 1
NUMBNESS: 1
ALLERGIC/IMMUNOLOGIC NEGATIVE: 1
EYES NEGATIVE: 1

## 2025-06-30 ASSESSMENT — PAIN SCALES - GENERAL: PAINLEVEL_OUTOF10: 0-NO PAIN

## 2025-06-30 NOTE — PROGRESS NOTES
Patient ID: Maldonado Mccloud is a 74 y.o. male.  Referring Physician: No referring provider defined for this encounter.  Primary Care Provider: Timothy Almodovar MD    Interval hx:  Cuong presents to clinic 25 for a follow up visit and BMBx.    Overall he is doing okay.    Cough is much better.     Will perform BMBx today.    Grandkids are coming into town this week.       Review of Systems   Constitutional:  Positive for fatigue.   HENT: Negative.     Eyes: Negative.    Respiratory:  Positive for cough.    Cardiovascular: Negative.    Gastrointestinal: Negative.    Endocrine: Negative.    Genitourinary: Negative.    Musculoskeletal:  Positive for arthralgias and neck pain.   Skin: Negative.    Allergic/Immunologic: Negative.    Neurological:  Positive for numbness.   Hematological: Negative.    Psychiatric/Behavioral: Negative.        PMHx:  1) Multiple myeloma (see below)  2) Squamous cell ca's skin  3) DVT, LLE ; now off AC  4) Neuropathy  5) S/p right radius fracture 2023  6) HTN  7) Afib (transient in  w/stem cell collection)    FamHx  Dad  of pan ca age 92    SocHx:   w/5 kids; smokes cigars; no cigs in 50 years; no EtOH or illicit drugs. Was in [employer] in [place]    Meds:  Were reviewed w/the pt  All:  [employer]  PCN  Zosyn    Physical Exam  Physical Exam  Constitutional:       Appearance: Normal appearance. He is normal weight.   HENT:      Head: Normocephalic and atraumatic.      Nose: Nose normal.      Mouth/Throat:      Mouth: Mucous membranes are moist.      Pharynx: Oropharynx is clear.   Eyes:      Conjunctiva/sclera: Conjunctivae normal.      Pupils: Pupils are equal, round, and reactive to light.   Cardiovascular:      Rate and Rhythm: Normal rate and regular rhythm.      Pulses: Normal pulses.      Heart sounds: Normal heart sounds.   Pulmonary:      Effort: Pulmonary effort is normal.      Breath sounds: Normal breath sounds.   Abdominal:      General: Abdomen is flat.  Bowel sounds are normal.      Palpations: Abdomen is soft.   Musculoskeletal:         General: Normal range of motion.      Cervical back: Normal range of motion and neck supple. Tenderness present.   Skin:     General: Skin is warm and dry.   Neurological:      General: No focal deficit present.      Mental Status: He is alert and oriented to person, place, and time. Mental status is at baseline.   Psychiatric:         Mood and Affect: Mood normal.         Behavior: Behavior normal.         Thought Content: Thought content normal.         Judgment: Judgment normal.       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; Bone marrow biopsy suggested multiple myeloma IgG kappa, ISS Stage II, bone survey revealed lytic lesions; Urine light chains present kappa restricted with 2gm proteinuria; s/p VRD x 3 cycles with CR; then s/p auto SCTx 12/23/15 complicated by orthostatic hypotension, electrolyte replacement, drug induced rash, culture negative fever, then s/p approximately 5 weeks of maintenance Revlimid 10 mg daily which was held for worsening neuropathy in August 2016; then in 3/ 2018: IgG M Braxton alexei to 0.2gm/L and light chain alexei was; restarted on Revlimid maintenance at 5mg every other day, then revlimid stopped in 3/2021 due to stable disease; then in 7/2021 his M spike was on the rise, and was enrolled onto Vactosertib/Pom trial on 8/5/2021; then progressed in 6/2022, switched to Isatuximab/Carfilzomib on 6/30/22, then in 9/2022 progressed and was switched to Cytoxan/Carfilzomib; then had therapy placed on hold in 11/2022 due to need for hip  "replacement surgery; myeloma markers were on the rise in 3/2023 with a new jaw lesion, s/p XRT 4/2023 and resume 2x/week carfilzomib; then s/p CAR T w/ABECMA (T=0, 10/25/23), hospital course complicated by grade 1 ICANS managed w/ Dex 10mg x3 days and 1 dose tociluzimab; then w/progression and started on teclistamab, full dosing given on 10/13/24] here for follow-up.     As for myeloma:  Last visit he was c/o worsening left shoulder and neck pains.  Plain films of c-spine and left shoulder revealed the following:  \"Severe facet disease worse in the mid cervical spine and on the left. Moderate severe multilevel spondylosis worse at C5-C6 and C6-7. Mild foraminal narrowing bilaterally worse at C5-C6 and C6-7 on the right at C3-C4 and C4-C5 on the left\" and \"moderate osteoarthritis in the AC joint. Deformity and sclerosis in the acromion concerning for underlying subacute to remote fracture  versus sequelae of degenerative changes\". His free kappa light chains still are normal but rising w/in the normal range. Subsequent MRI of c-spine anc left shoulder were inconclusive (\"somewhat of a micronodular appearance throughout the bones of the visualized cervical spine, which can indicate diffuse myelomatous involvement\" and left shoulder images revealed a \"heterogeneous marrow signal in the acromion and to a lesser extent in the distal clavicle and in the scapular body with imaging characteristics suggestive of treated myelomatous lesions given patient's history and recent PET. Active disease is difficult to exclude with MRI however...Repeat PET can be performed for further evaluation\"). The subsequent PET/CT was read as being c/w progression of myeloma (\"findings consistent with disease progression with new right iliac bone lesion and increased size and avidity of left iliac bone lesion....similar appearance of hypermetabolic left acetabular lesion...lytic focus in the L3 vertebral body with associated FDG uptake appears " "to represent a Schmorl's node, although disease involvement is not excluded in the setting of multiple myeloma...scattered lytic lesions throughout much of the axial and appendicular skeleton without significant FDG avidity consistent with treated myelomatous lesions\"). However, the images were reviewed at this morning's [facility] Tumor Bd Conf and the consensus was that the ?lesion in right iliac bone and left were NOT c/w progression of myeloma and it was recommended to just monitor for changes. Due to continued increase in FLC BMBx was performed today.     As for neutropenia:  Probably from teclistamab; cont prn weekly neupogen to keep ANC >500    Infection ppx:  Cont acyclovir and bactrim ppx; monitor IgG levels and cont monthly ppx IVIG (NOTE: he should receive Gammunex -C, d/t prior severe reaction to gammard).    As for sens NP:  No effect w/cymbalta, so last visit we stopped this medication. Previously offered adding gabapentin but the pt and his wife declined this, as he is really is not having any associated pain.     As for new L shoulder/neck pain:   Worsening over past month. Ordered x-ray of L shoulder/neck. Will plan to follow up. Using PRN Tylenol and Oxy.     RTC:  Dr. Huff in 4 weeks, will review BMBx results prior   "

## 2025-06-30 NOTE — PROGRESS NOTES
Patient ID: Maldonado Mccloud is a 74 y.o. male.    Biopsy bone marrow    Date/Time: 6/30/2025 1:04 PM    Performed by: SHANELL John  Authorized by: SHANELL John    Consent:     Consent obtained:  Verbal and written    Consent given by:  Patient    Risks, benefits, and alternatives were discussed: yes      Risks discussed:  Bleeding, infection, pain, incomplete drainage and nerve damage  Universal protocol:     Procedure explained and questions answered to patient or proxy's satisfaction: yes      Relevant documents present and verified: yes      Test results available: yes      Imaging studies available: yes      Required blood products, implants, devices, and special equipment available: yes      Site/side marked: yes      Immediately prior to procedure, a time out was called: yes      Patient identity confirmed:  Verbally with patient, hospital-assigned identification number, arm band and provided demographic data  Indications:     Indications:  Multiple Myeloma  Pre-procedure details:     Skin preparation:  Chlorhexidine    Preparation: Patient was prepped and draped in the usual sterile fashion    Anesthesia:     Anesthesia method:  Local infiltration    Local anesthetic:  Lidocaine 2% w/o epi and lidocaine 1% w/o epi  Procedure specific details:      Patient ID: Maldonado Mccloud is a 74 y.o. male     Bone Marrow Biopsy and Aspiration Procedure Note     Diagnosis: MM     Informed consent was obtained and potential risks including bleeding, infection and pain were reviewed with the patient. Time out was performed.     The left posterior iliac crest was prepped with chlorhexidine.     10 ml of lidocaine 2% and 10 ml of lidocaine 1% local anesthesia infiltrated into the subcutaneous tissue.    Left bone marrow biopsy and left bone marrow aspirate was obtained.     The procedure was tolerated well and there were no complications.    Specimens sent for: routine histopathologic  stains and sectioning, flow cytometry, cytogenetics, molecular analysis, and Clonoseq MRD    Procedure completed by: GEOFFREY John-CNP     Post-procedure details:     Procedure completion:  Tolerated well, no immediate complications

## 2025-06-30 NOTE — SIGNIFICANT EVENT

## 2025-06-30 NOTE — PROGRESS NOTES
Begin infusion at 51 mL/hr x30 minutes, if no ADR increase to:  102 mL/hr x30 minutes, if no ADR increase to:  204 mL/hr x30 minutes, if no ADR increase to:  408 mL/hr until infusion is complete.     [facility] Infusion Note      Maldonado Mccloud is a 74 y.o. year old male here today,06/30/25,  in the [facility] infusion center for  following treatment regimen:  Medications given today :    Recent Labs     06/30/25  0910   NEUTROABS 1.80   *   HGB 12.1*      CrCl cannot be calculated (Patient's most recent lab result is older than the maximum 7 days allowed.).  BP Readings from Last 2 Encounters:   06/30/25 124/82   06/23/25 135/87       Administer IVIG every 28 days  Hold treatment and notify provider if: ,  ANC LESS THAN 0.5 x 10E9/L,  Platelets LESS THAN 50 x 10E9/L,  Hemoglobin LESS THAN 8 g/dL      Administrations This Visit       diphenhydrAMINE (BENADryl) injection 25 mg       Admin Date  06/30/2025 Action  Given Dose  25 mg Route  intravenous Documented By  Chapin Duncan RN              famotidine (Pepcid) injection 20 mg       Admin Date  06/30/2025 Action  New Bag Dose  20 mg Route  intravenous Documented By  Chapin Duncan RN              immune globulin G-gly-IgA avg 46 (GAMUNEX-C 10%) infusion 20 g       Admin Date  06/30/2025 Action  New Bag Dose  20 g Rate   Route  intravenous Documented By  Chapin Duncan RN               Admin Date  06/30/2025 Action  Rate/Dose Change Dose   Rate  102 mL/hr Route  intravenous Documented By  Maria Victoria Mcdonald RN               Admin Date  06/30/2025 Action  Rate/Dose Change Dose   Rate  204 mL/hr Route  intravenous Documented By  Chapin Duncan RN              montelukast (Singulair) tablet 10 mg       Admin Date  06/30/2025 Action  Given Dose  10 mg Route  oral Documented By  Chapin Duncan RN              teclistamab-cqyv (Tecvayli) subcutaneous solution 153 mg       Admin Date  06/30/2025 Action  Given Dose  153 mg Route  subcutaneous Documented By  Chapin  Perla RN                    Bmbx r- glute clear   Pt tolerated and was discharged to home in stable condition. Pt ambulated  off the unit with a slow and steady gait. Pt had no further questions or concerns at this time. Has follow up appointment appropriately scheduled. Verbalized understanding of follow up. Made aware that appointments and times are always available online on my chart.

## 2025-07-01 LAB
ALBUMIN SERPL BCP-MCNC: 4 G/DL (ref 3.4–5)
ALP SERPL-CCNC: 52 U/L (ref 33–136)
ALT SERPL W P-5'-P-CCNC: 22 U/L (ref 10–52)
ANION GAP SERPL CALC-SCNC: 13 MMOL/L (ref 10–20)
AST SERPL W P-5'-P-CCNC: 27 U/L (ref 9–39)
BILIRUB SERPL-MCNC: 0.9 MG/DL (ref 0–1.2)
BUN SERPL-MCNC: 30 MG/DL (ref 6–23)
CALCIUM SERPL-MCNC: 9.4 MG/DL (ref 8.6–10.6)
CHLORIDE SERPL-SCNC: 109 MMOL/L (ref 98–107)
CO2 SERPL-SCNC: 23 MMOL/L (ref 21–32)
CREAT SERPL-MCNC: 1.11 MG/DL (ref 0.5–1.3)
EGFRCR SERPLBLD CKD-EPI 2021: 69 ML/MIN/1.73M*2
GLUCOSE SERPL-MCNC: 101 MG/DL (ref 74–99)
KAPPA LC SERPL-MCNC: 3.13 MG/DL (ref 0.33–1.94)
KAPPA LC/LAMBDA SER: ABNORMAL {RATIO}
LAMBDA LC SERPL-MCNC: <0.17 MG/DL (ref 0.57–2.63)
POTASSIUM SERPL-SCNC: 4.1 MMOL/L (ref 3.5–5.3)
PROT SERPL-MCNC: 5.9 G/DL (ref 6.4–8.2)
PROT SERPL-MCNC: 5.9 G/DL (ref 6.4–8.2)
SODIUM SERPL-SCNC: 141 MMOL/L (ref 136–145)

## 2025-07-02 ENCOUNTER — APPOINTMENT (OUTPATIENT)
Dept: HEMATOLOGY/ONCOLOGY | Facility: CLINIC | Age: 75
End: 2025-07-02
Payer: MEDICARE

## 2025-07-02 LAB
ALBUMIN: 3.9 G/DL (ref 3.4–5)
ALPHA 1 GLOBULIN: 0.2 G/DL (ref 0.2–0.6)
ALPHA 2 GLOBULIN: 0.7 G/DL (ref 0.4–1.1)
BETA GLOBULIN: 0.6 G/DL (ref 0.5–1.2)
GAMMA GLOBULIN: 0.5 G/DL (ref 0.5–1.4)
IMMUNOFIXATION COMMENT: NORMAL
PATH REVIEW - SERUM IMMUNOFIXATION: NORMAL
PATH REVIEW-SERUM PROTEIN ELECTROPHORESIS: NORMAL
PROTEIN ELECTROPHORESIS COMMENT: NORMAL

## 2025-07-07 ENCOUNTER — LAB (OUTPATIENT)
Dept: LAB | Facility: CLINIC | Age: 75
End: 2025-07-07
Payer: MEDICARE

## 2025-07-07 ENCOUNTER — INFUSION (OUTPATIENT)
Dept: HEMATOLOGY/ONCOLOGY | Facility: CLINIC | Age: 75
End: 2025-07-07
Payer: MEDICARE

## 2025-07-07 VITALS
DIASTOLIC BLOOD PRESSURE: 83 MMHG | OXYGEN SATURATION: 97 % | TEMPERATURE: 98.6 F | HEART RATE: 86 BPM | BODY MASS INDEX: 31.83 KG/M2 | SYSTOLIC BLOOD PRESSURE: 127 MMHG | WEIGHT: 217.15 LBS | RESPIRATION RATE: 16 BRPM

## 2025-07-07 DIAGNOSIS — C90.00 IGG MULTIPLE MYELOMA (MULTI): ICD-10-CM

## 2025-07-07 LAB
BASOPHILS # BLD AUTO: 0.02 X10*3/UL (ref 0–0.1)
BASOPHILS NFR BLD AUTO: 0.6 %
EOSINOPHIL # BLD AUTO: 0.06 X10*3/UL (ref 0–0.4)
EOSINOPHIL NFR BLD AUTO: 1.9 %
ERYTHROCYTE [DISTWIDTH] IN BLOOD BY AUTOMATED COUNT: 12.6 % (ref 11.5–14.5)
HCT VFR BLD AUTO: 34.5 % (ref 41–52)
HGB BLD-MCNC: 12.2 G/DL (ref 13.5–17.5)
IMM GRANULOCYTES # BLD AUTO: 0.01 X10*3/UL (ref 0–0.5)
IMM GRANULOCYTES NFR BLD AUTO: 0.3 % (ref 0–0.9)
LYMPHOCYTES # BLD AUTO: 1.14 X10*3/UL (ref 0.8–3)
LYMPHOCYTES NFR BLD AUTO: 36.8 %
MCH RBC QN AUTO: 33.9 PG (ref 26–34)
MCHC RBC AUTO-ENTMCNC: 35.4 G/DL (ref 32–36)
MCV RBC AUTO: 96 FL (ref 80–100)
MONOCYTES # BLD AUTO: 0.52 X10*3/UL (ref 0.05–0.8)
MONOCYTES NFR BLD AUTO: 16.8 %
NEUTROPHILS # BLD AUTO: 1.35 X10*3/UL (ref 1.6–5.5)
NEUTROPHILS NFR BLD AUTO: 43.6 %
NRBC BLD-RTO: ABNORMAL /100{WBCS}
PLATELET # BLD AUTO: 108 X10*3/UL (ref 150–450)
RBC # BLD AUTO: 3.6 X10*6/UL (ref 4.5–5.9)
WBC # BLD AUTO: 3.1 X10*3/UL (ref 4.4–11.3)

## 2025-07-07 PROCEDURE — 96401 CHEMO ANTI-NEOPL SQ/IM: CPT

## 2025-07-07 PROCEDURE — 2500000004 HC RX 250 GENERAL PHARMACY W/ HCPCS (ALT 636 FOR OP/ED): Mod: JZ,TB

## 2025-07-07 PROCEDURE — 36415 COLL VENOUS BLD VENIPUNCTURE: CPT

## 2025-07-07 PROCEDURE — 85025 COMPLETE CBC W/AUTO DIFF WBC: CPT

## 2025-07-07 RX ORDER — ALBUTEROL SULFATE 0.83 MG/ML
3 SOLUTION RESPIRATORY (INHALATION) AS NEEDED
Status: DISCONTINUED | OUTPATIENT
Start: 2025-07-07 | End: 2025-07-07 | Stop reason: HOSPADM

## 2025-07-07 RX ORDER — PROCHLORPERAZINE EDISYLATE 5 MG/ML
10 INJECTION INTRAMUSCULAR; INTRAVENOUS EVERY 6 HOURS PRN
Status: DISCONTINUED | OUTPATIENT
Start: 2025-07-07 | End: 2025-07-07 | Stop reason: HOSPADM

## 2025-07-07 RX ORDER — FAMOTIDINE 10 MG/ML
20 INJECTION, SOLUTION INTRAVENOUS ONCE AS NEEDED
Status: DISCONTINUED | OUTPATIENT
Start: 2025-07-07 | End: 2025-07-07 | Stop reason: HOSPADM

## 2025-07-07 RX ORDER — EPINEPHRINE 0.3 MG/.3ML
0.3 INJECTION SUBCUTANEOUS EVERY 5 MIN PRN
Status: DISCONTINUED | OUTPATIENT
Start: 2025-07-07 | End: 2025-07-07 | Stop reason: HOSPADM

## 2025-07-07 RX ORDER — DIPHENHYDRAMINE HYDROCHLORIDE 50 MG/ML
50 INJECTION, SOLUTION INTRAMUSCULAR; INTRAVENOUS AS NEEDED
Status: DISCONTINUED | OUTPATIENT
Start: 2025-07-07 | End: 2025-07-07 | Stop reason: HOSPADM

## 2025-07-07 RX ORDER — PROCHLORPERAZINE MALEATE 10 MG
10 TABLET ORAL EVERY 6 HOURS PRN
Status: DISCONTINUED | OUTPATIENT
Start: 2025-07-07 | End: 2025-07-07 | Stop reason: HOSPADM

## 2025-07-07 RX ADMIN — TECLISTAMAB 153 MG: 90 INJECTION SUBCUTANEOUS at 09:24

## 2025-07-07 ASSESSMENT — PAIN SCALES - GENERAL: PAINLEVEL_OUTOF10: 0-NO PAIN

## 2025-07-07 NOTE — PROGRESS NOTES
Patient is here today for C10D8 Teclistamab injection- no complications since last being seen-  Independent double check done prior to injection today-   b/h/ lung sounds not auscultated  Patient tolerated injection well.  No complaints. Call back instructions reviewed.    Patient verbalizes understanding of plan of care.  Ambulated off unit without difficulty, steady gait.

## 2025-07-09 ENCOUNTER — APPOINTMENT (OUTPATIENT)
Dept: HEMATOLOGY/ONCOLOGY | Facility: CLINIC | Age: 75
End: 2025-07-09
Payer: MEDICARE

## 2025-07-09 LAB
CELL COUNT (BLOOD): 3.26 X10*3/UL
CELL POPULATIONS: NORMAL
DIAGNOSIS: NORMAL
FLOW DIFFERENTIAL: NORMAL
FLOW TEST ORDERED: NORMAL
LAB TEST METHOD: NORMAL
NUMBER OF CELLS COLLECTED: NORMAL PER TUBE
PATH REPORT.COMMENTS IMP SPEC: NORMAL
PATH REPORT.FINAL DX SPEC: NORMAL
PATH REPORT.GROSS SPEC: NORMAL
PATH REPORT.MICROSCOPIC SPEC OTHER STN: NORMAL
PATH REPORT.RELEVANT HX SPEC: NORMAL
PATH REPORT.TOTAL CANCER: NORMAL
PATH REPORT.TOTAL CANCER: NORMAL
SIGNATURE COMMENT: NORMAL
SPECIMEN VIABILITY: NORMAL

## 2025-07-11 LAB
CHROM ANALY OVERALL INTERP-IMP: NORMAL
ELECTRONICALLY SIGNED BY CYTOGENETICS: NORMAL

## 2025-07-14 ENCOUNTER — INFUSION (OUTPATIENT)
Dept: HEMATOLOGY/ONCOLOGY | Facility: CLINIC | Age: 75
End: 2025-07-14
Payer: MEDICARE

## 2025-07-14 VITALS
HEART RATE: 80 BPM | TEMPERATURE: 97.9 F | RESPIRATION RATE: 16 BRPM | SYSTOLIC BLOOD PRESSURE: 125 MMHG | BODY MASS INDEX: 32.09 KG/M2 | WEIGHT: 218.92 LBS | OXYGEN SATURATION: 99 % | DIASTOLIC BLOOD PRESSURE: 80 MMHG

## 2025-07-14 DIAGNOSIS — C90.00 IGG MULTIPLE MYELOMA (MULTI): ICD-10-CM

## 2025-07-14 LAB
BASOPHILS # BLD AUTO: 0.01 X10*3/UL (ref 0–0.1)
BASOPHILS NFR BLD AUTO: 0.3 %
EOSINOPHIL # BLD AUTO: 0.07 X10*3/UL (ref 0–0.4)
EOSINOPHIL NFR BLD AUTO: 2.3 %
ERYTHROCYTE [DISTWIDTH] IN BLOOD BY AUTOMATED COUNT: 13 % (ref 11.5–14.5)
HCT VFR BLD AUTO: 34.6 % (ref 41–52)
HGB BLD-MCNC: 12.1 G/DL (ref 13.5–17.5)
IMM GRANULOCYTES # BLD AUTO: 0.01 X10*3/UL (ref 0–0.5)
IMM GRANULOCYTES NFR BLD AUTO: 0.3 % (ref 0–0.9)
LYMPHOCYTES # BLD AUTO: 1.09 X10*3/UL (ref 0.8–3)
LYMPHOCYTES NFR BLD AUTO: 36 %
MCH RBC QN AUTO: 34 PG (ref 26–34)
MCHC RBC AUTO-ENTMCNC: 35 G/DL (ref 32–36)
MCV RBC AUTO: 97 FL (ref 80–100)
MONOCYTES # BLD AUTO: 0.41 X10*3/UL (ref 0.05–0.8)
MONOCYTES NFR BLD AUTO: 13.5 %
NEUTROPHILS # BLD AUTO: 1.44 X10*3/UL (ref 1.6–5.5)
NEUTROPHILS NFR BLD AUTO: 47.6 %
NRBC BLD-RTO: ABNORMAL /100{WBCS}
PLATELET # BLD AUTO: 113 X10*3/UL (ref 150–450)
RBC # BLD AUTO: 3.56 X10*6/UL (ref 4.5–5.9)
WBC # BLD AUTO: 3 X10*3/UL (ref 4.4–11.3)

## 2025-07-14 PROCEDURE — 36415 COLL VENOUS BLD VENIPUNCTURE: CPT

## 2025-07-14 PROCEDURE — 2500000004 HC RX 250 GENERAL PHARMACY W/ HCPCS (ALT 636 FOR OP/ED): Mod: JZ,TB

## 2025-07-14 PROCEDURE — 85025 COMPLETE CBC W/AUTO DIFF WBC: CPT

## 2025-07-14 PROCEDURE — 96401 CHEMO ANTI-NEOPL SQ/IM: CPT

## 2025-07-14 RX ORDER — EPINEPHRINE 0.3 MG/.3ML
0.3 INJECTION SUBCUTANEOUS EVERY 5 MIN PRN
Status: DISCONTINUED | OUTPATIENT
Start: 2025-07-14 | End: 2025-07-14 | Stop reason: HOSPADM

## 2025-07-14 RX ORDER — PROCHLORPERAZINE EDISYLATE 5 MG/ML
10 INJECTION INTRAMUSCULAR; INTRAVENOUS EVERY 6 HOURS PRN
Status: DISCONTINUED | OUTPATIENT
Start: 2025-07-14 | End: 2025-07-14 | Stop reason: HOSPADM

## 2025-07-14 RX ORDER — ALBUTEROL SULFATE 0.83 MG/ML
3 SOLUTION RESPIRATORY (INHALATION) AS NEEDED
Status: DISCONTINUED | OUTPATIENT
Start: 2025-07-14 | End: 2025-07-14 | Stop reason: HOSPADM

## 2025-07-14 RX ORDER — DIPHENHYDRAMINE HYDROCHLORIDE 50 MG/ML
50 INJECTION, SOLUTION INTRAMUSCULAR; INTRAVENOUS AS NEEDED
Status: DISCONTINUED | OUTPATIENT
Start: 2025-07-14 | End: 2025-07-14 | Stop reason: HOSPADM

## 2025-07-14 RX ORDER — FAMOTIDINE 10 MG/ML
20 INJECTION, SOLUTION INTRAVENOUS ONCE AS NEEDED
Status: DISCONTINUED | OUTPATIENT
Start: 2025-07-14 | End: 2025-07-14 | Stop reason: HOSPADM

## 2025-07-14 RX ORDER — PROCHLORPERAZINE MALEATE 10 MG
10 TABLET ORAL EVERY 6 HOURS PRN
Status: DISCONTINUED | OUTPATIENT
Start: 2025-07-14 | End: 2025-07-14 | Stop reason: HOSPADM

## 2025-07-14 RX ADMIN — TECLISTAMAB 153 MG: 90 INJECTION SUBCUTANEOUS at 09:26

## 2025-07-14 ASSESSMENT — PAIN SCALES - GENERAL: PAINLEVEL_OUTOF10: 0-NO PAIN

## 2025-07-14 NOTE — PROGRESS NOTES
[facility] Infusion Note      Maldonado Mccloud is a 74 y.o. year old male here today,07/14/25,  in the [facility] infusion center for  following treatment regimen:  Medications given today :    Recent Labs     07/14/25  0833   NEUTROABS 1.44*   *   HGB 12.1*      CrCl cannot be calculated (Patient's most recent lab result is older than the maximum 7 days allowed.).  BP Readings from Last 2 Encounters:   07/14/25 125/80   07/07/25 127/83       Hold treatment and notify provider if: ,  ANC LESS THAN 0.5 x 10E9/L,  Platelets LESS THAN 50 x 10E9/L,  Hemoglobin LESS THAN 8 g/dL      Administrations This Visit       teclistamab-cqyv (Tecvayli) subcutaneous solution 153 mg       Admin Date  07/14/2025 Action  Given Dose  153 mg Route  subcutaneous Documented By  Chapin Duncan RN                    Vitals:    07/14/25 0839   BP: 125/80   Pulse: 80   Resp: 16   Temp: 36.6 °C (97.9 °F)   SpO2: 99%    on intake  Vitals:    07/14/25 0839   Weight: 99.3 kg (218 lb 14.7 oz)       Body surface area is 2.2 meters squared.    Pt tolerated and was discharged to home in stable condition. Pt ambulated  off the unit with a slow and steady gait. Pt had no further questions or concerns at this time. Has follow up appointment appropriately scheduled. Verbalized understanding of follow up. Made aware that appointments and times are always available online on my chart.

## 2025-07-16 ENCOUNTER — APPOINTMENT (OUTPATIENT)
Dept: HEMATOLOGY/ONCOLOGY | Facility: CLINIC | Age: 75
End: 2025-07-16
Payer: MEDICARE

## 2025-07-21 ENCOUNTER — APPOINTMENT (OUTPATIENT)
Dept: HEMATOLOGY/ONCOLOGY | Facility: CLINIC | Age: 75
End: 2025-07-21
Payer: MEDICARE

## 2025-07-21 ENCOUNTER — INFUSION (OUTPATIENT)
Dept: HEMATOLOGY/ONCOLOGY | Facility: CLINIC | Age: 75
End: 2025-07-21
Payer: MEDICARE

## 2025-07-21 ENCOUNTER — LAB (OUTPATIENT)
Dept: LAB | Facility: CLINIC | Age: 75
End: 2025-07-21
Payer: MEDICARE

## 2025-07-21 VITALS
HEART RATE: 79 BPM | OXYGEN SATURATION: 97 % | TEMPERATURE: 97.9 F | BODY MASS INDEX: 32.26 KG/M2 | WEIGHT: 220.02 LBS | SYSTOLIC BLOOD PRESSURE: 128 MMHG | RESPIRATION RATE: 16 BRPM | DIASTOLIC BLOOD PRESSURE: 83 MMHG

## 2025-07-21 DIAGNOSIS — C90.00 IGG MULTIPLE MYELOMA (MULTI): ICD-10-CM

## 2025-07-21 DIAGNOSIS — D80.1 HYPOGAMMAGLOBULINEMIA DUE TO MULTIPLE MYELOMA (MULTI): ICD-10-CM

## 2025-07-21 DIAGNOSIS — C90.00 IGG MULTIPLE MYELOMA (MULTI): Primary | ICD-10-CM

## 2025-07-21 DIAGNOSIS — C90.00 HYPOGAMMAGLOBULINEMIA DUE TO MULTIPLE MYELOMA (MULTI): ICD-10-CM

## 2025-07-21 LAB
BASOPHILS # BLD AUTO: 0.02 X10*3/UL (ref 0–0.1)
BASOPHILS NFR BLD AUTO: 0.5 %
EOSINOPHIL # BLD AUTO: 0.05 X10*3/UL (ref 0–0.4)
EOSINOPHIL NFR BLD AUTO: 1.3 %
ERYTHROCYTE [DISTWIDTH] IN BLOOD BY AUTOMATED COUNT: 13 % (ref 11.5–14.5)
HCT VFR BLD AUTO: 35.4 % (ref 41–52)
HGB BLD-MCNC: 12.4 G/DL (ref 13.5–17.5)
IMM GRANULOCYTES # BLD AUTO: 0 X10*3/UL (ref 0–0.5)
IMM GRANULOCYTES NFR BLD AUTO: 0 % (ref 0–0.9)
LYMPHOCYTES # BLD AUTO: 0.92 X10*3/UL (ref 0.8–3)
LYMPHOCYTES NFR BLD AUTO: 23.8 %
MCH RBC QN AUTO: 34.3 PG (ref 26–34)
MCHC RBC AUTO-ENTMCNC: 35 G/DL (ref 32–36)
MCV RBC AUTO: 98 FL (ref 80–100)
MONOCYTES # BLD AUTO: 0.63 X10*3/UL (ref 0.05–0.8)
MONOCYTES NFR BLD AUTO: 16.3 %
NEUTROPHILS # BLD AUTO: 2.25 X10*3/UL (ref 1.6–5.5)
NEUTROPHILS NFR BLD AUTO: 58.1 %
NRBC BLD-RTO: ABNORMAL /100{WBCS}
PLATELET # BLD AUTO: 138 X10*3/UL (ref 150–450)
RBC # BLD AUTO: 3.61 X10*6/UL (ref 4.5–5.9)
WBC # BLD AUTO: 3.9 X10*3/UL (ref 4.4–11.3)

## 2025-07-21 PROCEDURE — 36415 COLL VENOUS BLD VENIPUNCTURE: CPT

## 2025-07-21 PROCEDURE — 86334 IMMUNOFIX E-PHORESIS SERUM: CPT

## 2025-07-21 PROCEDURE — 80053 COMPREHEN METABOLIC PANEL: CPT

## 2025-07-21 PROCEDURE — 82784 ASSAY IGA/IGD/IGG/IGM EACH: CPT

## 2025-07-21 PROCEDURE — 84155 ASSAY OF PROTEIN SERUM: CPT

## 2025-07-21 PROCEDURE — 2500000004 HC RX 250 GENERAL PHARMACY W/ HCPCS (ALT 636 FOR OP/ED): Mod: JZ,TB

## 2025-07-21 PROCEDURE — 96401 CHEMO ANTI-NEOPL SQ/IM: CPT

## 2025-07-21 PROCEDURE — 83521 IG LIGHT CHAINS FREE EACH: CPT

## 2025-07-21 PROCEDURE — 85025 COMPLETE CBC W/AUTO DIFF WBC: CPT

## 2025-07-21 RX ORDER — PROCHLORPERAZINE EDISYLATE 5 MG/ML
10 INJECTION INTRAMUSCULAR; INTRAVENOUS EVERY 6 HOURS PRN
OUTPATIENT
Start: 2025-08-18

## 2025-07-21 RX ORDER — PROCHLORPERAZINE MALEATE 10 MG
10 TABLET ORAL EVERY 6 HOURS PRN
OUTPATIENT
Start: 2025-09-08

## 2025-07-21 RX ORDER — FAMOTIDINE 10 MG/ML
20 INJECTION, SOLUTION INTRAVENOUS ONCE AS NEEDED
OUTPATIENT
Start: 2025-09-15

## 2025-07-21 RX ORDER — EPINEPHRINE 0.3 MG/.3ML
0.3 INJECTION SUBCUTANEOUS EVERY 5 MIN PRN
OUTPATIENT
Start: 2025-09-08

## 2025-07-21 RX ORDER — PROCHLORPERAZINE MALEATE 10 MG
10 TABLET ORAL EVERY 6 HOURS PRN
OUTPATIENT
Start: 2025-08-11

## 2025-07-21 RX ORDER — DIPHENHYDRAMINE HYDROCHLORIDE 50 MG/ML
50 INJECTION, SOLUTION INTRAMUSCULAR; INTRAVENOUS AS NEEDED
Status: CANCELLED | OUTPATIENT
Start: 2025-07-28

## 2025-07-21 RX ORDER — DIPHENHYDRAMINE HYDROCHLORIDE 50 MG/ML
50 INJECTION, SOLUTION INTRAMUSCULAR; INTRAVENOUS AS NEEDED
OUTPATIENT
Start: 2025-08-25

## 2025-07-21 RX ORDER — FAMOTIDINE 10 MG/ML
20 INJECTION, SOLUTION INTRAVENOUS ONCE AS NEEDED
OUTPATIENT
Start: 2025-08-25

## 2025-07-21 RX ORDER — DIPHENHYDRAMINE HYDROCHLORIDE 50 MG/ML
50 INJECTION, SOLUTION INTRAMUSCULAR; INTRAVENOUS AS NEEDED
OUTPATIENT
Start: 2025-08-04

## 2025-07-21 RX ORDER — ALBUTEROL SULFATE 0.83 MG/ML
3 SOLUTION RESPIRATORY (INHALATION) AS NEEDED
Status: CANCELLED | OUTPATIENT
Start: 2025-07-28

## 2025-07-21 RX ORDER — EPINEPHRINE 0.3 MG/.3ML
0.3 INJECTION SUBCUTANEOUS EVERY 5 MIN PRN
Status: DISCONTINUED | OUTPATIENT
Start: 2025-07-21 | End: 2025-07-21 | Stop reason: HOSPADM

## 2025-07-21 RX ORDER — EPINEPHRINE 0.3 MG/.3ML
0.3 INJECTION SUBCUTANEOUS EVERY 5 MIN PRN
Status: CANCELLED | OUTPATIENT
Start: 2025-07-28

## 2025-07-21 RX ORDER — PROCHLORPERAZINE EDISYLATE 5 MG/ML
10 INJECTION INTRAMUSCULAR; INTRAVENOUS EVERY 6 HOURS PRN
OUTPATIENT
Start: 2025-09-15

## 2025-07-21 RX ORDER — DIPHENHYDRAMINE HYDROCHLORIDE 50 MG/ML
50 INJECTION, SOLUTION INTRAMUSCULAR; INTRAVENOUS AS NEEDED
OUTPATIENT
Start: 2025-09-08

## 2025-07-21 RX ORDER — ALBUTEROL SULFATE 0.83 MG/ML
3 SOLUTION RESPIRATORY (INHALATION) AS NEEDED
OUTPATIENT
Start: 2025-09-08

## 2025-07-21 RX ORDER — PROCHLORPERAZINE EDISYLATE 5 MG/ML
10 INJECTION INTRAMUSCULAR; INTRAVENOUS EVERY 6 HOURS PRN
OUTPATIENT
Start: 2025-09-01

## 2025-07-21 RX ORDER — EPINEPHRINE 0.3 MG/.3ML
0.3 INJECTION SUBCUTANEOUS EVERY 5 MIN PRN
OUTPATIENT
Start: 2025-08-18

## 2025-07-21 RX ORDER — ALBUTEROL SULFATE 0.83 MG/ML
3 SOLUTION RESPIRATORY (INHALATION) AS NEEDED
OUTPATIENT
Start: 2025-09-01

## 2025-07-21 RX ORDER — FAMOTIDINE 10 MG/ML
20 INJECTION, SOLUTION INTRAVENOUS ONCE AS NEEDED
Status: DISCONTINUED | OUTPATIENT
Start: 2025-07-21 | End: 2025-07-21 | Stop reason: HOSPADM

## 2025-07-21 RX ORDER — ALBUTEROL SULFATE 0.83 MG/ML
3 SOLUTION RESPIRATORY (INHALATION) AS NEEDED
OUTPATIENT
Start: 2025-08-11

## 2025-07-21 RX ORDER — FAMOTIDINE 10 MG/ML
20 INJECTION, SOLUTION INTRAVENOUS ONCE AS NEEDED
OUTPATIENT
Start: 2025-09-08

## 2025-07-21 RX ORDER — EPINEPHRINE 0.3 MG/.3ML
0.3 INJECTION SUBCUTANEOUS EVERY 5 MIN PRN
OUTPATIENT
Start: 2025-09-01

## 2025-07-21 RX ORDER — DIPHENHYDRAMINE HYDROCHLORIDE 50 MG/ML
50 INJECTION, SOLUTION INTRAMUSCULAR; INTRAVENOUS AS NEEDED
OUTPATIENT
Start: 2025-08-11

## 2025-07-21 RX ORDER — DIPHENHYDRAMINE HYDROCHLORIDE 50 MG/ML
50 INJECTION, SOLUTION INTRAMUSCULAR; INTRAVENOUS AS NEEDED
OUTPATIENT
Start: 2025-09-01

## 2025-07-21 RX ORDER — DIPHENHYDRAMINE HYDROCHLORIDE 50 MG/ML
50 INJECTION, SOLUTION INTRAMUSCULAR; INTRAVENOUS AS NEEDED
OUTPATIENT
Start: 2025-08-18

## 2025-07-21 RX ORDER — PROCHLORPERAZINE EDISYLATE 5 MG/ML
10 INJECTION INTRAMUSCULAR; INTRAVENOUS EVERY 6 HOURS PRN
OUTPATIENT
Start: 2025-08-04

## 2025-07-21 RX ORDER — ALBUTEROL SULFATE 0.83 MG/ML
3 SOLUTION RESPIRATORY (INHALATION) AS NEEDED
OUTPATIENT
Start: 2025-08-04

## 2025-07-21 RX ORDER — ALBUTEROL SULFATE 0.83 MG/ML
3 SOLUTION RESPIRATORY (INHALATION) AS NEEDED
OUTPATIENT
Start: 2025-09-15

## 2025-07-21 RX ORDER — EPINEPHRINE 0.3 MG/.3ML
0.3 INJECTION SUBCUTANEOUS EVERY 5 MIN PRN
OUTPATIENT
Start: 2025-09-15

## 2025-07-21 RX ORDER — PROCHLORPERAZINE MALEATE 10 MG
10 TABLET ORAL EVERY 6 HOURS PRN
OUTPATIENT
Start: 2025-09-15

## 2025-07-21 RX ORDER — ALBUTEROL SULFATE 0.83 MG/ML
3 SOLUTION RESPIRATORY (INHALATION) AS NEEDED
OUTPATIENT
Start: 2025-08-25

## 2025-07-21 RX ORDER — PROCHLORPERAZINE MALEATE 10 MG
10 TABLET ORAL EVERY 6 HOURS PRN
OUTPATIENT
Start: 2025-08-18

## 2025-07-21 RX ORDER — PROCHLORPERAZINE MALEATE 10 MG
10 TABLET ORAL EVERY 6 HOURS PRN
Status: DISCONTINUED | OUTPATIENT
Start: 2025-07-21 | End: 2025-07-21 | Stop reason: HOSPADM

## 2025-07-21 RX ORDER — PROCHLORPERAZINE EDISYLATE 5 MG/ML
10 INJECTION INTRAMUSCULAR; INTRAVENOUS EVERY 6 HOURS PRN
OUTPATIENT
Start: 2025-08-11

## 2025-07-21 RX ORDER — EPINEPHRINE 0.3 MG/.3ML
0.3 INJECTION SUBCUTANEOUS EVERY 5 MIN PRN
OUTPATIENT
Start: 2025-08-11

## 2025-07-21 RX ORDER — PROCHLORPERAZINE MALEATE 10 MG
10 TABLET ORAL EVERY 6 HOURS PRN
OUTPATIENT
Start: 2025-09-01

## 2025-07-21 RX ORDER — DIPHENHYDRAMINE HYDROCHLORIDE 50 MG/ML
50 INJECTION, SOLUTION INTRAMUSCULAR; INTRAVENOUS AS NEEDED
OUTPATIENT
Start: 2025-09-15

## 2025-07-21 RX ORDER — FAMOTIDINE 10 MG/ML
20 INJECTION, SOLUTION INTRAVENOUS ONCE AS NEEDED
OUTPATIENT
Start: 2025-08-11

## 2025-07-21 RX ORDER — PROCHLORPERAZINE EDISYLATE 5 MG/ML
10 INJECTION INTRAMUSCULAR; INTRAVENOUS EVERY 6 HOURS PRN
OUTPATIENT
Start: 2025-08-25

## 2025-07-21 RX ORDER — ALBUTEROL SULFATE 0.83 MG/ML
3 SOLUTION RESPIRATORY (INHALATION) AS NEEDED
Status: DISCONTINUED | OUTPATIENT
Start: 2025-07-21 | End: 2025-07-21 | Stop reason: HOSPADM

## 2025-07-21 RX ORDER — FAMOTIDINE 10 MG/ML
20 INJECTION, SOLUTION INTRAVENOUS ONCE AS NEEDED
OUTPATIENT
Start: 2025-08-04

## 2025-07-21 RX ORDER — ALBUTEROL SULFATE 0.83 MG/ML
3 SOLUTION RESPIRATORY (INHALATION) AS NEEDED
OUTPATIENT
Start: 2025-08-18

## 2025-07-21 RX ORDER — PROCHLORPERAZINE MALEATE 10 MG
10 TABLET ORAL EVERY 6 HOURS PRN
OUTPATIENT
Start: 2025-08-25

## 2025-07-21 RX ORDER — FAMOTIDINE 10 MG/ML
20 INJECTION, SOLUTION INTRAVENOUS ONCE AS NEEDED
OUTPATIENT
Start: 2025-09-01

## 2025-07-21 RX ORDER — PROCHLORPERAZINE EDISYLATE 5 MG/ML
10 INJECTION INTRAMUSCULAR; INTRAVENOUS EVERY 6 HOURS PRN
Status: CANCELLED | OUTPATIENT
Start: 2025-07-28

## 2025-07-21 RX ORDER — PROCHLORPERAZINE EDISYLATE 5 MG/ML
10 INJECTION INTRAMUSCULAR; INTRAVENOUS EVERY 6 HOURS PRN
Status: DISCONTINUED | OUTPATIENT
Start: 2025-07-21 | End: 2025-07-21 | Stop reason: HOSPADM

## 2025-07-21 RX ORDER — FAMOTIDINE 10 MG/ML
20 INJECTION, SOLUTION INTRAVENOUS ONCE AS NEEDED
Status: CANCELLED | OUTPATIENT
Start: 2025-07-28

## 2025-07-21 RX ORDER — PROCHLORPERAZINE EDISYLATE 5 MG/ML
10 INJECTION INTRAMUSCULAR; INTRAVENOUS EVERY 6 HOURS PRN
OUTPATIENT
Start: 2025-09-08

## 2025-07-21 RX ORDER — FAMOTIDINE 10 MG/ML
20 INJECTION, SOLUTION INTRAVENOUS ONCE AS NEEDED
OUTPATIENT
Start: 2025-08-18

## 2025-07-21 RX ORDER — DIPHENHYDRAMINE HYDROCHLORIDE 50 MG/ML
50 INJECTION, SOLUTION INTRAMUSCULAR; INTRAVENOUS AS NEEDED
Status: DISCONTINUED | OUTPATIENT
Start: 2025-07-21 | End: 2025-07-21 | Stop reason: HOSPADM

## 2025-07-21 RX ORDER — EPINEPHRINE 0.3 MG/.3ML
0.3 INJECTION SUBCUTANEOUS EVERY 5 MIN PRN
OUTPATIENT
Start: 2025-08-04

## 2025-07-21 RX ORDER — PROCHLORPERAZINE MALEATE 10 MG
10 TABLET ORAL EVERY 6 HOURS PRN
Status: CANCELLED | OUTPATIENT
Start: 2025-07-28

## 2025-07-21 RX ORDER — EPINEPHRINE 0.3 MG/.3ML
0.3 INJECTION SUBCUTANEOUS EVERY 5 MIN PRN
OUTPATIENT
Start: 2025-08-25

## 2025-07-21 RX ORDER — PROCHLORPERAZINE MALEATE 10 MG
10 TABLET ORAL EVERY 6 HOURS PRN
OUTPATIENT
Start: 2025-08-04

## 2025-07-21 RX ADMIN — TECLISTAMAB 153 MG: 90 INJECTION SUBCUTANEOUS at 09:49

## 2025-07-21 ASSESSMENT — PAIN SCALES - GENERAL: PAINLEVEL_OUTOF10: 0-NO PAIN

## 2025-07-21 NOTE — PROGRESS NOTES
Patient here for teclistimab.  IVIG one week early so will receive next week.  Patient and wife agreeable. Wife at side.  Admits to continued freq cough.  Patient states nasal drainage.  Has not tried allergy type medicine.  Suggested that along with humidifier in room at night (if okay with provider).  No new symptoms. NICK Arechiga NP in to see patient.  Patient tolerated injection without issue.   Aware of next appt date/time.  Patient independently ambulatory off unit in NAD and without complaints.  Gait steady.  Call back instructions reviewed.  Patient verbalized understanding.

## 2025-07-22 LAB
ALBUMIN SERPL BCP-MCNC: 4 G/DL (ref 3.4–5)
ALP SERPL-CCNC: 59 U/L (ref 33–136)
ALT SERPL W P-5'-P-CCNC: 26 U/L (ref 10–52)
ANION GAP SERPL CALC-SCNC: 14 MMOL/L (ref 10–20)
AST SERPL W P-5'-P-CCNC: 30 U/L (ref 9–39)
BILIRUB SERPL-MCNC: 0.7 MG/DL (ref 0–1.2)
BUN SERPL-MCNC: 30 MG/DL (ref 6–23)
CALCIUM SERPL-MCNC: 9.6 MG/DL (ref 8.6–10.6)
CHLORIDE SERPL-SCNC: 109 MMOL/L (ref 98–107)
CO2 SERPL-SCNC: 24 MMOL/L (ref 21–32)
CREAT SERPL-MCNC: 1.27 MG/DL (ref 0.5–1.3)
EGFRCR SERPLBLD CKD-EPI 2021: 59 ML/MIN/1.73M*2
GLUCOSE SERPL-MCNC: 112 MG/DL (ref 74–99)
IGA SERPL-MCNC: <7 MG/DL (ref 70–400)
IGG SERPL-MCNC: 635 MG/DL (ref 700–1600)
IGM SERPL-MCNC: 5 MG/DL (ref 40–230)
KAPPA LC SERPL-MCNC: 7.03 MG/DL (ref 0.33–1.94)
KAPPA LC/LAMBDA SER: ABNORMAL {RATIO}
LAMBDA LC SERPL-MCNC: <0.17 MG/DL (ref 0.57–2.63)
POTASSIUM SERPL-SCNC: 4.8 MMOL/L (ref 3.5–5.3)
PROT SERPL-MCNC: 6.1 G/DL (ref 6.4–8.2)
PROT SERPL-MCNC: 6.1 G/DL (ref 6.4–8.2)
SODIUM SERPL-SCNC: 142 MMOL/L (ref 136–145)

## 2025-07-23 ENCOUNTER — APPOINTMENT (OUTPATIENT)
Dept: HEMATOLOGY/ONCOLOGY | Facility: CLINIC | Age: 75
End: 2025-07-23
Payer: MEDICARE

## 2025-07-23 NOTE — PROGRESS NOTES
Patient ID: Maldonado Mccloud is a 74 y.o. male.  Referring Physician: No referring provider defined for this encounter.  Primary Care Provider: Timothy Almodovar MD    Interval hx:  Since last visit he has had a lot less left shoulder and neck pains. Is still c/o a hacking cough, now x 12 weeks. He also is c/o sinus congestion and some rhinorrhea. He denies having had , fevers, chills, sweats, palpable FELY, nausea, vomiting, diarrhea, skin rashes, chest/abd/back pains, , headaches, nosebleeds, GI,  bleeding, vision complaints. No change  in chronic neuropathic symptoms.      PMHx:  1) Multiple myeloma (see below)  2) Squamous cell ca's skin  3) DVT, LLE ; now off AC  4) Neuropathy  5) S/p right radius fracture 2023  6) HTN  7) Afib (transient in  w/stem cell collection)    FamHx  Dad  of pan ca age 92    SocHx:   w/5 kids; smokes cigars; no cigs in 50 years; no EtOH or illicit drugs. Was in [employer]    Meds:  Were reviewed w/the pt  All:  [employer]  PCN  Zos[employer]    Physical Exam  General: Ambulatory, looked comfortable, not requiring supplemental oxygen  Vital Signs   /92; P 100; afebrile; RR 16/min; Wt= 99.8 (gained 1.8 kg)    HEENT: no oral lesions, tonsillar or tongue enlargement; Neck: no masses; Lymph nodes: no palpable cervical, supraclavicular, axillary, epitrochear or inguinal nodes; Heart: no murmurs; Lungs: clear; Abd: soft, +bowel sounds, non-tender, no palpable liver or spleen; Skin: no rashes; Ext's: No LE edema; Neuro: alert, answered questions appropriately,  5/5 motor strength upper and lower extremities      Performance Status:  Symptomatic; fully ambulatory      Assessment/recommendations:  74 year old man w/a hx of LLE DVT (, now off AC), HTN, neuropathy, multiple skin cancers and multiply relapsed multiple myeloma [presented with 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; Bone marrow biopsy suggested multiple myeloma IgG kappa,  "ISS Stage II, bone survey revealed lytic lesions; Urine light chains present kappa restricted with 2gm proteinuria; s/p VRD x 3 cycles with CR; then s/p auto SCTx 12/23/15 complicated by orthostatic hypotension, electrolyte replacement, drug induced rash, culture negative fever, then s/p approximately 5 weeks of maintenance Revlimid 10 mg daily which was held for worsening neuropathy in August 2016; then in 3/ 2018: IgG M Braxton alexei to 0.2gm/L and light chain alexei was; restarted on Revlimid maintenance at 5mg every other day, then revlimid stopped in 3/2021 due to stable disease; then in 7/2021 his M spike was on the rise, and was enrolled onto Vactosertib/Pom trial on 8/5/2021; then progressed in 6/2022, switched to Isatuximab/Carfilzomib on 6/30/22, then in 9/2022 progressed and was switched to Cytoxan/Carfilzomib; then had therapy placed on hold in 11/2022 due to need for hip replacement surgery; myeloma markers were on the rise in 3/2023 with a new jaw lesion, s/p XRT 4/2023 and resume 2x/week carfilzomib; then s/p CAR T w/ABECMA (T=0, 10/25/23), hospital course complicated by grade 1 ICANS managed w/ Dex 10mg x3 days and 1 dose tociluzimab; then w/progression and started on teclistamab, full dosing given on 10/13/24] here for follow-up.     As for myeloma:  In late May he was c/o worsening left shoulder and neck pains.  Plain films of c-spine and left shoulder revealed the following:  \"Severe facet disease worse in the mid cervical spine and on the left. Moderate severe multilevel spondylosis worse at C5-C6 and C6-7. Mild foraminal narrowing bilaterally worse at C5-C6 and C6-7 on the right at C3-C4 and C4-C5 on the left\" and \"moderate osteoarthritis in the AC joint. Deformity and sclerosis in the acromion concerning for underlying subacute to remote fracture  versus sequelae of degenerative changes\". His free kappa light chains still are normal but rising w/in the normal range. Subsequent MRI of c-spine anc " "left shoulder were inconclusive (\"somewhat of a micronodular appearance throughout the bones of the visualized cervical spine, which can indicate diffuse myelomatous involvement\" and left shoulder images revealed a \"heterogeneous marrow signal in the acromion and to a lesser extent in the distal clavicle and in the scapular body with imaging characteristics suggestive of treated myelomatous lesions given patient's history and recent PET. Active disease is difficult to exclude with MRI however...Repeat PET can be performed for further evaluation\"). The subsequent PET/CT was read as being c/w progression of myeloma (\"findings consistent with disease progression with new right iliac bone lesion and increased size and avidity of left iliac bone lesion....similar appearance of hypermetabolic left acetabular lesion...lytic focus in the L3 vertebral body with associated FDG uptake appears to represent a Schmorl's node, although disease involvement is not excluded in the setting of multiple myeloma...scattered lytic lesions throughout much of the axial and appendicular skeleton without significant FDG avidity consistent with treated myelomatous lesions\"). However, the images were reviewed at this morning's [facility] and the consensus was that the ?lesion in right iliac bone and left were NOT c/w progression of myeloma and it was recommended to just monitor for changes. Subsequent BM asp/bx from 6/30/25 did not reveal any residual plasma cells. Repeat labs from 7/21/25 revealed continued mild increase in serum free kappa light chains (to 7.03mg/dL, or 70.3 mg/L); no hypercalcemia, renal impairment or sig anemia. He technically does not yet meet criteria for progression in need of treatment change, per [facility], but is getting close. Will plan to cont weekly teclistamab for now and have him follow-up in 4 weeks. If/when he does definitively progress will offer talquetamab.    As for persistent cough (x 12 " weeks):  Resp viral panel on 5/19 was all negative, except for rhinovirus. Given his persistence of cough, will add doxycline (previously was on azithromycin, and has PCN allergic) and check CT scans of sinus and chest (w/o contrast as he has myeloma and borderline renal fxn).     May tentatively follow-up w/me in 4 weeks.

## 2025-07-24 ENCOUNTER — OFFICE VISIT (OUTPATIENT)
Dept: HEMATOLOGY/ONCOLOGY | Facility: HOSPITAL | Age: 75
End: 2025-07-24
Payer: MEDICARE

## 2025-07-24 VITALS
BODY MASS INDEX: 32.27 KG/M2 | TEMPERATURE: 96.4 F | RESPIRATION RATE: 16 BRPM | WEIGHT: 220.1 LBS | OXYGEN SATURATION: 100 % | DIASTOLIC BLOOD PRESSURE: 92 MMHG | SYSTOLIC BLOOD PRESSURE: 135 MMHG | HEART RATE: 74 BPM

## 2025-07-24 DIAGNOSIS — C90.00 IGG MULTIPLE MYELOMA (MULTI): Primary | ICD-10-CM

## 2025-07-24 LAB
ALBUMIN: 3.8 G/DL (ref 3.4–5)
ALPHA 1 GLOBULIN: 0.3 G/DL (ref 0.2–0.6)
ALPHA 2 GLOBULIN: 0.8 G/DL (ref 0.4–1.1)
BETA GLOBULIN: 0.7 G/DL (ref 0.5–1.2)
GAMMA GLOBULIN: 0.6 G/DL (ref 0.5–1.4)
IMMUNOFIXATION COMMENT: NORMAL
PATH REVIEW - SERUM IMMUNOFIXATION: NORMAL
PATH REVIEW-SERUM PROTEIN ELECTROPHORESIS: NORMAL
PROTEIN ELECTROPHORESIS COMMENT: NORMAL

## 2025-07-24 PROCEDURE — 99215 OFFICE O/P EST HI 40 MIN: CPT | Performed by: INTERNAL MEDICINE

## 2025-07-24 PROCEDURE — 3080F DIAST BP >= 90 MM HG: CPT | Performed by: INTERNAL MEDICINE

## 2025-07-24 PROCEDURE — 1126F AMNT PAIN NOTED NONE PRSNT: CPT | Performed by: INTERNAL MEDICINE

## 2025-07-24 PROCEDURE — 3075F SYST BP GE 130 - 139MM HG: CPT | Performed by: INTERNAL MEDICINE

## 2025-07-24 RX ORDER — DOXYCYCLINE 100 MG/1
100 TABLET ORAL 2 TIMES DAILY
Qty: 20 TABLET | Refills: 0 | Status: SHIPPED | OUTPATIENT
Start: 2025-07-24 | End: 2025-08-03

## 2025-07-24 ASSESSMENT — PAIN SCALES - GENERAL: PAINLEVEL_OUTOF10: 0-NO PAIN

## 2025-07-25 ENCOUNTER — TELEPHONE (OUTPATIENT)
Dept: HEMATOLOGY/ONCOLOGY | Facility: HOSPITAL | Age: 75
End: 2025-07-25
Payer: MEDICARE

## 2025-07-25 ENCOUNTER — HOSPITAL ENCOUNTER (OUTPATIENT)
Dept: RADIOLOGY | Facility: CLINIC | Age: 75
Discharge: HOME | End: 2025-07-25
Payer: MEDICARE

## 2025-07-25 DIAGNOSIS — C90.00 IGG MULTIPLE MYELOMA (MULTI): ICD-10-CM

## 2025-07-25 PROCEDURE — 70486 CT MAXILLOFACIAL W/O DYE: CPT

## 2025-07-25 PROCEDURE — 71250 CT THORAX DX C-: CPT

## 2025-07-25 NOTE — PROGRESS NOTES
SEEN TODAY7/24 with Dr Ching.   Reviewed all labs and B.M biopsy 6/30-- all results given.   Needs  ct chest and sinus  without contrast  7/25 scheduled at [facility] 8/4- ON VACATION--weekly tx's at [facility] ON 7/28 IVIG AND TECLIST.   Need to fix 8/25 will need 240min  for ivig and teclist   Will see dr ching  on 8/21 and joslyn martinez on 9/22    All scheduling orders placed but need completed.Brandi Miller RN

## 2025-07-25 NOTE — TELEPHONE ENCOUNTER
I called the pt w/the recent neg CT chest and went over the CT sinus result; there was no answer so I left a message and instructed him to call me back w/any questions.

## 2025-07-28 ENCOUNTER — INFUSION (OUTPATIENT)
Dept: HEMATOLOGY/ONCOLOGY | Facility: CLINIC | Age: 75
End: 2025-07-28
Payer: MEDICARE

## 2025-07-28 ENCOUNTER — LAB (OUTPATIENT)
Dept: LAB | Facility: CLINIC | Age: 75
End: 2025-07-28
Payer: MEDICARE

## 2025-07-28 VITALS
TEMPERATURE: 98.1 F | WEIGHT: 221.67 LBS | SYSTOLIC BLOOD PRESSURE: 143 MMHG | BODY MASS INDEX: 32.5 KG/M2 | DIASTOLIC BLOOD PRESSURE: 79 MMHG | OXYGEN SATURATION: 96 % | RESPIRATION RATE: 14 BRPM | HEART RATE: 60 BPM

## 2025-07-28 DIAGNOSIS — C90.00 IGG MULTIPLE MYELOMA (MULTI): ICD-10-CM

## 2025-07-28 DIAGNOSIS — C90.00 MULTIPLE MYELOMA WITHOUT REMISSION (MULTI): ICD-10-CM

## 2025-07-28 DIAGNOSIS — Z92.850 HISTORY OF CHIMERIC ANTIGEN RECEPTOR T-CELL THERAPY: ICD-10-CM

## 2025-07-28 DIAGNOSIS — C90.00 HYPOGAMMAGLOBULINEMIA DUE TO MULTIPLE MYELOMA (MULTI): ICD-10-CM

## 2025-07-28 DIAGNOSIS — D80.1 HYPOGAMMAGLOBULINEMIA DUE TO MULTIPLE MYELOMA (MULTI): ICD-10-CM

## 2025-07-28 LAB
ALBUMIN SERPL BCP-MCNC: 4.1 G/DL (ref 3.4–5)
ALP SERPL-CCNC: 64 U/L (ref 33–136)
ALT SERPL W P-5'-P-CCNC: 22 U/L (ref 10–52)
ANION GAP SERPL CALC-SCNC: 13 MMOL/L (ref 10–20)
AST SERPL W P-5'-P-CCNC: 26 U/L (ref 9–39)
BASOPHILS # BLD AUTO: 0.02 X10*3/UL (ref 0–0.1)
BASOPHILS NFR BLD AUTO: 0.7 %
BILIRUB SERPL-MCNC: 0.7 MG/DL (ref 0–1.2)
BUN SERPL-MCNC: 25 MG/DL (ref 6–23)
CALCIUM SERPL-MCNC: 9.5 MG/DL (ref 8.6–10.6)
CHLORIDE SERPL-SCNC: 105 MMOL/L (ref 98–107)
CO2 SERPL-SCNC: 26 MMOL/L (ref 21–32)
CREAT SERPL-MCNC: 1.41 MG/DL (ref 0.5–1.3)
EGFRCR SERPLBLD CKD-EPI 2021: 52 ML/MIN/1.73M*2
EOSINOPHIL # BLD AUTO: 0.07 X10*3/UL (ref 0–0.4)
EOSINOPHIL NFR BLD AUTO: 2.6 %
ERYTHROCYTE [DISTWIDTH] IN BLOOD BY AUTOMATED COUNT: 13 % (ref 11.5–14.5)
GLUCOSE SERPL-MCNC: 110 MG/DL (ref 74–99)
HCT VFR BLD AUTO: 35.2 % (ref 41–52)
HGB BLD-MCNC: 12 G/DL (ref 13.5–17.5)
IMM GRANULOCYTES # BLD AUTO: 0 X10*3/UL (ref 0–0.5)
IMM GRANULOCYTES NFR BLD AUTO: 0 % (ref 0–0.9)
LYMPHOCYTES # BLD AUTO: 1.1 X10*3/UL (ref 0.8–3)
LYMPHOCYTES NFR BLD AUTO: 40.3 %
MCH RBC QN AUTO: 33.4 PG (ref 26–34)
MCHC RBC AUTO-ENTMCNC: 34.1 G/DL (ref 32–36)
MCV RBC AUTO: 98 FL (ref 80–100)
MONOCYTES # BLD AUTO: 0.5 X10*3/UL (ref 0.05–0.8)
MONOCYTES NFR BLD AUTO: 18.3 %
NEUTROPHILS # BLD AUTO: 1.04 X10*3/UL (ref 1.6–5.5)
NEUTROPHILS NFR BLD AUTO: 38.1 %
NRBC BLD-RTO: ABNORMAL /100{WBCS}
PLATELET # BLD AUTO: 116 X10*3/UL (ref 150–450)
POTASSIUM SERPL-SCNC: 4.4 MMOL/L (ref 3.5–5.3)
PROT SERPL-MCNC: 6.1 G/DL (ref 6.4–8.2)
RBC # BLD AUTO: 3.59 X10*6/UL (ref 4.5–5.9)
SODIUM SERPL-SCNC: 140 MMOL/L (ref 136–145)
WBC # BLD AUTO: 2.7 X10*3/UL (ref 4.4–11.3)

## 2025-07-28 PROCEDURE — 96365 THER/PROPH/DIAG IV INF INIT: CPT | Mod: INF

## 2025-07-28 PROCEDURE — 85025 COMPLETE CBC W/AUTO DIFF WBC: CPT

## 2025-07-28 PROCEDURE — 96375 TX/PRO/DX INJ NEW DRUG ADDON: CPT | Mod: INF

## 2025-07-28 PROCEDURE — 96366 THER/PROPH/DIAG IV INF ADDON: CPT | Mod: INF

## 2025-07-28 PROCEDURE — 2500000001 HC RX 250 WO HCPCS SELF ADMINISTERED DRUGS (ALT 637 FOR MEDICARE OP)

## 2025-07-28 PROCEDURE — 96401 CHEMO ANTI-NEOPL SQ/IM: CPT

## 2025-07-28 PROCEDURE — 80053 COMPREHEN METABOLIC PANEL: CPT

## 2025-07-28 PROCEDURE — 36415 COLL VENOUS BLD VENIPUNCTURE: CPT

## 2025-07-28 PROCEDURE — 2500000004 HC RX 250 GENERAL PHARMACY W/ HCPCS (ALT 636 FOR OP/ED)

## 2025-07-28 PROCEDURE — 2500000004 HC RX 250 GENERAL PHARMACY W/ HCPCS (ALT 636 FOR OP/ED): Mod: JZ,TB

## 2025-07-28 RX ORDER — PROCHLORPERAZINE EDISYLATE 5 MG/ML
10 INJECTION INTRAMUSCULAR; INTRAVENOUS EVERY 6 HOURS PRN
Status: DISCONTINUED | OUTPATIENT
Start: 2025-07-28 | End: 2025-07-28 | Stop reason: HOSPADM

## 2025-07-28 RX ORDER — ALBUTEROL SULFATE 0.83 MG/ML
3 SOLUTION RESPIRATORY (INHALATION) AS NEEDED
OUTPATIENT
Start: 2025-08-11

## 2025-07-28 RX ORDER — MONTELUKAST SODIUM 10 MG/1
10 TABLET ORAL ONCE
Status: COMPLETED | OUTPATIENT
Start: 2025-07-28 | End: 2025-07-28

## 2025-07-28 RX ORDER — FAMOTIDINE 10 MG/ML
20 INJECTION INTRAVENOUS ONCE
Status: COMPLETED | OUTPATIENT
Start: 2025-07-28 | End: 2025-07-28

## 2025-07-28 RX ORDER — FAMOTIDINE 10 MG/ML
20 INJECTION, SOLUTION INTRAVENOUS ONCE AS NEEDED
OUTPATIENT
Start: 2025-08-11

## 2025-07-28 RX ORDER — DIPHENHYDRAMINE HYDROCHLORIDE 50 MG/ML
50 INJECTION, SOLUTION INTRAMUSCULAR; INTRAVENOUS AS NEEDED
OUTPATIENT
Start: 2025-08-11

## 2025-07-28 RX ORDER — EPINEPHRINE 0.3 MG/.3ML
0.3 INJECTION SUBCUTANEOUS EVERY 5 MIN PRN
OUTPATIENT
Start: 2025-08-11

## 2025-07-28 RX ORDER — ALBUTEROL SULFATE 0.83 MG/ML
3 SOLUTION RESPIRATORY (INHALATION) AS NEEDED
Status: DISCONTINUED | OUTPATIENT
Start: 2025-07-28 | End: 2025-07-28 | Stop reason: HOSPADM

## 2025-07-28 RX ORDER — MONTELUKAST SODIUM 10 MG/1
10 TABLET ORAL ONCE
Start: 2025-08-11 | End: 2025-08-11

## 2025-07-28 RX ORDER — FAMOTIDINE 10 MG/ML
20 INJECTION, SOLUTION INTRAVENOUS ONCE AS NEEDED
Status: DISCONTINUED | OUTPATIENT
Start: 2025-07-28 | End: 2025-07-28 | Stop reason: HOSPADM

## 2025-07-28 RX ORDER — FAMOTIDINE 10 MG/ML
20 INJECTION INTRAVENOUS ONCE
Start: 2025-08-11 | End: 2025-08-11

## 2025-07-28 RX ORDER — ACETAMINOPHEN 325 MG/1
650 TABLET ORAL ONCE
OUTPATIENT
Start: 2025-08-11

## 2025-07-28 RX ORDER — DIPHENHYDRAMINE HYDROCHLORIDE 50 MG/ML
50 INJECTION, SOLUTION INTRAMUSCULAR; INTRAVENOUS AS NEEDED
Status: DISCONTINUED | OUTPATIENT
Start: 2025-07-28 | End: 2025-07-28 | Stop reason: HOSPADM

## 2025-07-28 RX ORDER — PROCHLORPERAZINE MALEATE 10 MG
10 TABLET ORAL EVERY 6 HOURS PRN
Status: DISCONTINUED | OUTPATIENT
Start: 2025-07-28 | End: 2025-07-28 | Stop reason: HOSPADM

## 2025-07-28 RX ORDER — HEPARIN 100 UNIT/ML
500 SYRINGE INTRAVENOUS AS NEEDED
OUTPATIENT
Start: 2025-07-28

## 2025-07-28 RX ORDER — HEPARIN SODIUM,PORCINE/PF 10 UNIT/ML
50 SYRINGE (ML) INTRAVENOUS AS NEEDED
OUTPATIENT
Start: 2025-07-28

## 2025-07-28 RX ORDER — ACETAMINOPHEN 325 MG/1
650 TABLET ORAL ONCE
Status: COMPLETED | OUTPATIENT
Start: 2025-07-28 | End: 2025-07-28

## 2025-07-28 RX ORDER — DIPHENHYDRAMINE HYDROCHLORIDE 50 MG/ML
25 INJECTION, SOLUTION INTRAMUSCULAR; INTRAVENOUS ONCE
Start: 2025-08-11 | End: 2025-08-11

## 2025-07-28 RX ORDER — EPINEPHRINE 0.3 MG/.3ML
0.3 INJECTION SUBCUTANEOUS EVERY 5 MIN PRN
Status: DISCONTINUED | OUTPATIENT
Start: 2025-07-28 | End: 2025-07-28 | Stop reason: HOSPADM

## 2025-07-28 RX ORDER — DIPHENHYDRAMINE HYDROCHLORIDE 50 MG/ML
25 INJECTION, SOLUTION INTRAMUSCULAR; INTRAVENOUS ONCE
Status: COMPLETED | OUTPATIENT
Start: 2025-07-28 | End: 2025-07-28

## 2025-07-28 RX ADMIN — IMMUNE GLOBULIN (HUMAN) 20 G: 10 INJECTION INTRAVENOUS; SUBCUTANEOUS at 09:34

## 2025-07-28 RX ADMIN — MONTELUKAST 10 MG: 10 TABLET, FILM COATED ORAL at 09:07

## 2025-07-28 RX ADMIN — TECLISTAMAB 153 MG: 90 INJECTION SUBCUTANEOUS at 11:14

## 2025-07-28 RX ADMIN — FAMOTIDINE 20 MG: 10 INJECTION, SOLUTION INTRAVENOUS at 09:08

## 2025-07-28 RX ADMIN — ACETAMINOPHEN 650 MG: 325 TABLET ORAL at 09:07

## 2025-07-28 RX ADMIN — DIPHENHYDRAMINE HYDROCHLORIDE 25 MG: 50 INJECTION INTRAMUSCULAR; INTRAVENOUS at 09:07

## 2025-07-28 ASSESSMENT — PAIN SCALES - GENERAL: PAINLEVEL_OUTOF10: 2

## 2025-07-28 NOTE — PROGRESS NOTES
Patient here for IVIG and talvey.  Wife at side. IVIG at  prescribed rates:   51 x 30 minutes  102 x 30 minutes  204 x 30 minutes  408 till complete    Patient tolerated infusion and injection without complaints.  IV site benign. No redness, swelling or bleeding noted  2x2 and coban applied. Patient independently ambulatory off unit in NAD and without complaints.  Gait steady.  Call back instructions reviewed.  Patient verbalized understanding.

## 2025-07-30 ENCOUNTER — APPOINTMENT (OUTPATIENT)
Dept: HEMATOLOGY/ONCOLOGY | Facility: CLINIC | Age: 75
End: 2025-07-30
Payer: MEDICARE

## 2025-07-31 ENCOUNTER — APPOINTMENT (OUTPATIENT)
Dept: HEMATOLOGY/ONCOLOGY | Facility: HOSPITAL | Age: 75
End: 2025-07-31
Payer: MEDICARE

## 2025-08-06 ENCOUNTER — APPOINTMENT (OUTPATIENT)
Dept: HEMATOLOGY/ONCOLOGY | Facility: CLINIC | Age: 75
End: 2025-08-06
Payer: MEDICARE

## 2025-08-11 ENCOUNTER — LAB (OUTPATIENT)
Dept: LAB | Facility: CLINIC | Age: 75
End: 2025-08-11
Payer: MEDICARE

## 2025-08-11 ENCOUNTER — INFUSION (OUTPATIENT)
Dept: HEMATOLOGY/ONCOLOGY | Facility: CLINIC | Age: 75
End: 2025-08-11
Payer: MEDICARE

## 2025-08-11 VITALS
BODY MASS INDEX: 31.93 KG/M2 | TEMPERATURE: 97.3 F | SYSTOLIC BLOOD PRESSURE: 114 MMHG | WEIGHT: 217.81 LBS | HEART RATE: 75 BPM | RESPIRATION RATE: 16 BRPM | OXYGEN SATURATION: 95 % | DIASTOLIC BLOOD PRESSURE: 79 MMHG

## 2025-08-11 DIAGNOSIS — C90.00 IGG MULTIPLE MYELOMA (MULTI): ICD-10-CM

## 2025-08-11 LAB
BASOPHILS # BLD AUTO: 0.02 X10*3/UL (ref 0–0.1)
BASOPHILS NFR BLD AUTO: 0.7 %
EOSINOPHIL # BLD AUTO: 0.05 X10*3/UL (ref 0–0.4)
EOSINOPHIL NFR BLD AUTO: 1.7 %
ERYTHROCYTE [DISTWIDTH] IN BLOOD BY AUTOMATED COUNT: 12.7 % (ref 11.5–14.5)
HCT VFR BLD AUTO: 35.9 % (ref 41–52)
HGB BLD-MCNC: 12.6 G/DL (ref 13.5–17.5)
IMM GRANULOCYTES # BLD AUTO: 0 X10*3/UL (ref 0–0.5)
IMM GRANULOCYTES NFR BLD AUTO: 0 % (ref 0–0.9)
LYMPHOCYTES # BLD AUTO: 1.44 X10*3/UL (ref 0.8–3)
LYMPHOCYTES NFR BLD AUTO: 50.2 %
MCH RBC QN AUTO: 34.1 PG (ref 26–34)
MCHC RBC AUTO-ENTMCNC: 35.1 G/DL (ref 32–36)
MCV RBC AUTO: 97 FL (ref 80–100)
MONOCYTES # BLD AUTO: 0.5 X10*3/UL (ref 0.05–0.8)
MONOCYTES NFR BLD AUTO: 17.4 %
NEUTROPHILS # BLD AUTO: 0.86 X10*3/UL (ref 1.6–5.5)
NEUTROPHILS NFR BLD AUTO: 30 %
NRBC BLD-RTO: ABNORMAL /100{WBCS}
PLATELET # BLD AUTO: 110 X10*3/UL (ref 150–450)
RBC # BLD AUTO: 3.7 X10*6/UL (ref 4.5–5.9)
WBC # BLD AUTO: 2.9 X10*3/UL (ref 4.4–11.3)

## 2025-08-11 PROCEDURE — 2500000004 HC RX 250 GENERAL PHARMACY W/ HCPCS (ALT 636 FOR OP/ED): Mod: JZ,TB

## 2025-08-11 PROCEDURE — 36415 COLL VENOUS BLD VENIPUNCTURE: CPT

## 2025-08-11 PROCEDURE — 85025 COMPLETE CBC W/AUTO DIFF WBC: CPT

## 2025-08-11 PROCEDURE — 96401 CHEMO ANTI-NEOPL SQ/IM: CPT

## 2025-08-11 RX ORDER — EPINEPHRINE 0.3 MG/.3ML
0.3 INJECTION SUBCUTANEOUS EVERY 5 MIN PRN
Status: DISCONTINUED | OUTPATIENT
Start: 2025-08-11 | End: 2025-08-11 | Stop reason: HOSPADM

## 2025-08-11 RX ORDER — PROCHLORPERAZINE MALEATE 10 MG
10 TABLET ORAL EVERY 6 HOURS PRN
Status: DISCONTINUED | OUTPATIENT
Start: 2025-08-11 | End: 2025-08-11 | Stop reason: HOSPADM

## 2025-08-11 RX ORDER — ALBUTEROL SULFATE 0.83 MG/ML
3 SOLUTION RESPIRATORY (INHALATION) AS NEEDED
Status: DISCONTINUED | OUTPATIENT
Start: 2025-08-11 | End: 2025-08-11 | Stop reason: HOSPADM

## 2025-08-11 RX ORDER — DIPHENHYDRAMINE HYDROCHLORIDE 50 MG/ML
50 INJECTION, SOLUTION INTRAMUSCULAR; INTRAVENOUS AS NEEDED
Status: DISCONTINUED | OUTPATIENT
Start: 2025-08-11 | End: 2025-08-11 | Stop reason: HOSPADM

## 2025-08-11 RX ORDER — PROCHLORPERAZINE EDISYLATE 5 MG/ML
10 INJECTION INTRAMUSCULAR; INTRAVENOUS EVERY 6 HOURS PRN
Status: DISCONTINUED | OUTPATIENT
Start: 2025-08-11 | End: 2025-08-11 | Stop reason: HOSPADM

## 2025-08-11 RX ORDER — FAMOTIDINE 10 MG/ML
20 INJECTION, SOLUTION INTRAVENOUS ONCE AS NEEDED
Status: DISCONTINUED | OUTPATIENT
Start: 2025-08-11 | End: 2025-08-11 | Stop reason: HOSPADM

## 2025-08-11 RX ADMIN — TECLISTAMAB 153 MG: 90 INJECTION SUBCUTANEOUS at 10:24

## 2025-08-11 ASSESSMENT — PAIN SCALES - GENERAL: PAINLEVEL_OUTOF10: 0-NO PAIN

## 2025-08-13 ENCOUNTER — APPOINTMENT (OUTPATIENT)
Dept: HEMATOLOGY/ONCOLOGY | Facility: CLINIC | Age: 75
End: 2025-08-13
Payer: MEDICARE

## 2025-08-18 ENCOUNTER — INFUSION (OUTPATIENT)
Dept: HEMATOLOGY/ONCOLOGY | Facility: CLINIC | Age: 75
End: 2025-08-18
Payer: MEDICARE

## 2025-08-18 ENCOUNTER — LAB (OUTPATIENT)
Dept: LAB | Facility: CLINIC | Age: 75
End: 2025-08-18
Payer: MEDICARE

## 2025-08-18 VITALS
WEIGHT: 217.48 LBS | OXYGEN SATURATION: 99 % | TEMPERATURE: 97.3 F | RESPIRATION RATE: 16 BRPM | HEART RATE: 69 BPM | BODY MASS INDEX: 31.88 KG/M2 | SYSTOLIC BLOOD PRESSURE: 152 MMHG | DIASTOLIC BLOOD PRESSURE: 92 MMHG

## 2025-08-18 DIAGNOSIS — C90.00 IGG MULTIPLE MYELOMA (MULTI): ICD-10-CM

## 2025-08-18 DIAGNOSIS — Z92.850 HISTORY OF CHIMERIC ANTIGEN RECEPTOR T-CELL THERAPY: ICD-10-CM

## 2025-08-18 DIAGNOSIS — C90.00 HYPOGAMMAGLOBULINEMIA DUE TO MULTIPLE MYELOMA (MULTI): ICD-10-CM

## 2025-08-18 DIAGNOSIS — C90.00 MULTIPLE MYELOMA WITHOUT REMISSION (MULTI): ICD-10-CM

## 2025-08-18 DIAGNOSIS — D80.1 HYPOGAMMAGLOBULINEMIA DUE TO MULTIPLE MYELOMA (MULTI): ICD-10-CM

## 2025-08-18 LAB
BASOPHILS # BLD AUTO: 0.01 X10*3/UL (ref 0–0.1)
BASOPHILS NFR BLD AUTO: 0.4 %
EOSINOPHIL # BLD AUTO: 0.05 X10*3/UL (ref 0–0.4)
EOSINOPHIL NFR BLD AUTO: 1.8 %
ERYTHROCYTE [DISTWIDTH] IN BLOOD BY AUTOMATED COUNT: 12.7 % (ref 11.5–14.5)
HCT VFR BLD AUTO: 36.4 % (ref 41–52)
HGB BLD-MCNC: 12.7 G/DL (ref 13.5–17.5)
IMM GRANULOCYTES # BLD AUTO: 0.01 X10*3/UL (ref 0–0.5)
IMM GRANULOCYTES NFR BLD AUTO: 0.4 % (ref 0–0.9)
LYMPHOCYTES # BLD AUTO: 1 X10*3/UL (ref 0.8–3)
LYMPHOCYTES NFR BLD AUTO: 36.4 %
MCH RBC QN AUTO: 33.9 PG (ref 26–34)
MCHC RBC AUTO-ENTMCNC: 34.9 G/DL (ref 32–36)
MCV RBC AUTO: 97 FL (ref 80–100)
MONOCYTES # BLD AUTO: 0.39 X10*3/UL (ref 0.05–0.8)
MONOCYTES NFR BLD AUTO: 14.2 %
NEUTROPHILS # BLD AUTO: 1.29 X10*3/UL (ref 1.6–5.5)
NEUTROPHILS NFR BLD AUTO: 46.8 %
NRBC BLD-RTO: ABNORMAL /100{WBCS}
PLATELET # BLD AUTO: 114 X10*3/UL (ref 150–450)
RBC # BLD AUTO: 3.75 X10*6/UL (ref 4.5–5.9)
WBC # BLD AUTO: 2.8 X10*3/UL (ref 4.4–11.3)

## 2025-08-18 PROCEDURE — 84165 PROTEIN E-PHORESIS SERUM: CPT

## 2025-08-18 PROCEDURE — 84155 ASSAY OF PROTEIN SERUM: CPT | Mod: 59

## 2025-08-18 PROCEDURE — 84075 ASSAY ALKALINE PHOSPHATASE: CPT

## 2025-08-18 PROCEDURE — 83521 IG LIGHT CHAINS FREE EACH: CPT

## 2025-08-18 PROCEDURE — 36415 COLL VENOUS BLD VENIPUNCTURE: CPT

## 2025-08-18 PROCEDURE — 96401 CHEMO ANTI-NEOPL SQ/IM: CPT

## 2025-08-18 PROCEDURE — 82784 ASSAY IGA/IGD/IGG/IGM EACH: CPT

## 2025-08-18 PROCEDURE — 85025 COMPLETE CBC W/AUTO DIFF WBC: CPT

## 2025-08-18 PROCEDURE — 2500000004 HC RX 250 GENERAL PHARMACY W/ HCPCS (ALT 636 FOR OP/ED): Mod: JZ,TB

## 2025-08-18 RX ORDER — DIPHENHYDRAMINE HYDROCHLORIDE 50 MG/ML
50 INJECTION, SOLUTION INTRAMUSCULAR; INTRAVENOUS AS NEEDED
Status: DISCONTINUED | OUTPATIENT
Start: 2025-08-18 | End: 2025-08-18 | Stop reason: HOSPADM

## 2025-08-18 RX ORDER — EPINEPHRINE 0.3 MG/.3ML
0.3 INJECTION SUBCUTANEOUS EVERY 5 MIN PRN
Status: DISCONTINUED | OUTPATIENT
Start: 2025-08-18 | End: 2025-08-18 | Stop reason: HOSPADM

## 2025-08-18 RX ORDER — PROCHLORPERAZINE EDISYLATE 5 MG/ML
10 INJECTION INTRAMUSCULAR; INTRAVENOUS EVERY 6 HOURS PRN
Status: DISCONTINUED | OUTPATIENT
Start: 2025-08-18 | End: 2025-08-18 | Stop reason: HOSPADM

## 2025-08-18 RX ORDER — ALBUTEROL SULFATE 0.83 MG/ML
3 SOLUTION RESPIRATORY (INHALATION) AS NEEDED
Status: DISCONTINUED | OUTPATIENT
Start: 2025-08-18 | End: 2025-08-18 | Stop reason: HOSPADM

## 2025-08-18 RX ORDER — FAMOTIDINE 10 MG/ML
20 INJECTION, SOLUTION INTRAVENOUS ONCE AS NEEDED
Status: DISCONTINUED | OUTPATIENT
Start: 2025-08-18 | End: 2025-08-18 | Stop reason: HOSPADM

## 2025-08-18 RX ORDER — PROCHLORPERAZINE MALEATE 10 MG
10 TABLET ORAL EVERY 6 HOURS PRN
Status: DISCONTINUED | OUTPATIENT
Start: 2025-08-18 | End: 2025-08-18 | Stop reason: HOSPADM

## 2025-08-18 RX ADMIN — TECLISTAMAB 153 MG: 90 INJECTION SUBCUTANEOUS at 15:03

## 2025-08-18 ASSESSMENT — PAIN SCALES - GENERAL: PAINLEVEL_OUTOF10: 0-NO PAIN

## 2025-08-19 LAB
ALBUMIN SERPL BCP-MCNC: 4.3 G/DL (ref 3.4–5)
ALP SERPL-CCNC: 62 U/L (ref 33–136)
ALT SERPL W P-5'-P-CCNC: 22 U/L (ref 10–52)
ANION GAP SERPL CALC-SCNC: 12 MMOL/L (ref 10–20)
AST SERPL W P-5'-P-CCNC: 27 U/L (ref 9–39)
BILIRUB SERPL-MCNC: 0.9 MG/DL (ref 0–1.2)
BUN SERPL-MCNC: 17 MG/DL (ref 6–23)
CALCIUM SERPL-MCNC: 9.6 MG/DL (ref 8.6–10.6)
CHLORIDE SERPL-SCNC: 106 MMOL/L (ref 98–107)
CO2 SERPL-SCNC: 26 MMOL/L (ref 21–32)
CREAT SERPL-MCNC: 1.35 MG/DL (ref 0.5–1.3)
EGFRCR SERPLBLD CKD-EPI 2021: 55 ML/MIN/1.73M*2
GLUCOSE SERPL-MCNC: 95 MG/DL (ref 74–99)
IGA SERPL-MCNC: <7 MG/DL (ref 70–400)
IGG SERPL-MCNC: 657 MG/DL (ref 700–1600)
IGM SERPL-MCNC: 5 MG/DL (ref 40–230)
KAPPA LC SERPL-MCNC: 13.96 MG/DL (ref 0.33–1.94)
KAPPA LC/LAMBDA SER: ABNORMAL {RATIO}
LAMBDA LC SERPL-MCNC: <0.17 MG/DL (ref 0.57–2.63)
POTASSIUM SERPL-SCNC: 4.3 MMOL/L (ref 3.5–5.3)
PROT SERPL-MCNC: 6.2 G/DL (ref 6.4–8.2)
PROT SERPL-MCNC: 6.2 G/DL (ref 6.4–8.2)
SODIUM SERPL-SCNC: 140 MMOL/L (ref 136–145)

## 2025-08-20 ENCOUNTER — APPOINTMENT (OUTPATIENT)
Dept: HEMATOLOGY/ONCOLOGY | Facility: CLINIC | Age: 75
End: 2025-08-20
Payer: MEDICARE

## 2025-08-21 ENCOUNTER — OFFICE VISIT (OUTPATIENT)
Dept: HEMATOLOGY/ONCOLOGY | Facility: HOSPITAL | Age: 75
End: 2025-08-21
Payer: MEDICARE

## 2025-08-21 ENCOUNTER — ONCOLOGY MEDICATION OUTREACH (OUTPATIENT)
Dept: HEMATOLOGY/ONCOLOGY | Facility: HOSPITAL | Age: 75
End: 2025-08-21
Payer: MEDICARE

## 2025-08-21 ENCOUNTER — INFUSION (OUTPATIENT)
Dept: HEMATOLOGY/ONCOLOGY | Facility: HOSPITAL | Age: 75
End: 2025-08-21
Payer: MEDICARE

## 2025-08-21 VITALS
RESPIRATION RATE: 14 BRPM | DIASTOLIC BLOOD PRESSURE: 75 MMHG | OXYGEN SATURATION: 99 % | TEMPERATURE: 96.8 F | HEART RATE: 89 BPM | BODY MASS INDEX: 31.39 KG/M2 | SYSTOLIC BLOOD PRESSURE: 101 MMHG | WEIGHT: 214.1 LBS

## 2025-08-21 DIAGNOSIS — C90.00 MULTIPLE MYELOMA WITHOUT REMISSION (MULTI): ICD-10-CM

## 2025-08-21 DIAGNOSIS — C90.00 IGG MULTIPLE MYELOMA (MULTI): Primary | ICD-10-CM

## 2025-08-21 PROCEDURE — 3078F DIAST BP <80 MM HG: CPT | Performed by: INTERNAL MEDICINE

## 2025-08-21 PROCEDURE — 1126F AMNT PAIN NOTED NONE PRSNT: CPT | Performed by: INTERNAL MEDICINE

## 2025-08-21 PROCEDURE — 99215 OFFICE O/P EST HI 40 MIN: CPT | Performed by: INTERNAL MEDICINE

## 2025-08-21 PROCEDURE — 3074F SYST BP LT 130 MM HG: CPT | Performed by: INTERNAL MEDICINE

## 2025-08-21 PROCEDURE — 96360 HYDRATION IV INFUSION INIT: CPT

## 2025-08-21 PROCEDURE — 2500000004 HC RX 250 GENERAL PHARMACY W/ HCPCS (ALT 636 FOR OP/ED): Performed by: INTERNAL MEDICINE

## 2025-08-21 PROCEDURE — 96361 HYDRATE IV INFUSION ADD-ON: CPT | Mod: INF

## 2025-08-21 RX ORDER — DIPHENHYDRAMINE HYDROCHLORIDE 50 MG/ML
50 INJECTION, SOLUTION INTRAMUSCULAR; INTRAVENOUS AS NEEDED
OUTPATIENT
Start: 2025-09-08

## 2025-08-21 RX ORDER — FAMOTIDINE 10 MG/ML
20 INJECTION, SOLUTION INTRAVENOUS ONCE AS NEEDED
OUTPATIENT
Start: 2025-09-11

## 2025-08-21 RX ORDER — PROCHLORPERAZINE MALEATE 10 MG
10 TABLET ORAL EVERY 6 HOURS PRN
Qty: 30 TABLET | Refills: 5 | Status: SHIPPED | OUTPATIENT
Start: 2025-08-21

## 2025-08-21 RX ORDER — PROCHLORPERAZINE MALEATE 10 MG
10 TABLET ORAL EVERY 6 HOURS PRN
OUTPATIENT
Start: 2025-09-08

## 2025-08-21 RX ORDER — EPINEPHRINE 0.3 MG/.3ML
0.3 INJECTION SUBCUTANEOUS EVERY 5 MIN PRN
OUTPATIENT
Start: 2025-09-08

## 2025-08-21 RX ORDER — DIPHENHYDRAMINE HCL 50 MG
50 CAPSULE ORAL ONCE
OUTPATIENT
Start: 2025-09-11

## 2025-08-21 RX ORDER — DEXAMETHASONE 4 MG/1
16 TABLET ORAL ONCE
OUTPATIENT
Start: 2025-09-08

## 2025-08-21 RX ORDER — EPINEPHRINE 0.3 MG/.3ML
0.3 INJECTION SUBCUTANEOUS EVERY 5 MIN PRN
OUTPATIENT
Start: 2025-09-11

## 2025-08-21 RX ORDER — PROCHLORPERAZINE EDISYLATE 5 MG/ML
10 INJECTION INTRAMUSCULAR; INTRAVENOUS EVERY 6 HOURS PRN
OUTPATIENT
Start: 2025-09-11

## 2025-08-21 RX ORDER — ONDANSETRON 8 MG/1
8 TABLET, FILM COATED ORAL EVERY 8 HOURS PRN
Qty: 30 TABLET | Refills: 5 | Status: SHIPPED | OUTPATIENT
Start: 2025-08-21

## 2025-08-21 RX ORDER — PROCHLORPERAZINE MALEATE 10 MG
10 TABLET ORAL EVERY 6 HOURS PRN
OUTPATIENT
Start: 2025-09-11

## 2025-08-21 RX ORDER — ALBUTEROL SULFATE 0.83 MG/ML
3 SOLUTION RESPIRATORY (INHALATION) AS NEEDED
OUTPATIENT
Start: 2025-09-14

## 2025-08-21 RX ORDER — PROCHLORPERAZINE MALEATE 10 MG
10 TABLET ORAL EVERY 6 HOURS PRN
OUTPATIENT
Start: 2025-09-14

## 2025-08-21 RX ORDER — EPINEPHRINE 0.3 MG/.3ML
0.3 INJECTION SUBCUTANEOUS EVERY 5 MIN PRN
OUTPATIENT
Start: 2025-09-14

## 2025-08-21 RX ORDER — ALBUTEROL SULFATE 0.83 MG/ML
3 SOLUTION RESPIRATORY (INHALATION) AS NEEDED
OUTPATIENT
Start: 2025-09-11

## 2025-08-21 RX ORDER — PROCHLORPERAZINE EDISYLATE 5 MG/ML
10 INJECTION INTRAMUSCULAR; INTRAVENOUS EVERY 6 HOURS PRN
OUTPATIENT
Start: 2025-09-14

## 2025-08-21 RX ORDER — ALBUTEROL SULFATE 0.83 MG/ML
3 SOLUTION RESPIRATORY (INHALATION) AS NEEDED
OUTPATIENT
Start: 2025-09-08

## 2025-08-21 RX ORDER — DEXAMETHASONE 4 MG/1
8 TABLET ORAL AS NEEDED
Qty: 12 TABLET | Refills: 2 | Status: SHIPPED | OUTPATIENT
Start: 2025-08-21

## 2025-08-21 RX ORDER — DIPHENHYDRAMINE HYDROCHLORIDE 50 MG/ML
50 INJECTION, SOLUTION INTRAMUSCULAR; INTRAVENOUS AS NEEDED
OUTPATIENT
Start: 2025-09-14

## 2025-08-21 RX ORDER — DIPHENHYDRAMINE HCL 50 MG
50 CAPSULE ORAL ONCE
OUTPATIENT
Start: 2025-09-08

## 2025-08-21 RX ORDER — ACETAMINOPHEN 325 MG/1
650 TABLET ORAL ONCE
OUTPATIENT
Start: 2025-09-08

## 2025-08-21 RX ORDER — FAMOTIDINE 10 MG/ML
20 INJECTION, SOLUTION INTRAVENOUS ONCE AS NEEDED
OUTPATIENT
Start: 2025-09-14

## 2025-08-21 RX ORDER — DIPHENHYDRAMINE HCL 50 MG
50 CAPSULE ORAL ONCE
OUTPATIENT
Start: 2025-09-14

## 2025-08-21 RX ORDER — ACETAMINOPHEN 325 MG/1
650 TABLET ORAL ONCE
OUTPATIENT
Start: 2025-09-14

## 2025-08-21 RX ORDER — FAMOTIDINE 10 MG/ML
20 INJECTION, SOLUTION INTRAVENOUS ONCE AS NEEDED
OUTPATIENT
Start: 2025-09-08

## 2025-08-21 RX ORDER — PROCHLORPERAZINE EDISYLATE 5 MG/ML
10 INJECTION INTRAMUSCULAR; INTRAVENOUS EVERY 6 HOURS PRN
OUTPATIENT
Start: 2025-09-08

## 2025-08-21 RX ORDER — ACETAMINOPHEN 325 MG/1
650 TABLET ORAL ONCE
OUTPATIENT
Start: 2025-09-11

## 2025-08-21 RX ORDER — DEXAMETHASONE 4 MG/1
16 TABLET ORAL ONCE
OUTPATIENT
Start: 2025-09-11

## 2025-08-21 RX ORDER — DIPHENHYDRAMINE HYDROCHLORIDE 50 MG/ML
50 INJECTION, SOLUTION INTRAMUSCULAR; INTRAVENOUS AS NEEDED
OUTPATIENT
Start: 2025-09-11

## 2025-08-21 RX ORDER — DEXAMETHASONE 4 MG/1
16 TABLET ORAL ONCE
OUTPATIENT
Start: 2025-09-14

## 2025-08-21 RX ADMIN — SODIUM CHLORIDE 1000 ML: 0.9 INJECTION, SOLUTION INTRAVENOUS at 13:58

## 2025-08-21 ASSESSMENT — PAIN SCALES - GENERAL: PAINLEVEL_OUTOF10: 0-NO PAIN

## 2025-08-24 LAB
ALBUMIN: 3.9 G/DL (ref 3.4–5)
ALPHA 1 GLOBULIN: 0.3 G/DL (ref 0.2–0.6)
ALPHA 2 GLOBULIN: 0.7 G/DL (ref 0.4–1.1)
BETA GLOBULIN: 0.7 G/DL (ref 0.5–1.2)
GAMMA GLOBULIN: 0.6 G/DL (ref 0.5–1.4)
IMMUNOFIXATION COMMENT: NORMAL
PATH REVIEW - SERUM IMMUNOFIXATION: NORMAL
PATH REVIEW-SERUM PROTEIN ELECTROPHORESIS: NORMAL
PROTEIN ELECTROPHORESIS COMMENT: NORMAL

## 2025-08-25 ENCOUNTER — APPOINTMENT (OUTPATIENT)
Dept: HEMATOLOGY/ONCOLOGY | Facility: CLINIC | Age: 75
End: 2025-08-25
Payer: MEDICARE

## 2025-08-28 ENCOUNTER — HOSPITAL ENCOUNTER (OUTPATIENT)
Dept: RADIOLOGY | Facility: CLINIC | Age: 75
Discharge: HOME | End: 2025-08-28
Payer: MEDICARE

## 2025-08-28 DIAGNOSIS — C90.00 IGG MULTIPLE MYELOMA (MULTI): ICD-10-CM

## 2025-08-28 PROCEDURE — 3430000001 HC RX 343 DIAGNOSTIC RADIOPHARMACEUTICALS: Performed by: INTERNAL MEDICINE

## 2025-08-28 PROCEDURE — 78816 PET IMAGE W/CT FULL BODY: CPT | Mod: PS

## 2025-08-28 PROCEDURE — A9552 F18 FDG: HCPCS | Performed by: INTERNAL MEDICINE

## 2025-08-28 RX ORDER — FLUDEOXYGLUCOSE F 18 200 MCI/ML
11.4 INJECTION, SOLUTION INTRAVENOUS
Status: COMPLETED | OUTPATIENT
Start: 2025-08-28 | End: 2025-08-28

## 2025-08-28 RX ADMIN — FLUDEOXYGLUCOSE F 18 11.4 MILLICURIE: 200 INJECTION, SOLUTION INTRAVENOUS at 07:20

## 2025-08-29 ENCOUNTER — APPOINTMENT (OUTPATIENT)
Facility: CLINIC | Age: 75
End: 2025-08-29
Payer: MEDICARE

## 2025-08-29 ENCOUNTER — TELEPHONE (OUTPATIENT)
Dept: HEMATOLOGY/ONCOLOGY | Facility: HOSPITAL | Age: 75
End: 2025-08-29

## 2025-08-29 VITALS
WEIGHT: 224 LBS | HEIGHT: 70 IN | DIASTOLIC BLOOD PRESSURE: 98 MMHG | TEMPERATURE: 98.2 F | HEART RATE: 74 BPM | BODY MASS INDEX: 32.07 KG/M2 | SYSTOLIC BLOOD PRESSURE: 147 MMHG | OXYGEN SATURATION: 97 %

## 2025-08-29 DIAGNOSIS — F17.290 CIGAR SMOKER: ICD-10-CM

## 2025-08-29 DIAGNOSIS — Z87.891 FORMER CIGARETTE SMOKER: ICD-10-CM

## 2025-08-29 DIAGNOSIS — R05.3 CHRONIC COUGH: Primary | ICD-10-CM

## 2025-08-29 PROCEDURE — 99204 OFFICE O/P NEW MOD 45 MIN: CPT

## 2025-08-29 PROCEDURE — 3080F DIAST BP >= 90 MM HG: CPT

## 2025-08-29 PROCEDURE — 1159F MED LIST DOCD IN RCRD: CPT

## 2025-08-29 PROCEDURE — 3077F SYST BP >= 140 MM HG: CPT

## 2025-08-29 PROCEDURE — 1125F AMNT PAIN NOTED PAIN PRSNT: CPT

## 2025-08-29 RX ORDER — FLUTICASONE PROPIONATE 50 MCG
2 SPRAY, SUSPENSION (ML) NASAL DAILY
Qty: 16 G | Refills: 6 | Status: SHIPPED | OUTPATIENT
Start: 2025-08-29 | End: 2026-02-25

## 2025-08-29 RX ORDER — ALBUTEROL SULFATE 90 UG/1
2 INHALANT RESPIRATORY (INHALATION) EVERY 4 HOURS PRN
Qty: 18 G | Refills: 5 | Status: SHIPPED | OUTPATIENT
Start: 2025-08-29 | End: 2026-08-29

## 2025-08-29 RX ORDER — ALBUTEROL SULFATE 0.83 MG/ML
3 SOLUTION RESPIRATORY (INHALATION) ONCE
OUTPATIENT
Start: 2025-08-29 | End: 2025-08-29

## 2025-08-29 RX ORDER — METHACHOLINE CHLORIDE 3 MG/3 ML
3 VIAL, NEBULIZER (ML) INHALATION ONCE AS NEEDED
OUTPATIENT
Start: 2025-08-29

## 2025-08-29 RX ORDER — ALBUTEROL SULFATE 0.83 MG/ML
3 SOLUTION RESPIRATORY (INHALATION) EVERY 10 MIN PRN
OUTPATIENT
Start: 2025-08-29

## 2025-08-29 RX ORDER — ALBUTEROL SULFATE 90 UG/1
1 INHALANT RESPIRATORY (INHALATION) ONCE
OUTPATIENT
Start: 2025-08-29

## 2025-08-29 RX ORDER — METHACHOLINE CHLORIDE 12 MG/3 ML
12 VIAL, NEBULIZER (ML) INHALATION ONCE AS NEEDED
OUTPATIENT
Start: 2025-08-29

## 2025-08-29 RX ORDER — ALBUTEROL SULFATE 90 UG/1
4 INHALANT RESPIRATORY (INHALATION) EVERY 10 MIN PRN
OUTPATIENT
Start: 2025-08-29

## 2025-08-29 RX ORDER — METHACHOLINE CHLORIDE 0 MG/3 ML
3 VIAL, NEBULIZER (ML) INHALATION ONCE AS NEEDED
OUTPATIENT
Start: 2025-08-29

## 2025-08-29 RX ORDER — METHACHOLINE CHLORIDE 0.1875/3ML
0.19 VIAL, NEBULIZER (ML) INHALATION ONCE AS NEEDED
OUTPATIENT
Start: 2025-08-29

## 2025-08-29 RX ORDER — METHACHOLINE CHLORIDE 0.75/3ML
0.75 VIAL, NEBULIZER (ML) INHALATION ONCE AS NEEDED
OUTPATIENT
Start: 2025-08-29

## 2025-08-29 RX ORDER — FLUTICASONE PROPIONATE 50 MCG
2 SPRAY, SUSPENSION (ML) NASAL DAILY
Qty: 16 G | Refills: 6 | Status: SHIPPED | OUTPATIENT
Start: 2025-08-29 | End: 2025-08-29

## 2025-08-29 RX ORDER — METHACHOLINE CHLORIDE 48 MG/3 ML
48 VIAL, NEBULIZER (ML) INHALATION ONCE AS NEEDED
OUTPATIENT
Start: 2025-08-29

## 2025-08-29 ASSESSMENT — PAIN SCALES - GENERAL: PAINLEVEL_OUTOF10: 3

## 2025-09-02 ENCOUNTER — APPOINTMENT (OUTPATIENT)
Dept: HEMATOLOGY/ONCOLOGY | Facility: CLINIC | Age: 75
End: 2025-09-02
Payer: MEDICARE

## 2025-09-03 ENCOUNTER — HOSPITAL ENCOUNTER (OUTPATIENT)
Dept: RESPIRATORY THERAPY | Facility: HOSPITAL | Age: 75
Discharge: HOME | End: 2025-09-03
Payer: MEDICARE

## 2025-09-03 DIAGNOSIS — R05.3 CHRONIC COUGH: ICD-10-CM

## 2025-09-03 LAB
MGC ASCENT PFT - FEV1 - POST: 2.72
MGC ASCENT PFT - FEV1 - POST: 2.72
MGC ASCENT PFT - FEV1 - PRE: 2.49
MGC ASCENT PFT - FEV1 - PRE: 2.49
MGC ASCENT PFT - FEV1 - PREDICTED: 2.9
MGC ASCENT PFT - FEV1 - PREDICTED: 2.9
MGC ASCENT PFT - FVC - POST: 3.98
MGC ASCENT PFT - FVC - POST: 3.98
MGC ASCENT PFT - FVC - PRE: 3.72
MGC ASCENT PFT - FVC - PRE: 3.72
MGC ASCENT PFT - FVC - PREDICTED: 3.9
MGC ASCENT PFT - FVC - PREDICTED: 3.9

## 2025-09-03 PROCEDURE — 2500000001 HC RX 250 WO HCPCS SELF ADMINISTERED DRUGS (ALT 637 FOR MEDICARE OP)

## 2025-09-03 PROCEDURE — 94729 DIFFUSING CAPACITY: CPT

## 2025-09-03 RX ORDER — ALBUTEROL SULFATE 90 UG/1
4 INHALANT RESPIRATORY (INHALATION) ONCE
Status: COMPLETED | OUTPATIENT
Start: 2025-09-03 | End: 2025-09-03

## 2025-09-03 RX ORDER — ALBUTEROL SULFATE 0.83 MG/ML
3 SOLUTION RESPIRATORY (INHALATION) ONCE
Status: COMPLETED | OUTPATIENT
Start: 2025-09-03 | End: 2025-09-03

## 2025-09-03 RX ADMIN — ALBUTEROL SULFATE 4 PUFF: 90 AEROSOL, METERED RESPIRATORY (INHALATION) at 11:43

## 2025-09-08 ENCOUNTER — APPOINTMENT (OUTPATIENT)
Dept: HEMATOLOGY/ONCOLOGY | Facility: CLINIC | Age: 75
End: 2025-09-08
Payer: MEDICARE

## 2025-09-15 ENCOUNTER — APPOINTMENT (OUTPATIENT)
Dept: HEMATOLOGY/ONCOLOGY | Facility: CLINIC | Age: 75
End: 2025-09-15
Payer: MEDICARE

## 2025-09-26 ENCOUNTER — APPOINTMENT (OUTPATIENT)
Facility: CLINIC | Age: 75
End: 2025-09-26
Payer: MEDICARE

## 2025-09-29 ENCOUNTER — APPOINTMENT (OUTPATIENT)
Dept: HEMATOLOGY/ONCOLOGY | Facility: CLINIC | Age: 75
End: 2025-09-29
Payer: MEDICARE

## 2025-10-06 ENCOUNTER — APPOINTMENT (OUTPATIENT)
Dept: HEMATOLOGY/ONCOLOGY | Facility: CLINIC | Age: 75
End: 2025-10-06
Payer: MEDICARE

## 2025-10-07 ENCOUNTER — APPOINTMENT (OUTPATIENT)
Dept: PRIMARY CARE | Facility: CLINIC | Age: 75
End: 2025-10-07
Payer: MEDICARE

## 2025-10-13 ENCOUNTER — APPOINTMENT (OUTPATIENT)
Dept: HEMATOLOGY/ONCOLOGY | Facility: CLINIC | Age: 75
End: 2025-10-13
Payer: MEDICARE

## 2025-10-14 ENCOUNTER — APPOINTMENT (OUTPATIENT)
Dept: PULMONOLOGY | Facility: CLINIC | Age: 75
End: 2025-10-14
Payer: MEDICARE